# Patient Record
Sex: MALE | Race: WHITE | NOT HISPANIC OR LATINO | Employment: OTHER | URBAN - NONMETROPOLITAN AREA
[De-identification: names, ages, dates, MRNs, and addresses within clinical notes are randomized per-mention and may not be internally consistent; named-entity substitution may affect disease eponyms.]

---

## 2021-04-21 ENCOUNTER — APPOINTMENT (EMERGENCY)
Dept: RADIOLOGY | Facility: HOSPITAL | Age: 81
DRG: 872 | End: 2021-04-21
Payer: MEDICARE

## 2021-04-21 ENCOUNTER — HOSPITAL ENCOUNTER (INPATIENT)
Facility: HOSPITAL | Age: 81
LOS: 7 days | Discharge: HOME | DRG: 872 | End: 2021-04-28
Attending: EMERGENCY MEDICINE | Admitting: FAMILY MEDICINE
Payer: MEDICARE

## 2021-04-21 ENCOUNTER — APPOINTMENT (EMERGENCY)
Dept: CT IMAGING | Facility: HOSPITAL | Age: 81
DRG: 872 | End: 2021-04-21
Payer: MEDICARE

## 2021-04-21 DIAGNOSIS — B96.5 BACTEREMIA DUE TO PSEUDOMONAS: ICD-10-CM

## 2021-04-21 DIAGNOSIS — I10 ESSENTIAL HYPERTENSION: ICD-10-CM

## 2021-04-21 DIAGNOSIS — R50.9 FEVER: Primary | ICD-10-CM

## 2021-04-21 DIAGNOSIS — R65.10 SIRS (SYSTEMIC INFLAMMATORY RESPONSE SYNDROME) (HCC): ICD-10-CM

## 2021-04-21 DIAGNOSIS — N39.0 UTI (URINARY TRACT INFECTION): ICD-10-CM

## 2021-04-21 DIAGNOSIS — D72.829 LEUKOCYTOSIS: ICD-10-CM

## 2021-04-21 DIAGNOSIS — E87.2 LACTIC ACIDOSIS: ICD-10-CM

## 2021-04-21 DIAGNOSIS — R94.31 ABNORMAL EKG: ICD-10-CM

## 2021-04-21 DIAGNOSIS — R78.81 BACTEREMIA DUE TO PSEUDOMONAS: ICD-10-CM

## 2021-04-21 PROBLEM — A41.9 SEPSIS (HCC): Status: ACTIVE | Noted: 2021-04-21

## 2021-04-21 PROBLEM — I34.0 MITRAL REGURGITATION: Status: ACTIVE | Noted: 2021-04-21

## 2021-04-21 PROBLEM — E78.5 HYPERLIPIDEMIA: Status: ACTIVE | Noted: 2021-04-21

## 2021-04-21 PROBLEM — I25.810 CORONARY ARTERY DISEASE INVOLVING CORONARY BYPASS GRAFT: Status: ACTIVE | Noted: 2021-04-21

## 2021-04-21 LAB
ALBUMIN SERPL BCP-MCNC: 4 G/DL (ref 3.5–5)
ALP SERPL-CCNC: 136 U/L (ref 46–116)
ALT SERPL W P-5'-P-CCNC: 26 U/L (ref 12–78)
ANION GAP SERPL CALCULATED.3IONS-SCNC: 12 MMOL/L (ref 4–13)
APTT PPP: 28 SECONDS (ref 23–37)
AST SERPL W P-5'-P-CCNC: 14 U/L (ref 5–45)
BACTERIA UR QL AUTO: ABNORMAL /HPF
BASOPHILS # BLD AUTO: 0.04 THOUSANDS/ΜL (ref 0–0.1)
BASOPHILS NFR BLD AUTO: 0 % (ref 0–1)
BILIRUB SERPL-MCNC: 1.65 MG/DL (ref 0.2–1)
BILIRUB UR QL STRIP: NEGATIVE
BUN SERPL-MCNC: 31 MG/DL (ref 5–25)
CALCIUM SERPL-MCNC: 8.2 MG/DL (ref 8.3–10.1)
CHLORIDE SERPL-SCNC: 102 MMOL/L (ref 100–108)
CLARITY UR: ABNORMAL
CO2 SERPL-SCNC: 25 MMOL/L (ref 21–32)
COLOR UR: YELLOW
CREAT SERPL-MCNC: 1.54 MG/DL (ref 0.6–1.3)
EOSINOPHIL # BLD AUTO: 0.11 THOUSAND/ΜL (ref 0–0.61)
EOSINOPHIL NFR BLD AUTO: 1 % (ref 0–6)
ERYTHROCYTE [DISTWIDTH] IN BLOOD BY AUTOMATED COUNT: 15.2 % (ref 11.6–15.1)
FLUAV RNA RESP QL NAA+PROBE: NEGATIVE
FLUBV RNA RESP QL NAA+PROBE: NEGATIVE
GFR SERPL CREATININE-BSD FRML MDRD: 42 ML/MIN/1.73SQ M
GLUCOSE SERPL-MCNC: 146 MG/DL (ref 65–140)
GLUCOSE UR STRIP-MCNC: NEGATIVE MG/DL
HCT VFR BLD AUTO: 35.5 % (ref 36.5–49.3)
HGB BLD-MCNC: 12.3 G/DL (ref 12–17)
HGB UR QL STRIP.AUTO: ABNORMAL
IMM GRANULOCYTES # BLD AUTO: 0.05 THOUSAND/UL (ref 0–0.2)
IMM GRANULOCYTES NFR BLD AUTO: 0 % (ref 0–2)
INR PPP: 1.09 (ref 0.84–1.19)
KETONES UR STRIP-MCNC: NEGATIVE MG/DL
LACTATE SERPL-SCNC: 1.4 MMOL/L (ref 0.5–2)
LACTATE SERPL-SCNC: 2.5 MMOL/L (ref 0.5–2)
LEUKOCYTE ESTERASE UR QL STRIP: ABNORMAL
LIPASE SERPL-CCNC: 159 U/L (ref 73–393)
LYMPHOCYTES # BLD AUTO: 0.96 THOUSANDS/ΜL (ref 0.6–4.47)
LYMPHOCYTES NFR BLD AUTO: 8 % (ref 14–44)
MCH RBC QN AUTO: 33 PG (ref 26.8–34.3)
MCHC RBC AUTO-ENTMCNC: 34.6 G/DL (ref 31.4–37.4)
MCV RBC AUTO: 95 FL (ref 82–98)
MONOCYTES # BLD AUTO: 0.32 THOUSAND/ΜL (ref 0.17–1.22)
MONOCYTES NFR BLD AUTO: 3 % (ref 4–12)
NEUTROPHILS # BLD AUTO: 10.61 THOUSANDS/ΜL (ref 1.85–7.62)
NEUTS SEG NFR BLD AUTO: 88 % (ref 43–75)
NITRITE UR QL STRIP: NEGATIVE
NON-SQ EPI CELLS URNS QL MICRO: ABNORMAL /HPF
NRBC BLD AUTO-RTO: 0 /100 WBCS
NT-PROBNP SERPL-MCNC: 921 PG/ML
PH UR STRIP.AUTO: 6 [PH]
PLATELET # BLD AUTO: 152 THOUSANDS/UL (ref 149–390)
PMV BLD AUTO: 10.2 FL (ref 8.9–12.7)
POTASSIUM SERPL-SCNC: 3.5 MMOL/L (ref 3.5–5.3)
PROT SERPL-MCNC: 7.1 G/DL (ref 6.4–8.2)
PROT UR STRIP-MCNC: ABNORMAL MG/DL
PROTHROMBIN TIME: 13.9 SECONDS (ref 11.6–14.5)
RBC # BLD AUTO: 3.73 MILLION/UL (ref 3.88–5.62)
RBC #/AREA URNS AUTO: ABNORMAL /HPF
RSV RNA RESP QL NAA+PROBE: NEGATIVE
SARS-COV-2 RNA RESP QL NAA+PROBE: NEGATIVE
SODIUM SERPL-SCNC: 139 MMOL/L (ref 136–145)
SP GR UR STRIP.AUTO: 1.02 (ref 1–1.03)
TROPONIN I SERPL-MCNC: 0.02 NG/ML
UROBILINOGEN UR QL STRIP.AUTO: 0.2 E.U./DL
WBC # BLD AUTO: 12.09 THOUSAND/UL (ref 4.31–10.16)
WBC #/AREA URNS AUTO: ABNORMAL /HPF

## 2021-04-21 PROCEDURE — 85610 PROTHROMBIN TIME: CPT | Performed by: EMERGENCY MEDICINE

## 2021-04-21 PROCEDURE — 84484 ASSAY OF TROPONIN QUANT: CPT | Performed by: EMERGENCY MEDICINE

## 2021-04-21 PROCEDURE — 85730 THROMBOPLASTIN TIME PARTIAL: CPT | Performed by: EMERGENCY MEDICINE

## 2021-04-21 PROCEDURE — 99285 EMERGENCY DEPT VISIT HI MDM: CPT

## 2021-04-21 PROCEDURE — 96361 HYDRATE IV INFUSION ADD-ON: CPT

## 2021-04-21 PROCEDURE — 99285 EMERGENCY DEPT VISIT HI MDM: CPT | Performed by: EMERGENCY MEDICINE

## 2021-04-21 PROCEDURE — 87077 CULTURE AEROBIC IDENTIFY: CPT | Performed by: EMERGENCY MEDICINE

## 2021-04-21 PROCEDURE — 87040 BLOOD CULTURE FOR BACTERIA: CPT | Performed by: EMERGENCY MEDICINE

## 2021-04-21 PROCEDURE — 85025 COMPLETE CBC W/AUTO DIFF WBC: CPT | Performed by: EMERGENCY MEDICINE

## 2021-04-21 PROCEDURE — 87186 SC STD MICRODIL/AGAR DIL: CPT | Performed by: EMERGENCY MEDICINE

## 2021-04-21 PROCEDURE — 36415 COLL VENOUS BLD VENIPUNCTURE: CPT | Performed by: EMERGENCY MEDICINE

## 2021-04-21 PROCEDURE — 80053 COMPREHEN METABOLIC PANEL: CPT | Performed by: EMERGENCY MEDICINE

## 2021-04-21 PROCEDURE — 85049 AUTOMATED PLATELET COUNT: CPT | Performed by: STUDENT IN AN ORGANIZED HEALTH CARE EDUCATION/TRAINING PROGRAM

## 2021-04-21 PROCEDURE — 96374 THER/PROPH/DIAG INJ IV PUSH: CPT

## 2021-04-21 PROCEDURE — 83605 ASSAY OF LACTIC ACID: CPT | Performed by: EMERGENCY MEDICINE

## 2021-04-21 PROCEDURE — 1124F ACP DISCUSS-NO DSCNMKR DOCD: CPT | Performed by: EMERGENCY MEDICINE

## 2021-04-21 PROCEDURE — 83690 ASSAY OF LIPASE: CPT | Performed by: EMERGENCY MEDICINE

## 2021-04-21 PROCEDURE — 70450 CT HEAD/BRAIN W/O DYE: CPT

## 2021-04-21 PROCEDURE — 87086 URINE CULTURE/COLONY COUNT: CPT | Performed by: EMERGENCY MEDICINE

## 2021-04-21 PROCEDURE — 93005 ELECTROCARDIOGRAM TRACING: CPT

## 2021-04-21 PROCEDURE — 83880 ASSAY OF NATRIURETIC PEPTIDE: CPT | Performed by: EMERGENCY MEDICINE

## 2021-04-21 PROCEDURE — 71045 X-RAY EXAM CHEST 1 VIEW: CPT

## 2021-04-21 PROCEDURE — 0241U HB NFCT DS VIR RESP RNA 4 TRGT: CPT | Performed by: NURSE PRACTITIONER

## 2021-04-21 PROCEDURE — 81001 URINALYSIS AUTO W/SCOPE: CPT | Performed by: EMERGENCY MEDICINE

## 2021-04-21 RX ORDER — METOPROLOL TARTRATE 100 MG/1
100 TABLET ORAL EVERY 12 HOURS SCHEDULED
COMMUNITY
End: 2022-03-05 | Stop reason: HOSPADM

## 2021-04-21 RX ORDER — ACETAMINOPHEN 325 MG/1
975 TABLET ORAL ONCE
Status: COMPLETED | OUTPATIENT
Start: 2021-04-21 | End: 2021-04-21

## 2021-04-21 RX ORDER — CEFTRIAXONE 1 G/50ML
1000 INJECTION, SOLUTION INTRAVENOUS ONCE
Status: COMPLETED | OUTPATIENT
Start: 2021-04-21 | End: 2021-04-21

## 2021-04-21 RX ORDER — CLOPIDOGREL BISULFATE 75 MG/1
75 TABLET ORAL DAILY
COMMUNITY

## 2021-04-21 RX ORDER — ASPIRIN 81 MG/1
81 TABLET, CHEWABLE ORAL DAILY
COMMUNITY
End: 2022-02-28 | Stop reason: CLARIF

## 2021-04-21 RX ORDER — ATORVASTATIN CALCIUM 40 MG/1
40 TABLET, FILM COATED ORAL DAILY
COMMUNITY

## 2021-04-21 RX ORDER — SODIUM CHLORIDE 9 MG/ML
150 INJECTION, SOLUTION INTRAVENOUS CONTINUOUS
Status: DISCONTINUED | OUTPATIENT
Start: 2021-04-21 | End: 2021-04-22

## 2021-04-21 RX ADMIN — SODIUM CHLORIDE 150 ML/HR: 0.9 INJECTION, SOLUTION INTRAVENOUS at 23:13

## 2021-04-21 RX ADMIN — ACETAMINOPHEN 975 MG: 325 TABLET ORAL at 21:34

## 2021-04-21 RX ADMIN — SODIUM CHLORIDE 1000 ML: 0.9 INJECTION, SOLUTION INTRAVENOUS at 21:34

## 2021-04-21 RX ADMIN — CEFTRIAXONE 1000 MG: 1 INJECTION, SOLUTION INTRAVENOUS at 22:30

## 2021-04-22 ENCOUNTER — APPOINTMENT (INPATIENT)
Dept: ULTRASOUND IMAGING | Facility: HOSPITAL | Age: 81
DRG: 872 | End: 2021-04-22
Payer: MEDICARE

## 2021-04-22 ENCOUNTER — APPOINTMENT (INPATIENT)
Dept: RADIOLOGY | Facility: HOSPITAL | Age: 81
DRG: 872 | End: 2021-04-22
Payer: MEDICARE

## 2021-04-22 PROBLEM — I65.29 CAROTID ARTERY STENOSIS: Status: ACTIVE | Noted: 2021-04-22

## 2021-04-22 LAB
ANION GAP SERPL CALCULATED.3IONS-SCNC: 10 MMOL/L (ref 4–13)
ATRIAL RATE: 97 BPM
ATRIAL RATE: 99 BPM
BASOPHILS # BLD AUTO: 0.05 THOUSANDS/ΜL (ref 0–0.1)
BASOPHILS NFR BLD AUTO: 0 % (ref 0–1)
BUN SERPL-MCNC: 30 MG/DL (ref 5–25)
CALCIUM SERPL-MCNC: 7.4 MG/DL (ref 8.3–10.1)
CHLORIDE SERPL-SCNC: 104 MMOL/L (ref 100–108)
CO2 SERPL-SCNC: 24 MMOL/L (ref 21–32)
CREAT SERPL-MCNC: 1.59 MG/DL (ref 0.6–1.3)
EOSINOPHIL # BLD AUTO: 0.02 THOUSAND/ΜL (ref 0–0.61)
EOSINOPHIL NFR BLD AUTO: 0 % (ref 0–6)
ERYTHROCYTE [DISTWIDTH] IN BLOOD BY AUTOMATED COUNT: 15.6 % (ref 11.6–15.1)
GFR SERPL CREATININE-BSD FRML MDRD: 40 ML/MIN/1.73SQ M
GLUCOSE SERPL-MCNC: 138 MG/DL (ref 65–140)
HCT VFR BLD AUTO: 33.1 % (ref 36.5–49.3)
HGB BLD-MCNC: 11.4 G/DL (ref 12–17)
IMM GRANULOCYTES # BLD AUTO: 0.1 THOUSAND/UL (ref 0–0.2)
IMM GRANULOCYTES NFR BLD AUTO: 0 % (ref 0–2)
LYMPHOCYTES # BLD AUTO: 0.7 THOUSANDS/ΜL (ref 0.6–4.47)
LYMPHOCYTES NFR BLD AUTO: 3 % (ref 14–44)
MCH RBC QN AUTO: 32.9 PG (ref 26.8–34.3)
MCHC RBC AUTO-ENTMCNC: 34.4 G/DL (ref 31.4–37.4)
MCV RBC AUTO: 95 FL (ref 82–98)
MONOCYTES # BLD AUTO: 1.53 THOUSAND/ΜL (ref 0.17–1.22)
MONOCYTES NFR BLD AUTO: 7 % (ref 4–12)
NEUTROPHILS # BLD AUTO: 19.96 THOUSANDS/ΜL (ref 1.85–7.62)
NEUTS SEG NFR BLD AUTO: 90 % (ref 43–75)
NRBC BLD AUTO-RTO: 0 /100 WBCS
P AXIS: 55 DEGREES
P AXIS: 64 DEGREES
PLATELET # BLD AUTO: 152 THOUSANDS/UL (ref 149–390)
PLATELET # BLD AUTO: 156 THOUSANDS/UL (ref 149–390)
PMV BLD AUTO: 10.2 FL (ref 8.9–12.7)
PMV BLD AUTO: 10.6 FL (ref 8.9–12.7)
POTASSIUM SERPL-SCNC: 4.1 MMOL/L (ref 3.5–5.3)
PR INTERVAL: 216 MS
PR INTERVAL: 220 MS
PROCALCITONIN SERPL-MCNC: 6.31 NG/ML
QRS AXIS: 49 DEGREES
QRS AXIS: 54 DEGREES
QRSD INTERVAL: 86 MS
QRSD INTERVAL: 92 MS
QT INTERVAL: 348 MS
QT INTERVAL: 350 MS
QTC INTERVAL: 441 MS
QTC INTERVAL: 449 MS
RBC # BLD AUTO: 3.47 MILLION/UL (ref 3.88–5.62)
SODIUM SERPL-SCNC: 138 MMOL/L (ref 136–145)
T WAVE AXIS: 190 DEGREES
T WAVE AXIS: 216 DEGREES
VENTRICULAR RATE: 97 BPM
VENTRICULAR RATE: 99 BPM
WBC # BLD AUTO: 22.36 THOUSAND/UL (ref 4.31–10.16)

## 2021-04-22 PROCEDURE — 80048 BASIC METABOLIC PNL TOTAL CA: CPT | Performed by: STUDENT IN AN ORGANIZED HEALTH CARE EDUCATION/TRAINING PROGRAM

## 2021-04-22 PROCEDURE — 84145 PROCALCITONIN (PCT): CPT | Performed by: STUDENT IN AN ORGANIZED HEALTH CARE EDUCATION/TRAINING PROGRAM

## 2021-04-22 PROCEDURE — 85025 COMPLETE CBC W/AUTO DIFF WBC: CPT | Performed by: STUDENT IN AN ORGANIZED HEALTH CARE EDUCATION/TRAINING PROGRAM

## 2021-04-22 PROCEDURE — 99222 1ST HOSP IP/OBS MODERATE 55: CPT | Performed by: INTERNAL MEDICINE

## 2021-04-22 PROCEDURE — 76770 US EXAM ABDO BACK WALL COMP: CPT

## 2021-04-22 PROCEDURE — 71046 X-RAY EXAM CHEST 2 VIEWS: CPT

## 2021-04-22 PROCEDURE — 94640 AIRWAY INHALATION TREATMENT: CPT

## 2021-04-22 PROCEDURE — 94760 N-INVAS EAR/PLS OXIMETRY 1: CPT

## 2021-04-22 RX ORDER — CEFTRIAXONE 2 G/50ML
2000 INJECTION, SOLUTION INTRAVENOUS EVERY 24 HOURS
Status: DISCONTINUED | OUTPATIENT
Start: 2021-04-22 | End: 2021-04-22

## 2021-04-22 RX ORDER — ALBUTEROL SULFATE 2.5 MG/3ML
SOLUTION RESPIRATORY (INHALATION)
Status: COMPLETED
Start: 2021-04-22 | End: 2021-04-22

## 2021-04-22 RX ORDER — HEPARIN SODIUM 5000 [USP'U]/ML
5000 INJECTION, SOLUTION INTRAVENOUS; SUBCUTANEOUS EVERY 8 HOURS SCHEDULED
Status: DISCONTINUED | OUTPATIENT
Start: 2021-04-22 | End: 2021-04-28 | Stop reason: HOSPADM

## 2021-04-22 RX ORDER — CLOPIDOGREL BISULFATE 75 MG/1
75 TABLET ORAL DAILY
Status: DISCONTINUED | OUTPATIENT
Start: 2021-04-22 | End: 2021-04-28 | Stop reason: HOSPADM

## 2021-04-22 RX ORDER — METOPROLOL TARTRATE 50 MG/1
100 TABLET, FILM COATED ORAL EVERY 12 HOURS SCHEDULED
Status: DISCONTINUED | OUTPATIENT
Start: 2021-04-22 | End: 2021-04-28 | Stop reason: HOSPADM

## 2021-04-22 RX ORDER — ATORVASTATIN CALCIUM 40 MG/1
40 TABLET, FILM COATED ORAL DAILY
Status: DISCONTINUED | OUTPATIENT
Start: 2021-04-22 | End: 2021-04-28 | Stop reason: HOSPADM

## 2021-04-22 RX ORDER — ASPIRIN 81 MG/1
81 TABLET, CHEWABLE ORAL DAILY
Status: DISCONTINUED | OUTPATIENT
Start: 2021-04-22 | End: 2021-04-28 | Stop reason: HOSPADM

## 2021-04-22 RX ORDER — ACETAMINOPHEN 325 MG/1
650 TABLET ORAL EVERY 6 HOURS PRN
Status: DISCONTINUED | OUTPATIENT
Start: 2021-04-22 | End: 2021-04-28 | Stop reason: HOSPADM

## 2021-04-22 RX ORDER — CEFEPIME HYDROCHLORIDE 1 G/50ML
1000 INJECTION, SOLUTION INTRAVENOUS EVERY 12 HOURS
Status: DISCONTINUED | OUTPATIENT
Start: 2021-04-22 | End: 2021-04-28

## 2021-04-22 RX ORDER — ALBUTEROL SULFATE 2.5 MG/3ML
2.5 SOLUTION RESPIRATORY (INHALATION) EVERY 4 HOURS PRN
Status: DISCONTINUED | OUTPATIENT
Start: 2021-04-22 | End: 2021-04-28 | Stop reason: HOSPADM

## 2021-04-22 RX ORDER — AMLODIPINE BESYLATE 5 MG/1
5 TABLET ORAL
Status: DISCONTINUED | OUTPATIENT
Start: 2021-04-22 | End: 2021-04-25

## 2021-04-22 RX ADMIN — ALBUTEROL SULFATE 2.5 MG: 2.5 SOLUTION RESPIRATORY (INHALATION) at 10:15

## 2021-04-22 RX ADMIN — HEPARIN SODIUM 5000 UNITS: 5000 INJECTION INTRAVENOUS; SUBCUTANEOUS at 17:43

## 2021-04-22 RX ADMIN — ATORVASTATIN CALCIUM 40 MG: 40 TABLET, FILM COATED ORAL at 09:28

## 2021-04-22 RX ADMIN — ASPIRIN 81 MG CHEWABLE TABLET 81 MG: 81 TABLET CHEWABLE at 09:28

## 2021-04-22 RX ADMIN — METOPROLOL TARTRATE 100 MG: 50 TABLET, FILM COATED ORAL at 21:52

## 2021-04-22 RX ADMIN — ACETAMINOPHEN 650 MG: 325 TABLET ORAL at 18:57

## 2021-04-22 RX ADMIN — HEPARIN SODIUM 5000 UNITS: 5000 INJECTION INTRAVENOUS; SUBCUTANEOUS at 09:29

## 2021-04-22 RX ADMIN — HEPARIN SODIUM 5000 UNITS: 5000 INJECTION INTRAVENOUS; SUBCUTANEOUS at 01:11

## 2021-04-22 RX ADMIN — SODIUM CHLORIDE 150 ML/HR: 0.9 INJECTION, SOLUTION INTRAVENOUS at 06:04

## 2021-04-22 RX ADMIN — METOPROLOL TARTRATE 100 MG: 50 TABLET, FILM COATED ORAL at 01:11

## 2021-04-22 RX ADMIN — METOPROLOL TARTRATE 100 MG: 50 TABLET, FILM COATED ORAL at 09:32

## 2021-04-22 RX ADMIN — CLOPIDOGREL BISULFATE 75 MG: 75 TABLET ORAL at 09:28

## 2021-04-22 RX ADMIN — AMLODIPINE BESYLATE 5 MG: 5 TABLET ORAL at 09:28

## 2021-04-22 RX ADMIN — CEFEPIME HYDROCHLORIDE 1000 MG: 1 INJECTION, SOLUTION INTRAVENOUS at 12:24

## 2021-04-22 NOTE — RESPIRATORY THERAPY NOTE
RT Protocol Note  [facility] 80 y o  male MRN: [id number]  Unit/Bed#: -01 Encounter: [id number]    Assessment    Principal Problem:    Sepsis ([address] )  Active Problems:    UTI (urinary tract infection)    Coronary artery disease involving coronary bypass graft    Mitral regurgitation    Essential hypertension    Hyperlipidemia    Carotid artery stenosis      Home Pulmonary Medications:  None       Past Medical History:   Diagnosis Date    Hypertension      Social History     Socioeconomic History    Marital status: /Civil Union     Spouse name: None    Number of children: None    Years of education: None    Highest education level: None   Occupational History    None   Social Needs    Financial resource strain: None    Food insecurity     Worry: None     Inability: None    Transportation needs     Medical: None     Non-medical: None   Tobacco Use    Smoking status: Never Smoker    Smokeless tobacco: Never Used   Substance and Sexual Activity    Alcohol use: Not Currently    Drug use: Not Currently    Sexual activity: None   Lifestyle    Physical activity     Days per week: None     Minutes per session: None    Stress: None   Relationships    Social connections     Talks on phone: None     Gets together: None     Attends [employer] service: None     Active member of club or organization: None     Attends meetings of clubs or organizations: None     Relationship status: None    Intimate partner violence     Fear of current or ex partner: None     Emotionally abused: None     Physically abused: None     Forced sexual activity: None   Other Topics Concern    None   Social History Narrative    None       Subjective    Subjective Data: Pt states he is SOB with exertion normally but not usually without exertion       Objective    Physical Exam:   Assessment Type: Assess only  General Appearance: Alert, Awake  Respiratory Pattern: Dyspnea with exertion, Dyspnea at rest  Chest Assessment: Chest expansion symmetrical  Bilateral Breath Sounds: Diminished, Expiratory wheezes  Cough: Dry, Non-productive  O2 Device: RA    Vitals:  Blood pressure 150/71, pulse 75, temperature 97 8 °F (36 6 °C), temperature source Oral, resp  rate 22, height 5' 8" (1 727 m), weight 76 3 kg (168 lb 3 4 oz), SpO2 94 %  Imaging and other studies: I have personally reviewed pertinent reports  O2 Device: RA     Plan    Respiratory Plan: Mild Distress pathway        Resp Comments: (P) Pt was never a smoker, no pulmonary hx, no home respiratory medications  Pt now having exp  wheezing with increased WOB  CXR ordered to r/o PNA vs fluid  Albuterol PRN UDN tx ordered and given at this time  Pt instructed on IS and understands well  Will continue to monitor

## 2021-04-22 NOTE — ED PROVIDER NOTES
History  Chief Complaint   Patient presents with    Chills     Patient started with chills and feels off balance about 2 hours ago  Patient denies cough, sob, NVD at this time  Patient is an 54-year-old male presents the emergency department complaining of chills and shakiness started abruptly this afternoon about 1 hour prior to arrival no associated slurred speech headache blurred vision no focal numbness or weakness no confusion  Patient noted to have a low-grade fever 100 4 and describes feeling generally fatigued and having chills and shakiness  Patient is not off balance or falling no ataxia  No chest pain no shortness of breath  History provided by:  Patient and spouse  Fever - 76 years or older  Max temp prior to arrival:  100 4  Severity:  Moderate  Onset quality:  Sudden  Duration:  1 hour  Timing:  Constant  Progression:  Worsening  Chronicity:  New  Associated symptoms: chills    Associated symptoms: no chest pain, no congestion, no cough, no diarrhea, no dysuria, no ear pain, no headaches, no myalgias, no nausea, no rash, no rhinorrhea, no sore throat and no vomiting        Prior to Admission Medications   Prescriptions Last Dose Informant Patient Reported? Taking?    AMLODIPINE BESYLATE PO 4/21/2021 at Unknown time  Yes Yes   Sig: Take 5 mg by mouth daily with lunch   aspirin 81 mg chewable tablet 4/21/2021 at Unknown time  Yes Yes   Sig: Chew 81 mg daily   atorvastatin (LIPITOR) 40 mg tablet 4/21/2021 at Unknown time  Yes Yes   Sig: Take 40 mg by mouth daily   clopidogrel (PLAVIX) 75 mg tablet 4/21/2021 at Unknown time  Yes Yes   Sig: Take 75 mg by mouth daily   metoprolol tartrate (LOPRESSOR) 100 mg tablet 4/21/2021 at Unknown time  Yes Yes   Sig: Take 100 mg by mouth every 12 (twelve) hours      Facility-Administered Medications: None       Past Medical History:   Diagnosis Date    Hypertension        Past Surgical History:   Procedure Laterality Date    CARDIAC SURGERY History reviewed  No pertinent family history  I have reviewed and agree with the history as documented  E-Cigarette/Vaping    E-Cigarette Use Never User      E-Cigarette/Vaping Substances     Social History     Tobacco Use    Smoking status: Never Smoker    Smokeless tobacco: Never Used   Substance Use Topics    Alcohol use: Not Currently    Drug use: Not Currently       Review of Systems   Constitutional: Positive for chills, fatigue and fever  Negative for activity change and appetite change  HENT: Negative for congestion, ear pain, rhinorrhea and sore throat  Eyes: Negative for discharge, redness and visual disturbance  Respiratory: Negative for cough, chest tightness, shortness of breath and wheezing  Cardiovascular: Negative for chest pain and palpitations  Gastrointestinal: Negative for abdominal pain, constipation, diarrhea, nausea and vomiting  Endocrine: Negative for polydipsia and polyuria  Genitourinary: Negative for difficulty urinating, dysuria, frequency, hematuria and urgency  Musculoskeletal: Negative for arthralgias and myalgias  Skin: Negative for color change, pallor and rash  Neurological: Positive for light-headedness  Negative for dizziness, weakness, numbness and headaches  Hematological: Negative for adenopathy  Does not bruise/bleed easily  All other systems reviewed and are negative  Physical Exam  Physical Exam  Vitals signs and nursing note reviewed  Constitutional:       Appearance: He is well-developed  HENT:      Head: Normocephalic and atraumatic  Right Ear: External ear normal       Left Ear: External ear normal       Nose: Nose normal    Eyes:      Conjunctiva/sclera: Conjunctivae normal       Pupils: Pupils are equal, round, and reactive to light  Neck:      Musculoskeletal: Normal range of motion and neck supple  Cardiovascular:      Rate and Rhythm: Normal rate and regular rhythm  Heart sounds: Normal heart sounds  Pulmonary:      Effort: Pulmonary effort is normal  No respiratory distress  Breath sounds: Normal breath sounds  No wheezing or rales  Chest:      Chest wall: No tenderness  Abdominal:      General: Bowel sounds are normal  There is no distension  Palpations: Abdomen is soft  Tenderness: There is no abdominal tenderness  There is no guarding  Musculoskeletal: Normal range of motion  Skin:     General: Skin is warm and dry  Neurological:      Mental Status: He is alert and oriented to person, place, and time  Cranial Nerves: No cranial nerve deficit  Sensory: No sensory deficit           Vital Signs  ED Triage Vitals   Temperature Pulse Respirations Blood Pressure SpO2   04/21/21 2125 04/21/21 2125 04/21/21 2125 04/21/21 2125 04/21/21 2125   100 4 °F (38 °C) 102 20 (!) 205/88 96 %      Temp Source Heart Rate Source Patient Position - Orthostatic VS BP Location FiO2 (%)   04/21/21 2125 04/21/21 2125 04/21/21 2125 04/21/21 2125 --   Temporal Monitor Lying Left arm       Pain Score       04/21/21 2130       No Pain           Vitals:    04/21/21 2130 04/21/21 2200 04/21/21 2215 04/21/21 2230   BP: (!) 185/86 167/70 155/67 (!) 171/72   Pulse: 98 99 97 97   Patient Position - Orthostatic VS: Lying Lying Lying Lying         Visual Acuity  Visual Acuity      Most Recent Value   L Pupil Size (mm)  4   R Pupil Size (mm)  4          ED Medications  Medications   cefTRIAXone (ROCEPHIN) IVPB (premix in dextrose) 1,000 mg 50 mL (1,000 mg Intravenous New Bag 4/21/21 2230)   sodium chloride 0 9 % infusion (has no administration in time range)   sodium chloride 0 9 % bolus 1,000 mL (1,000 mL Intravenous New Bag 4/21/21 2134)   acetaminophen (TYLENOL) tablet 975 mg (975 mg Oral Given 4/21/21 2134)       Diagnostic Studies  Results Reviewed     Procedure Component Value Units Date/Time    COVID19, Influenza A/B, RSV PCR, [facility] [[id number]]     Lab Status: No result Specimen: Nares from Nose     Urine Microscopic [[id number]]  (Abnormal) Collected: 04/21/21 2207    Lab Status: Final result Specimen: Urine, Clean Catch Updated: 04/21/21 2231     RBC, UA 10-20 /hpf      WBC, UA 20-30 /hpf      Epithelial Cells None Seen /hpf      Bacteria, UA Innumerable /hpf     Urine culture [[id number]] Collected: 04/21/21 2207    Lab Status: In process Specimen: Urine, Clean Catch Updated: 04/21/21 2226    UA w Reflex to Microscopic w Reflex to Culture [[id number]]  (Abnormal) Collected: 04/21/21 2207    Lab Status: Final result Specimen: Urine, Clean Catch Updated: 04/21/21 2214     Color, UA Yellow     Clarity, UA Slightly Cloudy     Specific [place], UA 1 020     pH, UA 6 0     Leukocytes, UA Small     Nitrite, UA Negative     Protein, UA 30 (1+) mg/dl      Glucose, UA Negative mg/dl      Ketones, UA Negative mg/dl      Urobilinogen, UA 0 2 E U /dl      Bilirubin, UA Negative     Blood, UA Small    NT-BNP PRO [[id number]]  (Abnormal) Collected: 04/21/21 2131    Lab Status: Final result Specimen: Blood from Arm, Right Updated: 04/21/21 2214     NT-proBNP 921 pg/mL     Lactic acid [[id number]]  (Abnormal) Collected: 04/21/21 2131    Lab Status: Final result Specimen: Blood from Arm, Right Updated: 04/21/21 2158     LACTIC ACID 2 5 mmol/L     Narrative:      Result may be elevated if tourniquet was used during collection      Lactic acid 2 Hours [[id number]]     Lab Status: No result Specimen: Blood     Troponin I [[id number]]  (Normal) Collected: 04/21/21 2131    Lab Status: Final result Specimen: Blood from Arm, Right Updated: 04/21/21 2156     Troponin I 0 02 ng/mL     Comprehensive metabolic panel [[id number]]  (Abnormal) Collected: 04/21/21 2131    Lab Status: Final result Specimen: Blood from Arm, Right Updated: 04/21/21 2154     Sodium 139 mmol/L      Potassium 3 5 mmol/L      Chloride 102 mmol/L      CO2 25 mmol/L      ANION GAP 12 mmol/L      BUN 31 mg/dL      Creatinine 1 54 mg/dL      Glucose 146 mg/dL      Calcium 8 2 mg/dL AST 14 U/L      ALT 26 U/L      Alkaline Phosphatase 136 U/L      Total Protein 7 1 g/dL      Albumin 4 0 g/dL      Total Bilirubin 1 65 mg/dL      eGFR 42 ml/min/1 73sq m     Narrative:      [place] guidelines for Chronic Kidney Disease (CKD):     Stage 1 with normal or high GFR (GFR > 90 mL/min/1 73 square meters)    Stage 2 Mild CKD (GFR = 60-89 mL/min/1 73 square meters)    Stage 3A Moderate CKD (GFR = 45-59 mL/min/1 73 square meters)    Stage 3B Moderate CKD (GFR = 30-44 mL/min/1 73 square meters)    Stage 4 Severe CKD (GFR = 15-29 mL/min/1 73 square meters)    Stage 5 End Stage CKD (GFR <15 mL/min/1 73 square meters)  Note: GFR calculation is accurate only with a steady state creatinine    Lipase [[id number]]  (Normal) Collected: 04/21/21 2131    Lab Status: Final result Specimen: Blood from Arm, Right Updated: 04/21/21 2154     Lipase 159 u/L     Protime-INR [[id number]]  (Normal) Collected: 04/21/21 2131    Lab Status: Final result Specimen: Blood from Arm, Right Updated: 04/21/21 2150     Protime 13 9 seconds      INR 1 09    APTT [[id number]]  (Normal) Collected: 04/21/21 2131    Lab Status: Final result Specimen: Blood from Arm, Right Updated: 04/21/21 2150     PTT 28 seconds     Blood culture #1 [[id number]] Collected: 04/21/21 2140    Lab Status:  In process Specimen: Blood from Arm, Left Updated: 04/21/21 2147    CBC and differential [[id number]]  (Abnormal) Collected: 04/21/21 2131    Lab Status: Final result Specimen: Blood from Arm, Right Updated: 04/21/21 2137     WBC 12 09 Thousand/uL      RBC 3 73 Million/uL      Hemoglobin 12 3 g/dL      Hematocrit 35 5 %      MCV 95 fL      MCH 33 0 pg      MCHC 34 6 g/dL      RDW 15 2 %      MPV 10 2 fL      Platelets 352 Thousands/uL      nRBC 0 /100 WBCs      Neutrophils Relative 88 %      Immat GRANS % 0 %      Lymphocytes Relative 8 %      Monocytes Relative 3 %      Eosinophils Relative 1 %      Basophils Relative 0 % Neutrophils Absolute 10 61 Thousands/µL      Immature Grans Absolute 0 05 Thousand/uL      Lymphocytes Absolute 0 96 Thousands/µL      Monocytes Absolute 0 32 Thousand/µL      Eosinophils Absolute 0 11 Thousand/µL      Basophils Absolute 0 04 Thousands/µL     Blood culture #2 [[id number]] Collected: 04/21/21 2131    Lab Status: In process Specimen: Blood from Arm, Right Updated: 04/21/21 2134                 XR chest 1 view portable   ED Interpretation by Yamini Benítez DO (04/21 2220)   Mild pulmonary vascular congestion elevated right hemidiaphragm no focal infiltrate  CT head without contrast   Final Result by Mary Sanders DO (04/21 2218)      No acute intracranial abnormality is seen  Other findings as above  Workstation performed: [id number]                    Procedures  ECG 12 Lead Documentation Only    Date/Time: 4/21/2021 9:32 PM  Performed by: Yamini Benítez DO  Authorized by: Yamini Benítez DO     ECG reviewed by me, the ED Provider: yes    Patient location:  ED  Previous ECG:     Comparison to cardiac monitor: Yes    Interpretation:     Interpretation: normal    Quality:     Tracing quality:  Limited by artifact  Rate:     ECG rate:  99    ECG rate assessment: normal    Rhythm:     Rhythm: sinus rhythm and A-V block      Rhythm comment:  First degree AV block  QRS:     QRS axis:  Normal    QRS intervals:  Normal  ST segments:     ST segments:  Non-specific  T waves:     T waves: non-specific               ED Course  ED Course as of Apr 21 2238 Wed Apr 21, 2021 2146 Spoke with Dr Braulio Baez Cardiology on-call reviewed EKG specifically given concern for ischemic changes  He agrees no ST elevation criteria present and recommends low threshold for transfer if troponins elevate if not elevated recommends admit for telemetry monitoring serial troponins and evaluation for fever and chills at this point          2202 Slight lactic acidosis and acute kidney injury noted as well as mild leukocytosis and low-grade fever present there is no severe sepsis or septic shock present will continue with IV fluid hydration treated with Tylenol blood cultures obtained no focal infiltrate or pneumonia seen on chest x-ray awaiting urinalysis will likely admit for SIRS vs sepsis and ischemic changes on EKG for telemetry  2218 Urine questionable for possible urinary tract infection no other clear source of infection with leukocytosis fever and slight lactic acidosis will cover with IV antibiotics for now and referred to the hospitalist for further evaluation monitoring and treatment  2236 Spoke with hospitalist nurse-practitioner on-call Alyssia Lockhart reviewed case and findings in the emergency department management thus far she accepts for admission on behalf Dr Linda Garcia                                                  [facility]  Number of Diagnoses or Management Options  Abnormal EKG: new and requires workup  Fever: new and requires workup  Lactic acidosis: new and requires workup  Leukocytosis: new and requires workup  SIRS (systemic inflammatory response syndrome) ([address] ): new and requires workup  UTI (urinary tract infection): new and requires workup     Amount and/or Complexity of Data Reviewed  Clinical lab tests: ordered and reviewed  Tests in the radiology section of CPT®: ordered and reviewed  Tests in the medicine section of CPT®: ordered and reviewed  Decide to obtain previous medical records or to obtain history from someone other than the patient: yes  Review and summarize past medical records: yes  Independent visualization of images, tracings, or specimens: yes    Risk of Complications, Morbidity, and/or Mortality  Presenting problems: moderate  Diagnostic procedures: moderate  Management options: moderate    Patient Progress  Patient progress: stable      Disposition  Final diagnoses:   Fever   Leukocytosis   UTI (urinary tract infection)   Lactic acidosis   SIRS (systemic inflammatory response syndrome) (HCC)   Abnormal EKG     Time reflects when diagnosis was documented in both MDM as applicable and the Disposition within this note     Time User Action Codes Description Comment    4/21/2021 10:17 PM Mónica Cosby Add [R50 9] Fever     4/21/2021 10:17 PM Mónica Cosby Add [M84 603] Leukocytosis     4/21/2021 10:17 PM Remaley, Stormy Car Add [N39 0] UTI (urinary tract infection)     4/21/2021 10:18 PM Remaleafua Eugenio Add [E87 2] Lactic acidosis     4/21/2021 10:18 PM Remaley, Stormy Car Add [N17 0] Acute kidney injury (JOEL) with acute tubular necrosis (ATN) ([email or web address] )     4/21/2021 10:18 PM Remaley, Stormy Car Remove [N17 0] Acute kidney injury (JOEL) with acute tubular necrosis (ATN) ([email or web address] )     4/21/2021 10:18 PM Remaley, Stormy Car Add [R65 10] SIRS (systemic inflammatory response syndrome) ([email or web address] )     4/21/2021 10:19 PM Mónica Cosby Add [R94 31] Abnormal EKG       ED Disposition     ED Disposition Condition Date/Time Comment    Admit Stable Wed Apr 21, 2021 10:37 PM Case was discussed with Natanael Edmonds and the patient's admission status was agreed to be Admission Status: inpatient status to the service of Dr Kirstie Snellen  Follow-up Information    None         Patient's Medications   Discharge Prescriptions    No medications on file     No discharge procedures on file      PDMP Review     None          ED Provider  Electronically Signed by           Reji Espinoza DO  04/21/21 9115

## 2021-04-22 NOTE — H&P
[address]  H&P- Robbie Cool 1940, 80 y o  male MRN: [id number]  Unit/Bed#: -01 Encounter: [id number]  Primary Care Provider: No primary care provider on file  Date and time admitted to hospital: 4/21/2021  9:18 PM    * Sepsis [facility])  Assessment & Plan  · Meets sepsis criteria with temp of 100 4, HR >90, RR >20 and a WBC count of 12 09  · Suspected source of UTI with innumerable bacteria on urine mircoscopic  · Pt received rocephin and 2L NS total    · Initial lactic 2 5, will repeat  · Procal, BMP, CBC AM draw  · Follow urine and blood cultures to end point  UTI (urinary tract infection)  Assessment & Plan  · UA and microscopic showing UTI, likely cause of sepsis  · Pt without urinary frequency, dysuria, or flank pain  · Will continue rocephin  · Will follow urine culture to end point  Coronary artery disease involving coronary bypass graft  Assessment & Plan  · History of CAD s/p CABG 1995  · Denies chest pain at this time  · Will continue ASA 81mg, plavix 75mg, amlodipine 5mg, metoprolol tartrate 100mg BID  Mitral regurgitation  Assessment & Plan  · Echo 3/5/21: normal left ventricular systolic function, EF 65%, mitral valve repair with mild residual regurg, mild tricuspid regurg, mild pulmonary hypertension, mild aortic root dilation  · Continue medication as above  Carotid artery stenosis  Assessment & Plan  · Carotid duplex 02/25/21: 1-19% stenosis right ICA, 20-39% stenosis left ICA, heterogenous plaque in left distal common carotid without significant stenosis  · Continue medications as above  · Continue Lipitor 40mg daily  Essential hypertension  Assessment & Plan  · /72 on admission  · Continue medications as above  Hyperlipidemia  Assessment & Plan  · Continue Lipitor 40mg daily        VTE Prophylaxis: Heparin  / sequential compression device   Code Status: Full code  POLST: There is no POLST form on file for this patient (pre-hospital)  Discussion with family: Wife at bedside on admission    Anticipated Length of Stay:  Patient will be admitted on an Inpatient basis with an anticipated length of stay of  Greater than 2 midnights  Justification for Hospital Stay: Sepsis and UTI treatment    Total Time for Visit, including Counseling / Coordination of Care: 45 minutes  Greater than 50% of this total time spent on direct patient counseling and coordination of care  Chief Complaint:   Chills and shakiness    History of Present Illness:    Wade Arevalo is a 80 y o  male who presents with chills and shakiness  On arrival to the ED pt had a low grade fever at 100 4, a mildly elevated WBC at 12 09, mild tachycardia and tachypnea  Initial lactic acid of 2 5  CT head negative and CXR with some congestion  UA showing small leuks, protein and blood with microscopic showing innumerable bacteria  Blood and urine cultures pending  At this time the patient is denying any urinary symptoms, denies chest pain, SOB, headache, dizziness, or weakness  Review of Systems:    Review of Systems   Constitutional: Positive for chills and fatigue  Negative for fever  Eyes: Negative for visual disturbance  Respiratory: Negative for chest tightness and shortness of breath  Cardiovascular: Negative for chest pain, palpitations and leg swelling  Gastrointestinal: Negative for abdominal pain  Genitourinary: Negative for flank pain, frequency and urgency  Musculoskeletal: Negative for back pain  Neurological: Negative for dizziness, weakness and headaches  Past Medical and Surgical History:     Past Medical History:   Diagnosis Date    Hypertension        Past Surgical History:   Procedure Laterality Date    CARDIAC SURGERY         Meds/Allergies:    Prior to Admission medications    Medication Sig Start Date End Date Taking?  Authorizing Provider   AMLODIPINE BESYLATE PO Take 5 mg by mouth daily with lunch   Yes Historical Provider, MD   aspirin 81 mg chewable tablet Chew 81 mg daily   Yes Historical Provider, MD   atorvastatin (LIPITOR) 40 mg tablet Take 40 mg by mouth daily   Yes Historical Provider, MD   clopidogrel (PLAVIX) 75 mg tablet Take 75 mg by mouth daily   Yes Historical Provider, MD   metoprolol tartrate (LOPRESSOR) 100 mg tablet Take 100 mg by mouth every 12 (twelve) hours   Yes Historical Provider, MD     I have reviewed home medications with patient personally  Allergies: No Known Allergies    Social History:     Marital Status: /Civil Union     Substance Use History:   Social History     Substance and Sexual Activity   Alcohol Use Not Currently     Social History     Tobacco Use   Smoking Status Never Smoker   Smokeless Tobacco Never Used     Social History     Substance and Sexual Activity   Drug Use Not Currently       Family History:    History reviewed  No pertinent family history  Physical Exam:     Vitals:   Blood Pressure: 161/77 (04/22/21 0017)  Pulse: 97 (04/22/21 0017)  Temperature: 100 4 °F (38 °C) (04/21/21 2125)  Temp Source: Temporal (04/21/21 2125)  Respirations: (!) 35 (04/22/21 0017)  Height: 5' 8" (172 7 cm) (04/22/21 0000)  Weight - Scale: 76 3 kg (168 lb 3 4 oz) (04/22/21 0000)  SpO2: 95 % (04/22/21 0017)    Physical Exam  Constitutional:       General: He is not in acute distress  Appearance: He is overweight  He is not ill-appearing, toxic-appearing or diaphoretic  Neck:      Musculoskeletal: Neck supple  Cardiovascular:      Rate and Rhythm: Normal rate and regular rhythm  Heart sounds: Normal heart sounds  No murmur  No friction rub  No gallop  Pulmonary:      Breath sounds: Normal breath sounds  No wheezing, rhonchi or rales  Abdominal:      General: Bowel sounds are normal  There is no distension  Palpations: Abdomen is soft  Tenderness: There is no abdominal tenderness  Musculoskeletal:      Right lower leg: No edema  Left lower leg: No edema  Skin:     General: Skin is warm and dry  Neurological:      Mental Status: He is oriented to person, place, and time  Mental status is at baseline  Additional Data:     Lab Results: I have personally reviewed pertinent reports  Results from last 7 days   Lab Units 04/21/21  2131   WBC Thousand/uL 12 09*   HEMOGLOBIN g/dL 12 3   HEMATOCRIT % 35 5*   PLATELETS Thousands/uL 152   NEUTROS PCT % 88*   LYMPHS PCT % 8*   MONOS PCT % 3*   EOS PCT % 1     Results from last 7 days   Lab Units 04/21/21  2131   SODIUM mmol/L 139   POTASSIUM mmol/L 3 5   CHLORIDE mmol/L 102   CO2 mmol/L 25   BUN mg/dL 31*   CREATININE mg/dL 1 54*   ANION GAP mmol/L 12   CALCIUM mg/dL 8 2*   ALBUMIN g/dL 4 0   TOTAL BILIRUBIN mg/dL 1 65*   ALK PHOS U/L 136*   ALT U/L 26   AST U/L 14   GLUCOSE RANDOM mg/dL 146*     Results from last 7 days   Lab Units 04/21/21  2131   INR  1 09             Results from last 7 days   Lab Units 04/21/21  2313 04/21/21  2131   LACTIC ACID mmol/L 1 4 2 5*       Imaging: I have personally reviewed pertinent reports  XR chest 1 view portable   ED Interpretation by Alexander Dunlap DO (04/21 2220)   Mild pulmonary vascular congestion elevated right hemidiaphragm no focal infiltrate  CT head without contrast   Final Result by Elzbieta Barreto DO (04/21 2218)      No acute intracranial abnormality is seen  Other findings as above  Workstation performed: [id number]             EKG, Pathology, and Other Studies Reviewed on Admission:   · EKG: sinus rhythm with 1st degree AV block    Allscripts / Epic Records Reviewed: Yes     ** Please Note: This note has been constructed using a voice recognition system   **

## 2021-04-22 NOTE — PLAN OF CARE
Problem: PAIN - ADULT  Goal: Verbalizes/displays adequate comfort level or baseline comfort level  Description: Interventions:  - Encourage patient to monitor pain and request assistance  - Assess pain using appropriate pain scale  - Administer analgesics based on type and severity of pain and evaluate response  - Implement non-pharmacological measures as appropriate and evaluate response  - Consider cultural and social influences on pain and pain management  - Notify physician/advanced practitioner if interventions unsuccessful or patient reports new pain  Outcome: Progressing     Problem: INFECTION - ADULT  Goal: Absence or prevention of progression during hospitalization  Description: INTERVENTIONS:  - Assess and monitor for signs and symptoms of infection  - Monitor lab/diagnostic results  - Monitor all insertion sites, i e  indwelling lines, tubes, and drains  - Monitor endotracheal if appropriate and nasal secretions for changes in amount and color  - [place] appropriate cooling/warming therapies per order  - Administer medications as ordered  - Instruct and encourage patient and family to use good hand hygiene technique  - Identify and instruct in appropriate isolation precautions for identified infection/condition  Outcome: Progressing     Problem: SAFETY ADULT  Goal: Patient will remain free of falls  Description: INTERVENTIONS:  - Assess patient frequently for physical needs  -  Identify cognitive and physical deficits and behaviors that affect risk of falls    -  [place] fall precautions as indicated by assessment   - Educate patient/family on patient safety including physical limitations  - Instruct patient to call for assistance with activity based on assessment  - Modify environment to reduce risk of injury  - Consider OT/PT consult to assist with strengthening/mobility  Outcome: Progressing  Goal: Maintain or return to baseline ADL function  Description: INTERVENTIONS:  -  Assess patient's ability to carry out ADLs; assess patient's baseline for ADL function and identify physical deficits which impact ability to perform ADLs (bathing, care of mouth/teeth, toileting, grooming, dressing, etc )  - Assess/evaluate cause of self-care deficits   - Assess range of motion  - Assess patient's mobility; develop plan if impaired  - Assess patient's need for assistive devices and provide as appropriate  - Encourage maximum independence but intervene and supervise when necessary  - Involve family in performance of ADLs  - Assess for home care needs following discharge   - Consider OT consult to assist with ADL evaluation and planning for discharge  - Provide patient education as appropriate  Outcome: Progressing  Goal: Maintain or return mobility status to optimal level  Description: INTERVENTIONS:  - Assess patient's baseline mobility status (ambulation, transfers, stairs, etc )    - Identify cognitive and physical deficits and behaviors that affect mobility  - Identify mobility aids required to assist with transfers and/or ambulation (gait belt, sit-to-stand, lift, walker, cane, etc )  - [place] fall precautions as indicated by assessment  - Record patient progress and toleration of activity level on Mobility SBAR; progress patient to next Phase/Stage  - Instruct patient to call for assistance with activity based on assessment  - Consider rehabilitation consult to assist with strengthening/weightbearing, etc   Outcome: Progressing     Problem: DISCHARGE PLANNING  Goal: Discharge to home or other facility with appropriate resources  Description: INTERVENTIONS:  - Identify barriers to discharge w/patient and caregiver  - Arrange for needed discharge resources and transportation as appropriate  - Identify discharge learning needs (meds, wound care, etc )  - Arrange for interpretive services to assist at discharge as needed  - Refer to Case Management Department for coordinating discharge planning if the patient needs post-hospital services based on physician/advanced practitioner order or complex needs related to functional status, cognitive ability, or social support system  Outcome: Not Progressing

## 2021-04-22 NOTE — ASSESSMENT & PLAN NOTE
· History of CAD s/p CABG 1995  · Denies chest pain at this time  · Will continue ASA 81mg, plavix 75mg, amlodipine 5mg, metoprolol tartrate 100mg BID

## 2021-04-22 NOTE — ASSESSMENT & PLAN NOTE
· Meets sepsis criteria with temp of 100 4, HR >90, RR >20 and a WBC count of 12 09  · Suspected source of UTI with innumerable bacteria on urine mircoscopic  · Pt received rocephin and 2L NS total    · Initial lactic 2 5, will repeat  · Procal, BMP, CBC AM draw  · Follow urine and blood cultures to end point

## 2021-04-22 NOTE — ASSESSMENT & PLAN NOTE
· Carotid duplex 02/25/21: 1-19% stenosis right ICA, 20-39% stenosis left ICA, heterogenous plaque in left distal common carotid without significant stenosis  · Continue medications as above  · Continue Lipitor 40mg daily

## 2021-04-22 NOTE — ASSESSMENT & PLAN NOTE
· UA and microscopic showing UTI, likely cause of sepsis  · Pt without urinary frequency, dysuria, or flank pain  · Will continue rocephin  · Will follow urine culture to end point

## 2021-04-22 NOTE — ASSESSMENT & PLAN NOTE
· Echo 3/5/21: normal left ventricular systolic function, EF 53%, mitral valve repair with mild residual regurg, mild tricuspid regurg, mild pulmonary hypertension, mild aortic root dilation  · Continue medication as above

## 2021-04-23 ENCOUNTER — APPOINTMENT (INPATIENT)
Dept: CT IMAGING | Facility: HOSPITAL | Age: 81
DRG: 872 | End: 2021-04-23
Payer: MEDICARE

## 2021-04-23 PROBLEM — N18.30 STAGE 3 CHRONIC KIDNEY DISEASE (HCC): Status: ACTIVE | Noted: 2021-04-23

## 2021-04-23 PROBLEM — R91.8 MULTIPLE LUNG NODULES ON CT: Status: ACTIVE | Noted: 2021-04-23

## 2021-04-23 LAB
ALBUMIN SERPL BCP-MCNC: 3.3 G/DL (ref 3.5–5)
ALP SERPL-CCNC: 94 U/L (ref 46–116)
ALT SERPL W P-5'-P-CCNC: 30 U/L (ref 12–78)
ANION GAP SERPL CALCULATED.3IONS-SCNC: 8 MMOL/L (ref 4–13)
AST SERPL W P-5'-P-CCNC: 68 U/L (ref 5–45)
BASOPHILS # BLD AUTO: 0.02 THOUSANDS/ΜL (ref 0–0.1)
BASOPHILS NFR BLD AUTO: 0 % (ref 0–1)
BILIRUB SERPL-MCNC: 2.08 MG/DL (ref 0.2–1)
BUN SERPL-MCNC: 29 MG/DL (ref 5–25)
CALCIUM ALBUM COR SERPL-MCNC: 8.5 MG/DL (ref 8.3–10.1)
CALCIUM SERPL-MCNC: 7.9 MG/DL (ref 8.3–10.1)
CHLORIDE SERPL-SCNC: 104 MMOL/L (ref 100–108)
CO2 SERPL-SCNC: 26 MMOL/L (ref 21–32)
CREAT SERPL-MCNC: 1.43 MG/DL (ref 0.6–1.3)
EOSINOPHIL # BLD AUTO: 0.06 THOUSAND/ΜL (ref 0–0.61)
EOSINOPHIL NFR BLD AUTO: 1 % (ref 0–6)
ERYTHROCYTE [DISTWIDTH] IN BLOOD BY AUTOMATED COUNT: 16 % (ref 11.6–15.1)
GFR SERPL CREATININE-BSD FRML MDRD: 46 ML/MIN/1.73SQ M
GLUCOSE SERPL-MCNC: 120 MG/DL (ref 65–140)
HCT VFR BLD AUTO: 33.2 % (ref 36.5–49.3)
HGB BLD-MCNC: 11.5 G/DL (ref 12–17)
IMM GRANULOCYTES # BLD AUTO: 0.04 THOUSAND/UL (ref 0–0.2)
IMM GRANULOCYTES NFR BLD AUTO: 0 % (ref 0–2)
INR PPP: 1.11 (ref 0.84–1.19)
LYMPHOCYTES # BLD AUTO: 0.85 THOUSANDS/ΜL (ref 0.6–4.47)
LYMPHOCYTES NFR BLD AUTO: 8 % (ref 14–44)
MAGNESIUM SERPL-MCNC: 1.7 MG/DL (ref 1.6–2.6)
MCH RBC QN AUTO: 32.9 PG (ref 26.8–34.3)
MCHC RBC AUTO-ENTMCNC: 34.6 G/DL (ref 31.4–37.4)
MCV RBC AUTO: 95 FL (ref 82–98)
MONOCYTES # BLD AUTO: 1.13 THOUSAND/ΜL (ref 0.17–1.22)
MONOCYTES NFR BLD AUTO: 10 % (ref 4–12)
NEUTROPHILS # BLD AUTO: 9.19 THOUSANDS/ΜL (ref 1.85–7.62)
NEUTS SEG NFR BLD AUTO: 81 % (ref 43–75)
NRBC BLD AUTO-RTO: 0 /100 WBCS
PHOSPHATE SERPL-MCNC: 2.8 MG/DL (ref 2.3–4.1)
PLATELET # BLD AUTO: 138 THOUSANDS/UL (ref 149–390)
PMV BLD AUTO: 10.4 FL (ref 8.9–12.7)
POTASSIUM SERPL-SCNC: 4 MMOL/L (ref 3.5–5.3)
PROCALCITONIN SERPL-MCNC: 4.91 NG/ML
PROT SERPL-MCNC: 6.3 G/DL (ref 6.4–8.2)
PROTHROMBIN TIME: 14.1 SECONDS (ref 11.6–14.5)
RBC # BLD AUTO: 3.5 MILLION/UL (ref 3.88–5.62)
SODIUM SERPL-SCNC: 138 MMOL/L (ref 136–145)
WBC # BLD AUTO: 11.29 THOUSAND/UL (ref 4.31–10.16)

## 2021-04-23 PROCEDURE — 94760 N-INVAS EAR/PLS OXIMETRY 1: CPT

## 2021-04-23 PROCEDURE — 74176 CT ABD & PELVIS W/O CONTRAST: CPT

## 2021-04-23 PROCEDURE — G1004 CDSM NDSC: HCPCS

## 2021-04-23 PROCEDURE — 71250 CT THORAX DX C-: CPT

## 2021-04-23 PROCEDURE — 80053 COMPREHEN METABOLIC PANEL: CPT | Performed by: INTERNAL MEDICINE

## 2021-04-23 PROCEDURE — 85610 PROTHROMBIN TIME: CPT | Performed by: INTERNAL MEDICINE

## 2021-04-23 PROCEDURE — 84100 ASSAY OF PHOSPHORUS: CPT | Performed by: INTERNAL MEDICINE

## 2021-04-23 PROCEDURE — 83735 ASSAY OF MAGNESIUM: CPT | Performed by: INTERNAL MEDICINE

## 2021-04-23 PROCEDURE — 85025 COMPLETE CBC W/AUTO DIFF WBC: CPT | Performed by: INTERNAL MEDICINE

## 2021-04-23 PROCEDURE — 94640 AIRWAY INHALATION TREATMENT: CPT

## 2021-04-23 PROCEDURE — 84145 PROCALCITONIN (PCT): CPT | Performed by: STUDENT IN AN ORGANIZED HEALTH CARE EDUCATION/TRAINING PROGRAM

## 2021-04-23 PROCEDURE — 99233 SBSQ HOSP IP/OBS HIGH 50: CPT | Performed by: INTERNAL MEDICINE

## 2021-04-23 RX ORDER — MAGNESIUM SULFATE HEPTAHYDRATE 40 MG/ML
2 INJECTION, SOLUTION INTRAVENOUS ONCE
Status: COMPLETED | OUTPATIENT
Start: 2021-04-23 | End: 2021-04-23

## 2021-04-23 RX ORDER — FUROSEMIDE 10 MG/ML
20 INJECTION INTRAMUSCULAR; INTRAVENOUS ONCE
Status: COMPLETED | OUTPATIENT
Start: 2021-04-23 | End: 2021-04-23

## 2021-04-23 RX ADMIN — HEPARIN SODIUM 5000 UNITS: 5000 INJECTION INTRAVENOUS; SUBCUTANEOUS at 17:41

## 2021-04-23 RX ADMIN — CEFEPIME HYDROCHLORIDE 1000 MG: 1 INJECTION, SOLUTION INTRAVENOUS at 12:17

## 2021-04-23 RX ADMIN — CEFEPIME HYDROCHLORIDE 1000 MG: 1 INJECTION, SOLUTION INTRAVENOUS at 00:37

## 2021-04-23 RX ADMIN — AMLODIPINE BESYLATE 5 MG: 5 TABLET ORAL at 09:06

## 2021-04-23 RX ADMIN — ALBUTEROL SULFATE 2.5 MG: 2.5 SOLUTION RESPIRATORY (INHALATION) at 06:06

## 2021-04-23 RX ADMIN — MAGNESIUM SULFATE HEPTAHYDRATE 2 G: 40 INJECTION, SOLUTION INTRAVENOUS at 10:05

## 2021-04-23 RX ADMIN — CEFEPIME HYDROCHLORIDE 1000 MG: 1 INJECTION, SOLUTION INTRAVENOUS at 23:10

## 2021-04-23 RX ADMIN — ATORVASTATIN CALCIUM 40 MG: 40 TABLET, FILM COATED ORAL at 09:06

## 2021-04-23 RX ADMIN — METOPROLOL TARTRATE 100 MG: 50 TABLET, FILM COATED ORAL at 09:06

## 2021-04-23 RX ADMIN — METOPROLOL TARTRATE 100 MG: 50 TABLET, FILM COATED ORAL at 20:28

## 2021-04-23 RX ADMIN — FUROSEMIDE 20 MG: 10 INJECTION, SOLUTION INTRAMUSCULAR; INTRAVENOUS at 10:05

## 2021-04-23 RX ADMIN — ASPIRIN 81 MG CHEWABLE TABLET 81 MG: 81 TABLET CHEWABLE at 09:06

## 2021-04-23 RX ADMIN — HEPARIN SODIUM 5000 UNITS: 5000 INJECTION INTRAVENOUS; SUBCUTANEOUS at 09:06

## 2021-04-23 RX ADMIN — CLOPIDOGREL BISULFATE 75 MG: 75 TABLET ORAL at 09:06

## 2021-04-23 RX ADMIN — HEPARIN SODIUM 5000 UNITS: 5000 INJECTION INTRAVENOUS; SUBCUTANEOUS at 00:37

## 2021-04-23 NOTE — ASSESSMENT & PLAN NOTE
· Met sepsis criteria with temp of 100 4, HR >90, RR >20 and a WBC count of 12 09  · Suspected source of UTI vs PNA  · Pt received Rocephin and 2L NS total in ER     · Transitioned to Cefepime Day #2  · Initial lactic 2 5 with repeat 1 4  · Initial Procal 6 31 with repeat pending  · Cont to trend labs- WBC's decreasing  · Urine and blood cultures pending

## 2021-04-23 NOTE — ASSESSMENT & PLAN NOTE
· Echo 3/5/21: normal left ventricular systolic function, EF 86%, mitral valve repair with mild residual regurg, mild tricuspid regurg, mild pulmonary hypertension, mild aortic root dilation  · Continue medication as above    · CXR on admission shows vascular congestion- will give Lasix 20 mg IV today and monitor closely

## 2021-04-23 NOTE — ASSESSMENT & PLAN NOTE
· Met sepsis criteria with temp of 100 4, HR >90, RR >20 and a WBC count of 12 09  · Suspected source likely PNA   · 2/2 blood cultures positive Pseudomonas  · Non-contrast CT c/a/p- gallstones without inflammation  Diverticulosis without acute diverticulitis  Some thickening and fat stranding left ureter, concern for left ureteritis  Multiple small lung nodules noted- will need follow up imaging  · Pt received Rocephin and 2L NS total in ER  · Transitioned to Cefepime Day #3  · Initial lactic 2 5 with repeat 1 4  · Initial Procal 6 31 with repeat 4 9  · Cont to trend labs- WBC's decreasing  · Repeat blood cultures ordered  Consult placed to Infectious Disease

## 2021-04-23 NOTE — ASSESSMENT & PLAN NOTE
· Non-contrast CT scan on 4/23 of chest/abd/pelvis   Multiple small pulmonary nodules measuring up to 0 5 cm  Based on current Fleischner Society 2017 Guidelines on  incidental pulmonary nodule, no routine follow-up is needed if the patient is considered low risk for lung cancer  If the  patient is considered high risk for lung cancer, 12 month follow-up non-contrast chest CT is recommended    · Pt reported as non-smoker  · Will need outpt follow up imaging if high risk for lung CA

## 2021-04-23 NOTE — ASSESSMENT & PLAN NOTE
Lab Results   Component Value Date    EGFR 46 04/23/2021    EGFR 40 04/22/2021    EGFR 42 04/21/2021    CREATININE 1 43 (H) 04/23/2021    CREATININE 1 59 (H) 04/22/2021    CREATININE 1 54 (H) 04/21/2021     Possible CKD 3, although no documentation seen in prior records    Cr from 3/2020 was 1 32  Continue to monitor closely  Avoid hypotension and nephrotoxic agents  Will need outpatient follow up

## 2021-04-23 NOTE — ASSESSMENT & PLAN NOTE
· Echo 3/5/21: normal left ventricular systolic function, EF 94%, mitral valve repair with mild residual regurg, mild tricuspid regurg, mild pulmonary hypertension, mild aortic root dilation  · Continue medication as above  · CXR on admission shows vascular congestion- will give Lasix 20 mg IV today and monitor closely    · Currently euvolemic

## 2021-04-23 NOTE — RESPIRATORY THERAPY NOTE
Paged ot patient's room by nurse, reporting patient felt short of breath  Upon therapists assessment and follow up questions patient denied feeling "any different than I usually do "  Patient admits to occasional shortness of breath that he has learned to live with it, but denies ever having a conversation with his PCP  A PRN albuterol was administered  Patient  Patient reported no relief  Nurse reports patient used the words "dyspnea with exertion" verbatim  Patient does not present with staccato speech or display any other symptoms except for requesting a breathing treatment   Latoya Vicente

## 2021-04-23 NOTE — ASSESSMENT & PLAN NOTE
· History of CAD s/p CABG 1995  · Denies chest pain at this time  · Continue ASA 81mg, plavix 75mg, amlodipine 5mg, metoprolol tartrate 100mg BID

## 2021-04-23 NOTE — RESPIRATORY THERAPY NOTE
RT Protocol Note  Jose Miguel Cherry 80 y o  male MRN: [id number]  Unit/Bed#: -01 Encounter: [id number]    Assessment    Principal Problem:    Sepsis ([address] )  Active Problems:    UTI (urinary tract infection)    Coronary artery disease involving coronary bypass graft    Mitral regurgitation    Essential hypertension    Hyperlipidemia    Carotid artery stenosis      Home Pulmonary Medications:  None    Past Medical History:   Diagnosis Date    Hypertension      Social History     Socioeconomic History    Marital status: /Civil Union     Spouse name: None    Number of children: None    Years of education: None    Highest education level: None   Occupational History    None   Social Needs    Financial resource strain: None    Food insecurity     Worry: None     Inability: None    Transportation needs     Medical: None     Non-medical: None   Tobacco Use    Smoking status: Never Smoker    Smokeless tobacco: Never Used   Substance and Sexual Activity    Alcohol use: Not Currently    Drug use: Not Currently    Sexual activity: None   Lifestyle    Physical activity     Days per week: None     Minutes per session: None    Stress: None   Relationships    Social connections     Talks on phone: None     Gets together: None     Attends Mu-ism service: None     Active member of club or organization: None     Attends meetings of clubs or organizations: None     Relationship status: None    Intimate partner violence     Fear of current or ex partner: None     Emotionally abused: None     Physically abused: None     Forced sexual activity: None   Other Topics Concern    None   Social History Narrative    None       Subjective    Subjective Data: Patient is non smoker without history of CPAP,/home O2 or respiratory medication    he has consitantly denied shortness of breath      Objective    Physical Exam:   Assessment Type: Assess only  General Appearance: Sleeping  Respiratory Pattern: Dyspnea with exertion(per nurse not witnessed)  Chest Assessment: Chest expansion symmetrical  Bilateral Breath Sounds: Clear, Crackles(per nurse)  Cough: Non-productive  O2 Device: Room Air    Vitals:  Blood pressure 141/66, pulse 68, temperature 99 6 °F (37 6 °C), temperature source Temporal, resp  rate 15, height 5' 8" (1 727 m), weight 76 3 kg (168 lb 3 4 oz), SpO2 96 %  Imaging and other studies: Bibasilar patchy airspace consolidation could reflect volume loss and/or pneumonia   Vascular congestion    O2 Device: Room Air     Plan    Respiratory Plan: No distress/Pulmonary history        Resp Comments: sleeping soundly

## 2021-04-23 NOTE — ASSESSMENT & PLAN NOTE
· UA and microscopic possibly showing UTI, although asymptomatic  · Will continue Cefepime  · Will follow urine culture to end point

## 2021-04-23 NOTE — PROGRESS NOTES
[address]  Progress Note Perri Eden 1940, 80 y o  male MRN: [id number]  Unit/Bed#: -01 Encounter: [id number]  Primary Care Provider: No primary care provider on file  Date and time admitted to hospital: 4/21/2021  9:18 PM    Stage 3 chronic kidney disease [facility])  Assessment & Plan  Lab Results   Component Value Date    EGFR 46 04/23/2021    EGFR 40 04/22/2021    EGFR 42 04/21/2021    CREATININE 1 43 (H) 04/23/2021    CREATININE 1 59 (H) 04/22/2021    CREATININE 1 54 (H) 04/21/2021     Possible CKD 3, although no documentation seen in prior records  Cr from 3/2020 was 1 32  Continue to monitor closely  Avoid hypotension and nephrotoxic agents  Will need outpatient follow up    Carotid artery stenosis  Assessment & Plan  · Carotid duplex 02/25/21: 1-19% stenosis right ICA, 20-39% stenosis left ICA, heterogenous plaque in left distal common carotid without significant stenosis  · Continue medications as above  · Continue Lipitor 40mg daily  Hyperlipidemia  Assessment & Plan  · Continue Lipitor 40mg daily  Essential hypertension  Assessment & Plan  · /72 on admission  · Continue medications as above  Mitral regurgitation  Assessment & Plan  · Echo 3/5/21: normal left ventricular systolic function, EF 44%, mitral valve repair with mild residual regurg, mild tricuspid regurg, mild pulmonary hypertension, mild aortic root dilation  · Continue medication as above  · CXR on admission shows vascular congestion- will give Lasix 20 mg IV today and monitor closely    Coronary artery disease involving coronary bypass graft  Assessment & Plan  · History of CAD s/p CABG 1995  · Denies chest pain at this time  · Continue ASA 81mg, plavix 75mg, amlodipine 5mg, metoprolol tartrate 100mg BID      Possible UTI (urinary tract infection)  Assessment & Plan  · UA and microscopic possibly showing UTI, although asymptomatic  · Will continue Cefepime  · Will follow urine culture to end point  * Sepsis likely due to PNA  Assessment & Plan  · Met sepsis criteria with temp of 100 4, HR >90, RR >20 and a WBC count of 12 09  · Suspected source likely PNA, but UTI on differential  · Pt received Rocephin and 2L NS total in ER  · Transitioned to Cefepime Day #2  · Initial lactic 2 5 with repeat 1 4  · Initial Procal 6 31 with repeat pending  · Cont to trend labs- WBC's decreasing  · Urine and blood cultures pending      VTE Pharmacologic Prophylaxis:   Pharmacologic: Heparin  Mechanical VTE Prophylaxis in Place: Yes    Patient Centered Rounds: I have performed bedside rounds with nursing staff today  Discussions with Specialists or Other Care Team Provider:     Education and Discussions with Family / Patient: Discussed likelihood of PNA    Time Spent for Care: 20 minutes  More than 50% of total time spent on counseling and coordination of care as described above  Current Length of Stay: 2 day(s)    Current Patient Status: Inpatient   Certification Statement: The patient will continue to require additional inpatient hospital stay due to pending cultures and IV diuresis    Discharge Plan: likely 1-2 days    Code Status: Level 1 - Full Code      Subjective:   Patient feels better today- denies fever, chills, chest pain, SOB at rest, abd pain, or any other concerns  Still with DODGE, which he states he has had for months  Never associated with CP  Objective:     Vitals:   Temp (24hrs), Av 3 °F (37 4 °C), Min:98 2 °F (36 8 °C), Max:100 6 °F (38 1 °C)    Temp:  [98 2 °F (36 8 °C)-100 6 °F (38 1 °C)] 98 2 °F (36 8 °C)  HR:  [68-83] 83  Resp:  [15-36] 32  BP: (141-160)/(66-75) 147/72  SpO2:  [93 %-98 %] 98 %  Body mass index is 25 58 kg/m²  Input and Output Summary (last 24 hours):        Intake/Output Summary (Last 24 hours) at 2021 0932  Last data filed at 2021 0912  Gross per 24 hour   Intake 770 ml   Output 1925 ml   Net -1155 ml       Physical Exam:     Physical Exam  Vitals signs and nursing note reviewed  Constitutional:       General: He is not in acute distress  Appearance: Normal appearance  HENT:      Head: Normocephalic  Mouth/Throat:      Mouth: Mucous membranes are moist       Pharynx: Oropharynx is clear  Eyes:      Conjunctiva/sclera: Conjunctivae normal       Pupils: Pupils are equal, round, and reactive to light  Cardiovascular:      Rate and Rhythm: Normal rate and regular rhythm  Heart sounds: Normal heart sounds  Pulmonary:      Breath sounds: Normal breath sounds  Abdominal:      General: Bowel sounds are normal  There is no distension  Palpations: Abdomen is soft  Tenderness: There is no abdominal tenderness  There is no guarding  Musculoskeletal:      Comments: Mild edema bilateral ankles   Skin:     General: Skin is warm  Capillary Refill: Capillary refill takes less than 2 seconds  Neurological:      General: No focal deficit present  Mental Status: He is alert and oriented to person, place, and time  Additional Data:     Labs:    Results from last 7 days   Lab Units 04/23/21  0555   WBC Thousand/uL 11 29*   HEMOGLOBIN g/dL 11 5*   HEMATOCRIT % 33 2*   PLATELETS Thousands/uL 138*   NEUTROS PCT % 81*   LYMPHS PCT % 8*   MONOS PCT % 10   EOS PCT % 1     Results from last 7 days   Lab Units 04/23/21  0555   SODIUM mmol/L 138   POTASSIUM mmol/L 4 0   CHLORIDE mmol/L 104   CO2 mmol/L 26   BUN mg/dL 29*   CREATININE mg/dL 1 43*   ANION GAP mmol/L 8   CALCIUM mg/dL 7 9*   ALBUMIN g/dL 3 3*   TOTAL BILIRUBIN mg/dL 2 08*   ALK PHOS U/L 94   ALT U/L 30   AST U/L 68*   GLUCOSE RANDOM mg/dL 120     Results from last 7 days   Lab Units 04/23/21  0555   INR  1 11             Results from last 7 days   Lab Units 04/22/21  0442 04/21/21  2313 04/21/21  2131   LACTIC ACID mmol/L  --  1 4 2 5*   PROCALCITONIN ng/ml 6 31*  --   --            * I Have Reviewed All Lab Data Listed Above    * Additional Pertinent Lab Tests Reviewed: Bob 66 Admission Reviewed    Imaging:    Imaging Reports Reviewed Today Include: CXR  Imaging Personally Reviewed by Myself Includes:      Recent Cultures (last 7 days):     Results from last 7 days   Lab Units 04/21/21 2140 04/21/21 2131   BLOOD CULTURE  Received in Microbiology Lab  Culture in Progress  Received in Microbiology Lab  Culture in Progress  Last 24 Hours Medication List:   Current Facility-Administered Medications   Medication Dose Route Frequency Provider Last Rate    acetaminophen  650 mg Oral Q6H PRN Florencio Mackay PA-C      albuterol  2 5 mg Nebulization Q4H PRN Charmel Gens, DO      amLODIPine  5 mg Oral Daily With [address] [place] OLEGARIO Arciniega      aspirin  81 mg Oral Daily [place] Arciniega PA-C      atorvastatin  40 mg Oral Daily Florencio Mackay PA-C      cefepime  1,000 mg Intravenous Tracy Choi, DO 1,000 mg (04/23/21 0037)    clopidogrel  75 mg Oral Daily Florencio Mackay PA-C      furosemide  20 mg Intravenous Once Eron Mercer PA-C      heparin (porcine)  5,000 Units Subcutaneous Q8H [facility] Swathi Ayezbigniew Arciniega PA-C      metoprolol tartrate  100 mg Oral Q12H [facility] Florencio Mackay PA-C          Today, Patient Was Seen By: Eron Mercer PA-C    ** Please Note: Dictation voice to text software may have been used in the creation of this document   **

## 2021-04-24 PROBLEM — N30.00 ACUTE CYSTITIS WITHOUT HEMATURIA: Status: ACTIVE | Noted: 2021-04-21

## 2021-04-24 PROBLEM — B96.5 BACTEREMIA DUE TO PSEUDOMONAS: Status: ACTIVE | Noted: 2021-04-21

## 2021-04-24 PROBLEM — R78.81 BACTEREMIA DUE TO PSEUDOMONAS: Status: ACTIVE | Noted: 2021-04-21

## 2021-04-24 LAB
ANION GAP SERPL CALCULATED.3IONS-SCNC: 7 MMOL/L (ref 4–13)
BACTERIA UR CULT: ABNORMAL
BUN SERPL-MCNC: 34 MG/DL (ref 5–25)
CALCIUM SERPL-MCNC: 7.9 MG/DL (ref 8.3–10.1)
CHLORIDE SERPL-SCNC: 106 MMOL/L (ref 100–108)
CO2 SERPL-SCNC: 25 MMOL/L (ref 21–32)
CREAT SERPL-MCNC: 1.46 MG/DL (ref 0.6–1.3)
ERYTHROCYTE [DISTWIDTH] IN BLOOD BY AUTOMATED COUNT: 15.9 % (ref 11.6–15.1)
GFR SERPL CREATININE-BSD FRML MDRD: 44 ML/MIN/1.73SQ M
GLUCOSE SERPL-MCNC: 102 MG/DL (ref 65–140)
HCT VFR BLD AUTO: 31.4 % (ref 36.5–49.3)
HGB BLD-MCNC: 10.9 G/DL (ref 12–17)
MAGNESIUM SERPL-MCNC: 2.3 MG/DL (ref 1.6–2.6)
MCH RBC QN AUTO: 33 PG (ref 26.8–34.3)
MCHC RBC AUTO-ENTMCNC: 34.7 G/DL (ref 31.4–37.4)
MCV RBC AUTO: 95 FL (ref 82–98)
PHOSPHATE SERPL-MCNC: 3.4 MG/DL (ref 2.3–4.1)
PLATELET # BLD AUTO: 142 THOUSANDS/UL (ref 149–390)
PMV BLD AUTO: 10.4 FL (ref 8.9–12.7)
POTASSIUM SERPL-SCNC: 3.7 MMOL/L (ref 3.5–5.3)
RBC # BLD AUTO: 3.3 MILLION/UL (ref 3.88–5.62)
SODIUM SERPL-SCNC: 138 MMOL/L (ref 136–145)
WBC # BLD AUTO: 7.5 THOUSAND/UL (ref 4.31–10.16)

## 2021-04-24 PROCEDURE — 87040 BLOOD CULTURE FOR BACTERIA: CPT | Performed by: PHYSICIAN ASSISTANT

## 2021-04-24 PROCEDURE — 99233 SBSQ HOSP IP/OBS HIGH 50: CPT | Performed by: FAMILY MEDICINE

## 2021-04-24 PROCEDURE — 80048 BASIC METABOLIC PNL TOTAL CA: CPT | Performed by: PHYSICIAN ASSISTANT

## 2021-04-24 PROCEDURE — 84100 ASSAY OF PHOSPHORUS: CPT | Performed by: PHYSICIAN ASSISTANT

## 2021-04-24 PROCEDURE — 83735 ASSAY OF MAGNESIUM: CPT | Performed by: PHYSICIAN ASSISTANT

## 2021-04-24 PROCEDURE — 85027 COMPLETE CBC AUTOMATED: CPT | Performed by: PHYSICIAN ASSISTANT

## 2021-04-24 RX ADMIN — HEPARIN SODIUM 5000 UNITS: 5000 INJECTION INTRAVENOUS; SUBCUTANEOUS at 08:33

## 2021-04-24 RX ADMIN — METOPROLOL TARTRATE 100 MG: 50 TABLET, FILM COATED ORAL at 08:33

## 2021-04-24 RX ADMIN — ATORVASTATIN CALCIUM 40 MG: 40 TABLET, FILM COATED ORAL at 08:32

## 2021-04-24 RX ADMIN — ASPIRIN 81 MG CHEWABLE TABLET 81 MG: 81 TABLET CHEWABLE at 08:32

## 2021-04-24 RX ADMIN — METOPROLOL TARTRATE 100 MG: 50 TABLET, FILM COATED ORAL at 20:04

## 2021-04-24 RX ADMIN — HEPARIN SODIUM 5000 UNITS: 5000 INJECTION INTRAVENOUS; SUBCUTANEOUS at 17:15

## 2021-04-24 RX ADMIN — CEFEPIME HYDROCHLORIDE 1000 MG: 1 INJECTION, SOLUTION INTRAVENOUS at 23:56

## 2021-04-24 RX ADMIN — CLOPIDOGREL BISULFATE 75 MG: 75 TABLET ORAL at 08:33

## 2021-04-24 RX ADMIN — CEFEPIME HYDROCHLORIDE 1000 MG: 1 INJECTION, SOLUTION INTRAVENOUS at 12:34

## 2021-04-24 RX ADMIN — HEPARIN SODIUM 5000 UNITS: 5000 INJECTION INTRAVENOUS; SUBCUTANEOUS at 00:26

## 2021-04-24 RX ADMIN — AMLODIPINE BESYLATE 5 MG: 5 TABLET ORAL at 08:32

## 2021-04-24 NOTE — PROGRESS NOTES
Chart reviewed:  Being treated for  Pseudomonas bacteremia  Maxipime IV  ID is consulted  CM met with patient at the bedside,baseline information  was obtained  CM discussed the role of CM in helping the patient develop a discharge plan and assist the patient in carry out their plan  04/24/21 1116   Patient Information   Mental Status Alert   Primary Caregiver Self   Support System Immediate family   Activities of Daily Living Prior to Admission   Functional Status Independent   Assistive Device No device needed   [address] [place] [address] of Transport to Appts: Drive Self       Patient has identified:   Tyshawn Red his wife as the primary   She is able to assist upon discharge  Per patient ,Tyshawn Red his wife is the POA: No advance directive: At this time patient is not interested in receiving information on advance directive  Requested patient to bring in a copy of their POA paperwork to be scanned into the chart  PCP:    Juan Miguel Cook        Pt has a prescription plan and uses [facility]  Medications are affordable  Patient is a : Pt does not seek [facility] at the [facility]  Pt denies SA/MH history  House set up: 4 steps to enter the 2 floor home with bed/bath on the 2nd floor  Functional level PTA: I/pta  DME use: none  Prior use of Home Care or STR: hx of [facility] after his CABG unsure of the name of the agency  Transportation: pt drives and family available upon [facility]: none    CM reviewed d/c planning process including the following: identifying help at home, patient preference for d/c planning needs, availability of treatment team to discuss questions or concerns patient and/or family may have regarding understanding medications and recognizing signs and symptoms once discharged  CM also encouraged patient to follow up with all recommended appointments after discharge   Patient advised of importance for patient and family to participate in managing patients medical well being  Current dc plan is home, no needs identified  CM will follow medical management

## 2021-04-24 NOTE — PROGRESS NOTES
[address]  Progress Note Yosi Horta 1940, 80 y o  male MRN: [id number]  Unit/Bed#: -01 Encounter: [id number]  Primary Care Provider: No primary care provider on file  Date and time admitted to hospital: 4/21/2021  9:18 PM    * Bacteremia due to Pseudomonas  Assessment & Plan  · Met sepsis criteria with temp of 100 4, HR >90, RR >20 and a WBC count of 12 09  · Suspected source likely PNA   · 2/2 blood cultures positive Pseudomonas  · Non-contrast CT c/a/p- gallstones without inflammation  Diverticulosis without acute diverticulitis  Some thickening and fat stranding left ureter, concern for left ureteritis  Multiple small lung nodules noted- will need follow up imaging  · Pt received Rocephin and 2L NS total in ER  · Transitioned to Cefepime Day #3  · Initial lactic 2 5 with repeat 1 4  · Initial Procal 6 31 with repeat 4 9  · Cont to trend labs- WBC's decreasing  · Repeat blood cultures ordered  Consult placed to Infectious Disease  Acute cystitis without hematuria  Assessment & Plan  · Urine cx >100K Klebsiella  · Will continue Cefepime    Multiple lung nodules on CT  Assessment & Plan  · Non-contrast CT scan on 4/23 of chest/abd/pelvis   Multiple small pulmonary nodules measuring up to 0 5 cm  Based on current Fleischner Society 2017 Guidelines on  incidental pulmonary nodule, no routine follow-up is needed if the patient is considered low risk for lung cancer  If the  patient is considered high risk for lung cancer, 12 month follow-up non-contrast chest CT is recommended    · Pt reported as non-smoker  · Will need outpt follow up imaging if high risk for lung CA    Stage 3 chronic kidney disease [facility])  Assessment & Plan  Lab Results   Component Value Date    EGFR 46 04/23/2021    EGFR 40 04/22/2021    EGFR 42 04/21/2021    CREATININE 1 43 (H) 04/23/2021    CREATININE 1 59 (H) 04/22/2021    CREATININE 1 54 (H) 04/21/2021     Possible CKD 3, although no documentation seen in prior records  Cr from 3/2020 was 1 32  Continue to monitor closely  Avoid hypotension and nephrotoxic agents  Will need outpatient follow up    Carotid artery stenosis  Assessment & Plan  · Carotid duplex 02/25/21: 1-19% stenosis right ICA, 20-39% stenosis left ICA, heterogenous plaque in left distal common carotid without significant stenosis  · Continue medications as above  · Continue Lipitor 40mg daily  Hyperlipidemia  Assessment & Plan  · Continue Lipitor 40mg daily  Essential hypertension  Assessment & Plan  · /72 on admission  · Continue medications as above  Mitral regurgitation  Assessment & Plan  · Echo 3/5/21: normal left ventricular systolic function, EF 85%, mitral valve repair with mild residual regurg, mild tricuspid regurg, mild pulmonary hypertension, mild aortic root dilation  · Continue medication as above  · CXR on admission shows vascular congestion- will give Lasix 20 mg IV today and monitor closely  · Currently euvolemic    Coronary artery disease involving coronary bypass graft  Assessment & Plan  · History of CAD s/p CABG 1995  · Denies chest pain at this time  · Continue ASA 81mg, plavix 75mg, amlodipine 5mg, metoprolol tartrate 100mg BID  VTE Pharmacologic Prophylaxis:   Pharmacologic: Heparin  Mechanical VTE Prophylaxis in Place: Yes    Patient Centered Rounds: I have performed bedside rounds with nursing staff today  Discussions with Specialists or Other Care Team Provider:  Will discuss with Infectious Disease    Education and Discussions with Family / Patient:  Discussed with patient at bedside    Time Spent for Care: 45 minutes  More than 50% of total time spent on counseling and coordination of care as described above      Current Length of Stay: 3 day(s)    Current Patient Status: Inpatient   Certification Statement: The patient will continue to require additional inpatient hospital stay due to Pseudomonas bacteremia    Discharge Plan:  Pending progress    Code Status: Level 1 - Full Code      Subjective:   Patient denies any chest pain or shortness of breath or abdominal pain at this time  States he is feeling better  Denies any fevers or chills but did have low-grade fever 100 1 yesterday    Objective:     Vitals:   Temp (24hrs), Av 6 °F (37 °C), Min:97 7 °F (36 5 °C), Max:100 1 °F (37 8 °C)    Temp:  [97 7 °F (36 5 °C)-100 1 °F (37 8 °C)] 98 2 °F (36 8 °C)  HR:  [69-88] 73  Resp:  [20-33] 20  BP: (144-177)/(67-86) 165/77  SpO2:  [92 %-96 %] 93 %  Body mass index is 25 58 kg/m²  Input and Output Summary (last 24 hours): Intake/Output Summary (Last 24 hours) at 2021 1107  Last data filed at 2021 0900  Gross per 24 hour   Intake 830 ml   Output 1320 ml   Net -490 ml       Physical Exam:     Physical Exam  Vitals signs and nursing note reviewed  Constitutional:       Appearance: Normal appearance  HENT:      Head: Normocephalic and atraumatic  Right Ear: External ear normal       Left Ear: External ear normal       Nose: Nose normal       Mouth/Throat:      Pharynx: Oropharynx is clear  Eyes:      Pupils: Pupils are equal, round, and reactive to light  Neck:      Musculoskeletal: Normal range of motion and neck supple  Cardiovascular:      Rate and Rhythm: Normal rate and regular rhythm  Heart sounds: Normal heart sounds  Pulmonary:      Effort: Pulmonary effort is normal       Comments: Moderate air entry bilaterally with mild diminished breath sounds bilateral bases  Abdominal:      General: Bowel sounds are normal       Palpations: Abdomen is soft  Tenderness: There is no abdominal tenderness  Musculoskeletal: Normal range of motion  Skin:     General: Skin is warm and dry  Capillary Refill: Capillary refill takes less than 2 seconds  Neurological:      General: No focal deficit present        Mental Status: He is alert and oriented to person, place, and time    Psychiatric:         Mood and Affect: Mood normal            Additional Data:     Labs:    Results from last 7 days   Lab Units 04/24/21  0654 04/23/21  0555   WBC Thousand/uL 7 50 11 29*   HEMOGLOBIN g/dL 10 9* 11 5*   HEMATOCRIT % 31 4* 33 2*   PLATELETS Thousands/uL 142* 138*   NEUTROS PCT %  --  81*   LYMPHS PCT %  --  8*   MONOS PCT %  --  10   EOS PCT %  --  1     Results from last 7 days   Lab Units 04/24/21  0654 04/23/21  0555   SODIUM mmol/L 138 138   POTASSIUM mmol/L 3 7 4 0   CHLORIDE mmol/L 106 104   CO2 mmol/L 25 26   BUN mg/dL 34* 29*   CREATININE mg/dL 1 46* 1 43*   ANION GAP mmol/L 7 8   CALCIUM mg/dL 7 9* 7 9*   ALBUMIN g/dL  --  3 3*   TOTAL BILIRUBIN mg/dL  --  2 08*   ALK PHOS U/L  --  94   ALT U/L  --  30   AST U/L  --  68*   GLUCOSE RANDOM mg/dL 102 120     Results from last 7 days   Lab Units 04/23/21  0555   INR  1 11             Results from last 7 days   Lab Units 04/23/21  0555 04/22/21  0442 04/21/21  2313 04/21/21  2131   LACTIC ACID mmol/L  --   --  1 4 2 5*   PROCALCITONIN ng/ml 4 91* 6 31*  --   --            * I Have Reviewed All Lab Data Listed Above  * Additional Pertinent Lab Tests Reviewed:  [facility] 66 Admission Reviewed    Imaging:    Imaging Reports Reviewed Today Include:  CT chest abdomen pelvis  Imaging Personally Reviewed by Myself Includes:  CT chest abdomen pelvis    Recent Cultures (last 7 days):     Results from last 7 days   Lab Units 04/21/21  2207 04/21/21  2140 04/21/21  2131   BLOOD CULTURE   --  Pseudomonas aeruginosa* Pseudomonas aeruginosa*  Gamma Hemolytic Streptococcus NOT Enterococcus   GRAM STAIN RESULT   --  Gram negative rods* Gram negative rods*  Gram positive cocci in chains*   URINE CULTURE  >100,000 cfu/ml Klebsiella pneumoniae*  --   --        Last 24 Hours Medication List:   Current Facility-Administered Medications   Medication Dose Route Frequency Provider Last Rate    acetaminophen  650 mg Oral Q6H PRN Swathi A Kali Craven PA-C      albuterol  2 5 mg Nebulization Q4H PRN Ursula Lock DO      amLODIPine  5 mg Oral Daily With [address] Jennifer Pall, PA-C      aspirin  81 mg Oral Daily [facility], PA-C      atorvastatin  40 mg Oral Daily [facility], PA-C      cefepime  1,000 mg Intravenous Q12H Ursula Lock DO Stopped (04/24/21 0020)    clopidogrel  75 mg Oral Daily [facility] Pall, PA-C      heparin (porcine)  5,000 Units Subcutaneous [facility] Michael, PA-ESTELITA      metoprolol tartrate  100 mg Oral Q12H [address] [facility], OLEGARIO          Today, Patient Was Seen By: Nemo Ritchie MD    ** Please Note: Dictation voice to text software may have been used in the creation of this document   **

## 2021-04-24 NOTE — PLAN OF CARE
Problem: PAIN - ADULT  Goal: Verbalizes/displays adequate comfort level or baseline comfort level  Description: Interventions:  - Encourage patient to monitor pain and request assistance  - Assess pain using appropriate pain scale  - Administer analgesics based on type and severity of pain and evaluate response  - Implement non-pharmacological measures as appropriate and evaluate response  - Notify physician/advanced practitioner if interventions unsuccessful or patient reports new pain  Outcome: Progressing     Problem: INFECTION - ADULT  Goal: Absence or prevention of progression during hospitalization  Description: INTERVENTIONS:  - Assess and monitor for signs and symptoms of infection  - Monitor lab/diagnostic results  - Monitor all insertion sites, i e  indwelling lines, tubes, and drains  - Monitor endotracheal if appropriate and nasal secretions for changes in amount and color  - [place] appropriate cooling/warming therapies per order  - Administer medications as ordered  - Instruct and encourage patient and family to use good hand hygiene technique  Outcome: Progressing     Problem: SAFETY ADULT  Goal: Patient will remain free of falls  Description: INTERVENTIONS:  - Assess patient frequently for physical needs  -  Identify cognitive and physical deficits and behaviors that affect risk of falls    -  [place] fall precautions as indicated by assessment   - Educate patient/family on patient safety including physical limitations  - Instruct patient to call for assistance with activity based on assessment  - Modify environment to reduce risk of injury  - Consider OT/PT consult to assist with strengthening/mobility  Outcome: Progressing  Goal: Maintain or return to baseline ADL function  Description: INTERVENTIONS:  -  Assess patient's ability to carry out ADLs; assess patient's baseline for ADL function and identify physical deficits which impact ability to perform ADLs (bathing, care of mouth/teeth, toileting, grooming, dressing, etc )  - Assess/evaluate cause of self-care deficits   - Assess range of motion  - Assess patient's mobility; develop plan if impaired  - Assess patient's need for assistive devices and provide as appropriate  - Encourage maximum independence but intervene and supervise when necessary  - Involve family in performance of ADLs  - Assess for home care needs following discharge   - Consider OT consult to assist with ADL evaluation and planning for discharge  - Provide patient education as appropriate  Outcome: Progressing  Goal: Maintain or return mobility status to optimal level  Description: INTERVENTIONS:  - Assess patient's baseline mobility status (ambulation, transfers, stairs, etc )    - Identify cognitive and physical deficits and behaviors that affect mobility  - Identify mobility aids required to assist with transfers and/or ambulation (gait belt, sit-to-stand, lift, walker, cane, etc )  - [place] fall precautions as indicated by assessment  - Record patient progress and toleration of activity level on Mobility SBAR; progress patient to next Phase/Stage  - Instruct patient to call for assistance with activity based on assessment  - Consider rehabilitation consult to assist with strengthening/weightbearing, etc   Outcome: Progressing     Problem: DISCHARGE PLANNING  Goal: Discharge to home or other facility with appropriate resources  Description: INTERVENTIONS:  - Identify barriers to discharge w/patient and caregiver  - Arrange for needed discharge resources and transportation as appropriate  - Identify discharge learning needs (meds, wound care, etc )  - Arrange for interpretive services to assist at discharge as needed  - Refer to Case Management Department for coordinating discharge planning if the patient needs post-hospital services based on physician/advanced practitioner order or complex needs related to functional status, cognitive ability, or social support system  Outcome: Progressing     Problem: Knowledge Deficit  Goal: Patient/family/caregiver demonstrates understanding of disease process, treatment plan, medications, and discharge instructions  Description: Complete learning assessment and assess knowledge base  Interventions:  - Provide teaching at level of understanding  - Provide teaching via preferred learning methods  Outcome: Progressing     Problem: CARDIOVASCULAR - ADULT  Goal: Maintains optimal cardiac output and hemodynamic stability  Description: INTERVENTIONS:  - Monitor I/O, vital signs and rhythm  - Monitor for S/S and trends of decreased cardiac output  - Administer and titrate ordered vasoactive medications to optimize hemodynamic stability  - Assess quality of pulses, skin color and temperature  - Assess for signs of decreased coronary artery perfusion  - Instruct patient to report change in severity of symptoms  Outcome: Progressing  Goal: Absence of cardiac dysrhythmias or at baseline rhythm  Description: INTERVENTIONS:  - Continuous cardiac monitoring, vital signs, obtain 12 lead EKG if ordered  - Administer antiarrhythmic and heart rate control medications as ordered  - Monitor electrolytes and administer replacement therapy as ordered  Outcome: Progressing     Problem: GENITOURINARY - ADULT  Goal: Maintains or returns to baseline urinary function  Description: INTERVENTIONS:  - Assess urinary function  - Encourage oral fluids to ensure adequate hydration if ordered  - Administer IV fluids as ordered to ensure adequate hydration  - Administer ordered medications as needed  - Offer frequent toileting  - Follow urinary retention protocol if ordered  Outcome: Progressing     Problem: Potential for Falls  Goal: Patient will remain free of falls  Description: INTERVENTIONS:  - Assess patient frequently for physical needs  -  Identify cognitive and physical deficits and behaviors that affect risk of falls    -  [place] fall precautions as indicated by assessment   - Educate patient/family on patient safety including physical limitations  - Instruct patient to call for assistance with activity based on assessment  - Modify environment to reduce risk of injury  - Consider OT/PT consult to assist with strengthening/mobility  Outcome: Progressing

## 2021-04-25 LAB
ANION GAP SERPL CALCULATED.3IONS-SCNC: 8 MMOL/L (ref 4–13)
BACTERIA BLD CULT: ABNORMAL
BUN SERPL-MCNC: 39 MG/DL (ref 5–25)
CALCIUM SERPL-MCNC: 7.6 MG/DL (ref 8.3–10.1)
CHLORIDE SERPL-SCNC: 106 MMOL/L (ref 100–108)
CO2 SERPL-SCNC: 25 MMOL/L (ref 21–32)
CREAT SERPL-MCNC: 1.49 MG/DL (ref 0.6–1.3)
ERYTHROCYTE [DISTWIDTH] IN BLOOD BY AUTOMATED COUNT: 15.9 % (ref 11.6–15.1)
GFR SERPL CREATININE-BSD FRML MDRD: 43 ML/MIN/1.73SQ M
GLUCOSE SERPL-MCNC: 95 MG/DL (ref 65–140)
GRAM STN SPEC: ABNORMAL
HCT VFR BLD AUTO: 29.7 % (ref 36.5–49.3)
HGB BLD-MCNC: 10.2 G/DL (ref 12–17)
MCH RBC QN AUTO: 32.9 PG (ref 26.8–34.3)
MCHC RBC AUTO-ENTMCNC: 34.3 G/DL (ref 31.4–37.4)
MCV RBC AUTO: 96 FL (ref 82–98)
PLATELET # BLD AUTO: 141 THOUSANDS/UL (ref 149–390)
PMV BLD AUTO: 10.2 FL (ref 8.9–12.7)
POTASSIUM SERPL-SCNC: 3.9 MMOL/L (ref 3.5–5.3)
RBC # BLD AUTO: 3.1 MILLION/UL (ref 3.88–5.62)
SODIUM SERPL-SCNC: 139 MMOL/L (ref 136–145)
WBC # BLD AUTO: 6.13 THOUSAND/UL (ref 4.31–10.16)

## 2021-04-25 PROCEDURE — 85027 COMPLETE CBC AUTOMATED: CPT | Performed by: FAMILY MEDICINE

## 2021-04-25 PROCEDURE — 99232 SBSQ HOSP IP/OBS MODERATE 35: CPT | Performed by: PHYSICIAN ASSISTANT

## 2021-04-25 PROCEDURE — 80048 BASIC METABOLIC PNL TOTAL CA: CPT | Performed by: FAMILY MEDICINE

## 2021-04-25 RX ORDER — AMLODIPINE BESYLATE 10 MG/1
10 TABLET ORAL
Status: DISCONTINUED | OUTPATIENT
Start: 2021-04-25 | End: 2021-04-28 | Stop reason: HOSPADM

## 2021-04-25 RX ADMIN — CEFEPIME HYDROCHLORIDE 1000 MG: 1 INJECTION, SOLUTION INTRAVENOUS at 12:33

## 2021-04-25 RX ADMIN — METOPROLOL TARTRATE 100 MG: 50 TABLET, FILM COATED ORAL at 20:37

## 2021-04-25 RX ADMIN — HEPARIN SODIUM 5000 UNITS: 5000 INJECTION INTRAVENOUS; SUBCUTANEOUS at 17:00

## 2021-04-25 RX ADMIN — AMLODIPINE BESYLATE 10 MG: 10 TABLET ORAL at 12:33

## 2021-04-25 RX ADMIN — METOPROLOL TARTRATE 100 MG: 50 TABLET, FILM COATED ORAL at 08:35

## 2021-04-25 RX ADMIN — HEPARIN SODIUM 5000 UNITS: 5000 INJECTION INTRAVENOUS; SUBCUTANEOUS at 00:48

## 2021-04-25 RX ADMIN — ATORVASTATIN CALCIUM 40 MG: 40 TABLET, FILM COATED ORAL at 08:35

## 2021-04-25 RX ADMIN — CEFEPIME HYDROCHLORIDE 1000 MG: 1 INJECTION, SOLUTION INTRAVENOUS at 22:52

## 2021-04-25 RX ADMIN — HEPARIN SODIUM 5000 UNITS: 5000 INJECTION INTRAVENOUS; SUBCUTANEOUS at 08:35

## 2021-04-25 RX ADMIN — ASPIRIN 81 MG CHEWABLE TABLET 81 MG: 81 TABLET CHEWABLE at 08:34

## 2021-04-25 RX ADMIN — CLOPIDOGREL BISULFATE 75 MG: 75 TABLET ORAL at 08:35

## 2021-04-25 NOTE — ASSESSMENT & PLAN NOTE
· History of CAD s/p CABG 1995  · Denies chest pain at this time  · Continue ASA 81mg, plavix 75mg, amlodipine 10mg, metoprolol tartrate 100mg BID

## 2021-04-25 NOTE — ASSESSMENT & PLAN NOTE
Lab Results   Component Value Date    EGFR 43 04/25/2021    EGFR 44 04/24/2021    EGFR 46 04/23/2021    CREATININE 1 49 (H) 04/25/2021    CREATININE 1 46 (H) 04/24/2021    CREATININE 1 43 (H) 04/23/2021     Possible CKD 3, although no documentation seen in prior records    Cr from 3/2020 was 1 32  Continue to monitor closely  Avoid hypotension and nephrotoxic agents  Will need outpatient follow up

## 2021-04-25 NOTE — ASSESSMENT & PLAN NOTE
· Non-contrast CT scan on 4/23 of chest/abd/pelvis   Multiple small pulmonary nodules measuring up to 0 5 cm  Based on current Fleischner Society 2017 Guidelines on  incidental pulmonary nodule, no routine follow-up is needed if the patient is considered low risk for lung cancer  If the  patient is considered high risk for lung cancer, 12 month follow-up non-contrast chest CT is recommended    · Pt reported as non-smoker  · Will need outpt follow up imaging if high risk for lung CA in 12 months

## 2021-04-25 NOTE — PLAN OF CARE
Problem: PAIN - ADULT  Goal: Verbalizes/displays adequate comfort level or baseline comfort level  Description: Interventions:  - Encourage patient to monitor pain and request assistance  - Assess pain using appropriate pain scale  - Administer analgesics based on type and severity of pain and evaluate response  - Implement non-pharmacological measures as appropriate and evaluate response  - Consider cultural and social influences on pain and pain management  - Notify physician/advanced practitioner if interventions unsuccessful or patient reports new pain  Outcome: Progressing     Problem: INFECTION - ADULT  Goal: Absence or prevention of progression during hospitalization  Description: INTERVENTIONS:  - Assess and monitor for signs and symptoms of infection  - Monitor lab/diagnostic results  - Monitor all insertion sites, i e  indwelling lines, tubes, and drains  - Monitor endotracheal if appropriate and nasal secretions for changes in amount and color  - [place] appropriate cooling/warming therapies per order  - Administer medications as ordered  - Instruct and encourage patient and family to use good hand hygiene technique  - Identify and instruct in appropriate isolation precautions for identified infection/condition  Outcome: Progressing     Problem: SAFETY ADULT  Goal: Patient will remain free of falls  Description: INTERVENTIONS:  - Assess patient frequently for physical needs  -  Identify cognitive and physical deficits and behaviors that affect risk of falls    -  [place] fall precautions as indicated by assessment   - Educate patient/family on patient safety including physical limitations  - Instruct patient to call for assistance with activity based on assessment  - Modify environment to reduce risk of injury  - Consider OT/PT consult to assist with strengthening/mobility  Outcome: Progressing  Goal: Maintain or return to baseline ADL function  Description: INTERVENTIONS:  -  Assess patient's ability to carry out ADLs; assess patient's baseline for ADL function and identify physical deficits which impact ability to perform ADLs (bathing, care of mouth/teeth, toileting, grooming, dressing, etc )  - Assess/evaluate cause of self-care deficits   - Assess range of motion  - Assess patient's mobility; develop plan if impaired  - Assess patient's need for assistive devices and provide as appropriate  - Encourage maximum independence but intervene and supervise when necessary  - Involve family in performance of ADLs  - Assess for home care needs following discharge   - Consider OT consult to assist with ADL evaluation and planning for discharge  - Provide patient education as appropriate  Outcome: Progressing  Goal: Maintain or return mobility status to optimal level  Description: INTERVENTIONS:  - Assess patient's baseline mobility status (ambulation, transfers, stairs, etc )    - Identify cognitive and physical deficits and behaviors that affect mobility  - Identify mobility aids required to assist with transfers and/or ambulation (gait belt, sit-to-stand, lift, walker, cane, etc )  - [place] fall precautions as indicated by assessment  - Record patient progress and toleration of activity level on Mobility SBAR; progress patient to next Phase/Stage  - Instruct patient to call for assistance with activity based on assessment  - Consider rehabilitation consult to assist with strengthening/weightbearing, etc   Outcome: Progressing     Problem: DISCHARGE PLANNING  Goal: Discharge to home or other facility with appropriate resources  Description: INTERVENTIONS:  - Identify barriers to discharge w/patient and caregiver  - Arrange for needed discharge resources and transportation as appropriate  - Identify discharge learning needs (meds, wound care, etc )  - Arrange for interpretive services to assist at discharge as needed  - Refer to Case Management Department for coordinating discharge planning if the patient needs post-hospital services based on physician/advanced practitioner order or complex needs related to functional status, cognitive ability, or social support system  Outcome: Progressing     Problem: Knowledge Deficit  Goal: Patient/family/caregiver demonstrates understanding of disease process, treatment plan, medications, and discharge instructions  Description: Complete learning assessment and assess knowledge base    Interventions:  - Provide teaching at level of understanding  - Provide teaching via preferred learning methods  Outcome: Progressing     Problem: CARDIOVASCULAR - ADULT  Goal: Maintains optimal cardiac output and hemodynamic stability  Description: INTERVENTIONS:  - Monitor I/O, vital signs and rhythm  - Monitor for S/S and trends of decreased cardiac output  - Administer and titrate ordered vasoactive medications to optimize hemodynamic stability  - Assess quality of pulses, skin color and temperature  - Assess for signs of decreased coronary artery perfusion  - Instruct patient to report change in severity of symptoms  Outcome: Progressing  Goal: Absence of cardiac dysrhythmias or at baseline rhythm  Description: INTERVENTIONS:  - Continuous cardiac monitoring, vital signs, obtain 12 lead EKG if ordered  - Administer antiarrhythmic and heart rate control medications as ordered  - Monitor electrolytes and administer replacement therapy as ordered  Outcome: Progressing     Problem: GENITOURINARY - ADULT  Goal: Maintains or returns to baseline urinary function  Description: INTERVENTIONS:  - Assess urinary function  - Encourage oral fluids to ensure adequate hydration if ordered  - Administer IV fluids as ordered to ensure adequate hydration  - Administer ordered medications as needed  - Offer frequent toileting  - Follow urinary retention protocol if ordered  Outcome: Progressing     Problem: Potential for Falls  Goal: Patient will remain free of falls  Description: INTERVENTIONS:  - Assess patient frequently for physical needs  -  Identify cognitive and physical deficits and behaviors that affect risk of falls    -  [place] fall precautions as indicated by assessment   - Educate patient/family on patient safety including physical limitations  - Instruct patient to call for assistance with activity based on assessment  - Modify environment to reduce risk of injury  - Consider OT/PT consult to assist with strengthening/mobility  Outcome: Progressing

## 2021-04-25 NOTE — ASSESSMENT & PLAN NOTE
· Met sepsis criteria with temp of 100 4, HR >90, RR >20 and a WBC count of 12 09  · Suspected source likely PNA   · 2/2 blood cultures positive Pseudomonas  · Non-contrast CT c/a/p- gallstones without inflammation  Diverticulosis without acute diverticulitis  Some thickening and fat stranding left ureter, concern for left ureteritis  Multiple small lung nodules noted- will need follow up imaging in 12 months  · Pt received Rocephin and 2L NS total in ER  · Transitioned to Cefepime Day #4  · Initial lactic 2 5 with repeat 1 4  · Initial Procal 6 31 with repeat 4 9  · Cont to trend labs- WBC's decreasing  · Repeat blood cultures pending  Consult placed to Infectious Disease    · Echo pending for tomorrow

## 2021-04-25 NOTE — ASSESSMENT & PLAN NOTE
· Echo 3/5/21: normal left ventricular systolic function, EF 54%, mitral valve repair with mild residual regurg, mild tricuspid regurg, mild pulmonary hypertension, mild aortic root dilation  · Continue medication as above    · CXR on admission shows vascular congestion- given Lasix 20 mg IV 4/23 with good response  · Currently euvolemic

## 2021-04-25 NOTE — PROGRESS NOTES
[address]  Progress Note Hakan Jang 1940, 80 y o  male MRN: [id number]  Unit/Bed#: -01 Encounter: [id number]  Primary Care Provider: Mirza Ortiz DO   Date and time admitted to hospital: 4/21/2021  9:18 PM    Multiple lung nodules on CT  Assessment & Plan  · Non-contrast CT scan on 4/23 of chest/abd/pelvis   Multiple small pulmonary nodules measuring up to 0 5 cm  Based on current Fleischner Society 2017 Guidelines on  incidental pulmonary nodule, no routine follow-up is needed if the patient is considered low risk for lung cancer  If the  patient is considered high risk for lung cancer, 12 month follow-up non-contrast chest CT is recommended  · Pt reported as non-smoker  · Will need outpt follow up imaging if high risk for lung CA in 12 months    Stage 3 chronic kidney disease [facility])  Assessment & Plan  Lab Results   Component Value Date    EGFR 43 04/25/2021    EGFR 44 04/24/2021    EGFR 46 04/23/2021    CREATININE 1 49 (H) 04/25/2021    CREATININE 1 46 (H) 04/24/2021    CREATININE 1 43 (H) 04/23/2021     Possible CKD 3, although no documentation seen in prior records  Cr from 3/2020 was 1 32  Continue to monitor closely  Avoid hypotension and nephrotoxic agents  Will need outpatient follow up    Carotid artery stenosis  Assessment & Plan  · Carotid duplex 02/25/21: 1-19% stenosis right ICA, 20-39% stenosis left ICA, heterogenous plaque in left distal common carotid without significant stenosis  · Continue medications as above  · Continue Lipitor 40mg daily  Hyperlipidemia  Assessment & Plan  · Continue Lipitor 40mg daily  Essential hypertension  Assessment & Plan  · /72 on admission    · Increase norvasc to 10 mg daily and monitor    Mitral regurgitation  Assessment & Plan  · Echo 3/5/21: normal left ventricular systolic function, EF 65%, mitral valve repair with mild residual regurg, mild tricuspid regurg, mild pulmonary hypertension, mild aortic root dilation  · Continue medication as above  · CXR on admission shows vascular congestion- given Lasix 20 mg IV 4/23 with good response  · Currently euvolemic    Coronary artery disease involving coronary bypass graft  Assessment & Plan  · History of CAD s/p CABG 1995  · Denies chest pain at this time  · Continue ASA 81mg, plavix 75mg, amlodipine 10mg, metoprolol tartrate 100mg BID  Acute cystitis without hematuria  Assessment & Plan  · Urine cx >100K Klebsiella  · Will continue Cefepime    * Bacteremia due to Pseudomonas  Assessment & Plan  · Met sepsis criteria with temp of 100 4, HR >90, RR >20 and a WBC count of 12 09  · Suspected source likely PNA   · 2/2 blood cultures positive Pseudomonas  · Non-contrast CT c/a/p- gallstones without inflammation  Diverticulosis without acute diverticulitis  Some thickening and fat stranding left ureter, concern for left ureteritis  Multiple small lung nodules noted- will need follow up imaging in 12 months  · Pt received Rocephin and 2L NS total in ER  · Transitioned to Cefepime Day #4  · Initial lactic 2 5 with repeat 1 4  · Initial Procal 6 31 with repeat 4 9  · Cont to trend labs- WBC's decreasing  · Repeat blood cultures pending  Consult placed to Infectious Disease  · Echo pending for tomorrow      VTE Pharmacologic Prophylaxis:   Pharmacologic: Heparin  Mechanical VTE Prophylaxis in Place: Yes    Patient Centered Rounds: I have performed bedside rounds with nursing staff today  Discussions with Specialists or Other Care Team Provider: ID    Education and Discussions with Family / Patient: Bacteremia and abx    Time Spent for Care: 20 minutes  More than 50% of total time spent on counseling and coordination of care as described above      Current Length of Stay: 4 day(s)    Current Patient Status: Inpatient   Certification Statement: The patient will continue to require additional inpatient hospital stay due to IV abx    Discharge Plan: 1-2 days    Code Status: Level 1 - Full Code      Subjective:   Patient feeling much better  No longer notices DODGE  Denies SOB, CP, abd pain, nausea  Objective:     Vitals:   Temp (24hrs), Av 6 °F (37 °C), Min:98 2 °F (36 8 °C), Max:99 °F (37 2 °C)    Temp:  [98 2 °F (36 8 °C)-99 °F (37 2 °C)] 98 2 °F (36 8 °C)  HR:  [69-78] 77  Resp:  [20] 20  BP: (142-172)/(67-76) 153/70  SpO2:  [96 %-98 %] 97 %  Body mass index is 25 58 kg/m²  Input and Output Summary (last 24 hours): Intake/Output Summary (Last 24 hours) at 2021 1228  Last data filed at 2021 0901  Gross per 24 hour   Intake 1025 ml   Output 650 ml   Net 375 ml       Physical Exam:     Physical Exam  Vitals signs and nursing note reviewed  Constitutional:       General: He is not in acute distress  HENT:      Head: Normocephalic  Mouth/Throat:      Mouth: Mucous membranes are moist    Eyes:      Conjunctiva/sclera: Conjunctivae normal       Pupils: Pupils are equal, round, and reactive to light  Neck:      Musculoskeletal: Neck supple  Cardiovascular:      Rate and Rhythm: Normal rate and regular rhythm  Heart sounds: Murmur present  Pulmonary:      Effort: No respiratory distress  Breath sounds: Normal breath sounds  No wheezing  Abdominal:      General: Bowel sounds are normal  There is no distension  Palpations: Abdomen is soft  Tenderness: There is no abdominal tenderness  There is no guarding  Musculoskeletal:      Right lower leg: No edema  Left lower leg: No edema  Neurological:      General: No focal deficit present  Mental Status: He is alert and oriented to person, place, and time             Additional Data:     Labs:    Results from last 7 days   Lab Units 21  0600  21  0555   WBC Thousand/uL 6 13   < > 11 29*   HEMOGLOBIN g/dL 10 2*   < > 11 5*   HEMATOCRIT % 29 7*   < > 33 2*   PLATELETS Thousands/uL 141*   < > 138*   NEUTROS PCT %  --   --  81*   LYMPHS PCT %  -- --  8*   MONOS PCT %  --   --  10   EOS PCT %  --   --  1    < > = values in this interval not displayed  Results from last 7 days   Lab Units 04/25/21  0600  04/23/21  0555   SODIUM mmol/L 139   < > 138   POTASSIUM mmol/L 3 9   < > 4 0   CHLORIDE mmol/L 106   < > 104   CO2 mmol/L 25   < > 26   BUN mg/dL 39*   < > 29*   CREATININE mg/dL 1 49*   < > 1 43*   ANION GAP mmol/L 8   < > 8   CALCIUM mg/dL 7 6*   < > 7 9*   ALBUMIN g/dL  --   --  3 3*   TOTAL BILIRUBIN mg/dL  --   --  2 08*   ALK PHOS U/L  --   --  94   ALT U/L  --   --  30   AST U/L  --   --  68*   GLUCOSE RANDOM mg/dL 95   < > 120    < > = values in this interval not displayed  Results from last 7 days   Lab Units 04/23/21  0555   INR  1 11             Results from last 7 days   Lab Units 04/23/21  0555 04/22/21  0442 04/21/21  2313 04/21/21  2131   LACTIC ACID mmol/L  --   --  1 4 2 5*   PROCALCITONIN ng/ml 4 91* 6 31*  --   --            * I Have Reviewed All Lab Data Listed Above  * Additional Pertinent Lab Tests Reviewed: All Labs Within Last 24 Hours Reviewed    Imaging:    Imaging Reports Reviewed Today Include:   Imaging Personally Reviewed by Myself Includes:      Recent Cultures (last 7 days):     Results from last 7 days   Lab Units 04/24/21  0743 04/24/21  0742 04/21/21  2207 04/21/21  2140 04/21/21  2131   BLOOD CULTURE  Received in Microbiology Lab  Culture in Progress  Received in Microbiology Lab  Culture in Progress    --  Pseudomonas aeruginosa* Pseudomonas aeruginosa*  Gamma Hemolytic Streptococcus NOT Enterococcus   GRAM STAIN RESULT   --   --   --  Gram negative rods* Gram negative rods*  Gram positive cocci in chains*   URINE CULTURE   --   --  >100,000 cfu/ml Klebsiella pneumoniae*  --   --        Last 24 Hours Medication List:   Current Facility-Administered Medications   Medication Dose Route Frequency Provider Last Rate    acetaminophen  650 mg Oral Q6H PRN Brenda Pugh PA-C      albuterol  2 5 mg Nebulization Q4H THEE Auguste DO      amLODIPine  10 mg Oral Daily With R Delvin [address] Vicente Cuevas PA-C      aspirin  81 mg Oral Daily Monse Chacon PA-C      atorvastatin  40 mg Oral Daily Monse Chacon PA-C      cefepime  1,000 mg Intravenous Tucker Ventura DO Stopped (04/25/21 0059)    clopidogrel  75 mg Oral Daily Swathi Burks PA-C      heparin (porcine)  5,000 Units Subcutaneous [facility] Swathi Burks PA-C      metoprolol tartrate  100 mg Oral Q12H [address] Monse Chacon PA-C          Today, Patient Was Seen By: Tamie Roman PA-C    ** Please Note: Dictation voice to text software may have been used in the creation of this document   **

## 2021-04-26 ENCOUNTER — APPOINTMENT (INPATIENT)
Dept: NON INVASIVE DIAGNOSTICS | Facility: HOSPITAL | Age: 81
DRG: 872 | End: 2021-04-26
Payer: MEDICARE

## 2021-04-26 PROBLEM — A41.9 SEPSIS (HCC): Status: ACTIVE | Noted: 2021-04-26

## 2021-04-26 LAB
ANION GAP SERPL CALCULATED.3IONS-SCNC: 11 MMOL/L (ref 4–13)
BUN SERPL-MCNC: 38 MG/DL (ref 5–25)
CA-I BLD-SCNC: 1.1 MMOL/L (ref 1.12–1.32)
CALCIUM SERPL-MCNC: 8.2 MG/DL (ref 8.3–10.1)
CHLORIDE SERPL-SCNC: 106 MMOL/L (ref 100–108)
CO2 SERPL-SCNC: 22 MMOL/L (ref 21–32)
CREAT SERPL-MCNC: 1.41 MG/DL (ref 0.6–1.3)
ERYTHROCYTE [DISTWIDTH] IN BLOOD BY AUTOMATED COUNT: 15.5 % (ref 11.6–15.1)
GFR SERPL CREATININE-BSD FRML MDRD: 46 ML/MIN/1.73SQ M
GLUCOSE SERPL-MCNC: 102 MG/DL (ref 65–140)
HCT VFR BLD AUTO: 32.4 % (ref 36.5–49.3)
HGB BLD-MCNC: 11.2 G/DL (ref 12–17)
MAGNESIUM SERPL-MCNC: 2.1 MG/DL (ref 1.6–2.6)
MCH RBC QN AUTO: 32.7 PG (ref 26.8–34.3)
MCHC RBC AUTO-ENTMCNC: 34.6 G/DL (ref 31.4–37.4)
MCV RBC AUTO: 95 FL (ref 82–98)
PLATELET # BLD AUTO: 149 THOUSANDS/UL (ref 149–390)
PMV BLD AUTO: 10.6 FL (ref 8.9–12.7)
POTASSIUM SERPL-SCNC: 3.9 MMOL/L (ref 3.5–5.3)
RBC # BLD AUTO: 3.43 MILLION/UL (ref 3.88–5.62)
SODIUM SERPL-SCNC: 139 MMOL/L (ref 136–145)
WBC # BLD AUTO: 6.16 THOUSAND/UL (ref 4.31–10.16)

## 2021-04-26 PROCEDURE — 83735 ASSAY OF MAGNESIUM: CPT | Performed by: PHYSICIAN ASSISTANT

## 2021-04-26 PROCEDURE — 99232 SBSQ HOSP IP/OBS MODERATE 35: CPT | Performed by: INTERNAL MEDICINE

## 2021-04-26 PROCEDURE — NC001 PR NO CHARGE: Performed by: INTERNAL MEDICINE

## 2021-04-26 PROCEDURE — 82330 ASSAY OF CALCIUM: CPT | Performed by: PHYSICIAN ASSISTANT

## 2021-04-26 PROCEDURE — 93306 TTE W/DOPPLER COMPLETE: CPT

## 2021-04-26 PROCEDURE — 94760 N-INVAS EAR/PLS OXIMETRY 1: CPT

## 2021-04-26 PROCEDURE — 80048 BASIC METABOLIC PNL TOTAL CA: CPT | Performed by: PHYSICIAN ASSISTANT

## 2021-04-26 PROCEDURE — 85027 COMPLETE CBC AUTOMATED: CPT | Performed by: PHYSICIAN ASSISTANT

## 2021-04-26 PROCEDURE — 93306 TTE W/DOPPLER COMPLETE: CPT | Performed by: INTERNAL MEDICINE

## 2021-04-26 RX ADMIN — HEPARIN SODIUM 5000 UNITS: 5000 INJECTION INTRAVENOUS; SUBCUTANEOUS at 16:42

## 2021-04-26 RX ADMIN — ASPIRIN 81 MG CHEWABLE TABLET 81 MG: 81 TABLET CHEWABLE at 08:04

## 2021-04-26 RX ADMIN — METOPROLOL TARTRATE 100 MG: 50 TABLET, FILM COATED ORAL at 08:04

## 2021-04-26 RX ADMIN — CEFEPIME HYDROCHLORIDE 1000 MG: 1 INJECTION, SOLUTION INTRAVENOUS at 23:50

## 2021-04-26 RX ADMIN — HEPARIN SODIUM 5000 UNITS: 5000 INJECTION INTRAVENOUS; SUBCUTANEOUS at 00:40

## 2021-04-26 RX ADMIN — CLOPIDOGREL BISULFATE 75 MG: 75 TABLET ORAL at 08:05

## 2021-04-26 RX ADMIN — HEPARIN SODIUM 5000 UNITS: 5000 INJECTION INTRAVENOUS; SUBCUTANEOUS at 23:56

## 2021-04-26 RX ADMIN — HEPARIN SODIUM 5000 UNITS: 5000 INJECTION INTRAVENOUS; SUBCUTANEOUS at 08:05

## 2021-04-26 RX ADMIN — METOPROLOL TARTRATE 100 MG: 50 TABLET, FILM COATED ORAL at 20:44

## 2021-04-26 RX ADMIN — CEFEPIME HYDROCHLORIDE 1000 MG: 1 INJECTION, SOLUTION INTRAVENOUS at 11:30

## 2021-04-26 RX ADMIN — ATORVASTATIN CALCIUM 40 MG: 40 TABLET, FILM COATED ORAL at 08:05

## 2021-04-26 RX ADMIN — AMLODIPINE BESYLATE 10 MG: 10 TABLET ORAL at 08:05

## 2021-04-26 NOTE — PROGRESS NOTES
[address] Osullivan  Progress Note [place] December 1940, 80 y o  male MRN: [id number]  Unit/Bed#: -01 Encounter: [id number]  Primary Care Provider: Dustin Scott DO   Date and time admitted to hospital: 4/21/2021  9:18 PM    Sepsis [facility])  Assessment & Plan  · POA - fever, tachycardia, tachypnea, leukocytosis, elevated lactic  · Source - Klebsiella UTI, pseudomonas bacteremia  · See plans below    * Bacteremia due to Pseudomonas  Assessment & Plan  2/2 blood cultures positive Pseudomonas  Non-contrast CT c/a/p- gallstones without inflammation  Diverticulosis without acute diverticulitis  Some thickening and fat stranding left ureter, concern for left ureteritis  Multiple small lung nodules noted- will need follow up imaging in 12 months  · 2/2 blood cultures - Pseudomonas  · Unclear source - ID following  · Repeat BC - NGTD  · Non-contrast CT c/a/p- gallstones without inflammation  Diverticulosis without acute diverticulitis  Some thickening and fat stranding left ureter, concern for left ureteritis  · ECHO - pending  · Pt received Rocephin and 2L NS total in ER  · Transitioned to Cefepime Day #5  · ID following - appreciated ABX guidance  · Trend fever curve and WBC      Acute cystitis without hematuria  Assessment & Plan  · Urine cx >100K Klebsiella  · Will continue Cefepime Day #5  · ID following - appreciate rec's    Multiple lung nodules on CT  Assessment & Plan  · Non-contrast CT scan on 4/23 of chest/abd/pelvis   Multiple small pulmonary nodules measuring up to 0 5 cm  Based on current Fleischner Society 2017 Guidelines on  incidental pulmonary nodule, no routine follow-up is needed if the patient is considered low risk for lung cancer  If the  patient is considered high risk for lung cancer, 12 month follow-up non-contrast chest CT is recommended    · Pt reported as non-smoker  · Will need outpt follow up imaging if high risk for lung CA in 12 months    Stage 3 chronic kidney disease [facility])  Assessment & Plan  Lab Results   Component Value Date    EGFR 46 04/26/2021    EGFR 43 04/25/2021    EGFR 44 04/24/2021    CREATININE 1 41 (H) 04/26/2021    CREATININE 1 49 (H) 04/25/2021    CREATININE 1 46 (H) 04/24/2021     Possible CKD 3, although no documentation seen in prior records  Cr from 3/2020 was 1 32  Continue to monitor closely  Avoid hypotension and nephrotoxic agents  Will need outpatient follow up    Carotid artery stenosis  Assessment & Plan  · Carotid duplex 02/25/21: 1-19% stenosis right ICA, 20-39% stenosis left ICA, heterogenous plaque in left distal common carotid without significant stenosis  · Continue medications as above  · Continue Lipitor 40mg daily  Hyperlipidemia  Assessment & Plan  · Continue Lipitor 40mg daily  Essential hypertension  Assessment & Plan  · /72 on admission  · Increase norvasc to 10 mg daily and monitor     Mitral regurgitation  Assessment & Plan  · Echo 3/5/21: normal left ventricular systolic function, EF 21%, mitral valve repair with mild residual regurg, mild tricuspid regurg, mild pulmonary hypertension, mild aortic root dilation  · Continue medication as above  · CXR on admission shows vascular congestion- given Lasix 20 mg IV 4/23 with good response  · Currently euvolemic    Coronary artery disease involving coronary bypass graft  Assessment & Plan  · History of CAD s/p CABG 1995  · Denies chest pain at this time  · Continue ASA 81mg, plavix 75mg, amlodipine 10mg, metoprolol tartrate 100mg BID  VTE Pharmacologic Prophylaxis:   Pharmacologic: Heparin  Mechanical VTE Prophylaxis in Place: Yes    Patient Centered Rounds: I have performed bedside rounds with nursing staff today  Discussions with Specialists or Other Care Team Provider: Dr Danni GALINDO attending    Education and Discussions with Family / Patient: source of bacteremia    Time Spent for Care: 20 minutes    More than 50% of total time spent on counseling and coordination of care as described above  Current Length of Stay: 5 day(s)    Current Patient Status: Inpatient   Certification Statement: The patient will continue to require additional inpatient hospital stay due to IV ABX    Discharge Plan: home with long term ABX IV vs po    Code Status: Level 1 - Full Code      Subjective:   "I'm feeling great"  Pt denies all complaints at this time  Objective:     Vitals:   Temp (24hrs), Av °F (36 7 °C), Min:98 °F (36 7 °C), Max:98 °F (36 7 °C)    Temp:  [98 °F (36 7 °C)] 98 °F (36 7 °C)  HR:  [68-83] 83  Resp:  [18-20] 18  BP: (162-173)/(74-76) 173/74  SpO2:  [97 %-100 %] 100 %  Body mass index is 25 58 kg/m²  Input and Output Summary (last 24 hours): Intake/Output Summary (Last 24 hours) at 2021 1227  Last data filed at 2021 0001  Gross per 24 hour   Intake 140 ml   Output --   Net 140 ml       Physical Exam:     Physical Exam  Constitutional:       General: He is awake  He is not in acute distress  Appearance: He is well-developed and normal weight  Cardiovascular:      Rate and Rhythm: Normal rate and regular rhythm  Pulses: Normal pulses  Heart sounds: Murmur present  No friction rub  No gallop  Pulmonary:      Effort: Pulmonary effort is normal  No respiratory distress  Breath sounds: No stridor  No wheezing or rales  Abdominal:      General: Abdomen is flat  There is no distension  Palpations: Abdomen is soft  Tenderness: There is no abdominal tenderness  Musculoskeletal:      Right lower leg: No edema  Left lower leg: No edema  Skin:     General: Skin is warm and dry  Capillary Refill: Capillary refill takes less than 2 seconds  Neurological:      General: No focal deficit present  Mental Status: He is alert and oriented to person, place, and time             Additional Data:     Labs:    Results from last 7 days   Lab Units 21  0559  21  0555   WBC Thousand/uL 6 16   < > 11 29*   HEMOGLOBIN g/dL 11 2*   < > 11 5*   HEMATOCRIT % 32 4*   < > 33 2*   PLATELETS Thousands/uL 149   < > 138*   NEUTROS PCT %  --   --  81*   LYMPHS PCT %  --   --  8*   MONOS PCT %  --   --  10   EOS PCT %  --   --  1    < > = values in this interval not displayed  Results from last 7 days   Lab Units 04/26/21  0559  04/23/21  0555   SODIUM mmol/L 139   < > 138   POTASSIUM mmol/L 3 9   < > 4 0   CHLORIDE mmol/L 106   < > 104   CO2 mmol/L 22   < > 26   BUN mg/dL 38*   < > 29*   CREATININE mg/dL 1 41*   < > 1 43*   ANION GAP mmol/L 11   < > 8   CALCIUM mg/dL 8 2*   < > 7 9*   ALBUMIN g/dL  --   --  3 3*   TOTAL BILIRUBIN mg/dL  --   --  2 08*   ALK PHOS U/L  --   --  94   ALT U/L  --   --  30   AST U/L  --   --  68*   GLUCOSE RANDOM mg/dL 102   < > 120    < > = values in this interval not displayed  Results from last 7 days   Lab Units 04/23/21  0555   INR  1 11             Results from last 7 days   Lab Units 04/23/21  0555 04/22/21  0442 04/21/21  2313 04/21/21  2131   LACTIC ACID mmol/L  --   --  1 4 2 5*   PROCALCITONIN ng/ml 4 91* 6 31*  --   --            * I Have Reviewed All Lab Data Listed Above  * Additional Pertinent Lab Tests Reviewed: All Labs Within Last 24 Hours Reviewed    Imaging:    Imaging Reports Reviewed Today Include: CT c/a/p  Imaging Personally Reviewed by Myself Includes:  CT c/a/p    Recent Cultures (last 7 days):     Results from last 7 days   Lab Units 04/24/21  0743 04/24/21  0742 04/21/21  2207 04/21/21 2140 04/21/21 2131   BLOOD CULTURE  No Growth at 24 hrs  No Growth at 24 hrs    --  Pseudomonas aeruginosa* Pseudomonas aeruginosa*  Gamma Hemolytic Streptococcus NOT Enterococcus   GRAM STAIN RESULT   --   --   --  Gram negative rods* Gram negative rods*  Gram positive cocci in chains*   URINE CULTURE   --   --  >100,000 cfu/ml Klebsiella pneumoniae*  --   --        Last 24 Hours Medication List:   Current Facility-Administered Medications Medication Dose Route Frequency Provider Last Rate    acetaminophen  650 mg Oral Q6H PRN Orcesar Kunz PA-C      albuterol  2 5 mg Nebulization Q4H PRN Jaya Jaramillo DO      amLODIPine  10 mg Oral Daily With R Delvin Lavrador [address] Tiny OLEGARIO Saul      aspirin  81 mg Oral Daily Orcesar Kunz PA-C      atorvastatin  40 mg Oral Daily Orcesar Kunz PA-C      cefepime  1,000 mg Intravenous Neal Lek, DO 1,000 mg (04/26/21 1130)    clopidogrel  75 mg Oral Daily Swathi Suthelrand PA-C      heparin (porcine)  5,000 Units Subcutaneous Q8H [address] Swathi Sutherland PA-C      metoprolol tartrate  100 mg Oral Q12H [address] Vamshi Kunz PA-C          Today, Patient Was Seen By: SANDRINE Howell    ** Please Note: Dictation voice to text software may have been used in the creation of this document   **

## 2021-04-26 NOTE — ASSESSMENT & PLAN NOTE
Lab Results   Component Value Date    EGFR 46 04/26/2021    EGFR 43 04/25/2021    EGFR 44 04/24/2021    CREATININE 1 41 (H) 04/26/2021    CREATININE 1 49 (H) 04/25/2021    CREATININE 1 46 (H) 04/24/2021     Possible CKD 3, although no documentation seen in prior records    Cr from 3/2020 was 1 32  Continue to monitor closely  Avoid hypotension and nephrotoxic agents  Will need outpatient follow up

## 2021-04-26 NOTE — ASSESSMENT & PLAN NOTE
2/2 blood cultures positive Pseudomonas  Non-contrast CT c/a/p- gallstones without inflammation  Diverticulosis without acute diverticulitis  Some thickening and fat stranding left ureter, concern for left ureteritis  Multiple small lung nodules noted- will need follow up imaging in 12 months  · 2/2 blood cultures - Pseudomonas  · Unclear source - ID following  · Repeat BC - NGTD  · Non-contrast CT c/a/p- gallstones without inflammation  Diverticulosis without acute diverticulitis  Some thickening and fat stranding left ureter, concern for left ureteritis  · ECHO - pending  · Pt received Rocephin and 2L NS total in ER     · Transitioned to Cefepime Day #5  · ID following - appreciated ABX guidance  · Trend fever curve and WBC

## 2021-04-26 NOTE — PLAN OF CARE
Problem: PAIN - ADULT  Goal: Verbalizes/displays adequate comfort level or baseline comfort level  Description: Interventions:  - Encourage patient to monitor pain and request assistance  - Assess pain using appropriate pain scale  - Administer analgesics based on type and severity of pain and evaluate response  - Implement non-pharmacological measures as appropriate and evaluate response  - Consider cultural and social influences on pain and pain management  - Notify physician/advanced practitioner if interventions unsuccessful or patient reports new pain  Outcome: Progressing     Problem: INFECTION - ADULT  Goal: Absence or prevention of progression during hospitalization  Description: INTERVENTIONS:  - Assess and monitor for signs and symptoms of infection  - Monitor lab/diagnostic results  - Monitor all insertion sites, i e  indwelling lines, tubes, and drains  - Monitor endotracheal if appropriate and nasal secretions for changes in amount and color  - [place] appropriate cooling/warming therapies per order  - Administer medications as ordered  - Instruct and encourage patient and family to use good hand hygiene technique  - Identify and instruct in appropriate isolation precautions for identified infection/condition  Outcome: Progressing     Problem: SAFETY ADULT  Goal: Patient will remain free of falls  Description: INTERVENTIONS:  - Assess patient frequently for physical needs  -  Identify cognitive and physical deficits and behaviors that affect risk of falls    -  [place] fall precautions as indicated by assessment   - Educate patient/family on patient safety including physical limitations  - Instruct patient to call for assistance with activity based on assessment  - Modify environment to reduce risk of injury  - Consider OT/PT consult to assist with strengthening/mobility  Outcome: Progressing  Goal: Maintain or return to baseline ADL function  Description: INTERVENTIONS:  -  Assess patient's ability to carry out ADLs; assess patient's baseline for ADL function and identify physical deficits which impact ability to perform ADLs (bathing, care of mouth/teeth, toileting, grooming, dressing, etc )  - Assess/evaluate cause of self-care deficits   - Assess range of motion  - Assess patient's mobility; develop plan if impaired  - Assess patient's need for assistive devices and provide as appropriate  - Encourage maximum independence but intervene and supervise when necessary  - Involve family in performance of ADLs  - Assess for home care needs following discharge   - Consider OT consult to assist with ADL evaluation and planning for discharge  - Provide patient education as appropriate  Outcome: Progressing  Goal: Maintain or return mobility status to optimal level  Description: INTERVENTIONS:  - Assess patient's baseline mobility status (ambulation, transfers, stairs, etc )    - Identify cognitive and physical deficits and behaviors that affect mobility  - Identify mobility aids required to assist with transfers and/or ambulation (gait belt, sit-to-stand, lift, walker, cane, etc )  - [place] fall precautions as indicated by assessment  - Record patient progress and toleration of activity level on Mobility SBAR; progress patient to next Phase/Stage  - Instruct patient to call for assistance with activity based on assessment  - Consider rehabilitation consult to assist with strengthening/weightbearing, etc   Outcome: Progressing     Problem: DISCHARGE PLANNING  Goal: Discharge to home or other facility with appropriate resources  Description: INTERVENTIONS:  - Identify barriers to discharge w/patient and caregiver  - Arrange for needed discharge resources and transportation as appropriate  - Identify discharge learning needs (meds, wound care, etc )  - Arrange for interpretive services to assist at discharge as needed  - Refer to Case Management Department for coordinating discharge planning if the patient needs post-hospital services based on physician/advanced practitioner order or complex needs related to functional status, cognitive ability, or social support system  Outcome: Progressing     Problem: Knowledge Deficit  Goal: Patient/family/caregiver demonstrates understanding of disease process, treatment plan, medications, and discharge instructions  Description: Complete learning assessment and assess knowledge base    Interventions:  - Provide teaching at level of understanding  - Provide teaching via preferred learning methods  Outcome: Progressing     Problem: CARDIOVASCULAR - ADULT  Goal: Maintains optimal cardiac output and hemodynamic stability  Description: INTERVENTIONS:  - Monitor I/O, vital signs and rhythm  - Monitor for S/S and trends of decreased cardiac output  - Administer and titrate ordered vasoactive medications to optimize hemodynamic stability  - Assess quality of pulses, skin color and temperature  - Assess for signs of decreased coronary artery perfusion  - Instruct patient to report change in severity of symptoms  Outcome: Progressing  Goal: Absence of cardiac dysrhythmias or at baseline rhythm  Description: INTERVENTIONS:  - Continuous cardiac monitoring, vital signs, obtain 12 lead EKG if ordered  - Administer antiarrhythmic and heart rate control medications as ordered  - Monitor electrolytes and administer replacement therapy as ordered  Outcome: Progressing     Problem: GENITOURINARY - ADULT  Goal: Maintains or returns to baseline urinary function  Description: INTERVENTIONS:  - Assess urinary function  - Encourage oral fluids to ensure adequate hydration if ordered  - Administer IV fluids as ordered to ensure adequate hydration  - Administer ordered medications as needed  - Offer frequent toileting  - Follow urinary retention protocol if ordered  Outcome: Progressing     Problem: Potential for Falls  Goal: Patient will remain free of falls  Description: INTERVENTIONS:  - Assess patient frequently for physical needs  -  Identify cognitive and physical deficits and behaviors that affect risk of falls    -  [place] fall precautions as indicated by assessment   - Educate patient/family on patient safety including physical limitations  - Instruct patient to call for assistance with activity based on assessment  - Modify environment to reduce risk of injury  - Consider OT/PT consult to assist with strengthening/mobility  Outcome: Progressing

## 2021-04-26 NOTE — CONSULTS
REQUIRED DOCUMENTATION:   1  This service was provided via Telemedicine  2  Provider located at home  3  TeleMed provider: Elvira Teixeira DO   4  Identify all parties in room with patient during tele consult: ADEEL Barrera  After connecting through [employer], patient was identified by name and date of birth and assistant checked wristband  Patient was then informed that this was a Telemedicine visit and that the exam was being conducted confidentially over secure lines  No one else was in the room  Patient acknowledged consent and understanding of privacy and security of the Telemedicine visit, and gave us permission to have the assistant stay in the room in order to assist with the history and to conduct the exam   I informed the patient that I have reviewed their record in Epic and presented the opportunity for them to ask any questions regarding the visit today  The patient agreed to participate  TeleConsultation - Infectious Disease   Hosmer Rein 80 y o  male MRN: [id number]  Unit/Bed#: -01 Encounter: [id number]    IMPRESSION/RECOMMENDATIONS:   Sepsis, POA: With manifestations of fever, tachycardia, tachypnea, leukocytosis, elevated lactic acid level (lactate 2 5->1  4)  Source of sepsis appears to be UTI with bacteremia  However urine and blood cultures do not match  Patient is clinically improving with cefepime IV  Fevers have resolved; WBC and lactate have normalized  Procalcitonin trending down   o Monitor clinical response, temperature curve   o Continue cefepime iv   Pseudomonas aeruginosa bacteremia: Possible respiratory source considered based on initial CXR; although CT chest does not demonstrate consolidation  Urinary source possible, although less likely, as Klebsiella was isolated from the urine   Noted repeat blood cx from 4/24 are ngtd   o Follow 4/24 blood cx: ngtd  o 2D echo ordered today which is reasonable as source of bacteremia is unclear and pt has history of mitral valve repair  o Continue cefepime 1g iv q12 hours (started on 8/16)   Complicated UTI due to Klebsiella pneumoniae  CT Abd/pelvis demonstrates findings of left ureteritis  o Continue cefepime iv   CKD Stage 3 with GFR 46  o Renal dose medications, avoid nephrotoxins   CAD s/p CABG, hx of mitral valve repair  o Follow 2D echo results    Extensive review of the medical records in Epic including review of the notes, radiographs, and laboratory results  My recommendations were discussed with the patient in detail who verbalized understanding  My recommendations were discussed with hospitalist, Dr Janny Giordano, from the primary service  Thank you for allowing me to participate in the care of this patient  The ID service will follow  HISTORY OF PRESENT ILLNESS:  Reason for Consult: Pseudomonas bacteremia  HPI: Tato Oviedo is a 80y o  year old man who was admitted on 4/22With chills and shakiness  Upon arrival to the ED he was noted to have temperature 100 4° F, WBC 12 0, tachycardia and tachypnea  Initial lactate was 2 5  He was admitted given concerns of UTI/sepsis  He was initially treated with ceftriaxone IV  His blood cultures returned positive for Pseudomonas aeruginosa and his antibiotic regimen was transitioned to cefepime IV  ID service was consulted for antibiotic management  Pt is tolerating current antibiotics  No fevers reported today  He is feeling better and denies chills or sweats  He does not report burning upon urination or bloody urine  Denies bladder, flank or kidney pain  REVIEW OF SYSTEMS:  Constitutional: +fever   Negative for chills  HENT: Negative for congestion, runny nose, sore throat  Eyes: Negative for visual disturbance  Respiratory: Negative for cough, shortness of breath  Cardiovascular: Negative for chest pain, palpitations   Gastrointestinal: Negative for abdominal pain, diarrhea, nausea, vomiting  Genitourinary: Negative for dysuria, hematuria, urine frequency, urgency  Musculoskeletal: +knee arthritis  Neurological: + numbness of his feet  Negative for speech difficulty  Skin: Negative for rash, itching  Psychiatric/Behavioral: Negative for depression, anxiety    PAST MEDICAL HISTORY:  Past Medical History:   Diagnosis Date    Hypertension      Past Surgical History:   Procedure Laterality Date    CARDIAC SURGERY     CAD s/p CABG    FAMILY HISTORY: Negative for recurrent infection    SOCIAL HISTORY:  Social History   Social History     Substance and Sexual Activity   Alcohol Use Not Currently     Social History     Substance and Sexual Activity   Drug Use Not Currently     Social History     Tobacco Use   Smoking Status Never Smoker   Smokeless Tobacco Never Used   Lives at home with his wife  ALLERGIES:  No Known Allergies    MEDICATIONS:  All current active medications have been reviewed    Scheduled Meds:  Current Facility-Administered Medications   Medication Dose Route Frequency Provider Last Rate    acetaminophen  650 mg Oral Q6H PRN Octaviano Araya PA-C      albuterol  2 5 mg Nebulization Q4H PRN Urusla Lock DO      amLODIPine  10 mg Oral Daily With R [address] Abby Lowe PA-C      aspirin  81 mg Oral Daily Octaviano Araya PA-C      atorvastatin  40 mg Oral Daily Octaviano Araya PA-C      cefepime  1,000 mg Intravenous Q12H Ursula Lock DO Stopped (21 0001)    clopidogrel  75 mg Oral Daily Octaviano Araya PA-C      heparin (porcine)  5,000 Units Subcutaneous [facility] Octaviano Araya PA-C      metoprolol tartrate  100 mg Oral Q12H [address] Swathi Donahue PA-C       Continuous Infusions:   PRN Meds:   acetaminophen    albuterol    PHYSICAL EXAM:  Temp:  [98 °F (36 7 °C)] 98 °F (36 7 °C)  HR:  [68-83] 83  Resp:  [18-20] 18  BP: (162-173)/(74-76) 173/74  SpO2:  [97 %-100 %] 100 %  Temp (24hrs), Av °F (36 7 °C), Min:98 °F (36 7 °C), Max:98 °F (36 7 °C)  Current: Temperature: 98 °F (36 7 °C)    Intake/Output Summary (Last 24 hours) at 4/26/2021 9873  Last data filed at 4/26/2021 0001  Gross per 24 hour   Intake 140 ml   Output --   Net 140 ml     Limited exam due to telehealth visit which was mostly done by the patient's nurse  General Appearance:  Nontoxic, in no acute distress   Head:  Normocephalic, without obvious abnormality, atraumatic   Eyes:  EOMI   Nose: Nares normal, mucosa normal   Throat: Oropharynx moist   Neck: Supple per RN   Lungs:   Respirations unlabored, CTA b/l per RN   Heart:  S1, S2, regular rate/rhythm, no murmur per RN   Abdomen:   Soft, nontender per RN   : No Schaffer catheter present per RN   Extremities: No distal leg edema bilateral per RN   Skin: No rash per RN   Neurologic: Alert and oriented times 3, conversant, fluent speech      LABS, IMAGING, & OTHER STUDIES:  Lab Results:  I have personally reviewed pertinent labs  Results from last 7 days   Lab Units 04/26/21  0559 04/25/21  0600 04/24/21  0654   WBC Thousand/uL 6 16 6 13 7 50   HEMOGLOBIN g/dL 11 2* 10 2* 10 9*   PLATELETS Thousands/uL 149 141* 142*     Results from last 7 days   Lab Units 04/26/21  0559  04/23/21  0555  04/21/21  2131   POTASSIUM mmol/L 3 9   < > 4 0   < > 3 5   CHLORIDE mmol/L 106   < > 104   < > 102   CO2 mmol/L 22   < > 26   < > 25   BUN mg/dL 38*   < > 29*   < > 31*   CREATININE mg/dL 1 41*   < > 1 43*   < > 1 54*   EGFR ml/min/1 73sq m 46   < > 46   < > 42   CALCIUM mg/dL 8 2*   < > 7 9*   < > 8 2*   AST U/L  --   --  68*  --  14   ALT U/L  --   --  30  --  26   ALK PHOS U/L  --   --  94  --  136*    < > = values in this interval not displayed  Results from last 7 days   Lab Units 04/24/21  0743 04/24/21  0742 04/21/21  2207 04/21/21  2140 04/21/21  2131   BLOOD CULTURE  No Growth at 24 hrs  No Growth at 24 hrs    --  Pseudomonas aeruginosa* Pseudomonas aeruginosa*  Gamma Hemolytic Streptococcus NOT Enterococcus   GRAM STAIN RESULT   --   --   --  Gram negative rods* Gram negative rods*  Gram positive cocci in chains* URINE CULTURE   --   --  >100,000 cfu/ml Klebsiella pneumoniae*  --   --      Results from last 7 days   Lab Units 04/23/21  0555 04/22/21  0442   PROCALCITONIN ng/ml 4 91* 6 31*     Imaging Studies:   4/22 CXR PA/Lat:  Bibasilar patchy airspace consolidation  4/22 renal US: mild renal atrophy  4/23 CT abd/pelv: Mild urothelial thickening and periureteral fat stranding of the left ureter, suggestive of ureteritis  I have personally reviewed pertinent imaging study reports  4/21 ECG: QTc 441    VIRTUAL VISIT DISCLAIMER  The patient has consented to an online visit or consultation  The patient understands that the online visit is based solely on information provided by them, and that, in the absence of a face-to-face physical evaluation by the physician, the diagnosis they receive are both limited and provisional in terms of accuracy and completeness  This is not intended to replace a full medical face-to-face evaluation by the physician

## 2021-04-26 NOTE — PLAN OF CARE
Problem: PAIN - ADULT  Goal: Verbalizes/displays adequate comfort level or baseline comfort level  Description: Interventions:  - Encourage patient to monitor pain and request assistance  - Assess pain using appropriate pain scale  - Administer analgesics based on type and severity of pain and evaluate response  - Implement non-pharmacological measures as appropriate and evaluate response  - Consider cultural and social influences on pain and pain management  - Notify physician/advanced practitioner if interventions unsuccessful or patient reports new pain  Outcome: Progressing     Problem: INFECTION - ADULT  Goal: Absence or prevention of progression during hospitalization  Description: INTERVENTIONS:  - Assess and monitor for signs and symptoms of infection  - Monitor lab/diagnostic results  - Monitor all insertion sites, i e  indwelling lines, tubes, and drains  - Monitor endotracheal if appropriate and nasal secretions for changes in amount and color  - [place] appropriate cooling/warming therapies per order  - Administer medications as ordered  - Instruct and encourage patient and family to use good hand hygiene technique  - Identify and instruct in appropriate isolation precautions for identified infection/condition  Outcome: Progressing     Problem: SAFETY ADULT  Goal: Patient will remain free of falls  Description: INTERVENTIONS:  - Assess patient frequently for physical needs  -  Identify cognitive and physical deficits and behaviors that affect risk of falls    -  [place] fall precautions as indicated by assessment   - Educate patient/family on patient safety including physical limitations  - Instruct patient to call for assistance with activity based on assessment  - Modify environment to reduce risk of injury  - Consider OT/PT consult to assist with strengthening/mobility  Outcome: Progressing  Goal: Maintain or return to baseline ADL function  Description: INTERVENTIONS:  -  Assess patient's ability to carry out ADLs; assess patient's baseline for ADL function and identify physical deficits which impact ability to perform ADLs (bathing, care of mouth/teeth, toileting, grooming, dressing, etc )  - Assess/evaluate cause of self-care deficits   - Assess range of motion  - Assess patient's mobility; develop plan if impaired  - Assess patient's need for assistive devices and provide as appropriate  - Encourage maximum independence but intervene and supervise when necessary  - Involve family in performance of ADLs  - Assess for home care needs following discharge   - Consider OT consult to assist with ADL evaluation and planning for discharge  - Provide patient education as appropriate  Outcome: Progressing  Goal: Maintain or return mobility status to optimal level  Description: INTERVENTIONS:  - Assess patient's baseline mobility status (ambulation, transfers, stairs, etc )    - Identify cognitive and physical deficits and behaviors that affect mobility  - Identify mobility aids required to assist with transfers and/or ambulation (gait belt, sit-to-stand, lift, walker, cane, etc )  - [place] fall precautions as indicated by assessment  - Record patient progress and toleration of activity level on Mobility SBAR; progress patient to next Phase/Stage  - Instruct patient to call for assistance with activity based on assessment  - Consider rehabilitation consult to assist with strengthening/weightbearing, etc   Outcome: Progressing     Problem: DISCHARGE PLANNING  Goal: Discharge to home or other facility with appropriate resources  Description: INTERVENTIONS:  - Identify barriers to discharge w/patient and caregiver  - Arrange for needed discharge resources and transportation as appropriate  - Identify discharge learning needs (meds, wound care, etc )  - Arrange for interpretive services to assist at discharge as needed  - Refer to Case Management Department for coordinating discharge planning if the patient needs post-hospital services based on physician/advanced practitioner order or complex needs related to functional status, cognitive ability, or social support system  Outcome: Progressing     Problem: Knowledge Deficit  Goal: Patient/family/caregiver demonstrates understanding of disease process, treatment plan, medications, and discharge instructions  Description: Complete learning assessment and assess knowledge base    Interventions:  - Provide teaching at level of understanding  - Provide teaching via preferred learning methods  Outcome: Progressing     Problem: CARDIOVASCULAR - ADULT  Goal: Maintains optimal cardiac output and hemodynamic stability  Description: INTERVENTIONS:  - Monitor I/O, vital signs and rhythm  - Monitor for S/S and trends of decreased cardiac output  - Administer and titrate ordered vasoactive medications to optimize hemodynamic stability  - Assess quality of pulses, skin color and temperature  - Assess for signs of decreased coronary artery perfusion  - Instruct patient to report change in severity of symptoms  Outcome: Progressing  Goal: Absence of cardiac dysrhythmias or at baseline rhythm  Description: INTERVENTIONS:  - Continuous cardiac monitoring, vital signs, obtain 12 lead EKG if ordered  - Administer antiarrhythmic and heart rate control medications as ordered  - Monitor electrolytes and administer replacement therapy as ordered  Outcome: Progressing     Problem: GENITOURINARY - ADULT  Goal: Maintains or returns to baseline urinary function  Description: INTERVENTIONS:  - Assess urinary function  - Encourage oral fluids to ensure adequate hydration if ordered  - Administer IV fluids as ordered to ensure adequate hydration  - Administer ordered medications as needed  - Offer frequent toileting  - Follow urinary retention protocol if ordered  Outcome: Progressing     Problem: Potential for Falls  Goal: Patient will remain free of falls  Description: INTERVENTIONS:  - Assess patient frequently for physical needs  -  Identify cognitive and physical deficits and behaviors that affect risk of falls    -  [place] fall precautions as indicated by assessment   - Educate patient/family on patient safety including physical limitations  - Instruct patient to call for assistance with activity based on assessment  - Modify environment to reduce risk of injury  - Consider OT/PT consult to assist with strengthening/mobility  Outcome: Progressing

## 2021-04-26 NOTE — CASE MANAGEMENT
Chart reviewed aware of medical management  Clinical information supporting hospitalization:   + Pseudomonas bacteremia  IV antibiotics  ID consulted-- will need echo  Barriers to discharge:  - medical management  Discharge plan: home  -CM will assist with IV antibiotics if needed  CM will continue to follow plan of care

## 2021-04-26 NOTE — ASSESSMENT & PLAN NOTE
· POA - fever, tachycardia, tachypnea, leukocytosis, elevated lactic  · Source - Klebsiella UTI, pseudomonas bacteremia  · See plans below

## 2021-04-26 NOTE — ASSESSMENT & PLAN NOTE
· Echo 3/5/21: normal left ventricular systolic function, EF 15%, mitral valve repair with mild residual regurg, mild tricuspid regurg, mild pulmonary hypertension, mild aortic root dilation  · Continue medication as above    · CXR on admission shows vascular congestion- given Lasix 20 mg IV 4/23 with good response  · Currently euvolemic

## 2021-04-26 NOTE — ASSESSMENT & PLAN NOTE
· History of CAD s/p CABG 1995  · Denies chest pain at this time  · Continue ASA 81mg, plavix 75mg, amlodipine 10mg, metoprolol tartrate 100mg BID  major surgery lasting over 3 hrs

## 2021-04-27 LAB
ANION GAP SERPL CALCULATED.3IONS-SCNC: 9 MMOL/L (ref 4–13)
BASOPHILS # BLD AUTO: 0.06 THOUSANDS/ΜL (ref 0–0.1)
BASOPHILS NFR BLD AUTO: 1 % (ref 0–1)
BUN SERPL-MCNC: 37 MG/DL (ref 5–25)
CALCIUM SERPL-MCNC: 8.5 MG/DL (ref 8.3–10.1)
CHLORIDE SERPL-SCNC: 107 MMOL/L (ref 100–108)
CO2 SERPL-SCNC: 23 MMOL/L (ref 21–32)
CREAT SERPL-MCNC: 1.45 MG/DL (ref 0.6–1.3)
EOSINOPHIL # BLD AUTO: 0.34 THOUSAND/ΜL (ref 0–0.61)
EOSINOPHIL NFR BLD AUTO: 4 % (ref 0–6)
ERYTHROCYTE [DISTWIDTH] IN BLOOD BY AUTOMATED COUNT: 15.5 % (ref 11.6–15.1)
GFR SERPL CREATININE-BSD FRML MDRD: 45 ML/MIN/1.73SQ M
GLUCOSE SERPL-MCNC: 107 MG/DL (ref 65–140)
HCT VFR BLD AUTO: 32.5 % (ref 36.5–49.3)
HGB BLD-MCNC: 11 G/DL (ref 12–17)
IMM GRANULOCYTES # BLD AUTO: 0.05 THOUSAND/UL (ref 0–0.2)
IMM GRANULOCYTES NFR BLD AUTO: 1 % (ref 0–2)
LYMPHOCYTES # BLD AUTO: 2.06 THOUSANDS/ΜL (ref 0.6–4.47)
LYMPHOCYTES NFR BLD AUTO: 25 % (ref 14–44)
MCH RBC QN AUTO: 32.2 PG (ref 26.8–34.3)
MCHC RBC AUTO-ENTMCNC: 33.8 G/DL (ref 31.4–37.4)
MCV RBC AUTO: 95 FL (ref 82–98)
MONOCYTES # BLD AUTO: 0.66 THOUSAND/ΜL (ref 0.17–1.22)
MONOCYTES NFR BLD AUTO: 8 % (ref 4–12)
NEUTROPHILS # BLD AUTO: 5.01 THOUSANDS/ΜL (ref 1.85–7.62)
NEUTS SEG NFR BLD AUTO: 61 % (ref 43–75)
NRBC BLD AUTO-RTO: 0 /100 WBCS
PLATELET # BLD AUTO: 181 THOUSANDS/UL (ref 149–390)
PMV BLD AUTO: 10.5 FL (ref 8.9–12.7)
POTASSIUM SERPL-SCNC: 4.1 MMOL/L (ref 3.5–5.3)
RBC # BLD AUTO: 3.42 MILLION/UL (ref 3.88–5.62)
SODIUM SERPL-SCNC: 139 MMOL/L (ref 136–145)
WBC # BLD AUTO: 8.18 THOUSAND/UL (ref 4.31–10.16)

## 2021-04-27 PROCEDURE — 99232 SBSQ HOSP IP/OBS MODERATE 35: CPT | Performed by: PHYSICIAN ASSISTANT

## 2021-04-27 PROCEDURE — 80048 BASIC METABOLIC PNL TOTAL CA: CPT | Performed by: INTERNAL MEDICINE

## 2021-04-27 PROCEDURE — 85025 COMPLETE CBC W/AUTO DIFF WBC: CPT | Performed by: INTERNAL MEDICINE

## 2021-04-27 RX ADMIN — CEFEPIME HYDROCHLORIDE 1000 MG: 1 INJECTION, SOLUTION INTRAVENOUS at 23:36

## 2021-04-27 RX ADMIN — HEPARIN SODIUM 5000 UNITS: 5000 INJECTION INTRAVENOUS; SUBCUTANEOUS at 17:32

## 2021-04-27 RX ADMIN — HEPARIN SODIUM 5000 UNITS: 5000 INJECTION INTRAVENOUS; SUBCUTANEOUS at 08:53

## 2021-04-27 RX ADMIN — ASPIRIN 81 MG CHEWABLE TABLET 81 MG: 81 TABLET CHEWABLE at 08:52

## 2021-04-27 RX ADMIN — CEFEPIME HYDROCHLORIDE 1000 MG: 1 INJECTION, SOLUTION INTRAVENOUS at 11:37

## 2021-04-27 RX ADMIN — METOPROLOL TARTRATE 100 MG: 50 TABLET, FILM COATED ORAL at 20:22

## 2021-04-27 RX ADMIN — METOPROLOL TARTRATE 100 MG: 50 TABLET, FILM COATED ORAL at 08:52

## 2021-04-27 RX ADMIN — AMLODIPINE BESYLATE 10 MG: 10 TABLET ORAL at 08:53

## 2021-04-27 RX ADMIN — ATORVASTATIN CALCIUM 40 MG: 40 TABLET, FILM COATED ORAL at 08:53

## 2021-04-27 RX ADMIN — CLOPIDOGREL BISULFATE 75 MG: 75 TABLET ORAL at 08:52

## 2021-04-27 NOTE — ASSESSMENT & PLAN NOTE
· POA - fever, tachycardia, tachypnea, leukocytosis, elevated lactic  · Source - Klebsiella UTI, pseudomonas bacteremia - unclear source  · See plan under Bacteremia

## 2021-04-27 NOTE — ASSESSMENT & PLAN NOTE
· Urine cx >100K Klebsiella  · Will continue Cefepime Day #6  · ID following in consult - will follow up antibiotic plan

## 2021-04-27 NOTE — QUICK NOTE
Patient had 1st Moderna vaccine approximately 3 weeks ago  Patient is due for his 2nd vaccine tomorrow at [facility]  Family is questioning next steps, as he was bacteremic and still on antibiotics, is he able to receive his 2nd dose and if not when can he proceed with such?

## 2021-04-27 NOTE — PROGRESS NOTES
[address]  Progress Note Alireza Speaks 1940, 80 y o  male MRN: [id number]  Unit/Bed#: -01 Encounter: [id number]  Primary Care Provider: Grey Briceno DO   Date and time admitted to hospital: 4/21/2021  9:18 PM    * Bacteremia due to Pseudomonas  Assessment & Plan  2/2 blood cultures positive Pseudomonas  Non-contrast CT c/a/p- gallstones without inflammation  Diverticulosis without acute diverticulitis  Some thickening and fat stranding left ureter, concern for left ureteritis  Multiple small lung nodules noted- will need follow up imaging in 12 months  · 2/2 blood cultures - Pseudomonas  · Unclear source - ID following  · Repeat [facility] 4/24 - NGTD 48 hours  · Non-contrast CT c/a/p- gallstones without inflammation  Diverticulosis without acute diverticulitis  Some thickening and fat stranding left ureter, concern for left ureteritis  · ECHO 4/26 EF 60%  - PA Pressure 35mm/Hg indicative of mild Pulmonary hypertension  · Pt received Rocephin and 2L NS total in ER  · Transitioned to Cefepime Day #6 (started 4/22)  · ID following in consult - will follow up further recommendations  · Continue to monitor temps / WBC count      Sepsis (HCC)  Assessment & Plan  · POA - fever, tachycardia, tachypnea, leukocytosis, elevated lactic  · Source - Klebsiella UTI, pseudomonas bacteremia - unclear source  · See plan under Bacteremia    Acute cystitis without hematuria  Assessment & Plan  · Urine cx >100K Klebsiella  · Will continue Cefepime Day #6  · ID following in consult - will follow up antibiotic plan    Carotid artery stenosis  Assessment & Plan  · Carotid duplex 02/25/21: 1-19% stenosis right ICA, 20-39% stenosis left ICA, heterogenous plaque in left distal common carotid without significant stenosis  · Continue ASA / Plavix  · Continue Lipitor 40mg daily  Hyperlipidemia  Assessment & Plan  · Continue Lipitor 40mg daily       Essential hypertension  Assessment & Plan  · BP 170/72 on admission  · Continue Norvasc which was increased to 10mg yesterday    Coronary artery disease involving coronary bypass graft  Assessment & Plan  · History of CAD s/p CABG   · No current complaints  · Continue ASA 81mg, plavix 75mg, amlodipine 10mg, metoprolol tartrate 100mg BID  VTE Pharmacologic Prophylaxis:   Pharmacologic: Heparin  Mechanical VTE Prophylaxis in Place: Yes    Patient Centered Rounds: I have performed bedside rounds with nursing staff today  Discussions with Specialists or Other Care Team Provider: Will follow up with ID regarding antibiotic plan    Education and Discussions with Family / Patient: Discussed with patient    Time Spent for Care: 20 minutes  More than 50% of total time spent on counseling and coordination of care as described above  Current Length of Stay: 6 day(s)    Current Patient Status: Inpatient   Certification Statement: The patient will continue to require additional inpatient hospital stay due to Bacteremia     Discharge Plan: hopeful d/c over next 24-48 hours    Code Status: Level 1 - Full Code      Subjective:   Patient seen and evaluated this am   He reports he is feeling well and would love to "get out of here"  No 24 hour events or significant concerns  Objective:     Vitals:   Temp (24hrs), Av °F (36 7 °C), Min:97 6 °F (36 4 °C), Max:98 4 °F (36 9 °C)    Temp:  [97 6 °F (36 4 °C)-98 4 °F (36 9 °C)] 98 4 °F (36 9 °C)  HR:  [68-82] 82  Resp:  [16-18] 18  BP: (143-170)/(67-78) 170/78  SpO2:  [98 %-100 %] 99 %  Body mass index is 25 58 kg/m²  Input and Output Summary (last 24 hours):        Intake/Output Summary (Last 24 hours) at 2021 1109  Last data filed at 2021 0901  Gross per 24 hour   Intake 320 ml   Output --   Net 320 ml       Physical Exam:     General:  79 y/o M awake, alert, and oriented in chair next to bed, without any complaints and in no distress  HEENT: Normocephalic, atraumatic, PERR, Sclera anicteric, conjunctiva pink, oropharynx patent, tolerating secretions  Neck: supple, trachea midline  Heart: RRR with noted murmur, no rub  Lungs: clear and equal all fields bilaterally, no wheezing, rales, or rhonchi  Abd: soft, non-tender, no guarding, rebound, or peritoneal signs, active BS noted  : no zavala  Back: no CVA tenderness  Skin: warm and dry  Neuro: GCS 15, conscious, alert, and oriented, non-focal exam    Additional Data:     Labs:    Results from last 7 days   Lab Units 04/27/21  0451   WBC Thousand/uL 8 18   HEMOGLOBIN g/dL 11 0*   HEMATOCRIT % 32 5*   PLATELETS Thousands/uL 181   NEUTROS PCT % 61   LYMPHS PCT % 25   MONOS PCT % 8   EOS PCT % 4     Results from last 7 days   Lab Units 04/27/21  0451  04/23/21  0555   SODIUM mmol/L 139   < > 138   POTASSIUM mmol/L 4 1   < > 4 0   CHLORIDE mmol/L 107   < > 104   CO2 mmol/L 23   < > 26   BUN mg/dL 37*   < > 29*   CREATININE mg/dL 1 45*   < > 1 43*   ANION GAP mmol/L 9   < > 8   CALCIUM mg/dL 8 5   < > 7 9*   ALBUMIN g/dL  --   --  3 3*   TOTAL BILIRUBIN mg/dL  --   --  2 08*   ALK PHOS U/L  --   --  94   ALT U/L  --   --  30   AST U/L  --   --  68*   GLUCOSE RANDOM mg/dL 107   < > 120    < > = values in this interval not displayed  Results from last 7 days   Lab Units 04/23/21  0555   INR  1 11             Results from last 7 days   Lab Units 04/23/21  0555 04/22/21  0442 04/21/21  2313 04/21/21  2131   LACTIC ACID mmol/L  --   --  1 4 2 5*   PROCALCITONIN ng/ml 4 91* 6 31*  --   --        * I Have Reviewed All Lab Data Listed Above  * Additional Pertinent Lab Tests Reviewed:  Bob [address] Admission Reviewed    Imaging:    Imaging Reports Reviewed Today Include: Echo, CT Chest/Abd/Pelvis, Renal U/S  Imaging Personally Reviewed by Myself Includes:  Echo, CT C/A/P, U/S    Recent Cultures (last 7 days):     Results from last 7 days   Lab Units 04/24/21  0743 04/24/21  0742 04/21/21  2207 04/21/21  2140 04/21/21 2131   BLOOD CULTURE  No Growth at 48 hrs  No Growth at 48 hrs  --  Pseudomonas aeruginosa* Pseudomonas aeruginosa*  Gamma Hemolytic Streptococcus NOT Enterococcus   GRAM STAIN RESULT   --   --   --  Gram negative rods* Gram negative rods*  Gram positive cocci in chains*   URINE CULTURE   --   --  >100,000 cfu/ml Klebsiella pneumoniae*  --   --        Last 24 Hours Medication List:   Current Facility-Administered Medications   Medication Dose Route Frequency Provider Last Rate    acetaminophen  650 mg Oral Q6H PRN Rafita Larson PA-C      albuterol  2 5 mg Nebulization Q4H PRN Neomia LaudDO      amLODIPine  10 mg Oral Daily With Lunch Jayde Santoro PA-C      aspirin  81 mg Oral Daily Swathi Rodarte PA-C      atorvastatin  40 mg Oral Daily Swathi Rodarte PA-C      cefepime  1,000 mg Intravenous Q12H Antwan Laguerre DO 1,000 mg (04/26/21 2350)    clopidogrel  75 mg Oral Daily Swathi Rodarte PA-C      heparin (porcine)  5,000 Units Subcutaneous Q8H [address] Swathi Rodarte PA-C      metoprolol tartrate  100 mg Oral Q12H [address] Rafita Larson PA-C          Today, Patient Was Seen By: Jasvir Villarreal PA-C    ** Please Note: Dictation voice to text software may have been used in the creation of this document   **

## 2021-04-27 NOTE — ASSESSMENT & PLAN NOTE
2/2 blood cultures positive Pseudomonas  Non-contrast CT c/a/p- gallstones without inflammation  Diverticulosis without acute diverticulitis  Some thickening and fat stranding left ureter, concern for left ureteritis  Multiple small lung nodules noted- will need follow up imaging in 12 months  · 2/2 blood cultures - Pseudomonas  · Unclear source - ID following  · Repeat [facility] 4/24 - NGTD 48 hours  · Non-contrast CT c/a/p- gallstones without inflammation  Diverticulosis without acute diverticulitis  Some thickening and fat stranding left ureter, concern for left ureteritis  · ECHO 4/26 EF 60%  - PA Pressure 35mm/Hg indicative of mild Pulmonary hypertension  · Pt received Rocephin and 2L NS total in ER     · Transitioned to Cefepime Day #6 (started 4/22)  · ID following in consult - will follow up further recommendations  · Continue to monitor temps / WBC count

## 2021-04-27 NOTE — PLAN OF CARE
Problem: INFECTION - ADULT  Goal: Absence or prevention of progression during hospitalization  Description: INTERVENTIONS:  - Assess and monitor for signs and symptoms of infection  - Monitor lab/diagnostic results  - Monitor all insertion sites, i e  indwelling lines, tubes, and drains  - Monitor endotracheal if appropriate and nasal secretions for changes in amount and color  - [place] appropriate cooling/warming therapies per order  - Administer medications as ordered  - Instruct and encourage patient and family to use good hand hygiene technique  - Identify and instruct in appropriate isolation precautions for identified infection/condition  Outcome: Progressing     Problem: SAFETY ADULT  Goal: Maintain or return to baseline ADL function  Description: INTERVENTIONS:  -  Assess patient's ability to carry out ADLs; assess patient's baseline for ADL function and identify physical deficits which impact ability to perform ADLs (bathing, care of mouth/teeth, toileting, grooming, dressing, etc )  - Assess/evaluate cause of self-care deficits   - Assess range of motion  - Assess patient's mobility; develop plan if impaired  - Assess patient's need for assistive devices and provide as appropriate  - Encourage maximum independence but intervene and supervise when necessary  - Involve family in performance of ADLs  - Assess for home care needs following discharge   - Consider OT consult to assist with ADL evaluation and planning for discharge  - Provide patient education as appropriate  Outcome: Progressing  Goal: Maintain or return mobility status to optimal level  Description: INTERVENTIONS:  - Assess patient's baseline mobility status (ambulation, transfers, stairs, etc )    - Identify cognitive and physical deficits and behaviors that affect mobility  - Identify mobility aids required to assist with transfers and/or ambulation (gait belt, sit-to-stand, lift, walker, cane, etc )  - [place] fall precautions as indicated by assessment  - Record patient progress and toleration of activity level on Mobility SBAR; progress patient to next Phase/Stage  - Instruct patient to call for assistance with activity based on assessment  - Consider rehabilitation consult to assist with strengthening/weightbearing, etc   Outcome: Progressing     Problem: Knowledge Deficit  Goal: Patient/family/caregiver demonstrates understanding of disease process, treatment plan, medications, and discharge instructions  Description: Complete learning assessment and assess knowledge base    Interventions:  - Provide teaching at level of understanding  - Provide teaching via preferred learning methods  Outcome: Progressing     Problem: CARDIOVASCULAR - ADULT  Goal: Maintains optimal cardiac output and hemodynamic stability  Description: INTERVENTIONS:  - Monitor I/O, vital signs and rhythm  - Monitor for S/S and trends of decreased cardiac output  - Administer and titrate ordered vasoactive medications to optimize hemodynamic stability  - Assess quality of pulses, skin color and temperature  - Assess for signs of decreased coronary artery perfusion  - Instruct patient to report change in severity of symptoms  Outcome: Progressing     Problem: GENITOURINARY - ADULT  Goal: Maintains or returns to baseline urinary function  Description: INTERVENTIONS:  - Assess urinary function  - Encourage oral fluids to ensure adequate hydration if ordered  - Administer IV fluids as ordered to ensure adequate hydration  - Administer ordered medications as needed  - Offer frequent toileting  - Follow urinary retention protocol if ordered  Outcome: Progressing Spontaneous, unlabored and symmetrical

## 2021-04-27 NOTE — ASSESSMENT & PLAN NOTE
· History of CAD s/p CABG 1995  · No current complaints  · Continue ASA 81mg, plavix 75mg, amlodipine 10mg, metoprolol tartrate 100mg BID

## 2021-04-27 NOTE — ASSESSMENT & PLAN NOTE
· Carotid duplex 02/25/21: 1-19% stenosis right ICA, 20-39% stenosis left ICA, heterogenous plaque in left distal common carotid without significant stenosis  · Continue ASA / Plavix  · Continue Lipitor 40mg daily

## 2021-04-28 VITALS
HEIGHT: 68 IN | BODY MASS INDEX: 23.76 KG/M2 | RESPIRATION RATE: 20 BRPM | WEIGHT: 156.75 LBS | TEMPERATURE: 97.9 F | OXYGEN SATURATION: 98 % | DIASTOLIC BLOOD PRESSURE: 70 MMHG | HEART RATE: 86 BPM | SYSTOLIC BLOOD PRESSURE: 155 MMHG

## 2021-04-28 PROCEDURE — 99239 HOSP IP/OBS DSCHRG MGMT >30: CPT | Performed by: PHYSICIAN ASSISTANT

## 2021-04-28 PROCEDURE — NC001 PR NO CHARGE: Performed by: INTERNAL MEDICINE

## 2021-04-28 RX ORDER — LEVOFLOXACIN 750 MG/1
750 TABLET ORAL EVERY OTHER DAY
Qty: 2 TABLET | Refills: 0 | Status: SHIPPED | OUTPATIENT
Start: 2021-04-30 | End: 2021-05-04

## 2021-04-28 RX ORDER — LEVOFLOXACIN 5 MG/ML
750 INJECTION, SOLUTION INTRAVENOUS
Status: DISCONTINUED | OUTPATIENT
Start: 2021-04-28 | End: 2021-04-28

## 2021-04-28 RX ORDER — LEVOFLOXACIN 750 MG/1
750 TABLET ORAL EVERY OTHER DAY
Status: DISCONTINUED | OUTPATIENT
Start: 2021-04-28 | End: 2021-04-28 | Stop reason: HOSPADM

## 2021-04-28 RX ORDER — AMLODIPINE BESYLATE 10 MG/1
10 TABLET ORAL
Qty: 30 TABLET | Refills: 0 | Status: SHIPPED | OUTPATIENT
Start: 2021-04-29 | End: 2022-03-05 | Stop reason: HOSPADM

## 2021-04-28 RX ADMIN — CLOPIDOGREL BISULFATE 75 MG: 75 TABLET ORAL at 08:43

## 2021-04-28 RX ADMIN — HEPARIN SODIUM 5000 UNITS: 5000 INJECTION INTRAVENOUS; SUBCUTANEOUS at 08:43

## 2021-04-28 RX ADMIN — ASPIRIN 81 MG CHEWABLE TABLET 81 MG: 81 TABLET CHEWABLE at 08:43

## 2021-04-28 RX ADMIN — LEVOFLOXACIN 750 MG: 750 TABLET, FILM COATED ORAL at 12:02

## 2021-04-28 RX ADMIN — AMLODIPINE BESYLATE 10 MG: 10 TABLET ORAL at 08:43

## 2021-04-28 RX ADMIN — METOPROLOL TARTRATE 100 MG: 50 TABLET, FILM COATED ORAL at 08:43

## 2021-04-28 RX ADMIN — HEPARIN SODIUM 5000 UNITS: 5000 INJECTION INTRAVENOUS; SUBCUTANEOUS at 00:31

## 2021-04-28 RX ADMIN — ATORVASTATIN CALCIUM 40 MG: 40 TABLET, FILM COATED ORAL at 08:43

## 2021-04-28 NOTE — ASSESSMENT & PLAN NOTE
· Echo 3/5/21: normal left ventricular systolic function, EF 57%, mitral valve repair with mild residual regurg, mild tricuspid regurg, mild pulmonary hypertension, mild aortic root dilation  · Continue medication as above    · CXR on admission shows vascular congestion- given Lasix 20 mg IV 4/23 with good response  · Currently euvolemic

## 2021-04-28 NOTE — DISCHARGE SUMMARY
[address]  Discharge- Nathaly Dominguez 1940, 80 y o  male MRN: [id number]  Unit/Bed#: -01 Encounter: [id number]  Primary Care Provider: Corbin Rangel DO   Date and time admitted to hospital: 4/21/2021  9:18 PM    * Bacteremia due to Pseudomonas  Assessment & Plan  2/2 blood cultures positive Pseudomonas  Non-contrast CT c/a/p- gallstones without inflammation  Diverticulosis without acute diverticulitis  Some thickening and fat stranding left ureter, concern for left ureteritis  Multiple small lung nodules noted- will need follow up imaging in 12 months  Spoke with wife today and she will make appointment with PCP  · 2/2 blood cultures - Pseudomonas  · Unclear source - ID following  · Repeat [facility] 4/24 - NGTD 48 hours   · ECHO 4/26 EF 60%  - PA Pressure 35mm/Hg indicative of mild Pulmonary hypertension  No vegetation  · Pt received Rocephin and 2L NS total in ER  · Transitioned to Cefepime Day #6 (started 4/22)  · ID following in consult - D/C Cefepime today and change to PO Levaquin 750 mg PO q 48 hours until 5/2  · Plan for D/C home today      Sepsis ([address] )  Assessment & Plan  · POA - fever, tachycardia, tachypnea, leukocytosis, elevated lactic  · Source - Klebsiella UTI, pseudomonas bacteremia - unclear source  · See plan under Bacteremia    Acute cystitis without hematuria  Assessment & Plan  · Urine cx >100K Klebsiella  · Received Cefepime X  6 days, transition to PO Levaquin per ID  · ID following in consult - will follow up antibiotic plan    Multiple lung nodules on CT  Assessment & Plan  · Non-contrast CT scan on 4/23 of chest/abd/pelvis   Multiple small pulmonary nodules measuring up to 0 5 cm  Based on current Fleischner Society 2017 Guidelines on  incidental pulmonary nodule, no routine follow-up is needed if the patient is considered low risk for lung cancer    If the  patient is considered high risk for lung cancer, 12 month follow-up non-contrast chest CT is recommended  · Pt reported as non-smoker  · Will need outpt follow up imaging if high risk for lung CA in 12 months    Stage 3 chronic kidney disease [facility])  Assessment & Plan  Lab Results   Component Value Date    EGFR 45 04/27/2021    EGFR 46 04/26/2021    EGFR 43 04/25/2021    CREATININE 1 45 (H) 04/27/2021    CREATININE 1 41 (H) 04/26/2021    CREATININE 1 49 (H) 04/25/2021     Possible CKD 3, although no documentation seen in prior records  Cr from 3/2020 was 1 32  Continue to monitor closely  Avoid hypotension and nephrotoxic agents  Will need outpatient follow up    Carotid artery stenosis  Assessment & Plan  · Carotid duplex 02/25/21: 1-19% stenosis right ICA, 20-39% stenosis left ICA, heterogenous plaque in left distal common carotid without significant stenosis  · Continue ASA / Plavix  · Continue Lipitor 40mg daily  Hyperlipidemia  Assessment & Plan  · Continue Lipitor 40mg daily  Essential hypertension  Assessment & Plan  · /72 on admission  · Continue Norvasc which was increased to 10mg during stay  · Follow up outpatient with PCP    Mitral regurgitation  Assessment & Plan  · Echo 3/5/21: normal left ventricular systolic function, EF 39%, mitral valve repair with mild residual regurg, mild tricuspid regurg, mild pulmonary hypertension, mild aortic root dilation  · Continue medication as above  · CXR on admission shows vascular congestion- given Lasix 20 mg IV 4/23 with good response  · Currently euvolemic    Coronary artery disease involving coronary bypass graft  Assessment & Plan  · History of CAD s/p CABG 1995  · No current complaints  · Continue ASA 81mg, plavix 75mg, amlodipine 10mg, metoprolol tartrate 100mg BID         Discharging Physician / Practitioner: Kate Edmonds PA-C  PCP: Sosa Daniel DO  Admission Date:   Admission Orders (From admission, onward)     Ordered        04/21/21 2232  Inpatient Admission  Once                   Discharge Date: 04/28/21    Resolved Problems  Date Reviewed: 4/28/2021    None          Consultations During Hospital Stay:  · Infectious Disease    Significant Findings / Test Results:   · As above    Incidental Findings:   · Multiple pulmonary nodules on CT chest     Outpatient Tests Requested:  · CT chest in 12 months    Complications:  None    Reason for Admission: Sepsis    Hospital Course:     Bari Clancy is a 80 y o  male with PMHx mitral regurg, CAD, HTN HTN who originally presented to the hospital on 4/21/2021 with chills and shakiness  On arrival to the ED, patient had a low grade fever at 100 4, mildly elevated WBC at 12 09, mild tachycardia and tachypnea  Initial lactic acid of 2 5  CT head negative and CXR with some congestion  CT of chest was completed showing multiple pulmonary nodules measuring up to 0 5 cm, which will require outpatient follow-up in 12 months  CT of the abdomen and pelvis showed mild urethral thickening and periureteral fat stranding of the left ureter suggestive of ureteritis  UA showing small leuks, protein and blood with microscopic showing innumerable bacteria  Blood and urine cultures were sent  At that time, the patient is denying any urinary symptoms, denies chest pain, SOB, headache, dizziness, or weakness  He was empirically started on Rocephin and admitted to the hospitalist service  He also had a mild increase in his creatinine to 1 54, which decreased over the course of his stay to 1 45  He did have a renal ultrasound which showed mild right renal atrophy, otherwise normal study  Initial blood and urine cultures were growing Gram-negative bacteria and he was transitioned to IV cefepime  Ultimately 2/2 blood cultures grew Pseudomonas and urine culture grew Klebsiella  Infectious Disease was consulted  Due to an unknown etiology for Pseudomonas bacteremia, and echo was obtained on April 26th due to history of mitral valve repair  There was no indication of valvular vegetation    Infectious Disease continued IV cefepime for 6 days and transition to oral Levaquin 750 mg every 48 hours until May 2nd  Repeat blood cultures were drawn April 24th and negative x2  The patient was markedly improved after IV antibiotics and was able to be discharged home on April 28  He will continue antibiotics as above and follow up with his primary care provider within 1 week  He was also made aware of a repeat CT scan of his chest for surveillance of pulmonary nodules within 1 year  He also is due for his 2nd Moderna vaccine and per Infectious Disease the patient is able to get his 2nd dose of the vaccine as soon as he is discharged  Please see above list of diagnoses and related plan for additional information  Condition at Discharge: stable     Discharge Day Visit / Exam:     Subjective:  Patient feels well and is ready for discharge  He denies chest pain, shortness of breath, cough, diaphoresis, fever, dysuria, hematuria, abdominal pain, nausea, or vomiting  Vitals: Blood Pressure: 155/70 (04/28/21 0800)  Pulse: 86 (04/28/21 0800)  Temperature: 97 9 °F (36 6 °C) (04/28/21 0800)  Temp Source: Temporal (04/28/21 0800)  Respirations: 20 (04/28/21 0800)  Height: 5' 8" (172 7 cm) (04/28/21 0857)  Weight - Scale: 71 1 kg (156 lb 12 oz) (04/28/21 0800)  SpO2: 98 % (04/28/21 0800)  Exam:   Physical Exam  Vitals signs and nursing note reviewed  Constitutional:       General: He is not in acute distress  Appearance: Normal appearance  He is not ill-appearing  HENT:      Head: Normocephalic and atraumatic  Mouth/Throat:      Mouth: Mucous membranes are moist    Eyes:      Conjunctiva/sclera: Conjunctivae normal    Neck:      Musculoskeletal: Neck supple  Cardiovascular:      Rate and Rhythm: Normal rate and regular rhythm  Heart sounds: Murmur present  Pulmonary:      Effort: Pulmonary effort is normal  No respiratory distress  Breath sounds: Normal breath sounds     Abdominal: General: Bowel sounds are normal  There is no distension  Palpations: Abdomen is soft  Tenderness: There is no abdominal tenderness  There is no guarding  Musculoskeletal:      Right lower leg: No edema  Left lower leg: No edema  Skin:     General: Skin is warm  Neurological:      General: No focal deficit present  Mental Status: He is alert and oriented to person, place, and time  Psychiatric:         Mood and Affect: Mood normal          Behavior: Behavior normal          Thought Content: Thought content normal          Discussion with Family: Wife, Kinga Macdonald, updated  Discharge instructions/Information to patient and family:   See after visit summary for information provided to patient and family  Provisions for Follow-Up Care:  See after visit summary for information related to follow-up care and any pertinent home health orders  Disposition:     Home    For Discharges to [facility] SNF:   · Not Applicable to this Patient - Not Applicable to this Patient    Planned Readmission: No     Discharge Statement:  I spent 35 minutes discharging the patient  This time was spent on the day of discharge  I had direct contact with the patient on the day of discharge  Greater than 50% of the total time was spent examining patient, answering all patient questions, arranging and discussing plan of care with patient as well as directly providing post-discharge instructions  Additional time then spent on discharge activities  Discharge Medications:  See after visit summary for reconciled discharge medications provided to patient and family        ** Please Note: This note has been constructed using a voice recognition system **

## 2021-04-28 NOTE — TELEMEDICINE
Progress Note - Infectious Disease   Len Manley 80 y o  male MRN: [id number]  Unit/Bed#: -01 Encounter: [id number]    REQUIRED DOCUMENTATION:   1  This service was provided via Telemedicine  2  Provider located at home  3  TeleMed provider: Doug Montero DO   4  Identify all parties in room with patient during tele consult: ADEEL Morris  5  After connecting through [employer], patient was identified by name and date of birth and assistant checked wristband  Patient was then informed that this was a Telemedicine visit and that the exam was being conducted confidentially over secure lines  My office door was closed  Patient acknowledged consent and understanding of privacy and security of the Telemedicine visit, and gave us permission to have the assistant stay in the room in order to assist with the history and to conduct the exam   I informed the patient that I have reviewed their record in Epic and presented the opportunity for them to ask any questions regarding the visit today  The patient agreed to participate  Impression/Recommendations:   · Sepsis, POA: With manifestations of fever, tachycardia, tachypnea, leukocytosis, elevated lactic acid level (lactate 2 5->1  4)  Source of sepsis appears to be UTI with bacteremia  However, urine and blood cultures do not match  Patient is clinically improving with cefepime IV  Fevers have resolved; WBC and lactate have normalized  Procalcitonin trending down  ? Improving with cefepime iv  · Pseudomonas aeruginosa bacteremia: Possible respiratory source considered based on initial CXR; although CT chest does not demonstrate consolidation  Urinary source considered, although less likely, as Klebsiella was isolated from the urine  Noted repeat blood cx from 4/24 are ngtd  4/26 2D echo is negative for valve vegetations  ? Improving with cefepime 1g iv q12 hours (started on 4/22)  ?  Upon discharge, transition to levofloxacin 750 mg p o  q 48h until May 2nd  · Complicated UTI due to Klebsiella pneumoniae  CT Abd/pelvis demonstrates findings of left ureteritis  ? Improving with cefepime iv  Transition to levofloxacin po as above  · CKD Stage 3 with GFR 46  ? Renal dose medications, avoid nephrotoxins  · CAD s/p CABG, hx of mitral valve repair    My recommendations were discussed with the patient in detail who verbalized understanding  My recommendations were discussed with ARNOLD Lagunas, from the critical care team       History   Subjective: Pt says he is feeling well and eager to be discharged  He denies fevers, chills, nausea, vomiting, diarrhea, rash or itching  Pt says his breathing is fine       Antibiotics: cefepime iv since   Scheduled Meds:  Current Facility-Administered Medications   Medication Dose Route Frequency Provider Last Rate    acetaminophen  650 mg Oral Q6H PRN Citlaly Fierro PA-C      albuterol  2 5 mg Nebulization Q4H PRN Lamar Vizcarra DO      amLODIPine  10 mg Oral Daily With Lunch Jayde Santoro PA-C      aspirin  81 mg Oral Daily Citlaly Fierro PA-C      atorvastatin  40 mg Oral Daily Citlaly Fierro PA-C      cefepime  1,000 mg Intravenous Q12H Lamar Vizcarra,  Stopped (21 0015)    clopidogrel  75 mg Oral Daily Citlaly Fierro PA-C      heparin (porcine)  5,000 Units Subcutaneous [facility] Citlaly Fierro PA-C      metoprolol tartrate  100 mg Oral Q12H [address] Swathi Donahue PA-C       Continuous Infusions:   PRN Meds:   acetaminophen    albuterol  Physical Exam   Temp:  [97 9 °F (36 6 °C)-99 °F (37 2 °C)] 97 9 °F (36 6 °C)  HR:  [66-86] 86  Resp:  [18-22] 20  BP: (141-159)/() 155/70  SpO2:  [96 %-99 %] 98 %  Temp (24hrs), Av 5 °F (36 9 °C), Min:97 9 °F (36 6 °C), Max:99 °F (37 2 °C)  Current: Temperature: 97 9 °F (36 6 °C)    Intake/Output Summary (Last 24 hours) at 2021 1056  Last data filed at 2021 0901  Gross per 24 hour   Intake 1048 33 ml   Output --   Net 1048 33 ml Limited exam due to tele health visit  Partial exam done that was corroborated with patient's RN  General Appearance:  Appearing well, nontoxic, and in no distress   Head:  Normocephalic, atraumatic   Eyes:  EOMI   Nose: Nares normal, mucosa normal   Throat: Oropharynx moist    Lungs:   Respirations nonlabored, CTA b/l but diminished at bases per RN   Heart:  Regular rate and rhythm, S1, S2 per RN   Abdomen:   Soft, non-tender per RN    No Schaffer catheter present per RN   Extremities: No distal leg edema b/l per RN   Skin: No rash per RN   Neurologic: Alert and oriented x3, conversant, fluent speech     Invasive Devices:   Peripheral IV 04/24/21 Left Antecubital (Active)   Site Assessment WDL 04/28/21 0900   Dressing Type Transparent 04/28/21 0900   Line Status Flushed;Saline locked 04/28/21 0900   Dressing Status Clean;Dry; Intact 04/28/21 0900   Reason Not Rotated Not due 04/28/21 0900     Labs, Imaging, & Other Studies   Lab Results: I have personally reviewed pertinent labs  Results from last 7 days   Lab Units 04/27/21  0451 04/26/21  0559 04/25/21  0600   WBC Thousand/uL 8 18 6 16 6 13   HEMOGLOBIN g/dL 11 0* 11 2* 10 2*   PLATELETS Thousands/uL 181 149 141*     Results from last 7 days   Lab Units 04/27/21  0451  04/23/21  0555  04/21/21  2131   POTASSIUM mmol/L 4 1   < > 4 0   < > 3 5   CHLORIDE mmol/L 107   < > 104   < > 102   CO2 mmol/L 23   < > 26   < > 25   BUN mg/dL 37*   < > 29*   < > 31*   CREATININE mg/dL 1 45*   < > 1 43*   < > 1 54*   EGFR ml/min/1 73sq m 45   < > 46   < > 42   CALCIUM mg/dL 8 5   < > 7 9*   < > 8 2*   AST U/L  --   --  68*  --  14   ALT U/L  --   --  30  --  26   ALK PHOS U/L  --   --  94  --  136*    < > = values in this interval not displayed  Results from last 7 days   Lab Units 04/24/21  0743 04/24/21  0742 04/21/21  2207 04/21/21 2140 04/21/21 2131   BLOOD CULTURE  No Growth at 72 hrs  No Growth at 72 hrs    --  Pseudomonas aeruginosa* Pseudomonas aeruginosa* Gamma Hemolytic Streptococcus NOT Enterococcus   GRAM STAIN RESULT   --   --   --  Gram negative rods* Gram negative rods*  Gram positive cocci in chains*   URINE CULTURE   --   --  >100,000 cfu/ml Klebsiella pneumoniae*  --   --      Imaging Studies:   I have personally reviewed pertinent imaging study reports  Counseling/Coordination of care: Total 30 minutes encounter including direct communication with the patient via telehealth, chart review and coordination of care  Labs, medical tests and imaging studies were independently reviewed by me as noted above  VIRTUAL VISIT DISCLAIMER  The patient has consented to an online visit or consultation  The patient understands that the online visit is based solely on information provided by them, and that, in the absence of a face-to-face physical evaluation by the physician, the diagnosis they receive are both limited and provisional in terms of accuracy and completeness  This is not intended to replace a full medical face-to-face evaluation by the physician  The patient understands and accepts these terms

## 2021-04-28 NOTE — ASSESSMENT & PLAN NOTE
2/2 blood cultures positive Pseudomonas  Non-contrast CT c/a/p- gallstones without inflammation  Diverticulosis without acute diverticulitis  Some thickening and fat stranding left ureter, concern for left ureteritis  Multiple small lung nodules noted- will need follow up imaging in 12 months  Spoke with wife today and she will make appointment with PCP  · 2/2 blood cultures - Pseudomonas  · Unclear source - ID following  · Repeat [facility] 4/24 - NGTD 48 hours   · ECHO 4/26 EF 60%  - PA Pressure 35mm/Hg indicative of mild Pulmonary hypertension  No vegetation  · Pt received Rocephin and 2L NS total in ER     · Transitioned to Cefepime Day #6 (started 4/22)  · ID following in consult - D/C Cefepime today and change to PO Levaquin 750 mg PO q 48 hours until 5/2  · Plan for D/C home today

## 2021-04-28 NOTE — INCIDENTAL FINDINGS
The following findings require follow up:  Radiographic finding   Finding: Multiple small lung nodules   Follow up required: CT chest   Follow up should be done within 1 year    Please notify the following clinician to assist with the follow up:   PCP

## 2021-04-28 NOTE — ASSESSMENT & PLAN NOTE
· Urine cx >100K Klebsiella  · Received Cefepime X  6 days, transition to PO Levaquin per ID  · ID following in consult - will follow up antibiotic plan

## 2021-04-28 NOTE — NURSING NOTE
Patient discharged to home  Went over all discharge instructions with patient and spouse at bedside  All belongings sent home with patient  Patient escorted out of building with this RN walking

## 2021-04-28 NOTE — PROGRESS NOTES
Pt with noted wt loss since admission, was 171lb now 156lb  Noting use of lasix once on 4/23  Pt reports good intake, eating 100% of meals x 11/11 per nursing flowsheets though says he eating smaller portions due to not moving around as much  Maintain cardiac diet, will order ensure enlive daily to help prevent further wt loss

## 2021-04-28 NOTE — PLAN OF CARE
Problem: PAIN - ADULT  Goal: Verbalizes/displays adequate comfort level or baseline comfort level  Description: Interventions:  - Encourage patient to monitor pain and request assistance  - Assess pain using appropriate pain scale  - Administer analgesics based on type and severity of pain and evaluate response  - Implement non-pharmacological measures as appropriate and evaluate response  - Consider cultural and social influences on pain and pain management  - Notify physician/advanced practitioner if interventions unsuccessful or patient reports new pain  Outcome: Progressing     Problem: INFECTION - ADULT  Goal: Absence or prevention of progression during hospitalization  Description: INTERVENTIONS:  - Assess and monitor for signs and symptoms of infection  - Monitor lab/diagnostic results  - Monitor all insertion sites, i e  indwelling lines, tubes, and drains  - Monitor endotracheal if appropriate and nasal secretions for changes in amount and color  - [place] appropriate cooling/warming therapies per order  - Administer medications as ordered  - Instruct and encourage patient and family to use good hand hygiene technique  - Identify and instruct in appropriate isolation precautions for identified infection/condition  Outcome: Progressing     Problem: SAFETY ADULT  Goal: Patient will remain free of falls  Description: INTERVENTIONS:  - Assess patient frequently for physical needs  -  Identify cognitive and physical deficits and behaviors that affect risk of falls    -  [place] fall precautions as indicated by assessment   - Educate patient/family on patient safety including physical limitations  - Instruct patient to call for assistance with activity based on assessment  - Modify environment to reduce risk of injury  - Consider OT/PT consult to assist with strengthening/mobility  Outcome: Progressing  Goal: Maintain or return to baseline ADL function  Description: INTERVENTIONS:  -  Assess patient's ability to carry out ADLs; assess patient's baseline for ADL function and identify physical deficits which impact ability to perform ADLs (bathing, care of mouth/teeth, toileting, grooming, dressing, etc )  - Assess/evaluate cause of self-care deficits   - Assess range of motion  - Assess patient's mobility; develop plan if impaired  - Assess patient's need for assistive devices and provide as appropriate  - Encourage maximum independence but intervene and supervise when necessary  - Involve family in performance of ADLs  - Assess for home care needs following discharge   - Consider OT consult to assist with ADL evaluation and planning for discharge  - Provide patient education as appropriate  Outcome: Progressing  Goal: Maintain or return mobility status to optimal level  Description: INTERVENTIONS:  - Assess patient's baseline mobility status (ambulation, transfers, stairs, etc )    - Identify cognitive and physical deficits and behaviors that affect mobility  - Identify mobility aids required to assist with transfers and/or ambulation (gait belt, sit-to-stand, lift, walker, cane, etc )  - [place] fall precautions as indicated by assessment  - Record patient progress and toleration of activity level on Mobility SBAR; progress patient to next Phase/Stage  - Instruct patient to call for assistance with activity based on assessment  - Consider rehabilitation consult to assist with strengthening/weightbearing, etc   Outcome: Progressing     Problem: DISCHARGE PLANNING  Goal: Discharge to home or other facility with appropriate resources  Description: INTERVENTIONS:  - Identify barriers to discharge w/patient and caregiver  - Arrange for needed discharge resources and transportation as appropriate  - Identify discharge learning needs (meds, wound care, etc )  - Arrange for interpretive services to assist at discharge as needed  - Refer to Case Management Department for coordinating discharge planning if the patient needs post-hospital services based on physician/advanced practitioner order or complex needs related to functional status, cognitive ability, or social support system  Outcome: Progressing     Problem: Knowledge Deficit  Goal: Patient/family/caregiver demonstrates understanding of disease process, treatment plan, medications, and discharge instructions  Description: Complete learning assessment and assess knowledge base  Interventions:  - Provide teaching at level of understanding  - Provide teaching via preferred learning methods  Outcome: Progressing     Problem: CARDIOVASCULAR - ADULT  Goal: Maintains optimal cardiac output and hemodynamic stability  Description: INTERVENTIONS:  - Monitor I/O, vital signs and rhythm  - Monitor for S/S and trends of decreased cardiac output  - Administer and titrate ordered vasoactive medications to optimize hemodynamic stability  - Assess quality of pulses, skin color and temperature  - Assess for signs of decreased coronary artery perfusion  - Instruct patient to report change in severity of symptoms  Outcome: Progressing     Problem: GENITOURINARY - ADULT  Goal: Maintains or returns to baseline urinary function  Description: INTERVENTIONS:  - Assess urinary function  - Encourage oral fluids to ensure adequate hydration if ordered  - Administer IV fluids as ordered to ensure adequate hydration  - Administer ordered medications as needed  - Offer frequent toileting  - Follow urinary retention protocol if ordered  Outcome: Progressing     Problem: Potential for Falls  Goal: Patient will remain free of falls  Description: INTERVENTIONS:  - Assess patient frequently for physical needs  -  Identify cognitive and physical deficits and behaviors that affect risk of falls    -  [place] fall precautions as indicated by assessment   - Educate patient/family on patient safety including physical limitations  - Instruct patient to call for assistance with activity based on assessment  - Modify environment to reduce risk of injury  - Consider OT/PT consult to assist with strengthening/mobility  Outcome: Progressing

## 2021-04-28 NOTE — PLAN OF CARE
Problem: PAIN - ADULT  Goal: Verbalizes/displays adequate comfort level or baseline comfort level  Description: Interventions:  - Encourage patient to monitor pain and request assistance  - Assess pain using appropriate pain scale  - Administer analgesics based on type and severity of pain and evaluate response  - Implement non-pharmacological measures as appropriate and evaluate response  - Consider cultural and social influences on pain and pain management  - Notify physician/advanced practitioner if interventions unsuccessful or patient reports new pain  Outcome: Progressing     Problem: INFECTION - ADULT  Goal: Absence or prevention of progression during hospitalization  Description: INTERVENTIONS:  - Assess and monitor for signs and symptoms of infection  - Monitor lab/diagnostic results  - Monitor all insertion sites, i e  indwelling lines, tubes, and drains  - Monitor endotracheal if appropriate and nasal secretions for changes in amount and color  - [place] appropriate cooling/warming therapies per order  - Administer medications as ordered  - Instruct and encourage patient and family to use good hand hygiene technique  - Identify and instruct in appropriate isolation precautions for identified infection/condition  Outcome: Progressing     Problem: SAFETY ADULT  Goal: Patient will remain free of falls  Description: INTERVENTIONS:  - Assess patient frequently for physical needs  -  Identify cognitive and physical deficits and behaviors that affect risk of falls    -  [place] fall precautions as indicated by assessment   - Educate patient/family on patient safety including physical limitations  - Instruct patient to call for assistance with activity based on assessment  - Modify environment to reduce risk of injury  - Consider OT/PT consult to assist with strengthening/mobility  Outcome: Progressing  Goal: Maintain or return to baseline ADL function  Description: INTERVENTIONS:  -  Assess patient's ability to carry out ADLs; assess patient's baseline for ADL function and identify physical deficits which impact ability to perform ADLs (bathing, care of mouth/teeth, toileting, grooming, dressing, etc )  - Assess/evaluate cause of self-care deficits   - Assess range of motion  - Assess patient's mobility; develop plan if impaired  - Assess patient's need for assistive devices and provide as appropriate  - Encourage maximum independence but intervene and supervise when necessary  - Involve family in performance of ADLs  - Assess for home care needs following discharge   - Consider OT consult to assist with ADL evaluation and planning for discharge  - Provide patient education as appropriate  Outcome: Progressing  Goal: Maintain or return mobility status to optimal level  Description: INTERVENTIONS:  - Assess patient's baseline mobility status (ambulation, transfers, stairs, etc )    - Identify cognitive and physical deficits and behaviors that affect mobility  - Identify mobility aids required to assist with transfers and/or ambulation (gait belt, sit-to-stand, lift, walker, cane, etc )  - [place] fall precautions as indicated by assessment  - Record patient progress and toleration of activity level on Mobility SBAR; progress patient to next Phase/Stage  - Instruct patient to call for assistance with activity based on assessment  - Consider rehabilitation consult to assist with strengthening/weightbearing, etc   Outcome: Progressing     Problem: DISCHARGE PLANNING  Goal: Discharge to home or other facility with appropriate resources  Description: INTERVENTIONS:  - Identify barriers to discharge w/patient and caregiver  - Arrange for needed discharge resources and transportation as appropriate  - Identify discharge learning needs (meds, wound care, etc )  - Arrange for interpretive services to assist at discharge as needed  - Refer to Case Management Department for coordinating discharge planning if the patient needs post-hospital services based on physician/advanced practitioner order or complex needs related to functional status, cognitive ability, or social support system  Outcome: Progressing     Problem: Knowledge Deficit  Goal: Patient/family/caregiver demonstrates understanding of disease process, treatment plan, medications, and discharge instructions  Description: Complete learning assessment and assess knowledge base  Interventions:  - Provide teaching at level of understanding  - Provide teaching via preferred learning methods  Outcome: Progressing     Problem: CARDIOVASCULAR - ADULT  Goal: Maintains optimal cardiac output and hemodynamic stability  Description: INTERVENTIONS:  - Monitor I/O, vital signs and rhythm  - Monitor for S/S and trends of decreased cardiac output  - Administer and titrate ordered vasoactive medications to optimize hemodynamic stability  - Assess quality of pulses, skin color and temperature  - Assess for signs of decreased coronary artery perfusion  - Instruct patient to report change in severity of symptoms  Outcome: Progressing     Problem: GENITOURINARY - ADULT  Goal: Maintains or returns to baseline urinary function  Description: INTERVENTIONS:  - Assess urinary function  - Encourage oral fluids to ensure adequate hydration if ordered  - Administer IV fluids as ordered to ensure adequate hydration  - Administer ordered medications as needed  - Offer frequent toileting  - Follow urinary retention protocol if ordered  Outcome: Progressing     Problem: Potential for Falls  Goal: Patient will remain free of falls  Description: INTERVENTIONS:  - Assess patient frequently for physical needs  -  Identify cognitive and physical deficits and behaviors that affect risk of falls    -  [place] fall precautions as indicated by assessment   - Educate patient/family on patient safety including physical limitations  - Instruct patient to call for assistance with activity based on assessment  - Modify environment to reduce risk of injury  - Consider OT/PT consult to assist with strengthening/mobility  Outcome: Progressing     Problem: Nutrition/Hydration-ADULT  Goal: Nutrient/Hydration intake appropriate for improving, restoring or maintaining nutritional needs  Description: Monitor and assess patient's nutrition/hydration status for malnutrition  Collaborate with interdisciplinary team and initiate plan and interventions as ordered  Monitor patient's weight and dietary intake as ordered or per policy  Utilize nutrition screening tool and intervene as necessary  Determine patient's food preferences and provide high-protein, high-caloric foods as appropriate       INTERVENTIONS:  - Monitor oral intake, urinary output, labs, and treatment plans  - Assess nutrition and hydration status and recommend course of action  - Evaluate amount of meals eaten  - Assist patient with eating if necessary   - Allow adequate time for meals  - Recommend/ encourage appropriate diets, oral nutritional supplements, and vitamin/mineral supplements  - Order, calculate, and assess calorie counts as needed  - Recommend, monitor, and adjust tube feedings and TPN/PPN based on assessed needs  - Assess need for intravenous fluids  - Provide specific nutrition/hydration education as appropriate  - Include patient/family/caregiver in decisions related to nutrition  Outcome: Progressing

## 2021-04-28 NOTE — ASSESSMENT & PLAN NOTE
· /72 on admission    · Continue Norvasc which was increased to 10mg during stay  · Follow up outpatient with PCP

## 2021-04-28 NOTE — ASSESSMENT & PLAN NOTE
Lab Results   Component Value Date    EGFR 45 04/27/2021    EGFR 46 04/26/2021    EGFR 43 04/25/2021    CREATININE 1 45 (H) 04/27/2021    CREATININE 1 41 (H) 04/26/2021    CREATININE 1 49 (H) 04/25/2021     Possible CKD 3, although no documentation seen in prior records    Cr from 3/2020 was 1 32  Continue to monitor closely  Avoid hypotension and nephrotoxic agents  Will need outpatient follow up

## 2021-04-29 LAB
BACTERIA BLD CULT: NORMAL
BACTERIA BLD CULT: NORMAL

## 2021-10-06 ENCOUNTER — HOSPITAL ENCOUNTER (EMERGENCY)
Facility: HOSPITAL | Age: 81
Discharge: ANOTHER HOSPITAL | End: 2021-10-06
Attending: EMERGENCY MEDICINE | Admitting: EMERGENCY MEDICINE
Payer: MEDICARE

## 2021-10-06 ENCOUNTER — APPOINTMENT (EMERGENCY)
Dept: RADIOLOGY | Facility: HOSPITAL | Age: 81
End: 2021-10-06
Payer: MEDICARE

## 2021-10-06 VITALS
HEART RATE: 65 BPM | WEIGHT: 167.99 LBS | TEMPERATURE: 97.8 F | HEIGHT: 68 IN | DIASTOLIC BLOOD PRESSURE: 76 MMHG | SYSTOLIC BLOOD PRESSURE: 153 MMHG | OXYGEN SATURATION: 97 % | RESPIRATION RATE: 18 BRPM | BODY MASS INDEX: 25.46 KG/M2

## 2021-10-06 DIAGNOSIS — R06.00 DYSPNEA: Primary | ICD-10-CM

## 2021-10-06 DIAGNOSIS — I21.4 NSTEMI (NON-ST ELEVATED MYOCARDIAL INFARCTION) (HCC): ICD-10-CM

## 2021-10-06 DIAGNOSIS — R07.9 CHEST PAIN: ICD-10-CM

## 2021-10-06 LAB
ALBUMIN SERPL BCP-MCNC: 4.1 G/DL (ref 3.5–5)
ALP SERPL-CCNC: 126 U/L (ref 46–116)
ALT SERPL W P-5'-P-CCNC: 30 U/L (ref 12–78)
ANION GAP SERPL CALCULATED.3IONS-SCNC: 8 MMOL/L (ref 4–13)
APTT PPP: 31 SECONDS (ref 23–37)
APTT PPP: 31 SECONDS (ref 23–37)
AST SERPL W P-5'-P-CCNC: 19 U/L (ref 5–45)
ATRIAL RATE: 69 BPM
ATRIAL RATE: 70 BPM
BASOPHILS # BLD AUTO: 0.07 THOUSANDS/ΜL (ref 0–0.1)
BASOPHILS NFR BLD AUTO: 1 % (ref 0–1)
BILIRUB SERPL-MCNC: 2.19 MG/DL (ref 0.2–1)
BUN SERPL-MCNC: 34 MG/DL (ref 5–25)
CALCIUM SERPL-MCNC: 8.5 MG/DL (ref 8.3–10.1)
CHLORIDE SERPL-SCNC: 106 MMOL/L (ref 100–108)
CO2 SERPL-SCNC: 27 MMOL/L (ref 21–32)
CREAT SERPL-MCNC: 1.69 MG/DL (ref 0.6–1.3)
EOSINOPHIL # BLD AUTO: 0.18 THOUSAND/ΜL (ref 0–0.61)
EOSINOPHIL NFR BLD AUTO: 1 % (ref 0–6)
ERYTHROCYTE [DISTWIDTH] IN BLOOD BY AUTOMATED COUNT: 15.7 % (ref 11.6–15.1)
GFR SERPL CREATININE-BSD FRML MDRD: 37 ML/MIN/1.73SQ M
GLUCOSE SERPL-MCNC: 204 MG/DL (ref 65–140)
HCT VFR BLD AUTO: 37.8 % (ref 36.5–49.3)
HGB BLD-MCNC: 12.9 G/DL (ref 12–17)
IMM GRANULOCYTES # BLD AUTO: 0.07 THOUSAND/UL (ref 0–0.2)
IMM GRANULOCYTES NFR BLD AUTO: 1 % (ref 0–2)
INR PPP: 1.02 (ref 0.84–1.19)
LYMPHOCYTES # BLD AUTO: 1.8 THOUSANDS/ΜL (ref 0.6–4.47)
LYMPHOCYTES NFR BLD AUTO: 13 % (ref 14–44)
MCH RBC QN AUTO: 33.2 PG (ref 26.8–34.3)
MCHC RBC AUTO-ENTMCNC: 34.1 G/DL (ref 31.4–37.4)
MCV RBC AUTO: 97 FL (ref 82–98)
MONOCYTES # BLD AUTO: 1.4 THOUSAND/ΜL (ref 0.17–1.22)
MONOCYTES NFR BLD AUTO: 10 % (ref 4–12)
NEUTROPHILS # BLD AUTO: 9.92 THOUSANDS/ΜL (ref 1.85–7.62)
NEUTS SEG NFR BLD AUTO: 74 % (ref 43–75)
NRBC BLD AUTO-RTO: 0 /100 WBCS
NT-PROBNP SERPL-MCNC: 6436 PG/ML
P AXIS: 53 DEGREES
P AXIS: 61 DEGREES
PLATELET # BLD AUTO: 261 THOUSANDS/UL (ref 149–390)
PMV BLD AUTO: 10.6 FL (ref 8.9–12.7)
POTASSIUM SERPL-SCNC: 3.9 MMOL/L (ref 3.5–5.3)
PR INTERVAL: 192 MS
PR INTERVAL: 222 MS
PROT SERPL-MCNC: 7.1 G/DL (ref 6.4–8.2)
PROTHROMBIN TIME: 13.3 SECONDS (ref 11.6–14.5)
QRS AXIS: 33 DEGREES
QRS AXIS: 65 DEGREES
QRSD INTERVAL: 104 MS
QRSD INTERVAL: 92 MS
QT INTERVAL: 422 MS
QT INTERVAL: 424 MS
QTC INTERVAL: 454 MS
QTC INTERVAL: 455 MS
RBC # BLD AUTO: 3.88 MILLION/UL (ref 3.88–5.62)
SARS-COV-2 RNA RESP QL NAA+PROBE: NEGATIVE
SODIUM SERPL-SCNC: 141 MMOL/L (ref 136–145)
T WAVE AXIS: 181 DEGREES
T WAVE AXIS: 230 DEGREES
TROPONIN I SERPL-MCNC: 0.79 NG/ML
VENTRICULAR RATE: 69 BPM
VENTRICULAR RATE: 70 BPM
WBC # BLD AUTO: 13.44 THOUSAND/UL (ref 4.31–10.16)

## 2021-10-06 PROCEDURE — 99285 EMERGENCY DEPT VISIT HI MDM: CPT | Performed by: EMERGENCY MEDICINE

## 2021-10-06 PROCEDURE — 96375 TX/PRO/DX INJ NEW DRUG ADDON: CPT

## 2021-10-06 PROCEDURE — 93005 ELECTROCARDIOGRAM TRACING: CPT

## 2021-10-06 PROCEDURE — 80053 COMPREHEN METABOLIC PANEL: CPT | Performed by: EMERGENCY MEDICINE

## 2021-10-06 PROCEDURE — U0005 INFEC AGEN DETEC AMPLI PROBE: HCPCS | Performed by: EMERGENCY MEDICINE

## 2021-10-06 PROCEDURE — 36415 COLL VENOUS BLD VENIPUNCTURE: CPT | Performed by: EMERGENCY MEDICINE

## 2021-10-06 PROCEDURE — 96366 THER/PROPH/DIAG IV INF ADDON: CPT

## 2021-10-06 PROCEDURE — 71045 X-RAY EXAM CHEST 1 VIEW: CPT

## 2021-10-06 PROCEDURE — 83880 ASSAY OF NATRIURETIC PEPTIDE: CPT | Performed by: EMERGENCY MEDICINE

## 2021-10-06 PROCEDURE — 85025 COMPLETE CBC W/AUTO DIFF WBC: CPT | Performed by: EMERGENCY MEDICINE

## 2021-10-06 PROCEDURE — 85730 THROMBOPLASTIN TIME PARTIAL: CPT | Performed by: EMERGENCY MEDICINE

## 2021-10-06 PROCEDURE — U0003 INFECTIOUS AGENT DETECTION BY NUCLEIC ACID (DNA OR RNA); SEVERE ACUTE RESPIRATORY SYNDROME CORONAVIRUS 2 (SARS-COV-2) (CORONAVIRUS DISEASE [COVID-19]), AMPLIFIED PROBE TECHNIQUE, MAKING USE OF HIGH THROUGHPUT TECHNOLOGIES AS DESCRIBED BY CMS-2020-01-R: HCPCS | Performed by: EMERGENCY MEDICINE

## 2021-10-06 PROCEDURE — 96365 THER/PROPH/DIAG IV INF INIT: CPT

## 2021-10-06 PROCEDURE — 99285 EMERGENCY DEPT VISIT HI MDM: CPT

## 2021-10-06 PROCEDURE — 84484 ASSAY OF TROPONIN QUANT: CPT | Performed by: EMERGENCY MEDICINE

## 2021-10-06 PROCEDURE — 85610 PROTHROMBIN TIME: CPT | Performed by: EMERGENCY MEDICINE

## 2021-10-06 RX ORDER — HEPARIN SODIUM 1000 [USP'U]/ML
4000 INJECTION, SOLUTION INTRAVENOUS; SUBCUTANEOUS ONCE
Status: COMPLETED | OUTPATIENT
Start: 2021-10-06 | End: 2021-10-06

## 2021-10-06 RX ORDER — ASPIRIN 81 MG/1
324 TABLET, CHEWABLE ORAL ONCE
Status: COMPLETED | OUTPATIENT
Start: 2021-10-06 | End: 2021-10-06

## 2021-10-06 RX ORDER — HEPARIN SODIUM 10000 [USP'U]/100ML
3-20 INJECTION, SOLUTION INTRAVENOUS
Status: DISCONTINUED | OUTPATIENT
Start: 2021-10-06 | End: 2021-10-06 | Stop reason: HOSPADM

## 2021-10-06 RX ORDER — NITROGLYCERIN 0.4 MG/1
0.4 TABLET SUBLINGUAL ONCE
Status: COMPLETED | OUTPATIENT
Start: 2021-10-06 | End: 2021-10-06

## 2021-10-06 RX ADMIN — HEPARIN SODIUM 12 UNITS/KG/HR: 10000 INJECTION, SOLUTION INTRAVENOUS at 08:44

## 2021-10-06 RX ADMIN — NITROGLYCERIN 0.4 MG: 0.4 TABLET SUBLINGUAL at 06:41

## 2021-10-06 RX ADMIN — ASPIRIN 324 MG: 81 TABLET, CHEWABLE ORAL at 06:42

## 2021-10-06 RX ADMIN — HEPARIN SODIUM 4000 UNITS: 1000 INJECTION INTRAVENOUS; SUBCUTANEOUS at 08:43

## 2021-11-26 ENCOUNTER — APPOINTMENT (EMERGENCY)
Dept: RADIOLOGY | Facility: HOSPITAL | Age: 81
End: 2021-11-26
Payer: MEDICARE

## 2021-11-26 ENCOUNTER — APPOINTMENT (EMERGENCY)
Dept: CT IMAGING | Facility: HOSPITAL | Age: 81
End: 2021-11-26
Payer: MEDICARE

## 2021-11-26 ENCOUNTER — HOSPITAL ENCOUNTER (EMERGENCY)
Facility: HOSPITAL | Age: 81
Discharge: HOME | End: 2021-11-26
Attending: EMERGENCY MEDICINE | Admitting: EMERGENCY MEDICINE
Payer: MEDICARE

## 2021-11-26 VITALS
TEMPERATURE: 97.9 F | HEART RATE: 59 BPM | DIASTOLIC BLOOD PRESSURE: 63 MMHG | SYSTOLIC BLOOD PRESSURE: 137 MMHG | OXYGEN SATURATION: 97 % | RESPIRATION RATE: 20 BRPM

## 2021-11-26 DIAGNOSIS — S89.90XA KNEE INJURY: ICD-10-CM

## 2021-11-26 DIAGNOSIS — S09.90XA HEAD INJURY: Primary | ICD-10-CM

## 2021-11-26 DIAGNOSIS — D64.9 ANEMIA: ICD-10-CM

## 2021-11-26 LAB
ANION GAP SERPL CALCULATED.3IONS-SCNC: 7 MMOL/L (ref 4–13)
BASOPHILS # BLD AUTO: 0.04 THOUSANDS/ΜL (ref 0–0.1)
BASOPHILS NFR BLD AUTO: 1 % (ref 0–1)
BUN SERPL-MCNC: 28 MG/DL (ref 5–25)
CALCIUM SERPL-MCNC: 8.6 MG/DL (ref 8.3–10.1)
CHLORIDE SERPL-SCNC: 106 MMOL/L (ref 100–108)
CO2 SERPL-SCNC: 26 MMOL/L (ref 21–32)
CREAT SERPL-MCNC: 1.76 MG/DL (ref 0.6–1.3)
EOSINOPHIL # BLD AUTO: 0.04 THOUSAND/ΜL (ref 0–0.61)
EOSINOPHIL NFR BLD AUTO: 1 % (ref 0–6)
ERYTHROCYTE [DISTWIDTH] IN BLOOD BY AUTOMATED COUNT: 17 % (ref 11.6–15.1)
GFR SERPL CREATININE-BSD FRML MDRD: 35 ML/MIN/1.73SQ M
GLUCOSE SERPL-MCNC: 107 MG/DL (ref 65–140)
HCT VFR BLD AUTO: 29.2 % (ref 36.5–49.3)
HGB BLD-MCNC: 9.9 G/DL (ref 12–17)
IMM GRANULOCYTES # BLD AUTO: 0.04 THOUSAND/UL (ref 0–0.2)
IMM GRANULOCYTES NFR BLD AUTO: 1 % (ref 0–2)
LYMPHOCYTES # BLD AUTO: 0.93 THOUSANDS/ΜL (ref 0.6–4.47)
LYMPHOCYTES NFR BLD AUTO: 12 % (ref 14–44)
MCH RBC QN AUTO: 33.3 PG (ref 26.8–34.3)
MCHC RBC AUTO-ENTMCNC: 33.9 G/DL (ref 31.4–37.4)
MCV RBC AUTO: 98 FL (ref 82–98)
MONOCYTES # BLD AUTO: 0.94 THOUSAND/ΜL (ref 0.17–1.22)
MONOCYTES NFR BLD AUTO: 12 % (ref 4–12)
NEUTROPHILS # BLD AUTO: 5.94 THOUSANDS/ΜL (ref 1.85–7.62)
NEUTS SEG NFR BLD AUTO: 73 % (ref 43–75)
NRBC BLD AUTO-RTO: 0 /100 WBCS
PLATELET # BLD AUTO: 163 THOUSANDS/UL (ref 149–390)
PMV BLD AUTO: 10.5 FL (ref 8.9–12.7)
POTASSIUM SERPL-SCNC: 4 MMOL/L (ref 3.5–5.3)
RBC # BLD AUTO: 2.97 MILLION/UL (ref 3.88–5.62)
SODIUM SERPL-SCNC: 139 MMOL/L (ref 136–145)
WBC # BLD AUTO: 7.93 THOUSAND/UL (ref 4.31–10.16)

## 2021-11-26 PROCEDURE — 99284 EMERGENCY DEPT VISIT MOD MDM: CPT | Performed by: EMERGENCY MEDICINE

## 2021-11-26 PROCEDURE — 36415 COLL VENOUS BLD VENIPUNCTURE: CPT | Performed by: EMERGENCY MEDICINE

## 2021-11-26 PROCEDURE — 85025 COMPLETE CBC W/AUTO DIFF WBC: CPT | Performed by: EMERGENCY MEDICINE

## 2021-11-26 PROCEDURE — 73590 X-RAY EXAM OF LOWER LEG: CPT

## 2021-11-26 PROCEDURE — 90715 TDAP VACCINE 7 YRS/> IM: CPT | Performed by: EMERGENCY MEDICINE

## 2021-11-26 PROCEDURE — 80048 BASIC METABOLIC PNL TOTAL CA: CPT | Performed by: EMERGENCY MEDICINE

## 2021-11-26 PROCEDURE — 73700 CT LOWER EXTREMITY W/O DYE: CPT

## 2021-11-26 PROCEDURE — 99284 EMERGENCY DEPT VISIT MOD MDM: CPT

## 2021-11-26 PROCEDURE — 90471 IMMUNIZATION ADMIN: CPT

## 2021-11-26 PROCEDURE — 70450 CT HEAD/BRAIN W/O DYE: CPT

## 2021-11-26 PROCEDURE — 73552 X-RAY EXAM OF FEMUR 2/>: CPT

## 2021-11-26 PROCEDURE — 73564 X-RAY EXAM KNEE 4 OR MORE: CPT

## 2021-11-26 RX ADMIN — TETANUS TOXOID, REDUCED DIPHTHERIA TOXOID AND ACELLULAR PERTUSSIS VACCINE, ADSORBED 0.5 ML: 5; 2.5; 8; 8; 2.5 SUSPENSION INTRAMUSCULAR at 11:56

## 2021-12-19 ENCOUNTER — HOSPITAL ENCOUNTER (EMERGENCY)
Facility: HOSPITAL | Age: 81
Discharge: HOME | End: 2021-12-19
Attending: EMERGENCY MEDICINE | Admitting: EMERGENCY MEDICINE
Payer: MEDICARE

## 2021-12-19 ENCOUNTER — APPOINTMENT (EMERGENCY)
Dept: RADIOLOGY | Facility: HOSPITAL | Age: 81
End: 2021-12-19
Payer: MEDICARE

## 2021-12-19 ENCOUNTER — APPOINTMENT (EMERGENCY)
Dept: CT IMAGING | Facility: HOSPITAL | Age: 81
End: 2021-12-19
Payer: MEDICARE

## 2021-12-19 VITALS
TEMPERATURE: 97.8 F | RESPIRATION RATE: 20 BRPM | HEART RATE: 54 BPM | SYSTOLIC BLOOD PRESSURE: 140 MMHG | WEIGHT: 150 LBS | OXYGEN SATURATION: 97 % | BODY MASS INDEX: 22.81 KG/M2 | DIASTOLIC BLOOD PRESSURE: 78 MMHG

## 2021-12-19 DIAGNOSIS — S60.222A CONTUSION OF LEFT HAND, INITIAL ENCOUNTER: ICD-10-CM

## 2021-12-19 DIAGNOSIS — S09.90XA INJURY OF HEAD, INITIAL ENCOUNTER: Primary | ICD-10-CM

## 2021-12-19 DIAGNOSIS — S00.83XA TRAUMATIC HEMATOMA OF FOREHEAD, INITIAL ENCOUNTER: ICD-10-CM

## 2021-12-19 PROCEDURE — 99284 EMERGENCY DEPT VISIT MOD MDM: CPT

## 2021-12-19 PROCEDURE — 70450 CT HEAD/BRAIN W/O DYE: CPT

## 2021-12-19 PROCEDURE — 99284 EMERGENCY DEPT VISIT MOD MDM: CPT | Performed by: EMERGENCY MEDICINE

## 2021-12-19 PROCEDURE — 73130 X-RAY EXAM OF HAND: CPT

## 2021-12-19 PROCEDURE — 70486 CT MAXILLOFACIAL W/O DYE: CPT

## 2021-12-19 PROCEDURE — 72125 CT NECK SPINE W/O DYE: CPT

## 2021-12-19 RX ORDER — ACETAMINOPHEN 325 MG/1
650 TABLET ORAL ONCE
Status: COMPLETED | OUTPATIENT
Start: 2021-12-19 | End: 2021-12-19

## 2021-12-19 RX ADMIN — ACETAMINOPHEN 650 MG: 325 TABLET ORAL at 12:05

## 2022-01-01 ENCOUNTER — APPOINTMENT (OUTPATIENT)
Dept: RADIOLOGY | Facility: HOSPITAL | Age: 82
End: 2022-01-01

## 2022-01-01 ENCOUNTER — APPOINTMENT (INPATIENT)
Dept: RADIOLOGY | Facility: HOSPITAL | Age: 82
End: 2022-01-01
Attending: GENERAL PRACTICE

## 2022-01-01 ENCOUNTER — APPOINTMENT (OUTPATIENT)
Dept: RADIOLOGY | Facility: HOSPITAL | Age: 82
End: 2022-01-01
Attending: GENERAL PRACTICE

## 2022-01-01 ENCOUNTER — APPOINTMENT (INPATIENT)
Dept: NON INVASIVE DIAGNOSTICS | Facility: HOSPITAL | Age: 82
End: 2022-01-01

## 2022-01-01 ENCOUNTER — APPOINTMENT (INPATIENT)
Dept: RADIOLOGY | Facility: HOSPITAL | Age: 82
End: 2022-01-01

## 2022-01-01 ENCOUNTER — APPOINTMENT (INPATIENT)
Dept: RADIOLOGY | Facility: HOSPITAL | Age: 82
End: 2022-01-01
Attending: INTERNAL MEDICINE

## 2022-01-01 ENCOUNTER — APPOINTMENT (OUTPATIENT)
Dept: RADIOLOGY | Facility: HOSPITAL | Age: 82
End: 2022-01-01
Attending: INTERNAL MEDICINE

## 2022-01-01 ENCOUNTER — HOSPITAL ENCOUNTER (INPATIENT)
Facility: HOSPITAL | Age: 82
LOS: 24 days | End: 2022-12-17
Attending: INTERNAL MEDICINE | Admitting: INTERNAL MEDICINE

## 2022-01-01 VITALS
HEART RATE: 116 BPM | RESPIRATION RATE: 24 BRPM | HEIGHT: 68 IN | TEMPERATURE: 98.2 F | BODY MASS INDEX: 21.05 KG/M2 | SYSTOLIC BLOOD PRESSURE: 91 MMHG | DIASTOLIC BLOOD PRESSURE: 49 MMHG | OXYGEN SATURATION: 85 % | WEIGHT: 138.89 LBS

## 2022-01-01 DIAGNOSIS — Z71.89 GOALS OF CARE, COUNSELING/DISCUSSION: ICD-10-CM

## 2022-01-01 DIAGNOSIS — T17.908A ASPIRATION INTO RESPIRATORY TRACT: ICD-10-CM

## 2022-01-01 DIAGNOSIS — A41.9 SEPTIC SHOCK (HCC): ICD-10-CM

## 2022-01-01 DIAGNOSIS — D64.9 ANEMIA: ICD-10-CM

## 2022-01-01 DIAGNOSIS — I73.9 PAD (PERIPHERAL ARTERY DISEASE) (HCC): ICD-10-CM

## 2022-01-01 DIAGNOSIS — L97.409 HEEL ULCER (HCC): ICD-10-CM

## 2022-01-01 DIAGNOSIS — J69.0 ASPIRATION PNEUMONIA (HCC): ICD-10-CM

## 2022-01-01 DIAGNOSIS — I48.0 PAROXYSMAL ATRIAL FIBRILLATION (HCC): ICD-10-CM

## 2022-01-01 DIAGNOSIS — N17.9 AKI (ACUTE KIDNEY INJURY) (HCC): ICD-10-CM

## 2022-01-01 DIAGNOSIS — S99.921A INJURY OF HEEL, RIGHT, INITIAL ENCOUNTER: ICD-10-CM

## 2022-01-01 DIAGNOSIS — S37.30XA TRAUMA OF URETHRA, INITIAL ENCOUNTER: Primary | ICD-10-CM

## 2022-01-01 DIAGNOSIS — J21.0 RSV (ACUTE BRONCHIOLITIS DUE TO RESPIRATORY SYNCYTIAL VIRUS): ICD-10-CM

## 2022-01-01 DIAGNOSIS — J69.0 ASPIRATION PNEUMONIA, UNSPECIFIED ASPIRATION PNEUMONIA TYPE, UNSPECIFIED LATERALITY, UNSPECIFIED PART OF LUNG (HCC): ICD-10-CM

## 2022-01-01 DIAGNOSIS — I48.91 ATRIAL FIBRILLATION WITH RVR (HCC): ICD-10-CM

## 2022-01-01 DIAGNOSIS — R65.21 SEPTIC SHOCK (HCC): ICD-10-CM

## 2022-01-01 LAB
ABO GROUP BLD BPU: NORMAL
ABO GROUP BLD: NORMAL
ABO GROUP BLD: NORMAL
ALBUMIN SERPL BCP-MCNC: 1.7 G/DL (ref 3.5–5)
ALBUMIN SERPL BCP-MCNC: 1.9 G/DL (ref 3.5–5)
ALBUMIN SERPL BCP-MCNC: 1.9 G/DL (ref 3.5–5)
ALBUMIN SERPL BCP-MCNC: 2 G/DL (ref 3.5–5)
ALBUMIN SERPL BCP-MCNC: 2.1 G/DL (ref 3.5–5)
ALBUMIN SERPL BCP-MCNC: 2.2 G/DL (ref 3.5–5)
ALP SERPL-CCNC: 110 U/L (ref 46–116)
ALP SERPL-CCNC: 112 U/L (ref 46–116)
ALP SERPL-CCNC: 120 U/L (ref 46–116)
ALP SERPL-CCNC: 157 U/L (ref 46–116)
ALP SERPL-CCNC: 289 U/L (ref 46–116)
ALP SERPL-CCNC: 290 U/L (ref 46–116)
ALP SERPL-CCNC: 346 U/L (ref 46–116)
ALP SERPL-CCNC: 92 U/L (ref 46–116)
ALT SERPL W P-5'-P-CCNC: 12 U/L (ref 12–78)
ALT SERPL W P-5'-P-CCNC: 13 U/L (ref 12–78)
ALT SERPL W P-5'-P-CCNC: 157 U/L (ref 12–78)
ALT SERPL W P-5'-P-CCNC: 18 U/L (ref 12–78)
ALT SERPL W P-5'-P-CCNC: 22 U/L (ref 12–78)
ALT SERPL W P-5'-P-CCNC: 27 U/L (ref 12–78)
ALT SERPL W P-5'-P-CCNC: 38 U/L (ref 12–78)
ALT SERPL W P-5'-P-CCNC: 73 U/L (ref 12–78)
AMORPH URATE CRY URNS QL MICRO: ABNORMAL
ANION GAP SERPL CALCULATED.3IONS-SCNC: 3 MMOL/L (ref 4–13)
ANION GAP SERPL CALCULATED.3IONS-SCNC: 3 MMOL/L (ref 4–13)
ANION GAP SERPL CALCULATED.3IONS-SCNC: 4 MMOL/L (ref 4–13)
ANION GAP SERPL CALCULATED.3IONS-SCNC: 5 MMOL/L (ref 4–13)
ANION GAP SERPL CALCULATED.3IONS-SCNC: 6 MMOL/L (ref 4–13)
ANION GAP SERPL CALCULATED.3IONS-SCNC: 7 MMOL/L (ref 4–13)
ANION GAP SERPL CALCULATED.3IONS-SCNC: 8 MMOL/L (ref 4–13)
ANION GAP SERPL CALCULATED.3IONS-SCNC: 9 MMOL/L (ref 4–13)
ANISOCYTOSIS BLD QL SMEAR: PRESENT
ANISOCYTOSIS BLD QL SMEAR: PRESENT
APTT PPP: 106 SECONDS (ref 23–37)
APTT PPP: 117 SECONDS (ref 23–37)
APTT PPP: 117 SECONDS (ref 23–37)
APTT PPP: 30 SECONDS (ref 23–37)
APTT PPP: 42 SECONDS (ref 23–37)
APTT PPP: 58 SECONDS (ref 23–37)
APTT PPP: 70 SECONDS (ref 23–37)
APTT PPP: 72 SECONDS (ref 23–37)
APTT PPP: 74 SECONDS (ref 23–37)
APTT PPP: 74 SECONDS (ref 23–37)
APTT PPP: 82 SECONDS (ref 23–37)
ARTERIAL PATENCY WRIST A: YES
AST SERPL W P-5'-P-CCNC: 18 U/L (ref 5–45)
AST SERPL W P-5'-P-CCNC: 20 U/L (ref 5–45)
AST SERPL W P-5'-P-CCNC: 22 U/L (ref 5–45)
AST SERPL W P-5'-P-CCNC: 31 U/L (ref 5–45)
AST SERPL W P-5'-P-CCNC: 40 U/L (ref 5–45)
AST SERPL W P-5'-P-CCNC: 41 U/L (ref 5–45)
AST SERPL W P-5'-P-CCNC: 47 U/L (ref 5–45)
AST SERPL W P-5'-P-CCNC: 98 U/L (ref 5–45)
BACTERIA BLD CULT: NORMAL
BACTERIA BLD CULT: NORMAL
BACTERIA UR QL AUTO: ABNORMAL /HPF
BASE EXCESS BLDA CALC-SCNC: 2.7 MMOL/L
BASOPHILS # BLD AUTO: 0.01 THOUSANDS/ÂΜL (ref 0–0.1)
BASOPHILS # BLD AUTO: 0.02 THOUSANDS/ÂΜL (ref 0–0.1)
BASOPHILS # BLD AUTO: 0.02 THOUSANDS/ÂΜL (ref 0–0.1)
BASOPHILS # BLD MANUAL: 0 THOUSAND/UL (ref 0–0.1)
BASOPHILS # BLD MANUAL: 0 THOUSAND/UL (ref 0–0.1)
BASOPHILS # BLD MANUAL: 0.06 THOUSAND/UL (ref 0–0.1)
BASOPHILS NFR BLD AUTO: 0 % (ref 0–1)
BASOPHILS NFR MAR MANUAL: 0 % (ref 0–1)
BASOPHILS NFR MAR MANUAL: 0 % (ref 0–1)
BASOPHILS NFR MAR MANUAL: 1 % (ref 0–1)
BILIRUB SERPL-MCNC: 0.93 MG/DL (ref 0.2–1)
BILIRUB SERPL-MCNC: 0.99 MG/DL (ref 0.2–1)
BILIRUB SERPL-MCNC: 1.04 MG/DL (ref 0.2–1)
BILIRUB SERPL-MCNC: 1.16 MG/DL (ref 0.2–1)
BILIRUB SERPL-MCNC: 1.34 MG/DL (ref 0.2–1)
BILIRUB SERPL-MCNC: 1.44 MG/DL (ref 0.2–1)
BILIRUB SERPL-MCNC: 1.53 MG/DL (ref 0.2–1)
BILIRUB SERPL-MCNC: 2.38 MG/DL (ref 0.2–1)
BILIRUB UR QL STRIP: NEGATIVE
BLD GP AB SCN SERPL QL: NEGATIVE
BLD GP AB SCN SERPL QL: NEGATIVE
BPU ID: NORMAL
BUN SERPL-MCNC: 38 MG/DL (ref 5–25)
BUN SERPL-MCNC: 40 MG/DL (ref 5–25)
BUN SERPL-MCNC: 41 MG/DL (ref 5–25)
BUN SERPL-MCNC: 41 MG/DL (ref 5–25)
BUN SERPL-MCNC: 42 MG/DL (ref 5–25)
BUN SERPL-MCNC: 43 MG/DL (ref 5–25)
BUN SERPL-MCNC: 46 MG/DL (ref 5–25)
BUN SERPL-MCNC: 47 MG/DL (ref 5–25)
BUN SERPL-MCNC: 53 MG/DL (ref 5–25)
BUN SERPL-MCNC: 55 MG/DL (ref 5–25)
BUN SERPL-MCNC: 55 MG/DL (ref 5–25)
BUN SERPL-MCNC: 56 MG/DL (ref 5–25)
BUN SERPL-MCNC: 58 MG/DL (ref 5–25)
BUN SERPL-MCNC: 60 MG/DL (ref 5–25)
BUN SERPL-MCNC: 68 MG/DL (ref 5–25)
BUN SERPL-MCNC: 76 MG/DL (ref 5–25)
BUN SERPL-MCNC: 78 MG/DL (ref 5–25)
BUN SERPL-MCNC: 83 MG/DL (ref 5–25)
BUN SERPL-MCNC: 85 MG/DL (ref 5–25)
BUN SERPL-MCNC: 87 MG/DL (ref 5–25)
BUN SERPL-MCNC: 89 MG/DL (ref 5–25)
BURR CELLS BLD QL SMEAR: PRESENT
CALCIUM ALBUM COR SERPL-MCNC: 10 MG/DL (ref 8.3–10.1)
CALCIUM ALBUM COR SERPL-MCNC: 10.2 MG/DL (ref 8.3–10.1)
CALCIUM ALBUM COR SERPL-MCNC: 9.3 MG/DL (ref 8.3–10.1)
CALCIUM ALBUM COR SERPL-MCNC: 9.3 MG/DL (ref 8.3–10.1)
CALCIUM ALBUM COR SERPL-MCNC: 9.6 MG/DL (ref 8.3–10.1)
CALCIUM ALBUM COR SERPL-MCNC: 9.7 MG/DL (ref 8.3–10.1)
CALCIUM ALBUM COR SERPL-MCNC: 9.7 MG/DL (ref 8.3–10.1)
CALCIUM ALBUM COR SERPL-MCNC: 9.8 MG/DL (ref 8.3–10.1)
CALCIUM ALBUM COR SERPL-MCNC: 9.8 MG/DL (ref 8.3–10.1)
CALCIUM ALBUM COR SERPL-MCNC: 9.9 MG/DL (ref 8.3–10.1)
CALCIUM SERPL-MCNC: 7.5 MG/DL (ref 8.3–10.1)
CALCIUM SERPL-MCNC: 7.6 MG/DL (ref 8.3–10.1)
CALCIUM SERPL-MCNC: 7.8 MG/DL (ref 8.3–10.1)
CALCIUM SERPL-MCNC: 7.9 MG/DL (ref 8.3–10.1)
CALCIUM SERPL-MCNC: 7.9 MG/DL (ref 8.3–10.1)
CALCIUM SERPL-MCNC: 8 MG/DL (ref 8.3–10.1)
CALCIUM SERPL-MCNC: 8.1 MG/DL (ref 8.3–10.1)
CALCIUM SERPL-MCNC: 8.2 MG/DL (ref 8.3–10.1)
CALCIUM SERPL-MCNC: 8.3 MG/DL (ref 8.3–10.1)
CALCIUM SERPL-MCNC: 8.4 MG/DL (ref 8.3–10.1)
CALCIUM SERPL-MCNC: 8.6 MG/DL (ref 8.3–10.1)
CHLORIDE SERPL-SCNC: 104 MMOL/L (ref 96–108)
CHLORIDE SERPL-SCNC: 104 MMOL/L (ref 96–108)
CHLORIDE SERPL-SCNC: 105 MMOL/L (ref 96–108)
CHLORIDE SERPL-SCNC: 105 MMOL/L (ref 96–108)
CHLORIDE SERPL-SCNC: 108 MMOL/L (ref 96–108)
CHLORIDE SERPL-SCNC: 108 MMOL/L (ref 96–108)
CHLORIDE SERPL-SCNC: 109 MMOL/L (ref 96–108)
CHLORIDE SERPL-SCNC: 109 MMOL/L (ref 96–108)
CHLORIDE SERPL-SCNC: 110 MMOL/L (ref 96–108)
CHLORIDE SERPL-SCNC: 111 MMOL/L (ref 96–108)
CHLORIDE SERPL-SCNC: 111 MMOL/L (ref 96–108)
CHLORIDE SERPL-SCNC: 112 MMOL/L (ref 96–108)
CHLORIDE SERPL-SCNC: 112 MMOL/L (ref 96–108)
CHLORIDE SERPL-SCNC: 113 MMOL/L (ref 96–108)
CHLORIDE SERPL-SCNC: 114 MMOL/L (ref 96–108)
CHLORIDE SERPL-SCNC: 114 MMOL/L (ref 96–108)
CHLORIDE SERPL-SCNC: 115 MMOL/L (ref 96–108)
CHLORIDE SERPL-SCNC: 116 MMOL/L (ref 96–108)
CHLORIDE SERPL-SCNC: 116 MMOL/L (ref 96–108)
CHLORIDE SERPL-SCNC: 117 MMOL/L (ref 96–108)
CHLORIDE SERPL-SCNC: 118 MMOL/L (ref 96–108)
CHOLEST SERPL-MCNC: 81 MG/DL
CLARITY UR: ABNORMAL
CO2 SERPL-SCNC: 19 MMOL/L (ref 21–32)
CO2 SERPL-SCNC: 21 MMOL/L (ref 21–32)
CO2 SERPL-SCNC: 22 MMOL/L (ref 21–32)
CO2 SERPL-SCNC: 24 MMOL/L (ref 21–32)
CO2 SERPL-SCNC: 26 MMOL/L (ref 21–32)
CO2 SERPL-SCNC: 26 MMOL/L (ref 21–32)
CO2 SERPL-SCNC: 27 MMOL/L (ref 21–32)
CO2 SERPL-SCNC: 28 MMOL/L (ref 21–32)
CO2 SERPL-SCNC: 29 MMOL/L (ref 21–32)
CO2 SERPL-SCNC: 30 MMOL/L (ref 21–32)
CO2 SERPL-SCNC: 31 MMOL/L (ref 21–32)
CO2 SERPL-SCNC: 33 MMOL/L (ref 21–32)
COLOR UR: YELLOW
CREAT SERPL-MCNC: 1.07 MG/DL (ref 0.6–1.3)
CREAT SERPL-MCNC: 1.09 MG/DL (ref 0.6–1.3)
CREAT SERPL-MCNC: 1.16 MG/DL (ref 0.6–1.3)
CREAT SERPL-MCNC: 1.19 MG/DL (ref 0.6–1.3)
CREAT SERPL-MCNC: 1.31 MG/DL (ref 0.6–1.3)
CREAT SERPL-MCNC: 1.52 MG/DL (ref 0.6–1.3)
CREAT SERPL-MCNC: 1.76 MG/DL (ref 0.6–1.3)
CREAT SERPL-MCNC: 1.77 MG/DL (ref 0.6–1.3)
CREAT SERPL-MCNC: 1.85 MG/DL (ref 0.6–1.3)
CREAT SERPL-MCNC: 1.85 MG/DL (ref 0.6–1.3)
CREAT SERPL-MCNC: 1.9 MG/DL (ref 0.6–1.3)
CREAT SERPL-MCNC: 1.91 MG/DL (ref 0.6–1.3)
CREAT SERPL-MCNC: 1.98 MG/DL (ref 0.6–1.3)
CREAT SERPL-MCNC: 2.12 MG/DL (ref 0.6–1.3)
CREAT SERPL-MCNC: 2.25 MG/DL (ref 0.6–1.3)
CREAT SERPL-MCNC: 2.41 MG/DL (ref 0.6–1.3)
CREAT SERPL-MCNC: 2.42 MG/DL (ref 0.6–1.3)
CREAT SERPL-MCNC: 2.43 MG/DL (ref 0.6–1.3)
CREAT SERPL-MCNC: 2.54 MG/DL (ref 0.6–1.3)
CREAT SERPL-MCNC: 2.61 MG/DL (ref 0.6–1.3)
CREAT SERPL-MCNC: 2.7 MG/DL (ref 0.6–1.3)
CREAT SERPL-MCNC: 2.81 MG/DL (ref 0.6–1.3)
CREAT SERPL-MCNC: 2.85 MG/DL (ref 0.6–1.3)
CROSSMATCH: NORMAL
EOSINOPHIL # BLD AUTO: 0 THOUSAND/ÂΜL (ref 0–0.61)
EOSINOPHIL # BLD AUTO: 0.01 THOUSAND/ÂΜL (ref 0–0.61)
EOSINOPHIL # BLD AUTO: 0.16 THOUSAND/ÂΜL (ref 0–0.61)
EOSINOPHIL # BLD MANUAL: 0 THOUSAND/UL (ref 0–0.4)
EOSINOPHIL # BLD MANUAL: 0 THOUSAND/UL (ref 0–0.4)
EOSINOPHIL # BLD MANUAL: 0.13 THOUSAND/UL (ref 0–0.4)
EOSINOPHIL NFR BLD AUTO: 0 % (ref 0–6)
EOSINOPHIL NFR BLD AUTO: 0 % (ref 0–6)
EOSINOPHIL NFR BLD AUTO: 2 % (ref 0–6)
EOSINOPHIL NFR BLD MANUAL: 0 % (ref 0–6)
EOSINOPHIL NFR BLD MANUAL: 0 % (ref 0–6)
EOSINOPHIL NFR BLD MANUAL: 1 % (ref 0–6)
ERYTHROCYTE [DISTWIDTH] IN BLOOD BY AUTOMATED COUNT: 15.5 % (ref 11.6–15.1)
ERYTHROCYTE [DISTWIDTH] IN BLOOD BY AUTOMATED COUNT: 15.9 % (ref 11.6–15.1)
ERYTHROCYTE [DISTWIDTH] IN BLOOD BY AUTOMATED COUNT: 16.2 % (ref 11.6–15.1)
ERYTHROCYTE [DISTWIDTH] IN BLOOD BY AUTOMATED COUNT: 16.4 % (ref 11.6–15.1)
ERYTHROCYTE [DISTWIDTH] IN BLOOD BY AUTOMATED COUNT: 16.5 % (ref 11.6–15.1)
ERYTHROCYTE [DISTWIDTH] IN BLOOD BY AUTOMATED COUNT: 16.6 % (ref 11.6–15.1)
ERYTHROCYTE [DISTWIDTH] IN BLOOD BY AUTOMATED COUNT: 17.1 % (ref 11.6–15.1)
ERYTHROCYTE [DISTWIDTH] IN BLOOD BY AUTOMATED COUNT: 17.1 % (ref 11.6–15.1)
ERYTHROCYTE [DISTWIDTH] IN BLOOD BY AUTOMATED COUNT: 17.2 % (ref 11.6–15.1)
ERYTHROCYTE [DISTWIDTH] IN BLOOD BY AUTOMATED COUNT: 17.4 % (ref 11.6–15.1)
ERYTHROCYTE [DISTWIDTH] IN BLOOD BY AUTOMATED COUNT: 17.5 % (ref 11.6–15.1)
ERYTHROCYTE [DISTWIDTH] IN BLOOD BY AUTOMATED COUNT: 17.7 % (ref 11.6–15.1)
ERYTHROCYTE [DISTWIDTH] IN BLOOD BY AUTOMATED COUNT: 17.9 % (ref 11.6–15.1)
ERYTHROCYTE [DISTWIDTH] IN BLOOD BY AUTOMATED COUNT: 17.9 % (ref 11.6–15.1)
ERYTHROCYTE [DISTWIDTH] IN BLOOD BY AUTOMATED COUNT: 18 % (ref 11.6–15.1)
FLUAV RNA RESP QL NAA+PROBE: NEGATIVE
FLUBV RNA RESP QL NAA+PROBE: NEGATIVE
GFR SERPL CREATININE-BSD FRML MDRD: 19 ML/MIN/1.73SQ M
GFR SERPL CREATININE-BSD FRML MDRD: 20 ML/MIN/1.73SQ M
GFR SERPL CREATININE-BSD FRML MDRD: 20 ML/MIN/1.73SQ M
GFR SERPL CREATININE-BSD FRML MDRD: 21 ML/MIN/1.73SQ M
GFR SERPL CREATININE-BSD FRML MDRD: 22 ML/MIN/1.73SQ M
GFR SERPL CREATININE-BSD FRML MDRD: 23 ML/MIN/1.73SQ M
GFR SERPL CREATININE-BSD FRML MDRD: 23 ML/MIN/1.73SQ M
GFR SERPL CREATININE-BSD FRML MDRD: 24 ML/MIN/1.73SQ M
GFR SERPL CREATININE-BSD FRML MDRD: 26 ML/MIN/1.73SQ M
GFR SERPL CREATININE-BSD FRML MDRD: 28 ML/MIN/1.73SQ M
GFR SERPL CREATININE-BSD FRML MDRD: 30 ML/MIN/1.73SQ M
GFR SERPL CREATININE-BSD FRML MDRD: 31 ML/MIN/1.73SQ M
GFR SERPL CREATININE-BSD FRML MDRD: 32 ML/MIN/1.73SQ M
GFR SERPL CREATININE-BSD FRML MDRD: 33 ML/MIN/1.73SQ M
GFR SERPL CREATININE-BSD FRML MDRD: 33 ML/MIN/1.73SQ M
GFR SERPL CREATININE-BSD FRML MDRD: 34 ML/MIN/1.73SQ M
GFR SERPL CREATININE-BSD FRML MDRD: 35 ML/MIN/1.73SQ M
GFR SERPL CREATININE-BSD FRML MDRD: 42 ML/MIN/1.73SQ M
GFR SERPL CREATININE-BSD FRML MDRD: 50 ML/MIN/1.73SQ M
GFR SERPL CREATININE-BSD FRML MDRD: 56 ML/MIN/1.73SQ M
GFR SERPL CREATININE-BSD FRML MDRD: 58 ML/MIN/1.73SQ M
GFR SERPL CREATININE-BSD FRML MDRD: 62 ML/MIN/1.73SQ M
GFR SERPL CREATININE-BSD FRML MDRD: 64 ML/MIN/1.73SQ M
GIANT PLATELETS BLD QL SMEAR: PRESENT
GLUCOSE FLD-MCNC: 147 MG/DL
GLUCOSE P FAST SERPL-MCNC: 98 MG/DL (ref 65–99)
GLUCOSE SERPL-MCNC: 100 MG/DL (ref 65–140)
GLUCOSE SERPL-MCNC: 101 MG/DL (ref 65–140)
GLUCOSE SERPL-MCNC: 101 MG/DL (ref 65–140)
GLUCOSE SERPL-MCNC: 102 MG/DL (ref 65–140)
GLUCOSE SERPL-MCNC: 102 MG/DL (ref 65–140)
GLUCOSE SERPL-MCNC: 103 MG/DL (ref 65–140)
GLUCOSE SERPL-MCNC: 106 MG/DL (ref 65–140)
GLUCOSE SERPL-MCNC: 108 MG/DL (ref 65–140)
GLUCOSE SERPL-MCNC: 110 MG/DL (ref 65–140)
GLUCOSE SERPL-MCNC: 112 MG/DL (ref 65–140)
GLUCOSE SERPL-MCNC: 113 MG/DL (ref 65–140)
GLUCOSE SERPL-MCNC: 114 MG/DL (ref 65–140)
GLUCOSE SERPL-MCNC: 117 MG/DL (ref 65–140)
GLUCOSE SERPL-MCNC: 120 MG/DL (ref 65–140)
GLUCOSE SERPL-MCNC: 120 MG/DL (ref 65–140)
GLUCOSE SERPL-MCNC: 122 MG/DL (ref 65–140)
GLUCOSE SERPL-MCNC: 122 MG/DL (ref 65–140)
GLUCOSE SERPL-MCNC: 125 MG/DL (ref 65–140)
GLUCOSE SERPL-MCNC: 126 MG/DL (ref 65–140)
GLUCOSE SERPL-MCNC: 128 MG/DL (ref 65–140)
GLUCOSE SERPL-MCNC: 129 MG/DL (ref 65–140)
GLUCOSE SERPL-MCNC: 131 MG/DL (ref 65–140)
GLUCOSE SERPL-MCNC: 135 MG/DL (ref 65–140)
GLUCOSE SERPL-MCNC: 137 MG/DL (ref 65–140)
GLUCOSE SERPL-MCNC: 137 MG/DL (ref 65–140)
GLUCOSE SERPL-MCNC: 138 MG/DL (ref 65–140)
GLUCOSE SERPL-MCNC: 147 MG/DL (ref 65–140)
GLUCOSE SERPL-MCNC: 155 MG/DL (ref 65–140)
GLUCOSE SERPL-MCNC: 158 MG/DL (ref 65–140)
GLUCOSE SERPL-MCNC: 159 MG/DL (ref 65–140)
GLUCOSE SERPL-MCNC: 159 MG/DL (ref 65–140)
GLUCOSE SERPL-MCNC: 160 MG/DL (ref 65–140)
GLUCOSE SERPL-MCNC: 165 MG/DL (ref 65–140)
GLUCOSE SERPL-MCNC: 167 MG/DL (ref 65–140)
GLUCOSE SERPL-MCNC: 167 MG/DL (ref 65–140)
GLUCOSE SERPL-MCNC: 171 MG/DL (ref 65–140)
GLUCOSE SERPL-MCNC: 177 MG/DL (ref 65–140)
GLUCOSE SERPL-MCNC: 191 MG/DL (ref 65–140)
GLUCOSE SERPL-MCNC: 194 MG/DL (ref 65–140)
GLUCOSE SERPL-MCNC: 197 MG/DL (ref 65–140)
GLUCOSE SERPL-MCNC: 209 MG/DL (ref 65–140)
GLUCOSE SERPL-MCNC: 211 MG/DL (ref 65–140)
GLUCOSE SERPL-MCNC: 218 MG/DL (ref 65–140)
GLUCOSE SERPL-MCNC: 229 MG/DL (ref 65–140)
GLUCOSE SERPL-MCNC: 232 MG/DL (ref 65–140)
GLUCOSE SERPL-MCNC: 237 MG/DL (ref 65–140)
GLUCOSE SERPL-MCNC: 256 MG/DL (ref 65–140)
GLUCOSE SERPL-MCNC: 265 MG/DL (ref 65–140)
GLUCOSE SERPL-MCNC: 311 MG/DL (ref 65–140)
GLUCOSE SERPL-MCNC: 325 MG/DL (ref 65–140)
GLUCOSE SERPL-MCNC: 74 MG/DL (ref 65–140)
GLUCOSE SERPL-MCNC: 75 MG/DL (ref 65–140)
GLUCOSE SERPL-MCNC: 79 MG/DL (ref 65–140)
GLUCOSE SERPL-MCNC: 83 MG/DL (ref 65–140)
GLUCOSE SERPL-MCNC: 86 MG/DL (ref 65–140)
GLUCOSE SERPL-MCNC: 86 MG/DL (ref 65–140)
GLUCOSE SERPL-MCNC: 87 MG/DL (ref 65–140)
GLUCOSE SERPL-MCNC: 90 MG/DL (ref 65–140)
GLUCOSE SERPL-MCNC: 91 MG/DL (ref 65–140)
GLUCOSE SERPL-MCNC: 92 MG/DL (ref 65–140)
GLUCOSE SERPL-MCNC: 94 MG/DL (ref 65–140)
GLUCOSE SERPL-MCNC: 95 MG/DL (ref 65–140)
GLUCOSE SERPL-MCNC: 97 MG/DL (ref 65–140)
GLUCOSE SERPL-MCNC: 98 MG/DL (ref 65–140)
GLUCOSE SERPL-MCNC: 99 MG/DL (ref 65–140)
GLUCOSE UR STRIP-MCNC: NEGATIVE MG/DL
HCO3 BLDA-SCNC: 25.9 MMOL/L (ref 22–28)
HCT VFR BLD AUTO: 22 % (ref 36.5–49.3)
HCT VFR BLD AUTO: 22.6 % (ref 36.5–49.3)
HCT VFR BLD AUTO: 22.8 % (ref 36.5–49.3)
HCT VFR BLD AUTO: 23.7 % (ref 36.5–49.3)
HCT VFR BLD AUTO: 24 % (ref 36.5–49.3)
HCT VFR BLD AUTO: 24.8 % (ref 36.5–49.3)
HCT VFR BLD AUTO: 25 % (ref 36.5–49.3)
HCT VFR BLD AUTO: 26.1 % (ref 36.5–49.3)
HCT VFR BLD AUTO: 26.3 % (ref 36.5–49.3)
HCT VFR BLD AUTO: 26.7 % (ref 36.5–49.3)
HCT VFR BLD AUTO: 28.4 % (ref 36.5–49.3)
HCT VFR BLD AUTO: 28.9 % (ref 36.5–49.3)
HCT VFR BLD AUTO: 29.4 % (ref 36.5–49.3)
HCT VFR BLD AUTO: 30 % (ref 36.5–49.3)
HCT VFR BLD AUTO: 30.5 % (ref 36.5–49.3)
HCT VFR BLD AUTO: 31.3 % (ref 36.5–49.3)
HCT VFR BLD AUTO: 31.7 % (ref 36.5–49.3)
HCT VFR BLD AUTO: 33.4 % (ref 36.5–49.3)
HCT VFR BLD AUTO: 34.8 % (ref 36.5–49.3)
HDLC SERPL-MCNC: 25 MG/DL
HGB BLD-MCNC: 10.1 G/DL (ref 12–17)
HGB BLD-MCNC: 10.6 G/DL (ref 12–17)
HGB BLD-MCNC: 6.7 G/DL (ref 12–17)
HGB BLD-MCNC: 6.8 G/DL (ref 12–17)
HGB BLD-MCNC: 6.8 G/DL (ref 12–17)
HGB BLD-MCNC: 7 G/DL (ref 12–17)
HGB BLD-MCNC: 7.4 G/DL (ref 12–17)
HGB BLD-MCNC: 7.6 G/DL (ref 12–17)
HGB BLD-MCNC: 7.9 G/DL (ref 12–17)
HGB BLD-MCNC: 8 G/DL (ref 12–17)
HGB BLD-MCNC: 8.8 G/DL (ref 12–17)
HGB BLD-MCNC: 8.8 G/DL (ref 12–17)
HGB BLD-MCNC: 8.9 G/DL (ref 12–17)
HGB BLD-MCNC: 9 G/DL (ref 12–17)
HGB BLD-MCNC: 9.2 G/DL (ref 12–17)
HGB BLD-MCNC: 9.7 G/DL (ref 12–17)
HGB BLD-MCNC: 9.8 G/DL (ref 12–17)
HGB UR QL STRIP.AUTO: ABNORMAL
HYALINE CASTS #/AREA URNS LPF: ABNORMAL /LPF
IMM GRANULOCYTES # BLD AUTO: 0.11 THOUSAND/UL (ref 0–0.2)
IMM GRANULOCYTES # BLD AUTO: 0.13 THOUSAND/UL (ref 0–0.2)
IMM GRANULOCYTES # BLD AUTO: 0.14 THOUSAND/UL (ref 0–0.2)
IMM GRANULOCYTES NFR BLD AUTO: 1 % (ref 0–2)
INR PPP: 1.38 (ref 0.84–1.19)
INR PPP: 1.69 (ref 0.84–1.19)
IPAP: 12
KETONES UR STRIP-MCNC: ABNORMAL MG/DL
L PNEUMO1 AG UR QL IA.RAPID: NEGATIVE
LACTATE SERPL-SCNC: 1.5 MMOL/L (ref 0.5–2)
LACTATE SERPL-SCNC: 1.6 MMOL/L (ref 0.5–2)
LACTATE SERPL-SCNC: 2.2 MMOL/L (ref 0.5–2)
LDH FLD L TO P-CCNC: 117 U/L
LDLC SERPL CALC-MCNC: 33 MG/DL (ref 0–100)
LEUKOCYTE ESTERASE UR QL STRIP: NEGATIVE
LYMPHOCYTES # BLD AUTO: 0 % (ref 14–44)
LYMPHOCYTES # BLD AUTO: 0 % (ref 14–44)
LYMPHOCYTES # BLD AUTO: 0 THOUSAND/UL (ref 0.6–4.47)
LYMPHOCYTES # BLD AUTO: 0 THOUSAND/UL (ref 0.6–4.47)
LYMPHOCYTES # BLD AUTO: 0.12 THOUSAND/UL (ref 0.6–4.47)
LYMPHOCYTES # BLD AUTO: 0.29 THOUSANDS/ÂΜL (ref 0.6–4.47)
LYMPHOCYTES # BLD AUTO: 0.31 THOUSANDS/ÂΜL (ref 0.6–4.47)
LYMPHOCYTES # BLD AUTO: 0.58 THOUSANDS/ÂΜL (ref 0.6–4.47)
LYMPHOCYTES # BLD AUTO: 2 % (ref 14–44)
LYMPHOCYTES NFR BLD AUTO: 2 % (ref 14–44)
LYMPHOCYTES NFR BLD AUTO: 2 % (ref 14–44)
LYMPHOCYTES NFR BLD AUTO: 6 % (ref 14–44)
MAGNESIUM SERPL-MCNC: 1.9 MG/DL (ref 1.6–2.6)
MAGNESIUM SERPL-MCNC: 2 MG/DL (ref 1.6–2.6)
MAGNESIUM SERPL-MCNC: 2 MG/DL (ref 1.6–2.6)
MAGNESIUM SERPL-MCNC: 2.5 MG/DL (ref 1.6–2.6)
MAGNESIUM SERPL-MCNC: 2.5 MG/DL (ref 1.6–2.6)
MAGNESIUM SERPL-MCNC: 2.6 MG/DL (ref 1.6–2.6)
MAGNESIUM SERPL-MCNC: 2.7 MG/DL (ref 1.6–2.6)
MCH RBC QN AUTO: 28.6 PG (ref 26.8–34.3)
MCH RBC QN AUTO: 28.8 PG (ref 26.8–34.3)
MCH RBC QN AUTO: 28.9 PG (ref 26.8–34.3)
MCH RBC QN AUTO: 29.1 PG (ref 26.8–34.3)
MCH RBC QN AUTO: 29.1 PG (ref 26.8–34.3)
MCH RBC QN AUTO: 29.2 PG (ref 26.8–34.3)
MCH RBC QN AUTO: 29.2 PG (ref 26.8–34.3)
MCH RBC QN AUTO: 29.3 PG (ref 26.8–34.3)
MCH RBC QN AUTO: 29.4 PG (ref 26.8–34.3)
MCH RBC QN AUTO: 29.6 PG (ref 26.8–34.3)
MCH RBC QN AUTO: 29.6 PG (ref 26.8–34.3)
MCH RBC QN AUTO: 29.8 PG (ref 26.8–34.3)
MCH RBC QN AUTO: 30.2 PG (ref 26.8–34.3)
MCHC RBC AUTO-ENTMCNC: 28.9 G/DL (ref 31.4–37.4)
MCHC RBC AUTO-ENTMCNC: 29.3 G/DL (ref 31.4–37.4)
MCHC RBC AUTO-ENTMCNC: 29.6 G/DL (ref 31.4–37.4)
MCHC RBC AUTO-ENTMCNC: 30.2 G/DL (ref 31.4–37.4)
MCHC RBC AUTO-ENTMCNC: 30.2 G/DL (ref 31.4–37.4)
MCHC RBC AUTO-ENTMCNC: 30.3 G/DL (ref 31.4–37.4)
MCHC RBC AUTO-ENTMCNC: 30.3 G/DL (ref 31.4–37.4)
MCHC RBC AUTO-ENTMCNC: 30.4 G/DL (ref 31.4–37.4)
MCHC RBC AUTO-ENTMCNC: 30.5 G/DL (ref 31.4–37.4)
MCHC RBC AUTO-ENTMCNC: 30.6 G/DL (ref 31.4–37.4)
MCHC RBC AUTO-ENTMCNC: 30.8 G/DL (ref 31.4–37.4)
MCHC RBC AUTO-ENTMCNC: 30.9 G/DL (ref 31.4–37.4)
MCHC RBC AUTO-ENTMCNC: 31 G/DL (ref 31.4–37.4)
MCHC RBC AUTO-ENTMCNC: 31 G/DL (ref 31.4–37.4)
MCHC RBC AUTO-ENTMCNC: 31.1 G/DL (ref 31.4–37.4)
MCV RBC AUTO: 94 FL (ref 82–98)
MCV RBC AUTO: 95 FL (ref 82–98)
MCV RBC AUTO: 95 FL (ref 82–98)
MCV RBC AUTO: 96 FL (ref 82–98)
MCV RBC AUTO: 97 FL (ref 82–98)
MCV RBC AUTO: 98 FL (ref 82–98)
MCV RBC AUTO: 98 FL (ref 82–98)
MCV RBC AUTO: 99 FL (ref 82–98)
METAMYELOCYTES NFR BLD MANUAL: 1 % (ref 0–1)
MONOCYTES # BLD AUTO: 0.06 THOUSAND/UL (ref 0–1.22)
MONOCYTES # BLD AUTO: 0.13 THOUSAND/UL (ref 0–1.22)
MONOCYTES # BLD AUTO: 0.33 THOUSAND/UL (ref 0–1.22)
MONOCYTES # BLD AUTO: 0.48 THOUSAND/ÂΜL (ref 0.17–1.22)
MONOCYTES # BLD AUTO: 0.68 THOUSAND/ÂΜL (ref 0.17–1.22)
MONOCYTES # BLD AUTO: 0.77 THOUSAND/ÂΜL (ref 0.17–1.22)
MONOCYTES NFR BLD AUTO: 4 % (ref 4–12)
MONOCYTES NFR BLD AUTO: 5 % (ref 4–12)
MONOCYTES NFR BLD AUTO: 6 % (ref 4–12)
MONOCYTES NFR BLD: 1 % (ref 4–12)
MONOCYTES NFR BLD: 1 % (ref 4–12)
MONOCYTES NFR BLD: 3 % (ref 4–12)
MRSA NOSE QL CULT: NORMAL
MUCOUS THREADS UR QL AUTO: ABNORMAL
NEUTROPHILS # BLD AUTO: 12.49 THOUSANDS/ÂΜL (ref 1.85–7.62)
NEUTROPHILS # BLD AUTO: 14.32 THOUSANDS/ÂΜL (ref 1.85–7.62)
NEUTROPHILS # BLD AUTO: 7.72 THOUSANDS/ÂΜL (ref 1.85–7.62)
NEUTROPHILS # BLD MANUAL: 10.51 THOUSAND/UL (ref 1.85–7.62)
NEUTROPHILS # BLD MANUAL: 12.21 THOUSAND/UL (ref 1.85–7.62)
NEUTROPHILS # BLD MANUAL: 5.54 THOUSAND/UL (ref 1.85–7.62)
NEUTS BAND NFR BLD MANUAL: 2 % (ref 0–8)
NEUTS BAND NFR BLD MANUAL: 3 % (ref 0–8)
NEUTS SEG NFR BLD AUTO: 86 % (ref 43–75)
NEUTS SEG NFR BLD AUTO: 91 % (ref 43–75)
NEUTS SEG NFR BLD AUTO: 91 % (ref 43–75)
NEUTS SEG NFR BLD AUTO: 93 % (ref 43–75)
NEUTS SEG NFR BLD AUTO: 93 % (ref 43–75)
NEUTS SEG NFR BLD AUTO: 97 % (ref 43–75)
NITRITE UR QL STRIP: NEGATIVE
NON VENT- BIPAP: ABNORMAL
NON-SQ EPI CELLS URNS QL MICRO: ABNORMAL /HPF
NRBC BLD AUTO-RTO: 0 /100 WBCS
O2 CT BLDA-SCNC: 13 ML/DL (ref 16–23)
OXYHGB MFR BLDA: 96.5 % (ref 94–97)
PCO2 BLDA: 34.2 MM HG (ref 36–44)
PEEP MAX SETTING VENT: 6 CM[H2O]
PH BLDA: 7.5 [PH] (ref 7.35–7.45)
PH BODY FLUID: 7.5
PH UR STRIP.AUTO: 5 [PH]
PHOSPHATE SERPL-MCNC: 2.8 MG/DL (ref 2.3–4.1)
PHOSPHATE SERPL-MCNC: 3.1 MG/DL (ref 2.3–4.1)
PHOSPHATE SERPL-MCNC: 3.4 MG/DL (ref 2.3–4.1)
PHOSPHATE SERPL-MCNC: 3.6 MG/DL (ref 2.3–4.1)
PHOSPHATE SERPL-MCNC: 4.5 MG/DL (ref 2.3–4.1)
PHOSPHATE SERPL-MCNC: 4.7 MG/DL (ref 2.3–4.1)
PLATELET # BLD AUTO: 113 THOUSANDS/UL (ref 149–390)
PLATELET # BLD AUTO: 119 THOUSANDS/UL (ref 149–390)
PLATELET # BLD AUTO: 122 THOUSANDS/UL (ref 149–390)
PLATELET # BLD AUTO: 123 THOUSANDS/UL (ref 149–390)
PLATELET # BLD AUTO: 132 THOUSANDS/UL (ref 149–390)
PLATELET # BLD AUTO: 140 THOUSANDS/UL (ref 149–390)
PLATELET # BLD AUTO: 142 THOUSANDS/UL (ref 149–390)
PLATELET # BLD AUTO: 144 THOUSANDS/UL (ref 149–390)
PLATELET # BLD AUTO: 156 THOUSANDS/UL (ref 149–390)
PLATELET # BLD AUTO: 158 THOUSANDS/UL (ref 149–390)
PLATELET # BLD AUTO: 160 THOUSANDS/UL (ref 149–390)
PLATELET # BLD AUTO: 167 THOUSANDS/UL (ref 149–390)
PLATELET # BLD AUTO: 212 THOUSANDS/UL (ref 149–390)
PLATELET # BLD AUTO: 87 THOUSANDS/UL (ref 149–390)
PLATELET # BLD AUTO: 92 THOUSANDS/UL (ref 149–390)
PLATELET BLD QL SMEAR: ABNORMAL
PLATELET BLD QL SMEAR: ABNORMAL
PLATELET BLD QL SMEAR: ADEQUATE
PMV BLD AUTO: 10.8 FL (ref 8.9–12.7)
PMV BLD AUTO: 10.9 FL (ref 8.9–12.7)
PMV BLD AUTO: 11 FL (ref 8.9–12.7)
PMV BLD AUTO: 11.1 FL (ref 8.9–12.7)
PMV BLD AUTO: 11.2 FL (ref 8.9–12.7)
PMV BLD AUTO: 11.3 FL (ref 8.9–12.7)
PMV BLD AUTO: 11.3 FL (ref 8.9–12.7)
PMV BLD AUTO: 11.4 FL (ref 8.9–12.7)
PMV BLD AUTO: 11.5 FL (ref 8.9–12.7)
PMV BLD AUTO: 11.5 FL (ref 8.9–12.7)
PMV BLD AUTO: 11.7 FL (ref 8.9–12.7)
PO2 BLDA: 110.2 MM HG (ref 75–129)
POIKILOCYTOSIS BLD QL SMEAR: PRESENT
POLYCHROMASIA BLD QL SMEAR: PRESENT
POTASSIUM SERPL-SCNC: 3.4 MMOL/L (ref 3.5–5.3)
POTASSIUM SERPL-SCNC: 3.5 MMOL/L (ref 3.5–5.3)
POTASSIUM SERPL-SCNC: 3.6 MMOL/L (ref 3.5–5.3)
POTASSIUM SERPL-SCNC: 3.7 MMOL/L (ref 3.5–5.3)
POTASSIUM SERPL-SCNC: 3.8 MMOL/L (ref 3.5–5.3)
POTASSIUM SERPL-SCNC: 3.8 MMOL/L (ref 3.5–5.3)
POTASSIUM SERPL-SCNC: 3.9 MMOL/L (ref 3.5–5.3)
POTASSIUM SERPL-SCNC: 4 MMOL/L (ref 3.5–5.3)
POTASSIUM SERPL-SCNC: 4 MMOL/L (ref 3.5–5.3)
POTASSIUM SERPL-SCNC: 4.1 MMOL/L (ref 3.5–5.3)
POTASSIUM SERPL-SCNC: 4.1 MMOL/L (ref 3.5–5.3)
POTASSIUM SERPL-SCNC: 4.2 MMOL/L (ref 3.5–5.3)
POTASSIUM SERPL-SCNC: 4.5 MMOL/L (ref 3.5–5.3)
POTASSIUM SERPL-SCNC: 4.6 MMOL/L (ref 3.5–5.3)
PROCALCITONIN SERPL-MCNC: 0.25 NG/ML
PROCALCITONIN SERPL-MCNC: 0.88 NG/ML
PROCALCITONIN SERPL-MCNC: 1.09 NG/ML
PROCALCITONIN SERPL-MCNC: 2 NG/ML
PROCALCITONIN SERPL-MCNC: 47.28 NG/ML
PROT FLD-MCNC: <2 G/DL
PROT SERPL-MCNC: 4.6 G/DL (ref 6.4–8.4)
PROT SERPL-MCNC: 5 G/DL (ref 6.4–8.4)
PROT SERPL-MCNC: 5 G/DL (ref 6.4–8.4)
PROT SERPL-MCNC: 5.5 G/DL (ref 6.4–8.4)
PROT SERPL-MCNC: 5.6 G/DL (ref 6.4–8.4)
PROT SERPL-MCNC: 6 G/DL (ref 6.4–8.4)
PROT SERPL-MCNC: 6.2 G/DL (ref 6.4–8.4)
PROT SERPL-MCNC: 6.9 G/DL (ref 6.4–8.4)
PROT UR STRIP-MCNC: ABNORMAL MG/DL
PROTHROMBIN TIME: 17.2 SECONDS (ref 11.6–14.5)
PROTHROMBIN TIME: 20.1 SECONDS (ref 11.6–14.5)
RBC # BLD AUTO: 2.34 MILLION/UL (ref 3.88–5.62)
RBC # BLD AUTO: 2.36 MILLION/UL (ref 3.88–5.62)
RBC # BLD AUTO: 2.45 MILLION/UL (ref 3.88–5.62)
RBC # BLD AUTO: 2.57 MILLION/UL (ref 3.88–5.62)
RBC # BLD AUTO: 2.61 MILLION/UL (ref 3.88–5.62)
RBC # BLD AUTO: 2.66 MILLION/UL (ref 3.88–5.62)
RBC # BLD AUTO: 2.99 MILLION/UL (ref 3.88–5.62)
RBC # BLD AUTO: 3.04 MILLION/UL (ref 3.88–5.62)
RBC # BLD AUTO: 3.06 MILLION/UL (ref 3.88–5.62)
RBC # BLD AUTO: 3.08 MILLION/UL (ref 3.88–5.62)
RBC # BLD AUTO: 3.22 MILLION/UL (ref 3.88–5.62)
RBC # BLD AUTO: 3.25 MILLION/UL (ref 3.88–5.62)
RBC # BLD AUTO: 3.31 MILLION/UL (ref 3.88–5.62)
RBC # BLD AUTO: 3.49 MILLION/UL (ref 3.88–5.62)
RBC # BLD AUTO: 3.64 MILLION/UL (ref 3.88–5.62)
RBC #/AREA URNS AUTO: ABNORMAL /HPF
RBC MORPH BLD: NORMAL
RBC MORPH BLD: PRESENT
RBC MORPH BLD: PRESENT
RH BLD: NEGATIVE
RH BLD: NEGATIVE
RSV RNA RESP QL NAA+PROBE: POSITIVE
S PNEUM AG UR QL: NEGATIVE
SARS-COV-2 RNA RESP QL NAA+PROBE: NEGATIVE
SITE: NORMAL
SODIUM SERPL-SCNC: 139 MMOL/L (ref 135–147)
SODIUM SERPL-SCNC: 141 MMOL/L (ref 135–147)
SODIUM SERPL-SCNC: 142 MMOL/L (ref 135–147)
SODIUM SERPL-SCNC: 143 MMOL/L (ref 135–147)
SODIUM SERPL-SCNC: 144 MMOL/L (ref 135–147)
SODIUM SERPL-SCNC: 145 MMOL/L (ref 135–147)
SODIUM SERPL-SCNC: 145 MMOL/L (ref 135–147)
SODIUM SERPL-SCNC: 147 MMOL/L (ref 135–147)
SODIUM SERPL-SCNC: 148 MMOL/L (ref 135–147)
SODIUM SERPL-SCNC: 149 MMOL/L (ref 135–147)
SODIUM SERPL-SCNC: 150 MMOL/L (ref 135–147)
SP GR UR STRIP.AUTO: 1.02 (ref 1–1.03)
SPECIMEN EXPIRATION DATE: NORMAL
SPECIMEN EXPIRATION DATE: NORMAL
SPECIMEN SOURCE: ABNORMAL
TRIGL SERPL-MCNC: 113 MG/DL
UNIT DISPENSE STATUS: NORMAL
UNIT PRODUCT CODE: NORMAL
UNIT PRODUCT VOLUME: 200 ML
UNIT PRODUCT VOLUME: 250 ML
UNIT PRODUCT VOLUME: 250 ML
UNIT RH: NORMAL
UROBILINOGEN UR STRIP-ACNC: <2 MG/DL
VANCOMYCIN SERPL-MCNC: 14.7 UG/ML (ref 10–20)
VANCOMYCIN SERPL-MCNC: 22.2 UG/ML (ref 10–20)
VARIANT LYMPHS # BLD AUTO: 1 %
VARIANT LYMPHS # BLD AUTO: 3 %
VENT BIPAP FIO2: 70 %
WBC # BLD AUTO: 10.84 THOUSAND/UL (ref 4.31–10.16)
WBC # BLD AUTO: 11.35 THOUSAND/UL (ref 4.31–10.16)
WBC # BLD AUTO: 12.99 THOUSAND/UL (ref 4.31–10.16)
WBC # BLD AUTO: 13.72 THOUSAND/UL (ref 4.31–10.16)
WBC # BLD AUTO: 15.43 THOUSAND/UL (ref 4.31–10.16)
WBC # BLD AUTO: 24.89 THOUSAND/UL (ref 4.31–10.16)
WBC # BLD AUTO: 25.39 THOUSAND/UL (ref 4.31–10.16)
WBC # BLD AUTO: 5.25 THOUSAND/UL (ref 4.31–10.16)
WBC # BLD AUTO: 5.83 THOUSAND/UL (ref 4.31–10.16)
WBC # BLD AUTO: 6.27 THOUSAND/UL (ref 4.31–10.16)
WBC # BLD AUTO: 7.7 THOUSAND/UL (ref 4.31–10.16)
WBC # BLD AUTO: 7.99 THOUSAND/UL (ref 4.31–10.16)
WBC # BLD AUTO: 8.04 THOUSAND/UL (ref 4.31–10.16)
WBC # BLD AUTO: 9.09 THOUSAND/UL (ref 4.31–10.16)
WBC # BLD AUTO: 9.68 THOUSAND/UL (ref 4.31–10.16)
WBC # FLD MANUAL: 275 /UL
WBC #/AREA URNS AUTO: ABNORMAL /HPF

## 2022-01-01 PROCEDURE — 02PYX3Z REMOVAL OF INFUSION DEVICE FROM GREAT VESSEL, EXTERNAL APPROACH: ICD-10-PCS | Performed by: INTERNAL MEDICINE

## 2022-01-01 PROCEDURE — 02HV33Z INSERTION OF INFUSION DEVICE INTO SUPERIOR VENA CAVA, PERCUTANEOUS APPROACH: ICD-10-PCS | Performed by: RADIOLOGY

## 2022-01-01 PROCEDURE — 0T9B30Z DRAINAGE OF BLADDER WITH DRAINAGE DEVICE, PERCUTANEOUS APPROACH: ICD-10-PCS | Performed by: UROLOGY

## 2022-01-01 PROCEDURE — 0TJD8ZZ INSPECTION OF URETHRA, VIA NATURAL OR ARTIFICIAL OPENING ENDOSCOPIC: ICD-10-PCS | Performed by: UROLOGY

## 2022-01-01 PROCEDURE — 0W9B3ZZ DRAINAGE OF LEFT PLEURAL CAVITY, PERCUTANEOUS APPROACH: ICD-10-PCS | Performed by: RADIOLOGY

## 2022-01-01 PROCEDURE — 30233N1 TRANSFUSION OF NONAUTOLOGOUS RED BLOOD CELLS INTO PERIPHERAL VEIN, PERCUTANEOUS APPROACH: ICD-10-PCS | Performed by: STUDENT IN AN ORGANIZED HEALTH CARE EDUCATION/TRAINING PROGRAM

## 2022-01-01 PROCEDURE — 30233N1 TRANSFUSION OF NONAUTOLOGOUS RED BLOOD CELLS INTO PERIPHERAL VEIN, PERCUTANEOUS APPROACH: ICD-10-PCS | Performed by: INTERNAL MEDICINE

## 2022-01-01 PROCEDURE — 02HV33Z INSERTION OF INFUSION DEVICE INTO SUPERIOR VENA CAVA, PERCUTANEOUS APPROACH: ICD-10-PCS | Performed by: INTERNAL MEDICINE

## 2022-01-01 RX ORDER — POTASSIUM CHLORIDE 750 MG/1
10 TABLET, EXTENDED RELEASE ORAL DAILY
Refills: 0 | Status: CANCELLED
Start: 2022-01-01

## 2022-01-01 RX ORDER — HYDROMORPHONE HCL IN WATER/PF 6 MG/30 ML
0.2 PATIENT CONTROLLED ANALGESIA SYRINGE INTRAVENOUS EVERY 4 HOURS PRN
Status: DISCONTINUED | OUTPATIENT
Start: 2022-01-01 | End: 2022-01-01

## 2022-01-01 RX ORDER — GUAIFENESIN 600 MG/1
600 TABLET, EXTENDED RELEASE ORAL EVERY 12 HOURS SCHEDULED
Status: DISCONTINUED | OUTPATIENT
Start: 2022-01-01 | End: 2022-01-01

## 2022-01-01 RX ORDER — HYDROMORPHONE HCL/PF 1 MG/ML
0.3 SYRINGE (ML) INJECTION
Status: DISCONTINUED | OUTPATIENT
Start: 2022-01-01 | End: 2022-01-01 | Stop reason: HOSPADM

## 2022-01-01 RX ORDER — VANCOMYCIN HYDROCHLORIDE 1 G/200ML
15 INJECTION, SOLUTION INTRAVENOUS DAILY PRN
Status: DISCONTINUED | OUTPATIENT
Start: 2022-01-01 | End: 2022-01-01

## 2022-01-01 RX ORDER — METOPROLOL TARTRATE 50 MG/1
100 TABLET, FILM COATED ORAL EVERY 12 HOURS SCHEDULED
Status: DISCONTINUED | OUTPATIENT
Start: 2022-01-01 | End: 2022-01-01

## 2022-01-01 RX ORDER — FUROSEMIDE 10 MG/ML
40 INJECTION INTRAMUSCULAR; INTRAVENOUS ONCE
Status: COMPLETED | OUTPATIENT
Start: 2022-01-01 | End: 2022-01-01

## 2022-01-01 RX ORDER — SODIUM CHLORIDE, SODIUM GLUCONATE, SODIUM ACETATE, POTASSIUM CHLORIDE, MAGNESIUM CHLORIDE, SODIUM PHOSPHATE, DIBASIC, AND POTASSIUM PHOSPHATE .53; .5; .37; .037; .03; .012; .00082 G/100ML; G/100ML; G/100ML; G/100ML; G/100ML; G/100ML; G/100ML
500 INJECTION, SOLUTION INTRAVENOUS ONCE
Status: COMPLETED | OUTPATIENT
Start: 2022-01-01 | End: 2022-01-01

## 2022-01-01 RX ORDER — METOPROLOL TARTRATE 5 MG/5ML
2.5 INJECTION INTRAVENOUS EVERY 8 HOURS
Status: DISCONTINUED | OUTPATIENT
Start: 2022-01-01 | End: 2022-01-01

## 2022-01-01 RX ORDER — SODIUM CHLORIDE, SODIUM GLUCONATE, SODIUM ACETATE, POTASSIUM CHLORIDE, MAGNESIUM CHLORIDE, SODIUM PHOSPHATE, DIBASIC, AND POTASSIUM PHOSPHATE .53; .5; .37; .037; .03; .012; .00082 G/100ML; G/100ML; G/100ML; G/100ML; G/100ML; G/100ML; G/100ML
75 INJECTION, SOLUTION INTRAVENOUS CONTINUOUS
Status: DISCONTINUED | OUTPATIENT
Start: 2022-01-01 | End: 2022-01-01

## 2022-01-01 RX ORDER — MAGNESIUM HYDROXIDE 1200 MG/15ML
LIQUID ORAL AS NEEDED
Status: DISCONTINUED | OUTPATIENT
Start: 2022-01-01 | End: 2022-01-01 | Stop reason: HOSPADM

## 2022-01-01 RX ORDER — LIDOCAINE HYDROCHLORIDE 10 MG/ML
INJECTION, SOLUTION EPIDURAL; INFILTRATION; INTRACAUDAL; PERINEURAL AS NEEDED
Status: COMPLETED | OUTPATIENT
Start: 2022-01-01 | End: 2022-01-01

## 2022-01-01 RX ORDER — ALBUTEROL SULFATE 90 UG/1
2 AEROSOL, METERED RESPIRATORY (INHALATION) EVERY 6 HOURS PRN
Status: DISCONTINUED | OUTPATIENT
Start: 2022-01-01 | End: 2022-01-01

## 2022-01-01 RX ORDER — METRONIDAZOLE 500 MG/100ML
500 INJECTION, SOLUTION INTRAVENOUS EVERY 8 HOURS
Status: DISCONTINUED | OUTPATIENT
Start: 2022-01-01 | End: 2022-01-01

## 2022-01-01 RX ORDER — OXYCODONE HYDROCHLORIDE 5 MG/1
2.5 TABLET ORAL EVERY 6 HOURS PRN
Status: DISCONTINUED | OUTPATIENT
Start: 2022-01-01 | End: 2022-01-01

## 2022-01-01 RX ORDER — OXYCODONE HYDROCHLORIDE 5 MG/1
5 TABLET ORAL 2 TIMES DAILY PRN
Qty: 5 TABLET | Refills: 0 | Status: CANCELLED | OUTPATIENT
Start: 2022-01-01 | End: 2022-01-01

## 2022-01-01 RX ORDER — FUROSEMIDE 10 MG/ML
40 INJECTION INTRAMUSCULAR; INTRAVENOUS
Status: DISCONTINUED | OUTPATIENT
Start: 2022-01-01 | End: 2022-01-01

## 2022-01-01 RX ORDER — HEPARIN SODIUM 5000 [USP'U]/ML
5000 INJECTION, SOLUTION INTRAVENOUS; SUBCUTANEOUS EVERY 8 HOURS SCHEDULED
Status: DISCONTINUED | OUTPATIENT
Start: 2022-01-01 | End: 2022-01-01

## 2022-01-01 RX ORDER — CEFAZOLIN SODIUM 2 G/50ML
2000 SOLUTION INTRAVENOUS EVERY 8 HOURS
Status: DISCONTINUED | OUTPATIENT
Start: 2022-01-01 | End: 2022-01-01

## 2022-01-01 RX ORDER — AMIODARONE HYDROCHLORIDE 200 MG/1
200 TABLET ORAL
Status: DISCONTINUED | OUTPATIENT
Start: 2022-01-01 | End: 2022-01-01

## 2022-01-01 RX ORDER — METHYLPREDNISOLONE SODIUM SUCCINATE 40 MG/ML
20 INJECTION, POWDER, LYOPHILIZED, FOR SOLUTION INTRAMUSCULAR; INTRAVENOUS DAILY
Status: DISCONTINUED | OUTPATIENT
Start: 2022-01-01 | End: 2022-01-01

## 2022-01-01 RX ORDER — LANOLIN ALCOHOL/MO/W.PET/CERES
6 CREAM (GRAM) TOPICAL
Status: DISCONTINUED | OUTPATIENT
Start: 2022-01-01 | End: 2022-01-01

## 2022-01-01 RX ORDER — GUAIFENESIN 100 MG/5ML
400 SOLUTION ORAL 2 TIMES DAILY
Status: DISCONTINUED | OUTPATIENT
Start: 2022-01-01 | End: 2022-01-01

## 2022-01-01 RX ORDER — FERROUS SULFATE 325(65) MG
325 TABLET ORAL
Status: DISCONTINUED | OUTPATIENT
Start: 2022-01-01 | End: 2022-01-01

## 2022-01-01 RX ORDER — METHYLPREDNISOLONE SODIUM SUCCINATE 40 MG/ML
40 INJECTION, POWDER, LYOPHILIZED, FOR SOLUTION INTRAMUSCULAR; INTRAVENOUS ONCE
Status: COMPLETED | OUTPATIENT
Start: 2022-01-01 | End: 2022-01-01

## 2022-01-01 RX ORDER — ALBUTEROL SULFATE 90 UG/1
2 AEROSOL, METERED RESPIRATORY (INHALATION) EVERY 4 HOURS PRN
Status: DISCONTINUED | OUTPATIENT
Start: 2022-01-01 | End: 2022-01-01 | Stop reason: HOSPADM

## 2022-01-01 RX ORDER — OXYCODONE HYDROCHLORIDE 5 MG/1
2.5 TABLET ORAL EVERY 4 HOURS PRN
Status: DISCONTINUED | OUTPATIENT
Start: 2022-01-01 | End: 2022-01-01

## 2022-01-01 RX ORDER — LIDOCAINE HYDROCHLORIDE 20 MG/ML
15 SOLUTION OROPHARYNGEAL 4 TIMES DAILY PRN
Status: DISCONTINUED | OUTPATIENT
Start: 2022-01-01 | End: 2022-01-01

## 2022-01-01 RX ORDER — HYDROMORPHONE HYDROCHLORIDE 2 MG/1
2 TABLET ORAL ONCE
Status: COMPLETED | OUTPATIENT
Start: 2022-01-01 | End: 2022-01-01

## 2022-01-01 RX ORDER — HEPARIN SODIUM 10000 [USP'U]/100ML
3-20 INJECTION, SOLUTION INTRAVENOUS
Status: DISCONTINUED | OUTPATIENT
Start: 2022-01-01 | End: 2022-01-01

## 2022-01-01 RX ORDER — LANOLIN ALCOHOL/MO/W.PET/CERES
6 CREAM (GRAM) TOPICAL
Refills: 0 | Status: CANCELLED
Start: 2022-01-01

## 2022-01-01 RX ORDER — XYLITOL/YERBA SANTA
5 AEROSOL, SPRAY WITH PUMP (ML) MUCOUS MEMBRANE 4 TIMES DAILY PRN
Status: DISCONTINUED | OUTPATIENT
Start: 2022-01-01 | End: 2022-01-01 | Stop reason: HOSPADM

## 2022-01-01 RX ORDER — ALBUMIN (HUMAN) 12.5 G/50ML
25 SOLUTION INTRAVENOUS ONCE
Status: DISCONTINUED | OUTPATIENT
Start: 2022-01-01 | End: 2022-01-01

## 2022-01-01 RX ORDER — HEPARIN SODIUM 1000 [USP'U]/ML
1950 INJECTION, SOLUTION INTRAVENOUS; SUBCUTANEOUS
Status: DISCONTINUED | OUTPATIENT
Start: 2022-01-01 | End: 2022-01-01

## 2022-01-01 RX ORDER — DEXTROSE MONOHYDRATE 50 MG/ML
75 INJECTION, SOLUTION INTRAVENOUS CONTINUOUS
Status: DISCONTINUED | OUTPATIENT
Start: 2022-01-01 | End: 2022-01-01

## 2022-01-01 RX ORDER — POTASSIUM CHLORIDE 14.9 MG/ML
20 INJECTION INTRAVENOUS ONCE
Status: COMPLETED | OUTPATIENT
Start: 2022-01-01 | End: 2022-01-01

## 2022-01-01 RX ORDER — ALBUTEROL SULFATE 2.5 MG/3ML
2.5 SOLUTION RESPIRATORY (INHALATION) EVERY 6 HOURS PRN
Status: DISCONTINUED | OUTPATIENT
Start: 2022-01-01 | End: 2022-01-01

## 2022-01-01 RX ORDER — SPIRONOLACTONE 25 MG/1
25 TABLET ORAL DAILY
Status: DISCONTINUED | OUTPATIENT
Start: 2022-01-01 | End: 2022-01-01

## 2022-01-01 RX ORDER — METOPROLOL TARTRATE 5 MG/5ML
2.5 INJECTION INTRAVENOUS EVERY 4 HOURS
Status: DISCONTINUED | OUTPATIENT
Start: 2022-01-01 | End: 2022-01-01

## 2022-01-01 RX ORDER — CEFAZOLIN SODIUM 1 G/50ML
1000 SOLUTION INTRAVENOUS EVERY 12 HOURS
Status: DISCONTINUED | OUTPATIENT
Start: 2022-01-01 | End: 2022-01-01

## 2022-01-01 RX ORDER — METHYLPREDNISOLONE SODIUM SUCCINATE 40 MG/ML
40 INJECTION, POWDER, LYOPHILIZED, FOR SOLUTION INTRAMUSCULAR; INTRAVENOUS EVERY 8 HOURS SCHEDULED
Status: DISCONTINUED | OUTPATIENT
Start: 2022-01-01 | End: 2022-01-01

## 2022-01-01 RX ORDER — ALBUTEROL SULFATE 2.5 MG/3ML
SOLUTION RESPIRATORY (INHALATION)
Status: COMPLETED
Start: 2022-01-01 | End: 2022-01-01

## 2022-01-01 RX ORDER — OXYCODONE HYDROCHLORIDE 5 MG/1
5 TABLET ORAL EVERY 6 HOURS PRN
Status: DISCONTINUED | OUTPATIENT
Start: 2022-01-01 | End: 2022-01-01

## 2022-01-01 RX ORDER — DEXTROSE AND SODIUM CHLORIDE 5; .2 G/100ML; G/100ML
100 INJECTION, SOLUTION INTRAVENOUS CONTINUOUS
Status: DISPENSED | OUTPATIENT
Start: 2022-01-01 | End: 2022-01-01

## 2022-01-01 RX ORDER — ACETAMINOPHEN 650 MG/1
650 SUPPOSITORY RECTAL EVERY 6 HOURS PRN
Status: DISCONTINUED | OUTPATIENT
Start: 2022-01-01 | End: 2022-01-01 | Stop reason: HOSPADM

## 2022-01-01 RX ORDER — MIRTAZAPINE 15 MG/1
7.5 TABLET, FILM COATED ORAL
Status: DISCONTINUED | OUTPATIENT
Start: 2022-01-01 | End: 2022-01-01

## 2022-01-01 RX ORDER — VANCOMYCIN HYDROCHLORIDE 1 G/200ML
15 INJECTION, SOLUTION INTRAVENOUS EVERY 12 HOURS
Status: DISCONTINUED | OUTPATIENT
Start: 2022-01-01 | End: 2022-01-01

## 2022-01-01 RX ORDER — LORAZEPAM 2 MG/ML
0.5 INJECTION INTRAMUSCULAR EVERY 6 HOURS PRN
Status: DISCONTINUED | OUTPATIENT
Start: 2022-01-01 | End: 2022-01-01

## 2022-01-01 RX ORDER — INSULIN LISPRO 100 [IU]/ML
1-5 INJECTION, SOLUTION INTRAVENOUS; SUBCUTANEOUS EVERY 6 HOURS SCHEDULED
Status: DISCONTINUED | OUTPATIENT
Start: 2022-01-01 | End: 2022-01-01

## 2022-01-01 RX ORDER — METHYLPREDNISOLONE SODIUM SUCCINATE 40 MG/ML
40 INJECTION, POWDER, LYOPHILIZED, FOR SOLUTION INTRAMUSCULAR; INTRAVENOUS DAILY
Status: DISCONTINUED | OUTPATIENT
Start: 2022-01-01 | End: 2022-01-01

## 2022-01-01 RX ORDER — ACETAMINOPHEN 325 MG/1
650 TABLET ORAL EVERY 6 HOURS PRN
Status: DISCONTINUED | OUTPATIENT
Start: 2022-01-01 | End: 2022-01-01

## 2022-01-01 RX ORDER — LEVALBUTEROL 1.25 MG/.5ML
1.25 SOLUTION, CONCENTRATE RESPIRATORY (INHALATION)
Status: DISCONTINUED | OUTPATIENT
Start: 2022-01-01 | End: 2022-01-01

## 2022-01-01 RX ORDER — BENZONATATE 100 MG/1
200 CAPSULE ORAL 3 TIMES DAILY
Status: DISCONTINUED | OUTPATIENT
Start: 2022-01-01 | End: 2022-01-01

## 2022-01-01 RX ORDER — DEXTROSE MONOHYDRATE 50 MG/ML
35 INJECTION, SOLUTION INTRAVENOUS CONTINUOUS
Status: DISCONTINUED | OUTPATIENT
Start: 2022-01-01 | End: 2022-01-01

## 2022-01-01 RX ORDER — ALBUTEROL SULFATE 2.5 MG/3ML
2.5 SOLUTION RESPIRATORY (INHALATION) ONCE
Status: COMPLETED | OUTPATIENT
Start: 2022-01-01 | End: 2022-01-01

## 2022-01-01 RX ORDER — ALBUTEROL SULFATE 90 UG/1
2 AEROSOL, METERED RESPIRATORY (INHALATION) EVERY 4 HOURS PRN
Status: DISCONTINUED | OUTPATIENT
Start: 2022-01-01 | End: 2022-01-01

## 2022-01-01 RX ORDER — LORAZEPAM 2 MG/ML
0.5 INJECTION INTRAMUSCULAR
Status: DISCONTINUED | OUTPATIENT
Start: 2022-01-01 | End: 2022-01-01 | Stop reason: HOSPADM

## 2022-01-01 RX ORDER — FUROSEMIDE 20 MG/1
20 TABLET ORAL 2 TIMES DAILY
Status: DISCONTINUED | OUTPATIENT
Start: 2022-01-01 | End: 2022-01-01

## 2022-01-01 RX ORDER — INSULIN LISPRO 100 [IU]/ML
1-5 INJECTION, SOLUTION INTRAVENOUS; SUBCUTANEOUS
Status: DISCONTINUED | OUTPATIENT
Start: 2022-01-01 | End: 2022-01-01

## 2022-01-01 RX ORDER — ATORVASTATIN CALCIUM 40 MG/1
40 TABLET, FILM COATED ORAL
Status: DISCONTINUED | OUTPATIENT
Start: 2022-01-01 | End: 2022-01-01

## 2022-01-01 RX ORDER — ALBUMIN (HUMAN) 12.5 G/50ML
25 SOLUTION INTRAVENOUS ONCE
Status: COMPLETED | OUTPATIENT
Start: 2022-01-01 | End: 2022-01-01

## 2022-01-01 RX ORDER — METHYLPREDNISOLONE SODIUM SUCCINATE 40 MG/ML
30 INJECTION, POWDER, LYOPHILIZED, FOR SOLUTION INTRAMUSCULAR; INTRAVENOUS DAILY
Status: DISCONTINUED | OUTPATIENT
Start: 2022-01-01 | End: 2022-01-01

## 2022-01-01 RX ORDER — DEXTROSE MONOHYDRATE 50 MG/ML
60 INJECTION, SOLUTION INTRAVENOUS CONTINUOUS
Status: DISCONTINUED | OUTPATIENT
Start: 2022-01-01 | End: 2022-01-01

## 2022-01-01 RX ORDER — HYDROMORPHONE HCL/PF 1 MG/ML
0.5 SYRINGE (ML) INJECTION EVERY 6 HOURS PRN
Status: DISCONTINUED | OUTPATIENT
Start: 2022-01-01 | End: 2022-01-01

## 2022-01-01 RX ORDER — DEXTROSE AND SODIUM CHLORIDE 5; .45 G/100ML; G/100ML
50 INJECTION, SOLUTION INTRAVENOUS CONTINUOUS
Status: DISCONTINUED | OUTPATIENT
Start: 2022-01-01 | End: 2022-01-01

## 2022-01-01 RX ORDER — POTASSIUM CHLORIDE 20 MEQ/1
20 TABLET, EXTENDED RELEASE ORAL
Status: COMPLETED | OUTPATIENT
Start: 2022-01-01 | End: 2022-01-01

## 2022-01-01 RX ORDER — OXYCODONE HYDROCHLORIDE 5 MG/1
5 TABLET ORAL EVERY 4 HOURS PRN
Status: DISCONTINUED | OUTPATIENT
Start: 2022-01-01 | End: 2022-01-01

## 2022-01-01 RX ORDER — POTASSIUM CHLORIDE 20 MEQ/1
20 TABLET, EXTENDED RELEASE ORAL DAILY
Status: DISCONTINUED | OUTPATIENT
Start: 2022-01-01 | End: 2022-01-01

## 2022-01-01 RX ORDER — CEPHALEXIN 500 MG/1
500 CAPSULE ORAL EVERY 8 HOURS SCHEDULED
Status: COMPLETED | OUTPATIENT
Start: 2022-01-01 | End: 2022-01-01

## 2022-01-01 RX ORDER — PANTOPRAZOLE SODIUM 40 MG/10ML
40 INJECTION, POWDER, LYOPHILIZED, FOR SOLUTION INTRAVENOUS
Status: DISCONTINUED | OUTPATIENT
Start: 2022-01-01 | End: 2022-01-01 | Stop reason: HOSPADM

## 2022-01-01 RX ORDER — HEPARIN SODIUM 1000 [USP'U]/ML
3900 INJECTION, SOLUTION INTRAVENOUS; SUBCUTANEOUS
Status: DISCONTINUED | OUTPATIENT
Start: 2022-01-01 | End: 2022-01-01

## 2022-01-01 RX ORDER — POTASSIUM CHLORIDE 750 MG/1
10 TABLET, EXTENDED RELEASE ORAL DAILY
Status: DISCONTINUED | OUTPATIENT
Start: 2022-01-01 | End: 2022-01-01

## 2022-01-01 RX ORDER — CLOPIDOGREL BISULFATE 75 MG/1
75 TABLET ORAL DAILY
Status: DISCONTINUED | OUTPATIENT
Start: 2022-01-01 | End: 2022-01-01

## 2022-01-01 RX ORDER — MAGNESIUM SULFATE 1 G/100ML
1 INJECTION INTRAVENOUS ONCE
Status: COMPLETED | OUTPATIENT
Start: 2022-01-01 | End: 2022-01-01

## 2022-01-01 RX ORDER — DEXTROSE AND SODIUM CHLORIDE 5; .2 G/100ML; G/100ML
100 INJECTION, SOLUTION INTRAVENOUS CONTINUOUS
Status: DISCONTINUED | OUTPATIENT
Start: 2022-01-01 | End: 2022-01-01

## 2022-01-01 RX ORDER — TORSEMIDE 20 MG/1
40 TABLET ORAL DAILY
Status: DISCONTINUED | OUTPATIENT
Start: 2022-01-01 | End: 2022-01-01

## 2022-01-01 RX ORDER — ALBUMIN, HUMAN INJ 5% 5 %
12.5 SOLUTION INTRAVENOUS ONCE
Status: COMPLETED | OUTPATIENT
Start: 2022-01-01 | End: 2022-01-01

## 2022-01-01 RX ORDER — METHYLPREDNISOLONE SODIUM SUCCINATE 40 MG/ML
10 INJECTION, POWDER, LYOPHILIZED, FOR SOLUTION INTRAMUSCULAR; INTRAVENOUS DAILY
Status: DISCONTINUED | OUTPATIENT
Start: 2022-01-01 | End: 2022-01-01

## 2022-01-01 RX ORDER — NITROGLYCERIN 0.4 MG/1
0.4 TABLET SUBLINGUAL
Status: DISCONTINUED | OUTPATIENT
Start: 2022-01-01 | End: 2022-01-01

## 2022-01-01 RX ORDER — TAMSULOSIN HYDROCHLORIDE 0.4 MG/1
0.4 CAPSULE ORAL
Status: DISCONTINUED | OUTPATIENT
Start: 2022-01-01 | End: 2022-01-01

## 2022-01-01 RX ADMIN — METHYLPREDNISOLONE SODIUM SUCCINATE 40 MG: 40 INJECTION, POWDER, FOR SOLUTION INTRAMUSCULAR; INTRAVENOUS at 15:20

## 2022-01-01 RX ADMIN — HEPARIN SODIUM 1950 UNITS: 1000 INJECTION INTRAVENOUS; SUBCUTANEOUS at 02:11

## 2022-01-01 RX ADMIN — OXYCODONE HYDROCHLORIDE 5 MG: 5 TABLET ORAL at 12:37

## 2022-01-01 RX ADMIN — METOROPROLOL TARTRATE 2.5 MG: 5 INJECTION, SOLUTION INTRAVENOUS at 04:35

## 2022-01-01 RX ADMIN — METHYLPREDNISOLONE SODIUM SUCCINATE 30 MG: 40 INJECTION, POWDER, FOR SOLUTION INTRAMUSCULAR; INTRAVENOUS at 08:43

## 2022-01-01 RX ADMIN — TAMSULOSIN HYDROCHLORIDE 0.4 MG: 0.4 CAPSULE ORAL at 16:20

## 2022-01-01 RX ADMIN — TAMSULOSIN HYDROCHLORIDE 0.4 MG: 0.4 CAPSULE ORAL at 16:06

## 2022-01-01 RX ADMIN — IPRATROPIUM BROMIDE 0.5 MG: 0.5 SOLUTION RESPIRATORY (INHALATION) at 07:20

## 2022-01-01 RX ADMIN — ATORVASTATIN CALCIUM 40 MG: 40 TABLET, FILM COATED ORAL at 16:08

## 2022-01-01 RX ADMIN — LEVALBUTEROL HYDROCHLORIDE 1.25 MG: 1.25 SOLUTION, CONCENTRATE RESPIRATORY (INHALATION) at 19:29

## 2022-01-01 RX ADMIN — CEPHALEXIN 500 MG: 500 CAPSULE ORAL at 21:04

## 2022-01-01 RX ADMIN — HYDROMORPHONE HYDROCHLORIDE 0.2 MG: 0.2 INJECTION, SOLUTION INTRAMUSCULAR; INTRAVENOUS; SUBCUTANEOUS at 23:29

## 2022-01-01 RX ADMIN — MELATONIN TAB 3 MG 6 MG: 3 TAB at 21:53

## 2022-01-01 RX ADMIN — OXYCODONE HYDROCHLORIDE 5 MG: 5 TABLET ORAL at 05:59

## 2022-01-01 RX ADMIN — IPRATROPIUM BROMIDE 0.5 MG: 0.5 SOLUTION RESPIRATORY (INHALATION) at 07:48

## 2022-01-01 RX ADMIN — METOPROLOL TARTRATE 25 MG: 25 TABLET, FILM COATED ORAL at 08:23

## 2022-01-01 RX ADMIN — IPRATROPIUM BROMIDE 0.5 MG: 0.5 SOLUTION RESPIRATORY (INHALATION) at 19:29

## 2022-01-01 RX ADMIN — AMIODARONE HYDROCHLORIDE 200 MG: 200 TABLET ORAL at 09:15

## 2022-01-01 RX ADMIN — AMIODARONE HYDROCHLORIDE 200 MG: 200 TABLET ORAL at 07:53

## 2022-01-01 RX ADMIN — METHYLPREDNISOLONE SODIUM SUCCINATE 40 MG: 40 INJECTION, POWDER, FOR SOLUTION INTRAMUSCULAR; INTRAVENOUS at 17:04

## 2022-01-01 RX ADMIN — GUAIFENESIN 400 MG: 200 SOLUTION ORAL at 21:53

## 2022-01-01 RX ADMIN — SODIUM CHLORIDE, SODIUM GLUCONATE, SODIUM ACETATE, POTASSIUM CHLORIDE, MAGNESIUM CHLORIDE, SODIUM PHOSPHATE, DIBASIC, AND POTASSIUM PHOSPHATE 75 ML/HR: .53; .5; .37; .037; .03; .012; .00082 INJECTION, SOLUTION INTRAVENOUS at 20:55

## 2022-01-01 RX ADMIN — MELATONIN TAB 3 MG 6 MG: 3 TAB at 21:31

## 2022-01-01 RX ADMIN — OXYCODONE HYDROCHLORIDE 2.5 MG: 5 TABLET ORAL at 08:26

## 2022-01-01 RX ADMIN — GUAIFENESIN 600 MG: 600 TABLET, EXTENDED RELEASE ORAL at 09:13

## 2022-01-01 RX ADMIN — AMIODARONE HYDROCHLORIDE 200 MG: 200 TABLET ORAL at 08:31

## 2022-01-01 RX ADMIN — CEPHALEXIN 500 MG: 500 CAPSULE ORAL at 21:09

## 2022-01-01 RX ADMIN — MELATONIN TAB 3 MG 6 MG: 3 TAB at 22:09

## 2022-01-01 RX ADMIN — LEVALBUTEROL HYDROCHLORIDE 1.25 MG: 1.25 SOLUTION, CONCENTRATE RESPIRATORY (INHALATION) at 14:12

## 2022-01-01 RX ADMIN — ALBUTEROL SULFATE 2 PUFF: 90 AEROSOL, METERED RESPIRATORY (INHALATION) at 10:00

## 2022-01-01 RX ADMIN — CEPHALEXIN 500 MG: 500 CAPSULE ORAL at 06:12

## 2022-01-01 RX ADMIN — MELATONIN TAB 3 MG 6 MG: 3 TAB at 21:24

## 2022-01-01 RX ADMIN — FUROSEMIDE 40 MG: 10 INJECTION, SOLUTION INTRAMUSCULAR; INTRAVENOUS at 17:21

## 2022-01-01 RX ADMIN — CLOPIDOGREL BISULFATE 75 MG: 75 TABLET, FILM COATED ORAL at 09:51

## 2022-01-01 RX ADMIN — METHYLPREDNISOLONE SODIUM SUCCINATE 30 MG: 40 INJECTION, POWDER, FOR SOLUTION INTRAMUSCULAR; INTRAVENOUS at 08:34

## 2022-01-01 RX ADMIN — BENZONATATE 200 MG: 100 CAPSULE ORAL at 16:16

## 2022-01-01 RX ADMIN — CEPHALEXIN 500 MG: 500 CAPSULE ORAL at 05:42

## 2022-01-01 RX ADMIN — CLOPIDOGREL BISULFATE 75 MG: 75 TABLET, FILM COATED ORAL at 13:05

## 2022-01-01 RX ADMIN — METOROPROLOL TARTRATE 2.5 MG: 5 INJECTION, SOLUTION INTRAVENOUS at 10:19

## 2022-01-01 RX ADMIN — METHYLPREDNISOLONE SODIUM SUCCINATE 30 MG: 40 INJECTION, POWDER, FOR SOLUTION INTRAMUSCULAR; INTRAVENOUS at 08:23

## 2022-01-01 RX ADMIN — METOPROLOL TARTRATE 25 MG: 25 TABLET, FILM COATED ORAL at 21:08

## 2022-01-01 RX ADMIN — CEFAZOLIN SODIUM 1000 MG: 1 SOLUTION INTRAVENOUS at 07:53

## 2022-01-01 RX ADMIN — FERROUS SULFATE TAB 325 MG (65 MG ELEMENTAL FE) 325 MG: 325 (65 FE) TAB at 08:42

## 2022-01-01 RX ADMIN — ACETAMINOPHEN 650 MG: 325 TABLET ORAL at 06:12

## 2022-01-01 RX ADMIN — LORAZEPAM 0.5 MG: 2 INJECTION INTRAMUSCULAR; INTRAVENOUS at 21:43

## 2022-01-01 RX ADMIN — METOPROLOL TARTRATE 25 MG: 25 TABLET, FILM COATED ORAL at 08:32

## 2022-01-01 RX ADMIN — ACETAMINOPHEN 650 MG: 650 SUPPOSITORY RECTAL at 23:36

## 2022-01-01 RX ADMIN — FUROSEMIDE 40 MG: 10 INJECTION, SOLUTION INTRAMUSCULAR; INTRAVENOUS at 16:20

## 2022-01-01 RX ADMIN — METOPROLOL TARTRATE 25 MG: 25 TABLET, FILM COATED ORAL at 21:19

## 2022-01-01 RX ADMIN — METOPROLOL TARTRATE 25 MG: 25 TABLET, FILM COATED ORAL at 09:50

## 2022-01-01 RX ADMIN — AMIODARONE HYDROCHLORIDE 200 MG: 200 TABLET ORAL at 08:05

## 2022-01-01 RX ADMIN — ATORVASTATIN CALCIUM 40 MG: 40 TABLET, FILM COATED ORAL at 17:17

## 2022-01-01 RX ADMIN — ALBUTEROL SULFATE 2.5 MG: 2.5 SOLUTION RESPIRATORY (INHALATION) at 09:49

## 2022-01-01 RX ADMIN — GUAIFENESIN 400 MG: 200 SOLUTION ORAL at 09:51

## 2022-01-01 RX ADMIN — APIXABAN 2.5 MG: 2.5 TABLET, FILM COATED ORAL at 18:19

## 2022-01-01 RX ADMIN — OXYCODONE HYDROCHLORIDE 5 MG: 5 TABLET ORAL at 13:24

## 2022-01-01 RX ADMIN — METOROPROLOL TARTRATE 2.5 MG: 5 INJECTION, SOLUTION INTRAVENOUS at 10:16

## 2022-01-01 RX ADMIN — METOPROLOL TARTRATE 25 MG: 25 TABLET, FILM COATED ORAL at 21:43

## 2022-01-01 RX ADMIN — APIXABAN 2.5 MG: 2.5 TABLET, FILM COATED ORAL at 17:10

## 2022-01-01 RX ADMIN — GUAIFENESIN 600 MG: 600 TABLET, EXTENDED RELEASE ORAL at 08:53

## 2022-01-01 RX ADMIN — HYDROMORPHONE HYDROCHLORIDE 0.3 MG: 1 INJECTION, SOLUTION INTRAMUSCULAR; INTRAVENOUS; SUBCUTANEOUS at 02:40

## 2022-01-01 RX ADMIN — INSULIN LISPRO 2 UNITS: 100 INJECTION, SOLUTION INTRAVENOUS; SUBCUTANEOUS at 18:13

## 2022-01-01 RX ADMIN — ATORVASTATIN CALCIUM 40 MG: 40 TABLET, FILM COATED ORAL at 17:01

## 2022-01-01 RX ADMIN — METOROPROLOL TARTRATE 2.5 MG: 5 INJECTION, SOLUTION INTRAVENOUS at 17:17

## 2022-01-01 RX ADMIN — CEFEPIME 1000 MG: 1 INJECTION, POWDER, FOR SOLUTION INTRAMUSCULAR; INTRAVENOUS at 02:13

## 2022-01-01 RX ADMIN — FUROSEMIDE 40 MG: 10 INJECTION, SOLUTION INTRAMUSCULAR; INTRAVENOUS at 09:12

## 2022-01-01 RX ADMIN — INSULIN LISPRO 1 UNITS: 100 INJECTION, SOLUTION INTRAVENOUS; SUBCUTANEOUS at 17:35

## 2022-01-01 RX ADMIN — METOPROLOL TARTRATE 25 MG: 25 TABLET, FILM COATED ORAL at 20:30

## 2022-01-01 RX ADMIN — APIXABAN 2.5 MG: 2.5 TABLET, FILM COATED ORAL at 13:05

## 2022-01-01 RX ADMIN — METOPROLOL TARTRATE 25 MG: 25 TABLET, FILM COATED ORAL at 08:42

## 2022-01-01 RX ADMIN — IPRATROPIUM BROMIDE 0.5 MG: 0.5 SOLUTION RESPIRATORY (INHALATION) at 20:07

## 2022-01-01 RX ADMIN — AMIODARONE HYDROCHLORIDE 200 MG: 200 TABLET ORAL at 08:38

## 2022-01-01 RX ADMIN — SODIUM CHLORIDE 500 ML: 0.9 INJECTION, SOLUTION INTRAVENOUS at 23:29

## 2022-01-01 RX ADMIN — LORAZEPAM 0.5 MG: 2 INJECTION INTRAMUSCULAR; INTRAVENOUS at 20:40

## 2022-01-01 RX ADMIN — FUROSEMIDE 40 MG: 10 INJECTION, SOLUTION INTRAMUSCULAR; INTRAVENOUS at 16:16

## 2022-01-01 RX ADMIN — ATORVASTATIN CALCIUM 40 MG: 40 TABLET, FILM COATED ORAL at 16:20

## 2022-01-01 RX ADMIN — AMIODARONE HYDROCHLORIDE 200 MG: 200 TABLET ORAL at 08:19

## 2022-01-01 RX ADMIN — BENZONATATE 200 MG: 100 CAPSULE ORAL at 09:55

## 2022-01-01 RX ADMIN — APIXABAN 2.5 MG: 2.5 TABLET, FILM COATED ORAL at 10:29

## 2022-01-01 RX ADMIN — FUROSEMIDE 20 MG: 20 TABLET ORAL at 17:51

## 2022-01-01 RX ADMIN — PANTOPRAZOLE SODIUM 40 MG: 40 INJECTION, POWDER, FOR SOLUTION INTRAVENOUS at 08:34

## 2022-01-01 RX ADMIN — MELATONIN TAB 3 MG 6 MG: 3 TAB at 21:44

## 2022-01-01 RX ADMIN — DEXTROSE AND SODIUM CHLORIDE 50 ML/HR: 5; .45 INJECTION, SOLUTION INTRAVENOUS at 18:02

## 2022-01-01 RX ADMIN — GUAIFENESIN 400 MG: 200 SOLUTION ORAL at 08:05

## 2022-01-01 RX ADMIN — GUAIFENESIN 600 MG: 600 TABLET, EXTENDED RELEASE ORAL at 21:08

## 2022-01-01 RX ADMIN — METHYLPREDNISOLONE SODIUM SUCCINATE 20 MG: 40 INJECTION, POWDER, FOR SOLUTION INTRAMUSCULAR; INTRAVENOUS at 10:16

## 2022-01-01 RX ADMIN — METOROPROLOL TARTRATE 2.5 MG: 5 INJECTION, SOLUTION INTRAVENOUS at 18:28

## 2022-01-01 RX ADMIN — METOROPROLOL TARTRATE 2.5 MG: 5 INJECTION, SOLUTION INTRAVENOUS at 19:53

## 2022-01-01 RX ADMIN — CLOPIDOGREL BISULFATE 75 MG: 75 TABLET, FILM COATED ORAL at 08:58

## 2022-01-01 RX ADMIN — POTASSIUM CHLORIDE 20 MEQ: 1500 TABLET, EXTENDED RELEASE ORAL at 17:01

## 2022-01-01 RX ADMIN — BENZONATATE 200 MG: 100 CAPSULE ORAL at 17:01

## 2022-01-01 RX ADMIN — DEXTROSE 1000 ML: 5 SOLUTION INTRAVENOUS at 12:35

## 2022-01-01 RX ADMIN — ALBUMIN (HUMAN) 25 G: 0.25 INJECTION, SOLUTION INTRAVENOUS at 12:50

## 2022-01-01 RX ADMIN — ATORVASTATIN CALCIUM 40 MG: 40 TABLET, FILM COATED ORAL at 17:21

## 2022-01-01 RX ADMIN — METHYLPREDNISOLONE SODIUM SUCCINATE 20 MG: 40 INJECTION, POWDER, FOR SOLUTION INTRAMUSCULAR; INTRAVENOUS at 08:08

## 2022-01-01 RX ADMIN — HEPARIN SODIUM 10 UNITS/KG/HR: 10000 INJECTION, SOLUTION INTRAVENOUS at 22:01

## 2022-01-01 RX ADMIN — CEFEPIME 1000 MG: 1 INJECTION, POWDER, FOR SOLUTION INTRAMUSCULAR; INTRAVENOUS at 01:48

## 2022-01-01 RX ADMIN — CLOPIDOGREL BISULFATE 75 MG: 75 TABLET, FILM COATED ORAL at 08:42

## 2022-01-01 RX ADMIN — METOPROLOL TARTRATE 25 MG: 25 TABLET, FILM COATED ORAL at 08:26

## 2022-01-01 RX ADMIN — DEXTROSE 75 ML/HR: 5 SOLUTION INTRAVENOUS at 17:36

## 2022-01-01 RX ADMIN — AMIODARONE HYDROCHLORIDE 200 MG: 200 TABLET ORAL at 10:29

## 2022-01-01 RX ADMIN — OXYCODONE HYDROCHLORIDE 5 MG: 5 TABLET ORAL at 12:50

## 2022-01-01 RX ADMIN — CEPHALEXIN 500 MG: 500 CAPSULE ORAL at 13:53

## 2022-01-01 RX ADMIN — OXYCODONE HYDROCHLORIDE 5 MG: 5 TABLET ORAL at 21:23

## 2022-01-01 RX ADMIN — BENZONATATE 200 MG: 100 CAPSULE ORAL at 09:13

## 2022-01-01 RX ADMIN — METOPROLOL TARTRATE 25 MG: 25 TABLET, FILM COATED ORAL at 09:15

## 2022-01-01 RX ADMIN — APIXABAN 2.5 MG: 2.5 TABLET, FILM COATED ORAL at 17:01

## 2022-01-01 RX ADMIN — METRONIDAZOLE 500 MG: 500 INJECTION, SOLUTION INTRAVENOUS at 15:19

## 2022-01-01 RX ADMIN — BENZONATATE 200 MG: 100 CAPSULE ORAL at 08:53

## 2022-01-01 RX ADMIN — OXYCODONE HYDROCHLORIDE 5 MG: 5 TABLET ORAL at 06:16

## 2022-01-01 RX ADMIN — METOROPROLOL TARTRATE 2.5 MG: 5 INJECTION, SOLUTION INTRAVENOUS at 17:03

## 2022-01-01 RX ADMIN — POTASSIUM CHLORIDE 10 MEQ: 750 TABLET, EXTENDED RELEASE ORAL at 11:39

## 2022-01-01 RX ADMIN — METOROPROLOL TARTRATE 2.5 MG: 5 INJECTION, SOLUTION INTRAVENOUS at 21:55

## 2022-01-01 RX ADMIN — FERROUS SULFATE TAB 325 MG (65 MG ELEMENTAL FE) 325 MG: 325 (65 FE) TAB at 08:05

## 2022-01-01 RX ADMIN — METOROPROLOL TARTRATE 2.5 MG: 5 INJECTION, SOLUTION INTRAVENOUS at 06:30

## 2022-01-01 RX ADMIN — MIRTAZAPINE 7.5 MG: 15 TABLET, FILM COATED ORAL at 20:30

## 2022-01-01 RX ADMIN — BENZONATATE 200 MG: 100 CAPSULE ORAL at 17:21

## 2022-01-01 RX ADMIN — SODIUM CHLORIDE, SODIUM GLUCONATE, SODIUM ACETATE, POTASSIUM CHLORIDE, MAGNESIUM CHLORIDE, SODIUM PHOSPHATE, DIBASIC, AND POTASSIUM PHOSPHATE 75 ML/HR: .53; .5; .37; .037; .03; .012; .00082 INJECTION, SOLUTION INTRAVENOUS at 13:07

## 2022-01-01 RX ADMIN — FERROUS SULFATE TAB 325 MG (65 MG ELEMENTAL FE) 325 MG: 325 (65 FE) TAB at 09:15

## 2022-01-01 RX ADMIN — HYDROMORPHONE HYDROCHLORIDE 0.2 MG: 0.2 INJECTION, SOLUTION INTRAMUSCULAR; INTRAVENOUS; SUBCUTANEOUS at 00:39

## 2022-01-01 RX ADMIN — OXYCODONE HYDROCHLORIDE 5 MG: 5 TABLET ORAL at 21:04

## 2022-01-01 RX ADMIN — CEPHALEXIN 500 MG: 500 CAPSULE ORAL at 06:33

## 2022-01-01 RX ADMIN — SPIRONOLACTONE 25 MG: 25 TABLET ORAL at 08:53

## 2022-01-01 RX ADMIN — BENZONATATE 200 MG: 100 CAPSULE ORAL at 16:20

## 2022-01-01 RX ADMIN — BENZONATATE 200 MG: 100 CAPSULE ORAL at 09:15

## 2022-01-01 RX ADMIN — SPIRONOLACTONE 25 MG: 25 TABLET ORAL at 09:12

## 2022-01-01 RX ADMIN — FUROSEMIDE 40 MG: 10 INJECTION, SOLUTION INTRAMUSCULAR; INTRAVENOUS at 15:20

## 2022-01-01 RX ADMIN — AMIODARONE HYDROCHLORIDE 200 MG: 200 TABLET ORAL at 09:06

## 2022-01-01 RX ADMIN — CLOPIDOGREL BISULFATE 75 MG: 75 TABLET, FILM COATED ORAL at 08:07

## 2022-01-01 RX ADMIN — CLOPIDOGREL BISULFATE 75 MG: 75 TABLET, FILM COATED ORAL at 08:05

## 2022-01-01 RX ADMIN — LEVALBUTEROL HYDROCHLORIDE 1.25 MG: 1.25 SOLUTION, CONCENTRATE RESPIRATORY (INHALATION) at 16:45

## 2022-01-01 RX ADMIN — CEFTRIAXONE SODIUM 2000 MG: 10 INJECTION, POWDER, FOR SOLUTION INTRAVENOUS at 10:55

## 2022-01-01 RX ADMIN — METOROPROLOL TARTRATE 2.5 MG: 5 INJECTION, SOLUTION INTRAVENOUS at 08:05

## 2022-01-01 RX ADMIN — IPRATROPIUM BROMIDE 0.5 MG: 0.5 SOLUTION RESPIRATORY (INHALATION) at 14:12

## 2022-01-01 RX ADMIN — IPRATROPIUM BROMIDE 0.5 MG: 0.5 SOLUTION RESPIRATORY (INHALATION) at 14:45

## 2022-01-01 RX ADMIN — GUAIFENESIN 400 MG: 200 SOLUTION ORAL at 20:30

## 2022-01-01 RX ADMIN — CEPHALEXIN 500 MG: 500 CAPSULE ORAL at 21:57

## 2022-01-01 RX ADMIN — OXYCODONE HYDROCHLORIDE 5 MG: 5 TABLET ORAL at 23:52

## 2022-01-01 RX ADMIN — DEXTROSE AND SODIUM CHLORIDE 40 ML/HR: 5; .45 INJECTION, SOLUTION INTRAVENOUS at 18:29

## 2022-01-01 RX ADMIN — AMIODARONE HYDROCHLORIDE 200 MG: 200 TABLET ORAL at 09:55

## 2022-01-01 RX ADMIN — DEXTROSE 35 ML/HR: 5 SOLUTION INTRAVENOUS at 09:30

## 2022-01-01 RX ADMIN — SPIRONOLACTONE 25 MG: 25 TABLET ORAL at 08:26

## 2022-01-01 RX ADMIN — NOREPINEPHRINE BITARTRATE 8 MCG/MIN: 1 SOLUTION INTRAVENOUS at 07:29

## 2022-01-01 RX ADMIN — HEPARIN SODIUM 5000 UNITS: 5000 INJECTION INTRAVENOUS; SUBCUTANEOUS at 14:52

## 2022-01-01 RX ADMIN — TORSEMIDE 40 MG: 20 TABLET ORAL at 08:31

## 2022-01-01 RX ADMIN — METOROPROLOL TARTRATE 2.5 MG: 5 INJECTION, SOLUTION INTRAVENOUS at 17:50

## 2022-01-01 RX ADMIN — APIXABAN 2.5 MG: 2.5 TABLET, FILM COATED ORAL at 18:01

## 2022-01-01 RX ADMIN — HEPARIN SODIUM 12 UNITS/KG/HR: 10000 INJECTION, SOLUTION INTRAVENOUS at 18:29

## 2022-01-01 RX ADMIN — GUAIFENESIN 400 MG: 200 SOLUTION ORAL at 21:43

## 2022-01-01 RX ADMIN — BENZONATATE 200 MG: 100 CAPSULE ORAL at 21:07

## 2022-01-01 RX ADMIN — METOROPROLOL TARTRATE 2.5 MG: 5 INJECTION, SOLUTION INTRAVENOUS at 03:30

## 2022-01-01 RX ADMIN — CEPHALEXIN 500 MG: 500 CAPSULE ORAL at 21:05

## 2022-01-01 RX ADMIN — SODIUM CHLORIDE 200 MG: 900 INJECTION, SOLUTION INTRAVENOUS at 09:06

## 2022-01-01 RX ADMIN — APIXABAN 2.5 MG: 2.5 TABLET, FILM COATED ORAL at 18:29

## 2022-01-01 RX ADMIN — LEVALBUTEROL HYDROCHLORIDE 1.25 MG: 1.25 SOLUTION, CONCENTRATE RESPIRATORY (INHALATION) at 19:54

## 2022-01-01 RX ADMIN — HEPARIN SODIUM 5000 UNITS: 5000 INJECTION INTRAVENOUS; SUBCUTANEOUS at 21:15

## 2022-01-01 RX ADMIN — CEPHALEXIN 500 MG: 500 CAPSULE ORAL at 05:27

## 2022-01-01 RX ADMIN — HEPARIN SODIUM 3900 UNITS: 1000 INJECTION INTRAVENOUS; SUBCUTANEOUS at 03:31

## 2022-01-01 RX ADMIN — ACETAMINOPHEN 650 MG: 650 SUPPOSITORY RECTAL at 05:15

## 2022-01-01 RX ADMIN — METOROPROLOL TARTRATE 2.5 MG: 5 INJECTION, SOLUTION INTRAVENOUS at 11:17

## 2022-01-01 RX ADMIN — CLOPIDOGREL BISULFATE 75 MG: 75 TABLET, FILM COATED ORAL at 09:13

## 2022-01-01 RX ADMIN — CEFEPIME 1000 MG: 1 INJECTION, POWDER, FOR SOLUTION INTRAMUSCULAR; INTRAVENOUS at 00:54

## 2022-01-01 RX ADMIN — TAMSULOSIN HYDROCHLORIDE 0.4 MG: 0.4 CAPSULE ORAL at 17:10

## 2022-01-01 RX ADMIN — METOPROLOL TARTRATE 25 MG: 25 TABLET, FILM COATED ORAL at 21:45

## 2022-01-01 RX ADMIN — METOPROLOL TARTRATE 25 MG: 25 TABLET, FILM COATED ORAL at 21:55

## 2022-01-01 RX ADMIN — METOROPROLOL TARTRATE 2.5 MG: 5 INJECTION, SOLUTION INTRAVENOUS at 02:13

## 2022-01-01 RX ADMIN — GUAIFENESIN 400 MG: 200 SOLUTION ORAL at 08:44

## 2022-01-01 RX ADMIN — HEPARIN SODIUM 5000 UNITS: 5000 INJECTION INTRAVENOUS; SUBCUTANEOUS at 09:06

## 2022-01-01 RX ADMIN — CLOPIDOGREL BISULFATE 75 MG: 75 TABLET, FILM COATED ORAL at 08:23

## 2022-01-01 RX ADMIN — DEXTROSE 60 ML/HR: 5 SOLUTION INTRAVENOUS at 10:28

## 2022-01-01 RX ADMIN — ATORVASTATIN CALCIUM 40 MG: 40 TABLET, FILM COATED ORAL at 18:13

## 2022-01-01 RX ADMIN — POTASSIUM CHLORIDE 20 MEQ: 14.9 INJECTION, SOLUTION INTRAVENOUS at 09:26

## 2022-01-01 RX ADMIN — METHYLPREDNISOLONE SODIUM SUCCINATE 40 MG: 40 INJECTION, POWDER, FOR SOLUTION INTRAMUSCULAR; INTRAVENOUS at 11:30

## 2022-01-01 RX ADMIN — APIXABAN 2.5 MG: 2.5 TABLET, FILM COATED ORAL at 08:06

## 2022-01-01 RX ADMIN — METOPROLOL TARTRATE 25 MG: 25 TABLET, FILM COATED ORAL at 09:55

## 2022-01-01 RX ADMIN — HEPARIN SODIUM 5000 UNITS: 5000 INJECTION INTRAVENOUS; SUBCUTANEOUS at 05:39

## 2022-01-01 RX ADMIN — HEPARIN SODIUM 5000 UNITS: 5000 INJECTION INTRAVENOUS; SUBCUTANEOUS at 21:19

## 2022-01-01 RX ADMIN — TAMSULOSIN HYDROCHLORIDE 0.4 MG: 0.4 CAPSULE ORAL at 16:00

## 2022-01-01 RX ADMIN — APIXABAN 2.5 MG: 2.5 TABLET, FILM COATED ORAL at 17:34

## 2022-01-01 RX ADMIN — METOROPROLOL TARTRATE 2.5 MG: 5 INJECTION, SOLUTION INTRAVENOUS at 02:08

## 2022-01-01 RX ADMIN — APIXABAN 2.5 MG: 2.5 TABLET, FILM COATED ORAL at 08:58

## 2022-01-01 RX ADMIN — CLOPIDOGREL BISULFATE 75 MG: 75 TABLET, FILM COATED ORAL at 08:53

## 2022-01-01 RX ADMIN — AMIODARONE HYDROCHLORIDE 200 MG: 200 TABLET ORAL at 06:41

## 2022-01-01 RX ADMIN — APIXABAN 2.5 MG: 2.5 TABLET, FILM COATED ORAL at 08:24

## 2022-01-01 RX ADMIN — APIXABAN 2.5 MG: 2.5 TABLET, FILM COATED ORAL at 09:55

## 2022-01-01 RX ADMIN — CEFAZOLIN SODIUM 1000 MG: 1 SOLUTION INTRAVENOUS at 18:45

## 2022-01-01 RX ADMIN — PANTOPRAZOLE SODIUM 40 MG: 40 INJECTION, POWDER, FOR SOLUTION INTRAVENOUS at 08:08

## 2022-01-01 RX ADMIN — FERROUS SULFATE TAB 325 MG (65 MG ELEMENTAL FE) 325 MG: 325 (65 FE) TAB at 10:29

## 2022-01-01 RX ADMIN — METOROPROLOL TARTRATE 2.5 MG: 5 INJECTION, SOLUTION INTRAVENOUS at 13:49

## 2022-01-01 RX ADMIN — METHYLPREDNISOLONE SODIUM SUCCINATE 20 MG: 40 INJECTION, POWDER, FOR SOLUTION INTRAMUSCULAR; INTRAVENOUS at 12:21

## 2022-01-01 RX ADMIN — FERROUS SULFATE TAB 325 MG (65 MG ELEMENTAL FE) 325 MG: 325 (65 FE) TAB at 08:32

## 2022-01-01 RX ADMIN — METOPROLOL TARTRATE 25 MG: 25 TABLET, FILM COATED ORAL at 20:57

## 2022-01-01 RX ADMIN — TAMSULOSIN HYDROCHLORIDE 0.4 MG: 0.4 CAPSULE ORAL at 15:56

## 2022-01-01 RX ADMIN — DEXTROSE AND SODIUM CHLORIDE 100 ML/HR: 5; .2 INJECTION, SOLUTION INTRAVENOUS at 13:41

## 2022-01-01 RX ADMIN — CLOPIDOGREL BISULFATE 75 MG: 75 TABLET, FILM COATED ORAL at 08:32

## 2022-01-01 RX ADMIN — VANCOMYCIN HYDROCHLORIDE 750 MG: 750 INJECTION, SOLUTION INTRAVENOUS at 11:27

## 2022-01-01 RX ADMIN — APIXABAN 2.5 MG: 2.5 TABLET, FILM COATED ORAL at 08:38

## 2022-01-01 RX ADMIN — METOROPROLOL TARTRATE 2.5 MG: 5 INJECTION, SOLUTION INTRAVENOUS at 09:27

## 2022-01-01 RX ADMIN — AMIODARONE HYDROCHLORIDE 200 MG: 200 TABLET ORAL at 09:51

## 2022-01-01 RX ADMIN — TAMSULOSIN HYDROCHLORIDE 0.4 MG: 0.4 CAPSULE ORAL at 17:01

## 2022-01-01 RX ADMIN — MELATONIN TAB 3 MG 6 MG: 3 TAB at 21:57

## 2022-01-01 RX ADMIN — CEPHALEXIN 500 MG: 500 CAPSULE ORAL at 05:59

## 2022-01-01 RX ADMIN — TORSEMIDE 40 MG: 20 TABLET ORAL at 08:07

## 2022-01-01 RX ADMIN — FERROUS SULFATE TAB 325 MG (65 MG ELEMENTAL FE) 325 MG: 325 (65 FE) TAB at 08:19

## 2022-01-01 RX ADMIN — APIXABAN 2.5 MG: 2.5 TABLET, FILM COATED ORAL at 17:51

## 2022-01-01 RX ADMIN — ATORVASTATIN CALCIUM 40 MG: 40 TABLET, FILM COATED ORAL at 16:16

## 2022-01-01 RX ADMIN — ATORVASTATIN CALCIUM 40 MG: 40 TABLET, FILM COATED ORAL at 17:34

## 2022-01-01 RX ADMIN — LEVALBUTEROL HYDROCHLORIDE 1.25 MG: 1.25 SOLUTION, CONCENTRATE RESPIRATORY (INHALATION) at 20:07

## 2022-01-01 RX ADMIN — ATORVASTATIN CALCIUM 40 MG: 40 TABLET, FILM COATED ORAL at 17:10

## 2022-01-01 RX ADMIN — APIXABAN 2.5 MG: 2.5 TABLET, FILM COATED ORAL at 08:53

## 2022-01-01 RX ADMIN — APIXABAN 2.5 MG: 2.5 TABLET, FILM COATED ORAL at 08:05

## 2022-01-01 RX ADMIN — HEPARIN SODIUM 14 UNITS/KG/HR: 10000 INJECTION, SOLUTION INTRAVENOUS at 05:16

## 2022-01-01 RX ADMIN — METOROPROLOL TARTRATE 2.5 MG: 5 INJECTION, SOLUTION INTRAVENOUS at 15:11

## 2022-01-01 RX ADMIN — CEFEPIME 1000 MG: 1 INJECTION, POWDER, FOR SOLUTION INTRAMUSCULAR; INTRAVENOUS at 01:51

## 2022-01-01 RX ADMIN — HYDROMORPHONE HYDROCHLORIDE 0.3 MG: 1 INJECTION, SOLUTION INTRAMUSCULAR; INTRAVENOUS; SUBCUTANEOUS at 06:02

## 2022-01-01 RX ADMIN — METOPROLOL TARTRATE 25 MG: 25 TABLET, FILM COATED ORAL at 08:07

## 2022-01-01 RX ADMIN — METOPROLOL TARTRATE 25 MG: 25 TABLET, FILM COATED ORAL at 21:06

## 2022-01-01 RX ADMIN — FERROUS SULFATE TAB 325 MG (65 MG ELEMENTAL FE) 325 MG: 325 (65 FE) TAB at 08:23

## 2022-01-01 RX ADMIN — TAMSULOSIN HYDROCHLORIDE 0.4 MG: 0.4 CAPSULE ORAL at 17:40

## 2022-01-01 RX ADMIN — METHYLPREDNISOLONE SODIUM SUCCINATE 10 MG: 40 INJECTION, POWDER, FOR SOLUTION INTRAMUSCULAR; INTRAVENOUS at 09:50

## 2022-01-01 RX ADMIN — APIXABAN 2.5 MG: 2.5 TABLET, FILM COATED ORAL at 18:44

## 2022-01-01 RX ADMIN — FERROUS SULFATE TAB 325 MG (65 MG ELEMENTAL FE) 325 MG: 325 (65 FE) TAB at 09:51

## 2022-01-01 RX ADMIN — POTASSIUM CHLORIDE 20 MEQ: 1500 TABLET, EXTENDED RELEASE ORAL at 11:37

## 2022-01-01 RX ADMIN — SPIRONOLACTONE 25 MG: 25 TABLET ORAL at 08:06

## 2022-01-01 RX ADMIN — VANCOMYCIN HYDROCHLORIDE 1750 MG: 10 INJECTION, POWDER, LYOPHILIZED, FOR SOLUTION INTRAVENOUS at 00:55

## 2022-01-01 RX ADMIN — CEPHALEXIN 500 MG: 500 CAPSULE ORAL at 21:06

## 2022-01-01 RX ADMIN — OXYCODONE HYDROCHLORIDE 5 MG: 5 TABLET ORAL at 17:35

## 2022-01-01 RX ADMIN — CEFEPIME 1000 MG: 1 INJECTION, POWDER, FOR SOLUTION INTRAMUSCULAR; INTRAVENOUS at 00:39

## 2022-01-01 RX ADMIN — METOPROLOL TARTRATE 25 MG: 25 TABLET, FILM COATED ORAL at 20:47

## 2022-01-01 RX ADMIN — TAMSULOSIN HYDROCHLORIDE 0.4 MG: 0.4 CAPSULE ORAL at 17:21

## 2022-01-01 RX ADMIN — METOPROLOL TARTRATE 25 MG: 25 TABLET, FILM COATED ORAL at 21:31

## 2022-01-01 RX ADMIN — FERROUS SULFATE TAB 325 MG (65 MG ELEMENTAL FE) 325 MG: 325 (65 FE) TAB at 08:07

## 2022-01-01 RX ADMIN — PANTOPRAZOLE SODIUM 40 MG: 40 INJECTION, POWDER, FOR SOLUTION INTRAVENOUS at 11:17

## 2022-01-01 RX ADMIN — APIXABAN 2.5 MG: 2.5 TABLET, FILM COATED ORAL at 08:19

## 2022-01-01 RX ADMIN — TORSEMIDE 40 MG: 20 TABLET ORAL at 08:38

## 2022-01-01 RX ADMIN — METOROPROLOL TARTRATE 2.5 MG: 5 INJECTION, SOLUTION INTRAVENOUS at 18:29

## 2022-01-01 RX ADMIN — APIXABAN 2.5 MG: 2.5 TABLET, FILM COATED ORAL at 17:36

## 2022-01-01 RX ADMIN — SODIUM CHLORIDE, SODIUM GLUCONATE, SODIUM ACETATE, POTASSIUM CHLORIDE, MAGNESIUM CHLORIDE, SODIUM PHOSPHATE, DIBASIC, AND POTASSIUM PHOSPHATE 500 ML: .53; .5; .37; .037; .03; .012; .00082 INJECTION, SOLUTION INTRAVENOUS at 12:30

## 2022-01-01 RX ADMIN — LEVALBUTEROL HYDROCHLORIDE 1.25 MG: 1.25 SOLUTION, CONCENTRATE RESPIRATORY (INHALATION) at 07:20

## 2022-01-01 RX ADMIN — BENZONATATE 200 MG: 100 CAPSULE ORAL at 21:05

## 2022-01-01 RX ADMIN — CEPHALEXIN 500 MG: 500 CAPSULE ORAL at 13:00

## 2022-01-01 RX ADMIN — FUROSEMIDE 40 MG: 10 INJECTION, SOLUTION INTRAMUSCULAR; INTRAVENOUS at 09:55

## 2022-01-01 RX ADMIN — OXYCODONE HYDROCHLORIDE 5 MG: 5 TABLET ORAL at 00:17

## 2022-01-01 RX ADMIN — APIXABAN 2.5 MG: 2.5 TABLET, FILM COATED ORAL at 09:16

## 2022-01-01 RX ADMIN — METOPROLOL TARTRATE 25 MG: 25 TABLET, FILM COATED ORAL at 08:53

## 2022-01-01 RX ADMIN — CEPHALEXIN 500 MG: 500 CAPSULE ORAL at 16:16

## 2022-01-01 RX ADMIN — CEFEPIME 2000 MG: 2 INJECTION, POWDER, FOR SOLUTION INTRAVENOUS at 03:58

## 2022-01-01 RX ADMIN — CLOPIDOGREL BISULFATE 75 MG: 75 TABLET, FILM COATED ORAL at 09:55

## 2022-01-01 RX ADMIN — CEPHALEXIN 500 MG: 500 CAPSULE ORAL at 16:22

## 2022-01-01 RX ADMIN — MELATONIN TAB 3 MG 6 MG: 3 TAB at 21:06

## 2022-01-01 RX ADMIN — METOROPROLOL TARTRATE 2.5 MG: 5 INJECTION, SOLUTION INTRAVENOUS at 06:38

## 2022-01-01 RX ADMIN — APIXABAN 2.5 MG: 2.5 TABLET, FILM COATED ORAL at 11:10

## 2022-01-01 RX ADMIN — TAMSULOSIN HYDROCHLORIDE 0.4 MG: 0.4 CAPSULE ORAL at 17:17

## 2022-01-01 RX ADMIN — TAMSULOSIN HYDROCHLORIDE 0.4 MG: 0.4 CAPSULE ORAL at 16:16

## 2022-01-01 RX ADMIN — METOROPROLOL TARTRATE 2.5 MG: 5 INJECTION, SOLUTION INTRAVENOUS at 23:10

## 2022-01-01 RX ADMIN — PANTOPRAZOLE SODIUM 40 MG: 40 INJECTION, POWDER, FOR SOLUTION INTRAVENOUS at 12:21

## 2022-01-01 RX ADMIN — ALBUTEROL SULFATE 2 PUFF: 90 AEROSOL, METERED RESPIRATORY (INHALATION) at 04:53

## 2022-01-01 RX ADMIN — APIXABAN 2.5 MG: 2.5 TABLET, FILM COATED ORAL at 09:51

## 2022-01-01 RX ADMIN — HYDROMORPHONE HYDROCHLORIDE 0.2 MG: 0.2 INJECTION, SOLUTION INTRAMUSCULAR; INTRAVENOUS; SUBCUTANEOUS at 01:15

## 2022-01-01 RX ADMIN — APIXABAN 2.5 MG: 2.5 TABLET, FILM COATED ORAL at 17:40

## 2022-01-01 RX ADMIN — AMIODARONE HYDROCHLORIDE 200 MG: 200 TABLET ORAL at 08:42

## 2022-01-01 RX ADMIN — METOROPROLOL TARTRATE 2.5 MG: 5 INJECTION, SOLUTION INTRAVENOUS at 11:42

## 2022-01-01 RX ADMIN — METHYLPREDNISOLONE SODIUM SUCCINATE 10 MG: 40 INJECTION, POWDER, FOR SOLUTION INTRAMUSCULAR; INTRAVENOUS at 09:08

## 2022-01-01 RX ADMIN — METOPROLOL TARTRATE 25 MG: 25 TABLET, FILM COATED ORAL at 21:03

## 2022-01-01 RX ADMIN — LIDOCAINE HYDROCHLORIDE 10 ML: 10 INJECTION, SOLUTION EPIDURAL; INFILTRATION; INTRACAUDAL; PERINEURAL at 14:20

## 2022-01-01 RX ADMIN — METOROPROLOL TARTRATE 2.5 MG: 5 INJECTION, SOLUTION INTRAVENOUS at 06:10

## 2022-01-01 RX ADMIN — PANTOPRAZOLE SODIUM 40 MG: 40 INJECTION, POWDER, FOR SOLUTION INTRAVENOUS at 10:16

## 2022-01-01 RX ADMIN — CLOPIDOGREL BISULFATE 75 MG: 75 TABLET, FILM COATED ORAL at 10:29

## 2022-01-01 RX ADMIN — GUAIFENESIN 600 MG: 600 TABLET, EXTENDED RELEASE ORAL at 21:06

## 2022-01-01 RX ADMIN — METOROPROLOL TARTRATE 2.5 MG: 5 INJECTION, SOLUTION INTRAVENOUS at 21:22

## 2022-01-01 RX ADMIN — METHYLPREDNISOLONE SODIUM SUCCINATE 20 MG: 40 INJECTION, POWDER, FOR SOLUTION INTRAMUSCULAR; INTRAVENOUS at 11:17

## 2022-01-01 RX ADMIN — FUROSEMIDE 40 MG: 10 INJECTION, SOLUTION INTRAMUSCULAR; INTRAVENOUS at 08:53

## 2022-01-01 RX ADMIN — METOPROLOL TARTRATE 25 MG: 25 TABLET, FILM COATED ORAL at 09:12

## 2022-01-01 RX ADMIN — FERROUS SULFATE TAB 325 MG (65 MG ELEMENTAL FE) 325 MG: 325 (65 FE) TAB at 08:38

## 2022-01-01 RX ADMIN — ALBUTEROL SULFATE 2.5 MG: 2.5 SOLUTION RESPIRATORY (INHALATION) at 21:20

## 2022-01-01 RX ADMIN — METOPROLOL TARTRATE 25 MG: 25 TABLET, FILM COATED ORAL at 08:58

## 2022-01-01 RX ADMIN — GUAIFENESIN 400 MG: 200 SOLUTION ORAL at 08:23

## 2022-01-01 RX ADMIN — CLOPIDOGREL BISULFATE 75 MG: 75 TABLET, FILM COATED ORAL at 09:16

## 2022-01-01 RX ADMIN — ATORVASTATIN CALCIUM 40 MG: 40 TABLET, FILM COATED ORAL at 15:56

## 2022-01-01 RX ADMIN — POTASSIUM CHLORIDE 10 MEQ: 750 TABLET, EXTENDED RELEASE ORAL at 08:07

## 2022-01-01 RX ADMIN — CEFAZOLIN SODIUM 1000 MG: 1 SOLUTION INTRAVENOUS at 06:02

## 2022-01-01 RX ADMIN — OXYCODONE HYDROCHLORIDE 5 MG: 5 TABLET ORAL at 05:42

## 2022-01-01 RX ADMIN — ALBUMIN (HUMAN) 12.5 G: 12.5 INJECTION, SOLUTION INTRAVENOUS at 21:17

## 2022-01-01 RX ADMIN — ALBUTEROL SULFATE 2 PUFF: 90 AEROSOL, METERED RESPIRATORY (INHALATION) at 06:08

## 2022-01-01 RX ADMIN — MELATONIN TAB 3 MG 6 MG: 3 TAB at 21:42

## 2022-01-01 RX ADMIN — SPIRONOLACTONE 25 MG: 25 TABLET ORAL at 08:38

## 2022-01-01 RX ADMIN — POTASSIUM CHLORIDE 10 MEQ: 750 TABLET, EXTENDED RELEASE ORAL at 08:38

## 2022-01-01 RX ADMIN — MELATONIN TAB 3 MG 6 MG: 3 TAB at 21:19

## 2022-01-01 RX ADMIN — LEVALBUTEROL HYDROCHLORIDE 1.25 MG: 1.25 SOLUTION, CONCENTRATE RESPIRATORY (INHALATION) at 07:48

## 2022-01-01 RX ADMIN — FUROSEMIDE 20 MG: 20 TABLET ORAL at 17:50

## 2022-01-01 RX ADMIN — CLOPIDOGREL BISULFATE 75 MG: 75 TABLET, FILM COATED ORAL at 08:19

## 2022-01-01 RX ADMIN — AMIODARONE HYDROCHLORIDE 200 MG: 200 TABLET ORAL at 08:06

## 2022-01-01 RX ADMIN — MELATONIN TAB 3 MG 6 MG: 3 TAB at 21:16

## 2022-01-01 RX ADMIN — ALBUTEROL SULFATE 2 PUFF: 90 AEROSOL, METERED RESPIRATORY (INHALATION) at 01:06

## 2022-01-01 RX ADMIN — AMIODARONE HYDROCHLORIDE 200 MG: 200 TABLET ORAL at 08:58

## 2022-01-01 RX ADMIN — FERROUS SULFATE TAB 325 MG (65 MG ELEMENTAL FE) 325 MG: 325 (65 FE) TAB at 09:55

## 2022-01-01 RX ADMIN — GUAIFENESIN 600 MG: 600 TABLET, EXTENDED RELEASE ORAL at 21:05

## 2022-01-01 RX ADMIN — IPRATROPIUM BROMIDE 0.5 MG: 0.5 SOLUTION RESPIRATORY (INHALATION) at 19:54

## 2022-01-01 RX ADMIN — AMIODARONE HYDROCHLORIDE 200 MG: 200 TABLET ORAL at 06:34

## 2022-01-01 RX ADMIN — METOPROLOL TARTRATE 25 MG: 25 TABLET, FILM COATED ORAL at 10:29

## 2022-01-01 RX ADMIN — SPIRONOLACTONE 25 MG: 25 TABLET ORAL at 09:55

## 2022-01-01 RX ADMIN — INSULIN LISPRO 1 UNITS: 100 INJECTION, SOLUTION INTRAVENOUS; SUBCUTANEOUS at 18:24

## 2022-01-01 RX ADMIN — HEPARIN SODIUM 5000 UNITS: 5000 INJECTION INTRAVENOUS; SUBCUTANEOUS at 05:04

## 2022-01-01 RX ADMIN — METOROPROLOL TARTRATE 2.5 MG: 5 INJECTION, SOLUTION INTRAVENOUS at 11:37

## 2022-01-01 RX ADMIN — APIXABAN 2.5 MG: 2.5 TABLET, FILM COATED ORAL at 17:17

## 2022-01-01 RX ADMIN — POTASSIUM CHLORIDE 20 MEQ: 1500 TABLET, EXTENDED RELEASE ORAL at 09:15

## 2022-01-01 RX ADMIN — APIXABAN 2.5 MG: 2.5 TABLET, FILM COATED ORAL at 08:31

## 2022-01-01 RX ADMIN — OXYCODONE HYDROCHLORIDE 5 MG: 5 TABLET ORAL at 10:55

## 2022-01-01 RX ADMIN — METOPROLOL TARTRATE 25 MG: 25 TABLET, FILM COATED ORAL at 21:57

## 2022-01-01 RX ADMIN — METOPROLOL TARTRATE 25 MG: 25 TABLET, FILM COATED ORAL at 08:38

## 2022-01-01 RX ADMIN — METOROPROLOL TARTRATE 2.5 MG: 5 INJECTION, SOLUTION INTRAVENOUS at 14:28

## 2022-01-01 RX ADMIN — APIXABAN 2.5 MG: 2.5 TABLET, FILM COATED ORAL at 18:13

## 2022-01-01 RX ADMIN — FUROSEMIDE 40 MG: 10 INJECTION, SOLUTION INTRAMUSCULAR; INTRAVENOUS at 09:15

## 2022-01-01 RX ADMIN — FUROSEMIDE 40 MG: 10 INJECTION, SOLUTION INTRAMUSCULAR; INTRAVENOUS at 16:41

## 2022-01-01 RX ADMIN — IPRATROPIUM BROMIDE 0.5 MG: 0.5 SOLUTION RESPIRATORY (INHALATION) at 16:46

## 2022-01-01 RX ADMIN — OXYCODONE HYDROCHLORIDE 5 MG: 5 TABLET ORAL at 12:51

## 2022-01-01 RX ADMIN — HYDROMORPHONE HYDROCHLORIDE 2 MG: 2 TABLET ORAL at 03:09

## 2022-01-01 RX ADMIN — SODIUM CHLORIDE 200 MG: 900 INJECTION, SOLUTION INTRAVENOUS at 09:13

## 2022-01-01 RX ADMIN — CEFEPIME 1000 MG: 1 INJECTION, POWDER, FOR SOLUTION INTRAMUSCULAR; INTRAVENOUS at 00:52

## 2022-01-01 RX ADMIN — FUROSEMIDE 40 MG: 10 INJECTION, SOLUTION INTRAMUSCULAR; INTRAVENOUS at 17:01

## 2022-01-01 RX ADMIN — SPIRONOLACTONE 25 MG: 25 TABLET ORAL at 08:32

## 2022-01-01 RX ADMIN — MELATONIN TAB 3 MG 6 MG: 3 TAB at 21:43

## 2022-01-01 RX ADMIN — ATORVASTATIN CALCIUM 40 MG: 40 TABLET, FILM COATED ORAL at 17:40

## 2022-01-01 RX ADMIN — METOPROLOL TARTRATE 25 MG: 25 TABLET, FILM COATED ORAL at 21:05

## 2022-01-01 RX ADMIN — FUROSEMIDE 40 MG: 10 INJECTION, SOLUTION INTRAMUSCULAR; INTRAVENOUS at 08:26

## 2022-01-01 RX ADMIN — FUROSEMIDE 20 MG: 20 TABLET ORAL at 08:58

## 2022-01-01 RX ADMIN — METHYLPREDNISOLONE SODIUM SUCCINATE 40 MG: 40 INJECTION, POWDER, FOR SOLUTION INTRAMUSCULAR; INTRAVENOUS at 17:02

## 2022-01-01 RX ADMIN — MAGNESIUM SULFATE HEPTAHYDRATE 1 G: 1 INJECTION, SOLUTION INTRAVENOUS at 10:25

## 2022-01-01 RX ADMIN — CLOPIDOGREL BISULFATE 75 MG: 75 TABLET, FILM COATED ORAL at 08:38

## 2022-01-01 RX ADMIN — METOROPROLOL TARTRATE 2.5 MG: 5 INJECTION, SOLUTION INTRAVENOUS at 06:26

## 2022-01-01 RX ADMIN — AMIODARONE HYDROCHLORIDE 200 MG: 200 TABLET ORAL at 08:23

## 2022-01-01 RX ADMIN — APIXABAN 2.5 MG: 2.5 TABLET, FILM COATED ORAL at 17:50

## 2022-01-01 RX ADMIN — SODIUM CHLORIDE 200 MG: 900 INJECTION, SOLUTION INTRAVENOUS at 10:36

## 2022-01-01 RX ADMIN — PANTOPRAZOLE SODIUM 40 MG: 40 INJECTION, POWDER, FOR SOLUTION INTRAVENOUS at 08:54

## 2022-01-01 RX ADMIN — DEXTROSE AND SODIUM CHLORIDE 100 ML/HR: 5; .2 INJECTION, SOLUTION INTRAVENOUS at 15:11

## 2022-01-01 RX ADMIN — APIXABAN 2.5 MG: 2.5 TABLET, FILM COATED ORAL at 17:21

## 2022-01-01 RX ADMIN — METOROPROLOL TARTRATE 2.5 MG: 5 INJECTION, SOLUTION INTRAVENOUS at 01:57

## 2022-01-01 RX ADMIN — METOPROLOL TARTRATE 25 MG: 25 TABLET, FILM COATED ORAL at 14:52

## 2022-01-01 RX ADMIN — ATORVASTATIN CALCIUM 40 MG: 40 TABLET, FILM COATED ORAL at 18:19

## 2022-01-01 RX ADMIN — GUAIFENESIN 600 MG: 600 TABLET, EXTENDED RELEASE ORAL at 09:16

## 2022-01-01 RX ADMIN — PANTOPRAZOLE SODIUM 40 MG: 40 INJECTION, POWDER, FOR SOLUTION INTRAVENOUS at 09:08

## 2022-01-01 RX ADMIN — BENZONATATE 200 MG: 100 CAPSULE ORAL at 21:06

## 2022-01-01 RX ADMIN — GUAIFENESIN 600 MG: 600 TABLET, EXTENDED RELEASE ORAL at 21:57

## 2022-01-01 RX ADMIN — CEPHALEXIN 500 MG: 500 CAPSULE ORAL at 13:03

## 2022-01-01 RX ADMIN — BENZONATATE 200 MG: 100 CAPSULE ORAL at 21:57

## 2022-01-01 RX ADMIN — GUAIFENESIN 400 MG: 200 SOLUTION ORAL at 20:54

## 2022-01-01 RX ADMIN — SPIRONOLACTONE 25 MG: 25 TABLET ORAL at 09:15

## 2022-01-01 RX ADMIN — PANTOPRAZOLE SODIUM 40 MG: 40 INJECTION, POWDER, FOR SOLUTION INTRAVENOUS at 09:50

## 2022-01-01 RX ADMIN — METOPROLOL TARTRATE 25 MG: 25 TABLET, FILM COATED ORAL at 21:24

## 2022-01-01 RX ADMIN — APIXABAN 2.5 MG: 2.5 TABLET, FILM COATED ORAL at 09:13

## 2022-01-01 RX ADMIN — DEXTROSE 60 ML/HR: 5 SOLUTION INTRAVENOUS at 17:41

## 2022-01-01 RX ADMIN — MELATONIN TAB 3 MG 6 MG: 3 TAB at 20:30

## 2022-01-01 RX ADMIN — CEFAZOLIN SODIUM 1000 MG: 1 SOLUTION INTRAVENOUS at 17:26

## 2022-01-01 RX ADMIN — METOROPROLOL TARTRATE 2.5 MG: 5 INJECTION, SOLUTION INTRAVENOUS at 22:23

## 2022-01-01 RX ADMIN — TAMSULOSIN HYDROCHLORIDE 0.4 MG: 0.4 CAPSULE ORAL at 18:44

## 2022-01-01 RX ADMIN — LEVALBUTEROL HYDROCHLORIDE 1.25 MG: 1.25 SOLUTION, CONCENTRATE RESPIRATORY (INHALATION) at 14:45

## 2022-01-01 RX ADMIN — MIRTAZAPINE 7.5 MG: 15 TABLET, FILM COATED ORAL at 21:43

## 2022-01-01 RX ADMIN — CEFEPIME 2000 MG: 2 INJECTION, POWDER, FOR SOLUTION INTRAVENOUS at 00:53

## 2022-02-28 ENCOUNTER — HOSPITAL ENCOUNTER (INPATIENT)
Facility: HOSPITAL | Age: 82
LOS: 5 days | Discharge: HOME HEALTH | DRG: 291 | End: 2022-03-05
Attending: EMERGENCY MEDICINE | Admitting: FAMILY MEDICINE
Payer: MEDICARE

## 2022-02-28 ENCOUNTER — APPOINTMENT (EMERGENCY)
Dept: RADIOLOGY | Facility: HOSPITAL | Age: 82
DRG: 291 | End: 2022-02-28
Payer: MEDICARE

## 2022-02-28 DIAGNOSIS — I48.20 CHRONIC A-FIB (HCC): ICD-10-CM

## 2022-02-28 DIAGNOSIS — R09.02 HYPOXIA: Primary | ICD-10-CM

## 2022-02-28 DIAGNOSIS — J90 PLEURAL EFFUSION: ICD-10-CM

## 2022-02-28 DIAGNOSIS — I50.33 ACUTE ON CHRONIC DIASTOLIC CHF (CONGESTIVE HEART FAILURE) (HCC): ICD-10-CM

## 2022-02-28 DIAGNOSIS — I50.9 CHF (CONGESTIVE HEART FAILURE) (HCC): ICD-10-CM

## 2022-02-28 DIAGNOSIS — J96.01 ACUTE RESPIRATORY FAILURE WITH HYPOXIA (HCC): ICD-10-CM

## 2022-02-28 DIAGNOSIS — R33.9 URINARY RETENTION: ICD-10-CM

## 2022-02-28 DIAGNOSIS — S32.000A COMPRESSION FRACTURE OF LUMBAR VERTEBRA, UNSPECIFIED LUMBAR VERTEBRAL LEVEL, INITIAL ENCOUNTER (HCC): ICD-10-CM

## 2022-02-28 DIAGNOSIS — J95.811 POSTPROCEDURAL PNEUMOTHORAX: ICD-10-CM

## 2022-02-28 PROBLEM — E78.2 HYPERLIPIDEMIA, MIXED: Status: ACTIVE | Noted: 2021-04-21

## 2022-02-28 PROBLEM — I16.0 HYPERTENSIVE URGENCY: Status: ACTIVE | Noted: 2021-04-21

## 2022-02-28 PROBLEM — N18.31 STAGE 3A CHRONIC KIDNEY DISEASE (HCC): Status: ACTIVE | Noted: 2022-02-28

## 2022-02-28 PROBLEM — N17.9 AKI (ACUTE KIDNEY INJURY) (HCC): Status: ACTIVE | Noted: 2022-02-28

## 2022-02-28 LAB
2HR DELTA HS TROPONIN: 10 NG/L
4HR DELTA HS TROPONIN: 30 NG/L
ALBUMIN SERPL BCP-MCNC: 3.8 G/DL (ref 3.5–5)
ALP SERPL-CCNC: 143 U/L (ref 46–116)
ALT SERPL W P-5'-P-CCNC: 32 U/L (ref 12–78)
ANION GAP SERPL CALCULATED.3IONS-SCNC: 9 MMOL/L (ref 4–13)
APTT PPP: 39 SECONDS (ref 23–37)
AST SERPL W P-5'-P-CCNC: 20 U/L (ref 5–45)
BASOPHILS # BLD AUTO: 0.05 THOUSANDS/ΜL (ref 0–0.1)
BASOPHILS NFR BLD AUTO: 0 % (ref 0–1)
BILIRUB SERPL-MCNC: 1.67 MG/DL (ref 0.2–1)
BUN SERPL-MCNC: 44 MG/DL (ref 5–25)
CALCIUM SERPL-MCNC: 8.5 MG/DL (ref 8.3–10.1)
CARDIAC TROPONIN I PNL SERPL HS: 227 NG/L
CARDIAC TROPONIN I PNL SERPL HS: 237 NG/L
CARDIAC TROPONIN I PNL SERPL HS: 257 NG/L
CHLORIDE SERPL-SCNC: 106 MMOL/L (ref 100–108)
CK SERPL-CCNC: 99 U/L (ref 39–308)
CO2 SERPL-SCNC: 25 MMOL/L (ref 21–32)
CREAT SERPL-MCNC: 1.85 MG/DL (ref 0.6–1.3)
EOSINOPHIL # BLD AUTO: 0.07 THOUSAND/ΜL (ref 0–0.61)
EOSINOPHIL NFR BLD AUTO: 1 % (ref 0–6)
ERYTHROCYTE [DISTWIDTH] IN BLOOD BY AUTOMATED COUNT: 16.7 % (ref 11.6–15.1)
FLUAV RNA RESP QL NAA+PROBE: NEGATIVE
FLUBV RNA RESP QL NAA+PROBE: NEGATIVE
GFR SERPL CREATININE-BSD FRML MDRD: 33 ML/MIN/1.73SQ M
GLUCOSE SERPL-MCNC: 116 MG/DL (ref 65–140)
HCT VFR BLD AUTO: 36.5 % (ref 36.5–49.3)
HGB BLD-MCNC: 12.6 G/DL (ref 12–17)
IMM GRANULOCYTES # BLD AUTO: 0.05 THOUSAND/UL (ref 0–0.2)
IMM GRANULOCYTES NFR BLD AUTO: 0 % (ref 0–2)
INR PPP: 1.29 (ref 0.84–1.19)
LYMPHOCYTES # BLD AUTO: 2.44 THOUSANDS/ΜL (ref 0.6–4.47)
LYMPHOCYTES NFR BLD AUTO: 18 % (ref 14–44)
MAGNESIUM SERPL-MCNC: 2.3 MG/DL (ref 1.6–2.6)
MCH RBC QN AUTO: 33.4 PG (ref 26.8–34.3)
MCHC RBC AUTO-ENTMCNC: 34.5 G/DL (ref 31.4–37.4)
MCV RBC AUTO: 97 FL (ref 82–98)
MONOCYTES # BLD AUTO: 1.41 THOUSAND/ΜL (ref 0.17–1.22)
MONOCYTES NFR BLD AUTO: 11 % (ref 4–12)
NEUTROPHILS # BLD AUTO: 9.22 THOUSANDS/ΜL (ref 1.85–7.62)
NEUTS SEG NFR BLD AUTO: 70 % (ref 43–75)
NRBC BLD AUTO-RTO: 0 /100 WBCS
NT-PROBNP SERPL-MCNC: ABNORMAL PG/ML
PLATELET # BLD AUTO: 181 THOUSANDS/UL (ref 149–390)
PMV BLD AUTO: 11.4 FL (ref 8.9–12.7)
POTASSIUM SERPL-SCNC: 4.1 MMOL/L (ref 3.5–5.3)
PROT SERPL-MCNC: 7.6 G/DL (ref 6.4–8.2)
PROTHROMBIN TIME: 16 SECONDS (ref 11.6–14.5)
RBC # BLD AUTO: 3.77 MILLION/UL (ref 3.88–5.62)
RSV RNA RESP QL NAA+PROBE: NEGATIVE
SARS-COV-2 RNA RESP QL NAA+PROBE: NEGATIVE
SODIUM SERPL-SCNC: 140 MMOL/L (ref 136–145)
WBC # BLD AUTO: 13.24 THOUSAND/UL (ref 4.31–10.16)

## 2022-02-28 PROCEDURE — 99285 EMERGENCY DEPT VISIT HI MDM: CPT

## 2022-02-28 PROCEDURE — 0241U HB NFCT DS VIR RESP RNA 4 TRGT: CPT | Performed by: PHYSICIAN ASSISTANT

## 2022-02-28 PROCEDURE — 96374 THER/PROPH/DIAG INJ IV PUSH: CPT

## 2022-02-28 PROCEDURE — 36415 COLL VENOUS BLD VENIPUNCTURE: CPT

## 2022-02-28 PROCEDURE — 85610 PROTHROMBIN TIME: CPT | Performed by: PHYSICIAN ASSISTANT

## 2022-02-28 PROCEDURE — 85730 THROMBOPLASTIN TIME PARTIAL: CPT | Performed by: PHYSICIAN ASSISTANT

## 2022-02-28 PROCEDURE — 85025 COMPLETE CBC W/AUTO DIFF WBC: CPT | Performed by: EMERGENCY MEDICINE

## 2022-02-28 PROCEDURE — 99285 EMERGENCY DEPT VISIT HI MDM: CPT | Performed by: PHYSICIAN ASSISTANT

## 2022-02-28 PROCEDURE — 80053 COMPREHEN METABOLIC PANEL: CPT | Performed by: EMERGENCY MEDICINE

## 2022-02-28 PROCEDURE — 71045 X-RAY EXAM CHEST 1 VIEW: CPT

## 2022-02-28 PROCEDURE — 1124F ACP DISCUSS-NO DSCNMKR DOCD: CPT | Performed by: PHYSICIAN ASSISTANT

## 2022-02-28 PROCEDURE — 82550 ASSAY OF CK (CPK): CPT | Performed by: PHYSICIAN ASSISTANT

## 2022-02-28 PROCEDURE — 93005 ELECTROCARDIOGRAM TRACING: CPT

## 2022-02-28 PROCEDURE — 83735 ASSAY OF MAGNESIUM: CPT | Performed by: PHYSICIAN ASSISTANT

## 2022-02-28 PROCEDURE — 84484 ASSAY OF TROPONIN QUANT: CPT | Performed by: EMERGENCY MEDICINE

## 2022-02-28 PROCEDURE — 99223 1ST HOSP IP/OBS HIGH 75: CPT | Performed by: FAMILY MEDICINE

## 2022-02-28 PROCEDURE — 83880 ASSAY OF NATRIURETIC PEPTIDE: CPT | Performed by: PHYSICIAN ASSISTANT

## 2022-02-28 RX ORDER — ATORVASTATIN CALCIUM 40 MG/1
40 TABLET, FILM COATED ORAL DAILY
Status: DISCONTINUED | OUTPATIENT
Start: 2022-03-01 | End: 2022-03-05 | Stop reason: HOSPADM

## 2022-02-28 RX ORDER — ACETAMINOPHEN 325 MG/1
650 TABLET ORAL EVERY 6 HOURS PRN
Status: DISCONTINUED | OUTPATIENT
Start: 2022-02-28 | End: 2022-03-05 | Stop reason: HOSPADM

## 2022-02-28 RX ORDER — FUROSEMIDE 10 MG/ML
40 INJECTION INTRAMUSCULAR; INTRAVENOUS
Status: DISCONTINUED | OUTPATIENT
Start: 2022-02-28 | End: 2022-03-02

## 2022-02-28 RX ORDER — AMIODARONE HYDROCHLORIDE 200 MG/1
200 TABLET ORAL DAILY
Status: DISCONTINUED | OUTPATIENT
Start: 2022-03-01 | End: 2022-03-05 | Stop reason: HOSPADM

## 2022-02-28 RX ORDER — AMIODARONE HYDROCHLORIDE 200 MG/1
200 TABLET ORAL DAILY
COMMUNITY

## 2022-02-28 RX ORDER — FUROSEMIDE 10 MG/ML
40 INJECTION INTRAMUSCULAR; INTRAVENOUS ONCE
Status: COMPLETED | OUTPATIENT
Start: 2022-02-28 | End: 2022-02-28

## 2022-02-28 RX ORDER — CLOPIDOGREL BISULFATE 75 MG/1
75 TABLET ORAL DAILY
Status: DISCONTINUED | OUTPATIENT
Start: 2022-03-01 | End: 2022-02-28

## 2022-02-28 RX ORDER — METOPROLOL TARTRATE 50 MG/1
100 TABLET, FILM COATED ORAL EVERY 12 HOURS SCHEDULED
Status: DISCONTINUED | OUTPATIENT
Start: 2022-02-28 | End: 2022-03-01

## 2022-02-28 RX ADMIN — FUROSEMIDE 40 MG: 10 INJECTION, SOLUTION INTRAVENOUS at 17:27

## 2022-02-28 RX ADMIN — METOPROLOL TARTRATE 100 MG: 50 TABLET, FILM COATED ORAL at 21:00

## 2022-02-28 RX ADMIN — FUROSEMIDE 40 MG: 10 INJECTION, SOLUTION INTRAVENOUS at 22:18

## 2022-02-28 NOTE — ASSESSMENT & PLAN NOTE
Patient has acute respiratory failure with hypoxia secondary to acute on chronic diastolic CHF exacerbation with left-sided pleural effusion  Currently patient is requiring 2 L of oxygen    Patient does not use any oxygen at home

## 2022-02-28 NOTE — ASSESSMENT & PLAN NOTE
Lab Results   Component Value Date    EGFR 33 02/28/2022    EGFR 35 11/26/2021    EGFR 37 10/06/2021    CREATININE 1 85 (H) 02/28/2022    CREATININE 1 76 (H) 11/26/2021    CREATININE 1 69 (H) 10/06/2021   Baseline creatinine is 1 4-1 7  Currently patient present with creatinine of 1 85  Also fluid overloaded  Placed on IV Lasix and monitor closely during diuresis    Avoid hypotension or nephrotoxic agents

## 2022-02-28 NOTE — H&P
[address] Am[address] Osullivan  H&P- [place] 1940, 80 y o  male MRN: [id number]  Unit/Bed#: DAYNA Encounter: [id number]  Primary Care Provider: Robyn Rocha DO   Date and time admitted to hospital: 2/28/2022  3:58 PM    * Acute respiratory failure with hypoxia [facility])  Assessment & Plan  Patient has acute respiratory failure with hypoxia secondary to acute on chronic diastolic CHF exacerbation with left-sided pleural effusion  Currently patient is requiring 2 L of oxygen  Patient does not use any oxygen at home    Acute on chronic diastolic CHF (congestive heart failure) ([facility])  Assessment & Plan  Wt Readings from Last 3 Encounters:   02/28/22 71 1 kg (156 lb 12 oz)   12/19/21 68 kg (150 lb)   10/06/21 76 2 kg (167 lb 15 9 oz)     Patient has acute on chronic diastolic CHF exacerbation  Known history of coronary artery disease status post CABG 8 years ago and now recently had 2 stents 10/2021  Last 2D echo from 04/20/2021 showed EF of 60%  Patient had a complicated hospital course at the end of last year in [facility] and records are not completely available a but it seems like patient had an acute MI had 2 stents placed in the left main and ramus  Patient also had AFib with RVR underwent cardioversion  Also had episode of GI bleed and found have a stomach ulcer  Was admitted for almost 3 weeks  Recently he was seen outpatient by his cardiologist [facility] who stopped his Lasix  Patient appears to be gaining weight again and presented with worsening shortness of breath and found to have worsening left-sided pleural effusion  Will place on Lasix 40 mg IV b i d   Consult placed to Critical Care to evaluate for left-sided thoracentesis  Temporarily hold Plavix and Eliquis  Please note that patient is not on aspirin anymore as per his primary cardiologist   Please resume Eliquis and Plavix right away after procedure is completed due to recent PCI          Hypertensive urgency  Assessment & Plan  Blood pressure elevated on presentation secondary to acute CHF exacerbation  Received IV Lasix following which blood pressure is starting to improve  Continue home regimen of metoprolol 100 mg twice daily  Patient also takes Norvasc 5 mg daily  Will hold temporarily for now and continue diurese    Chronic a-fib [facility])  Assessment & Plan  Patient has history of chronic AFib is on amiodarone 200 mg daily continue same for now  Also on Lopressor 100 mg twice daily  Continue Eliquis 2 5 mg twice daily once thoracentesis has been completed    Stage 3a chronic kidney disease [facility])  Assessment & Plan  Lab Results   Component Value Date    EGFR 33 02/28/2022    EGFR 35 11/26/2021    EGFR 37 10/06/2021    CREATININE 1 85 (H) 02/28/2022    CREATININE 1 76 (H) 11/26/2021    CREATININE 1 69 (H) 10/06/2021   Baseline creatinine is 1 4-1 7  Currently patient present with creatinine of 1 85  Also fluid overloaded  Placed on IV Lasix and monitor closely during diuresis  Avoid hypotension or nephrotoxic agents    Hyperlipidemia, mixed  Assessment & Plan  Continue statin therapy      VTE Prophylaxis: Apixaban (Eliquis)  / sequential compression device   Code Status:  Full code  POLST: There is no POLST form on file for this patient (pre-hospital)  Discussion with family:  Discussed with wife at bedside    Anticipated Length of Stay:  Patient will be admitted on an Inpatient basis with an anticipated length of stay of  more than 2 midnights  Justification for Hospital Stay:  Acute respiratory failure with hypoxia    Total Time for Visit, including Counseling / Coordination of Care: 60 minutes  Greater than 50% of this total time spent on direct patient counseling and coordination of care  Chief Complaint:   Worsening shortness of breath    History of Present Illness:    Bari Clancy is a 80 y o  male who presents with progressively worsening shortness of breath over the last few days  Patient has been sleeping up in the recliner chair despite that he has difficulty catching his breath  Patient called PCP who recommended going to the ED for further evaluation  He has had some cough with clear phlegm with no chest pain reported  Also complaining of leg swelling  Patient Lasix and aspirin was recently discontinued by his cardiologist at [facility]  Patient's pulse ox was 88-89% on room air and placed on 2 L and now is more comfortable with decreasing work of breathing    Review of Systems:    Review of Systems   Constitutional: Positive for fatigue  Negative for appetite change, chills and fever  HENT: Negative for hearing loss, sore throat and trouble swallowing  Eyes: Negative for photophobia, discharge and visual disturbance  Respiratory: Positive for cough and shortness of breath  Negative for chest tightness  Cardiovascular: Positive for leg swelling  Negative for chest pain and palpitations  Gastrointestinal: Negative for abdominal pain, blood in stool and vomiting  Endocrine: Negative for polydipsia and polyuria  Genitourinary: Negative for difficulty urinating, dysuria, flank pain and hematuria  Musculoskeletal: Negative for back pain and gait problem  Skin: Negative for rash  Allergic/Immunologic: Negative for environmental allergies and food allergies  Neurological: Positive for weakness  Negative for dizziness, seizures, syncope and headaches  Hematological: Does not bruise/bleed easily  Psychiatric/Behavioral: Negative for behavioral problems  All other systems reviewed and are negative        Past Medical and Surgical History:     Past Medical History:   Diagnosis Date    CHF [facility]congestive heart failure) ([facility] [address] )     Coronary artery disease     Hyperlipidemia     Hypertension        Past Surgical History:   Procedure Laterality Date    CARDIAC SURGERY         Meds/Allergies:    Prior to Admission medications    Medication Sig Start Date End Date Taking? Authorizing Provider   amiodarone 200 mg tablet Take 200 mg by mouth daily   Yes Historical Provider, MD   amLODIPine (NORVASC) 10 mg tablet Take 1 tablet (10 mg total) by mouth daily with lunch  Patient taking differently: Take 5 mg by mouth daily   4/29/21  Yes Favian Petit PA-C   apixaban (Eliquis) 2 5 mg Take 2 5 mg by mouth 2 (two) times a day   10/25/21  Yes Historical Provider, MD   atorvastatin (LIPITOR) 40 mg tablet Take 40 mg by mouth daily   Yes Historical Provider, MD   clopidogrel (PLAVIX) 75 mg tablet Take 75 mg by mouth daily   Yes Historical Provider, MD   metoprolol tartrate (LOPRESSOR) 100 mg tablet Take 100 mg by mouth every 12 (twelve) hours   Yes Historical Provider, MD   aspirin 81 mg chewable tablet Chew 81 mg daily  2/28/22 Yes Historical Provider, MD     I have reviewed home medications with patient personally  Allergies: No Known Allergies    Social History:     Marital Status: /Civil Union   Social History     Substance and Sexual Activity   Alcohol Use Not Currently     Social History     Tobacco Use   Smoking Status Never Smoker   Smokeless Tobacco Never Used     Social History     Substance and Sexual Activity   Drug Use Not Currently       Family History:    Family History   Problem Relation Age of Onset    Hypertension Father        Physical Exam:     Vitals:   Blood Pressure: 151/74 (02/28/22 1730)  Pulse: 60 (02/28/22 1730)  Temperature: 98 °F (36 7 °C) (02/28/22 1559)  Temp Source: Oral (02/28/22 1559)  Respirations: (!) 23 (02/28/22 1730)  Height: 5' 8" (172 7 cm) (02/28/22 1559)  Weight - Scale: 71 1 kg (156 lb 12 oz) (02/28/22 1559)  SpO2: 95 % (02/28/22 1730)    Physical Exam  Vitals and nursing note reviewed  Constitutional:       Appearance: Normal appearance  He is ill-appearing  HENT:      Head: Normocephalic and atraumatic        Right Ear: External ear normal       Left Ear: External ear normal       Nose: Nose normal       Mouth/Throat: Pharynx: Oropharynx is clear  Eyes:      Pupils: Pupils are equal, round, and reactive to light  Cardiovascular:      Rate and Rhythm: Normal rate  Rhythm irregular  Heart sounds: Normal heart sounds  Pulmonary:      Effort: Respiratory distress present  Breath sounds: Rales present  Comments: Moderate air entry on the right side however diminished breath sounds on left side and absent breath sounds mid to lower chest on the left side  Increased work of breathing noted  Abdominal:      General: Bowel sounds are normal       Palpations: Abdomen is soft  Tenderness: There is no abdominal tenderness  Musculoskeletal:         General: Normal range of motion  Cervical back: Normal range of motion and neck supple  Right lower leg: Edema present  Left lower leg: Edema present  Comments: 1+ nonpitting edema bilateral lower extremity   Skin:     General: Skin is warm and dry  Capillary Refill: Capillary refill takes less than 2 seconds  Neurological:      General: No focal deficit present  Mental Status: He is alert and oriented to person, place, and time  Psychiatric:         Mood and Affect: Mood normal            Additional Data:     Lab Results: I have personally reviewed pertinent reports        Results from last 7 days   Lab Units 02/28/22  1608   WBC Thousand/uL 13 24*   HEMOGLOBIN g/dL 12 6   HEMATOCRIT % 36 5   PLATELETS Thousands/uL 181   NEUTROS PCT % 70   LYMPHS PCT % 18   MONOS PCT % 11   EOS PCT % 1     Results from last 7 days   Lab Units 02/28/22  1608   SODIUM mmol/L 140   POTASSIUM mmol/L 4 1   CHLORIDE mmol/L 106   CO2 mmol/L 25   BUN mg/dL 44*   CREATININE mg/dL 1 85*   ANION GAP mmol/L 9   CALCIUM mg/dL 8 5   ALBUMIN g/dL 3 8   TOTAL BILIRUBIN mg/dL 1 67*   ALK PHOS U/L 143*   ALT U/L 32   AST U/L 20   GLUCOSE RANDOM mg/dL 116     Results from last 7 days   Lab Units 02/28/22  1616   INR  1 29*                   Imaging: I have personally reviewed pertinent reports  XR chest 1 view portable    (Results Pending)       EKG, Pathology, and Other Studies Reviewed on Admission:   · EKG:  AFib    Allscripts / Epic Records Reviewed: Yes     ** Please Note: This note has been constructed using a voice recognition system   **

## 2022-02-28 NOTE — ASSESSMENT & PLAN NOTE
Blood pressure elevated on presentation secondary to acute CHF exacerbation  Received IV Lasix following which blood pressure is starting to improve  Continue home regimen of metoprolol 100 mg twice daily  Patient also takes Norvasc 5 mg daily    Will hold temporarily for now and continue diurese

## 2022-02-28 NOTE — ASSESSMENT & PLAN NOTE
Wt Readings from Last 3 Encounters:   02/28/22 71 1 kg (156 lb 12 oz)   12/19/21 68 kg (150 lb)   10/06/21 76 2 kg (167 lb 15 9 oz)     Patient has acute on chronic diastolic CHF exacerbation  Known history of coronary artery disease status post CABG 8 years ago and now recently had 2 stents 10/2021  Last 2D echo from 04/20/2021 showed EF of 60%  Patient had a complicated hospital course at the end of last year in [facility] and records are not completely available a but it seems like patient had an acute MI had 2 stents placed in the left main and ramus  Patient also had AFib with RVR underwent cardioversion  Also had episode of GI bleed and found have a stomach ulcer  Was admitted for almost 3 weeks  Recently he was seen outpatient by his cardiologist [facility] who stopped his Lasix  Patient appears to be gaining weight again and presented with worsening shortness of breath and found to have worsening left-sided pleural effusion  Will place on Lasix 40 mg IV b i d   Consult placed to Critical Care to evaluate for left-sided thoracentesis  Temporarily hold Plavix and Eliquis  Please note that patient is not on aspirin anymore as per his primary cardiologist   Please resume Eliquis and Plavix right away after procedure is completed due to recent PCI

## 2022-02-28 NOTE — ED PROVIDER NOTES
History  Chief Complaint   Patient presents with    Shortness of Breath     Shortness of breath for a few weeks, worse with exertion  80year old male presents to the ED with spouse for evaluation of shortness of breath  Reports this have been going on for several weeks however worsened since this weekend  We did until Monday to call PCP who recommended ED evaluation  Patient has had productive cough with clear phlegm  He reports shortness of breath is worse with exertion  Wife states she has noticed patient's feet have been more swollen than normal   Reports he was recently stopped from Eliquis  Patient with past medical history of cardiac catheterization and stent placement on Eliquis and Plavix  Patient denies any chest pain, palpitations  He denies any nausea vomiting, diarrhea, abdominal pain, diaphoresis  Patient is a non smoker  History provided by:  Patient  Shortness of Breath  Severity:  Moderate  Onset quality:  Gradual  Timing:  Constant  Progression:  Worsening  Chronicity:  New  Relieved by:  Nothing  Worsened by:  Exertion  Ineffective treatments:  None tried  Associated symptoms: cough    Associated symptoms: no abdominal pain, no chest pain, no claudication, no diaphoresis, no ear pain, no fever, no headaches, no hemoptysis, no neck pain, no PND, no rash, no sore throat, no sputum production, no syncope, no swollen glands, no vomiting and no wheezing        Prior to Admission Medications   Prescriptions Last Dose Informant Patient Reported? Taking?    amLODIPine (NORVASC) 10 mg tablet 2/28/2022 at Unknown time  No Yes   Sig: Take 1 tablet (10 mg total) by mouth daily with lunch   Patient taking differently: Take 5 mg by mouth daily     amiodarone 200 mg tablet 2/28/2022 at Unknown time  Yes Yes   Sig: Take 200 mg by mouth daily   apixaban (Eliquis) 2 5 mg 2/28/2022 at Unknown time  Yes Yes   Sig: Take 2 5 mg by mouth 2 (two) times a day     atorvastatin (LIPITOR) 40 mg tablet 2/27/2022 at Unknown time  Yes Yes   Sig: Take 40 mg by mouth daily   clopidogrel (PLAVIX) 75 mg tablet 2/28/2022 at Unknown time  Yes Yes   Sig: Take 75 mg by mouth daily   metoprolol tartrate (LOPRESSOR) 100 mg tablet 2/28/2022 at Unknown time  Yes Yes   Sig: Take 100 mg by mouth every 12 (twelve) hours      Facility-Administered Medications: None       Past Medical History:   Diagnosis Date    [facility]est[facility]) ([facility] [address] [facility]     Coronary artery disease     Hyperlipidemia     Hypertension        Past Surgical History:   Procedure Laterality Date    CARDIAC SURGERY         Family History   Problem Relation Age of Onset    Hypertension Father      I have reviewed and agree with the history as documented  E-Cigarette/Vaping    E-Cigarette Use Never User      E-Cigarette/Vaping Substances    Nicotine No     THC No     CBD No     Flavoring No     Other No     Unknown No      Social History     Tobacco Use    Smoking status: Never Smoker    Smokeless tobacco: Never Used   Vaping Use    Vaping Use: Never used   Substance Use Topics    Alcohol use: Not Currently    Drug use: Not Currently       Review of Systems   Constitutional: Negative  Negative for appetite change, chills, diaphoresis, fatigue and fever  HENT: Negative  Negative for ear pain and sore throat  Respiratory: Positive for cough and shortness of breath  Negative for hemoptysis, sputum production, choking, chest tightness, wheezing and stridor  Cardiovascular: Negative  Negative for chest pain, claudication, syncope and PND  Gastrointestinal: Negative  Negative for abdominal pain and vomiting  Musculoskeletal: Negative  Negative for neck pain  Skin: Negative  Negative for rash  Neurological: Negative  Negative for headaches  All other systems reviewed and are negative  Physical Exam  Physical Exam  Vitals and nursing note reviewed  Constitutional:       General: He is not in acute distress  Appearance: He is well-developed  He is ill-appearing  He is not toxic-appearing or diaphoretic  HENT:      Head: Normocephalic  Mouth/Throat:      Mouth: Mucous membranes are moist       Pharynx: Oropharynx is clear  No pharyngeal swelling or oropharyngeal exudate  Eyes:      Pupils: Pupils are equal, round, and reactive to light  Cardiovascular:      Rate and Rhythm: Normal rate and regular rhythm  Pulmonary:      Effort: Tachypnea present  Breath sounds: Examination of the left-middle field reveals decreased breath sounds  Examination of the left-lower field reveals decreased breath sounds  Decreased breath sounds and rales present  Chest:      Chest wall: No mass or tenderness  Abdominal:      Palpations: Abdomen is soft  There is no mass  Tenderness: There is no abdominal tenderness  Musculoskeletal:         General: Normal range of motion  Cervical back: Normal range of motion  Right lower leg: Edema (foot) present  Left lower leg: Edema (foot) present  Skin:     General: Skin is warm and dry  Capillary Refill: Capillary refill takes less than 2 seconds  Findings: No ecchymosis, erythema or rash  Neurological:      General: No focal deficit present  Mental Status: He is alert and oriented to person, place, and time     Psychiatric:         Mood and Affect: Mood normal          Behavior: Behavior normal          Vital Signs  ED Triage Vitals   Temperature Pulse Respirations Blood Pressure SpO2   02/28/22 1559 02/28/22 1559 02/28/22 1559 02/28/22 1559 02/28/22 1612   98 °F (36 7 °C) 65 22 (!) 196/86 (!) 89 %      Temp Source Heart Rate Source Patient Position - Orthostatic VS BP Location FiO2 (%)   02/28/22 1559 02/28/22 1559 02/28/22 1559 02/28/22 1559 --   Oral Monitor Sitting Right arm       Pain Score       02/28/22 1839       No Pain           Vitals:    02/28/22 1559 02/28/22 1615 02/28/22 1730 02/28/22 1830   BP: (!) 196/86  151/74 166/85 Pulse: 65 62 60    Patient Position - Orthostatic VS: Sitting            Visual Acuity      ED Medications  Medications   amiodarone tablet 200 mg (has no administration in time range)   atorvastatin (LIPITOR) tablet 40 mg (has no administration in time range)   metoprolol tartrate (LOPRESSOR) tablet 100 mg (has no administration in time range)   acetaminophen (TYLENOL) tablet 650 mg (has no administration in time range)   furosemide (LASIX) injection 40 mg (has no administration in time range)   furosemide (LASIX) injection 40 mg (40 mg Intravenous Given 2/28/22 1727)       Diagnostic Studies  Results Reviewed     Procedure Component Value Units Date/Time    HS Troponin I 4hr [[id number]]     Lab Status: No result Specimen: Blood     Magnesium [[id number]]  (Normal) Collected: 02/28/22 1608    Lab Status: Final result Specimen: Blood from Arm, Left Updated: 02/28/22 1710     Magnesium 2 3 mg/dL     CK Total with Reflex CKMB [[id number]]  (Normal) Collected: 02/28/22 1608    Lab Status: Final result Specimen: Blood from Arm, Left Updated: 02/28/22 1710     Total CK 99 U/L     NT-BNP PRO [[id number]]  (Abnormal) Collected: 02/28/22 1608    Lab Status: Final result Specimen: Blood from Arm, Left Updated: 02/28/22 1710     NT-proBNP 10,648 pg/mL     COVID/FLU/RSV - 2 hour TAT [[id number]]  (Normal) Collected: 02/28/22 1616    Lab Status: Final result Specimen: Nares from Nose Updated: 02/28/22 1704     SARS-CoV-2 Negative     INFLUENZA A PCR Negative     INFLUENZA B PCR Negative     RSV PCR Negative    Narrative:      FOR PEDIATRIC PATIENTS - copy/paste COVID Guidelines URL to browser: [email or web address]    SARS-CoV-2 assay is a Nucleic Acid Amplification assay intended for the  qualitative detection of nucleic acid from SARS-CoV-2 in nasopharyngeal  swabs  Results are for the presumptive identification of SARS-CoV-2 RNA      Positive results are indicative of infection with SARS-CoV-2, the virus  causing COVID-19, but do not rule out bacterial infection or co-infection  with other viruses  Laboratories within the [place] and its  territories are required to report all positive results to the appropriate  public health authorities  Negative results do not preclude SARS-CoV-2  infection and should not be used as the sole basis for treatment or other  patient management decisions  Negative results must be combined with  clinical observations, patient history, and epidemiological information  This test has not been FDA cleared or approved  This test has been authorized by FDA under an Emergency Use Authorization  (EUA)  This test is only authorized for the duration of time the  declaration that circumstances exist justifying the authorization of the  emergency use of an in vitro diagnostic tests for detection of SARS-CoV-2  virus and/or diagnosis of COVID-19 infection under section 564(b)(1) of  the Act, 21 U  S C  418ZJX-1(I)(7), unless the authorization is terminated  or revoked sooner  The test has been validated but independent review by FDA  and CLIA is pending  Test performed using [employer] Gene[employer]pert: This RT-PCR assay targets N2,  a region unique to SARS-CoV-2  A conserved region in the E-gene was chosen  for pan-Sarbecovirus detection which includes SARS-CoV-2      HS Troponin I 2hr [[id number]]     Lab Status: No result Specimen: Blood     HS Troponin 0hr (reflex protocol) [[id number]]  (Abnormal) Collected: 02/28/22 1608    Lab Status: Final result Specimen: Blood from Arm, Left Updated: 02/28/22 1645     hs TnI 0hr 227 ng/L     Comprehensive metabolic panel [[id number]]  (Abnormal) Collected: 02/28/22 1608    Lab Status: Final result Specimen: Blood from Arm, Left Updated: 02/28/22 1637     Sodium 140 mmol/L      Potassium 4 1 mmol/L      Chloride 106 mmol/L      CO2 25 mmol/L      ANION GAP 9 mmol/L      BUN 44 mg/dL      Creatinine 1 85 mg/dL      Glucose 116 mg/dL      Calcium 8 5 mg/dL      AST 20 U/L      ALT 32 U/L      Alkaline Phosphatase 143 U/L      Total Protein 7 6 g/dL      Albumin 3 8 g/dL      Total Bilirubin 1 67 mg/dL      eGFR 33 ml/min/1 73sq m     Narrative:      [place] guidelines for Chronic Kidney Disease (CKD):     Stage 1 with normal or high GFR (GFR > 90 mL/min/1 73 square meters)    Stage 2 Mild CKD (GFR = 60-89 mL/min/1 73 square meters)    Stage 3A Moderate CKD (GFR = 45-59 mL/min/1 73 square meters)    Stage 3B Moderate CKD (GFR = 30-44 mL/min/1 73 square meters)    Stage 4 Severe CKD (GFR = 15-29 mL/min/1 73 square meters)    Stage 5 End Stage CKD (GFR <15 mL/min/1 73 square meters)  Note: GFR calculation is accurate only with a steady state creatinine    Protime-INR [[id number]]  (Abnormal) Collected: 02/28/22 1616    Lab Status: Final result Specimen: Blood from Arm, Left Updated: 02/28/22 1634     Protime 16 0 seconds      INR 1 29    APTT [[id number]]  (Abnormal) Collected: 02/28/22 1616    Lab Status: Final result Specimen: Blood from Arm, Left Updated: 02/28/22 1634     PTT 39 seconds     CBC and differential [[id number]]  (Abnormal) Collected: 02/28/22 1608    Lab Status: Final result Specimen: Blood from Arm, Left Updated: 02/28/22 1622     WBC 13 24 Thousand/uL      RBC 3 77 Million/uL      Hemoglobin 12 6 g/dL      Hematocrit 36 5 %      MCV 97 fL      MCH 33 4 pg      MCHC 34 5 g/dL      RDW 16 7 %      MPV 11 4 fL      Platelets 686 Thousands/uL      nRBC 0 /100 WBCs      Neutrophils Relative 70 %      Immat GRANS % 0 %      Lymphocytes Relative 18 %      Monocytes Relative 11 %      Eosinophils Relative 1 %      Basophils Relative 0 %      Neutrophils Absolute 9 22 Thousands/µL      Immature Grans Absolute 0 05 Thousand/uL      Lymphocytes Absolute 2 44 Thousands/µL      Monocytes Absolute 1 41 Thousand/µL      Eosinophils Absolute 0 07 Thousand/µL      Basophils Absolute 0 05 Thousands/µL XR chest 1 view portable    (Results Pending)              Procedures  Procedures   Please see attendings EKG interpretation      ED Course  ED Course as of 02/28/22 1905   Mon Feb 28, 2022   1623 WBC(!): 13 24   1624 SpO2(!): 89 %  Placed on 2L NC   1638 Creatinine(!): 1 85  Similar to baseline however trending upwards   1647 hs TnI 0hr(!): 227   1649 Chest xray noted for left sided effusion    1707 SARS-COV-2: Negative   1711 NT-proBNP(!): 10,648   1727 Patient accepted to medicine service               MDM  Number of Diagnoses or Management Options  CHF (congestive heart failure) ([facility] [address] ): new and requires workup  Hypoxia: new and requires workup  Pleural effusion: new and requires workup  Diagnosis management comments: 80year old male presents to the emergency department for evaluation of shortness of breath worsening over the past several weeks  Vitals and medical record reviewed  Hypoxic on arrival placed on 2 L nasal cannula  On exam patient is tachypneic  Decreased breath sounds in left base with noted rales  Some pitting edema in his feet bilaterally  Abdomen is soft nontender  Patient found have CHF flare with elevated BNP  EKG was nonischemic  Troponin elevated suspected due to CHF will continue to trend  Creat elevated trending upwards from baseline  ED interpretation of chest x-ray concerning for left-sided pleural effusion  Patient was ordered IV Lasix  Discussed with Medicine who accepted the patient for admission         Amount and/or Complexity of Data Reviewed  Clinical lab tests: ordered and reviewed  Tests in the radiology section of CPT®: ordered and reviewed  Review and summarize past medical records: yes  Discuss the patient with other providers: yes  Independent visualization of images, tracings, or specimens: yes        Disposition  Final diagnoses:   Hypoxia   CHF (congestive heart failure) ([facility] [address] )   Pleural effusion     Time reflects when diagnosis was documented in both MDM as applicable and the Disposition within this note     Time User Action Codes Description Comment    2/28/2022  5:28 PM Emmanuel Denny Add [R09 02] Hypoxia     2/28/2022  5:28 PM Emmanuel Denny Add [I50 9] [facility]     2/28/2022  5:28 PM Emmanuel Denny Add [J90] Pleural effusion     2/28/2022  6:08 PM Lobo Pool Add [I50 33] Acute on chronic diastolic CHF (congestive heart failure) ([facility]     2/28/2022  6:12 PM Bakari Pulse Add [J96 01] Acute respiratory failure with hypoxia [facility])       ED Disposition     ED Disposition Condition Date/Time Comment    Admit Stable Mon Feb 28, 2022  5:28 PM Case was discussed with Dr Vasu Norman and the patient's admission status was agreed to be Admission Status: inpatient status to the service of Dr Vasu Norman   Follow-up Information    None         Current Discharge Medication List      CONTINUE these medications which have NOT CHANGED    Details   amiodarone 200 mg tablet Take 200 mg by mouth daily      amLODIPine (NORVASC) 10 mg tablet Take 1 tablet (10 mg total) by mouth daily with lunch  Qty: 30 tablet, Refills: 0    Associated Diagnoses: Essential hypertension      apixaban (Eliquis) 2 5 mg Take 2 5 mg by mouth 2 (two) times a day        atorvastatin (LIPITOR) 40 mg tablet Take 40 mg by mouth daily      clopidogrel (PLAVIX) 75 mg tablet Take 75 mg by mouth daily      metoprolol tartrate (LOPRESSOR) 100 mg tablet Take 100 mg by mouth every 12 (twelve) hours             No discharge procedures on file      PDMP Review     None          ED Provider  Electronically Signed by           Louise Orlando PA-C  02/28/22 0103

## 2022-02-28 NOTE — ASSESSMENT & PLAN NOTE
Patient has history of chronic AFib is on amiodarone 200 mg daily continue same for now  Also on Lopressor 100 mg twice daily    Continue Eliquis 2 5 mg twice daily once thoracentesis has been completed

## 2022-03-01 ENCOUNTER — APPOINTMENT (INPATIENT)
Dept: RADIOLOGY | Facility: HOSPITAL | Age: 82
DRG: 291 | End: 2022-03-01
Payer: MEDICARE

## 2022-03-01 ENCOUNTER — APPOINTMENT (INPATIENT)
Dept: NON INVASIVE DIAGNOSTICS | Facility: HOSPITAL | Age: 82
DRG: 291 | End: 2022-03-01
Payer: MEDICARE

## 2022-03-01 PROBLEM — J95.811 POSTPROCEDURAL PNEUMOTHORAX: Status: ACTIVE | Noted: 2022-03-01

## 2022-03-01 LAB
ANION GAP SERPL CALCULATED.3IONS-SCNC: 10 MMOL/L (ref 4–13)
AORTIC ROOT: 4 CM
APICAL FOUR CHAMBER EJECTION FRACTION: 40 %
APPEARANCE FLD: ABNORMAL
ATRIAL RATE: 64 BPM
ATRIAL RATE: 64 BPM
BUN SERPL-MCNC: 42 MG/DL (ref 5–25)
CALCIUM SERPL-MCNC: 8.7 MG/DL (ref 8.3–10.1)
CHLORIDE SERPL-SCNC: 103 MMOL/L (ref 100–108)
CO2 SERPL-SCNC: 28 MMOL/L (ref 21–32)
COLOR FLD: ABNORMAL
CREAT SERPL-MCNC: 1.94 MG/DL (ref 0.6–1.3)
DOP CALC MV VTI: 35.16 CM
ERYTHROCYTE [DISTWIDTH] IN BLOOD BY AUTOMATED COUNT: 16.3 % (ref 11.6–15.1)
FRACTIONAL SHORTENING: 16 % (ref 28–44)
GFR SERPL CREATININE-BSD FRML MDRD: 31 ML/MIN/1.73SQ M
GLUCOSE FLD-MCNC: 124 MG/DL
GLUCOSE SERPL-MCNC: 111 MG/DL (ref 65–140)
HCT VFR BLD AUTO: 35.9 % (ref 36.5–49.3)
HGB BLD-MCNC: 12.4 G/DL (ref 12–17)
INTERVENTRICULAR SEPTUM IN DIASTOLE (PARASTERNAL SHORT AXIS VIEW): 1.4 CM
INTERVENTRICULAR SEPTUM: 1.4 CM (ref 0.51–0.95)
LAAS-AP4: 15.2 CM2
LDH FLD L TO P-CCNC: 212 U/L
LEFT ATRIUM SIZE: 4.4 CM
LEFT INTERNAL DIMENSION IN SYSTOLE: 3.2 CM (ref 2.47–3.74)
LEFT VENTRICLE DIASTOLIC VOLUME (MOD BIPLANE): 86 ML (ref 80.71–181.77)
LEFT VENTRICULAR INTERNAL DIMENSION IN DIASTOLE: 3.8 CM (ref 4.03–6)
LEFT VENTRICULAR POSTERIOR WALL IN END DIASTOLE: 1 CM (ref 0.5–0.94)
LEFT VENTRICULAR STROKE VOLUME: 22 ML
LVSV (TEICH): 22 ML
LYMPHOCYTES NFR BLD AUTO: 91 %
MCH RBC QN AUTO: 32.6 PG (ref 26.8–34.3)
MCHC RBC AUTO-ENTMCNC: 34.5 G/DL (ref 31.4–37.4)
MCV RBC AUTO: 95 FL (ref 82–98)
MONO+MESO NFR FLD MANUAL: 1 %
MONONUC CELLS NFR FLD MANUAL: 5 %
MV MEAN GRADIENT: 2 MMHG
MV PEAK GRADIENT: 4 MMHG
MV STENOSIS PRESSURE HALF TIME: 81 MS
MV VALVE AREA P 1/2 METHOD: 2.72 CM2
NEUTS SEG NFR BLD AUTO: 3 %
P AXIS: 67 DEGREES
P AXIS: 85 DEGREES
PH BODY FLUID: 7.9
PLATELET # BLD AUTO: 149 THOUSANDS/UL (ref 149–390)
PMV BLD AUTO: 11.2 FL (ref 8.9–12.7)
POTASSIUM SERPL-SCNC: 3.6 MMOL/L (ref 3.5–5.3)
PR INTERVAL: 240 MS
PR INTERVAL: 244 MS
PROT FLD-MCNC: 4 G/DL
QRS AXIS: 25 DEGREES
QRS AXIS: 26 DEGREES
QRSD INTERVAL: 96 MS
QRSD INTERVAL: 98 MS
QT INTERVAL: 448 MS
QT INTERVAL: 464 MS
QTC INTERVAL: 462 MS
QTC INTERVAL: 478 MS
RBC # BLD AUTO: 3.8 MILLION/UL (ref 3.88–5.62)
RIGHT ATRIAL 2D VOLUME: 11 ML
RIGHT ATRIUM AREA SYSTOLE A4C: 7.5 CM2
RIGHT VENTRICLE ID DIMENSION: 2.9 CM
SITE: ABNORMAL
SL CV LV DIAS VOL ENDO Z SCORE: -1.79
SL CV LV EF: 45
SL CV PED ECHO LEFT VENTRICLE DIASTOLIC VOLUME (MOD BIPLANE) 2D: 62 ML
SL CV PED ECHO LEFT VENTRICLE SYSTOLIC VOLUME (MOD BIPLANE) 2D: 40 ML
SODIUM SERPL-SCNC: 141 MMOL/L (ref 136–145)
T WAVE AXIS: 151 DEGREES
T WAVE AXIS: 162 DEGREES
TOTAL CELLS COUNTED SPEC: 100
TR MAX PG: 33 MMHG
TR PEAK VELOCITY: 2.9 M/S
TRICUSPID VALVE PEAK REGURGITATION VELOCITY: 2.87 M/S
TSH SERPL DL<=0.05 MIU/L-ACNC: 1.33 UIU/ML (ref 0.36–3.74)
VENTRICULAR RATE: 64 BPM
VENTRICULAR RATE: 64 BPM
WBC # BLD AUTO: 12.53 THOUSAND/UL (ref 4.31–10.16)
WBC # FLD MANUAL: 732 /UL
Z-SCORE OF INTERVENTRICULAR SEPTUM IN END DIASTOLE: 5.91
Z-SCORE OF LEFT VENTRICULAR DIMENSION IN END DIASTOLE: -2.58
Z-SCORE OF LEFT VENTRICULAR DIMENSION IN END SYSTOLE: 0.4
Z-SCORE OF LEFT VENTRICULAR POSTERIOR WALL IN END DIASTOLE: 2.49

## 2022-03-01 PROCEDURE — 88305 TISSUE EXAM BY PATHOLOGIST: CPT | Performed by: PATHOLOGY

## 2022-03-01 PROCEDURE — 83615 LACTATE (LD) (LDH) ENZYME: CPT | Performed by: FAMILY MEDICINE

## 2022-03-01 PROCEDURE — 82945 GLUCOSE OTHER FLUID: CPT | Performed by: FAMILY MEDICINE

## 2022-03-01 PROCEDURE — 80048 BASIC METABOLIC PNL TOTAL CA: CPT | Performed by: FAMILY MEDICINE

## 2022-03-01 PROCEDURE — 99223 1ST HOSP IP/OBS HIGH 75: CPT | Performed by: INTERNAL MEDICINE

## 2022-03-01 PROCEDURE — 84443 ASSAY THYROID STIM HORMONE: CPT | Performed by: FAMILY MEDICINE

## 2022-03-01 PROCEDURE — 32555 ASPIRATE PLEURA W/ IMAGING: CPT | Performed by: INTERNAL MEDICINE

## 2022-03-01 PROCEDURE — 93005 ELECTROCARDIOGRAM TRACING: CPT

## 2022-03-01 PROCEDURE — 71045 X-RAY EXAM CHEST 1 VIEW: CPT

## 2022-03-01 PROCEDURE — NC001 PR NO CHARGE: Performed by: INTERNAL MEDICINE

## 2022-03-01 PROCEDURE — 87205 SMEAR GRAM STAIN: CPT | Performed by: FAMILY MEDICINE

## 2022-03-01 PROCEDURE — 87070 CULTURE OTHR SPECIMN AEROBIC: CPT | Performed by: FAMILY MEDICINE

## 2022-03-01 PROCEDURE — 93010 ELECTROCARDIOGRAM REPORT: CPT | Performed by: INTERNAL MEDICINE

## 2022-03-01 PROCEDURE — 93306 TTE W/DOPPLER COMPLETE: CPT | Performed by: INTERNAL MEDICINE

## 2022-03-01 PROCEDURE — 84157 ASSAY OF PROTEIN OTHER: CPT | Performed by: FAMILY MEDICINE

## 2022-03-01 PROCEDURE — 88112 CYTOPATH CELL ENHANCE TECH: CPT | Performed by: PATHOLOGY

## 2022-03-01 PROCEDURE — 83986 ASSAY PH BODY FLUID NOS: CPT | Performed by: FAMILY MEDICINE

## 2022-03-01 PROCEDURE — 99233 SBSQ HOSP IP/OBS HIGH 50: CPT | Performed by: FAMILY MEDICINE

## 2022-03-01 PROCEDURE — 0W9B3ZZ DRAINAGE OF LEFT PLEURAL CAVITY, PERCUTANEOUS APPROACH: ICD-10-PCS | Performed by: INTERNAL MEDICINE

## 2022-03-01 PROCEDURE — 89051 BODY FLUID CELL COUNT: CPT | Performed by: FAMILY MEDICINE

## 2022-03-01 PROCEDURE — 93306 TTE W/DOPPLER COMPLETE: CPT

## 2022-03-01 PROCEDURE — 85027 COMPLETE CBC AUTOMATED: CPT | Performed by: FAMILY MEDICINE

## 2022-03-01 RX ORDER — METOPROLOL SUCCINATE 100 MG/1
200 TABLET, EXTENDED RELEASE ORAL DAILY
Status: DISCONTINUED | OUTPATIENT
Start: 2022-03-02 | End: 2022-03-05 | Stop reason: HOSPADM

## 2022-03-01 RX ORDER — CLOPIDOGREL BISULFATE 75 MG/1
75 TABLET ORAL DAILY
Status: DISCONTINUED | OUTPATIENT
Start: 2022-03-01 | End: 2022-03-05 | Stop reason: HOSPADM

## 2022-03-01 RX ORDER — OXYCODONE HYDROCHLORIDE AND ACETAMINOPHEN 5; 325 MG/1; MG/1
1 TABLET ORAL EVERY 4 HOURS PRN
Status: DISCONTINUED | OUTPATIENT
Start: 2022-03-01 | End: 2022-03-05 | Stop reason: HOSPADM

## 2022-03-01 RX ORDER — TAMSULOSIN HYDROCHLORIDE 0.4 MG/1
0.4 CAPSULE ORAL
Status: DISCONTINUED | OUTPATIENT
Start: 2022-03-02 | End: 2022-03-05 | Stop reason: HOSPADM

## 2022-03-01 RX ORDER — SPIRONOLACTONE 25 MG/1
25 TABLET ORAL DAILY
Status: DISCONTINUED | OUTPATIENT
Start: 2022-03-01 | End: 2022-03-05 | Stop reason: HOSPADM

## 2022-03-01 RX ADMIN — ATORVASTATIN CALCIUM 40 MG: 40 TABLET, FILM COATED ORAL at 09:14

## 2022-03-01 RX ADMIN — FUROSEMIDE 40 MG: 10 INJECTION, SOLUTION INTRAVENOUS at 09:14

## 2022-03-01 RX ADMIN — SPIRONOLACTONE 25 MG: 25 TABLET ORAL at 12:56

## 2022-03-01 RX ADMIN — FUROSEMIDE 40 MG: 10 INJECTION, SOLUTION INTRAVENOUS at 16:07

## 2022-03-01 RX ADMIN — CLOPIDOGREL BISULFATE 75 MG: 75 TABLET ORAL at 09:15

## 2022-03-01 RX ADMIN — APIXABAN 2.5 MG: 2.5 TABLET, FILM COATED ORAL at 09:16

## 2022-03-01 RX ADMIN — APIXABAN 2.5 MG: 2.5 TABLET, FILM COATED ORAL at 16:07

## 2022-03-01 RX ADMIN — ACETAMINOPHEN 650 MG: 325 TABLET ORAL at 09:29

## 2022-03-01 RX ADMIN — OXYCODONE HYDROCHLORIDE AND ACETAMINOPHEN 1 TABLET: 5; 325 TABLET ORAL at 16:07

## 2022-03-01 RX ADMIN — METOPROLOL TARTRATE 100 MG: 50 TABLET, FILM COATED ORAL at 09:14

## 2022-03-01 RX ADMIN — AMIODARONE HYDROCHLORIDE 200 MG: 200 TABLET ORAL at 09:15

## 2022-03-01 NOTE — ASSESSMENT & PLAN NOTE
Blood pressure elevated on presentation secondary to acute CHF exacerbation  Received IV Lasix following which blood pressure is starting to improve  Continue home regimen of metoprolol 100 mg twice daily    Blood pressure is much improved  Cardiology improved appreciated

## 2022-03-01 NOTE — PLAN OF CARE
Problem: Nutrition/Hydration-ADULT  Goal: Nutrient/Hydration intake appropriate for improving, restoring or maintaining nutritional needs  Description: Monitor and assess patient's nutrition/hydration status for malnutrition  Collaborate with interdisciplinary team and initiate plan and interventions as ordered  Monitor patient's weight and dietary intake as ordered or per policy  Utilize nutrition screening tool and intervene as necessary  Determine patient's food preferences and provide high-protein, high-caloric foods as appropriate       INTERVENTIONS:  - Monitor oral intake, urinary output, labs, and treatment plans  - Assess nutrition and hydration status and recommend course of action  - Evaluate amount of meals eaten  - Assist patient with eating if necessary   - Allow adequate time for meals  - Recommend/ encourage appropriate diets, oral nutritional supplements, and vitamin/mineral supplements  - Order, calculate, and assess calorie counts as needed  - Recommend, monitor, and adjust tube feedings and TPN/PPN based on assessed needs  - Assess need for intravenous fluids  - Provide specific nutrition/hydration education as appropriate  - Include patient/family/caregiver in decisions related to nutrition  Outcome: Progressing     Problem: MOBILITY - ADULT  Goal: Maintain or return to baseline ADL function  Description: INTERVENTIONS:  -  Assess patient's ability to carry out ADLs; assess patient's baseline for ADL function and identify physical deficits which impact ability to perform ADLs (bathing, care of mouth/teeth, toileting, grooming, dressing, etc )  - Assess/evaluate cause of self-care deficits   - Assess range of motion  - Assess patient's mobility; develop plan if impaired  - Assess patient's need for assistive devices and provide as appropriate  - Encourage maximum independence but intervene and supervise when necessary  - Involve family in performance of ADLs  - Assess for home care needs following discharge   - Consider OT consult to assist with ADL evaluation and planning for discharge  - Provide patient education as appropriate  Outcome: Progressing  Goal: Maintains/Returns to pre admission functional level  Description: INTERVENTIONS:  - Perform BMAT or MOVE assessment daily    - Set and communicate daily mobility goal to care team and patient/family/caregiver  - Collaborate with rehabilitation services on mobility goals if consulted  - Perform Range of Motion 2 times a day  - Reposition patient every 2 hours    - Dangle patient 2 times a day  - Stand patient 2 times a day  - Ambulate patient 2 times a day  - Out of bed to chair 2 times a day   - Out of bed for meals 2 times a day  - Out of bed for toileting  - Record patient progress and toleration of activity level   Outcome: Progressing     Problem: Potential for Falls  Goal: Patient will remain free of falls  Description: INTERVENTIONS:  - Educate patient/family on patient safety including physical limitations  - Instruct patient to call for assistance with activity   - Consult OT/PT to assist with strengthening/mobility   - Keep Call bell within reach  - Keep bed low and locked with side rails adjusted as appropriate  - Keep care items and personal belongings within reach  - Initiate and maintain comfort rounds  - Make Fall Risk Sign visible to staff  - Offer Toileting every 2 Hours, in advance of need  - Initiate/Maintain 2 alarm  - Obtain necessary fall risk management equipment: 2  - Apply yellow socks and bracelet for high fall risk patients  - Consider moving patient to room near nurses station  Outcome: Progressing     Problem: PAIN - ADULT  Goal: Verbalizes/displays adequate comfort level or baseline comfort level  Description: Interventions:  - Encourage patient to monitor pain and request assistance  - Assess pain using appropriate pain scale  - Administer analgesics based on type and severity of pain and evaluate response  - Implement non-pharmacological measures as appropriate and evaluate response  - Consider cultural and social influences on pain and pain management  - Notify physician/advanced practitioner if interventions unsuccessful or patient reports new pain  Outcome: Progressing     Problem: INFECTION - ADULT  Goal: Absence or prevention of progression during hospitalization  Description: INTERVENTIONS:  - Assess and monitor for signs and symptoms of infection  - Monitor lab/diagnostic results  - Monitor all insertion sites, i e  indwelling lines, tubes, and drains  - Monitor endotracheal if appropriate and nasal secretions for changes in amount and color  - [place] appropriate cooling/warming therapies per order  - Administer medications as ordered  - Instruct and encourage patient and family to use good hand hygiene technique  - Identify and instruct in appropriate isolation precautions for identified infection/condition  Outcome: Progressing  Goal: Absence of fever/infection during neutropenic period  Description: INTERVENTIONS:  - Monitor WBC    Outcome: Progressing     Problem: SAFETY ADULT  Goal: Maintain or return to baseline ADL function  Description: INTERVENTIONS:  -  Assess patient's ability to carry out ADLs; assess patient's baseline for ADL function and identify physical deficits which impact ability to perform ADLs (bathing, care of mouth/teeth, toileting, grooming, dressing, etc )  - Assess/evaluate cause of self-care deficits   - Assess range of motion  - Assess patient's mobility; develop plan if impaired  - Assess patient's need for assistive devices and provide as appropriate  - Encourage maximum independence but intervene and supervise when necessary  - Involve family in performance of ADLs  - Assess for home care needs following discharge   - Consider OT consult to assist with ADL evaluation and planning for discharge  - Provide patient education as appropriate  Outcome: Progressing  Goal: Maintains/Returns to pre admission functional level  Description: INTERVENTIONS:  - Perform BMAT or MOVE assessment daily    - Set and communicate daily mobility goal to care team and patient/family/caregiver  - Collaborate with rehabilitation services on mobility goals if consulted  - Perform Range of Motion 2 times a day  - Reposition patient every 2 hours    - Dangle patient 2 times a day  - Stand patient 2 times a day  - Ambulate patient 2 times a day  - Out of bed to chair 2 times a day   - Out of bed for meals 2 times a day  - Out of bed for toileting  - Record patient progress and toleration of activity level   Outcome: Progressing  Goal: Patient will remain free of falls  Description: INTERVENTIONS:  - Educate patient/family on patient safety including physical limitations  - Instruct patient to call for assistance with activity   - Consult OT/PT to assist with strengthening/mobility   - Keep Call bell within reach  - Keep bed low and locked with side rails adjusted as appropriate  - Keep care items and personal belongings within reach  - Initiate and maintain comfort rounds  - Make Fall Risk Sign visible to staff  - Offer Toileting every 2 Hours, in advance of need  - Initiate/Maintain 2alarm  - Obtain necessary fall risk management equipment: 2  - Apply yellow socks and bracelet for high fall risk patients  - Consider moving patient to room near nurses station  Outcome: Progressing     Problem: DISCHARGE PLANNING  Goal: Discharge to home or other facility with appropriate resources  Description: INTERVENTIONS:  - Identify barriers to discharge w/patient and caregiver  - Arrange for needed discharge resources and transportation as appropriate  - Identify discharge learning needs (meds, wound care, etc )  - Arrange for interpretive services to assist at discharge as needed  - Refer to Case Management Department for coordinating discharge planning if the patient needs post-hospital services based on physician/advanced practitioner order or complex needs related to functional status, cognitive ability, or social support system  Outcome: Progressing     Problem: Knowledge Deficit  Goal: Patient/family/caregiver demonstrates understanding of disease process, treatment plan, medications, and discharge instructions  Description: Complete learning assessment and assess knowledge base    Interventions:  - Provide teaching at level of understanding  - Provide teaching via preferred learning methods  Outcome: Progressing

## 2022-03-01 NOTE — PLAN OF CARE
Problem: Nutrition/Hydration-ADULT  Goal: Nutrient/Hydration intake appropriate for improving, restoring or maintaining nutritional needs  Description: Monitor and assess patient's nutrition/hydration status for malnutrition  Collaborate with interdisciplinary team and initiate plan and interventions as ordered  Monitor patient's weight and dietary intake as ordered or per policy  Utilize nutrition screening tool and intervene as necessary  Determine patient's food preferences and provide high-protein, high-caloric foods as appropriate       INTERVENTIONS:  - Monitor oral intake, urinary output, labs, and treatment plans  - Assess nutrition and hydration status and recommend course of action  - Evaluate amount of meals eaten  - Assist patient with eating if necessary   - Allow adequate time for meals  - Recommend/ encourage appropriate diets, oral nutritional supplements, and vitamin/mineral supplements  - Order, calculate, and assess calorie counts as needed  - Recommend, monitor, and adjust tube feedings and TPN/PPN based on assessed needs  - Assess need for intravenous fluids  - Provide specific nutrition/hydration education as appropriate  - Include patient/family/caregiver in decisions related to nutrition  Outcome: Progressing     Problem: PAIN - ADULT  Goal: Verbalizes/displays adequate comfort level or baseline comfort level  Description: Interventions:  - Encourage patient to monitor pain and request assistance  - Assess pain using appropriate pain scale  - Administer analgesics based on type and severity of pain and evaluate response  - Implement non-pharmacological measures as appropriate and evaluate response  - Consider cultural and social influences on pain and pain management  - Notify physician/advanced practitioner if interventions unsuccessful or patient reports new pain  Outcome: Progressing     Problem: Knowledge Deficit  Goal: Patient/family/caregiver demonstrates understanding of disease process, treatment plan, medications, and discharge instructions  Description: Complete learning assessment and assess knowledge base    Interventions:  - Provide teaching at level of understanding  - Provide teaching via preferred learning methods  Outcome: Progressing   toleration of activity level   Outcome: Progressing

## 2022-03-01 NOTE — ASSESSMENT & PLAN NOTE
Patient with known CAD  CABG x 2 in 2014  DAPHNEY to left main and ramus in 10/2021  Continue daily Plavix 75 mg  Not on aspirin to avoid triple therapy in setting of Eliquis use  Continue beta blocker and statin  HS troponin elevated but flat, peak 257  Suspect demand  Denies any chest pain and no acute changes on ECG  Echo 3/1/2022 with EF 45% and hypokinesis in mid anteroseptal, apical septal and apical inferior walls  Optimized goal directed medical therapy with transition to metoprolol succinate and addition of spironolactone  Would recommend follow up imaging through primary cardiologist for monitoring

## 2022-03-01 NOTE — PROGRESS NOTES
[address]  Progress Note Sabrina Ariza 1940, 80 y o  male MRN: [id number]  Unit/Bed#: -01 Encounter: [id number]  Primary Care Provider: Ramona Diaz DO   Date and time admitted to hospital: 2/28/2022  3:58 PM    Postprocedural pneumothorax  Assessment & Plan  Mild left-sided pneumothorax after procedure  Patient remained hemodynamically stable  Continue to monitor-serial chest x-ray q 4 hours  Critical care team is aware of the patient  Family updated    * Acute respiratory failure with hypoxia [facility])  Assessment & Plan  Patient has acute respiratory failure with hypoxia secondary to acute on chronic diastolic CHF exacerbation with left-sided pleural effusion  Currently patient is requiring 2 L of oxygen  Patient does not use any oxygen at home  Status post thoracentesis with removal of 2 2 L serosanguineous fluid-complicated with mild pneumothorax, continue to monitor    Chronic a-fib [facility])  Assessment & Plan  Patient has history of chronic AFib is on amiodarone 200 mg daily continue same for now  Also on Lopressor 100 mg twice daily  Continue Eliquis 2 5 mg twice daily     Acute on chronic diastolic CHF (congestive heart failure) ([facility])  Assessment & Plan  Wt Readings from Last 3 Encounters:   03/01/22 65 8 kg (145 lb)   12/19/21 68 kg (150 lb)   10/06/21 76 2 kg (167 lb 15 9 oz)     Patient has acute on chronic diastolic CHF exacerbation  Known history of coronary artery disease status post CABG 8 years ago and now recently had 2 stents 10/2021  Last 2D echo from 04/20/2021 showed EF of 60%  Patient had a complicated hospital course at the end of last year in [facility] and records are not completely available a but it seems like patient had an acute MI had 2 stents placed in the left main and ramus  Patient also had AFib with RVR underwent cardioversion  Also had episode of GI bleed and found have a stomach ulcer    Was admitted for almost 3 weeks  Recently he was seen outpatient by his cardiologist krystina Cardiology who stopped his Lasix  Patient appears to be gaining weight again and presented with worsening shortness of breath and found to have worsening left-sided pleural effusion  Will place on Lasix 40 mg IV b i d   Consult placed to Critical Care to evaluate for left-sided thoracentesis  Temporarily hold Plavix and Eliquis  Please note that patient is not on aspirin anymore as per his primary cardiologist   Continue Eliquis and Plavix  Status post thoracentesis with removal of 2 2 L serosanguineous fluid        Hypertensive urgency  Assessment & Plan  Blood pressure elevated on presentation secondary to acute CHF exacerbation  Received IV Lasix following which blood pressure is starting to improve  Continue home regimen of metoprolol 100 mg twice daily  Blood pressure is much improved  Cardiology improved appreciated          VTE Pharmacologic Prophylaxis:   High Risk (Score >/= 5) - Pharmacological DVT Prophylaxis Ordered: apixaban (Eliquis)  Sequential Compression Devices Ordered  Patient Centered Rounds: I performed bedside rounds with nursing staff today  Discussions with Specialists or Other Care Team Provider:  Critical Care, Cardiology    Education and Discussions with Family / Patient: Updated  (wife and daughter) at bedside  Time Spent for Care: 15 minutes  More than 50% of total time spent on counseling and coordination of care as described above  Current Length of Stay: 1 day(s)  Current Patient Status: Inpatient   Certification Statement: The patient will continue to require additional inpatient hospital stay due to To monitor above condition  Discharge Plan: Anticipate discharge in 48-72 hrs to To be determined    Code Status: Level 1 - Full Code    Subjective:   Seen and evaluated during the round  Patient resting comfortably    Denies any chest pain, nausea, vomiting but reports mild short of breath  Objective:     Vitals:   Temp (24hrs), Av 4 °F (36 3 °C), Min:96 5 °F (35 8 °C), Max:97 9 °F (36 6 °C)    Temp:  [96 5 °F (35 8 °C)-97 9 °F (36 6 °C)] 97 9 °F (36 6 °C)  HR:  [60-81] 60  Resp:  [17-23] 17  BP: (136-166)/(61-85) 136/61  SpO2:  [88 %-98 %] 98 %  Body mass index is 22 05 kg/m²  Input and Output Summary (last 24 hours): Intake/Output Summary (Last 24 hours) at 3/1/2022 1652  Last data filed at 3/1/2022 1243  Gross per 24 hour   Intake 500 ml   Output 2850 ml   Net -2350 ml       Physical Exam:   Physical Exam  Vitals and nursing note reviewed  HENT:      Nose: No congestion  Mouth/Throat:      Mouth: Mucous membranes are moist    Eyes:      General: No scleral icterus  Conjunctiva/sclera: Conjunctivae normal       Pupils: Pupils are equal, round, and reactive to light  Cardiovascular:      Rate and Rhythm: Normal rate  Heart sounds: No gallop  Pulmonary:      Effort: Pulmonary effort is normal       Breath sounds: No stridor  Rales present  No wheezing  Abdominal:      General: Abdomen is flat  Bowel sounds are normal  There is no distension  Palpations: There is no mass  Hernia: No hernia is present  Musculoskeletal:         General: Normal range of motion  Cervical back: Normal range of motion  Lymphadenopathy:      Cervical: No cervical adenopathy  Skin:     General: Skin is warm  Neurological:      General: No focal deficit present  Mental Status: He is alert and oriented to person, place, and time  Cranial Nerves: No cranial nerve deficit  Motor: No weakness           Additional Data:     Labs:  Results from last 7 days   Lab Units 22  0447 22  1608 22  1608   WBC Thousand/uL 12 53*   < > 13 24*   HEMOGLOBIN g/dL 12 4   < > 12 6   HEMATOCRIT % 35 9*   < > 36 5   PLATELETS Thousands/uL 149   < > 181   NEUTROS PCT %  --   --  70   LYMPHS PCT %  --   --  18   MONOS PCT %  --   --  11   EOS PCT %  -- --  1    < > = values in this interval not displayed  Results from last 7 days   Lab Units 03/01/22  0447 02/28/22  1608 02/28/22  1608   SODIUM mmol/L 141   < > 140   POTASSIUM mmol/L 3 6   < > 4 1   CHLORIDE mmol/L 103   < > 106   CO2 mmol/L 28   < > 25   BUN mg/dL 42*   < > 44*   CREATININE mg/dL 1 94*   < > 1 85*   ANION GAP mmol/L 10   < > 9   CALCIUM mg/dL 8 7   < > 8 5   ALBUMIN g/dL  --   --  3 8   TOTAL BILIRUBIN mg/dL  --   --  1 67*   ALK PHOS U/L  --   --  143*   ALT U/L  --   --  32   AST U/L  --   --  20   GLUCOSE RANDOM mg/dL 111   < > 116    < > = values in this interval not displayed  Results from last 7 days   Lab Units 02/28/22  1616   INR  1 29*                   Lines/Drains:  Invasive Devices  Report    Peripheral Intravenous Line            Peripheral IV 02/28/22 Left Antecubital 1 day                      Imaging: Reviewed radiology reports from this admission including: chest xray    Recent Cultures (last 7 days):         Last 24 Hours Medication List:   Current Facility-Administered Medications   Medication Dose Route Frequency Provider Last Rate    acetaminophen  650 mg Oral Q6H PRN Margaret Wolf MD      amiodarone  200 mg Oral Daily Margaret Wolf MD      apixaban  2 5 mg Oral BID Chemo Cardenas MD      atorvastatin  40 mg Oral Daily Margaret Wolf MD      clopidogrel  75 mg Oral Daily Chemo Cardenas MD      furosemide  40 mg Intravenous BID (diuretic) MD Ortiz Linn Peacockangélica Jeffreys ON 3/2/2022] metoprolol succinate  200 mg Oral Daily Ventura Hollins MD      oxyCODONE-acetaminophen  1 tablet Oral Q4H PRN Chemo Cardenas MD      spironolactone  25 mg Oral Daily Ventura Hollins MD          Today, Patient Was Seen By: Chemo Cardenas MD    **Please Note: This note may have been constructed using a voice recognition system  **

## 2022-03-01 NOTE — ASSESSMENT & PLAN NOTE
Wt Readings from Last 3 Encounters:   03/01/22 65 8 kg (145 lb)   12/19/21 68 kg (150 lb)   10/06/21 76 2 kg (167 lb 15 9 oz)     Patient has acute on chronic diastolic CHF exacerbation  Known history of coronary artery disease status post CABG 8 years ago and now recently had 2 stents 10/2021  Last 2D echo from 04/20/2021 showed EF of 60%  Patient had a complicated hospital course at the end of last year in [facility] and records are not completely available a but it seems like patient had an acute MI had 2 stents placed in the left main and ramus  Patient also had AFib with RVR underwent cardioversion  Also had episode of GI bleed and found have a stomach ulcer  Was admitted for almost 3 weeks  Recently he was seen outpatient by his cardiologist krystina Cardiology who stopped his Lasix  Patient appears to be gaining weight again and presented with worsening shortness of breath and found to have worsening left-sided pleural effusion  Will place on Lasix 40 mg IV b i d   Consult placed to Critical Care to evaluate for left-sided thoracentesis  Temporarily hold Plavix and Eliquis    Please note that patient is not on aspirin anymore as per his primary cardiologist   Continue Eliquis and Plavix  Status post thoracentesis with removal of 2 2 L serosanguineous fluid

## 2022-03-01 NOTE — CONSULTS
Consult Cardiology    [facility] 1940, 80 y o  male MRN: [id number]    Unit/Bed#: -01 Encounter: [id number]    Attending Provider: Mary Short MD   Primary Care Provider: Ramiro Rodgers DO   Date admitted to hospital: 2/28/2022       Inpatient consult to Cardiology  Consult performed by: SANDRINE Cagle  Consult ordered by: Yoselin Rogers MD          Acute on chronic diastolic CHF (congestive heart failure) [facility])  Assessment & Plan  Wt Readings from Last 3 Encounters:   03/01/22 65 8 kg (145 lb 2 oz)   12/19/21 68 kg (150 lb)   10/06/21 76 2 kg (167 lb 15 9 oz)     Patient presented with progressively worsening shortness of breath x 2 weeks  His furosemide was discontinued by his primary cardiologist in December 2021  On presentation NT proBNP elevated at 10,648 with large left pleural effusion on chest xray  He is responding well to IV furosemide 40 mg BID, which we will continue  Awaiting thoracentesis this am   Echo 4/2021 with EF 60% and mild-mod MR  Echo today pending  Continue strict I's/O's, standing daily weights, fluid restriction  Optimize electrolytes for K+>4, Mag > 2  Reviewed 2 g sodium diet with patient  Mitral regurgitation  Assessment & Plan  S/P mitral ring in 2014  Echo 4/2021 with mild-moderate regurgitation  Awaiting updated echo this admission  Coronary artery disease involving coronary bypass graft  Assessment & Plan  Patient with known CAD  CABG x 2 in 2014  DAPHNEY to left main and ramus in 10/2021  Resume Plavix 75 mg daily as soon as thoracentesis complete  Not on aspirin to avoid triple therapy in setting of Eliquis use  Continue beta blocker and statin  HS troponin elevated but flat, peak 257  Suspect demand in setting of CHF  Denies any chest pain and no acute changes on ECG  Chronic a-fib (HCC)  Assessment & Plan  S/P cardioversion    Remains on amiodarone 200 mg daily and Eliquis 2 5 mg BID (qualifies for reduced dose due to age and renal status)  Stage 3a chronic kidney disease [facility])  Assessment & Plan  Lab Results   Component Value Date    EGFR 31 03/01/2022    EGFR 33 02/28/2022    EGFR 35 11/26/2021    CREATININE 1 94 (H) 03/01/2022    CREATININE 1 85 (H) 02/28/2022    CREATININE 1 76 (H) 11/26/2021     Will monitor renal function with diuresis  Avoiding nephrotoxic agents  Hyperlipidemia, mixed  Assessment & Plan  Continue statin therapy for goal LDL < 70 given known CAD    Hypertensive urgency  Assessment & Plan  Blood pressure elevated at admission, however is much improved today  Continue metoprolol tartrate 100 mg BID  Amlodipine on hold to allow for aggressive diuresis but would resume prior to discharge  * Acute respiratory failure with hypoxia (HCC)  Assessment & Plan  Secondary to volume overload with large left pleural effusion  Presently awaiting thoracentesis  Continues on O2 via [place]      Physician Requesting Consult: Stu Jackson MD    Reason for Consult / Principal Problem: Acute on chronic heart failure      HPI: Lexis Medina is a 80y o  year old male who has a history of CAD s/p CABG x 2 in 2014 and DAPHNEY to LM and ramus in 76/3485, chronic diastolic heart failure, paroxysmal a fib, mitral regurgitation s/p mitral ring 2014, GI bleed who follows with cardiologist Dr Krystal Lamb at [facility]  Patient presented to [address] [place] [address] 2/28/2022 with complaint of progressively worsening shortness of breath for the past 2 weeks  He states his wife noticed that his legs were more swollen than usual  He denied chest pain, lightheadedness/dizziness, palpitations or heart racing  He last saw his primary cardiologist in December at which time his furosemide was discontinued  He states because the cardiologist felt it was not needed  He had been on triple therapy for recent stent placement and aspirin was discontinued  He continues on Plavix and Eliquis  He denies recent GI bleeding or tarry stools    Upon presentation NT proBNP was 10,648  Chest xray showed a large left pleural effusion, HS troponins were elevated but flat with peak of 257  ECG did not reveal acute ST changes  He did require supplemental O2  He was started on IV furosemide with good diuresis  At the time of my consultation he is resting comfortably in bed  He continues to note shortness of breath  Critical care is preparing to complete a thoracentesis at bedside  He denies chest discomfort  He does not feel his legs are swollen but he states his wife feels they are  He denies lightheadedness  He does feel that he is urinating well since last evening  No new complaints today  Review of Systems   Constitutional: Negative for chills and fever  HENT: Negative for ear pain and sore throat  Eyes: Negative for pain and visual disturbance  Respiratory: Positive for shortness of breath  Negative for cough  Cardiovascular: Positive for leg swelling  Negative for chest pain and palpitations  Gastrointestinal: Negative for abdominal pain and vomiting  Genitourinary: Negative for dysuria and hematuria  Musculoskeletal: Negative for arthralgias and back pain  Skin: Negative for color change and rash  Neurological: Negative for seizures and syncope  All other systems reviewed and are negative         Historical Information     Past Medical History:   Diagnosis Date    CHF (congestive heart failure) ([address] )     Coronary artery disease     Hyperlipidemia     Hypertension     Paroxysmal atrial fibrillation ([address] )      Past Surgical History:   Procedure Laterality Date    CARDIAC SURGERY      cabg x 2 2014 [facility]    CORONARY ANGIOPLASTY WITH STENT PLACEMENT  10/2021    DAPHNEY to left main and ramus    MITRAL VALVE REPAIR  2014    mitral ring     Social History     Substance and Sexual Activity   Alcohol Use Not Currently     Social History     Substance and Sexual Activity   Drug Use Never     Social History     Tobacco Use   Smoking Status Never Smoker   Smokeless Tobacco Never Used       Family History:   Family History   Problem Relation Age of Onset    Hypertension Father        Meds/Allergies     current meds:   Current Facility-Administered Medications   Medication Dose Route Frequency    acetaminophen (TYLENOL) tablet 650 mg  650 mg Oral Q6H PRN    amiodarone tablet 200 mg  200 mg Oral Daily    apixaban (ELIQUIS) tablet 2 5 mg  2 5 mg Oral BID    atorvastatin (LIPITOR) tablet 40 mg  40 mg Oral Daily    clopidogrel (PLAVIX) tablet 75 mg  75 mg Oral Daily    furosemide (LASIX) injection 40 mg  40 mg Intravenous BID (diuretic)    metoprolol tartrate (LOPRESSOR) tablet 100 mg  100 mg Oral Q12H [address]          No Known Allergies    Objective     Vitals: Blood pressure 136/69, pulse 64, temperature 97 7 °F (36 5 °C), resp  rate 17, height 5' 8" (1 727 m), weight 65 8 kg (145 lb 2 oz), SpO2 97 %  , Body mass index is 22 07 kg/m²  Orthostatic Blood Pressures      Most Recent Value   Blood Pressure 136/69 filed at 03/01/2022 0709   Patient Position - Orthostatic VS Sitting filed at 02/28/2022 4369          Systolic (74MVP), BHK:466 , Min:136 , RVF:234     Diastolic (14NPO), MDP:16, Min:69, Max:86        Intake/Output Summary (Last 24 hours) at 3/1/2022 1003  Last data filed at 3/1/2022 0610  Gross per 24 hour   Intake 500 ml   Output 2050 ml   Net -1550 ml       Weight (last 2 days)     Date/Time Weight    03/01/22 0600 65 8 (145 13)    02/28/22 1559 71 1 (156 75)          Invasive Devices  Report    Peripheral Intravenous Line            Peripheral IV 02/28/22 Left Antecubital <1 day                Physical Exam  Vitals and nursing note reviewed  Constitutional:       Appearance: He is well-developed  HENT:      Head: Normocephalic and atraumatic  Eyes:      Conjunctiva/sclera: Conjunctivae normal    Neck:      Vascular: JVD present  Cardiovascular:      Rate and Rhythm: Normal rate and regular rhythm  Heart sounds: Murmur heard  Systolic murmur is present  Comments: Mild bilateral leg edema  Pulmonary:      Effort: Pulmonary effort is normal  No respiratory distress  Breath sounds: Examination of the right-middle field reveals rales  Examination of the left-middle field reveals decreased breath sounds  Examination of the right-lower field reveals rales  Examination of the left-lower field reveals decreased breath sounds  Decreased breath sounds and rales present  Abdominal:      Palpations: Abdomen is soft  Tenderness: There is no abdominal tenderness  Musculoskeletal:      Cervical back: Neck supple  Skin:     General: Skin is warm and dry  Neurological:      Mental Status: He is alert  Psychiatric:         Mood and Affect: Mood and affect normal          Speech: Speech normal          Behavior: Behavior normal  Behavior is cooperative           Cognition and Memory: Cognition and memory normal               Laboratory Results:    Results from last 7 days   Lab Units 02/28/22  1608   CK TOTAL U/L 99       CBC with diff:   Results from last 7 days   Lab Units 03/01/22  0447 02/28/22  1608   WBC Thousand/uL 12 53* 13 24*   HEMOGLOBIN g/dL 12 4 12 6   HEMATOCRIT % 35 9* 36 5   MCV fL 95 97   PLATELETS Thousands/uL 149 181   MCH pg 32 6 33 4   MCHC g/dL 34 5 34 5   RDW % 16 3* 16 7*   MPV fL 11 2 11 4   NRBC AUTO /100 WBCs  --  0         CMP:  Results from last 7 days   Lab Units 03/01/22  0447 02/28/22  1608   POTASSIUM mmol/L 3 6 4 1   CHLORIDE mmol/L 103 106   CO2 mmol/L 28 25   BUN mg/dL 42* 44*   CREATININE mg/dL 1 94* 1 85*   CALCIUM mg/dL 8 7 8 5   AST U/L  --  20   ALT U/L  --  32   ALK PHOS U/L  --  143*   EGFR ml/min/1 73sq m 31 33       BMP:  Results from last 7 days   Lab Units 03/01/22  0447 02/28/22  1608   POTASSIUM mmol/L 3 6 4 1   CHLORIDE mmol/L 103 106   CO2 mmol/L 28 25   BUN mg/dL 42* 44*   CREATININE mg/dL 1 94* 1 85*   CALCIUM mg/dL 8 7 8 5       BNP:  10,648    HS Troponin 0 hr: 227,  2 hr: 237, 4hr: 257    Magnesium:   Results from last 7 days   Lab Units 22  1608   MAGNESIUM mg/dL 2 3       Coags:   Results from last 7 days   Lab Units 22  1616   PTT seconds 39*   INR  1 29*       TSH:   1 3    Cardiac testing:     Results for orders placed during the hospital encounter of 21    Echo complete with contrast if indicated    Narrative  [facility] [address]    Transthoracic Echocardiogram  2D, M-mode, Doppler, and Color Doppler    Study date:  2021    Patient: Sandrita Pérez  MR number: [id number]  Account number: [de-identified]  : 1940  Age: 80 years  Gender: Male  Status: Inpatient  [facility]  Height: 68 in  Weight: 168 lb  BP: 173/ 74 mmHg    Indications: Bacteremia    Diagnoses: R78 81 - Bacteremia    Sonographer:  SILAS Carmona  Referring Physician:  Jennifer Haywood MD  Group:  Emilee Harry  Interpreting Physician:  Cele Pham DO    SUMMARY    LEFT VENTRICLE:  Abnormal septal motion consistent with prior cardiac surgery  Systolic function was normal  Ejection fraction was estimated to be 60 %  There was mild concentric hypertrophy with a sigmoid shaped basal septum  RIGHT VENTRICLE:  The size was normal   Systolic function was normal visually  Tapse 1 4 cm  LEFT ATRIUM:  The atrium was mildly dilated  MITRAL VALVE:  Prior repair procedures: surgical repair with a #28 annuloplasty ring 3/7/2014  Transmitral velocity was increased  Mean gradient 5 mmHg (4 mmHg on prior study 2015)  There was mild to moderate regurgitation  TRICUSPID VALVE:  There was mild regurgitation  Estimated peak PA pressure was 35 mmHg  The findings suggest mild pulmonary hypertension  PULMONIC VALVE:  There was trace regurgitation  AORTA:  There was dilatation of the ascending aorta  3 8 cm      COMPARISONS:  Compared to prior study 2015, the degree of mitral regurgitation has increased from minimal on prior study to mild-moderate on current study  Minimal increase in the mitral valve gradient from 4 mmHg to 5 mmHg  HISTORY: PRIOR HISTORY: MV repair, CAD  MR, Carotid Artery stenosis  CKD3    PROCEDURE: The study was performed in the [facility]  This was a routine study  The transthoracic approach was used  The study included complete 2D imaging, M-mode, complete spectral Doppler, and color Doppler  The heart  rate was 71 bpm, at the start of the study  Images were obtained from the parasternal, apical, subcostal, and suprasternal notch acoustic windows  Echocardiographic views were limited due to lung interference  Image quality was adequate  LEFT VENTRICLE: Abnormal septal motion consistent with prior cardiac surgery  Size was normal  Systolic function was normal  Ejection fraction was estimated to be 60 %  There was mild concentric hypertrophy with a sigmoid shaped basal  septum  DOPPLER: Left ventricular diastolic function is indeterminate due to mitral valve repair  RIGHT VENTRICLE: The size was normal  Systolic function was normal visually  Tapse 1 4 cm  LEFT ATRIUM: The atrium was mildly dilated  RIGHT ATRIUM: Size was normal     MITRAL VALVE: There was lower normal leaflet separation  Prior repair procedures: surgical repair with a #28 annuloplasty ring 3/7/2014  DOPPLER: Transmitral velocity was increased  Mean gradient 5 mmHg (4 mmHg on prior study 11/24/2015)  There was mild to moderate regurgitation  AORTIC VALVE: The valve was trileaflet  Leaflets exhibited mildly increased thickness, mild calcification, and normal cuspal separation  There was no echocardiographic evidence of vegetation  DOPPLER: Transaortic velocity was within the  normal range  There was no evidence for stenosis  There was no significant regurgitation  TRICUSPID VALVE: The valve structure was normal  There was normal leaflet separation   DOPPLER: The transtricuspid velocity was within the normal range  There was no evidence for stenosis  There was mild regurgitation  Estimated peak PA  pressure was 35 mmHg  The findings suggest mild pulmonary hypertension  PULMONIC VALVE: Not well visualized  DOPPLER: The transpulmonic velocity was within the normal range  There was trace regurgitation  PERICARDIUM: There was no pericardial effusion  AORTA: The root exhibited normal size  There was dilatation of the ascending aorta  3 8 cm  SYSTEMIC VEINS: IVC: The inferior vena cava was normal in size  SYSTEM MEASUREMENT TABLES    2D  %FS: 29 1 %  AV Diam: 3 47 cm  EDV(Teich): 101 77 ml  EF(Teich): 55 87 %  ESV(Teich): 44 91 ml  IVSd: 1 24 cm  LA Diam: 5 01 cm  LAAs A4C: 13 54 cm2  LAESV A-L A4C: 34 3 ml  LAESV MOD A4C: 32 68 ml  LALs A4C: 4 53 cm  LVEDV MOD A4C: 99 57 ml  LVEF MOD A4C: 57 25 %  LVESV MOD A4C: 42 56 ml  LVIDd: 4 69 cm  LVIDs: 3 32 cm  LVLd A4C: 8 82 cm  LVLs A4C: 8 cm  LVPWd: 1 19 cm  RAAs A4C: 9 72 cm2  RAESV A-L: 16 93 ml  RAESV MOD: 16 99 ml  RALs: 4 73 cm  RVIDd: 2 83 cm  RWT: 0 51  SV MOD A4C: 57 01 ml  SV(Teich): 56 86 ml    CF  MR Als  Mina: 0 46 m/s  MR Flow: 22 57 ml/s  MR Rad: 0 28 cm    CW  AV Vmax: 1 43 m/s  AV maxP 15 mmHg  HR: 70 62 BPM  MR VTI: 197 42 cm  MR Vmax: 6 1 m/s  MR Vmean: 4 32 m/s  MR maxP 16 mmHg  MR meanP 92 mmHg  MV PHT: 95 72 ms  MV VTI: 51 69 cm  MV Vmax: 1 57 m/s  MV Vmean: 1 02 m/s  MV maxP 81 mmHg  MV meanP 68 mmHg  MVA By PHT: 2 3 cm2  TR Vmax: 2 47 m/s  TR maxP 55 mmHg    MM  TAPSE: 1 39 cm    PW  E' Av 06 m/s  E' Lat: 0 07 m/s  E' Sept: 0 05 m/s  E/E' Av 82  E/E' Lat: 18 01  E/E' Sept: 24 65  MR ERO: 0 04 cm2  MR RV: 7 31 ml  MV A Mina: 1 02 m/s  MV Dec [place]: 7 63 m/s2  MV DecT: 172 23 ms  MV E Mina: 1 31 m/s  MV E/A Ratio: 1 28    [facility] Accredited Echocardiography Laboratory    Prepared and electronically signed by    DO Jori Gonsalves 26-Apr-2021 17:26:11      Imaging: I have personally reviewed pertinent films in PACS    XR chest portable    Result Date: 3/1/2022  Narrative: CHEST INDICATION:   pleural effusion s/p thoracentesis  COMPARISON:  Compared to 2/28/2022 EXAM PERFORMED/VIEWS:  XR CHEST PORTABLE FINDINGS:  There are median sternotomy wires indicating prior cardiac surgery  Tortuous thoracic aorta  Cardiomediastinal silhouette appears unremarkable  Right hemidiaphragm is elevated  Poor inspiration  Minimally blunted right costophrenic angle  Left lung base opacity of effusion is resolved  Nonexpanded left lung with trapped appearance and small hemothorax Osseous structures appear within normal limits for patient age  Impression: Trapped left lower lobe with nonreexpansion related small pneumothorax postthoracentesis  The study was marked in [facility] for immediate notification  Workstation performed: [id number]     XR chest 1 view portable    Result Date: 3/1/2022  Narrative: CHEST INDICATION:   short of breath  COMPARISON:  10/06/2021 EXAM PERFORMED/VIEWS:  XR CHEST PORTABLE 1 image FINDINGS: Cardiomediastinal silhouette appears unremarkable  A median sternotomy has been performed  Prosthetic cardiac valve  Patient appears to be in mild CHF  Left greater than right pleural effusions  The left-sided pleural effusion is larger compared to the prior study  No pneumothorax  Osseous structures appear within normal limits for patient age  Surgical anchors in the left humeral head  Impression: CHF  Left greater than right pleural effusions  Workstation performed: [id number]       Counseling / Coordination of Care  Total floor / unit time spent today 45 minutes  Greater than 50% of total time was spent with the patient and / or family counseling and / or coordination of care    A description of the counseling / coordination of care: Discussed case with Dr Joaquin Larry         Code Status: Level 1 - Full Code

## 2022-03-01 NOTE — PROCEDURES
Thoracentesis (Bedside)    Date/Time: 3/1/2022 8:43 AM  Performed by: Merna Gilliam MD  Authorized by: Merna Gilliam MD     Patient location:  Bedside  Consent:     Consent obtained:  Written    Consent given by:  Patient    Risks discussed:  Bleeding, infection, pain, pneumothorax, nerve damage and incomplete drainage    Alternatives discussed:  No treatment and observation  Universal protocol:     Procedure explained and questions answered to patient or proxy's satisfaction: yes      Relevant documents present and verified: yes      Site/side marked: yes      Immediately prior to procedure a time out was called: yes      Patient identity confirmed:  Verbally with patient  Indications:     Procedure Purpose: therapeutic      Indications: pleural effusion    Anesthesia (see MAR for exact dosages):      Anesthesia method:  None  Procedure details:     Preparation: Patient was prepped and draped in usual sterile fashion      Standard thoracentesis cath kit used: Yes      Patient position:  Sitting    Laterality:  Left    Location:  Midscapular line    Intercostal space:  8th    Puncture method:  Over-the-needle catheter    Ultrasound guidance: yes      Sterile ultrasound techniques: Sterile gel and sterile probe covers were used      Number of attempts:  1    Drainage characteristics:  Serosanguinous    Fluid removed amount:  2 2 L  Post-procedure details:     Chest x-ray performed: yes      Chest x-ray findings:  Pneumothorax    Complication (if applicable):  Small pneumo at left base with trapped lung

## 2022-03-01 NOTE — ASSESSMENT & PLAN NOTE
Wt Readings from Last 3 Encounters:   03/01/22 65 8 kg (145 lb 2 oz)   12/19/21 68 kg (150 lb)   10/06/21 76 2 kg (167 lb 15 9 oz)     Patient presented with progressively worsening shortness of breath x 2 weeks  His furosemide was discontinued by his primary cardiologist in December 2021  On presentation NT proBNP elevated at 10,648 with large left pleural effusion on chest xray  Thoracentesis 3/1 resulted in >2L of serosanguinous fluid removal  Left stable pneumothorax present on chest xray with serial monitoring  Echo 4/2021 with EF 60% and mild-mod MR  Echo 3/1/22 with EF 45% and mild MR  Optimizing goal directed medical therapy with transition to metoprolol succinate 200 mg daily and addition of spironolactone  Creatinine elevated to 2 44 3/2/22 and diuretics placed on hold  Creatinine today 2 36 with no evidence of volume overload  Would continue to hold diuretics at this time  CT chest 3/2 reveals RUL pneumonia, now on antibiotics  Continue strict I's/O's, standing daily weights, fluid restriction  Optimize electrolytes for K+>4, Mag > 2  Reviewed 2 g sodium diet with patient  Will sign off at this time, but may re-consult as needed  Will need monitoring of fluid status off diuretics

## 2022-03-01 NOTE — PROGRESS NOTES
Pt reports decreased appetite though says his wife has been encouraging him to eat basically regular portions  Reports eating large bowl of cereal with muffin for breakfast, sandwich with low sodium lunchmeat/cheese for lunch and homecooked meal for dinner  Avoids salt shaker entirely, uses salt substitute instead  Noting recent weight loss, suspect mostly fluid related  Pt reports he has not noticed clothes fitting looser or losing muscle/fat weight  Chart review: 4/28/21 156lb, 10/6/21 167lb, 3/1/22 145lb  Will maintain diet as ordered, fluid restriction per MD  Will trial ensure pudding BID to optimize oral intake

## 2022-03-01 NOTE — ASSESSMENT & PLAN NOTE
Mild left-sided pneumothorax after procedure  Patient remained hemodynamically stable  Continue to monitor-serial chest x-ray q 4 hours  Critical care team is aware of the patient  Family updated

## 2022-03-01 NOTE — ASSESSMENT & PLAN NOTE
Blood pressure elevated at admission, however is much improved today  Continue metoprolol succinate 200 mg daily  Amlodipine on hold to allow for aggressive diuresis but would resume prior to discharge

## 2022-03-01 NOTE — ASSESSMENT & PLAN NOTE
Lab Results   Component Value Date    EGFR 31 03/01/2022    EGFR 33 02/28/2022    EGFR 35 11/26/2021    CREATININE 1 94 (H) 03/01/2022    CREATININE 1 85 (H) 02/28/2022    CREATININE 1 76 (H) 11/26/2021     Will monitor renal function with diuresis  Avoiding nephrotoxic agents

## 2022-03-02 ENCOUNTER — APPOINTMENT (INPATIENT)
Dept: CT IMAGING | Facility: HOSPITAL | Age: 82
DRG: 291 | End: 2022-03-02
Payer: MEDICARE

## 2022-03-02 PROBLEM — S32.000A LUMBAR COMPRESSION FRACTURE (HCC): Status: ACTIVE | Noted: 2022-03-02

## 2022-03-02 LAB
ALBUMIN SERPL BCP-MCNC: 2.9 G/DL (ref 3.5–5)
ALP SERPL-CCNC: 114 U/L (ref 46–116)
ALT SERPL W P-5'-P-CCNC: 21 U/L (ref 12–78)
ANION GAP SERPL CALCULATED.3IONS-SCNC: 10 MMOL/L (ref 4–13)
AST SERPL W P-5'-P-CCNC: 21 U/L (ref 5–45)
BASOPHILS # BLD AUTO: 0.02 THOUSANDS/ΜL (ref 0–0.1)
BASOPHILS NFR BLD AUTO: 0 % (ref 0–1)
BILIRUB SERPL-MCNC: 1.77 MG/DL (ref 0.2–1)
BUN SERPL-MCNC: 56 MG/DL (ref 5–25)
CALCIUM ALBUM COR SERPL-MCNC: 8.8 MG/DL (ref 8.3–10.1)
CALCIUM SERPL-MCNC: 7.9 MG/DL (ref 8.3–10.1)
CHLORIDE SERPL-SCNC: 103 MMOL/L (ref 100–108)
CO2 SERPL-SCNC: 27 MMOL/L (ref 21–32)
CREAT SERPL-MCNC: 2.44 MG/DL (ref 0.6–1.3)
EOSINOPHIL # BLD AUTO: 0.01 THOUSAND/ΜL (ref 0–0.61)
EOSINOPHIL NFR BLD AUTO: 0 % (ref 0–6)
ERYTHROCYTE [DISTWIDTH] IN BLOOD BY AUTOMATED COUNT: 17.2 % (ref 11.6–15.1)
GFR SERPL CREATININE-BSD FRML MDRD: 23 ML/MIN/1.73SQ M
GLUCOSE SERPL-MCNC: 120 MG/DL (ref 65–140)
GLUCOSE SERPL-MCNC: 99 MG/DL (ref 65–140)
HCT VFR BLD AUTO: 31.8 % (ref 36.5–49.3)
HGB BLD-MCNC: 11.6 G/DL (ref 12–17)
IMM GRANULOCYTES # BLD AUTO: 0.16 THOUSAND/UL (ref 0–0.2)
IMM GRANULOCYTES NFR BLD AUTO: 1 % (ref 0–2)
LACTATE SERPL-SCNC: 1.7 MMOL/L (ref 0.5–2)
LDH SERPL-CCNC: 248 U/L (ref 81–234)
LYMPHOCYTES # BLD AUTO: 0.22 THOUSANDS/ΜL (ref 0.6–4.47)
LYMPHOCYTES NFR BLD AUTO: 1 % (ref 14–44)
MCH RBC QN AUTO: 34.8 PG (ref 26.8–34.3)
MCHC RBC AUTO-ENTMCNC: 36.5 G/DL (ref 31.4–37.4)
MCV RBC AUTO: 96 FL (ref 82–98)
MONOCYTES # BLD AUTO: 0.98 THOUSAND/ΜL (ref 0.17–1.22)
MONOCYTES NFR BLD AUTO: 7 % (ref 4–12)
NEUTROPHILS # BLD AUTO: 13.79 THOUSANDS/ΜL (ref 1.85–7.62)
NEUTS SEG NFR BLD AUTO: 91 % (ref 43–75)
NRBC BLD AUTO-RTO: 0 /100 WBCS
PLATELET # BLD AUTO: 119 THOUSANDS/UL (ref 149–390)
PMV BLD AUTO: 11.2 FL (ref 8.9–12.7)
POTASSIUM SERPL-SCNC: 3.2 MMOL/L (ref 3.5–5.3)
PROCALCITONIN SERPL-MCNC: 1.93 NG/ML
PROT SERPL-MCNC: 6.1 G/DL (ref 6.4–8.2)
RBC # BLD AUTO: 3.33 MILLION/UL (ref 3.88–5.62)
SODIUM SERPL-SCNC: 140 MMOL/L (ref 136–145)
WBC # BLD AUTO: 15.18 THOUSAND/UL (ref 4.31–10.16)

## 2022-03-02 PROCEDURE — 80053 COMPREHEN METABOLIC PANEL: CPT | Performed by: FAMILY MEDICINE

## 2022-03-02 PROCEDURE — 85025 COMPLETE CBC W/AUTO DIFF WBC: CPT | Performed by: FAMILY MEDICINE

## 2022-03-02 PROCEDURE — 87449 NOS EACH ORGANISM AG IA: CPT | Performed by: FAMILY MEDICINE

## 2022-03-02 PROCEDURE — G1004 CDSM NDSC: HCPCS

## 2022-03-02 PROCEDURE — 87040 BLOOD CULTURE FOR BACTERIA: CPT | Performed by: FAMILY MEDICINE

## 2022-03-02 PROCEDURE — 83605 ASSAY OF LACTIC ACID: CPT | Performed by: FAMILY MEDICINE

## 2022-03-02 PROCEDURE — 99233 SBSQ HOSP IP/OBS HIGH 50: CPT | Performed by: FAMILY MEDICINE

## 2022-03-02 PROCEDURE — 83615 LACTATE (LD) (LDH) ENZYME: CPT | Performed by: FAMILY MEDICINE

## 2022-03-02 PROCEDURE — 99232 SBSQ HOSP IP/OBS MODERATE 35: CPT | Performed by: INTERNAL MEDICINE

## 2022-03-02 PROCEDURE — 71250 CT THORAX DX C-: CPT

## 2022-03-02 PROCEDURE — 84145 PROCALCITONIN (PCT): CPT | Performed by: FAMILY MEDICINE

## 2022-03-02 PROCEDURE — 82948 REAGENT STRIP/BLOOD GLUCOSE: CPT

## 2022-03-02 RX ORDER — CEFTRIAXONE 1 G/50ML
1000 INJECTION, SOLUTION INTRAVENOUS EVERY 24 HOURS
Status: DISCONTINUED | OUTPATIENT
Start: 2022-03-02 | End: 2022-03-05 | Stop reason: HOSPADM

## 2022-03-02 RX ORDER — POTASSIUM CHLORIDE 20 MEQ/1
40 TABLET, EXTENDED RELEASE ORAL ONCE
Status: COMPLETED | OUTPATIENT
Start: 2022-03-02 | End: 2022-03-02

## 2022-03-02 RX ADMIN — OXYCODONE HYDROCHLORIDE AND ACETAMINOPHEN 1 TABLET: 5; 325 TABLET ORAL at 16:49

## 2022-03-02 RX ADMIN — APIXABAN 2.5 MG: 2.5 TABLET, FILM COATED ORAL at 08:48

## 2022-03-02 RX ADMIN — CEFTRIAXONE 1000 MG: 1 INJECTION, SOLUTION INTRAVENOUS at 16:52

## 2022-03-02 RX ADMIN — ATORVASTATIN CALCIUM 40 MG: 40 TABLET, FILM COATED ORAL at 08:48

## 2022-03-02 RX ADMIN — AMIODARONE HYDROCHLORIDE 200 MG: 200 TABLET ORAL at 08:47

## 2022-03-02 RX ADMIN — TAMSULOSIN HYDROCHLORIDE 0.4 MG: 0.4 CAPSULE ORAL at 16:50

## 2022-03-02 RX ADMIN — OXYCODONE HYDROCHLORIDE AND ACETAMINOPHEN 1 TABLET: 5; 325 TABLET ORAL at 11:38

## 2022-03-02 RX ADMIN — SPIRONOLACTONE 25 MG: 25 TABLET ORAL at 08:48

## 2022-03-02 RX ADMIN — APIXABAN 2.5 MG: 2.5 TABLET, FILM COATED ORAL at 17:18

## 2022-03-02 RX ADMIN — POTASSIUM CHLORIDE 40 MEQ: 1500 TABLET, EXTENDED RELEASE ORAL at 08:47

## 2022-03-02 RX ADMIN — CLOPIDOGREL BISULFATE 75 MG: 75 TABLET ORAL at 08:48

## 2022-03-02 NOTE — ASSESSMENT & PLAN NOTE
Patient with known CADCABG x 2 in 2014  DAPHNEY to left main and ramus in 10/2021  Continue daily Plavix 75 mg  Not on aspirin to avoid triple therapy in setting of Eliquis use  Continue beta blocker and statin  Echo 3/1/2022 with EF 45% and hypokinesis in mid anteroseptal, apical septal and apical inferior walls  Optimized goal directed medical therapy with transition to metoprolol succinate and addition of spironolactone    Cardiology consult appreciated, recommendation as above

## 2022-03-02 NOTE — PROGRESS NOTES
Progress Note:Cardiology  Juana Eden 1940, 80 y o  male MRN: [id number]    Unit/Bed#: -01 Encounter: [id number]  Attending Physician: Nathanael Zhang MD   Primary Care Provider: Radha Pike DO   Date admitted to hospital: 2/28/2022  Length of stay: 2         Acute on chronic diastolic CHF (congestive heart failure) [facility])  Assessment & Plan  Wt Readings from Last 3 Encounters:   03/01/22 65 8 kg (145 lb 2 oz)   12/19/21 68 kg (150 lb)   10/06/21 76 2 kg (167 lb 15 9 oz)     Patient presented with progressively worsening shortness of breath x 2 weeks  His furosemide was discontinued by his primary cardiologist in December 2021  On presentation NT proBNP elevated at 10,648 with large left pleural effusion on chest xray  Thoracentesis 3/1 resulted in >2L of serosanguinous fluid removal  Left stable pneumothorax present on chest xray with serial monitoring  Creatinine elevated to 2 44 today  Will hold diuretics  Patient continues with left lower lung opacity on chest xray, continues to be somewhat short of breath today  I discussed additional chest imaging with medicine team   Echo 4/2021 with EF 60% and mild-mod MR  Echo 3/1/22 with EF 45% and mild MR  Optimizing goal directed medical therapy with transition to metoprolol succinate 200 mg daily and addition of spironolactone  Continue strict I's/O's, standing daily weights, fluid restriction  Optimize electrolytes for K+>4, Mag > 2  Reviewed 2 g sodium diet with patient  Will monitor fluid status with hold of diuretics  Mitral regurgitation  Assessment & Plan  S/P mitral ring in 2014  Echo 4/2021 with mild-moderate regurgitation  Echo 3/1/2022 with mild MR  Coronary artery disease involving coronary bypass graft  Assessment & Plan  Patient with known CAD  CABG x 2 in 2014  DAPHNEY to left main and ramus in 10/2021  Continue daily Plavix 75 mg  Not on aspirin to avoid triple therapy in setting of Eliquis use    Continue beta blocker and statin  HS troponin elevated but flat, peak 257  Suspect demand  Denies any chest pain and no acute changes on ECG  Echo 3/1/2022 with EF 45% and hypokinesis in mid anteroseptal, apical septal and apical inferior walls  Optimized goal directed medical therapy with transition to metoprolol succinate and addition of spironolactone  Would recommend follow up imaging through primary cardiologist for monitoring  Chronic a-fib (HCC)  Assessment & Plan  S/P cardioversion  Remains on amiodarone 200 mg daily and Eliquis 2 5 mg BID (qualifies for reduced dose due to age and renal status)  Stage 3a chronic kidney disease [facility])  Assessment & Plan  Lab Results   Component Value Date    EGFR 31 03/01/2022    EGFR 33 02/28/2022    EGFR 35 11/26/2021    CREATININE 1 94 (H) 03/01/2022    CREATININE 1 85 (H) 02/28/2022    CREATININE 1 76 (H) 11/26/2021     Will monitor renal function with diuresis  Avoiding nephrotoxic agents  Hyperlipidemia, mixed  Assessment & Plan  Continue statin therapy for goal LDL < 70 given known CAD    Hypertensive urgency  Assessment & Plan  Blood pressure elevated at admission, however is much improved today  Continue metoprolol succinate 200 mg daily  Amlodipine on hold to allow for aggressive diuresis but would resume prior to discharge  * Acute respiratory failure with hypoxia [facility])  Assessment & Plan  Resolved today  Currently saturating well on room air  Subjective:   Patient seen and examined  No significant events overnight  Patient is sitting up comfortably breathing on room air  He continues to note some shortness of breath, which he feels is better than yesterday  He denies any new complaints today  No dizziness, chest pain, palpitations  Review of Systems   Constitutional: Negative  HENT: Negative  Cardiovascular: Positive for dyspnea on exertion   Negative for chest pain, irregular heartbeat, leg swelling, near-syncope, orthopnea and palpitations  Respiratory: Positive for shortness of breath (somewhat improved from yesterday)  Negative for cough and snoring  Endocrine: Negative  Skin: Negative  Musculoskeletal: Negative  Gastrointestinal: Negative  Genitourinary: Negative  Neurological: Negative  Psychiatric/Behavioral: Negative  Objective:     Vitals: Blood pressure 107/59, pulse 81, temperature 98 4 °F (36 9 °C), resp  rate 18, height 5' 8" (1 727 m), weight 65 9 kg (145 lb 4 5 oz), SpO2 99 %  , Body mass index is 22 09 kg/m² ,     Orthostatic Blood Pressures      Most Recent Value   Blood Pressure 107/59 filed at 03/02/2022 7922   Patient Position - Orthostatic VS Sitting filed at 02/28/2022 1559          Physical Exam  Vitals and nursing note reviewed  Constitutional:       Appearance: He is well-developed  HENT:      Head: Normocephalic and atraumatic  Eyes:      Conjunctiva/sclera: Conjunctivae normal    Neck:      Vascular: No JVD  Cardiovascular:      Rate and Rhythm: Normal rate and regular rhythm  Heart sounds: Murmur heard  Systolic murmur is present  Pulmonary:      Effort: Pulmonary effort is normal  No respiratory distress  Breath sounds: Examination of the left-lower field reveals decreased breath sounds  Decreased breath sounds present  Comments: Breathing comfortably on room air today  Abdominal:      Palpations: Abdomen is soft  Tenderness: There is no abdominal tenderness  Musculoskeletal:      Cervical back: Neck supple  Right lower leg: No edema  Left lower leg: No edema  Skin:     General: Skin is warm and dry  Neurological:      Mental Status: He is alert  Psychiatric:         Mood and Affect: Mood and affect normal          Speech: Speech normal          Behavior: Behavior normal  Behavior is cooperative           Cognition and Memory: Cognition and memory normal             Intake/Output Summary (Last 24 hours) at 3/2/2022 1132  Last data filed at 3/2/2022 0509  Gross per 24 hour   Intake --   Output 1475 ml   Net -1475 ml       Weight (last 2 days)     Date/Time Weight    03/02/22 0600 65 9 (145 28)    03/01/22 1020 65 8 (145)    03/01/22 0600 65 8 (145 13)    02/28/22 1559 71 1 (156 75)             Medications:      Current Facility-Administered Medications:     acetaminophen (TYLENOL) tablet 650 mg, 650 mg, Oral, Q6H PRN, Joanne Hui MD, 650 mg at 03/01/22 3113    amiodarone tablet 200 mg, 200 mg, Oral, Daily, Joanne Hui MD, 200 mg at 03/02/22 0847    apixaban (ELIQUIS) tablet 2 5 mg, 2 5 mg, Oral, BID, Ingrid Gonzalez MD, 2 5 mg at 03/02/22 0848    atorvastatin (LIPITOR) tablet 40 mg, 40 mg, Oral, Daily, Joanne Hui MD, 40 mg at 03/02/22 0848    clopidogrel (PLAVIX) tablet 75 mg, 75 mg, Oral, Daily, Keshia Zepeda MD, 75 mg at 03/02/22 0848    metoprolol succinate (TOPROL-XL) 24 hr tablet 200 mg, 200 mg, Oral, Daily, Lula Campos MD    oxyCODONE-acetaminophen (PERCOCET) 5-325 mg per tablet 1 tablet, 1 tablet, Oral, Q4H PRN, Ingrid Gonzalez MD, 1 tablet at 03/01/22 1607    spironolactone (ALDACTONE) tablet 25 mg, 25 mg, Oral, Daily, Lula Campos MD, 25 mg at 03/02/22 0848    tamsulosin (FLOMAX) capsule 0 4 mg, 0 4 mg, Oral, Daily With Auguste SANDRINE Lee     Labs & Results:    Results from last 7 days   Lab Units 02/28/22  1608   CK TOTAL U/L 99     Results from last 7 days   Lab Units 03/02/22  0506 03/01/22  0447 02/28/22  1608   WBC Thousand/uL 15 18* 12 53* 13 24*   HEMOGLOBIN g/dL 11 6* 12 4 12 6   HEMATOCRIT % 31 8* 35 9* 36 5   PLATELETS Thousands/uL 119* 149 181         Results from last 7 days   Lab Units 03/02/22  0506 03/01/22  0447 02/28/22  1608   POTASSIUM mmol/L 3 2* 3 6 4 1   CHLORIDE mmol/L 103 103 106   CO2 mmol/L 27 28 25   BUN mg/dL 56* 42* 44*   CREATININE mg/dL 2 44* 1 94* 1 85*   CALCIUM mg/dL 7 9* 8 7 8 5   ALK PHOS U/L 114  --  143*   ALT U/L 21  --  32   AST U/L 21  --  20     Results from last 7 days   Lab Units 02/28/22  1616   INR  1 29*   PTT seconds 39*     Results from last 7 days   Lab Units 02/28/22  1608   MAGNESIUM mg/dL 2 3     Results from last 7 days   Lab Units 02/28/22  1608   NT-PRO BNP pg/mL 10,648*        Counseling / Coordination of Care  Total floor / unit time spent today 25 minutes  Greater than 50% of total time was spent with the patient and / or family counseling and / or coordination of care    A description of the counseling / coordination of care: Discussed case with Dr Koko Gutierrez

## 2022-03-02 NOTE — ASSESSMENT & PLAN NOTE
Wt Readings from Last 3 Encounters:   03/02/22 65 9 kg (145 lb 4 5 oz)   12/19/21 68 kg (150 lb)   10/06/21 76 2 kg (167 lb 15 9 oz)     Patient has acute on chronic diastolic CHF exacerbation  Known history of coronary artery disease status post CABG 8 years ago and now recently had 2 stents 10/2021  Last 2D echo from 04/20/2021 showed EF of 60%  Patient had a complicated hospital course at the end of last year in [facility] and records are not completely available a but it seems like patient had an acute MI had 2 stents placed in the left main and ramus  Patient also had AFib with RVR underwent cardioversion  Also had episode of GI bleed and found have a stomach ulcer  Was admitted for almost 3 weeks  Recently he was seen outpatient by his cardiologist [facility] who stopped his Lasix  Patient appears to be gaining weight again and presented with worsening shortness of breath and found to have worsening left-sided pleural effusion  Will place on Lasix 40 mg IV b i d   Consult placed to Critical Care to evaluate for left-sided thoracentesis  Temporarily hold Plavix and Eliquis  Please note that patient is not on aspirin anymore as per his primary cardiologist   Continue Eliquis and Plavix  Status post thoracentesis with removal of 2 2 L serosanguineous fluid-exudative in nature as per light criteria  Patient placed on IV antibiotic  no

## 2022-03-02 NOTE — PLAN OF CARE
Problem: Nutrition/Hydration-ADULT  Goal: Nutrient/Hydration intake appropriate for improving, restoring or maintaining nutritional needs  Description: Monitor and assess patient's nutrition/hydration status for malnutrition  Collaborate with interdisciplinary team and initiate plan and interventions as ordered  Monitor patient's weight and dietary intake as ordered or per policy  Utilize nutrition screening tool and intervene as necessary  Determine patient's food preferences and provide high-protein, high-caloric foods as appropriate       INTERVENTIONS:  - Monitor oral intake, urinary output, labs, and treatment plans  - Assess nutrition and hydration status and recommend course of action  - Evaluate amount of meals eaten  - Assist patient with eating if necessary   - Allow adequate time for meals  - Recommend/ encourage appropriate diets, oral nutritional supplements, and vitamin/mineral supplements  - Order, calculate, and assess calorie counts as needed  - Recommend, monitor, and adjust tube feedings and TPN/PPN based on assessed needs  - Assess need for intravenous fluids  - Provide specific nutrition/hydration education as appropriate  - Include patient/family/caregiver in decisions related to nutrition  Outcome: Progressing     Problem: MOBILITY - ADULT  Goal: Maintain or return to baseline ADL function  Description: INTERVENTIONS:  -  Assess patient's ability to carry out ADLs; assess patient's baseline for ADL function and identify physical deficits which impact ability to perform ADLs (bathing, care of mouth/teeth, toileting, grooming, dressing, etc )  - Assess/evaluate cause of self-care deficits   - Assess range of motion  - Assess patient's mobility; develop plan if impaired  - Assess patient's need for assistive devices and provide as appropriate  - Encourage maximum independence but intervene and supervise when necessary  - Involve family in performance of ADLs  - Assess for home care needs following discharge   - Consider OT consult to assist with ADL evaluation and planning for discharge  - Provide patient education as appropriate  Outcome: Progressing  Goal: Maintains/Returns to pre admission functional level  Description: INTERVENTIONS:  - Perform BMAT or MOVE assessment daily    - Set and communicate daily mobility goal to care team and patient/family/caregiver  - Collaborate with rehabilitation services on mobility goals if consulted  - Perform Range of Motion 4 times a day  - Reposition patient every 3 hours    - Dangle patient 3 times a day  - Stand patient 3 times a day  - Ambulate patient 3 times a day  - Out of bed to chair 3 times a day   - Out of bed for meals 3 times a day  - Out of bed for toileting  - Record patient progress and toleration of activity level   Outcome: Progressing     Problem: Potential for Falls  Goal: Patient will remain free of falls  Description: INTERVENTIONS:  - Educate patient/family on patient safety including physical limitations  - Instruct patient to call for assistance with activity   - Consult OT/PT to assist with strengthening/mobility   - Keep Call bell within reach  - Keep bed low and locked with side rails adjusted as appropriate  - Keep care items and personal belongings within reach  - Initiate and maintain comfort rounds  - Make Fall Risk Sign visible to staff  - Offer Toileting every 2 Hours, in advance of need  - Apply yellow socks and bracelet for high fall risk patients  - Consider moving patient to room near nurses station  Outcome: Progressing     Problem: PAIN - ADULT  Goal: Verbalizes/displays adequate comfort level or baseline comfort level  Description: Interventions:  - Encourage patient to monitor pain and request assistance  - Assess pain using appropriate pain scale  - Administer analgesics based on type and severity of pain and evaluate response  - Implement non-pharmacological measures as appropriate and evaluate response  - Consider cultural and social influences on pain and pain management  - Notify physician/advanced practitioner if interventions unsuccessful or patient reports new pain  Outcome: Progressing     Problem: INFECTION - ADULT  Goal: Absence or prevention of progression during hospitalization  Description: INTERVENTIONS:  - Assess and monitor for signs and symptoms of infection  - Monitor lab/diagnostic results  - Monitor all insertion sites, i e  indwelling lines, tubes, and drains  - Monitor endotracheal if appropriate and nasal secretions for changes in amount and color  - Luke appropriate cooling/warming therapies per order  - Administer medications as ordered  - Instruct and encourage patient and family to use good hand hygiene technique  - Identify and instruct in appropriate isolation precautions for identified infection/condition  Outcome: Progressing  Goal: Absence of fever/infection during neutropenic period  Description: INTERVENTIONS:  - Monitor WBC    Outcome: Progressing     Problem: SAFETY ADULT  Goal: Maintain or return to baseline ADL function  Description: INTERVENTIONS:  -  Assess patient's ability to carry out ADLs; assess patient's baseline for ADL function and identify physical deficits which impact ability to perform ADLs (bathing, care of mouth/teeth, toileting, grooming, dressing, etc )  - Assess/evaluate cause of self-care deficits   - Assess range of motion  - Assess patient's mobility; develop plan if impaired  - Assess patient's need for assistive devices and provide as appropriate  - Encourage maximum independence but intervene and supervise when necessary  - Involve family in performance of ADLs  - Assess for home care needs following discharge   - Consider OT consult to assist with ADL evaluation and planning for discharge  - Provide patient education as appropriate  Outcome: Progressing  Goal: Maintains/Returns to pre admission functional level  Description: INTERVENTIONS:  - Perform BMAT or MOVE assessment daily    - Set and communicate daily mobility goal to care team and patient/family/caregiver  - Collaborate with rehabilitation services on mobility goals if consulted  - Perform Range of Motion 4 times a day  - Reposition patient every 3 hours    - Dangle patient 3 times a day  - Stand patient 3 times a day  - Ambulate patient 3 times a day  - Out of bed to chair 3 times a day   - Out of bed for meals 3 times a day  - Out of bed for toileting  - Record patient progress and toleration of activity level   Outcome: Progressing  Goal: Patient will remain free of falls  Description: INTERVENTIONS:  - Educate patient/family on patient safety including physical limitations  - Instruct patient to call for assistance with activity   - Consult OT/PT to assist with strengthening/mobility   - Keep Call bell within reach  - Keep bed low and locked with side rails adjusted as appropriate  - Keep care items and personal belongings within reach  - Initiate and maintain comfort rounds  - Make Fall Risk Sign visible to staff  - Offer Toileting every 2 Hours, in advance of need  - Apply yellow socks and bracelet for high fall risk patients  - Consider moving patient to room near nurses station  Outcome: Progressing     Problem: DISCHARGE PLANNING  Goal: Discharge to home or other facility with appropriate resources  Description: INTERVENTIONS:  - Identify barriers to discharge w/patient and caregiver  - Arrange for needed discharge resources and transportation as appropriate  - Identify discharge learning needs (meds, wound care, etc )  - Arrange for interpretive services to assist at discharge as needed  - Refer to Case Management Department for coordinating discharge planning if the patient needs post-hospital services based on physician/advanced practitioner order or complex needs related to functional status, cognitive ability, or social support system  Outcome: Progressing     Problem: Knowledge Deficit  Goal: Patient/family/caregiver demonstrates understanding of disease process, treatment plan, medications, and discharge instructions  Description: Complete learning assessment and assess knowledge base    Interventions:  - Provide teaching at level of understanding  - Provide teaching via preferred learning methods  Outcome: Progressing     Problem: Prexisting or High Potential for Compromised Skin Integrity  Goal: Skin integrity is maintained or improved  Description: INTERVENTIONS:  - Identify patients at risk for skin breakdown  - Assess and monitor skin integrity  - Assess and monitor nutrition and hydration status  - Monitor labs   - Assess for incontinence   - Turn and reposition patient  - Assist with mobility/ambulation  - Relieve pressure over bony prominences  - Avoid friction and shearing  - Provide appropriate hygiene as needed including keeping skin clean and dry  - Evaluate need for skin moisturizer/barrier cream  - Collaborate with interdisciplinary team   - Patient/family teaching  - Consider wound care consult   Outcome: Progressing

## 2022-03-02 NOTE — PROGRESS NOTES
[address]  Progress Note Garrick Backer 1940, 80 y o  male MRN: [id number]  Unit/Bed#: -[place] Encounter: [id number]  Primary Care Provider: Vel Ruff DO   Date and time admitted to hospital: 2/28/2022  3:58 PM    Postprocedural pneumothorax  Assessment & Plan  Mild left-sided pneumothorax after procedure  Patient remained hemodynamically stable  Continue to monitor-serial chest x-ray q 4 hours  Critical care team is aware of the patient  Family updated  Remained stable on repeat x-ray  * Acute respiratory failure with hypoxia [facility])  Assessment & Plan  Patient has acute respiratory failure with hypoxia secondary to acute on chronic diastolic CHF exacerbation with left-sided pleural effusion  Currently patient is requiring 2 L of oxygen  Patient does not use any oxygen at home  Status post thoracentesis with removal of 2 2 L serosanguineous fluid-complicated with mild pneumothorax, continue to monitor  As per light criteria, exudate deep in nature  Patient placed on IV antibiotic-body fluid shows no growth  Follow CT scan of chest    Lumbar compression deformity  Assessment & Plan  Mild superior endplate deformity of L1 and inferior endplate deformity of L2  As per family, patient has history of multiple fall  No neuro deficit  Continue conservative management    Chronic a-fib [facility])  Assessment & Plan  Patient has history of chronic AFib is on amiodarone 200 mg daily continue same for now  Also on Lopressor 100 mg twice daily  Continue Eliquis 2 5 mg twice daily     Stage 3a chronic kidney disease [facility])  Assessment & Plan  Lab Results   Component Value Date    EGFR 23 03/02/2022    EGFR 31 03/01/2022    EGFR 33 02/28/2022    CREATININE 2 44 (H) 03/02/2022    CREATININE 1 94 (H) 03/01/2022    CREATININE 1 85 (H) 02/28/2022   Baseline creatinine is 1 4-1 7  Currently patient present with creatinine of 1 85  Also fluid overloaded    Placed on IV Lasix and monitor closely during diuresis  Avoid hypotension or nephrotoxic agents    Acute on chronic diastolic CHF (congestive heart failure) (HCC)  Assessment & Plan  Wt Readings from Last 3 Encounters:   03/02/22 65 9 kg (145 lb 4 5 oz)   12/19/21 68 kg (150 lb)   10/06/21 76 2 kg (167 lb 15 9 oz)     Patient has acute on chronic diastolic CHF exacerbation  Known history of coronary artery disease status post CABG 8 years ago and now recently had 2 stents 10/2021  Last 2D echo from 04/20/2021 showed EF of 60%  Patient had a complicated hospital course at the end of last year in [facility] and records are not completely available a but it seems like patient had an acute MI had 2 stents placed in the left main and ramus  Patient also had AFib with RVR underwent cardioversion  Also had episode of GI bleed and found have a stomach ulcer  Was admitted for almost 3 weeks  Recently he was seen outpatient by his cardiologist [facility] who stopped his Lasix  Patient appears to be gaining weight again and presented with worsening shortness of breath and found to have worsening left-sided pleural effusion  Will place on Lasix 40 mg IV b i d   Consult placed to Critical Care to evaluate for left-sided thoracentesis  Temporarily hold Plavix and Eliquis  Please note that patient is not on aspirin anymore as per his primary cardiologist   Continue Eliquis and Plavix  Status post thoracentesis with removal of 2 2 L serosanguineous fluid-exudative in nature as per light criteria  Patient placed on IV antibiotic  Hyperlipidemia, mixed  Assessment & Plan  Continue statin therapy    Hypertensive urgency  Assessment & Plan  Blood pressure elevated on presentation secondary to acute CHF exacerbation  Received IV Lasix following which blood pressure is starting to improve  Continue home regimen of metoprolol 100 mg twice daily    Blood pressure is much improved  Cardiology improved appreciated    Coronary artery disease involving coronary bypass graft  Assessment & Plan  Patient with known CADCABG x 2 in   DAPHNEY to left main and ramus in 10/2021  Continue daily Plavix 75 mg  Not on aspirin to avoid triple therapy in setting of Eliquis use  Continue beta blocker and statin  Echo 3/1/2022 with EF 45% and hypokinesis in mid anteroseptal, apical septal and apical inferior walls  Optimized goal directed medical therapy with transition to metoprolol succinate and addition of spironolactone  Cardiology consult appreciated, recommendation as above        VTE Pharmacologic Prophylaxis:   Moderate Risk (Score 3-4) - Pharmacological DVT Prophylaxis Ordered: apixaban (Eliquis)  Patient Centered Rounds: I performed bedside rounds with nursing staff today  Discussions with Specialists or Other Care Team Provider:  Cardiology    Education and Discussions with Family / Patient: Updated  (wife and daughter) at bedside  Time Spent for Care: 20 minutes  More than 50% of total time spent on counseling and coordination of care as described above  Current Length of Stay: 2 day(s)  Current Patient Status: Inpatient   Certification Statement: The patient will continue to require additional inpatient hospital stay due to To monitor above condition  Discharge Plan: Anticipate discharge in >72 hrs to rehab facility  Code Status: Level 1 - Full Code    Subjective:   Seen and evaluated during the round  Patient resting comfortably  Denies any significant complaint other than mild pain on the procedure site  Objective:     Vitals:   Temp (24hrs), Av 3 °F (36 8 °C), Min:97 9 °F (36 6 °C), Max:98 5 °F (36 9 °C)    Temp:  [97 9 °F (36 6 °C)-98 5 °F (36 9 °C)] 98 2 °F (36 8 °C)  HR:  [76-86] 81  Resp:  [18] 18  BP: (107-131)/(57-91) 128/57  SpO2:  [93 %-99 %] 99 %  Body mass index is 22 09 kg/m²  Input and Output Summary (last 24 hours):      Intake/Output Summary (Last 24 hours) at 3/2/2022 1702  Last data filed at 3/2/2022 1320  Gross per 24 hour   Intake 420 ml   Output 675 ml   Net -255 ml       Physical Exam:   Physical Exam  Vitals and nursing note reviewed  Constitutional:       Appearance: He is not diaphoretic  HENT:      Head: Normocephalic  Nose: No congestion  Mouth/Throat:      Mouth: Mucous membranes are moist       Pharynx: No oropharyngeal exudate  Eyes:      General: No scleral icterus  Conjunctiva/sclera: Conjunctivae normal       Pupils: Pupils are equal, round, and reactive to light  Cardiovascular:      Rate and Rhythm: Normal rate  Heart sounds: No friction rub  No gallop  Pulmonary:      Breath sounds: Rales (Left lower lung base ) present  Comments: Demand sound in left lower lung base  Abdominal:      General: Abdomen is flat  Bowel sounds are normal  There is no distension  Palpations: There is no mass  Musculoskeletal:         General: Normal range of motion  Cervical back: Normal range of motion  Right lower leg: No edema  Lymphadenopathy:      Cervical: No cervical adenopathy  Skin:     General: Skin is warm  Neurological:      General: No focal deficit present  Mental Status: He is alert and oriented to person, place, and time  Cranial Nerves: No cranial nerve deficit  Sensory: No sensory deficit  Motor: No weakness        Coordination: Coordination normal          Additional Data:     Labs:  Results from last 7 days   Lab Units 03/02/22  0506   WBC Thousand/uL 15 18*   HEMOGLOBIN g/dL 11 6*   HEMATOCRIT % 31 8*   PLATELETS Thousands/uL 119*   NEUTROS PCT % 91*   LYMPHS PCT % 1*   MONOS PCT % 7   EOS PCT % 0     Results from last 7 days   Lab Units 03/02/22  0506   SODIUM mmol/L 140   POTASSIUM mmol/L 3 2*   CHLORIDE mmol/L 103   CO2 mmol/L 27   BUN mg/dL 56*   CREATININE mg/dL 2 44*   ANION GAP mmol/L 10   CALCIUM mg/dL 7 9*   ALBUMIN g/dL 2 9*   TOTAL BILIRUBIN mg/dL 1 77*   ALK PHOS U/L 114   ALT U/L 21   AST U/L 21   GLUCOSE RANDOM mg/dL 99     Results from last 7 days   Lab Units 02/28/22  1616   INR  1 29*     Results from last 7 days   Lab Units 03/02/22  0049   POC GLUCOSE mg/dl 120         Results from last 7 days   Lab Units 03/02/22  1632 03/02/22  0506   LACTIC ACID mmol/L 1 7  --    PROCALCITONIN ng/ml  --  1 93*       Lines/Drains:  Invasive Devices  Report    Peripheral Intravenous Line            Peripheral IV 02/28/22 Left Antecubital 2 days          Drain            Urethral Catheter Straight-tip 12 Fr  <1 day              Urinary Catheter:  Goal for removal: Remove after 48 hrs of I/O monitoring               Imaging: Reviewed radiology reports from this admission including: chest CT scan    Recent Cultures (last 7 days):   Results from last 7 days   Lab Units 03/01/22  1556   GRAM STAIN RESULT  No Polys or Bacteria seen   BODY FLUID CULTURE, STERILE  No growth       Last 24 Hours Medication List:   Current Facility-Administered Medications   Medication Dose Route Frequency Provider Last Rate    acetaminophen  650 mg Oral Q6H PRN Neftali Valdes MD      amiodarone  200 mg Oral Daily Neftali Valdes MD      apixaban  2 5 mg Oral BID Chay Vance MD      atorvastatin  40 mg Oral Daily Neftali Valdes MD      cefTRIAXone  1,000 mg Intravenous Q24H Chay Vance MD      clopidogrel  75 mg Oral Daily Chay Vance MD      metoprolol succinate  200 mg Oral Daily Tasha Crews MD      oxyCODONE-acetaminophen  1 tablet Oral Q4H PRN Chay Vance MD      spironolactone  25 mg Oral Daily Tasha Crews MD      tamsulosin  0 4 mg Oral Daily With [address] [place] [address]          Today, Patient Was Seen By: Chay Vance MD    **Please Note: This note may have been constructed using a voice recognition system  **

## 2022-03-02 NOTE — NURSING NOTE
Patient requiring multiple intermittent catheterizations following urinary retention protocol  Order obtained for indwelling catheter per protocol  Nasreen Cottrell RN attempted 25 Korean coude catheter insertion  Resistance met along with patient discomfort and no passage of urine  I (Gibson Harper) attempted 12 [place] straight tip catheter  Small amount of resistance met with some patient discomfort  Catheter able to be advanced  Initially catheter patent for small blood clots x2 and pink tinged urine returns  Urine grew to a clear, yellow as  urine continued to drain  Patient reports some mild burning sensation, denies further sensation of bladder fullness  Indwelling catheter secured and patient made comfortable  Initial volume return amount after catheterization 300mL's with urine present in drainage bag tubing  Will continue to monitor

## 2022-03-02 NOTE — ASSESSMENT & PLAN NOTE
Patient has history of chronic AFib is on amiodarone 200 mg daily continue same for now  Also on Lopressor 100 mg twice daily    Continue Eliquis 2 5 mg twice daily

## 2022-03-02 NOTE — ASSESSMENT & PLAN NOTE
Patient has acute respiratory failure with hypoxia secondary to acute on chronic diastolic CHF exacerbation with left-sided pleural effusion  Currently patient is requiring 2 L of oxygen  Patient does not use any oxygen at home  Status post thoracentesis with removal of 2 2 L serosanguineous fluid-complicated with mild pneumothorax, continue to monitor  As per light criteria, exudate deep in nature  Patient placed on IV antibiotic-body fluid shows no growth    Follow CT scan of chest

## 2022-03-02 NOTE — PLAN OF CARE
Problem: Nutrition/Hydration-ADULT  Goal: Nutrient/Hydration intake appropriate for improving, restoring or maintaining nutritional needs  Description: Monitor and assess patient's nutrition/hydration status for malnutrition  Collaborate with interdisciplinary team and initiate plan and interventions as ordered  Monitor patient's weight and dietary intake as ordered or per policy  Utilize nutrition screening tool and intervene as necessary  Determine patient's food preferences and provide high-protein, high-caloric foods as appropriate       INTERVENTIONS:  - Monitor oral intake, urinary output, labs, and treatment plans  - Assess nutrition and hydration status and recommend course of action  - Evaluate amount of meals eaten  - Assist patient with eating if necessary   - Allow adequate time for meals  - Recommend/ encourage appropriate diets, oral nutritional supplements, and vitamin/mineral supplements  - Order, calculate, and assess calorie counts as needed  - Recommend, monitor, and adjust tube feedings and TPN/PPN based on assessed needs  - Assess need for intravenous fluids  - Provide specific nutrition/hydration education as appropriate  - Include patient/family/caregiver in decisions related to nutrition  Outcome: Progressing     Problem: INFECTION - ADULT  Goal: Absence or prevention of progression during hospitalization  Description: INTERVENTIONS:  - Assess and monitor for signs and symptoms of infection  - Monitor lab/diagnostic results  - Monitor all insertion sites, i e  indwelling lines, tubes, and drains  - Monitor endotracheal if appropriate and nasal secretions for changes in amount and color  - [place] appropriate cooling/warming therapies per order  - Administer medications as ordered  - Instruct and encourage patient and family to use good hand hygiene technique  - Identify and instruct in appropriate isolation precautions for identified infection/condition  Outcome: Progressing     Problem: PAIN - ADULT  Goal: Verbalizes/displays adequate comfort level or baseline comfort level  Description: Interventions:  - Encourage patient to monitor pain and request assistance  - Assess pain using appropriate pain scale  - Administer analgesics based on type and severity of pain and evaluate response  - Implement non-pharmacological measures as appropriate and evaluate response  - Consider cultural and social influences on pain and pain management  - Notify physician/advanced practitioner if interventions unsuccessful or patient reports new pain  Outcome: Progressing     Problem: DISCHARGE PLANNING  Goal: Discharge to home or other facility with appropriate resources  Description: INTERVENTIONS:  - Identify barriers to discharge w/patient and caregiver  - Arrange for needed discharge resources and transportation as appropriate  - Identify discharge learning needs (meds, wound care, etc )  - Arrange for interpretive services to assist at discharge as needed  - Refer to Case Management Department for coordinating discharge planning if the patient needs post-hospital services based on physician/advanced practitioner order or complex needs related to functional status, cognitive ability, or social support system  Outcome: Progressing     Problem: Knowledge Deficit  Goal: Patient/family/caregiver demonstrates understanding of disease process, treatment plan, medications, and discharge instructions  Description: Complete learning assessment and assess knowledge base    Interventions:  - Provide teaching at level of understanding  - Provide teaching via preferred learning methods  Outcome: Progressing

## 2022-03-02 NOTE — ASSESSMENT & PLAN NOTE
Mild left-sided pneumothorax after procedure  Patient remained hemodynamically stable  Continue to monitor-serial chest x-ray q 4 hours  Critical care team is aware of the patient  Family updated  Remained stable on repeat x-ray

## 2022-03-02 NOTE — ASSESSMENT & PLAN NOTE
Lab Results   Component Value Date    EGFR 23 03/02/2022    EGFR 31 03/01/2022    EGFR 33 02/28/2022    CREATININE 2 44 (H) 03/02/2022    CREATININE 1 94 (H) 03/01/2022    CREATININE 1 85 (H) 02/28/2022   Baseline creatinine is 1 4-1 7  Currently patient present with creatinine of 1 85  Also fluid overloaded  Placed on IV Lasix and monitor closely during diuresis    Avoid hypotension or nephrotoxic agents

## 2022-03-02 NOTE — ASSESSMENT & PLAN NOTE
Mild superior endplate deformity of L1 and inferior endplate deformity of L2  As per family, patient has history of multiple fall  No neuro deficit  Continue conservative management

## 2022-03-03 LAB
ALBUMIN SERPL BCP-MCNC: 2.8 G/DL (ref 3.5–5)
ALP SERPL-CCNC: 105 U/L (ref 46–116)
ALT SERPL W P-5'-P-CCNC: 21 U/L (ref 12–78)
ANION GAP SERPL CALCULATED.3IONS-SCNC: 8 MMOL/L (ref 4–13)
AST SERPL W P-5'-P-CCNC: 15 U/L (ref 5–45)
BASOPHILS # BLD AUTO: 0.02 THOUSANDS/ΜL (ref 0–0.1)
BASOPHILS NFR BLD AUTO: 0 % (ref 0–1)
BILIRUB SERPL-MCNC: 1.13 MG/DL (ref 0.2–1)
BUN SERPL-MCNC: 63 MG/DL (ref 5–25)
CALCIUM ALBUM COR SERPL-MCNC: 9 MG/DL (ref 8.3–10.1)
CALCIUM SERPL-MCNC: 8 MG/DL (ref 8.3–10.1)
CHLORIDE SERPL-SCNC: 103 MMOL/L (ref 100–108)
CO2 SERPL-SCNC: 29 MMOL/L (ref 21–32)
CREAT SERPL-MCNC: 2.36 MG/DL (ref 0.6–1.3)
EOSINOPHIL # BLD AUTO: 0.17 THOUSAND/ΜL (ref 0–0.61)
EOSINOPHIL NFR BLD AUTO: 2 % (ref 0–6)
ERYTHROCYTE [DISTWIDTH] IN BLOOD BY AUTOMATED COUNT: 16.5 % (ref 11.6–15.1)
GFR SERPL CREATININE-BSD FRML MDRD: 24 ML/MIN/1.73SQ M
GLUCOSE SERPL-MCNC: 100 MG/DL (ref 65–140)
HCT VFR BLD AUTO: 32 % (ref 36.5–49.3)
HGB BLD-MCNC: 10.8 G/DL (ref 12–17)
IMM GRANULOCYTES # BLD AUTO: 0.03 THOUSAND/UL (ref 0–0.2)
IMM GRANULOCYTES NFR BLD AUTO: 0 % (ref 0–2)
L PNEUMO1 AG UR QL IA.RAPID: NEGATIVE
LYMPHOCYTES # BLD AUTO: 0.97 THOUSANDS/ΜL (ref 0.6–4.47)
LYMPHOCYTES NFR BLD AUTO: 10 % (ref 14–44)
MCH RBC QN AUTO: 32.6 PG (ref 26.8–34.3)
MCHC RBC AUTO-ENTMCNC: 33.8 G/DL (ref 31.4–37.4)
MCV RBC AUTO: 97 FL (ref 82–98)
MONOCYTES # BLD AUTO: 1.07 THOUSAND/ΜL (ref 0.17–1.22)
MONOCYTES NFR BLD AUTO: 11 % (ref 4–12)
NEUTROPHILS # BLD AUTO: 7.11 THOUSANDS/ΜL (ref 1.85–7.62)
NEUTS SEG NFR BLD AUTO: 77 % (ref 43–75)
NRBC BLD AUTO-RTO: 0 /100 WBCS
PLATELET # BLD AUTO: 121 THOUSANDS/UL (ref 149–390)
PMV BLD AUTO: 11.1 FL (ref 8.9–12.7)
POTASSIUM SERPL-SCNC: 3.7 MMOL/L (ref 3.5–5.3)
PROCALCITONIN SERPL-MCNC: 3.06 NG/ML
PROT SERPL-MCNC: 6.3 G/DL (ref 6.4–8.2)
RBC # BLD AUTO: 3.31 MILLION/UL (ref 3.88–5.62)
S PNEUM AG UR QL: NEGATIVE
SODIUM SERPL-SCNC: 140 MMOL/L (ref 136–145)
WBC # BLD AUTO: 9.37 THOUSAND/UL (ref 4.31–10.16)

## 2022-03-03 PROCEDURE — 99232 SBSQ HOSP IP/OBS MODERATE 35: CPT | Performed by: INTERNAL MEDICINE

## 2022-03-03 PROCEDURE — 99232 SBSQ HOSP IP/OBS MODERATE 35: CPT | Performed by: FAMILY MEDICINE

## 2022-03-03 PROCEDURE — 85025 COMPLETE CBC W/AUTO DIFF WBC: CPT | Performed by: FAMILY MEDICINE

## 2022-03-03 PROCEDURE — 80053 COMPREHEN METABOLIC PANEL: CPT | Performed by: FAMILY MEDICINE

## 2022-03-03 PROCEDURE — 84145 PROCALCITONIN (PCT): CPT | Performed by: FAMILY MEDICINE

## 2022-03-03 RX ADMIN — ATORVASTATIN CALCIUM 40 MG: 40 TABLET, FILM COATED ORAL at 09:02

## 2022-03-03 RX ADMIN — APIXABAN 2.5 MG: 2.5 TABLET, FILM COATED ORAL at 09:02

## 2022-03-03 RX ADMIN — TAMSULOSIN HYDROCHLORIDE 0.4 MG: 0.4 CAPSULE ORAL at 16:07

## 2022-03-03 RX ADMIN — APIXABAN 2.5 MG: 2.5 TABLET, FILM COATED ORAL at 18:11

## 2022-03-03 RX ADMIN — CLOPIDOGREL BISULFATE 75 MG: 75 TABLET ORAL at 09:01

## 2022-03-03 RX ADMIN — ACETAMINOPHEN 650 MG: 325 TABLET ORAL at 16:07

## 2022-03-03 RX ADMIN — CEFTRIAXONE 1000 MG: 1 INJECTION, SOLUTION INTRAVENOUS at 16:07

## 2022-03-03 RX ADMIN — METOPROLOL SUCCINATE 200 MG: 100 TABLET, EXTENDED RELEASE ORAL at 09:01

## 2022-03-03 RX ADMIN — SPIRONOLACTONE 25 MG: 25 TABLET ORAL at 09:01

## 2022-03-03 RX ADMIN — AMIODARONE HYDROCHLORIDE 200 MG: 200 TABLET ORAL at 09:01

## 2022-03-03 NOTE — PROGRESS NOTES
[address]  Progress Note Sophie Hernandez 1940, 80 y o  male MRN: [id number]  Unit/Bed#: -01 Encounter: [id number]  Primary Care Provider: Anastasiya Vail DO   Date and time admitted to hospital: 2/28/2022  3:58 PM    Postprocedural pneumothorax  Assessment & Plan  Mild left-sided pneumothorax after procedure  Patient remained hemodynamically stable  Continue to monitor-serial chest x-ray q 4 hours  Critical care team is aware of the patient  Family updated  Remained stable on repeat x-ray  * Acute respiratory failure with hypoxia [facility])  Assessment & Plan  Patient has acute respiratory failure with hypoxia secondary to acute on chronic diastolic CHF exacerbation with left-sided pleural effusion  Currently patient is requiring 2 L of oxygen  Patient does not use any oxygen at home  Status post thoracentesis with removal of 2 2 L serosanguineous fluid-complicated with mild pneumothorax, continue to monitor  As per light criteria, exudate deep in nature  Patient placed on IV antibiotic-body fluid shows no growth  CT chest reviewed    Lumbar compression deformity  Assessment & Plan  Mild superior endplate deformity of L1 and inferior endplate deformity of L2  As per family, patient has history of multiple fall  No neuro deficit  Continue conservative management    Chronic a-fib [facility])  Assessment & Plan  Patient has history of chronic AFib is on amiodarone 200 mg daily continue same for now  Also on Lopressor 100 mg twice daily  Continue Eliquis 2 5 mg twice daily     Stage 3a chronic kidney disease [facility])  Assessment & Plan  Lab Results   Component Value Date    EGFR 24 03/03/2022    EGFR 23 03/02/2022    EGFR 31 03/01/2022    CREATININE 2 36 (H) 03/03/2022    CREATININE 2 44 (H) 03/02/2022    CREATININE 1 94 (H) 03/01/2022   Baseline creatinine is 1 4-1 7  Currently patient present with creatinine of 1 85  Also fluid overloaded    Placed on IV Lasix and monitor closely during diuresis  Avoid hypotension or nephrotoxic agents    Acute on chronic diastolic CHF (congestive heart failure) (HCC)  Assessment & Plan  Wt Readings from Last 3 Encounters:   03/03/22 65 3 kg (143 lb 15 4 oz)   12/19/21 68 kg (150 lb)   10/06/21 76 2 kg (167 lb 15 9 oz)     Patient has acute on chronic diastolic CHF exacerbation  Known history of coronary artery disease status post CABG 8 years ago and now recently had 2 stents 10/2021  Last 2D echo from 04/20/2021 showed EF of 60%  Patient had a complicated hospital course at the end of last year in [facility] and records are not completely available a but it seems like patient had an acute MI had 2 stents placed in the left main and ramus  Patient also had AFib with RVR underwent cardioversion  Also had episode of GI bleed and found have a stomach ulcer  Was admitted for almost 3 weeks  Recently he was seen outpatient by his cardiologist [facility] who stopped his Lasix  Patient appears to be gaining weight again and presented with worsening shortness of breath and found to have worsening left-sided pleural effusion  Will place on Lasix 40 mg IV b i d   Consult placed to Critical Care to evaluate for left-sided thoracentesis  Temporarily hold Plavix and Eliquis  Please note that patient is not on aspirin anymore as per his primary cardiologist   Continue Eliquis and Plavix  Status post thoracentesis with removal of 2 2 L serosanguineous fluid-exudative in nature as per light criteria  Patient placed on IV antibiotic  Hyperlipidemia, mixed  Assessment & Plan  Continue statin therapy    Hypertensive urgency  Assessment & Plan  Blood pressure elevated on presentation secondary to acute CHF exacerbation  Received IV Lasix following which blood pressure is starting to improve  Continue home regimen of metoprolol 100 mg twice daily    Blood pressure is much improved  Cardiology improved appreciated    Coronary artery disease involving coronary bypass graft  Assessment & Plan  Patient with known CADCABG x 2 in   DAPHNEY to left main and ramus in 10/2021  Continue daily Plavix 75 mg  Not on aspirin to avoid triple therapy in setting of Eliquis use  Continue beta blocker and statin  Echo 3/1/2022 with EF 45% and hypokinesis in mid anteroseptal, apical septal and apical inferior walls  Optimized goal directed medical therapy with transition to metoprolol succinate and addition of spironolactone  Cardiology consult appreciated, recommendation as above          VTE Pharmacologic Prophylaxis:   High Risk (Score >/= 5) - Pharmacological DVT Prophylaxis Ordered: apixaban (Eliquis)  Sequential Compression Devices Ordered  Patient Centered Rounds: I performed bedside rounds with nursing staff today  Discussions with Specialists or Other Care Team Provider:  Cardiology    Education and Discussions with Family / Patient: Updated  (wife) at bedside  Time Spent for Care: 15 minutes  More than 50% of total time spent on counseling and coordination of care as described above  Current Length of Stay: 3 day(s)  Current Patient Status: Inpatient   Certification Statement: The patient will continue to require additional inpatient hospital stay due to To monitor above conditions  Discharge Plan: Anticipate discharge in 48-72 hrs to home with home services  Code Status: Level 1 - Full Code    Subjective:   Seen and evaluated during the round  Patient denies any significant complaint  Objective:     Vitals:   Temp (24hrs), Av 1 °F (36 7 °C), Min:98 °F (36 7 °C), Max:98 2 °F (36 8 °C)    Temp:  [98 °F (36 7 °C)-98 2 °F (36 8 °C)] 98 °F (36 7 °C)  HR:  [70-74] 70  Resp:  [16-19] 17  BP: (128-131)/(58-59) 128/59  SpO2:  [95 %-97 %] 97 %  Body mass index is 21 89 kg/m²  Input and Output Summary (last 24 hours):      Intake/Output Summary (Last 24 hours) at 3/3/2022 1708  Last data filed at 3/3/2022 1419  Gross per 24 hour   Intake 600 ml   Output 1275 ml   Net -675 ml       Physical Exam:   Physical Exam  Vitals and nursing note reviewed  Constitutional:       Appearance: He is not diaphoretic  HENT:      Head: Normocephalic  Nose: No congestion  Mouth/Throat:      Mouth: Mucous membranes are moist       Pharynx: No oropharyngeal exudate  Eyes:      General: No scleral icterus  Conjunctiva/sclera: Conjunctivae normal       Pupils: Pupils are equal, round, and reactive to light  Cardiovascular:      Rate and Rhythm: Normal rate  Heart sounds: No gallop  Pulmonary:      Effort: Pulmonary effort is normal  No respiratory distress  Breath sounds: No stridor  Rales present  No rhonchi  Abdominal:      General: Abdomen is flat  Bowel sounds are normal    Musculoskeletal:         General: Normal range of motion  Cervical back: Normal range of motion  Right lower leg: No edema  Left lower leg: No edema  Lymphadenopathy:      Cervical: No cervical adenopathy  Neurological:      General: No focal deficit present  Mental Status: He is alert and oriented to person, place, and time     Psychiatric:         Mood and Affect: Mood normal          Additional Data:     Labs:  Results from last 7 days   Lab Units 03/03/22  0541   WBC Thousand/uL 9 37   HEMOGLOBIN g/dL 10 8*   HEMATOCRIT % 32 0*   PLATELETS Thousands/uL 121*   NEUTROS PCT % 77*   LYMPHS PCT % 10*   MONOS PCT % 11   EOS PCT % 2     Results from last 7 days   Lab Units 03/03/22  0541   SODIUM mmol/L 140   POTASSIUM mmol/L 3 7   CHLORIDE mmol/L 103   CO2 mmol/L 29   BUN mg/dL 63*   CREATININE mg/dL 2 36*   ANION GAP mmol/L 8   CALCIUM mg/dL 8 0*   ALBUMIN g/dL 2 8*   TOTAL BILIRUBIN mg/dL 1 13*   ALK PHOS U/L 105   ALT U/L 21   AST U/L 15   GLUCOSE RANDOM mg/dL 100     Results from last 7 days   Lab Units 02/28/22  1616   INR  1 29*     Results from last 7 days   Lab Units 03/02/22  0049   POC GLUCOSE mg/dl 120 Results from last 7 days   Lab Units 03/03/22  0457 03/02/22  1632 03/02/22  0506   LACTIC ACID mmol/L  --  1 7  --    PROCALCITONIN ng/ml 3 06*  --  1 93*       Lines/Drains:  Invasive Devices  Report    Peripheral Intravenous Line            Peripheral IV 03/03/22 Left;Ventral (anterior) Forearm <1 day                      Imaging: No pertinent imaging reviewed  Recent Cultures (last 7 days):   Results from last 7 days   Lab Units 03/02/22  1633 03/02/22  1601 03/01/22  1556   BLOOD CULTURE  Received in Microbiology Lab  Culture in Progress  Received in Microbiology Lab  Culture in Progress  --   --    GRAM STAIN RESULT   --   --  No Polys or Bacteria seen   BODY FLUID CULTURE, STERILE   --   --  No growth   LEGIONELLA URINARY ANTIGEN   --  Negative  --        Last 24 Hours Medication List:   Current Facility-Administered Medications   Medication Dose Route Frequency Provider Last Rate    acetaminophen  650 mg Oral Q6H PRN Nieves Gotti MD      amiodarone  200 mg Oral Daily Nieves Gotti MD      apixaban  2 5 mg Oral BID Tara Tellez MD      atorvastatin  40 mg Oral Daily Nieves Gotti MD      cefTRIAXone  1,000 mg Intravenous Q24H Tara Tellez MD 1,000 mg (03/03/22 1607)    clopidogrel  75 mg Oral Daily Tara Tellez MD      metoprolol succinate  200 mg Oral Daily Nerissa Cummings MD      oxyCODONE-acetaminophen  1 tablet Oral Q4H PRN Tara Tellez MD      spironolactone  25 mg Oral Daily Nerissa Cummings MD      tamsulosin  0 4 mg Oral Daily With [address] [place] [address]          Today, Patient Was Seen By: Tara Tellez MD    **Please Note: This note may have been constructed using a voice recognition system  **

## 2022-03-03 NOTE — PROGRESS NOTES
Progress Note:Cardiology  Henri Labs 1940, 80 y o  male MRN: [id number]    Unit/Bed#: -01 Encounter: [id number]  Attending Physician: Pat Manley MD   Primary Care Provider: Marion Daniel DO   Date admitted to hospital: 2/28/2022  Length of stay: 3         Acute on chronic diastolic CHF (congestive heart failure) [facility])  Assessment & Plan  Wt Readings from Last 3 Encounters:   03/01/22 65 8 kg (145 lb 2 oz)   12/19/21 68 kg (150 lb)   10/06/21 76 2 kg (167 lb 15 9 oz)     Patient presented with progressively worsening shortness of breath x 2 weeks  His furosemide was discontinued by his primary cardiologist in December 2021  On presentation NT proBNP elevated at 10,648 with large left pleural effusion on chest xray  Thoracentesis 3/1 resulted in >2L of serosanguinous fluid removal  Left stable pneumothorax present on chest xray with serial monitoring  Echo 4/2021 with EF 60% and mild-mod MR  Echo 3/1/22 with EF 45% and mild MR  Optimizing goal directed medical therapy with transition to metoprolol succinate 200 mg daily and addition of spironolactone  Creatinine elevated to 2 44 3/2/22 and diuretics placed on hold  Creatinine today 2 36 with no evidence of volume overload  Would continue to hold diuretics at this time  CT chest 3/2 reveals RUL pneumonia, now on antibiotics  Continue strict I's/O's, standing daily weights, fluid restriction  Optimize electrolytes for K+>4, Mag > 2  Reviewed 2 g sodium diet with patient  Will sign off at this time, but may re-consult as needed  Will need monitoring of fluid status off diuretics  Mitral regurgitation  Assessment & Plan  S/P mitral ring in 2014  Echo 4/2021 with mild-moderate regurgitation  Echo 3/1/2022 with mild MR  Coronary artery disease involving coronary bypass graft  Assessment & Plan  Patient with known CAD  CABG x 2 in 2014  DAPHNEY to left main and ramus in 10/2021    Continue daily Plavix 75 mg  Not on aspirin to avoid triple therapy in setting of Eliquis use  Continue beta blocker and statin  HS troponin elevated but flat, peak 257  Suspect demand  Denies any chest pain and no acute changes on ECG  Echo 3/1/2022 with EF 45% and hypokinesis in mid anteroseptal, apical septal and apical inferior walls  Optimized goal directed medical therapy with transition to metoprolol succinate and addition of spironolactone  Would recommend follow up imaging through primary cardiologist for monitoring  Chronic a-fib (HCC)  Assessment & Plan  S/P cardioversion  Remains on amiodarone 200 mg daily and Eliquis 2 5 mg BID (qualifies for reduced dose due to age and renal status)  Stage 3a chronic kidney disease [facility])  Assessment & Plan  Lab Results   Component Value Date    EGFR 31 03/01/2022    EGFR 33 02/28/2022    EGFR 35 11/26/2021    CREATININE 1 94 (H) 03/01/2022    CREATININE 1 85 (H) 02/28/2022    CREATININE 1 76 (H) 11/26/2021     Will monitor renal function with diuresis  Avoiding nephrotoxic agents  Hyperlipidemia, mixed  Assessment & Plan  Continue statin therapy for goal LDL < 70 given known CAD    Hypertensive urgency  Assessment & Plan  Blood pressure elevated at admission, however is much improved today  Continue metoprolol succinate 200 mg daily  Amlodipine on hold to allow for aggressive diuresis but would resume prior to discharge  * Acute respiratory failure with hypoxia [facility])  Assessment & Plan  Resolved today  Currently saturating well on room air  Subjective:   Patient seen and examined  No significant events overnight  He is sitting up in bed eating breakfast  Continues with some shortness of breath which he feels is mildly improved from admission  He denies chest pain, cough, lightheadedness, or palpitations  He does not feel that he is retaining fluid at this time  Review of Systems   Constitutional: Negative  HENT: Negative  Cardiovascular: Positive for dyspnea on exertion   Negative for chest pain, irregular heartbeat, leg swelling, near-syncope, orthopnea and palpitations  Respiratory: Positive for shortness of breath  Negative for cough and snoring  Endocrine: Negative  Skin: Negative  Musculoskeletal: Negative  Gastrointestinal: Negative  Genitourinary: Negative  Neurological: Negative  Psychiatric/Behavioral: Negative  Objective:     Vitals: Blood pressure 129/59, pulse 74, temperature 98 1 °F (36 7 °C), resp  rate 19, height 5' 8" (1 727 m), weight 65 3 kg (143 lb 15 4 oz), SpO2 95 %  , Body mass index is 21 89 kg/m² ,     Orthostatic Blood Pressures      Most Recent Value   Blood Pressure 129/59 filed at 03/03/2022 0757   Patient Position - Orthostatic VS Sitting filed at 02/28/2022 1559          Physical Exam  Vitals and nursing note reviewed  Constitutional:       Appearance: He is well-developed  HENT:      Head: Normocephalic and atraumatic  Eyes:      Conjunctiva/sclera: Conjunctivae normal    Neck:      Vascular: No JVD  Cardiovascular:      Rate and Rhythm: Normal rate and regular rhythm  Heart sounds: Murmur heard  Systolic murmur is present  Pulmonary:      Effort: Pulmonary effort is normal  No respiratory distress  Breath sounds: Examination of the right-upper field reveals rhonchi  Examination of the left-lower field reveals decreased breath sounds  Decreased breath sounds and rhonchi present  Comments: Breathing comfortably on room air  Abdominal:      Palpations: Abdomen is soft  Tenderness: There is no abdominal tenderness  Musculoskeletal:      Cervical back: Neck supple  Right lower leg: No edema  Left lower leg: No edema  Skin:     General: Skin is warm and dry  Neurological:      Mental Status: He is alert  Psychiatric:         Mood and Affect: Mood and affect normal          Speech: Speech normal          Behavior: Behavior normal  Behavior is cooperative           Cognition and Memory: Cognition and memory normal             Intake/Output Summary (Last 24 hours) at 3/3/2022 1016  Last data filed at 3/3/2022 0926  Gross per 24 hour   Intake 840 ml   Output 900 ml   Net -60 ml       Weight (last 2 days)     Date/Time Weight    03/03/22 0500 65 3 (143 96)    03/02/22 1700 64 5 (142 2)    03/02/22 0600 65 9 (145 28)    03/01/22 1020 65 8 (145)    03/01/22 0600 65 8 (145 13)             Medications:      Current Facility-Administered Medications:     acetaminophen (TYLENOL) tablet 650 mg, 650 mg, Oral, Q6H PRN, Vonda Haynes MD, 650 mg at 03/01/22 8458    amiodarone tablet 200 mg, 200 mg, Oral, Daily, Vonda Haynes MD, 200 mg at 03/03/22 0901    apixaban (ELIQUIS) tablet 2 5 mg, 2 5 mg, Oral, BID, Jose Zepeda MD, 2 5 mg at 03/03/22 0902    atorvastatin (LIPITOR) tablet 40 mg, 40 mg, Oral, Daily, Vonda Haynes MD, 40 mg at 03/03/22 0902    cefTRIAXone (ROCEPHIN) IVPB (premix in dextrose) 1,000 mg 50 mL, 1,000 mg, Intravenous, Q24H, Agatha Zepeda MD, Last Rate: 100 mL/hr at 03/02/22 1652, 1,000 mg at 03/02/22 1652    clopidogrel (PLAVIX) tablet 75 mg, 75 mg, Oral, Daily, Jose Zepeda MD, 75 mg at 03/03/22 0901    metoprolol succinate (TOPROL-XL) 24 hr tablet 200 mg, 200 mg, Oral, Daily, Jessie Dhillon MD, 200 mg at 03/03/22 0901    oxyCODONE-acetaminophen (PERCOCET) 5-325 mg per tablet 1 tablet, 1 tablet, Oral, Q4H PRN, Amalia Mijares MD, 1 tablet at 03/02/22 1649    spironolactone (ALDACTONE) tablet 25 mg, 25 mg, Oral, Daily, Jessie Dhillon MD, 25 mg at 03/03/22 0901    tamsulosin (FLOMAX) capsule 0 4 mg, 0 4 mg, Oral, Daily With Dinner, SANDRINE Vickers, 0 4 mg at 03/02/22 1650     Labs & Results:    Results from last 7 days   Lab Units 02/28/22  1608   CK TOTAL U/L 99     Results from last 7 days   Lab Units 03/03/22  0541 03/02/22  0506 03/01/22  0447   WBC Thousand/uL 9 37 15 18* 12 53*   HEMOGLOBIN g/dL 10 8* 11 6* 12 4   HEMATOCRIT % 32 0* 31 8* 35 9*   PLATELETS Thousands/uL 121* 119* 149         Results from last 7 days   Lab Units 03/03/22  0541 03/02/22  0506 03/01/22  0447 02/28/22  1608 02/28/22  1608   POTASSIUM mmol/L 3 7 3 2* 3 6   < > 4 1   CHLORIDE mmol/L 103 103 103   < > 106   CO2 mmol/L 29 27 28   < > 25   BUN mg/dL 63* 56* 42*   < > 44*   CREATININE mg/dL 2 36* 2 44* 1 94*   < > 1 85*   CALCIUM mg/dL 8 0* 7 9* 8 7   < > 8 5   ALK PHOS U/L 105 114  --   --  143*   ALT U/L 21 21  --   --  32   AST U/L 15 21  --   --  20    < > = values in this interval not displayed  Results from last 7 days   Lab Units 02/28/22  1616   INR  1 29*   PTT seconds 39*     Results from last 7 days   Lab Units 02/28/22  1608   MAGNESIUM mg/dL 2 3     Results from last 7 days   Lab Units 02/28/22  1608   NT-PRO BNP pg/mL 10,648*      Counseling / Coordination of Care  Total floor / unit time spent today 25 minutes  Greater than 50% of total time was spent with the patient and / or family counseling and / or coordination of care    A description of the counseling / coordination of care: Discussed case with Dr Josiah Mcgregor

## 2022-03-03 NOTE — PLAN OF CARE
Problem: Nutrition/Hydration-ADULT  Goal: Nutrient/Hydration intake appropriate for improving, restoring or maintaining nutritional needs  Description: Monitor and assess patient's nutrition/hydration status for malnutrition  Collaborate with interdisciplinary team and initiate plan and interventions as ordered  Monitor patient's weight and dietary intake as ordered or per policy  Utilize nutrition screening tool and intervene as necessary  Determine patient's food preferences and provide high-protein, high-caloric foods as appropriate       INTERVENTIONS:  - Monitor oral intake, urinary output, labs, and treatment plans  - Assess nutrition and hydration status and recommend course of action  - Evaluate amount of meals eaten  - Assist patient with eating if necessary   - Allow adequate time for meals  - Recommend/ encourage appropriate diets, oral nutritional supplements, and vitamin/mineral supplements  - Order, calculate, and assess calorie counts as needed  - Recommend, monitor, and adjust tube feedings and TPN/PPN based on assessed needs  - Assess need for intravenous fluids  - Provide specific nutrition/hydration education as appropriate  - Include patient/family/caregiver in decisions related to nutrition  Outcome: Progressing     Problem: PAIN - ADULT  Goal: Verbalizes/displays adequate comfort level or baseline comfort level  Description: Interventions:  - Encourage patient to monitor pain and request assistance  - Assess pain using appropriate pain scale  - Administer analgesics based on type and severity of pain and evaluate response  - Implement non-pharmacological measures as appropriate and evaluate response  - Consider cultural and social influences on pain and pain management  - Notify physician/advanced practitioner if interventions unsuccessful or patient reports new pain  Outcome: Progressing     Problem: INFECTION - ADULT  Goal: Absence or prevention of progression during hospitalization  Description: INTERVENTIONS:  - Assess and monitor for signs and symptoms of infection  - Monitor lab/diagnostic results  - Monitor all insertion sites, i e  indwelling lines, tubes, and drains  - Monitor endotracheal if appropriate and nasal secretions for changes in amount and color  - [employer] appropriate cooling/warming therapies per order  - Administer medications as ordered  - Instruct and encourage patient and family to use good hand hygiene technique  - Identify and instruct in appropriate isolation precautions for identified infection/condition  Outcome: Progressing     Problem: Knowledge Deficit  Goal: Patient/family/caregiver demonstrates understanding of disease process, treatment plan, medications, and discharge instructions  Description: Complete learning assessment and assess knowledge base    Interventions:  - Provide teaching at level of understanding  - Provide teaching via preferred learning methods  Outcome: Progressing     Problem: DISCHARGE PLANNING  Goal: Discharge to home or other facility with appropriate resources  Description: INTERVENTIONS:  - Identify barriers to discharge w/patient and caregiver  - Arrange for needed discharge resources and transportation as appropriate  - Identify discharge learning needs (meds, wound care, etc )  - Arrange for interpretive services to assist at discharge as needed  - Refer to Case Management Department for coordinating discharge planning if the patient needs post-hospital services based on physician/advanced practitioner order or complex needs related to functional status, cognitive ability, or social support system  Outcome: Progressing

## 2022-03-03 NOTE — PROGRESS NOTES
Pt refused to get OOB for standing weight, said "I will get up before breakfast and they can get receive it"

## 2022-03-03 NOTE — ASSESSMENT & PLAN NOTE
Patient has acute respiratory failure with hypoxia secondary to acute on chronic diastolic CHF exacerbation with left-sided pleural effusion  Currently patient is requiring 2 L of oxygen  Patient does not use any oxygen at home  Status post thoracentesis with removal of 2 2 L serosanguineous fluid-complicated with mild pneumothorax, continue to monitor  As per light criteria, exudate deep in nature  Patient placed on IV antibiotic-body fluid shows no growth    CT chest reviewed

## 2022-03-03 NOTE — PLAN OF CARE
Problem: Nutrition/Hydration-ADULT  Goal: Nutrient/Hydration intake appropriate for improving, restoring or maintaining nutritional needs  Description: Monitor and assess patient's nutrition/hydration status for malnutrition  Collaborate with interdisciplinary team and initiate plan and interventions as ordered  Monitor patient's weight and dietary intake as ordered or per policy  Utilize nutrition screening tool and intervene as necessary  Determine patient's food preferences and provide high-protein, high-caloric foods as appropriate       INTERVENTIONS:  - Monitor oral intake, urinary output, labs, and treatment plans  - Assess nutrition and hydration status and recommend course of action  - Evaluate amount of meals eaten  - Assist patient with eating if necessary   - Allow adequate time for meals  - Recommend/ encourage appropriate diets, oral nutritional supplements, and vitamin/mineral supplements  - Order, calculate, and assess calorie counts as needed  - Recommend, monitor, and adjust tube feedings and TPN/PPN based on assessed needs  - Assess need for intravenous fluids  - Provide specific nutrition/hydration education as appropriate  - Include patient/family/caregiver in decisions related to nutrition  Outcome: Progressing     Problem: MOBILITY - ADULT  Goal: Maintain or return to baseline ADL function  Description: INTERVENTIONS:  -  Assess patient's ability to carry out ADLs; assess patient's baseline for ADL function and identify physical deficits which impact ability to perform ADLs (bathing, care of mouth/teeth, toileting, grooming, dressing, etc )  - Assess/evaluate cause of self-care deficits   - Assess range of motion  - Assess patient's mobility; develop plan if impaired  - Assess patient's need for assistive devices and provide as appropriate  - Encourage maximum independence but intervene and supervise when necessary  - Involve family in performance of ADLs  - Assess for home care needs following discharge   - Consider OT consult to assist with ADL evaluation and planning for discharge  - Provide patient education as appropriate  Outcome: Progressing  Goal: Maintains/Returns to pre admission functional level  Description: INTERVENTIONS:  - Perform BMAT or MOVE assessment daily    - Set and communicate daily mobility goal to care team and patient/family/caregiver  - Collaborate with rehabilitation services on mobility goals if consulted  - Perform Range of Motion 4 times a day  - Reposition patient every 2 hours    - Dangle patient 3 times a day  - Stand patient 3 times a day  - Ambulate patient 3 times a day  - Out of bed to chair 3 times a day   - Out of bed for meals 3 times a day  - Out of bed for toileting  - Record patient progress and toleration of activity level   Outcome: Progressing     Problem: Potential for Falls  Goal: Patient will remain free of falls  Description: INTERVENTIONS:  - Educate patient/family on patient safety including physical limitations  - Instruct patient to call for assistance with activity   - Consult OT/PT to assist with strengthening/mobility   - Keep Call bell within reach  - Keep bed low and locked with side rails adjusted as appropriate  - Keep care items and personal belongings within reach  - Initiate and maintain comfort rounds  - Make Fall Risk Sign visible to staff  - Offer Toileting every 2 Hours, in advance of need  - Initiate/Maintain bed and chair alarm  - Obtain necessary fall risk management equipment: walker  - Apply yellow socks and bracelet for high fall risk patients  - Consider moving patient to room near nurses station  Outcome: Progressing     Problem: PAIN - ADULT  Goal: Verbalizes/displays adequate comfort level or baseline comfort level  Description: Interventions:  - Encourage patient to monitor pain and request assistance  - Assess pain using appropriate pain scale  - Administer analgesics based on type and severity of pain and evaluate response  - Implement non-pharmacological measures as appropriate and evaluate response  - Consider cultural and social influences on pain and pain management  - Notify physician/advanced practitioner if interventions unsuccessful or patient reports new pain  Outcome: Progressing     Problem: INFECTION - ADULT  Goal: Absence or prevention of progression during hospitalization  Description: INTERVENTIONS:  - Assess and monitor for signs and symptoms of infection  - Monitor lab/diagnostic results  - Monitor all insertion sites, i e  indwelling lines, tubes, and drains  - Monitor endotracheal if appropriate and nasal secretions for changes in amount and color  - [place] appropriate cooling/warming therapies per order  - Administer medications as ordered  - Instruct and encourage patient and family to use good hand hygiene technique  - Identify and instruct in appropriate isolation precautions for identified infection/condition  Outcome: Progressing  Goal: Absence of fever/infection during neutropenic period  Description: INTERVENTIONS:  - Monitor WBC    Outcome: Progressing     Problem: SAFETY ADULT  Goal: Maintain or return to baseline ADL function  Description: INTERVENTIONS:  -  Assess patient's ability to carry out ADLs; assess patient's baseline for ADL function and identify physical deficits which impact ability to perform ADLs (bathing, care of mouth/teeth, toileting, grooming, dressing, etc )  - Assess/evaluate cause of self-care deficits   - Assess range of motion  - Assess patient's mobility; develop plan if impaired  - Assess patient's need for assistive devices and provide as appropriate  - Encourage maximum independence but intervene and supervise when necessary  - Involve family in performance of ADLs  - Assess for home care needs following discharge   - Consider OT consult to assist with ADL evaluation and planning for discharge  - Provide patient education as appropriate  Outcome: Progressing  Goal: Maintains/Returns to pre admission functional level  Description: INTERVENTIONS:  - Perform BMAT or MOVE assessment daily    - Set and communicate daily mobility goal to care team and patient/family/caregiver  - Collaborate with rehabilitation services on mobility goals if consulted  - Perform Range of Motion 4 times a day  - Reposition patient every 2 hours    - Dangle patient 3 times a day  - Stand patient 3 times a day  - Ambulate patient 3 times a day  - Out of bed to chair 3 times a day   - Out of bed for meals 3 times a day  - Out of bed for toileting  - Record patient progress and toleration of activity level   Outcome: Progressing  Goal: Patient will remain free of falls  Description: INTERVENTIONS:  - Educate patient/family on patient safety including physical limitations  - Instruct patient to call for assistance with activity   - Consult OT/PT to assist with strengthening/mobility   - Keep Call bell within reach  - Keep bed low and locked with side rails adjusted as appropriate  - Keep care items and personal belongings within reach  - Initiate and maintain comfort rounds  - Make Fall Risk Sign visible to staff  - Offer Toileting every 2 Hours, in advance of need  - Initiate/Maintain bed and chair alarm  - Obtain necessary fall risk management equipment: walker  - Apply yellow socks and bracelet for high fall risk patients  - Consider moving patient to room near nurses station  Outcome: Progressing     Problem: DISCHARGE PLANNING  Goal: Discharge to home or other facility with appropriate resources  Description: INTERVENTIONS:  - Identify barriers to discharge w/patient and caregiver  - Arrange for needed discharge resources and transportation as appropriate  - Identify discharge learning needs (meds, wound care, etc )  - Arrange for interpretive services to assist at discharge as needed  - Refer to Case Management Department for coordinating discharge planning if the patient needs post-hospital services based on physician/advanced practitioner order or complex needs related to functional status, cognitive ability, or social support system  Outcome: Progressing     Problem: Knowledge Deficit  Goal: Patient/family/caregiver demonstrates understanding of disease process, treatment plan, medications, and discharge instructions  Description: Complete learning assessment and assess knowledge base    Interventions:  - Provide teaching at level of understanding  - Provide teaching via preferred learning methods  Outcome: Progressing     Problem: Prexisting or High Potential for Compromised Skin Integrity  Goal: Skin integrity is maintained or improved  Description: INTERVENTIONS:  - Identify patients at risk for skin breakdown  - Assess and monitor skin integrity  - Assess and monitor nutrition and hydration status  - Monitor labs   - Assess for incontinence   - Turn and reposition patient  - Assist with mobility/ambulation  - Relieve pressure over bony prominences  - Avoid friction and shearing  - Provide appropriate hygiene as needed including keeping skin clean and dry  - Evaluate need for skin moisturizer/barrier cream  - Collaborate with interdisciplinary team   - Patient/family teaching  - Consider wound care consult   Outcome: Progressing

## 2022-03-03 NOTE — PLAN OF CARE
Problem: GENITOURINARY - ADULT  Goal: Urinary catheter remains patent  Description: INTERVENTIONS:  - Assess patency of urinary catheter  - If patient has a chronic zavala, consider changing catheter if non-functioning  - Follow guidelines for intermittent irrigation of non-functioning urinary catheter  Outcome: Progressing

## 2022-03-03 NOTE — PROGRESS NOTES
Indwelling catheter removed at 1420  Voiding trial initiated  Pt educated and given urinal at bedside

## 2022-03-03 NOTE — ASSESSMENT & PLAN NOTE
Wt Readings from Last 3 Encounters:   03/03/22 65 3 kg (143 lb 15 4 oz)   12/19/21 68 kg (150 lb)   10/06/21 76 2 kg (167 lb 15 9 oz)     Patient has acute on chronic diastolic CHF exacerbation  Known history of coronary artery disease status post CABG 8 years ago and now recently had 2 stents 10/2021  Last 2D echo from 04/20/2021 showed EF of 60%  Patient had a complicated hospital course at the end of last year in [facility] and records are not completely available a but it seems like patient had an acute MI had 2 stents placed in the left main and ramus  Patient also had AFib with RVR underwent cardioversion  Also had episode of GI bleed and found have a stomach ulcer  Was admitted for almost 3 weeks  Recently he was seen outpatient by his cardiologist [facility] who stopped his Lasix  Patient appears to be gaining weight again and presented with worsening shortness of breath and found to have worsening left-sided pleural effusion  Will place on Lasix 40 mg IV b i d   Consult placed to Critical Care to evaluate for left-sided thoracentesis  Temporarily hold Plavix and Eliquis  Please note that patient is not on aspirin anymore as per his primary cardiologist   Continue Eliquis and Plavix  Status post thoracentesis with removal of 2 2 L serosanguineous fluid-exudative in nature as per light criteria  Patient placed on IV antibiotic

## 2022-03-03 NOTE — ASSESSMENT & PLAN NOTE
Lab Results   Component Value Date    EGFR 24 03/03/2022    EGFR 23 03/02/2022    EGFR 31 03/01/2022    CREATININE 2 36 (H) 03/03/2022    CREATININE 2 44 (H) 03/02/2022    CREATININE 1 94 (H) 03/01/2022   Baseline creatinine is 1 4-1 7  Currently patient present with creatinine of 1 85  Also fluid overloaded  Placed on IV Lasix and monitor closely during diuresis    Avoid hypotension or nephrotoxic agents

## 2022-03-04 ENCOUNTER — APPOINTMENT (INPATIENT)
Dept: RADIOLOGY | Facility: HOSPITAL | Age: 82
DRG: 291 | End: 2022-03-04
Payer: MEDICARE

## 2022-03-04 LAB
ALBUMIN SERPL BCP-MCNC: 2.4 G/DL (ref 3.5–5)
ALP SERPL-CCNC: 98 U/L (ref 46–116)
ALT SERPL W P-5'-P-CCNC: 25 U/L (ref 12–78)
ANION GAP SERPL CALCULATED.3IONS-SCNC: 9 MMOL/L (ref 4–13)
AST SERPL W P-5'-P-CCNC: 22 U/L (ref 5–45)
ATRIAL RATE: 65 BPM
BACTERIA SPEC BFLD CULT: NO GROWTH
BASOPHILS # BLD AUTO: 0.02 THOUSANDS/ΜL (ref 0–0.1)
BASOPHILS NFR BLD AUTO: 0 % (ref 0–1)
BILIRUB SERPL-MCNC: 0.76 MG/DL (ref 0.2–1)
BUN SERPL-MCNC: 52 MG/DL (ref 5–25)
CALCIUM ALBUM COR SERPL-MCNC: 9.1 MG/DL (ref 8.3–10.1)
CALCIUM SERPL-MCNC: 7.8 MG/DL (ref 8.3–10.1)
CHLORIDE SERPL-SCNC: 103 MMOL/L (ref 100–108)
CO2 SERPL-SCNC: 25 MMOL/L (ref 21–32)
CREAT SERPL-MCNC: 1.81 MG/DL (ref 0.6–1.3)
EOSINOPHIL # BLD AUTO: 0.28 THOUSAND/ΜL (ref 0–0.61)
EOSINOPHIL NFR BLD AUTO: 3 % (ref 0–6)
ERYTHROCYTE [DISTWIDTH] IN BLOOD BY AUTOMATED COUNT: 16 % (ref 11.6–15.1)
GFR SERPL CREATININE-BSD FRML MDRD: 34 ML/MIN/1.73SQ M
GLUCOSE SERPL-MCNC: 95 MG/DL (ref 65–140)
GRAM STN SPEC: NORMAL
HCT VFR BLD AUTO: 28.9 % (ref 36.5–49.3)
HGB BLD-MCNC: 10.2 G/DL (ref 12–17)
IMM GRANULOCYTES # BLD AUTO: 0.04 THOUSAND/UL (ref 0–0.2)
IMM GRANULOCYTES NFR BLD AUTO: 1 % (ref 0–2)
LYMPHOCYTES # BLD AUTO: 0.85 THOUSANDS/ΜL (ref 0.6–4.47)
LYMPHOCYTES NFR BLD AUTO: 10 % (ref 14–44)
MCH RBC QN AUTO: 33.2 PG (ref 26.8–34.3)
MCHC RBC AUTO-ENTMCNC: 35.3 G/DL (ref 31.4–37.4)
MCV RBC AUTO: 94 FL (ref 82–98)
MONOCYTES # BLD AUTO: 0.89 THOUSAND/ΜL (ref 0.17–1.22)
MONOCYTES NFR BLD AUTO: 11 % (ref 4–12)
NEUTROPHILS # BLD AUTO: 6.21 THOUSANDS/ΜL (ref 1.85–7.62)
NEUTS SEG NFR BLD AUTO: 75 % (ref 43–75)
NRBC BLD AUTO-RTO: 0 /100 WBCS
P AXIS: 55 DEGREES
PLATELET # BLD AUTO: 117 THOUSANDS/UL (ref 149–390)
PMV BLD AUTO: 11 FL (ref 8.9–12.7)
POTASSIUM SERPL-SCNC: 4.1 MMOL/L (ref 3.5–5.3)
PR INTERVAL: 210 MS
PROT SERPL-MCNC: 5.7 G/DL (ref 6.4–8.2)
QRS AXIS: 48 DEGREES
QRSD INTERVAL: 104 MS
QT INTERVAL: 464 MS
QTC INTERVAL: 482 MS
RBC # BLD AUTO: 3.07 MILLION/UL (ref 3.88–5.62)
SODIUM SERPL-SCNC: 137 MMOL/L (ref 136–145)
T WAVE AXIS: 106 DEGREES
VENTRICULAR RATE: 65 BPM
WBC # BLD AUTO: 8.29 THOUSAND/UL (ref 4.31–10.16)

## 2022-03-04 PROCEDURE — 99223 1ST HOSP IP/OBS HIGH 75: CPT | Performed by: INTERNAL MEDICINE

## 2022-03-04 PROCEDURE — 97163 PT EVAL HIGH COMPLEX 45 MIN: CPT

## 2022-03-04 PROCEDURE — 97166 OT EVAL MOD COMPLEX 45 MIN: CPT

## 2022-03-04 PROCEDURE — 99233 SBSQ HOSP IP/OBS HIGH 50: CPT | Performed by: FAMILY MEDICINE

## 2022-03-04 PROCEDURE — 80053 COMPREHEN METABOLIC PANEL: CPT | Performed by: FAMILY MEDICINE

## 2022-03-04 PROCEDURE — 93010 ELECTROCARDIOGRAM REPORT: CPT | Performed by: INTERNAL MEDICINE

## 2022-03-04 PROCEDURE — 71046 X-RAY EXAM CHEST 2 VIEWS: CPT

## 2022-03-04 PROCEDURE — 85025 COMPLETE CBC W/AUTO DIFF WBC: CPT | Performed by: FAMILY MEDICINE

## 2022-03-04 RX ORDER — FUROSEMIDE 40 MG/1
40 TABLET ORAL DAILY
Status: DISCONTINUED | OUTPATIENT
Start: 2022-03-05 | End: 2022-03-05 | Stop reason: HOSPADM

## 2022-03-04 RX ADMIN — APIXABAN 2.5 MG: 2.5 TABLET, FILM COATED ORAL at 07:46

## 2022-03-04 RX ADMIN — TAMSULOSIN HYDROCHLORIDE 0.4 MG: 0.4 CAPSULE ORAL at 15:59

## 2022-03-04 RX ADMIN — OXYCODONE HYDROCHLORIDE AND ACETAMINOPHEN 1 TABLET: 5; 325 TABLET ORAL at 17:24

## 2022-03-04 RX ADMIN — ATORVASTATIN CALCIUM 40 MG: 40 TABLET, FILM COATED ORAL at 07:45

## 2022-03-04 RX ADMIN — OXYCODONE HYDROCHLORIDE AND ACETAMINOPHEN 1 TABLET: 5; 325 TABLET ORAL at 13:01

## 2022-03-04 RX ADMIN — OXYCODONE HYDROCHLORIDE AND ACETAMINOPHEN 1 TABLET: 5; 325 TABLET ORAL at 07:44

## 2022-03-04 RX ADMIN — OXYCODONE HYDROCHLORIDE AND ACETAMINOPHEN 1 TABLET: 5; 325 TABLET ORAL at 22:16

## 2022-03-04 RX ADMIN — SPIRONOLACTONE 25 MG: 25 TABLET ORAL at 07:46

## 2022-03-04 RX ADMIN — METOPROLOL SUCCINATE 200 MG: 100 TABLET, EXTENDED RELEASE ORAL at 07:45

## 2022-03-04 RX ADMIN — CLOPIDOGREL BISULFATE 75 MG: 75 TABLET ORAL at 07:46

## 2022-03-04 RX ADMIN — AMIODARONE HYDROCHLORIDE 200 MG: 200 TABLET ORAL at 07:46

## 2022-03-04 RX ADMIN — CEFTRIAXONE 1000 MG: 1 INJECTION, SOLUTION INTRAVENOUS at 14:44

## 2022-03-04 RX ADMIN — APIXABAN 2.5 MG: 2.5 TABLET, FILM COATED ORAL at 17:24

## 2022-03-04 NOTE — CASE MANAGEMENT
Case Management Discharge Planning Note    Patient name Tato Oviedo  Location [address] [id number] MRN [id number]  : 1940 Date 3/4/2022       Current Admission Date: 2022  Current Admission Diagnosis:Acute respiratory failure with hypoxia [facility])   Patient Active Problem List    Diagnosis Date Noted    Lumbar compression deformity 2022    Postprocedural pneumothorax 2022    Acute respiratory failure with hypoxia ([facility] ) 2022    Acute on chronic diastolic CHF (congestive heart failure) ([facility] ) 2022    Stage 3a chronic kidney disease ([facility] ) 2022    [facility] 20[facility]    Sepsis [facility] ) 2021    Stage 3 chronic kidney disease [facility] 2021    Multiple lung nodules on CT 2021    Carotid artery stenosis 2021    Bacteremia due to Pseudomonas 2021    Acute cystitis without hematuria 2021    Coronary artery disease involving coronary bypass graft 2021    Mitral regurgitation 2021    Hypertensive urgency 2021    Hyperlipidemia, mixed 2021      LOS (days): 4  Geometric Mean LOS (GMLOS) (days): 3 80  Days to GMLOS:-0 2     OBJECTIVE:  Risk of Unplanned Readmission Score: 20         Current admission status: Inpatient   Preferred Pharmacy:   [address] [place] [id number]  Phone: [phone] Fax: [facility], [address] [place] [address],[address]  [facility] [address]  Phone: [phone] Fax: [phone]    Primary Care Provider: Ramona Diaz DO    Primary Insurance: MEDICARE  Secondary Insurance: BLUE CROSS    DISCHARGE DETAILS:    Recommendation is for [facility]    Discharge planning discussed with[de-identified] patient, wife and daughter  Freedom of Choice: Yes  Comments - [place] of Choice: HHC is recommended- No choice or preference/ can not recall the name of the past agencies- 4 referrals placed out to see who is available for [facility] - patient is being dc tomorrow  CM contacted family/caregiver?: Yes  Were Treatment Team discharge recommendations reviewed with patient/caregiver?: Yes  Did patient/caregiver verbalize understanding of patient care needs?: Yes            Requested [address]         Is the patient interested in [facility] at discharge?: Yes  [address] requested[de-identified] [address] Name[de-identified]  (Pending acceptance)  Vaibhav2 Art Garcia Provider[de-identified] PCP  Home Health Services Needed[de-identified] Strengthening/Theraputic Exercises to Improve Function (Cardiopulmonary assessment/ med management)  Homebound Criteria Met[de-identified] Uses an Assist Device (i e  cane, walker, etc)  Supporting Clincal Findings[de-identified] Fatigues Easliy in Short Distances                                     IMM Given (Date):: 03/04/22  IMM Given to[de-identified] Patient (verbalized understanding and copy provided)        Referral is pending at this time time  To [facility] agency

## 2022-03-04 NOTE — CONSULTS
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    He has evidence of pleural effusions on prior imaging as far back as April 2021  His residual pneumothorax after thoracentesis signifies trapped lung and the chronicity of the effusion  Unusual for this effusion to be so predominant on the left and for it to be exudative in setting of heart failure, however, he is on chronic diuretics which can lead to pseudoexudate  Cytology negative  Cultures negative  He has been treated for possible CAP of LLL  If pleural fluid reaccumulates as outpatient despite optimization of cardiac medications he should have a repeat thoracentesis with cytology sent again  I gave him my card- he can see me as outpatient if necessary if large effusion recurs  Discussed with Dr Frantz Ibarra with [facility]      History of Present Illness   HPI:  Leatha Milton is a 80 y o  male with PMHx of CAD, chronic systolic heart failure, atrial fibrillation on A/C, hypertension, and hyperlipidemia presented 2/28 with shortness of breath for several days and cough and orthopnea  He had b/l pleural effusions, much larger on left  He was initially treated with lasix and aldactone  He has been on ceftriaxone for 3 days  He had elevated procalcitonin  He denied sputum production or fever  He also had a thoracentesis - fluid was exudative, lymphocyte predominant  He feels much better and is on room air  No dyspnea or cough      He is unsure if he has had pleural effusion in past   He is a never smoker  No relevant occupational exposures  No history of prior pulmonary disease including asthma or COPD  No family history of lung disease    Review of systems:  12 point review of systems was completed and was otherwise negative except as listed in HPI        Historical Information   Past Medical History:   Diagnosis Date    CHF (congestive heart failure) ([address] )     Coronary artery disease     Hyperlipidemia     Hypertension     Paroxysmal atrial fibrillation (HCC)      Past Surgical History:   Procedure Laterality Date    CARDIAC SURGERY      cabg x 2 2014 [facility]    CORONARY ANGIOPLASTY WITH STENT PLACEMENT  10/2021    DAPHNEY to left main and ramus    MITRAL VALVE REPAIR  2014    mitral ring     Family History   Problem Relation Age of Onset    Hypertension Father          Social History     Tobacco Use   Smoking Status Never Smoker   Smokeless Tobacco Never Used         Meds/Allergies   Current Facility-Administered Medications   Medication Dose Route Frequency    acetaminophen (TYLENOL) tablet 650 mg  650 mg Oral Q6H PRN    amiodarone tablet 200 mg  200 mg Oral Daily    apixaban (ELIQUIS) tablet 2 5 mg  2 5 mg Oral BID    atorvastatin (LIPITOR) tablet 40 mg  40 mg Oral Daily    cefTRIAXone (ROCEPHIN) IVPB (premix in dextrose) 1,000 mg 50 mL  1,000 mg Intravenous Q24H    clopidogrel (PLAVIX) tablet 75 mg  75 mg Oral Daily    metoprolol succinate (TOPROL-XL) 24 hr tablet 200 mg  200 mg Oral Daily    oxyCODONE-acetaminophen (PERCOCET) 5-325 mg per tablet 1 tablet  1 tablet Oral Q4H PRN    spironolactone (ALDACTONE) tablet 25 mg  25 mg Oral Daily    tamsulosin (FLOMAX) capsule 0 4 mg  0 4 mg Oral Daily With Dinner     Medications Prior to Admission   Medication    amiodarone 200 mg tablet    amLODIPine (NORVASC) 10 mg tablet    apixaban (Eliquis) 2 5 mg    atorvastatin (LIPITOR) 40 mg tablet    clopidogrel (PLAVIX) 75 mg tablet    metoprolol tartrate (LOPRESSOR) 100 mg tablet     No Known Allergies    Vitals: Blood pressure 133/60, pulse 65, temperature 98 1 °F (36 7 °C), resp  rate 17, height 5' 8" (1 727 m), weight 63 9 kg (140 lb 12 8 oz), SpO2 98 %  , Room air, Body mass index is 21 41 kg/m²  Intake/Output Summary (Last 24 hours) at 3/4/2022 1741  Last data filed at 3/4/2022 1201  Gross per 24 hour   Intake 180 ml   Output 1000 ml   Net -820 ml       Physical Exam  Vitals and nursing note reviewed  Constitutional:       Appearance: He is well-developed  HENT:      Head: Normocephalic and atraumatic  Mouth/Throat:      Mouth: Mucous membranes are moist       Pharynx: No pharyngeal swelling  Eyes:      Conjunctiva/sclera: Conjunctivae normal    Neck:      Vascular: No JVD  Cardiovascular:      Rate and Rhythm: Normal rate and regular rhythm  Heart sounds: No murmur heard  Pulmonary:      Effort: Pulmonary effort is normal  No respiratory distress  Breath sounds: Normal breath sounds  Abdominal:      Palpations: Abdomen is soft  Tenderness: There is no abdominal tenderness  Musculoskeletal:      Cervical back: Neck supple  Right lower leg: No edema  Left lower leg: No edema  Skin:     General: Skin is warm and dry  Neurological:      General: No focal deficit present  Mental Status: He is alert and oriented to person, place, and time  Psychiatric:         Mood and Affect: Mood normal          Behavior: Behavior normal          Labs: I have personally reviewed pertinent lab results    Laboratory and Diagnostics  Results from last 7 days   Lab Units 03/04/22  0602 03/03/22  0541 03/02/22  0506 03/01/22  0447 02/28/22  1608   WBC Thousand/uL 8 29 9 37 15 18* 12 53* 13 24*   HEMOGLOBIN g/dL 10 2* 10 8* 11 6* 12 4 12 6   HEMATOCRIT % 28 9* 32 0* 31 8* 35 9* 36 5   PLATELETS Thousands/uL 117* 121* 119* 149 181   NEUTROS PCT % 75 77* 91*  --  70   MONOS PCT % 11 11 7  --  11     Results from last 7 days   Lab Units 03/04/22  0602 03/03/22  0541 03/02/22  0506 03/01/22  0447 02/28/22  1608   SODIUM mmol/L 137 140 140 141 140   POTASSIUM mmol/L 4 1 3 7 3 2* 3 6 4 1   CHLORIDE mmol/L 103 103 103 103 106   CO2 mmol/L 25 29 27 28 25   ANION GAP mmol/L 9 8 10 10 9   BUN mg/dL 52* 63* 56* 42* 44*   CREATININE mg/dL 1 81* 2 36* 2 44* 1 94* 1 85*   CALCIUM mg/dL 7 8* 8 0* 7 9* 8 7 8 5   GLUCOSE RANDOM mg/dL 95 100 99 111 116   ALT U/L 25 21 21  --  32   AST U/L 22 15 21  --  20   ALK PHOS U/L 98 105 114  --  143*   ALBUMIN g/dL 2 4* 2 8* 2 9*  --  3 8   TOTAL BILIRUBIN mg/dL 0 76 1 13* 1 77*  --  1 67*     Results from last 7 days   Lab Units 02/28/22  1608   MAGNESIUM mg/dL 2 3      Results from last 7 days   Lab Units 02/28/22  1616   INR  1 29*   PTT seconds 39*          Results from last 7 days   Lab Units 03/02/22  1632   LACTIC ACID mmol/L 1 7         Results from last 7 days   Lab Units 03/02/22  0506   LD U/L 248*             Results from last 7 days   Lab Units 03/03/22  0457 03/02/22  0506   PROCALCITONIN ng/ml 3 06* 1 93*       Microbiology:  Results from last 7 days   Lab Units 03/02/22  1633 03/02/22  1601 03/01/22  1556   BLOOD CULTURE  No Growth at 24 hrs  No Growth at 24 hrs   --   --    GRAM STAIN RESULT   --   --  No Polys or Bacteria seen   BODY FLUID CULTURE, STERILE   --   --  No growth   STREP PNEUMONIAE ANTIGEN, URINE   --  Negative  --    LEGIONELLA URINARY ANTIGEN   --  Negative  --        ABG: No results found for: PHART, PIR7ZGB, PO2ART, QAE0NNE, U8CVQECF, BEART, SOURCE    Pleural fluid cytology- negative  Component      Latest Ref Rng & Units 3/1/2022   Case Report       Non-gynecologic Cytology                          Case: [id number]         Final Diagnosis       This result contains rich text formatting which cannot be displayed here  Gross Description       This result contains rich text formatting which cannot be displayed here  Clinical Information       CT          Additional Information       This result contains rich text formatting which cannot be displayed here  Site       Pleural, Left   Color, Fluid      Clear, Colorless,Yellow Luz   Clarity, Fluid      Clear Cloudy (A)   WBC, Fluid      /ul 732   GLUCOSE FLUID      mg/dL 124   LACTATE DEHYDROGENASE FLUID      U/L 212   PH BODY FLUID       7 9   Protein, Fluid      g/dL 4 0       Imaging and other studies: I have personally reviewed pertinent reports  and I have personally reviewed pertinent films in PACS  XR chest portable    Result Date: 3/2/2022  Impression: No significant interval change compared to most immediate prior study  Redemonstrated left pneumothorax  Workstation performed: [id number]     XR chest portable    Result Date: 3/1/2022  Impression: Slightly increased size of the left pneumothorax  Workstation performed: [id number]     XR chest portable    Result Date: 3/1/2022  Impression: Trapped left lower lobe with nonreexpansion related small pneumothorax postthoracentesis  The study was marked in [facility] for immediate notification  Workstation performed: [id number]     XR chest 1 view portable    Result Date: 3/1/2022  Impression: CHF  Left greater than right pleural effusions  Workstation performed: [id number]     XR chest pa & lateral    Result Date: 3/4/2022  Impression: Persistent left basilar pleural effusion and atelectasis  Small residual left basilar pneumothorax  Workstation performed: [id number]     CT chest wo contrast    Result Date: 3/2/2022  Impression: Small left pleural effusion, similar to April 2021, recurrent versus chronic  Small left pneumothorax  Mild patchy ground glass opacity in the right upper lobe and inferior lingula and mild patchy consolidation in the lingula which may be infectious or inflammatory  Mild reticulation in the right lower lobe due to fibrosis  New mild endplate compression deformities in the spine    Workstation performed: [id number]         Pulmonary function testing: N/A    EKG, Pathology, and Other Studies: I have personally reviewed pertinent reports  TTE 3/1/22   Left Ventricle: Left ventricular cavity size is normal  Wall thickness is normal  The left ventricular ejection fraction is 45%  Systolic function is normal  Diastolic function is normal     The following segments are hypokinetic: mid anteroseptal, apical septal and apical inferior    All other segments are normal     Aortic Valve: The aortic valve is trileaflet  The leaflets are mildly thickened  The leaflets are mildly calcified  The leaflets exhibit normal mobility  There is mild regurgitation  There is no evidence of stenosis    Mitral Valve: There is mild thickening  There is severe annular calcification  The valve has been repaired with an annular ring  There is mild regurgitation  There is no evidence of stenosis    Tricuspid Valve: Tricuspid valve structure is normal  There is mild regurgitation        Code Status: Level 1 - Full Code        Pramod Staples MD  Attending Physician  Pulmonary and Critical Care Medicine

## 2022-03-04 NOTE — PLAN OF CARE
Problem: PHYSICAL THERAPY ADULT  Goal: Performs mobility at highest level of function for planned discharge setting  See evaluation for individualized goals  Description: Treatment/Interventions: Functional transfer training,LE strengthening/ROM,Elevations,Therapeutic exercise,Endurance training,Patient/family training,Equipment eval/education,Bed mobility,Gait training,Compensatory technique education,Spoke to nursing,Spoke to case management,OT  Equipment Recommended:  (walker-pt has)       See flowsheet documentation for full assessment, interventions and recommendations  Note: Prognosis: Good  Problem List: Decreased strength,Decreased endurance,Impaired balance,Decreased mobility,Decreased range of motion,Decreased safety awareness,Decreased coordination  Assessment: Pt is a 80 y o  male seen for PT evaluation s/p admission to [address] [place] [address] on 2/28/2022 with Acute respiratory failure with hypoxia ([facility] [address] )  Order placed for PT services  Upon evaluation: Pt is presenting with impaired functional mobility due to decreased strength, decreased ROM, decreased endurance, impaired balance, impaired coordination, gait deviations, decreased safety awareness and fall risk requiring supervision assistance for transfers and supervision assistance for ambulation with RW and SBA with [place] cane   Pt's clinical presentation is currently unpredictable given the functional mobility deficits above, especially weakness, decreased ROM, decreased endurance, impaired coordination, gait deviations, decreased activity tolerance, decreased functional mobility tolerance and decreased safety awareness, coupled with fall risks as indicated by AM-PAC 6-Clicks: 19/95 and score of 7/12 on 4 item DGI  Indicating increased fall risk as well as impaired balance, polypharmacy, impaired coordination and decreased safety awareness and combined with medical complications of abnormal renal lab values, abnormal H&H and abnormal BNP with acute CHF, troponin, hypertension on admission, post procedural pneumothorax s/p thoracentesis 3/1/22, Mild superior endplate deformity of L1 and inferior endplate deformity of L2  (pt with h/o multiple falls)  Pt's PMHx and comorbidities that may affect physical performance and progress include: CHF, CKD, A fib, HTN, CAD and orthopedic surgery  Personal factors affecting pt at time of IE include: step(s) to enter environment, multi-level environment, inability to perform IADLs, inability to perform ADLs, inability to navigate level surfaces without external assistance, inability to navigate community distances and recent fall(s)/fall history  Pt will benefit from continued skilled PT services to address deficits as defined above and to maximize level of functional mobility to facilitate return toward PLOF and improved QOL  From PT/mobility standpoint, recommendation at time of d/c would be Home PT, home with family support and with walker pending progress in order to reduce fall risk and maximize pt's functional independence and consistency with mobility in order to facilitate return to PLOF  Recommend ther ex next 1-2 sessions and RW versus cane  Barriers to Discharge: None  Barriers to Discharge Comments: assistance prn from spouse     PT Discharge Recommendation: Home with home health rehabilitation          See flowsheet documentation for full assessment

## 2022-03-04 NOTE — PLAN OF CARE
Problem: Nutrition/Hydration-ADULT  Goal: Nutrient/Hydration intake appropriate for improving, restoring or maintaining nutritional needs  Description: Monitor and assess patient's nutrition/hydration status for malnutrition  Collaborate with interdisciplinary team and initiate plan and interventions as ordered  Monitor patient's weight and dietary intake as ordered or per policy  Utilize nutrition screening tool and intervene as necessary  Determine patient's food preferences and provide high-protein, high-caloric foods as appropriate       INTERVENTIONS:  - Monitor oral intake, urinary output, labs, and treatment plans  - Assess nutrition and hydration status and recommend course of action  - Evaluate amount of meals eaten  - Assist patient with eating if necessary   - Allow adequate time for meals  - Recommend/ encourage appropriate diets, oral nutritional supplements, and vitamin/mineral supplements  - Order, calculate, and assess calorie counts as needed  - Recommend, monitor, and adjust tube feedings and TPN/PPN based on assessed needs  - Assess need for intravenous fluids  - Provide specific nutrition/hydration education as appropriate  - Include patient/family/caregiver in decisions related to nutrition  Outcome: Progressing     Problem: MOBILITY - ADULT  Goal: Maintain or return to baseline ADL function  Description: INTERVENTIONS:  -  Assess patient's ability to carry out ADLs; assess patient's baseline for ADL function and identify physical deficits which impact ability to perform ADLs (bathing, care of mouth/teeth, toileting, grooming, dressing, etc )  - Assess/evaluate cause of self-care deficits   - Assess range of motion  - Assess patient's mobility; develop plan if impaired  - Assess patient's need for assistive devices and provide as appropriate  - Encourage maximum independence but intervene and supervise when necessary  - Involve family in performance of ADLs  - Assess for home care needs following discharge   - Consider OT consult to assist with ADL evaluation and planning for discharge  - Provide patient education as appropriate  Outcome: Progressing     Problem: PAIN - ADULT  Goal: Verbalizes/displays adequate comfort level or baseline comfort level  Description: Interventions:  - Encourage patient to monitor pain and request assistance  - Assess pain using appropriate pain scale  - Administer analgesics based on type and severity of pain and evaluate response  - Implement non-pharmacological measures as appropriate and evaluate response  - Consider cultural and social influences on pain and pain management  - Notify physician/advanced practitioner if interventions unsuccessful or patient reports new pain  Outcome: Progressing     Problem: INFECTION - ADULT  Goal: Absence or prevention of progression during hospitalization  Description: INTERVENTIONS:  - Assess and monitor for signs and symptoms of infection  - Monitor lab/diagnostic results  - Monitor all insertion sites, i e  indwelling lines, tubes, and drains  - Monitor endotracheal if appropriate and nasal secretions for changes in amount and color  - [place] appropriate cooling/warming therapies per order  - Administer medications as ordered  - Instruct and encourage patient and family to use good hand hygiene technique  - Identify and instruct in appropriate isolation precautions for identified infection/condition  Outcome: Progressing     Problem: Knowledge Deficit  Goal: Patient/family/caregiver demonstrates understanding of disease process, treatment plan, medications, and discharge instructions  Description: Complete learning assessment and assess knowledge base    Interventions:  - Provide teaching at level of understanding  - Provide teaching via preferred learning methods  Outcome: Progressing     Problem: DISCHARGE PLANNING  Goal: Discharge to home or other facility with appropriate resources  Description: INTERVENTIONS:  - Identify barriers to discharge w/patient and caregiver  - Arrange for needed discharge resources and transportation as appropriate  - Identify discharge learning needs (meds, wound care, etc )  - Arrange for interpretive services to assist at discharge as needed  - Refer to Case Management Department for coordinating discharge planning if the patient needs post-hospital services based on physician/advanced practitioner order or complex needs related to functional status, cognitive ability, or social support system  Outcome: Progressing

## 2022-03-04 NOTE — OCCUPATIONAL THERAPY NOTE
Occupational Therapy Evaluation     Patient Name: Cora SUMNER Date: 3/4/2022  Problem List  Principal Problem:    Acute respiratory failure with hypoxia [facility])  Active Problems:    Coronary artery disease involving coronary bypass graft    Mitral regurgitation    Hypertensive urgency    Hyperlipidemia, mixed    Acute on chronic diastolic CHF (congestive heart failure) ([facility] )    Stage 3a chronic kidney disease (HCC)    Chronic a-fib (HCC)    Postprocedural pneumothorax    Lumbar compression deformity    Past Medical History  Past Medical History:   Diagnosis Date    CHF (congestive heart failure) ([facility] [address] [facility] )     Coronary artery disease     Hyperlipidemia     Hypertension     [facility]      Past Surgical History  Past Surgical History:   Procedure Laterality Date    CARDIAC SURGERY      cabg x 2 2014 [facility]    CORONARY ANGIOPLASTY WITH STENT PLACEMENT  10/2021    DAPHNEY to left main and ramus    MITRAL VALVE REPAIR  2014    mitral ring           03/04/22 1028   Note Type   Note type Evaluation   Restrictions/Precautions   Other Precautions Chair Alarm; Bed Alarm; Fall Risk   Pain Assessment   Pain Assessment Tool 0-10   Pain Score No Pain   Home Living   Type of Home House  (3 JACOBO R HR)   Home Layout Two level;1/2 bath on main level;Bed/bath upstairs  (3 steps to platform L HR then stair glide to 2nd floor)   Bathroom Shower/Tub Tub/shower unit   [employer] Standard  (standard on 2nd floor  raised on 1st floor)   [address] [place] chair;Grab bars in [facility];Cane  (RW on 1st floor   SPC on 2nd floor)   Additional Comments Pt reports living in a 2 story home with 3 JACOBO> Pt ambulates with RW or SPC a baseline   Prior Function   Level of [place] Independent with ADLs and functional mobility   Lives With Spouse   Receives Help From Family   ADL Assistance Independent   IADLs Needs assistance  (+ driving, spouse does 99%)   Falls in the last 6 months 0   Comments Pt reports completing ADLs adn functional ambulation @ Mod I  Pt has assistance for IADLs and community mobility  ADL   Where Assessed Chair   Grooming Assistance 6  Modified Independent   Grooming Deficit Setup   UB Dressing Assistance 6  Modified independent   UB Dressing Deficit Setup   LB Dressing Assistance 5  Supervision/Setup   LB Dressing Deficit Requires assistive device for steadying;Verbal cueing;Supervision/safety; Increased time to complete; Don/doff R sock; Don/doff L sock; Thread RLE into pants; Thread LLE into pants;Pull up over hips   Additional Comments Pt completing ADL tasks while seated OOB in recliner chair  UB Dressing and grooming tasks @ MOd I after set-up while seated  LB Dressing @ S for safety dring CM around waist while standing  Pt donning B socks/pants around feet @ S with no difficulty noted   Bed Mobility   Additional Comments Pt seated OOB at start and end of session with call bell in reach, chair alarm intact adn all needs met at this time   Transfers   Sit to Stand 6  Modified independent   Stand to Sit 6  Modified independent   Stand pivot 5  Supervision   Additional items Increased time required;Verbal cues   Additional Comments Pt completing functional transfers with use of RW for UB support  Mod I for STS and S for SPT and short distance amblation  occasional vc'ing for safety and RW managemetn   Balance   Static Sitting Normal   Dynamic Sitting Good   Static Standing Fair +   Dynamic Standing Fair   Activity Tolerance   Activity Tolerance Patient limited by fatigue   Medical Staff Made Aware Spoke with PT, Kiko Espinoza   Nurse Made Aware Spoke with RN   RUE Assessment   RUE Assessment WFL   LUE Assessment   LUE Assessment WFL   Hand Function   Gross Motor Coordination Functional   Fine Motor Coordination Functional   Sensation   Light Touch No apparent deficits   Cognition   Overall Cognitive Status WFL   Arousal/Participation Alert; Responsive; Cooperative Attention Attends with cues to redirect   Orientation Level Oriented X4   Memory Within functional limits   Following Commands Follows one step commands without difficulty   Assessment   Limitation Decreased ADL status; Decreased UE strength;Decreased Safe judgement during ADL;Decreased endurance;Decreased self-care trans;Decreased high-level ADLs   Prognosis Good   Assessment Pt is a 80 y o  male, admitted to [address] [place] [address] 2/28/2022 d/t experiencing increased SOB  Dx: acute respiratory failure with hypoxia  Pt with PMHx impacting their performance during ADL tasks, including: CHF, CAD, hyperlipidemia, HTN  Prior to admission to the hospital Pt was performing ADLs without physical assistance  IADLs without physical assistance  Functional transfers/ambulation without physical assistance  Cognitive status was PTA was intact  OT order placed to assess Pt's ADLs, cognitive status, and performance during functional tasks in order to maximize safety and independence while making most appropriate d/c recommendations  Pt's clinical presentation is currently evolving given new onset deficits that effect Pt's occupational performance and ability to safely return to [facility] including decrease activity tolerance, decrease standing balance, decrease safety awareness , decrease UB MS, decrease generalized strength, decrease activity engagement, decrease performance during functional transfers and high fall risk combined with medical complications of hypertension , abnormal renal lab values, abnormal H&H, abnormal CBC, abnormal potassium values, low SpO2 values, new onset O2 use, elevated BNP and need for input for mobility technique/safety  Pt previously on 2L of O2 however on RA during therapy session  Pt maintaining >88% with activity and no SOB noted   Personal factors affecting Pt at time of initial evaluation include: multi-level environment, inability to perform current job functions, inability to perform IADLs, inability to navigate community distances and limited insight into impairments  Pt will benefit from continued skilled OT services to address deficits as defined above and to maximize level independence/participation during ADLs and functional tasks to facilitate return toward PLOF and improved quality of life  From an occupational therapy standpoint, recommendation at time of d/c would be no further OT needs  Plan   Treatment Interventions ADL retraining;Functional transfer training;UE strengthening/ROM; Endurance training;Patient/family training;Equipment evaluation/education; Neuromuscular reeducation; Compensatory technique education;Continued evaluation; Energy conservation; Activityengagement   Goal Expiration Date 03/18/22   OT Frequency 1-2x/wk   Recommendation   OT Discharge Recommendation No rehabilitation needs   AM-PAC Daily Activity Inpatient   Lower Body Dressing 3   Bathing 3   Toileting 3   Upper Body Dressing 4   Grooming 3   Eating 4   Daily Activity Raw Score 20   Daily Activity Standardized Score (Calc for Raw Score >=11) 42 03     The patient's raw score on the AM-PAC Daily Activity inpatient short form is 20, standardized score is 42 03, greater than 39 4  Patients at this level are likely to benefit from DC to home  Please refer to the recommendation of the Occupational Therapist for safe DC planning  Pt goals to be met by 3/18/2022    1  Pt will demonstrate ability to complete LB dressing @ Mod I in order to increase safety and independence during meaningful tasks  2  Pt will demonstrate ability to dary/doff socks/shoes while sitting EOB @ Mod I in order to increase safety and independence during meaningful tasks  3  Pt will demonstrate ability to complete toileting tasks including CM and pericare @ Mod I in order to increase safety and independence during meaningful tasks    4  Pt will demonstrate ability to complete EOB, chair, toilet/commode transfers @ Mod I in order to increase performance and participation during functional tasks  5  Pt will demonstrate ability to stand for 10 minutes while maintaining G balance with use of least restrictive device for UB support PRN  6  Pt will demonstrate ability to tolerate 30-35 minute OT session with no vc'ing for deep breathing or use of energy conservation techniques in order to increase activity tolerance during functional tasks  7  Pt will demonstrate Good carryover of use of energy conservation/compensatory strategies during ADLs and functional tasks in order to increase safety and reduce risk for falls  8  Pt will demonstrate Good attention and participation in continued evaluation of functional ambulation house hold distances in order to assist with safe d/c planning  9  Pt will attend to continued cognitive assessments 100% of the time in order to provide most appropriate d/c recommendations  10  Pt will follow 100% simple 2-step commands and be A&O x4 consistently with environmental cues to increase participation in functional activities  11  Pt will identify 3 areas of interest/hobbies and 1 intervention on how to incorporate into daily life in order to increase interaction with environment and peers as well as increase participation in meaningful tasks  12  Pt will demonstrate 100% carryover of BUE HEP in order to increase BUE MS and increase performance during functional tasks upon d/c home      Marc Duran OTR/L

## 2022-03-04 NOTE — PLAN OF CARE
Problem: Nutrition/Hydration-ADULT  Goal: Nutrient/Hydration intake appropriate for improving, restoring or maintaining nutritional needs  Description: Monitor and assess patient's nutrition/hydration status for malnutrition  Collaborate with interdisciplinary team and initiate plan and interventions as ordered  Monitor patient's weight and dietary intake as ordered or per policy  Utilize nutrition screening tool and intervene as necessary  Determine patient's food preferences and provide high-protein, high-caloric foods as appropriate       INTERVENTIONS:  - Monitor oral intake, urinary output, labs, and treatment plans  - Assess nutrition and hydration status and recommend course of action  - Evaluate amount of meals eaten  - Assist patient with eating if necessary   - Allow adequate time for meals  - Recommend/ encourage appropriate diets, oral nutritional supplements, and vitamin/mineral supplements  - Order, calculate, and assess calorie counts as needed  - Recommend, monitor, and adjust tube feedings and TPN/PPN based on assessed needs  - Assess need for intravenous fluids  - Provide specific nutrition/hydration education as appropriate  - Include patient/family/caregiver in decisions related to nutrition  Outcome: Progressing     Problem: MOBILITY - ADULT  Goal: Maintain or return to baseline ADL function  Description: INTERVENTIONS:  -  Assess patient's ability to carry out ADLs; assess patient's baseline for ADL function and identify physical deficits which impact ability to perform ADLs (bathing, care of mouth/teeth, toileting, grooming, dressing, etc )  - Assess/evaluate cause of self-care deficits   - Assess range of motion  - Assess patient's mobility; develop plan if impaired  - Assess patient's need for assistive devices and provide as appropriate  - Encourage maximum independence but intervene and supervise when necessary  - Involve family in performance of ADLs  - Assess for home care needs following discharge   - Consider OT consult to assist with ADL evaluation and planning for discharge  - Provide patient education as appropriate  Outcome: Progressing  Goal: Maintains/Returns to pre admission functional level  Description: INTERVENTIONS:  - Perform BMAT or MOVE assessment daily    - Set and communicate daily mobility goal to care team and patient/family/caregiver  - Collaborate with rehabilitation services on mobility goals if consulted  - Perform Range of Motion   times a day  - Reposition patient every   hours  - Dangle patient   times a day  - Stand patient   times a day  - Ambulate patient   times a day  - Out of bed to chair   times a day   - Out of bed for meals   times a day  - Out of bed for toileting  - Record patient progress and toleration of activity level   Outcome: Progressing     Problem: Potential for Falls  Goal: Patient will remain free of falls  Description: INTERVENTIONS:  - Educate patient/family on patient safety including physical limitations  - Instruct patient to call for assistance with activity   - Consult OT/PT to assist with strengthening/mobility   - Keep Call bell within reach  - Keep bed low and locked with side rails adjusted as appropriate  - Keep care items and personal belongings within reach  - Initiate and maintain comfort rounds  - Make Fall Risk Sign visible to staff  - Offer Toileting every   Hours, in advance of need  - Initiate/Maintain    alarm  - Obtain necessary fall risk management equipment:      - Apply yellow socks and bracelet for high fall risk patients  - Consider moving patient to room near nurses station  Outcome: Progressing     Problem: PAIN - ADULT  Goal: Verbalizes/displays adequate comfort level or baseline comfort level  Description: Interventions:  - Encourage patient to monitor pain and request assistance  - Assess pain using appropriate pain scale  - Administer analgesics based on type and severity of pain and evaluate response  - Implement non-pharmacological measures as appropriate and evaluate response  - Consider cultural and social influences on pain and pain management  - Notify physician/advanced practitioner if interventions unsuccessful or patient reports new pain  Outcome: Progressing     Problem: INFECTION - ADULT  Goal: Absence or prevention of progression during hospitalization  Description: INTERVENTIONS:  - Assess and monitor for signs and symptoms of infection  - Monitor lab/diagnostic results  - Monitor all insertion sites, i e  indwelling lines, tubes, and drains  - Monitor endotracheal if appropriate and nasal secretions for changes in amount and color  - Aromas appropriate cooling/warming therapies per order  - Administer medications as ordered  - Instruct and encourage patient and family to use good hand hygiene technique  - Identify and instruct in appropriate isolation precautions for identified infection/condition  Outcome: Progressing  Goal: Absence of fever/infection during neutropenic period  Description: INTERVENTIONS:  - Monitor WBC    Outcome: Progressing     Problem: SAFETY ADULT  Goal: Maintain or return to baseline ADL function  Description: INTERVENTIONS:  -  Assess patient's ability to carry out ADLs; assess patient's baseline for ADL function and identify physical deficits which impact ability to perform ADLs (bathing, care of mouth/teeth, toileting, grooming, dressing, etc )  - Assess/evaluate cause of self-care deficits   - Assess range of motion  - Assess patient's mobility; develop plan if impaired  - Assess patient's need for assistive devices and provide as appropriate  - Encourage maximum independence but intervene and supervise when necessary  - Involve family in performance of ADLs  - Assess for home care needs following discharge   - Consider OT consult to assist with ADL evaluation and planning for discharge  - Provide patient education as appropriate  Outcome: Progressing  Goal: Maintains/Returns to pre admission functional level  Description: INTERVENTIONS:  - Perform BMAT or MOVE assessment daily    - Set and communicate daily mobility goal to care team and patient/family/caregiver  - Collaborate with rehabilitation services on mobility goals if consulted  - Perform Range of Motion   times a day  - Reposition patient every   hours  - Dangle patient   times a day  - Stand patient   times a day  - Ambulate patient   times a day  - Out of bed to chair   times a day   - Out of bed for meals     times a day  - Out of bed for toileting  - Record patient progress and toleration of activity level   Outcome: Progressing  Goal: Patient will remain free of falls  Description: INTERVENTIONS:  - Educate patient/family on patient safety including physical limitations  - Instruct patient to call for assistance with activity   - Consult OT/PT to assist with strengthening/mobility   - Keep Call bell within reach  - Keep bed low and locked with side rails adjusted as appropriate  - Keep care items and personal belongings within reach  - Initiate and maintain comfort rounds  - Make Fall Risk Sign visible to staff  - Offer Toileting every   Hours, in advance of need  - Initiate/Maintain   alarm  - Obtain necessary fall risk management equipment:      - Apply yellow socks and bracelet for high fall risk patients  - Consider moving patient to room near nurses station  Outcome: Progressing     Problem: DISCHARGE PLANNING  Goal: Discharge to home or other facility with appropriate resources  Description: INTERVENTIONS:  - Identify barriers to discharge w/patient and caregiver  - Arrange for needed discharge resources and transportation as appropriate  - Identify discharge learning needs (meds, wound care, etc )  - Arrange for interpretive services to assist at discharge as needed  - Refer to Case Management Department for coordinating discharge planning if the patient needs post-hospital services based on physician/advanced practitioner order or complex needs related to functional status, cognitive ability, or social support system  Outcome: Progressing     Problem: Knowledge Deficit  Goal: Patient/family/caregiver demonstrates understanding of disease process, treatment plan, medications, and discharge instructions  Description: Complete learning assessment and assess knowledge base    Interventions:  - Provide teaching at level of understanding  - Provide teaching via preferred learning methods  Outcome: Progressing     Problem: Prexisting or High Potential for Compromised Skin Integrity  Goal: Skin integrity is maintained or improved  Description: INTERVENTIONS:  - Identify patients at risk for skin breakdown  - Assess and monitor skin integrity  - Assess and monitor nutrition and hydration status  - Monitor labs   - Assess for incontinence   - Turn and reposition patient  - Assist with mobility/ambulation  - Relieve pressure over bony prominences  - Avoid friction and shearing  - Provide appropriate hygiene as needed including keeping skin clean and dry  - Evaluate need for skin moisturizer/barrier cream  - Collaborate with interdisciplinary team   - Patient/family teaching  - Consider wound care consult   Outcome: Progressing     Problem: GENITOURINARY - ADULT  Goal: Urinary catheter remains patent  Description: INTERVENTIONS:  - Assess patency of urinary catheter  - If patient has a chronic zavala, consider changing catheter if non-functioning  - Follow guidelines for intermittent irrigation of non-functioning urinary catheter  Outcome: Progressing

## 2022-03-04 NOTE — PROGRESS NOTES
Pt still due to void, stated he does not feel the need to void and is not experiencing discomfort  Bladder scanned for 86ml x3  Offered pt more water  Will continue to monitor

## 2022-03-04 NOTE — PLAN OF CARE
Problem: Nutrition/Hydration-ADULT  Goal: Nutrient/Hydration intake appropriate for improving, restoring or maintaining nutritional needs  Description: Monitor and assess patient's nutrition/hydration status for malnutrition  Collaborate with interdisciplinary team and initiate plan and interventions as ordered  Monitor patient's weight and dietary intake as ordered or per policy  Utilize nutrition screening tool and intervene as necessary  Determine patient's food preferences and provide high-protein, high-caloric foods as appropriate       INTERVENTIONS:  - Monitor oral intake, urinary output, labs, and treatment plans  - Assess nutrition and hydration status and recommend course of action  - Evaluate amount of meals eaten  - Assist patient with eating if necessary   - Allow adequate time for meals  - Recommend/ encourage appropriate diets, oral nutritional supplements, and vitamin/mineral supplements  - Order, calculate, and assess calorie counts as needed  - Recommend, monitor, and adjust tube feedings and TPN/PPN based on assessed needs  - Assess need for intravenous fluids  - Provide specific nutrition/hydration education as appropriate  - Include patient/family/caregiver in decisions related to nutrition  3/4/2022 1022 by Yudith Ingram RN  Outcome: Progressing  3/4/2022 1022 by Yudith Ingram RN  Outcome: Progressing     Problem: MOBILITY - ADULT  Goal: Maintain or return to baseline ADL function  Description: INTERVENTIONS:  -  Assess patient's ability to carry out ADLs; assess patient's baseline for ADL function and identify physical deficits which impact ability to perform ADLs (bathing, care of mouth/teeth, toileting, grooming, dressing, etc )  - Assess/evaluate cause of self-care deficits   - Assess range of motion  - Assess patient's mobility; develop plan if impaired  - Assess patient's need for assistive devices and provide as appropriate  - Encourage maximum independence but intervene and supervise when necessary  - Involve family in performance of ADLs  - Assess for home care needs following discharge   - Consider OT consult to assist with ADL evaluation and planning for discharge  - Provide patient education as appropriate  3/4/2022 1022 by Estela Bettencourt RN  Outcome: Progressing  3/4/2022 1022 by Estela Bettencourt RN  Outcome: Progressing  Goal: Maintains/Returns to pre admission functional level  Description: INTERVENTIONS:  - Perform BMAT or MOVE assessment daily    - Set and communicate daily mobility goal to care team and patient/family/caregiver  - Collaborate with rehabilitation services on mobility goals if consulted  - Perform Range of Motion 4 times a day  - Reposition patient every 2 hours    - Dangle patient 3 times a day  - Stand patient 3 times a day  - Ambulate patient 3 times a day  - Out of bed to chair 3 times a day   - Out of bed for meals 3 times a day  - Out of bed for toileting  - Record patient progress and toleration of activity level   3/4/2022 1022 by Estela Bettencourt RN  Outcome: Progressing  3/4/2022 1022 by Estela Bettencourt RN  Outcome: Progressing     Problem: Potential for Falls  Goal: Patient will remain free of falls  Description: INTERVENTIONS:  - Educate patient/family on patient safety including physical limitations  - Instruct patient to call for assistance with activity   - Consult OT/PT to assist with strengthening/mobility   - Keep Call bell within reach  - Keep bed low and locked with side rails adjusted as appropriate  - Keep care items and personal belongings within reach  - Initiate and maintain comfort rounds  - Make Fall Risk Sign visible to staff  - Offer Toileting every 3 Hours, in advance of need  - Initiate/Maintain 2alarm  - Obtain necessary fall risk management equipment: 2  - Apply yellow socks and bracelet for high fall risk patients  - Consider moving patient to room near nurses station  3/4/2022 1022 by Estela Bettencourt RN  Outcome: Progressing  3/4/2022 1022 by Sabino Ball RN  Outcome: Progressing     Problem: PAIN - ADULT  Goal: Verbalizes/displays adequate comfort level or baseline comfort level  Description: Interventions:  - Encourage patient to monitor pain and request assistance  - Assess pain using appropriate pain scale  - Administer analgesics based on type and severity of pain and evaluate response  - Implement non-pharmacological measures as appropriate and evaluate response  - Consider cultural and social influences on pain and pain management  - Notify physician/advanced practitioner if interventions unsuccessful or patient reports new pain  3/4/2022 1022 by Sabino Ball RN  Outcome: Progressing  3/4/2022 1022 by Sabino Ball RN  Outcome: Progressing     Problem: INFECTION - ADULT  Goal: Absence or prevention of progression during hospitalization  Description: INTERVENTIONS:  - Assess and monitor for signs and symptoms of infection  - Monitor lab/diagnostic results  - Monitor all insertion sites, i e  indwelling lines, tubes, and drains  - Monitor endotracheal if appropriate and nasal secretions for changes in amount and color  - [place] appropriate cooling/warming therapies per order  - Administer medications as ordered  - Instruct and encourage patient and family to use good hand hygiene technique  - Identify and instruct in appropriate isolation precautions for identified infection/condition  3/4/2022 1022 by Sabino Ball RN  Outcome: Progressing  3/4/2022 1022 by Sabino Ball RN  Outcome: Progressing  Goal: Absence of fever/infection during neutropenic period  Description: INTERVENTIONS:  - Monitor WBC    3/4/2022 1022 by Sabino Ball RN  Outcome: Progressing  3/4/2022 1022 by Sabino Ball RN  Outcome: Progressing     Problem: SAFETY ADULT  Goal: Maintain or return to baseline ADL function  Description: INTERVENTIONS:  -  Assess patient's ability to carry out ADLs; assess patient's baseline for ADL function and identify physical deficits which impact ability to perform ADLs (bathing, care of mouth/teeth, toileting, grooming, dressing, etc )  - Assess/evaluate cause of self-care deficits   - Assess range of motion  - Assess patient's mobility; develop plan if impaired  - Assess patient's need for assistive devices and provide as appropriate  - Encourage maximum independence but intervene and supervise when necessary  - Involve family in performance of ADLs  - Assess for home care needs following discharge   - Consider OT consult to assist with ADL evaluation and planning for discharge  - Provide patient education as appropriate  3/4/2022 1022 by Johnny Nayak RN  Outcome: Progressing  3/4/2022 1022 by Johnny Nayak RN  Outcome: Progressing  Goal: Maintains/Returns to pre admission functional level  Description: INTERVENTIONS:  - Perform BMAT or MOVE assessment daily    - Set and communicate daily mobility goal to care team and patient/family/caregiver  - Collaborate with rehabilitation services on mobility goals if consulted  - Perform Range of Motion 4 times a day  - Reposition patient every 2 hours    - Dangle patient 3 times a day  - Stand patient 3 times a day  - Ambulate patient 3 times a day  - Out of bed to chair 3 times a day   - Out of bed for meals 3 times a day  - Out of bed for toileting  - Record patient progress and toleration of activity level   3/4/2022 1022 by Johnny Nayak RN  Outcome: Progressing  3/4/2022 1022 by Johnny Nayak RN  Outcome: Progressing  Goal: Patient will remain free of falls  Description: INTERVENTIONS:  - Educate patient/family on patient safety including physical limitations  - Instruct patient to call for assistance with activity   - Consult OT/PT to assist with strengthening/mobility   - Keep Call bell within reach  - Keep bed low and locked with side rails adjusted as appropriate  - Keep care items and personal belongings within reach  - Initiate and maintain comfort rounds  - Make Fall Risk Sign visible to staff  - Offer Toileting every 3 Hours, in advance of need  - Initiate/Maintain 2alarm  - Consider moving patient to room near nurses station  3/4/2022 1022 by Taniya Melara RN  Outcome: Progressing  3/4/2022 1022 by Taniya Melara RN  Outcome: Progressing     Problem: DISCHARGE PLANNING  Goal: Discharge to home or other facility with appropriate resources  Description: INTERVENTIONS:  - Identify barriers to discharge w/patient and caregiver  - Arrange for needed discharge resources and transportation as appropriate  - Identify discharge learning needs (meds, wound care, etc )  - Arrange for interpretive services to assist at discharge as needed  - Refer to Case Management Department for coordinating discharge planning if the patient needs post-hospital services based on physician/advanced practitioner order or complex needs related to functional status, cognitive ability, or social support system  3/4/2022 1022 by Taniya Melara RN  Outcome: Progressing  3/4/2022 1022 by Taniya Melara RN  Outcome: Progressing     Problem: Knowledge Deficit  Goal: Patient/family/caregiver demonstrates understanding of disease process, treatment plan, medications, and discharge instructions  Description: Complete learning assessment and assess knowledge base    Interventions:  - Provide teaching at level of understanding  - Provide teaching via preferred learning methods  3/4/2022 1022 by Taniya Melara RN  Outcome: Progressing  3/4/2022 1022 by Taniya Melara RN  Outcome: Progressing     Problem: Prexisting or High Potential for Compromised Skin Integrity  Goal: Skin integrity is maintained or improved  Description: INTERVENTIONS:  - Identify patients at risk for skin breakdown  - Assess and monitor skin integrity  - Assess and monitor nutrition and hydration status  - Monitor labs   - Assess for incontinence   - Turn and reposition patient  - Assist with mobility/ambulation  - Relieve pressure over bony prominences  - Avoid friction and shearing  - Provide appropriate hygiene as needed including keeping skin clean and dry  - Evaluate need for skin moisturizer/barrier cream  - Collaborate with interdisciplinary team   - Patient/family teaching  - Consider wound care consult   3/4/2022 1022 by Marcie Yost RN  Outcome: Progressing  3/4/2022 1022 by Marcie Yost RN  Outcome: Progressing     Problem: GENITOURINARY - ADULT  Goal: Urinary catheter remains patent  Description: INTERVENTIONS:  - Assess patency of urinary catheter  - If patient has a chronic zavala, consider changing catheter if non-functioning  - Follow guidelines for intermittent irrigation of non-functioning urinary catheter  3/4/2022 1022 by Marcie Yost RN  Outcome: Progressing  3/4/2022 1022 by Marcie Yost RN  Outcome: Progressing

## 2022-03-04 NOTE — PHYSICAL THERAPY NOTE
PHYSICAL THERAPY EVALUATION  NAME:  Henri King  DATE: 03/04/22    AGE:   80 y o  Mrn:   [id number]  ADMIT DX:  CHF (congestive heart failure) (HCC) [I50 9]  SOB (shortness of breath) [R06 02]  Pleural effusion [J90]  Hypoxia [R09 02]  Acute respiratory failure with hypoxia (HCC) [J96 01]  Acute on chronic diastolic CHF (congestive heart failure) (HCC) [I50 33]    Past Medical History:   Diagnosis Date    [facility])     Coronary artery disease     Hyperlipidemia     Hypertension     Paroxysmal atrial fibrillation ([facility] [address] )      Length Of Stay: 4  Performed at least 2 patient identifiers during session: Name and Birthday  PHYSICAL THERAPY EVALUATION :      03/04/22 1027   PT Last Visit   PT Visit Date 03/04/22   Note Type   Note type Evaluation   Pain Assessment   Pain Assessment Tool 0-10   Pain Score No Pain   Restrictions/Precautions   Other Precautions Chair Alarm; Bed Alarm; Fall Risk   Home Living   Type of Home House  (3 JACOBO R HR)   Home Layout Two level;Bed/bath upstairs;1/2 bath on main level  (3 Steps L HR to chair lift)   Bathroom Shower/Tub Tub/shower unit   Bathroom Toilet Standard  (upstairs, raised downstairs)   Bathroom Equipment Shower chair;Grab bars in shower  (wasn't using shower chair)   Home Equipment Cane;Walker  (mostly uses RW on 1st floor, cane on 2nd floor)   Additional Comments Reports living in a 2 SH with 3 JACOBO R HR and then 3 steps L HR to stair glide and uses stair glide to 2nd floor  Reports using RW downstairs and cane upstairs  Prior Function   Level of [place] Independent with ADLs and functional mobility   Lives With Spouse   Receives Help From Family   ADL Assistance Independent   IADLs Needs assistance  (+ driving, spouse does 99%)   Falls in the last 6 months 0   Comments Reports being independent with mobility, ADLs and has assistance from spouse for IADLs  Pt drives  General   Additional Pertinent History Reports h/o ankle sx R ankle  Pt s/p L thoracentesis 3/1/2022 due to hypoxia, CHF with pleural effusion  chest xray 3/4/22 showing Persistent left basilar pleural effusion and atelectasis  Small residual left basilar pneumothorax  Cognition   Orientation Level Oriented X4   Following Commands Follows one step commands without difficulty   Subjective   Subjective "I have trouble with this foot since my ankle surgery"   RLE Assessment   RLE Assessment WFL  (4-/5 except ankle DF 1/5 wiht trace contraction; PROM WNL)   LLE Assessment   LLE Assessment WFL  (4-/5 )   Coordination   Rapid Alternating Movements   (impaired at ankle)   Light Touch   RLE Light Touch Grossly intact   LLE Light Touch Grossly intact   Bed Mobility   Additional Comments Pt sitting OOB in recliner chair upon arrival to room  Transfers   Sit to Stand 6  Modified independent  (w RW, with hurry cane Supervision w inc time)   Stand to Sit 6  Modified independent  (with RW  Supervision with hurry cane)   Stand pivot 5  Supervision  (w RW  with hurry cane SBA)   Additional items Increased time required;Verbal cues   Additional Comments use of RW  sit<>stand without cues with safe completion modified independent  spt with RW with supervision with min cues for turning completely and backing up toward chair prior to sitting  trialed hurry cane as pt uses cane upstairs  sit<>stand with SBA with inc time and inc lateral lean to right  spt with cane with SBA for balance  Ambulation/Elevation   Gait pattern Narrow TED;Scissoring;Decreased foot clearance; Excessively slow; Short stride  (R foot drop)   Gait Assistance   (S with RW and SBA with hurry cane)   Additional items Verbal cues   Assistive Device Rolling walker  (hurry cane)   Distance 60'x1 and 75'x1 with RW with Supervision with NBOS, scissoring at times, dec R foot clearance and min cues for inc foot clearance and TED  then ambulated with hurry cane 36'x1 with SBA with NBOS, inc lateral sway bilaterally     Stair Management Assistance   (steadying to SBA)   Additional items Assist x 1;Verbal cues; Increased time required   Stair Management Technique One rail R;Step to pattern; Foreward   Number of Stairs 5   Ambulation/Elevation Additional Comments completed 5 steps ascending wiht L LE and descending with R LE with increased time with R HR  cues for safe completion, positioning on step   Balance   Static Sitting Normal   Dynamic Sitting Good   Static Standing Fair +   Dynamic Standing Fair   Ambulatory Fair   Activity Tolerance   Activity Tolerance Patient limited by fatigue   Medical Staff Made Aware Yaniv BALTAZAR   Nurse Made Aware Yesenia DENIS/Colleen   Assessment   Prognosis Good   Problem List Decreased strength;Decreased endurance; Impaired balance;Decreased mobility; Decreased range of motion;Decreased safety awareness;Decreased coordination   Barriers to Discharge None   Barriers to Discharge Comments assistance prn from spouse   Goals   Patient Goals "Go home"   [facility] Expiration Date 03/18/22   PT Treatment Day 0   Plan   Treatment/Interventions Functional transfer training;LE strengthening/ROM; Elevations; Therapeutic exercise; Endurance training;Patient/family training;Equipment eval/education; Bed mobility;Gait training; Compensatory technique education;Spoke to nursing;Spoke to case management;OT   PT Frequency 2-3x/wk   Recommendation   PT Discharge Recommendation Home with home health rehabilitation   Equipment Recommended   (walker-pt has)   AM-PAC Basic Mobility Inpatient   Turning in Bed Without Bedrails 4   Lying on Back to Sitting on Edge of Flat Bed 4   Moving Bed to Chair 3   Standing Up From Chair 3   Walk in Room 3   Climb 3-5 Stairs 3   Basic Mobility Inpatient Raw Score 20   Basic Mobility Standardized Score 43 99   Highest Level Of Mobility   JH-HLM Goal 6: Walk 10 steps or more   JH-HLM Highest Level of Mobility 7: Walk 25 feet or more   JH-HLM Goal Achieved Yes   Dynamic Gait Index   Gait level surface  2   Change in gait speed 2   Gait with horizontal head turns  1   Gait with vertical head turns  2   End of Consult   Patient Position at End of Consult Bedside chair;Bed/Chair alarm activated; All needs within reach  (reclined in recliner)     (Please find full objective findings from PT assessment regarding body systems outlined above)  Assessment: Pt is a 80 y o  male seen for PT evaluation s/p admission to [address] [place] [address] on 2/28/2022 with Acute respiratory failure with hypoxia ([address] )  Order placed for PT services  Upon evaluation: Pt is presenting with impaired functional mobility due to decreased strength, decreased ROM, decreased endurance, impaired balance, impaired coordination, gait deviations, decreased safety awareness and fall risk requiring supervision assistance for transfers and supervision assistance for ambulation with RW and SBA with hurry cane  Pt's clinical presentation is currently unpredictable given the functional mobility deficits above, especially weakness, decreased ROM, decreased endurance, impaired coordination, gait deviations, decreased activity tolerance, decreased functional mobility tolerance and decreased safety awareness, coupled with fall risks as indicated by AM-PAC 6-Clicks: 10/04 and score of 7/12 on 4 item DGI  Indicating increased fall risk as well as impaired balance, polypharmacy, impaired coordination and decreased safety awareness and combined with medical complications of abnormal renal lab values, abnormal H&H and abnormal BNP with acute CHF, troponin, hypertension on admission, post procedural pneumothorax s/p thoracentesis 3/1/22, Mild superior endplate deformity of L1 and inferior endplate deformity of L2  (pt with h/o multiple falls)  Pt's PMHx and comorbidities that may affect physical performance and progress include: CHF, CKD, A fib, HTN, CAD and orthopedic surgery   Personal factors affecting pt at time of IE include: step(s) to enter environment, multi-level environment, inability to perform IADLs, inability to perform ADLs, inability to navigate level surfaces without external assistance, inability to navigate community distances and recent fall(s)/fall history  Pt will benefit from continued skilled PT services to address deficits as defined above and to maximize level of functional mobility to facilitate return toward PLOF and improved QOL  From PT/mobility standpoint, recommendation at time of d/c would be Home PT, home with family support and with walker pending progress in order to reduce fall risk and maximize pt's functional independence and consistency with mobility in order to facilitate return to PLOF  Recommend ther ex next 1-2 sessions and RW versus cane  The patient's AM-PAC Basic Mobility Inpatient Short Form Raw Score is 20  A Raw score of greater than 16 suggests the patient may benefit from discharge to home  Please also refer to the recommendation of the Physical Therapist for safe discharge planning  Goals: Pt will: Perform bed mobility tasks with modified I to reposition in bed and prepare for transfers  Pt will perform transfers with modified I to decrease burden of care, decrease risk for falls and improve activity tolerance and prepare for ambulation  Pt will ambulate with LRAD for >/= 200' with  modified I  to decrease burden of care, decrease risk for falls, improve activity tolerance and improve gait quality and to access home environment  Pt will complete >/= 8 steps with with unilateral handrail with modified I to decrease burden of care, return to home with JACOBO, return to multilevel home, decrease risk for falls and improve activity tolerance  Pt will demonstrate decreased fall risk as indicated by score of >/= 10/12 on 4 item DGI  Pt will increase B LE strength >/= 1/2 MMT grade to facilitate functional mobility        [place], PT,DPT

## 2022-03-04 NOTE — PLAN OF CARE
Problem: OCCUPATIONAL THERAPY ADULT  Goal: Performs self-care activities at highest level of function for planned discharge setting  See evaluation for individualized goals  Description: Treatment Interventions: ADL retraining,Functional transfer training,UE strengthening/ROM,Endurance training,Patient/family training,Equipment evaluation/education,Neuromuscular reeducation,Compensatory technique education,Continued evaluation,Energy conservation,Activityengagement          See flowsheet documentation for full assessment, interventions and recommendations  Note: Limitation: Decreased ADL status,Decreased UE strength,Decreased Safe judgement during ADL,Decreased endurance,Decreased self-care trans,Decreased high-level ADLs  Prognosis: Good  Assessment: Pt is a 80 y o  male, admitted to [address] [place] [address] 2/28/2022 d/t experiencing increased SOB  Dx: acute respiratory failure with hypoxia  Pt with PMHx impacting their performance during ADL tasks, including: CHF, CAD, hyperlipidemia, HTN  Prior to admission to the hospital Pt was performing ADLs without physical assistance  IADLs without physical assistance  Functional transfers/ambulation without physical assistance  Cognitive status was PTA was intact  OT order placed to assess Pt's ADLs, cognitive status, and performance during functional tasks in order to maximize safety and independence while making most appropriate d/c recommendations   Pt's clinical presentation is currently evolving given new onset deficits that effect Pt's occupational performance and ability to safely return to [facility] including decrease activity tolerance, decrease standing balance, decrease safety awareness , decrease UB MS, decrease generalized strength, decrease activity engagement, decrease performance during functional transfers and high fall risk combined with medical complications of hypertension , abnormal renal lab values, abnormal H&H, abnormal CBC, abnormal potassium values, low SpO2 values, new onset O2 use, elevated BNP and need for input for mobility technique/safety  Pt previously on 2L of O2 however on RA during therapy session  Pt maintaining >88% with activity and no SOB noted  Personal factors affecting Pt at time of initial evaluation include: multi-level environment, inability to perform current job functions, inability to perform IADLs, inability to navigate community distances and limited insight into impairments  Pt will benefit from continued skilled OT services to address deficits as defined above and to maximize level independence/participation during ADLs and functional tasks to facilitate return toward PLOF and improved quality of life  From an occupational therapy standpoint, recommendation at time of d/c would be no further OT needs       OT Discharge Recommendation: No rehabilitation needs

## 2022-03-05 VITALS
HEART RATE: 69 BPM | DIASTOLIC BLOOD PRESSURE: 55 MMHG | SYSTOLIC BLOOD PRESSURE: 127 MMHG | RESPIRATION RATE: 18 BRPM | OXYGEN SATURATION: 94 % | WEIGHT: 138.67 LBS | BODY MASS INDEX: 21.02 KG/M2 | HEIGHT: 68 IN | TEMPERATURE: 97.9 F

## 2022-03-05 PROCEDURE — 99239 HOSP IP/OBS DSCHRG MGMT >30: CPT | Performed by: FAMILY MEDICINE

## 2022-03-05 PROCEDURE — 94761 N-INVAS EAR/PLS OXIMETRY MLT: CPT

## 2022-03-05 RX ORDER — METOPROLOL SUCCINATE 200 MG/1
200 TABLET, EXTENDED RELEASE ORAL DAILY
Qty: 90 TABLET | Refills: 0 | Status: SHIPPED | OUTPATIENT
Start: 2022-03-06 | End: 2022-06-04

## 2022-03-05 RX ORDER — ALBUTEROL SULFATE 90 UG/1
2 AEROSOL, METERED RESPIRATORY (INHALATION) EVERY 6 HOURS PRN
Qty: 8.5 G | Refills: 0 | Status: SHIPPED | OUTPATIENT
Start: 2022-03-05

## 2022-03-05 RX ORDER — SPIRONOLACTONE 25 MG/1
25 TABLET ORAL DAILY
Qty: 90 TABLET | Refills: 0 | Status: SHIPPED | OUTPATIENT
Start: 2022-03-06 | End: 2022-06-04

## 2022-03-05 RX ORDER — TAMSULOSIN HYDROCHLORIDE 0.4 MG/1
0.4 CAPSULE ORAL
Qty: 90 CAPSULE | Refills: 0 | Status: SHIPPED | OUTPATIENT
Start: 2022-03-05 | End: 2022-06-03

## 2022-03-05 RX ORDER — DOXYCYCLINE 100 MG/1
100 TABLET ORAL 2 TIMES DAILY
Qty: 14 TABLET | Refills: 0 | Status: SHIPPED | OUTPATIENT
Start: 2022-03-05 | End: 2022-03-12

## 2022-03-05 RX ORDER — ACETAMINOPHEN 325 MG/1
650 TABLET ORAL EVERY 6 HOURS PRN
Qty: 30 TABLET | Refills: 0 | Status: SHIPPED | OUTPATIENT
Start: 2022-03-05

## 2022-03-05 RX ORDER — FUROSEMIDE 40 MG/1
40 TABLET ORAL DAILY
Qty: 90 TABLET | Refills: 0 | Status: SHIPPED | OUTPATIENT
Start: 2022-03-06 | End: 2022-06-04

## 2022-03-05 RX ADMIN — METOPROLOL SUCCINATE 200 MG: 100 TABLET, EXTENDED RELEASE ORAL at 09:41

## 2022-03-05 RX ADMIN — FUROSEMIDE 40 MG: 40 TABLET ORAL at 09:41

## 2022-03-05 RX ADMIN — OXYCODONE HYDROCHLORIDE AND ACETAMINOPHEN 1 TABLET: 5; 325 TABLET ORAL at 10:58

## 2022-03-05 RX ADMIN — CLOPIDOGREL BISULFATE 75 MG: 75 TABLET ORAL at 09:41

## 2022-03-05 RX ADMIN — AMIODARONE HYDROCHLORIDE 200 MG: 200 TABLET ORAL at 09:41

## 2022-03-05 RX ADMIN — ATORVASTATIN CALCIUM 40 MG: 40 TABLET, FILM COATED ORAL at 09:41

## 2022-03-05 RX ADMIN — SPIRONOLACTONE 25 MG: 25 TABLET ORAL at 09:41

## 2022-03-05 RX ADMIN — APIXABAN 2.5 MG: 2.5 TABLET, FILM COATED ORAL at 09:41

## 2022-03-05 NOTE — ASSESSMENT & PLAN NOTE
Patient has acute respiratory failure with hypoxia secondary to acute on chronic diastolic CHF exacerbation with left-sided pleural effusion  Currently patient is requiring 2 L of oxygen  Patient does not use any oxygen at home  Status post thoracentesis with removal of 2 2 L serosanguineous fluid-complicated with mild pneumothorax, continue to monitor  As per light criteria, exudate deep in nature  Patient placed on IV antibiotic-body fluid shows no growth    CT chest reviewed  Continue to improve

## 2022-03-05 NOTE — ASSESSMENT & PLAN NOTE
Wt Readings from Last 3 Encounters:   03/04/22 63 9 kg (140 lb 12 8 oz)   12/19/21 68 kg (150 lb)   10/06/21 76 2 kg (167 lb 15 9 oz)     Patient has acute on chronic diastolic CHF exacerbation  Known history of coronary artery disease status post CABG 8 years ago and now recently had 2 stents 10/2021  Last 2D echo from 04/20/2021 showed EF of 60%  Patient had a complicated hospital course at the end of last year in [facility] and records are not completely available a but it seems like patient had an acute MI had 2 stents placed in the left main and ramus  Patient also had AFib with RVR underwent cardioversion  Also had episode of GI bleed and found have a stomach ulcer  Was admitted for almost 3 weeks  Recently he was seen outpatient by his cardiologist [facility] who stopped his Lasix  Patient appears to be gaining weight again and presented with worsening shortness of breath and found to have worsening left-sided pleural effusion  Resume p o  Lasix 40 mg   Plavix and Eliquis  Please note that patient is not on aspirin anymore as per his primary cardiologist   Status post thoracentesis with removal of 2 2 L serosanguineous fluid-exudative in nature as per light criteria  Patient placed on IV antibiotic

## 2022-03-05 NOTE — RESPIRATORY THERAPY NOTE
Home Oxygen Qualifying Test     Patient name: Susie Eagle        : 1940   Date of Test:  2022  Diagnosis:    Home Oxygen Test:    **Medicare Guidelines require item(s) 1-5 on all ambulatory patients or 1 and 2 on non-ambulatory patients  1  Baseline SPO2 on Room Air at rest 98 %   a  If <= 88% on Room Air add O2 via NC to obtain SpO2 >=88%  If LPM needed, document LPM  needed to reach =>88%    2  SPO2 during exertion on Room Air 94  %  a  During exertion monitor SPO2  If SPO2 increases >=89%, do not add supplemental oxygen  3  SPO2 on Oxygen at Rest NA % at NA LPM    4  SPO2 during exertion on Oxygen NA % at NA LPM    5  Test performed during exertion activity  []  Supplemental Home Oxygen is indicated  [x]  Client does not qualify for home oxygen      Respiratory Additional Notes- Pt did not require O2 at rest or for exertion  Ilsa Figueredo, RT

## 2022-03-05 NOTE — ASSESSMENT & PLAN NOTE
Patient has acute respiratory failure with hypoxia secondary to acute on chronic diastolic CHF exacerbation with left-sided pleural effusion  Patient does not use any oxygen at home  Patient is off of oxygen  Status post thoracentesis with removal of 2 2 L serosanguineous fluid-complicated with mild pneumothorax, continue to monitor  As per light criteria, exudate deep in nature  Patient placed on IV antibiotic-body fluid shows no growth    CT chest reviewed  Pulmonary consult appreciated

## 2022-03-05 NOTE — DISCHARGE SUMMARY
[address]  Discharge- Ar Galicia 1940, 80 y o  male MRN: [id number]  Unit/Bed#: -01 Encounter: [id number]  Primary Care Provider: Delilah Yanez DO   Date and time admitted to hospital: 2/28/2022  3:58 PM    Postprocedural pneumothorax  Assessment & Plan  Mild left-sided pneumothorax after procedure  Patient remained hemodynamically stable  Continue to monitor-serial chest x-ray q 4 hours  Critical care team is aware of the patient  Family updated  Remained stable on repeat x-ray  * Acute respiratory failure with hypoxia [facility])  Assessment & Plan  Patient has acute respiratory failure with hypoxia secondary to acute on chronic diastolic CHF exacerbation with left-sided pleural effusion  Patient does not use any oxygen at home  Patient is off of oxygen  Status post thoracentesis with removal of 2 2 L serosanguineous fluid-complicated with mild pneumothorax, continue to monitor  As per light criteria, exudate deep in nature  Patient placed on IV antibiotic-body fluid shows no growth  CT chest reviewed  Pulmonary consult appreciated    Lumbar compression deformity  Assessment & Plan  Mild superior endplate deformity of L1 and inferior endplate deformity of L2  As per family, patient has history of multiple fall  No neuro deficit  Continue conservative management    Chronic a-fib [facility])  Assessment & Plan  Patient has history of chronic AFib is on amiodarone 200 mg daily continue same for now  Also on Lopressor 100 mg twice daily  Continue Eliquis 2 5 mg twice daily     Stage 3a chronic kidney disease [facility])  Assessment & Plan  Lab Results   Component Value Date    EGFR 34 03/04/2022    EGFR 24 03/03/2022    EGFR 23 03/02/2022    CREATININE 1 81 (H) 03/04/2022    CREATININE 2 36 (H) 03/03/2022    CREATININE 2 44 (H) 03/02/2022   Baseline creatinine is 1 4-1 7  Currently patient present with creatinine of 1 85  Also fluid overloaded  Continue p o   Lasix  Patient renal function remained almost close to baseline    Acute on chronic diastolic CHF (congestive heart failure) (HCC)  Assessment & Plan  Wt Readings from Last 3 Encounters:   03/05/22 62 9 kg (138 lb 10 7 oz)   12/19/21 68 kg (150 lb)   10/06/21 76 2 kg (167 lb 15 9 oz)     Patient has acute on chronic diastolic CHF exacerbation  Known history of coronary artery disease status post CABG 8 years ago and now recently had 2 stents 10/2021  Last 2D echo from 04/20/2021 showed EF of 60%  Patient had a complicated hospital course at the end of last year in [facility] and records are not completely available a but it seems like patient had an acute MI had 2 stents placed in the left main and ramus  Patient also had AFib with RVR underwent cardioversion  Also had episode of GI bleed and found have a stomach ulcer  Was admitted for almost 3 weeks  Recently he was seen outpatient by his cardiologist [facility] who stopped his Lasix  Patient appears to be gaining weight again and presented with worsening shortness of breath and found to have worsening left-sided pleural effusion  Resume p o  Lasix 40 mg   Plavix and Eliquis  Please note that patient is not on aspirin anymore as per his primary cardiologist   Status post thoracentesis with removal of 2 2 L serosanguineous fluid-exudative in nature as per light criteria  Hyperlipidemia, mixed  Assessment & Plan  Continue statin therapy    Hypertensive urgency  Assessment & Plan  Blood pressure elevated on presentation secondary to acute CHF exacerbation  Received IV Lasix following which blood pressure is starting to improve  Continue home regimen of metoprolol 100 mg twice daily  Blood pressure is much improved  Cardiology improved appreciated    Coronary artery disease involving coronary bypass graft  Assessment & Plan  Patient with known CADCABG x 2 in 2014  DAPHNEY to left main and ramus in 10/2021    Continue daily Plavix 75 mg  Not on aspirin to avoid triple therapy in setting of Eliquis use  Continue beta blocker and statin  Echo 3/1/2022 with EF 45% and hypokinesis in mid anteroseptal, apical septal and apical inferior walls  Optimized goal directed medical therapy with transition to metoprolol succinate and addition of spironolactone  Cardiology consult appreciated, recommendation as above        Medical Problems             Resolved Problems  Date Reviewed: 3/3/2022    None              Discharging Physician / Practitioner: Karen Ward MD  PCP: Corbin Rangel,   Admission Date:   Admission Orders (From admission, onward)     Ordered        02/28/22 1728  Inpatient Admission  Once                      Discharge Date: 03/05/22    Consultations During Hospital Stay:  · Cardiology  · Pulmonary  · Critical care  · PT/OT    Procedures Performed:   Thoracentesis (Bedside)    Result Date: 3/1/2022  Narrative: Chiqui Lewis MD     3/1/2022 11:04 AM Thoracentesis (Bedside) Date/Time: 3/1/2022 8:43 AM Performed by: Chiqui Lewis MD Authorized by: Chiqui Lewis MD Patient location:  Bedside Consent:   Consent obtained:  Written   Consent given by:  Patient   Risks discussed:  Bleeding, infection, pain, pneumothorax, nerve damage and incomplete drainage   Alternatives discussed:  No treatment and observation Universal protocol:   Procedure explained and questions answered to patient or proxy's satisfaction: yes    Relevant documents present and verified: yes    Site/side marked: yes    Immediately prior to procedure a time out was called: yes    Patient identity confirmed:  Verbally with patient Indications:   Procedure Purpose: therapeutic    Indications: pleural effusion  Anesthesia (see MAR for exact dosages):    Anesthesia method:  None Procedure details:   Preparation: Patient was prepped and draped in usual sterile fashion    Standard thoracentesis cath kit used: Yes    Patient position:  Sitting   Laterality:  Left   Location:  Midscapular line Intercostal space:  8th   Puncture method:  Over-the-needle catheter   Ultrasound guidance: yes    Sterile ultrasound techniques: Sterile gel and sterile probe covers were used    Number of attempts:  1   Drainage characteristics:  Serosanguinous   Fluid removed amount:  2 2 L Post-procedure details:   Chest x-ray performed: yes    Chest x-ray findings:  Pneumothorax   Complication (if applicable):  Small pneumo at left base with trapped lung    XR chest portable    Result Date: 3/2/2022  Narrative: CHEST INDICATION:   Pneumothorax  COMPARISON:  Prior chest x-rays performed earlier the same day  EXAM PERFORMED/VIEWS:  XR CHEST PORTABLE FINDINGS: Cardiomediastinal silhouette appears unremarkable  CABG  Stable opacity left lower lung field and non reexpanded lung  Reidentified left pneumothorax similar in appearance to prior earlier studies  Osseous structures appear within normal limits for patient age  Median sternotomy wires  Stable surgical clips  Impression: No significant interval change compared to most immediate prior study  Redemonstrated left pneumothorax  Workstation performed: [id number]     XR chest portable    Result Date: 3/1/2022  Narrative: CHEST INDICATION:   pneumothorax s/p thoracentesis  COMPARISON:  3/1/2022 at 0855 EXAM PERFORMED/VIEWS:  XR CHEST PORTABLE FINDINGS:  Median sternotomy wires are aligned  Cardiomediastinal silhouette appears unremarkable  Slightly increased size of the left pneumothorax  Groundglass opacities again in the left lower lung zone with non-re-expanded lung again noted  No right pneumothorax or pleural effusion  Osseous structures appear unchanged  Impression: Slightly increased size of the left pneumothorax  Workstation performed: [id number]     XR chest portable    Result Date: 3/1/2022  Narrative: CHEST INDICATION:   pleural effusion s/p thoracentesis   COMPARISON:  Compared to 2/28/2022 EXAM PERFORMED/VIEWS:  XR CHEST PORTABLE FINDINGS:  There are median sternotomy wires indicating prior cardiac surgery  Tortuous thoracic aorta  Cardiomediastinal silhouette appears unremarkable  Right hemidiaphragm is elevated  Poor inspiration  Minimally blunted right costophrenic angle  Left lung base opacity of effusion is resolved  Nonexpanded left lung with trapped appearance and small hemothorax Osseous structures appear within normal limits for patient age  Impression: Trapped left lower lobe with nonreexpansion related small pneumothorax postthoracentesis  The study was marked in [facility] for immediate notification  Workstation performed: [id number]     XR chest 1 view portable    Result Date: 3/1/2022  Narrative: CHEST INDICATION:   short of breath  COMPARISON:  10/06/2021 EXAM PERFORMED/VIEWS:  XR CHEST PORTABLE 1 image FINDINGS: Cardiomediastinal silhouette appears unremarkable  A median sternotomy has been performed  Prosthetic cardiac valve  Patient appears to be in mild CHF  Left greater than right pleural effusions  The left-sided pleural effusion is larger compared to the prior study  No pneumothorax  Osseous structures appear within normal limits for patient age  Surgical anchors in the left humeral head  Impression: CHF  Left greater than right pleural effusions  Workstation performed: [id number]     XR chest pa & lateral    Result Date: 3/4/2022  Narrative: CHEST INDICATION:   pneumothorax  COMPARISON:  CT chest 3/2/2022  AP chest 3/1/2022  EXAM PERFORMED/VIEWS:  XR CHEST PA & LATERAL FINDINGS: Unchanged cardiomegaly  Status post CABG  Persistent left basilar pleural effusion and atelectasis  Small residual left basilar pneumothorax  Osseous structures appear within normal limits for patient age  Impression: Persistent left basilar pleural effusion and atelectasis  Small residual left basilar pneumothorax   Workstation performed: [id number]     CT chest wo contrast    Result Date: 3/2/2022  Narrative: CT CHEST WITHOUT IV CONTRAST INDICATION: "Pneumothorax Pleural effusion, malignancy suspected pleural effusion, Pneumothorax " Per my review of the medical record, the patient developed a left pneumothorax after thoracentesis yesterday  COMPARISON:  Chest radiograph from 3/1/2022 and chest CT from 4/23/2021  TECHNIQUE: Chest CT without intravenous contrast   Axial, sagittal, coronal 2D reformats and coronal MIPS from source data  Radiation dose length product (DLP):  245 mGy-cm   Radiation dose exposure minimized using iterative reconstruction and automated exposure control  FINDINGS: LUNGS:  Mild patchy inflammatory groundglass opacity in the right upper lobe and inferior lingula with mild consolidation in the lingula  Moderate atelectasis in the left lower lobe  Juxtapleural reticulation in the right lower lobe  AIRWAYS: No significant filling defects  PLEURA:  Small loculated left pleural effusion, similar to April 2021, with small left pneumothorax  HEART/GREAT VESSELS:  Heart at upper limits normal in size  MVR, CABG  Severe coronary artery calcification  MEDIASTINUM AND JENNIFER:  Unremarkable  CHEST WALL AND LOWER NECK: Unremarkable  UPPER ABDOMEN:  Cholelithiasis  OSSEOUS STRUCTURES: Mild degenerative disease in the spine  Healed left rib fractures  Mild superior endplate deformity of L1 and inferior endplate deformity of L2, new since April 2021  Impression: Small left pleural effusion, similar to April 2021, recurrent versus chronic  Small left pneumothorax  Mild patchy ground glass opacity in the right upper lobe and inferior lingula and mild patchy consolidation in the lingula which may be infectious or inflammatory  Mild reticulation in the right lower lobe due to fibrosis  New mild endplate compression deformities in the spine    Workstation performed: [id number]     Echo complete w/ contrast if indicated    Result Date: 3/1/2022  · Narrative:   Left Ventricle: Left ventricular cavity size is normal  Wall thickness is normal  The left ventricular ejection fraction is 45%  Systolic function is normal  Diastolic function is normal    The following segments are hypokinetic: mid anteroseptal, apical septal and apical inferior    All other segments are normal    Aortic Valve: The aortic valve is trileaflet  The leaflets are mildly thickened  The leaflets are mildly calcified  The leaflets exhibit normal mobility  There is mild regurgitation  There is no evidence of stenosis    Mitral Valve: There is mild thickening  There is severe annular calcification  The valve has been repaired with an annular ring  There is mild regurgitation  There is no evidence of stenosis    Tricuspid Valve: Tricuspid valve structure is normal  There is mild regurgitation  Status post thoracentesis on 03/01/2022-with removal of 2 2 serosanguineous fluid  Significant Findings / Test Results:   Lab Results   Component Value Date    WBC 8 29 03/04/2022    HGB 10 2 (L) 03/04/2022    HCT 28 9 (L) 03/04/2022    MCV 94 03/04/2022     (L) 03/04/2022   ·   Lab Results   Component Value Date    SODIUM 137 03/04/2022    K 4 1 03/04/2022     03/04/2022    CO2 25 03/04/2022    AGAP 9 03/04/2022    BUN 52 (H) 03/04/2022    CREATININE 1 81 (H) 03/04/2022    GLUC 95 03/04/2022    CALCIUM 7 8 (L) 03/04/2022    AST 22 03/04/2022    ALT 25 03/04/2022    ALKPHOS 98 03/04/2022    TP 5 7 (L) 03/04/2022    TBILI 0 76 03/04/2022    EGFR 34 03/04/2022   ·   Collected Updated Procedure Result Status Patient Facility Result Comment    03/02/2022 1633 03/05/2022 0001 Blood culture [[id number]]   Blood from Arm, Left    Preliminary result [address]  Component Value   Blood Culture No Growth at 48 hrs  P              03/02/2022 1633 03/05/2022 0001 Blood culture [[id number]]   Blood from Arm, Right    Preliminary result [address]  Component Value   Blood Culture No Growth at 48 hrs   P              03/02/2022 1601 03/03/2022 0101 Legionella antigen, urine [[id number]]   Urine, Catheter    Final result [address]  Component Value   Legionella Urinary Antigen Negative           03/02/2022 1601 03/03/2022 0101 Strep Pneumoniae, Urine [[id number]]   Urine, Catheter    Final result [address]  Component Value   Strep pneumoniae antigen, urine Negative           03/01/2022 1557 03/01/2022 1654 LD (LDH), Body Fluid [[id number]]   Body Fluid from Other    Final result [address]  Component Value Units   LD, Fluid 212  U/L           03/01/2022 1557 03/01/2022 1654 Total Protein, body fluid [[id number]]   Body Fluid from Other    Final result [address]  Component Value Units   Protein, Fluid 4 0  g/dL           03/01/2022 1556 03/04/2022 0837 Body fluid culture (Pleural Fluid Culture) and Gram stain [[id number]]   Body Fluid from Thoracentesis    Final result [address]  Component Value   Body Fluid Culture, Sterile No growth   Gram Stain Result No Polys or Bacteria seen              02/28/2022 1616 02/28/2022 1704 COVID/FLU/RSV - 2 hour TAT [[id number]]    Nares from Nose    Final result [address] - copy/paste COVID Guidelines URL to browser: [email or web address]   SARS-CoV-2 assay is a Nucleic Acid Amplification assay intended for the   qualitative detection of nucleic acid from SARS-CoV-2 in nasopharyngeal   swabs  Results are for the presumptive identification of SARS-CoV-2 RNA  Positive results are indicative of infection with SARS-CoV-2, the virus   causing COVID-19, but do not rule out bacterial infection or co-infection   with other viruses   Laboratories within the [place] and its   territories are required to report all positive results to the appropriate   public health authorities  Negative results do not preclude SARS-CoV-2   infection and should not be used as the sole basis for treatment or other   patient management decisions  Negative results must be combined with   clinical observations, patient history, and epidemiological information  This test has not been FDA cleared or approved  This test has been authorized by FDA under an Emergency Use Authorization   (EUA)  This test is only authorized for the duration of time the   declaration that circumstances exist justifying the authorization of the   emergency use of an in vitro diagnostic tests for detection of SARS-CoV-2   virus and/or diagnosis of COVID-19 infection under section 564(b)(1) of   the Act, 21 U  S C  120PWZ-9(C)(5), unless the authorization is terminated   or revoked sooner  The test has been validated but independent review by FDA   and CLIA is pending  Test performed using [employer]: This RT-PCR assay targets N2,   a region unique to SARS-CoV-2  A conserved region in the E-gene was chosen   for pan-Sarbecovirus detection which includes SARS-CoV-2  Component Value   SARS-CoV-2 Negative   INFLUENZA A PCR Negative   INFLUENZA B PCR Negative   RSV PCR Negative           ·     Incidental Findings:   · As mentioned above     Test Results Pending at Discharge (will require follow up): · None     Outpatient Tests Requested:  · Chest x-ray in 2-3 weeks    Complications:  Postprocedure pneumothorax    Reason for Admission:  Worsening short of breath  Hospital Course:   Griselda Herring is a 80 y o  male patient who originally presented to the hospital on 2/28/2022 due to worsening short of breath  Patient admitted under the diagnosis of acute respiratory failure with hypoxia secondary to CHF exacerbation as well as left pleural effusion    Patient was evaluated by Cardiology, received IV Lasix with 40 mg b i d   Patient also status post left thoracentesis with removal of 2 2 L of serosanguineous fluid complicated with postprocedure pneumothorax remained stable  With the treatment, patient condition improved  Patient also received IV antibiotic  Blood culture, urine Legionella pneumonia negative  Pleural fluid study shows exited if in nature with lymphocyte predominant  As per Cardiology, patient placed back on Lasix and continue metoprolol and Aldactone  Also continue Plavix and Eliquis  Patient initially requiring 2 L of oxygen  With the treatment, patient resting comfortably on room air  Home oxygen evaluation done for ambulatory oxygen requirement-does not qualify  Patient also evaluated by pulmonologist   Katarina Jaquez to continue current treatment  If pleural effusion reaccumulate, patient will need retapping  PT OT evaluated the patient, recommends home with home health aide  Patient also retaining some urine  Status post Schaffer placement, Schaffer removed  Postvoid trending down  No concern for retention of urine  Patient can continue Flomax  Treatment plan of care discussed with patient and his wife over the phone  Verbalizes to understand and agrees  Patient need repeat chest x-ray in 2-3 weeks  ER precaution provided  Please see above list of diagnoses and related plan for additional information  Condition at Discharge: stable    Discharge Day Visit / Exam:   Subjective:  Seen and evaluated during the round  Patient denies any significant complaint  Vitals: Blood Pressure: 127/55 (03/05/22 0725)  Pulse: 69 (03/05/22 0725)  Temperature: 97 9 °F (36 6 °C) (03/05/22 0725)  Temp Source: Oral (03/04/22 0745)  Respirations: 18 (03/05/22 0725)  Height: 5' 8" (172 7 cm) (03/01/22 1020)  Weight - Scale: 62 9 kg (138 lb 10 7 oz) (03/05/22 0546)  SpO2: 97 % (03/05/22 0725)  Exam:   Physical Exam  Vitals and nursing note reviewed  Constitutional:       Appearance: He is not diaphoretic  HENT:      Head: Normocephalic  Right Ear: There is no impacted cerumen        Nose: Nose normal  No congestion  Mouth/Throat:      Mouth: Mucous membranes are moist       Pharynx: No oropharyngeal exudate  Eyes:      General: No scleral icterus  Conjunctiva/sclera: Conjunctivae normal       Pupils: Pupils are equal, round, and reactive to light  Cardiovascular:      Rate and Rhythm: Normal rate  Heart sounds: No friction rub  No gallop  Pulmonary:      Effort: Pulmonary effort is normal  No respiratory distress  Breath sounds: No stridor  No wheezing or rhonchi  Comments: Decreased breath sound and left lower lung base which remained stable  Abdominal:      General: Abdomen is flat  Bowel sounds are normal  There is no distension  Palpations: There is no mass  Tenderness: There is no abdominal tenderness  Hernia: No hernia is present  Musculoskeletal:         General: Normal range of motion  Cervical back: Normal range of motion  Right lower leg: No edema  Left lower leg: No edema  Lymphadenopathy:      Cervical: No cervical adenopathy  Skin:     General: Skin is warm  Capillary Refill: Capillary refill takes less than 2 seconds  Neurological:      General: No focal deficit present  Mental Status: He is alert and oriented to person, place, and time  Cranial Nerves: No cranial nerve deficit  Sensory: No sensory deficit  Motor: No weakness  Psychiatric:         Mood and Affect: Mood normal          Discussion with Family: Updated  (wife) via phone  Discharge instructions/Information to patient and family:   See after visit summary for information provided to patient and family  Provisions for Follow-Up Care:  See after visit summary for information related to follow-up care and any pertinent home health orders         Disposition:   Home with VNA Services (Reminder: Complete face to face encounter)    Planned Readmission:  If condition get worse     Discharge Statement:  I spent >35 minutes discharging the patient  This time was spent on the day of discharge  I had direct contact with the patient on the day of discharge  Greater than 50% of the total time was spent examining patient, answering all patient questions, arranging and discussing plan of care with patient as well as directly providing post-discharge instructions  Additional time then spent on discharge activities  Discharge Medications:  See after visit summary for reconciled discharge medications provided to patient and/or family        **Please Note: This note may have been constructed using a voice recognition system**

## 2022-03-05 NOTE — CASE MANAGEMENT
Case Management Discharge Planning Note    Patient name Bari Reveal  Location /-40 MRN [id number]  : 1940 Date 3/5/2022       Current Admission Date: 2022  Current Admission Diagnosis:Acute respiratory failure with hypoxia [facility])   Patient Active Problem List    Diagnosis Date Noted    Lumbar compression deformity 2022    Postprocedural pneumothorax 2022    Acute respiratory failure with hypoxia ([facility] ) 2022    Acute on chronic diastolic CHF (congestive heart failure) ([facility] ) 2022    Stage 3a chronic kidney disease ([facility] ) 2022    Chronic a-fib ([facility] ) 2022    S[facility]    Stage 3 chronic kidney disease [facility] 2021    Multiple lung nodules on CT 2021    Carotid artery stenosis 2021    Bacteremia due to Pseudomonas 2021    Acute cystitis without hematuria 2021    Coronary artery disease involving coronary bypass graft 2021    Mitral regurgitation 2021    Hypertensive urgency 2021    Hyperlipidemia, mixed 2021      LOS (days): 5  Geometric Mean LOS (GMLOS) (days): 3 80  Days to GMLOS:-1     OBJECTIVE:  Risk of Unplanned Readmission Score: 22         Current admission status: Inpatient   Preferred Pharmacy:   [address] [place] [address]  Phone: [phone] Fax: [address]  [facility]  Phone: [phone] Fax: [phone]    Primary Care Provider: Sabrina Marquez DO    Primary Insurance: MEDICARE  Secondary Insurance: BLUE CROSS    DISCHARGE DETAILS:  Patient and wife are aware of accepting [facility] agency- [facility]  Notified [facility] of dc via [facility] and faxed the AVS via [employer]

## 2022-03-05 NOTE — ASSESSMENT & PLAN NOTE
Wt Readings from Last 3 Encounters:   03/05/22 62 9 kg (138 lb 10 7 oz)   12/19/21 68 kg (150 lb)   10/06/21 76 2 kg (167 lb 15 9 oz)     Patient has acute on chronic diastolic CHF exacerbation  Known history of coronary artery disease status post CABG 8 years ago and now recently had 2 stents 10/2021  Last 2D echo from 04/20/2021 showed EF of 60%  Patient had a complicated hospital course at the end of last year in [facility] and records are not completely available a but it seems like patient had an acute MI had 2 stents placed in the left main and ramus  Patient also had AFib with RVR underwent cardioversion  Also had episode of GI bleed and found have a stomach ulcer  Was admitted for almost 3 weeks  Recently he was seen outpatient by his cardiologist [facility] who stopped his Lasix  Patient appears to be gaining weight again and presented with worsening shortness of breath and found to have worsening left-sided pleural effusion  Resume p o  Lasix 40 mg   Plavix and Eliquis  Please note that patient is not on aspirin anymore as per his primary cardiologist   Status post thoracentesis with removal of 2 2 L serosanguineous fluid-exudative in nature as per light criteria

## 2022-03-05 NOTE — DISCHARGE INSTRUCTIONS
Pleural Effusion   AMBULATORY CARE:   Pleural effusion  is fluid buildup in the space between the layers of the pleura  The pleura is a thin piece of tissue with 2 layers  One layer rests directly on the lungs  The other rests on the chest wall  There is normally a small amount of fluid between these layers  This fluid helps your lungs move easily when you breathe  Common signs and symptoms:   · Cough, shortness of breath    · Breathing faster than usual    · Chest pain when you breathe in or cough    · Fever    Call your local emergency number (911 in the [address] [facility]) if:   · You find it very hard to breathe  · You feel faint, or you cannot think clearly  Seek care immediately if:   · Your breathing problems do not go away, or they get worse  Call your doctor if:   · Your lips or fingernails turn blue  · You have a fever  · Your pain does not go away or gets worse  · You cough up yellow, green, gray, or bloody mucus  · You have questions or concerns about your condition or care  Treatment  depends on the cause of your pleural effusion and your symptoms  You may need any of the following:  · Cardiac medicines  may be needed if your pleural effusion is caused by heart failure  · Antibiotics  help treat an infection caused by bacteria  · NSAIDs  help decrease swelling and pain or fever  This medicine is available with or without a doctor's order  NSAIDs can cause stomach bleeding or kidney problems in certain people  If you take blood thinner medicine, always ask your healthcare provider if NSAIDs are safe for you  Always read the medicine label and follow directions  · Prescription pain medicine  may be given  Ask your healthcare provider how to take this medicine safely  Some prescription pain medicines contain acetaminophen  Do not take other medicines that contain acetaminophen without talking to your healthcare provider  Too much acetaminophen may cause liver damage   Prescription pain medicine may cause constipation  Ask your healthcare provider how to prevent or treat constipation  · Chemotherapy  may be needed if your pleural effusion is caused by cancer  · Steroids ,or other types of medicines, may be given to decrease swelling  · Drainage  of extra pleural fluid may be done using thoracentesis or a chest tube  A chest tube may stay in your chest for days or weeks  This allows the extra fluid around your lungs to drain over time  You may need medicines put directly into your chest if the fluid does not drain out easily  · Surgery  may be needed if your pleural effusion keeps coming back or if it increases your risk for other problems  Prevent another pleural effusion:  Maintain a healthy lifestyle:  · Eat a variety of healthy foods  Healthy foods help with overall health  Healthy foods include fruit, vegetables, whole-grain breads, low-fat dairy products, beans, lean meat, and fish  Limit sugar, alcohol, and fat  · Do not smoke  and do not allow others to smoke around you  Nicotine and other chemicals in cigarettes and cigars increase your risk for lung infections such as pneumonia  Ask your healthcare provider for information if you currently smoke and need help to quit  E-cigarettes or smokeless tobacco still contain nicotine  Talk to your healthcare provider before you use these products  · Drink liquids as directed and rest as needed  Liquids help keep your air passages moist  This can help your body get rid of germs and other irritants  Ask your healthcare provider how much liquid to drink each day and which liquids are best for you  You may feel like resting more  Slowly start to do more each day  Rest when you feel it is needed  · Exercise regularly  Ask about the best exercise plan for you  Exercise will lower your blood pressure and decrease stress  This helps decrease your risk for another pleural effusion, or a lung infection      Follow up with your doctor or specialist as directed: You may need to see a specialist depending on the cause of your pleural effusion  Write down your questions so you remember to ask them during your visits  © Copyright [employer] 2022 Information is for End User's use only and may not be sold, redistributed or otherwise used for commercial purposes  All illustrations and images included in CareNotes® are the copyrighted property of [employer] A M [employer]  or Judd Galdamez   The above information is an  only  It is not intended as medical advice for individual conditions or treatments  Talk to your doctor, nurse or pharmacist before following any medical regimen to see if it is safe and effective for you  Heart Failure   AMBULATORY CARE:   Heart failure  is a condition that does not allow your heart to fill or pump properly  Not enough oxygen in your blood gets to your organs and tissues  Fluid may not move through your body properly  Fluid builds up and causes swelling and trouble breathing  This is known as congestive heart failure  Heart failure may start in the left or right ventricle  Heart failure is often caused by damage or injury to your heart  The damage may be caused by other heart problems, diabetes, or high blood pressure  The damage may have also been caused by an infection  Heart failure is a long-term condition that tends to get worse over time  It is important to manage your health to improve your quality of life         Common signs and symptoms:   · Trouble breathing with activity that worsens to trouble breathing at rest    · Shortness of breath while lying flat    · Severe shortness of breath and coughing at night that usually wakes you    · Feeling lightheaded when you stand up    · Purple color around your mouth and nails    · Confusion or anxiety    · Chest pain at night    · Periods of no breathing, then breathing fast    · Lack of energy (often worsened by physical activity), or trouble sleeping    · Swelling in your ankles, legs, or abdomen    · Heartbeat that is fast or not regular    · Fingers and toes feel cool to the touch    Call your local emergency number (911 in the [address]) if:   · You have any of the following signs of a heart attack:      ? Squeezing, pressure, or pain in your chest    ? You may  also have any of the following:     § Discomfort or pain in your back, neck, jaw, stomach, or arm    § Shortness of breath    § Nausea or vomiting    § Lightheadedness or a sudden cold sweat      Call your doctor if:   · Your heartbeat is fast, slow, or uneven all the time  · You have symptoms of worsening heart failure:      ? Shortness of breath at rest, at night, or that is getting worse in any way    ? Weight gain of 3 or more pounds (1 4 kg) in a day, or more than your healthcare provider says is okay    ? More swelling in your legs or ankles    ? Abdominal pain or swelling    ? More coughing    ? Loss of appetite    ? Feeling tired all the time    · You feel hopeless or depressed, or you have lost interest in things you used to enjoy  · You often feel worried or afraid  · You have questions or concerns about your condition or care  Treatment for heart failure  may include any of the following:  · Medicines  may be needed to help regulate your heart rhythm  You may also need medicines to lower your blood pressure, and to decrease extra fluids  · Oxygen  may help you breathe easier if your oxygen level is lower than normal  A CPAP machine may be used to keep your airway open while you sleep  · Cardiac rehab  is a program run by specialists who will help you safely strengthen your heart  In the program you will learn about exercise, relaxation, stress management, and heart-healthy nutrition  Cardiac rehab may be recommended if your heart failure is not severe      · Surgery  can be done to implant a pacemaker or another device in your chest to regulate your heart rhythm  Other types of surgery can open blocked heart vessels, replace a damaged heart valve, or remove scar tissue  Manage swelling from extra fluid:   · Elevate (raise) your legs above the level of your heart  This will help with fluid that builds up in your legs or ankles  Elevate your legs as often as possible during the day  Prop your legs on pillows or blankets to keep them elevated comfortably  Try not to stand for long periods of time during the day  Move around to keep your blood circulating  · Limit sodium (salt)  Ask how much sodium you can have each day  Your healthcare provider may give you a limit, such as 2,300 milligrams (mg) a day  Your provider or a dietitian can teach you how to read food labels for the number of mg in a food  He or she can also help you find ways to have less salt  For example, if you add salt to food as you cook, do not add more at the table  · Drink liquids as directed  You may need to limit the amount of liquid you drink within 24 hours  Your healthcare provider will tell you how much liquid to have and which liquids are best for you  He or she may tell you to limit liquid to 1 5 to 2 liters in a day  He or she will also tell you how often to drink liquid throughout the day  · Weigh yourself every morning  Use the same scale, in the same spot  Do this after you use the bathroom, but before you eat or drink  Wear the same type of clothing each time  Write down your weight and call your healthcare provider if you have a sudden weight gain  Swelling and weight gain are signs of fluid buildup  Manage heart failure: Your quality of life may improve with treatment and the following:  · Do not smoke  Nicotine and other chemicals in cigarettes and cigars can cause lung and heart damage  Ask your healthcare provider for information if you currently smoke and need help to quit  E-cigarettes or smokeless tobacco still contain nicotine   Talk to your healthcare provider before you use these products  · Do not drink alcohol or use illegal drugs  Alcohol and drugs can increase your risk for high blood pressure, diabetes, and coronary artery disease  · Eat heart-healthy foods  Heart-healthy foods include fruits, vegetables, lean meat (such as beef, chicken, or pork), and low-fat dairy products  Fatty fish such as salmon and tuna are also heart healthy  Other heart-healthy foods include walnuts, whole-grain breads, beans, and cooked beans  Replace butter and margarine with heart-healthy oils such as olive oil or canola oil  Your provider or a dietitian can help you create heart-healthy meal plans  · Manage any chronic health conditions you have  These include high blood pressure, diabetes, obesity, high cholesterol, metabolic syndrome, and COPD  You will have fewer symptoms if you manage these health conditions  Follow your healthcare provider's recommendations and follow up with him or her regularly  · Maintain a healthy weight  Being overweight can increase your risk for high blood pressure, diabetes, and coronary artery disease  These conditions can make your symptoms worse  Ask your healthcare provider how much you should weigh  Ask him or her to help you create a weight loss plan if you are overweight  · Stay active  Activity can help keep your symptoms from getting worse  Walking is a type of physical activity that helps maintain your strength and improve your mood  Physical activity also helps you manage your weight  Work with your healthcare provider to create an exercise plan that is right for you  · Get vaccines as directed  The flu and pneumonia can be severe for a person who has heart failure  Vaccines protect you from these infections  Get a flu shot every year as soon as it is recommended, usually in September or October  You may also need the pneumonia vaccine   Your healthcare provider can tell you if you need other vaccines, and when to get them  Follow up with your doctor or cardiologist within 7 days or as directed: You may need to return for other tests  You may need home health care  A healthcare provider will monitor your vital signs, weight, and make sure your medicines are working  Write down your questions so you remember to ask them during your visits  Join a support group:  Heart failure can be difficult to manage  It may be helpful to talk with others who have heart failure  You may learn how to better manage your condition or get emotional support  For more information:  · [address]  [place] , [place]   Phone: 2- 391 - [phone]  Web Address: https://www strong com/  [address] 2022 Information is for End User's use only and may not be sold, redistributed or otherwise used for commercial purposes  All illustrations and images included in CareNotes® are the copyrighted property of [employer]  or [address] [place], [place] [address]piter Galdamez   The above information is an  only  It is not intended as medical advice for individual conditions or treatments  Talk to your doctor, nurse or pharmacist before following any medical regimen to see if it is safe and effective for you

## 2022-03-05 NOTE — NURSING NOTE
AVS reviewed w pt and spouse, verbalize understanding of same, copy provided  DC'ed to home in stable condition w [facility] and script for CXR by Mar 19th

## 2022-03-05 NOTE — ASSESSMENT & PLAN NOTE
Lab Results   Component Value Date    EGFR 34 03/04/2022    EGFR 24 03/03/2022    EGFR 23 03/02/2022    CREATININE 1 81 (H) 03/04/2022    CREATININE 2 36 (H) 03/03/2022    CREATININE 2 44 (H) 03/02/2022   Baseline creatinine is 1 4-1 7  Currently patient present with creatinine of 1 85  Also fluid overloaded  Continue p o   Lasix  Patient renal function remained almost close to baseline

## 2022-03-05 NOTE — PROGRESS NOTES
[address]  Progress Note Candice Gonzales 1940, 80 y o  male MRN: [id number]  Unit/Bed#: -[place] Encounter: [id number]  Primary Care Provider: Kylah Vaqsuez DO   Date and time admitted to hospital: 2/28/2022  3:58 PM    Postprocedural pneumothorax  Assessment & Plan  Mild left-sided pneumothorax after procedure  Patient remained hemodynamically stable  Continue to monitor-serial chest x-ray q 4 hours  Critical care team is aware of the patient  Family updated  Remained stable on repeat x-ray  * Acute respiratory failure with hypoxia [facility])  Assessment & Plan  Patient has acute respiratory failure with hypoxia secondary to acute on chronic diastolic CHF exacerbation with left-sided pleural effusion  Currently patient is requiring 2 L of oxygen  Patient does not use any oxygen at home  Status post thoracentesis with removal of 2 2 L serosanguineous fluid-complicated with mild pneumothorax, continue to monitor  As per light criteria, exudate deep in nature  Patient placed on IV antibiotic-body fluid shows no growth  CT chest reviewed  Continue to improve    Lumbar compression deformity  Assessment & Plan  Mild superior endplate deformity of L1 and inferior endplate deformity of L2  As per family, patient has history of multiple fall  No neuro deficit  Continue conservative management    Chronic a-fib [facility])  Assessment & Plan  Patient has history of chronic AFib is on amiodarone 200 mg daily continue same for now  Also on Lopressor 100 mg twice daily  Continue Eliquis 2 5 mg twice daily     Stage 3a chronic kidney disease [facility])  Assessment & Plan  Lab Results   Component Value Date    EGFR 34 03/04/2022    EGFR 24 03/03/2022    EGFR 23 03/02/2022    CREATININE 1 81 (H) 03/04/2022    CREATININE 2 36 (H) 03/03/2022    CREATININE 2 44 (H) 03/02/2022   Baseline creatinine is 1 4-1 7  Currently patient present with creatinine of 1 85  Also fluid overloaded  Will resume p o  Lasix    Acute on chronic diastolic CHF (congestive heart failure) (HCC)  Assessment & Plan  Wt Readings from Last 3 Encounters:   03/04/22 63 9 kg (140 lb 12 8 oz)   12/19/21 68 kg (150 lb)   10/06/21 76 2 kg (167 lb 15 9 oz)     Patient has acute on chronic diastolic CHF exacerbation  Known history of coronary artery disease status post CABG 8 years ago and now recently had 2 stents 10/2021  Last 2D echo from 04/20/2021 showed EF of 60%  Patient had a complicated hospital course at the end of last year in [facility] and records are not completely available a but it seems like patient had an acute MI had 2 stents placed in the left main and ramus  Patient also had AFib with RVR underwent cardioversion  Also had episode of GI bleed and found have a stomach ulcer  Was admitted for almost 3 weeks  Recently he was seen outpatient by his cardiologist [facility] who stopped his Lasix  Patient appears to be gaining weight again and presented with worsening shortness of breath and found to have worsening left-sided pleural effusion  Resume p o  Lasix 40 mg   Plavix and Eliquis  Please note that patient is not on aspirin anymore as per his primary cardiologist   Status post thoracentesis with removal of 2 2 L serosanguineous fluid-exudative in nature as per light criteria  Patient placed on IV antibiotic  Hyperlipidemia, mixed  Assessment & Plan  Continue statin therapy    Hypertensive urgency  Assessment & Plan  Blood pressure elevated on presentation secondary to acute CHF exacerbation  Received IV Lasix following which blood pressure is starting to improve  Continue home regimen of metoprolol 100 mg twice daily  Blood pressure is much improved  Cardiology improved appreciated    Coronary artery disease involving coronary bypass graft  Assessment & Plan  Patient with known CADCABG x 2 in 2014  DAPHNEY to left main and ramus in 10/2021    Continue daily Plavix 75 mg  Not on aspirin to avoid triple therapy in setting of Eliquis use  Continue beta blocker and statin  Echo 3/1/2022 with EF 45% and hypokinesis in mid anteroseptal, apical septal and apical inferior walls  Optimized goal directed medical therapy with transition to metoprolol succinate and addition of spironolactone  Cardiology consult appreciated, recommendation as above          VTE Pharmacologic Prophylaxis:   Moderate Risk (Score 3-4) - Pharmacological DVT Prophylaxis Ordered: apixaban (Eliquis)  Patient Centered Rounds: I performed bedside rounds with nursing staff today  Discussions with Specialists or Other Care Team Provider:  Pulmonary    Education and Discussions with Family / Patient: Updated  (wife and daughter) at bedside  Time Spent for Care: 15 minutes  More than 50% of total time spent on counseling and coordination of care as described above  Current Length of Stay: 4 day(s)  Current Patient Status: Inpatient   Certification Statement: The patient will continue to require additional inpatient hospital stay due to To monitor above condition  Discharge Plan: Anticipate discharge in 24-48 hrs to home with home services  Code Status: Level 1 - Full Code    Subjective:   Seen and evaluated during the round  Patient resting comfortably  Denies any significant complaint  Objective:     Vitals:   Temp (24hrs), Av 9 °F (36 6 °C), Min:97 8 °F (36 6 °C), Max:98 1 °F (36 7 °C)    Temp:  [97 8 °F (36 6 °C)-98 1 °F (36 7 °C)] 98 1 °F (36 7 °C)  HR:  [65-74] 65  Resp:  [17-18] 17  BP: (128-133)/(60-78) 133/60  SpO2:  [94 %-98 %] 98 %  Body mass index is 21 41 kg/m²  Input and Output Summary (last 24 hours): Intake/Output Summary (Last 24 hours) at 3/4/2022 1908  Last data filed at 3/4/2022 1201  Gross per 24 hour   Intake 180 ml   Output 1000 ml   Net -820 ml       Physical Exam:   Physical Exam  Vitals and nursing note reviewed  Constitutional:       Appearance: He is not diaphoretic  HENT:      Head: Normocephalic  Nose: Nose normal  No congestion  Mouth/Throat:      Mouth: Mucous membranes are moist       Pharynx: No oropharyngeal exudate  Eyes:      General: No scleral icterus  Conjunctiva/sclera: Conjunctivae normal       Pupils: Pupils are equal, round, and reactive to light  Cardiovascular:      Rate and Rhythm: Normal rate  Heart sounds: No friction rub  No gallop  Pulmonary:      Effort: Pulmonary effort is normal       Comments: Decreased breath sound in left lower lobe  Abdominal:      General: Abdomen is flat  Bowel sounds are normal  There is no distension  Palpations: There is no mass  Tenderness: There is no abdominal tenderness  Hernia: No hernia is present  Musculoskeletal:         General: Normal range of motion  Cervical back: Normal range of motion  Right lower leg: No edema  Left lower leg: No edema  Skin:     General: Skin is warm  Capillary Refill: Capillary refill takes less than 2 seconds  Neurological:      General: No focal deficit present  Mental Status: He is alert and oriented to person, place, and time  Cranial Nerves: No cranial nerve deficit  Sensory: No sensory deficit  Motor: No weakness        Coordination: Coordination normal    Psychiatric:         Mood and Affect: Mood normal          Additional Data:     Labs:  Results from last 7 days   Lab Units 03/04/22  0602   WBC Thousand/uL 8 29   HEMOGLOBIN g/dL 10 2*   HEMATOCRIT % 28 9*   PLATELETS Thousands/uL 117*   NEUTROS PCT % 75   LYMPHS PCT % 10*   MONOS PCT % 11   EOS PCT % 3     Results from last 7 days   Lab Units 03/04/22  0602   SODIUM mmol/L 137   POTASSIUM mmol/L 4 1   CHLORIDE mmol/L 103   CO2 mmol/L 25   BUN mg/dL 52*   CREATININE mg/dL 1 81*   ANION GAP mmol/L 9   CALCIUM mg/dL 7 8*   ALBUMIN g/dL 2 4*   TOTAL BILIRUBIN mg/dL 0 76   ALK PHOS U/L 98   ALT U/L 25   AST U/L 22   GLUCOSE RANDOM mg/dL 95     Results from last 7 days   Lab Units 02/28/22  1616   INR  1 29*     Results from last 7 days   Lab Units 03/02/22  0049   POC GLUCOSE mg/dl 120         Results from last 7 days   Lab Units 03/03/22  0457 03/02/22  1632 03/02/22  0506   LACTIC ACID mmol/L  --  1 7  --    PROCALCITONIN ng/ml 3 06*  --  1 93*       Lines/Drains:  Invasive Devices  Report    Peripheral Intravenous Line            Peripheral IV 03/03/22 Left;Ventral (anterior) Forearm 1 day                      Imaging: Reviewed radiology reports from this admission including: chest xray    Recent Cultures (last 7 days):   Results from last 7 days   Lab Units 03/02/22  1633 03/02/22  1601 03/01/22  1556   BLOOD CULTURE  No Growth at 24 hrs  No Growth at 24 hrs   --   --    GRAM STAIN RESULT   --   --  No Polys or Bacteria seen   BODY FLUID CULTURE, STERILE   --   --  No growth   LEGIONELLA URINARY ANTIGEN   --  Negative  --        Last 24 Hours Medication List:   Current Facility-Administered Medications   Medication Dose Route Frequency Provider Last Rate    acetaminophen  650 mg Oral Q6H PRN Remo Nyhan, MD      amiodarone  200 mg Oral Daily Remo Nyhan, MD      apixaban  2 5 mg Oral BID Mercedez Feliciano MD      atorvastatin  40 mg Oral Daily Remo Nyhan, MD      cefTRIAXone  1,000 mg Intravenous Q24H Mercedez Feliciano MD 1,000 mg (03/04/22 1444)    clopidogrel  75 mg Oral Daily Mercedez Feliciano MD     Kodiryann Ifeoma Sawyer Mikayla ON 3/5/2022] furosemide  40 mg Oral Daily Mercedez Feliciano MD      metoprolol succinate  200 mg Oral Daily Julee Torres MD      oxyCODONE-acetaminophen  1 tablet Oral Q4H PRN Mercedez Feliciano MD      spironolactone  25 mg Oral Daily Julee Torres MD      tamsulosin  0 4 mg Oral Daily [facility]          Today, Patient Was Seen By: Mercedez Feliciano MD    **Please Note: This note may have been constructed using a voice recognition system  **

## 2022-03-05 NOTE — ASSESSMENT & PLAN NOTE
Lab Results   Component Value Date    EGFR 34 03/04/2022    EGFR 24 03/03/2022    EGFR 23 03/02/2022    CREATININE 1 81 (H) 03/04/2022    CREATININE 2 36 (H) 03/03/2022    CREATININE 2 44 (H) 03/02/2022   Baseline creatinine is 1 4-1 7  Currently patient present with creatinine of 1 85  Also fluid overloaded  Will resume p o   Lasix

## 2022-03-05 NOTE — CASE MANAGEMENT
Case Management Discharge Planning Note    Patient name Sylvain Kaur  Location [address] [id number] MRN [id number]  : 1940 Date 3/5/2022       Current Admission Date: 2022  Current Admission Diagnosis:Acute respiratory failure with hypoxia [facility])   Patient Active Problem List    Diagnosis Date Noted    Lumbar compression deformity 2022    Postprocedural pneumothorax 2022    Acute respiratory failure with hypoxia ([facility] ) 2022    Acute on chronic diastolic CHF (congestive heart failure) ([facility] ) 2022    Stage 3a chronic kidney disease ([facility] ) 2022    [facility]    Sepsis ([facility] ) 2021    Stage 3 chronic kidney disease ([facility] 2021    Multiple lung nodules on CT 2021    Carotid artery stenosis 2021    Bacteremia due to Pseudomonas 2021    Acute cystitis without hematuria 2021    Coronary artery disease involving coronary bypass graft 2021    Mitral regurgitation 2021    Hypertensive urgency 2021    Hyperlipidemia, mixed 2021      LOS (days): 5  Geometric Mean LOS (GMLOS) (days): 3 80  Days to GMLOS:-0 8     OBJECTIVE:  Risk of Unplanned Readmission Score: 20         Current admission status: Inpatient   Preferred Pharmacy:   [address] [place] [address] [address]  [address] [address]  Phone: [phone] Fax: [address]  [facility]  Phone: [phone] Fax: [phone]    Primary Care Provider: Grey Briceno DO    Primary Insurance: MEDICARE  Secondary Insurance: BLUE CROSS    DISCHARGE DETAILS:  [facility] accepted for Nursing/PT and OT  They have [facility] acute [facility] 3/5    Updated Face to Face and AVS

## 2022-03-05 NOTE — PLAN OF CARE
Problem: Nutrition/Hydration-ADULT  Goal: Nutrient/Hydration intake appropriate for improving, restoring or maintaining nutritional needs  Description: Monitor and assess patient's nutrition/hydration status for malnutrition  Collaborate with interdisciplinary team and initiate plan and interventions as ordered  Monitor patient's weight and dietary intake as ordered or per policy  Utilize nutrition screening tool and intervene as necessary  Determine patient's food preferences and provide high-protein, high-caloric foods as appropriate       INTERVENTIONS:  - Monitor oral intake, urinary output, labs, and treatment plans  - Assess nutrition and hydration status and recommend course of action  - Evaluate amount of meals eaten  - Assist patient with eating if necessary   - Allow adequate time for meals  - Recommend/ encourage appropriate diets, oral nutritional supplements, and vitamin/mineral supplements  - Order, calculate, and assess calorie counts as needed  - Recommend, monitor, and adjust tube feedings and TPN/PPN based on assessed needs  - Assess need for intravenous fluids  - Provide specific nutrition/hydration education as appropriate  - Include patient/family/caregiver in decisions related to nutrition  Outcome: Progressing     Problem: MOBILITY - ADULT  Goal: Maintain or return to baseline ADL function  Description: INTERVENTIONS:  -  Assess patient's ability to carry out ADLs; assess patient's baseline for ADL function and identify physical deficits which impact ability to perform ADLs (bathing, care of mouth/teeth, toileting, grooming, dressing, etc )  - Assess/evaluate cause of self-care deficits   - Assess range of motion  - Assess patient's mobility; develop plan if impaired  - Assess patient's need for assistive devices and provide as appropriate  - Encourage maximum independence but intervene and supervise when necessary  - Involve family in performance of ADLs  - Assess for home care needs following discharge   - Consider OT consult to assist with ADL evaluation and planning for discharge  - Provide patient education as appropriate  Outcome: Progressing  Goal: Maintains/Returns to pre admission functional level  Description: INTERVENTIONS:  - Perform BMAT or MOVE assessment daily    - Set and communicate daily mobility goal to care team and patient/family/caregiver  - Collaborate with rehabilitation services on mobility goals if consulted  - Perform Range of Motion  times a day  - Reposition patient every  hours    - Dangle patient  times a day  - Stand patient  times a day  - Ambulate patient  times a day  - Out of bed to chair  times a day   - Out of bed for meals  times a day  - Out of bed for toileting  - Record patient progress and toleration of activity level   Outcome: Progressing     Problem: Potential for Falls  Goal: Patient will remain free of falls  Description: INTERVENTIONS:  - Educate patient/family on patient safety including physical limitations  - Instruct patient to call for assistance with activity   - Consult OT/PT to assist with strengthening/mobility   - Keep Call bell within reach  - Keep bed low and locked with side rails adjusted as appropriate  - Keep care items and personal belongings within reach  - Initiate and maintain comfort rounds  - Make Fall Risk Sign visible to staff  - Offer Toileting every  Hours, in advance of need  - Initiate/Maintain alarm  - Obtain necessary fall risk management equipment:   - Apply yellow socks and bracelet for high fall risk patients  - Consider moving patient to room near nurses station  Outcome: Progressing     Problem: PAIN - ADULT  Goal: Verbalizes/displays adequate comfort level or baseline comfort level  Description: Interventions:  - Encourage patient to monitor pain and request assistance  - Assess pain using appropriate pain scale  - Administer analgesics based on type and severity of pain and evaluate response  - Implement non-pharmacological measures as appropriate and evaluate response  - Consider cultural and social influences on pain and pain management  - Notify physician/advanced practitioner if interventions unsuccessful or patient reports new pain  Outcome: Progressing     Problem: INFECTION - ADULT  Goal: Absence or prevention of progression during hospitalization  Description: INTERVENTIONS:  - Assess and monitor for signs and symptoms of infection  - Monitor lab/diagnostic results  - Monitor all insertion sites, i e  indwelling lines, tubes, and drains  - Monitor endotracheal if appropriate and nasal secretions for changes in amount and color  - [place] appropriate cooling/warming therapies per order  - Administer medications as ordered  - Instruct and encourage patient and family to use good hand hygiene technique  - Identify and instruct in appropriate isolation precautions for identified infection/condition  Outcome: Progressing  Goal: Absence of fever/infection during neutropenic period  Description: INTERVENTIONS:  - Monitor WBC    Outcome: Progressing     Problem: SAFETY ADULT  Goal: Maintain or return to baseline ADL function  Description: INTERVENTIONS:  -  Assess patient's ability to carry out ADLs; assess patient's baseline for ADL function and identify physical deficits which impact ability to perform ADLs (bathing, care of mouth/teeth, toileting, grooming, dressing, etc )  - Assess/evaluate cause of self-care deficits   - Assess range of motion  - Assess patient's mobility; develop plan if impaired  - Assess patient's need for assistive devices and provide as appropriate  - Encourage maximum independence but intervene and supervise when necessary  - Involve family in performance of ADLs  - Assess for home care needs following discharge   - Consider OT consult to assist with ADL evaluation and planning for discharge  - Provide patient education as appropriate  Outcome: Progressing  Goal: Maintains/Returns to pre admission functional level  Description: INTERVENTIONS:  - Perform BMAT or MOVE assessment daily    - Set and communicate daily mobility goal to care team and patient/family/caregiver  - Collaborate with rehabilitation services on mobility goals if consulted  - Perform Range of Motion  times a day  - Reposition patient every  hours    - Dangle patient  times a day  - Stand patient  times a day  - Ambulate patient  times a day  - Out of bed to chair  times a day   - Out of bed for meals  times a day  - Out of bed for toileting  - Record patient progress and toleration of activity level   Outcome: Progressing  Goal: Patient will remain free of falls  Description: INTERVENTIONS:  - Educate patient/family on patient safety including physical limitations  - Instruct patient to call for assistance with activity   - Consult OT/PT to assist with strengthening/mobility   - Keep Call bell within reach  - Keep bed low and locked with side rails adjusted as appropriate  - Keep care items and personal belongings within reach  - Initiate and maintain comfort rounds  - Make Fall Risk Sign visible to staff  - Offer Toileting every  Hours, in advance of need  - Initiate/Maintain alarm  - Obtain necessary fall risk management equipment:  - Apply yellow socks and bracelet for high fall risk patients  - Consider moving patient to room near nurses station  Outcome: Progressing     Problem: DISCHARGE PLANNING  Goal: Discharge to home or other facility with appropriate resources  Description: INTERVENTIONS:  - Identify barriers to discharge w/patient and caregiver  - Arrange for needed discharge resources and transportation as appropriate  - Identify discharge learning needs (meds, wound care, etc )  - Arrange for interpretive services to assist at discharge as needed  - Refer to Case Management Department for coordinating discharge planning if the patient needs post-hospital services based on physician/advanced practitioner order or complex needs related to functional status, cognitive ability, or social support system  Outcome: Progressing     Problem: Knowledge Deficit  Goal: Patient/family/caregiver demonstrates understanding of disease process, treatment plan, medications, and discharge instructions  Description: Complete learning assessment and assess knowledge base    Interventions:  - Provide teaching at level of understanding  - Provide teaching via preferred learning methods  Outcome: Progressing     Problem: Prexisting or High Potential for Compromised Skin Integrity  Goal: Skin integrity is maintained or improved  Description: INTERVENTIONS:  - Identify patients at risk for skin breakdown  - Assess and monitor skin integrity  - Assess and monitor nutrition and hydration status  - Monitor labs   - Assess for incontinence   - Turn and reposition patient  - Assist with mobility/ambulation  - Relieve pressure over bony prominences  - Avoid friction and shearing  - Provide appropriate hygiene as needed including keeping skin clean and dry  - Evaluate need for skin moisturizer/barrier cream  - Collaborate with interdisciplinary team   - Patient/family teaching  - Consider wound care consult   Outcome: Progressing     Problem: GENITOURINARY - ADULT  Goal: Urinary catheter remains patent  Description: INTERVENTIONS:  - Assess patency of urinary catheter  - If patient has a chronic zavala, consider changing catheter if non-functioning  - Follow guidelines for intermittent irrigation of non-functioning urinary catheter  Outcome: Progressing

## 2022-03-08 LAB
BACTERIA BLD CULT: NORMAL
BACTERIA BLD CULT: NORMAL

## 2022-03-22 ENCOUNTER — OFFICE VISIT (OUTPATIENT)
Dept: PULMONOLOGY | Facility: CLINIC | Age: 82
End: 2022-03-22
Payer: MEDICARE

## 2022-03-22 VITALS
OXYGEN SATURATION: 100 % | DIASTOLIC BLOOD PRESSURE: 60 MMHG | HEIGHT: 68 IN | WEIGHT: 140 LBS | BODY MASS INDEX: 21.22 KG/M2 | SYSTOLIC BLOOD PRESSURE: 120 MMHG | HEART RATE: 43 BPM

## 2022-03-22 DIAGNOSIS — J90 PLEURAL EFFUSION: ICD-10-CM

## 2022-03-22 DIAGNOSIS — J95.811 POSTPROCEDURAL PNEUMOTHORAX: ICD-10-CM

## 2022-03-22 DIAGNOSIS — J96.01 ACUTE RESPIRATORY FAILURE WITH HYPOXIA (HCC): ICD-10-CM

## 2022-03-22 PROCEDURE — 99214 OFFICE O/P EST MOD 30 MIN: CPT | Performed by: INTERNAL MEDICINE

## 2022-03-22 RX ORDER — ISOSORBIDE MONONITRATE 30 MG/1
TABLET, EXTENDED RELEASE ORAL
COMMUNITY
Start: 2021-10-25

## 2022-03-22 RX ORDER — NITROGLYCERIN 0.4 MG/1
TABLET SUBLINGUAL
COMMUNITY
Start: 2021-10-28

## 2022-03-22 RX ORDER — METOPROLOL TARTRATE 100 MG/1
TABLET ORAL
COMMUNITY

## 2022-03-22 RX ORDER — AMLODIPINE BESYLATE 5 MG/1
TABLET ORAL
COMMUNITY

## 2022-03-22 NOTE — PROGRESS NOTES
Pulmonary Follow Up Note   Michael Pugh 80 y o  male MRN: [id number]  3/22/2022    Assessment:  Bilateral pleural effusion   · S/p left thoracentesis removed 2 2 liters of serosanguineous fluid  · Fluid analysis consistent with pseudo exudate/lymphocytic predominance, negative cultures and cytology  · Most likely from CHF/fluid overload that has been chronic  · No prior occupational/environmental hazards exposure  · Repeat chest x-ray done at [address] [place] [address] on 03/14 showing a very minimal pleural effusion on the left  · Also noted a pneumo ex vacuo/entrapped lung post the procedure which is likely from the chronic effusion    Plan:   · Counseled extensively/discussed in details about the potential etiology   · Continue fluid/salt restriction and diuretics therapy as recommended by Cardiology  · No need for further follow-up with Pulmonary, unless had reaccumulation patient will call in to reschedule a follow-up      Return if symptoms worsen or fail to improve  History of Present Illness     Follow up for: HFU/pleural effusion    Background:  80 y o  male with a h/o CAD/CABG, CHF EF45%, AFib, HTN, dyslipidemia  Hospitalized in 02/2022 for dyspnea, cough and orthopnea  Found to have bilateral pleural effusion more on the left  Treated with diuretics, also antibiotics given the elevated procalcitonin  Thoracentesis performed by the critical care team, removed 2 2 L of serosanguineous fluid  Fluid analysis consistent with pseudo exudate/lymphocytic predominance, negative cultures and cytology  Postprocedure chest x-ray showed sign of trapped lung  Interval History  Since discharge from the hospital, feeling progressively better  Not completely back to baseline, able to participate with physical therapy at home  No significant respiratory symptoms, no wheezing, cough, chest pain, or new shortness of breath  States that he follows water/salt restrictions, diuretic therapy as recommended by Cardiology       No prior exposure to asbestos, lifelong nonsmoker, nonalcoholic  He served in [employer] for few years, 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  Review of Systems  As per hpi, all other systems reviewed and were negative    Studies:    Imaging and other studies: I have personally reviewed pertinent films in PACS      Pulmonary function testing:       EKG, Pathology, and Other Studies: I have personally reviewed pertinent reports  Past medical, surgical, social and family histories reviewed  Medications/Allergies: Reviewed      Vitals: Blood pressure 120/60, pulse (!) 43, height 5' 8" (1 727 m), weight 63 5 kg (140 lb), SpO2 100 %  Body mass index is 21 29 kg/m²  Oxygen Therapy  SpO2: 100 %      Physical Exam  Body mass index is 21 29 kg/m²     Gen: not in acute distress, pleasant, thin  Neck/Eyes: supple, no JVD appreciated, PERRL  Ear: normal appearance, no significant hearing impairment  Nose:  normal nasal mucosa, no drainage  Mouth:  unremarkable/normal appearance of lips, teeth and gums  Oropharynx: mucosa is moist, no focal lesions or erythema  Salivary glands: soft nontender  Chest: normal respiratory efforts, clear breath sounds bilaterally  CV: RRR, no murmurs appreciated, no edema  Abdomen: soft, non tender  Extremities:  No observed deformity   Skin: unremarkable  Neuro: AAO X3, no focal motor deficit        Labs:  Lab Results   Component Value Date    WBC 8 29 03/04/2022    HGB 10 2 (L) 03/04/2022    HCT 28 9 (L) 03/04/2022    MCV 94 03/04/2022     (L) 03/04/2022     Lab Results   Component Value Date    CALCIUM 7 8 (L) 03/04/2022    K 4 1 03/04/2022    CO2 25 03/04/2022     03/04/2022    BUN 52 (H) 03/04/2022    CREATININE 1 81 (H) 03/04/2022     No results found for: IGE  Lab Results   Component Value Date    ALT 25 03/04/2022    AST 22 03/04/2022    ALKPHOS 98 03/04/2022           Portions of the record may have been created with voice recognition software  Occasional wrong word or "sound a like" substitutions may have occurred due to the inherent limitations of voice recognition software  Read the chart carefully and recognize, using context, where substitutions have occurred    [facility]

## 2022-06-04 ENCOUNTER — APPOINTMENT (EMERGENCY)
Dept: CT IMAGING | Facility: HOSPITAL | Age: 82
End: 2022-06-04
Payer: MEDICARE

## 2022-06-04 ENCOUNTER — HOSPITAL ENCOUNTER (EMERGENCY)
Facility: HOSPITAL | Age: 82
Discharge: HOME | End: 2022-06-04
Attending: EMERGENCY MEDICINE | Admitting: EMERGENCY MEDICINE
Payer: MEDICARE

## 2022-06-04 VITALS
DIASTOLIC BLOOD PRESSURE: 74 MMHG | HEART RATE: 58 BPM | SYSTOLIC BLOOD PRESSURE: 163 MMHG | BODY MASS INDEX: 21.69 KG/M2 | OXYGEN SATURATION: 98 % | TEMPERATURE: 98.1 F | RESPIRATION RATE: 24 BRPM | WEIGHT: 142.64 LBS

## 2022-06-04 DIAGNOSIS — T07.XXXA MULTIPLE ABRASIONS: Primary | ICD-10-CM

## 2022-06-04 DIAGNOSIS — S51.812A SKIN TEAR OF LEFT FOREARM WITHOUT COMPLICATION, INITIAL ENCOUNTER: ICD-10-CM

## 2022-06-04 PROCEDURE — 93005 ELECTROCARDIOGRAM TRACING: CPT

## 2022-06-04 PROCEDURE — 72125 CT NECK SPINE W/O DYE: CPT

## 2022-06-04 PROCEDURE — 99284 EMERGENCY DEPT VISIT MOD MDM: CPT

## 2022-06-04 PROCEDURE — 90715 TDAP VACCINE 7 YRS/> IM: CPT | Performed by: EMERGENCY MEDICINE

## 2022-06-04 PROCEDURE — 99284 EMERGENCY DEPT VISIT MOD MDM: CPT | Performed by: EMERGENCY MEDICINE

## 2022-06-04 PROCEDURE — 70450 CT HEAD/BRAIN W/O DYE: CPT

## 2022-06-04 PROCEDURE — 90471 IMMUNIZATION ADMIN: CPT

## 2022-06-04 RX ADMIN — TETANUS TOXOID, REDUCED DIPHTHERIA TOXOID AND ACELLULAR PERTUSSIS VACCINE, ADSORBED 0.5 ML: 5; 2.5; 8; 8; 2.5 SUSPENSION INTRAMUSCULAR at 17:30

## 2022-06-04 NOTE — ED PROVIDER NOTES
Emergency Department Trauma Note  Kyra Avalos 80 y o  male MRN: [id number]  Unit/Bed#: ED 06/ED 06 Encounter: [id number]      Trauma Alert: Trauma Acuity: Trauma Evaluation  Model of Arrival:   via    Trauma Team: Current Providers  Attending Provider: Paloma Sandoval MD  Registered Nurse: Bailey Timmons, RN  Registered Nurse: Nacho Morelos RN  Consultants:     None      History of Present Illness     Chief Complaint:   Chief Complaint   Patient presents with    Fall     Pt tripped and fell onto cement - loc + thinners     HPI:  Kyra Avalos is a 80 y o  male who presents with fall  Mechanism:Details of Incident: pt tripped and fell onto cement - loc + thinners Injury Date: 06/04/22 Injury Time: 1600      Patient missed a step and fell  He struck his head  He did not pass out  He had no vomiting  He complains of no neck pain  He suffered multiple scrapes and abrasions  No other complaints at this time    Patient takes Eliquis      History provided by:  Patient   used: No    Fall  Mechanism of injury: fall    Injury location:  Head/neck and shoulder/arm  Head/neck injury location:  Head  Incident location:  Home  Time since incident:  20 minutes  Fall:     Fall occurred:  Standing    Entrapped after fall: no    Suspicion of alcohol use: no    Suspicion of drug use: no    Tetanus status:  Unknown  Prior to arrival data:     Bystander interventions:  None    Patient ambulatory at scene: yes      Blood loss:  None    Responsiveness at scene:  Alert    Orientation at scene:  Person, place, situation and time    Loss of consciousness: no      Amnesic to event: no      Airway interventions:  None    Airway condition since incident:  Stable    Breathing condition since incident:  Stable    Circulation condition since incident:  Stable    Mental status condition since incident:  Stable    Disability condition since incident:  Stable  Associated symptoms: no abdominal pain, no back pain, no chest pain, no difficulty breathing, no headaches, no hearing loss, no loss of consciousness, no nausea, no neck pain, no seizures and no vomiting      Review of Systems   Constitutional: Negative for chills and fever  HENT: Negative for ear pain, hearing loss, sore throat, trouble swallowing and voice change  Eyes: Negative for pain and discharge  Respiratory: Negative for cough, shortness of breath and wheezing  Cardiovascular: Negative for chest pain and palpitations  Gastrointestinal: Negative for abdominal pain, blood in stool, constipation, diarrhea, nausea and vomiting  Genitourinary: Negative for dysuria, flank pain, frequency and hematuria  Musculoskeletal: Negative for back pain, joint swelling, neck pain and neck stiffness  Skin: Negative for rash and wound  Neurological: Negative for dizziness, seizures, loss of consciousness, syncope, facial asymmetry and headaches  Psychiatric/Behavioral: Negative for hallucinations, self-injury and suicidal ideas  All other systems reviewed and are negative        Historical Information     Immunizations:   Immunization History   Administered Date(s) Administered    Tdap 11/26/2021, 06/04/2022       Past Medical History:   Diagnosis Date    CHF (congestive heart failure) (HCC)     Coronary artery disease     Hyperlipidemia     Hypertension     Paroxysmal atrial fibrillation (HCC)        Family History   Problem Relation Age of Onset    Hypertension Father      Past Surgical History:   Procedure Laterality Date    CARDIAC SURGERY      cabg x 2 2014 [facility]    CORONARY ANGIOPLASTY WITH STENT PLACEMENT  10/2021    DAPHNEY to left main and ramus    MITRAL VALVE REPAIR  2014    mitral ring     Social History     Tobacco Use    Smoking status: Never Smoker    Smokeless tobacco: Never Used   Vaping Use    Vaping Use: Never used   Substance Use Topics    Alcohol use: Not Currently    Drug use: Never     E-Cigarette/Vaping    E-Cigarette Use Never User      E-Cigarette/Vaping Substances    Nicotine No     THC No     CBD No     Flavoring No     Other No     Unknown No        Family History:   Family History   Problem Relation Age of Onset    Hypertension Father        Meds/Allergies   Prior to Admission Medications   Prescriptions Last Dose Informant Patient Reported?  Taking?   acetaminophen (TYLENOL) 325 mg tablet   No No   Sig: Take 2 tablets (650 mg total) by mouth every 6 (six) hours as needed for mild pain   albuterol (ProAir HFA) 90 mcg/act inhaler   No No   Sig: Inhale 2 puffs every 6 (six) hours as needed for wheezing   amLODIPine (NORVASC) 5 mg tablet   Yes No   Sig: Take by mouth   amiodarone 200 mg tablet   Yes No   Sig: Take 200 mg by mouth daily   apixaban (Eliquis) 2 5 mg   Yes No   Sig: Take 2 5 mg by mouth 2 (two) times a day     atorvastatin (LIPITOR) 40 mg tablet   Yes No   Sig: Take 40 mg by mouth daily   clopidogrel (PLAVIX) 75 mg tablet   Yes No   Sig: Take 75 mg by mouth daily   furosemide (LASIX) 40 mg tablet   No No   Sig: Take 1 tablet (40 mg total) by mouth daily   isosorbide mononitrate (IMDUR) 30 mg 24 hr tablet   Yes No   metoprolol succinate (TOPROL-XL) 200 MG 24 hr tablet   No No   Sig: Take 1 tablet (200 mg total) by mouth daily   metoprolol tartrate (LOPRESSOR) 100 mg tablet   Yes No   Sig: Take by mouth   nitroglycerin (Nitrostat) 0 4 mg SL tablet   Yes No   spironolactone (ALDACTONE) 25 mg tablet   No No   Sig: Take 1 tablet (25 mg total) by mouth daily   tamsulosin (FLOMAX) 0 4 mg   No No   Sig: Take 1 capsule (0 4 mg total) by mouth daily with dinner      Facility-Administered Medications: None       No Known Allergies    PHYSICAL EXAM    PE limited by:  Nothing    Objective   Vitals:   First set: Temperature: 98 1 °F (36 7 °C) (06/04/22 1657)  Pulse: 60 (06/04/22 1657)  Respirations: 20 (06/04/22 1657)  Blood Pressure: (!) 204/95 (06/04/22 1657)  SpO2: 96 % (06/04/22 1656)    Primary Survey: (A) Airway:  Intact  (B) Breathing:  Intact  (C) Circulation: Pulses:   normal  (D) Disabliity:  GCS Total:  15  (E) Expose:  Completed    Secondary Survey: (Click on Physical Exam tab above)  Physical Exam  Vitals and nursing note reviewed  Constitutional:       General: He is not in acute distress  Appearance: He is well-developed  HENT:      Head: Normocephalic  Comments: Abrasion/contusion noted left forehead  Right Ear: External ear normal       Left Ear: External ear normal    Eyes:      General: No scleral icterus  Right eye: No discharge  Left eye: No discharge  Extraocular Movements: Extraocular movements intact  Conjunctiva/sclera: Conjunctivae normal    Cardiovascular:      Rate and Rhythm: Normal rate and regular rhythm  Heart sounds: Normal heart sounds  No murmur heard  Pulmonary:      Effort: Pulmonary effort is normal       Breath sounds: Normal breath sounds  No wheezing or rales  Abdominal:      General: Bowel sounds are normal  There is no distension  Palpations: Abdomen is soft  Tenderness: There is no abdominal tenderness  There is no guarding or rebound  Musculoskeletal:         General: No deformity  Normal range of motion  Cervical back: Normal range of motion and neck supple  Comments: No cervical spine tenderness step-off or deformity  No T/L-spine tenderness step-off or deformity  No clavicular or chest wall tenderness  No chest wall crepitus  No rib tenderness  All long bones and joints nontender including shoulder, humerus, radius ulna, elbow, wrist, hips, femur, knee, tib-fib, ankle  Patient has full range of motion at all the above joints without pain  Skin:     General: Skin is warm and dry  Capillary Refill: Capillary refill takes less than 2 seconds  Findings: No rash  Comments: Multiple abrasions and skin tears, bilateral arms, bilateral legs  No lacerations     Neurological: General: No focal deficit present  Mental Status: He is alert and oriented to person, place, and time  Cranial Nerves: No cranial nerve deficit  Motor: No weakness  Psychiatric:         Mood and Affect: Mood normal          Behavior: Behavior normal          Thought Content: Thought content normal          Judgment: Judgment normal          Cervical spine cleared by clinical criteria? No (imaging required)      Invasive Devices  Report    None                 Lab Results:   Results Reviewed     None                 Imaging Studies:   Direct to CT: Yes  TRAUMA - CT head wo contrast   Final Result by Daniel Abel MD (06/04 1728)      No acute intracranial abnormality  Workstation performed: [id number]         TRAUMA - CT spine cervical wo contrast   Final Result by Daniel Abel MD (06/04 1728)      No cervical spine fracture or traumatic malalignment  Moderate degenerative changes from C3 to C6  Workstation performed: [id number]               Procedures  Procedures         ED Course  ED Course as of 06/04/22 1820   Sat Jun 04, 2022   1706 CXR was not obtained  Patient did not suffer chest trauma and has negative examination without tenderness or crepitus  Pelvic plain film was not obtained  Patient not suffer abdominal or pelvic trauma and had stable examination without hip tenderness and full range of motion    Patient deemed stable to proceed to any necessary CT imaging  Cervical spine was cleared only following advanced imaging  Patient was immediately placed in cervical collar upon ED evaluation  1818 Patient able to ambulate using a walker without difficulty  CT scans are negative  Patient is appropriate for discharge at this time             MDM  Number of Diagnoses or Management Options     Amount and/or Complexity of Data Reviewed  Tests in the radiology section of CPT®: ordered and reviewed            Disposition  [facility] Transfer: No  Final diagnoses:   Multiple abrasions   Skin tear of left forearm without complication, initial encounter     Time reflects when diagnosis was documented in both MDM as applicable and the Disposition within this note     Time User Action Codes Description Comment    6/4/2022  6:19 PM Katlyn Convoy Add [T07  XXXA] Multiple abrasions     6/4/2022  6:19 PM Katlyn Downs Add [T42 486G] Skin tear of left forearm without complication, initial encounter       ED Disposition     ED Disposition   Discharge    Condition   Stable    Date/Time   Sat Jun 4, 2022  6:19 PM    Comment   Ambika Sweet discharge to home/self care  Follow-up Information     Follow up With Specialties Details Why Contact Info    Georgia Gibbs DO Internal Medicine, Pediatrics   [address] [facility] [address] [phone]          Patient's Medications   Discharge Prescriptions    No medications on file     No discharge procedures on file      PDMP Review       Value Time User    PDMP Reviewed  Yes 3/5/2022 11:42 AM Ibrahima Ball MD          ED Provider  Electronically Signed by         Ilana Finn MD  06/04/22 [phone]

## 2022-06-04 NOTE — TRAUMA DOCUMENTATION
All wounds cleansed carefully with sterile water and dressed with xeroform Dsg and then dressed with sterile 4x4s and oren, secured with tape

## 2022-06-06 LAB
ATRIAL RATE: 67 BPM
QRS AXIS: 51 DEGREES
QRSD INTERVAL: 94 MS
QT INTERVAL: 468 MS
QTC INTERVAL: 471 MS
T WAVE AXIS: 52 DEGREES
VENTRICULAR RATE: 61 BPM

## 2022-06-06 PROCEDURE — 93010 ELECTROCARDIOGRAM REPORT: CPT | Performed by: INTERNAL MEDICINE

## 2022-08-01 NOTE — ASSESSMENT & PLAN NOTE
Discussed AREDS 2 supplements, UV protection and green leafy vegetables. Patient has history of chronic AFib is on amiodarone 200 mg daily continue same for now  Also on Lopressor 100 mg twice daily    Continue Eliquis 2 5 mg twice daily

## 2022-10-12 PROBLEM — A41.9 SEPSIS (HCC): Status: RESOLVED | Noted: 2021-04-26 | Resolved: 2022-10-12

## 2022-10-12 PROBLEM — N30.00 ACUTE CYSTITIS WITHOUT HEMATURIA: Status: RESOLVED | Noted: 2021-04-21 | Resolved: 2022-10-12

## 2022-10-31 ENCOUNTER — APPOINTMENT (EMERGENCY)
Dept: RADIOLOGY | Facility: HOSPITAL | Age: 82
End: 2022-10-31

## 2022-10-31 ENCOUNTER — HOSPITAL ENCOUNTER (INPATIENT)
Facility: HOSPITAL | Age: 82
LOS: 6 days | Discharge: NURSING, REHAB OR LONG-TERM CARE | End: 2022-11-06
Attending: EMERGENCY MEDICINE | Admitting: FAMILY MEDICINE

## 2022-10-31 DIAGNOSIS — S72.001A CLOSED FRACTURE OF RIGHT HIP, INITIAL ENCOUNTER (HCC): Primary | ICD-10-CM

## 2022-10-31 DIAGNOSIS — N17.9 AKI (ACUTE KIDNEY INJURY) (HCC): ICD-10-CM

## 2022-10-31 DIAGNOSIS — I25.810 CORONARY ARTERY DISEASE INVOLVING CORONARY BYPASS GRAFT OF NATIVE HEART WITHOUT ANGINA PECTORIS: ICD-10-CM

## 2022-10-31 DIAGNOSIS — S72.141A CLOSED 2-PART INTERTROCHANTERIC FRACTURE OF PROXIMAL END OF RIGHT FEMUR, INITIAL ENCOUNTER (HCC): ICD-10-CM

## 2022-10-31 DIAGNOSIS — I48.0 PAROXYSMAL ATRIAL FIBRILLATION (HCC): ICD-10-CM

## 2022-10-31 DIAGNOSIS — S72.001A CLOSED RIGHT HIP FRACTURE, INITIAL ENCOUNTER (HCC): ICD-10-CM

## 2022-10-31 DIAGNOSIS — D64.9 ACUTE ON CHRONIC ANEMIA: ICD-10-CM

## 2022-10-31 PROBLEM — I50.9 CHF (CONGESTIVE HEART FAILURE) (HCC): Status: ACTIVE | Noted: 2022-10-31

## 2022-10-31 LAB
ALBUMIN SERPL BCP-MCNC: 3.5 G/DL (ref 3.5–5)
ALP SERPL-CCNC: 125 U/L (ref 46–116)
ALT SERPL W P-5'-P-CCNC: 40 U/L (ref 12–78)
ANION GAP SERPL CALCULATED.3IONS-SCNC: 7 MMOL/L (ref 4–13)
AST SERPL W P-5'-P-CCNC: 22 U/L (ref 5–45)
BASOPHILS # BLD AUTO: 0.03 THOUSANDS/ÂΜL (ref 0–0.1)
BASOPHILS NFR BLD AUTO: 0 % (ref 0–1)
BILIRUB SERPL-MCNC: 0.94 MG/DL (ref 0.2–1)
BUN SERPL-MCNC: 37 MG/DL (ref 5–25)
CALCIUM SERPL-MCNC: 8.2 MG/DL (ref 8.3–10.1)
CARDIAC TROPONIN I PNL SERPL HS: 10 NG/L
CHLORIDE SERPL-SCNC: 103 MMOL/L (ref 96–108)
CO2 SERPL-SCNC: 28 MMOL/L (ref 21–32)
CREAT SERPL-MCNC: 2.22 MG/DL (ref 0.6–1.3)
EOSINOPHIL # BLD AUTO: 0.15 THOUSAND/ÂΜL (ref 0–0.61)
EOSINOPHIL NFR BLD AUTO: 2 % (ref 0–6)
ERYTHROCYTE [DISTWIDTH] IN BLOOD BY AUTOMATED COUNT: 15.9 % (ref 11.6–15.1)
GFR SERPL CREATININE-BSD FRML MDRD: 26 ML/MIN/1.73SQ M
GLUCOSE SERPL-MCNC: 129 MG/DL (ref 65–140)
HCT VFR BLD AUTO: 34.5 % (ref 36.5–49.3)
HGB BLD-MCNC: 11.1 G/DL (ref 12–17)
IMM GRANULOCYTES # BLD AUTO: 0.04 THOUSAND/UL (ref 0–0.2)
IMM GRANULOCYTES NFR BLD AUTO: 1 % (ref 0–2)
LYMPHOCYTES # BLD AUTO: 1.56 THOUSANDS/ÂΜL (ref 0.6–4.47)
LYMPHOCYTES NFR BLD AUTO: 21 % (ref 14–44)
MCH RBC QN AUTO: 32 PG (ref 26.8–34.3)
MCHC RBC AUTO-ENTMCNC: 32.2 G/DL (ref 31.4–37.4)
MCV RBC AUTO: 99 FL (ref 82–98)
MONOCYTES # BLD AUTO: 0.75 THOUSAND/ÂΜL (ref 0.17–1.22)
MONOCYTES NFR BLD AUTO: 10 % (ref 4–12)
NEUTROPHILS # BLD AUTO: 4.91 THOUSANDS/ÂΜL (ref 1.85–7.62)
NEUTS SEG NFR BLD AUTO: 66 % (ref 43–75)
NRBC BLD AUTO-RTO: 0 /100 WBCS
PLATELET # BLD AUTO: 146 THOUSANDS/UL (ref 149–390)
PMV BLD AUTO: 10.7 FL (ref 8.9–12.7)
POTASSIUM SERPL-SCNC: 4 MMOL/L (ref 3.5–5.3)
PROT SERPL-MCNC: 7.1 G/DL (ref 6.4–8.4)
RBC # BLD AUTO: 3.47 MILLION/UL (ref 3.88–5.62)
SODIUM SERPL-SCNC: 138 MMOL/L (ref 135–147)
WBC # BLD AUTO: 7.44 THOUSAND/UL (ref 4.31–10.16)

## 2022-10-31 RX ORDER — LIDOCAINE 50 MG/G
1 PATCH TOPICAL DAILY
Status: DISCONTINUED | OUTPATIENT
Start: 2022-11-01 | End: 2022-11-06 | Stop reason: HOSPADM

## 2022-10-31 RX ORDER — OXYCODONE HYDROCHLORIDE 5 MG/1
2.5 TABLET ORAL EVERY 4 HOURS PRN
Status: DISCONTINUED | OUTPATIENT
Start: 2022-10-31 | End: 2022-11-05

## 2022-10-31 RX ORDER — AMIODARONE HYDROCHLORIDE 200 MG/1
200 TABLET ORAL DAILY
Status: DISCONTINUED | OUTPATIENT
Start: 2022-11-01 | End: 2022-11-06 | Stop reason: HOSPADM

## 2022-10-31 RX ORDER — SPIRONOLACTONE 25 MG/1
25 TABLET ORAL DAILY
COMMUNITY
End: 2022-11-06

## 2022-10-31 RX ORDER — ATORVASTATIN CALCIUM 40 MG/1
40 TABLET, FILM COATED ORAL DAILY
Status: DISCONTINUED | OUTPATIENT
Start: 2022-11-01 | End: 2022-11-06 | Stop reason: HOSPADM

## 2022-10-31 RX ORDER — POLYETHYLENE GLYCOL 3350 17 G/17G
17 POWDER, FOR SOLUTION ORAL DAILY PRN
Status: DISCONTINUED | OUTPATIENT
Start: 2022-10-31 | End: 2022-11-06 | Stop reason: HOSPADM

## 2022-10-31 RX ORDER — ACETAMINOPHEN 325 MG/1
975 TABLET ORAL EVERY 8 HOURS SCHEDULED
Status: DISCONTINUED | OUTPATIENT
Start: 2022-10-31 | End: 2022-11-06 | Stop reason: HOSPADM

## 2022-10-31 RX ORDER — CALCIUM CARBONATE 200(500)MG
1000 TABLET,CHEWABLE ORAL DAILY PRN
Status: DISCONTINUED | OUTPATIENT
Start: 2022-10-31 | End: 2022-11-06 | Stop reason: HOSPADM

## 2022-10-31 RX ORDER — SPIRONOLACTONE 25 MG/1
25 TABLET ORAL DAILY
Status: DISCONTINUED | OUTPATIENT
Start: 2022-11-01 | End: 2022-11-01

## 2022-10-31 RX ORDER — OXYCODONE HYDROCHLORIDE 5 MG/1
5 TABLET ORAL EVERY 4 HOURS PRN
Status: DISCONTINUED | OUTPATIENT
Start: 2022-10-31 | End: 2022-11-06 | Stop reason: HOSPADM

## 2022-10-31 RX ORDER — AMOXICILLIN 250 MG
1 CAPSULE ORAL 2 TIMES DAILY
Status: DISCONTINUED | OUTPATIENT
Start: 2022-10-31 | End: 2022-11-06 | Stop reason: HOSPADM

## 2022-10-31 RX ORDER — ACETAMINOPHEN 325 MG/1
650 TABLET ORAL EVERY 6 HOURS PRN
Status: CANCELLED | OUTPATIENT
Start: 2022-10-31

## 2022-10-31 RX ORDER — FENTANYL CITRATE 50 UG/ML
50 INJECTION, SOLUTION INTRAMUSCULAR; INTRAVENOUS ONCE
Status: COMPLETED | OUTPATIENT
Start: 2022-10-31 | End: 2022-10-31

## 2022-10-31 RX ORDER — METOPROLOL TARTRATE 50 MG/1
100 TABLET, FILM COATED ORAL EVERY 12 HOURS SCHEDULED
Status: DISCONTINUED | OUTPATIENT
Start: 2022-10-31 | End: 2022-11-06 | Stop reason: HOSPADM

## 2022-10-31 RX ORDER — FUROSEMIDE 20 MG/1
20 TABLET ORAL 2 TIMES DAILY
COMMUNITY

## 2022-10-31 RX ORDER — HYDROMORPHONE HCL IN WATER/PF 6 MG/30 ML
0.2 PATIENT CONTROLLED ANALGESIA SYRINGE INTRAVENOUS EVERY 4 HOURS PRN
Status: DISCONTINUED | OUTPATIENT
Start: 2022-10-31 | End: 2022-11-05

## 2022-10-31 RX ORDER — ONDANSETRON 2 MG/ML
4 INJECTION INTRAMUSCULAR; INTRAVENOUS EVERY 6 HOURS PRN
Status: DISCONTINUED | OUTPATIENT
Start: 2022-10-31 | End: 2022-11-06 | Stop reason: HOSPADM

## 2022-10-31 RX ORDER — TAMSULOSIN HYDROCHLORIDE 0.4 MG/1
0.4 CAPSULE ORAL
Status: DISCONTINUED | OUTPATIENT
Start: 2022-10-31 | End: 2022-11-06 | Stop reason: HOSPADM

## 2022-10-31 RX ORDER — CLOPIDOGREL BISULFATE 75 MG/1
75 TABLET ORAL DAILY
Status: DISCONTINUED | OUTPATIENT
Start: 2022-11-01 | End: 2022-10-31

## 2022-10-31 RX ORDER — ALBUTEROL SULFATE 90 UG/1
2 AEROSOL, METERED RESPIRATORY (INHALATION) EVERY 6 HOURS PRN
Status: DISCONTINUED | OUTPATIENT
Start: 2022-10-31 | End: 2022-11-06 | Stop reason: HOSPADM

## 2022-10-31 RX ORDER — TAMSULOSIN HYDROCHLORIDE 0.4 MG/1
0.4 CAPSULE ORAL
COMMUNITY
End: 2022-11-06

## 2022-10-31 RX ORDER — FUROSEMIDE 20 MG/1
20 TABLET ORAL DAILY
Status: DISCONTINUED | OUTPATIENT
Start: 2022-11-01 | End: 2022-11-01

## 2022-10-31 RX ADMIN — ACETAMINOPHEN 975 MG: 325 TABLET ORAL at 16:47

## 2022-10-31 RX ADMIN — FENTANYL CITRATE 50 MCG: 50 INJECTION INTRAMUSCULAR; INTRAVENOUS at 13:43

## 2022-10-31 RX ADMIN — FENTANYL CITRATE 50 MCG: 50 INJECTION INTRAMUSCULAR; INTRAVENOUS at 15:23

## 2022-10-31 RX ADMIN — SENNOSIDES, DOCUSATE SODIUM 1 TABLET: 8.6; 5 TABLET ORAL at 16:47

## 2022-10-31 RX ADMIN — TAMSULOSIN HYDROCHLORIDE 0.4 MG: 0.4 CAPSULE ORAL at 16:47

## 2022-10-31 RX ADMIN — ACETAMINOPHEN 975 MG: 325 TABLET ORAL at 21:53

## 2022-10-31 RX ADMIN — METOPROLOL TARTRATE 100 MG: 50 TABLET, FILM COATED ORAL at 21:53

## 2022-10-31 RX ADMIN — OXYCODONE HYDROCHLORIDE 5 MG: 5 TABLET ORAL at 22:15

## 2022-10-31 RX ADMIN — HYDROMORPHONE HYDROCHLORIDE 0.2 MG: 0.2 INJECTION, SOLUTION INTRAMUSCULAR; INTRAVENOUS; SUBCUTANEOUS at 21:02

## 2022-10-31 RX ADMIN — OXYCODONE HYDROCHLORIDE 5 MG: 5 TABLET ORAL at 17:35

## 2022-10-31 NOTE — ASSESSMENT & PLAN NOTE
Background: Presented to the ED status post mechanical fall leaving house  Reports missing a step in ending up falling onto port    • XR confirms acute right hip fracture  • Anticoagulation on hold; to resume postoperatively  • Type and screen ordered   • Hgb 11 1 on admission; monitor closely and transfuse PRN  • Pain management with geriatric pain regimen  • Early mobilization when cleared   • Incentive spirometry   • Orthopedic surgery consulted; input appreciated   o Likely for surgery on 11/01 in the evening  o NPO at midnight  • PT/OT consulted; post operative evaluations needed for likely rehab placement   o Case management on board to assist with dispo  • Please see attending attestation for preoperative risk stratification/clearance

## 2022-10-31 NOTE — H&P
[address]  H&P- Jose Sales 1940, 80 y o  male MRN: [id number]  Unit/Bed#: ED 08 Encounter: [id number]  Primary Care Provider: Saadia Burgess DO   Date and time admitted to hospital: 10/31/2022 12:35 PM    * Closed right hip fracture, initial encounter [facility])  Assessment & Plan  Background: Presented to the ED status post mechanical fall leaving house  Reports missing a step in ending up falling onto port    • XR confirms acute right hip fracture  • Anticoagulation on hold; to resume postoperatively  • Type and screen ordered   • Hgb 11 1 on admission; monitor closely and transfuse PRN  • Pain management with geriatric pain regimen  • Early mobilization when cleared   • Incentive spirometry   • Orthopedic surgery consulted; input appreciated   o Likely for surgery on 11/01 in the evening  o NPO at midnight  • PT/OT consulted; post operative evaluations needed for likely rehab placement   o Case management on board to assist with dispo  • Please see attending attestation for preoperative risk stratification/clearance       Paroxysmal atrial fibrillation ([facility] [address] )  Assessment & Plan  · Anticoagulated with Eliquis; on hold preoperatively; resume when cleared by orthopedic surgery  · Rate/rhythm controlled on amiodarone and toprol; continue while inpatient  · Ongoing monitoring with routine vitals and maria luisa     CHF (congestive heart failure) ([facility] [address] )  Assessment & Plan  Wt Readings from Last 3 Encounters:   10/31/22 70 7 kg (155 lb 13 8 oz)   06/04/22 64 7 kg (142 lb 10 2 oz)   03/22/22 63 5 kg (140 lb)     · Euvolemic on admission  · Continue spironolactone and Lasix per PTA regimen  · Daily weight  · Intake and output  · Fluid restricted/sodium restricted/low-cholesterol diet  · Monitor volume status during hospitalization    Stage 3a chronic kidney disease [facility])  Assessment & Plan  Lab Results   Component Value Date    EGFR 26 10/31/2022    EGFR 34 03/04/2022    EGFR 24 03/03/2022 CREATININE 2 22 (H) 10/31/2022    CREATININE 1 81 (H) 03/04/2022    CREATININE 2 36 (H) 03/03/2022     · Baseline appears to be approximately 1 9-2 2 on review of records  · On admission does not qualify for OJEL criteria  · Avoid nephrotoxins, relative hypotension, and NSAIDs if possible  · Renally dose medications as appropriate  · Monitor with morning labs    Hyperlipidemia, mixed  Assessment & Plan  · Continue statin therapy    Coronary artery disease involving coronary bypass graft  Assessment & Plan  · With past medical history of quadruple bypass and valve replacement in 2013 or 2014 per wife  · Continue Plavix per PTA medication regimen  · Currently without any shortness of breath or chest pain      VTE Prophylaxis: On hold preoperatively  / sequential compression device   Code Status:  Full code  Discussion with family:  Wife present at the bedside; POA    Anticipated Length of Stay:  Patient will be admitted on an Inpatient basis with an anticipated length of stay of  > 2 midnights  Justification for Hospital Stay:  Orthopedic surgery intervention    Total Time for Visit, including Counseling / Coordination of Care: 45 minutes  Greater than 50% of this total time spent on direct patient counseling and coordination of care  Past Medical History:    Past Medical History:   Diagnosis Date   • CHF (congestive heart failure) ([facility] [address] )    • Coronary artery disease    • Hyperlipidemia    • Hypertension    • Paroxysmal atrial fibrillation [facility])        Chief Complaint:   Hip Injury (Pt reports walking outside his house and tripped over a step coming out of his front door, landing on butt, injuring R hip, pt unable to stand/walk, EMS called and found pt sitting on ground   -LOC - headstrike)      History of Present Illness:    Ronna Pompa is a 80 y o  male with past medical history as noted above who presents with Hip Injury (Pt reports walking outside his house and tripped over a step coming out of his front door, landing on butt, injuring R hip, pt unable to stand/walk, EMS called and found pt sitting on ground  -LOC - headstrike)      Patient presents to the ED status post fall coming out of his house missing a step and falling onto the porch with resultant hip injury and pain  X-ray on admission in the emergency department confirming acute fracture  Orthopedic surgery requesting admission for intervention on 11/01 in the evening following holding anticoagulation preoperatively  Will eventually need therapy evaluations postoperatively  See rest of plan as noted above    Review of Systems:    Review of Systems   Constitutional: Negative for fatigue and fever  Respiratory: Negative for shortness of breath  Cardiovascular: Negative for chest pain and palpitations  Gastrointestinal: Negative for abdominal pain, anal bleeding, constipation, diarrhea and vomiting  Genitourinary: Negative for difficulty urinating  Musculoskeletal: Positive for gait problem  Neurological: Negative for dizziness, syncope and headaches  Past Surgical History:     Past Medical History:   Diagnosis Date   • CHF (congestive heart failure) ([facility] [address] )    • Coronary artery disease    • Hyperlipidemia    • Hypertension    • Paroxysmal atrial fibrillation [facility])        Past Surgical History:   Procedure Laterality Date   • CARDIAC SURGERY      cabg x 2 2014 [facility]   • CORONARY ANGIOPLASTY WITH STENT PLACEMENT  10/2021    DAPHNEY to left main and ramus   • MITRAL VALVE REPAIR  2014    mitral ring       Meds/Allergies:    Prior to Admission medications    Medication Sig Start Date End Date Taking?  Authorizing Provider   acetaminophen (TYLENOL) 325 mg tablet Take 2 tablets (650 mg total) by mouth every 6 (six) hours as needed for mild pain 3/5/22   Mala Iqbal MD   albuterol (ProAir HFA) 90 mcg/act inhaler Inhale 2 puffs every 6 (six) hours as needed for wheezing 3/5/22   Mala Iqbal MD   amiodarone 200 mg tablet Take 200 mg by mouth daily    Historical Provider, MD   amLODIPine (NORVASC) 5 mg tablet Take by mouth    Historical Provider, MD   apixaban (Eliquis) 2 5 mg Take 2 5 mg by mouth 2 (two) times a day   10/25/21   Historical Provider, MD   atorvastatin (LIPITOR) 40 mg tablet Take 40 mg by mouth daily    Historical Provider, MD   clopidogrel (PLAVIX) 75 mg tablet Take 75 mg by mouth daily    Historical Provider, MD   furosemide (LASIX) 40 mg tablet Take 1 tablet (40 mg total) by mouth daily 3/6/22 6/4/22  Cody Zelaya MD   isosorbide mononitrate (IMDUR) 30 mg 24 hr tablet  10/25/21   Historical Provider, MD   metoprolol succinate (TOPROL-XL) 200 MG 24 hr tablet Take 1 tablet (200 mg total) by mouth daily 3/6/22 6/4/22  Cody Zelaya MD   metoprolol tartrate (LOPRESSOR) 100 mg tablet Take by mouth    Historical Provider, MD   nitroglycerin (Nitrostat) 0 4 mg SL tablet  10/28/21   Historical Provider, MD   spironolactone (ALDACTONE) 25 mg tablet Take 1 tablet (25 mg total) by mouth daily 3/6/22 6/4/22  Cody Zelaya MD   tamsulosin (FLOMAX) 0 4 mg Take 1 capsule (0 4 mg total) by mouth daily with dinner 3/5/22 6/3/22  oCdy Zelaya MD     I have reviewed home medications with patient family member      Allergies: No Known Allergies    Social History:     Marital Status: /Civil Union   Occupation: retired   Patient Pre-hospital Living Situation: home with wife   Patient Pre-hospital Level of Mobility: cane at baseline; walker in the house   Patient Pre-hospital Diet Restrictions: low sodium   Substance Use History:   Social History     Substance and Sexual Activity   Alcohol Use Not Currently     Social History     Tobacco Use   Smoking Status Never Smoker   Smokeless Tobacco Never Used     Social History     Substance and Sexual Activity   Drug Use Never       Family History:    Family History   Problem Relation Age of Onset   • Hypertension Father        Physical Exam:     Vitals:   Blood Pressure: 143/65 (10/31/22 1600)  Pulse: 56 (10/31/22 1600)  Temperature: (!) 96 6 °F (35 9 °C) (10/31/22 1242)  Temp Source: Temporal (10/31/22 1242)  Respirations: 18 (10/31/22 1600)  Height: 5' 9" (175 3 cm) (10/31/22 1242)  Weight - Scale: 70 7 kg (155 lb 13 8 oz) (10/31/22 1242)  SpO2: 96 % (10/31/22 1600)    Physical Exam  Vitals and nursing note reviewed  Constitutional:       Appearance: He is well-developed and well-groomed  He is not ill-appearing  HENT:      Head: Normocephalic and atraumatic  Eyes:      Conjunctiva/sclera: Conjunctivae normal    Cardiovascular:      Rate and Rhythm: Regular rhythm  Bradycardia present  Pulses: Normal pulses  Heart sounds: Murmur heard  Pulmonary:      Effort: Pulmonary effort is normal  No respiratory distress  Breath sounds: Normal breath sounds  Abdominal:      Palpations: Abdomen is soft  Tenderness: There is no abdominal tenderness  Musculoskeletal:         General: Tenderness and signs of injury present  Cervical back: Neck supple  Skin:     General: Skin is warm and dry  Capillary Refill: Capillary refill takes less than 2 seconds  Neurological:      Mental Status: He is alert and oriented to person, place, and time  Mental status is at baseline  Psychiatric:         Behavior: Behavior normal  Behavior is cooperative  Additional Data:     Lab Results: I have personally reviewed pertinent reports        Results from last 7 days   Lab Units 10/31/22  1304   WBC Thousand/uL 7 44   HEMOGLOBIN g/dL 11 1*   HEMATOCRIT % 34 5*   PLATELETS Thousands/uL 146*   NEUTROS PCT % 66   LYMPHS PCT % 21   MONOS PCT % 10   EOS PCT % 2     Results from last 7 days   Lab Units 10/31/22  1304   SODIUM mmol/L 138   POTASSIUM mmol/L 4 0   CHLORIDE mmol/L 103   CO2 mmol/L 28   BUN mg/dL 37*   CREATININE mg/dL 2 22*   ANION GAP mmol/L 7   CALCIUM mg/dL 8 2*   ALBUMIN g/dL 3 5   TOTAL BILIRUBIN mg/dL 0 94   ALK PHOS U/L 125*   ALT U/L 40   AST U/L 22 GLUCOSE RANDOM mg/dL 129                       Imaging: I have personally reviewed pertinent reports  XR hip/pelv 2-3 vws right if performed   Final Result by Laura Hensley MD (10/31 3664)      Right hip fracture  The study was marked in [facility] for immediate notification  Workstation performed: [id number]             EKG, Pathology, and Other Studies Reviewed on Admission:   · EKG:  Bradycardia sinus with rates approximately 57  · Outpatient documentation reviewed when available     Allscripts / Epic Records Reviewed: Yes     ** Please Note: This note has been constructed using a voice recognition system   **

## 2022-10-31 NOTE — CONSULTS
Consultation - Orthopedics   Bran Velasquez 80 y o  male MRN: [id number]  Unit/Bed#: ED 08 Encounter: [id number]      Assessment/Plan     Assessment:  Displaced intertrochanteric fracture proximal right femur; ambulatory dysfunction; atrial fibrillation, coronary artery disease, CHF, chronic kidney disease  Plan:  The risks, benefits, options and alternatives of treatment were discussed, including but not limited to: Infection, blood loss, DVT/PE, poor wound healing, anesthetic risks, loss of motion, nerve/blood vessel damage, poor fracture healing, fixation failure, malunion and need for additional surgery  After a thorough discussion, the plan will be to proceed with fixation of his right hip  However, the medical attending feels it would be best to postpone surgery at least until 11/01/2022 as the patient took his dose of Eliquis this morning  The patient will be NPO after midnight  No anticoagulation after midnight  History of Present Illness   Physician Requesting Consult: Tato Wagner MD  Reason for Consult / Principal Problem:  Right hip fracture  HPI: Bran Velasquez is a 80y o  year old male who presents with fall at home at approximately noon today  He states he was going outside the house when he some both at the door frame, falling onto the right lower extremity  He was taken by ambulance to the emergency room at [facility] where he was evaluated, x-rays obtained and he was subsequently admitted to the medical service with orthopedic consultation  The patient is seen by myself  He currently lives with his wife in their own home  He ambulates in the home with a walker and uses a cane outside the home  He does continue to drive and is generally an active individual   He denies any prodromal pain  He denies head trauma or loss of consciousness  He denies chest pain, shortness of breath, lightheadedness or dizziness  He denies extremity paresthesias      Consults    Review of Systems:  The patient is 10 organ system review is negative excluding as in the history of chief complaint above and as consistent with his medical history  Historical Information   Past Medical History:   Diagnosis Date   • CHF (congestive heart failure) ([facility] [address] )    • Coronary artery disease    • Hyperlipidemia    • Hypertension    • Paroxysmal atrial fibrillation (HCC)      Past Surgical History:   Procedure Laterality Date   • CARDIAC SURGERY      cabg x 2 2014 [address] [facility]   • CORONARY ANGIOPLASTY WITH STENT PLACEMENT  10/2021    DAPHNEY to left main and ramus   • MITRAL VALVE REPAIR  2014    mitral ring     Social History   Social History     Substance and Sexual Activity   Alcohol Use Not Currently     Social History     Substance and Sexual Activity   Drug Use Never     E-Cigarette/Vaping   • E-Cigarette Use Never User      E-Cigarette/Vaping Substances   • Nicotine No    • THC No    • CBD No    • Flavoring No    • Other No    • Unknown No      Social History     Tobacco Use   Smoking Status Never Smoker   Smokeless Tobacco Never Used     Family History: non-contributory    Meds/Allergies   all current active meds have been reviewed  No Known Allergies    Objective   Vitals: Blood pressure 143/65, pulse 56, temperature (!) 96 6 °F (35 9 °C), temperature source Temporal, resp  rate 18, height 5' 9" (1 753 m), weight 70 7 kg (155 lb 13 8 oz), SpO2 96 %  ,Body mass index is 23 02 kg/m²  No intake or output data in the 24 hours ending 10/31/22 1648  No intake/output data recorded  Invasive Devices  Report    Peripheral Intravenous Line  Duration           Peripheral IV 10/31/22 Left Antecubital <1 day                Physical Exam :  Eliel Jung is alert, oriented and seems to be in mild distress  HEENT exam is benign  Heart regular, pulses palpable  lungs clear  Abdomen soft and nontender  Skin with out lacerations or abrasions to the right hip area    He does indicate an injury to his left hand which is currently dressed and the dressings were not removed  Motor and sensory exams are grossly intact except as limited by his injury  Clavicles, ribs and cervical and upper thoracic spine nontender; pelvis nontender  Ortho Exam:  The right lower extremity is abducted, externally rotated and shortened  He does have tenderness to palpation of the right hip  Attempts at right hip range of motion were avoided due to the patient's injury  He had no tenderness to palpation of the mid shaft of the femur distally  The knee, lower leg and ankle and foot were nontender  He is able to actively move the ankle and foot without difficulty  Knee range of motion to any degree triggered complaints of hip pain  The left lower extremity examination is grossly benign although hip and knee range of motion on the left lower extremity did trigger right hip pain  The bilateral upper extremity examination is benign  Lab Results:   CBC:   Lab Results   Component Value Date    WBC 7 44 10/31/2022    HGB 11 1 (L) 10/31/2022    HCT 34 5 (L) 10/31/2022    MCV 99 (H) 10/31/2022     (L) 10/31/2022    MCH 32 0 10/31/2022    MCHC 32 2 10/31/2022    RDW 15 9 (H) 10/31/2022    MPV 10 7 10/31/2022    NRBC 0 10/31/2022     CMP:   Lab Results   Component Value Date    SODIUM 138 10/31/2022     10/31/2022    CO2 28 10/31/2022    BUN 37 (H) 10/31/2022    CREATININE 2 22 (H) 10/31/2022    CALCIUM 8 2 (L) 10/31/2022    AST 22 10/31/2022    ALT 40 10/31/2022    ALKPHOS 125 (H) 10/31/2022    EGFR 26 10/31/2022     PT/INR: No results found for: PT, INR  Imaging Studies: X-rays demonstrate an intertrochanteric fracture of the proximal right femur  EKG, Pathology, and Other Studies: I have personally reviewed pertinent reports

## 2022-10-31 NOTE — ASSESSMENT & PLAN NOTE
Wt Readings from Last 3 Encounters:   10/31/22 70 7 kg (155 lb 13 8 oz)   06/04/22 64 7 kg (142 lb 10 2 oz)   03/22/22 63 5 kg (140 lb)     · Euvolemic on admission  · Continue spironolactone and Lasix per PTA regimen  · Daily weight  · Intake and output  · Fluid restricted/sodium restricted/low-cholesterol diet  · Monitor volume status during hospitalization

## 2022-10-31 NOTE — H&P (VIEW-ONLY)
Consultation - Orthopedics   Ben Goldberg 80 y o  male MRN: [id number]  Unit/Bed#: ED 08 Encounter: [id number]      Assessment/Plan     Assessment:  Displaced intertrochanteric fracture proximal right femur; ambulatory dysfunction; atrial fibrillation, coronary artery disease, CHF, chronic kidney disease  Plan:  The risks, benefits, options and alternatives of treatment were discussed, including but not limited to: Infection, blood loss, DVT/PE, poor wound healing, anesthetic risks, loss of motion, nerve/blood vessel damage, poor fracture healing, fixation failure, malunion and need for additional surgery  After a thorough discussion, the plan will be to proceed with fixation of his right hip  However, the medical attending feels it would be best to postpone surgery at least until 11/01/2022 as the patient took his dose of Eliquis this morning  The patient will be NPO after midnight  No anticoagulation after midnight  History of Present Illness   Physician Requesting Consult: Shashank Kunz MD  Reason for Consult / Principal Problem:  Right hip fracture  HPI: Ben Goldberg is a 80y o  year old male who presents with fall at home at approximately noon today  He states he was going outside the house when he some both at the door frame, falling onto the right lower extremity  He was taken by ambulance to the emergency room at [facility] where he was evaluated, x-rays obtained and he was subsequently admitted to the medical service with orthopedic consultation  The patient is seen by myself  He currently lives with his wife in their own home  He ambulates in the home with a walker and uses a cane outside the home  He does continue to drive and is generally an active individual   He denies any prodromal pain  He denies head trauma or loss of consciousness  He denies chest pain, shortness of breath, lightheadedness or dizziness  He denies extremity paresthesias      Consults    Review of Systems:  The patient is 10 organ system review is negative excluding as in the history of chief complaint above and as consistent with his medical history  Historical Information   Past Medical History:   Diagnosis Date   • CHF (congestive heart failure) ([facility] [address] )    • Coronary artery disease    • Hyperlipidemia    • Hypertension    • Paroxysmal atrial fibrillation (HCC)      Past Surgical History:   Procedure Laterality Date   • CARDIAC SURGERY      cabg x 2 2014 [address] [facility]   • CORONARY ANGIOPLASTY WITH STENT PLACEMENT  10/2021    DAPHNEY to left main and ramus   • MITRAL VALVE REPAIR  2014    mitral ring     Social History   Social History     Substance and Sexual Activity   Alcohol Use Not Currently     Social History     Substance and Sexual Activity   Drug Use Never     E-Cigarette/Vaping   • E-Cigarette Use Never User      E-Cigarette/Vaping Substances   • Nicotine No    • THC No    • CBD No    • Flavoring No    • Other No    • Unknown No      Social History     Tobacco Use   Smoking Status Never Smoker   Smokeless Tobacco Never Used     Family History: non-contributory    Meds/Allergies   all current active meds have been reviewed  No Known Allergies    Objective   Vitals: Blood pressure 143/65, pulse 56, temperature (!) 96 6 °F (35 9 °C), temperature source Temporal, resp  rate 18, height 5' 9" (1 753 m), weight 70 7 kg (155 lb 13 8 oz), SpO2 96 %  ,Body mass index is 23 02 kg/m²  No intake or output data in the 24 hours ending 10/31/22 1648  No intake/output data recorded  Invasive Devices  Report    Peripheral Intravenous Line  Duration           Peripheral IV 10/31/22 Left Antecubital <1 day                Physical Exam :  Alex Giordano is alert, oriented and seems to be in mild distress  HEENT exam is benign  Heart regular, pulses palpable  lungs clear  Abdomen soft and nontender  Skin with out lacerations or abrasions to the right hip area    He does indicate an injury to his left hand which is currently dressed and the dressings were not removed  Motor and sensory exams are grossly intact except as limited by his injury  Clavicles, ribs and cervical and upper thoracic spine nontender; pelvis nontender  Ortho Exam:  The right lower extremity is abducted, externally rotated and shortened  He does have tenderness to palpation of the right hip  Attempts at right hip range of motion were avoided due to the patient's injury  He had no tenderness to palpation of the mid shaft of the femur distally  The knee, lower leg and ankle and foot were nontender  He is able to actively move the ankle and foot without difficulty  Knee range of motion to any degree triggered complaints of hip pain  The left lower extremity examination is grossly benign although hip and knee range of motion on the left lower extremity did trigger right hip pain  The bilateral upper extremity examination is benign  Lab Results:   CBC:   Lab Results   Component Value Date    WBC 7 44 10/31/2022    HGB 11 1 (L) 10/31/2022    HCT 34 5 (L) 10/31/2022    MCV 99 (H) 10/31/2022     (L) 10/31/2022    MCH 32 0 10/31/2022    MCHC 32 2 10/31/2022    RDW 15 9 (H) 10/31/2022    MPV 10 7 10/31/2022    NRBC 0 10/31/2022     CMP:   Lab Results   Component Value Date    SODIUM 138 10/31/2022     10/31/2022    CO2 28 10/31/2022    BUN 37 (H) 10/31/2022    CREATININE 2 22 (H) 10/31/2022    CALCIUM 8 2 (L) 10/31/2022    AST 22 10/31/2022    ALT 40 10/31/2022    ALKPHOS 125 (H) 10/31/2022    EGFR 26 10/31/2022     PT/INR: No results found for: PT, INR  Imaging Studies: X-rays demonstrate an intertrochanteric fracture of the proximal right femur  EKG, Pathology, and Other Studies: I have personally reviewed pertinent reports

## 2022-10-31 NOTE — ASSESSMENT & PLAN NOTE
· With past medical history of quadruple bypass and valve replacement in 2013 or 2014 per wife  · Continue Plavix per PTA medication regimen  · Currently without any shortness of breath or chest pain

## 2022-10-31 NOTE — ED PROVIDER NOTES
History  Chief Complaint   Patient presents with   • Hip Injury     Pt reports walking outside his house and tripped over a step coming out of his front door, landing on butt, injuring R hip, pt unable to stand/walk, EMS called and found pt sitting on ground  -LOC - headstrike     Patient tripped and fell injuring right hip  Complains of right hip pain since that time  Unable to ambulate or bear weight  Did not strike head  No other injuries or complaints  History provided by:  Patient   used: No    Hip Pain  Location:  Right hip  Quality:  Pain following fall  Severity:  Severe  Onset quality:  Sudden  Duration:  30 minutes  Timing:  Constant  Progression:  Unchanged  Chronicity:  New  Relieved by:  Nothing  Worsened by: Attempting to bear weight or palpation  Associated symptoms: no abdominal pain, no chest pain, no cough, no diarrhea, no ear pain, no fever, no headaches, no loss of consciousness, no myalgias, no nausea, no rash, no shortness of breath, no sore throat, no vomiting and no wheezing        Prior to Admission Medications   Prescriptions Last Dose Informant Patient Reported?  Taking?   acetaminophen (TYLENOL) 325 mg tablet   No No   Sig: Take 2 tablets (650 mg total) by mouth every 6 (six) hours as needed for mild pain   albuterol (ProAir HFA) 90 mcg/act inhaler   No No   Sig: Inhale 2 puffs every 6 (six) hours as needed for wheezing   amLODIPine (NORVASC) 5 mg tablet   Yes No   Sig: Take by mouth   amiodarone 200 mg tablet   Yes No   Sig: Take 200 mg by mouth daily   apixaban (Eliquis) 2 5 mg   Yes No   Sig: Take 2 5 mg by mouth 2 (two) times a day     atorvastatin (LIPITOR) 40 mg tablet   Yes No   Sig: Take 40 mg by mouth daily   clopidogrel (PLAVIX) 75 mg tablet   Yes No   Sig: Take 75 mg by mouth daily   furosemide (LASIX) 40 mg tablet   No No   Sig: Take 1 tablet (40 mg total) by mouth daily   isosorbide mononitrate (IMDUR) 30 mg 24 hr tablet   Yes No   metoprolol succinate (TOPROL-XL) 200 MG 24 hr tablet   No No   Sig: Take 1 tablet (200 mg total) by mouth daily   metoprolol tartrate (LOPRESSOR) 100 mg tablet   Yes No   Sig: Take by mouth   nitroglycerin (Nitrostat) 0 4 mg SL tablet   Yes No   spironolactone (ALDACTONE) 25 mg tablet   No No   Sig: Take 1 tablet (25 mg total) by mouth daily   tamsulosin (FLOMAX) 0 4 mg   No No   Sig: Take 1 capsule (0 4 mg total) by mouth daily with dinner      Facility-Administered Medications: None       Past Medical History:   Diagnosis Date   • CHF (congestive heart failure) ([facility] [address] )    • Coronary artery disease    • Hyperlipidemia    • Hypertension    • [facility]        Past Surgical History:   Procedure Laterality Date   • CARDIAC SURGERY      cabg x 2 2014 [facility]   • CORONARY ANGIOPLASTY WITH STENT PLACEMENT  10/2021    DAPHNEY to left main and ramus   • MITRAL VALVE REPAIR  2014    mitral ring       Family History   Problem Relation Age of Onset   • Hypertension Father      I have reviewed and agree with the history as documented  E-Cigarette/Vaping   • E-Cigarette Use Never User      E-Cigarette/Vaping Substances   • Nicotine No    • THC No    • CBD No    • Flavoring No    • Other No    • Unknown No      Social History     Tobacco Use   • Smoking status: Never Smoker   • Smokeless tobacco: Never Used   Vaping Use   • Vaping Use: Never used   Substance Use Topics   • Alcohol use: Not Currently   • Drug use: Never       Review of Systems   Constitutional: Negative for chills and fever  HENT: Negative for ear pain, hearing loss, sore throat, trouble swallowing and voice change  Eyes: Negative for pain and discharge  Respiratory: Negative for cough, shortness of breath and wheezing  Cardiovascular: Negative for chest pain and palpitations  Gastrointestinal: Negative for abdominal pain, blood in stool, constipation, diarrhea, nausea and vomiting     Genitourinary: Negative for dysuria, flank pain, frequency and hematuria  Musculoskeletal: Negative for joint swelling, myalgias, neck pain and neck stiffness  Skin: Negative for rash and wound  Neurological: Negative for dizziness, seizures, loss of consciousness, syncope, facial asymmetry and headaches  Psychiatric/Behavioral: Negative for hallucinations, self-injury and suicidal ideas  All other systems reviewed and are negative  Physical Exam  Physical Exam  Vitals and nursing note reviewed  Constitutional:       General: He is not in acute distress  Appearance: He is well-developed  HENT:      Head: Normocephalic and atraumatic  Right Ear: External ear normal       Left Ear: External ear normal    Eyes:      General: No scleral icterus  Right eye: No discharge  Left eye: No discharge  Extraocular Movements: Extraocular movements intact  Conjunctiva/sclera: Conjunctivae normal    Cardiovascular:      Rate and Rhythm: Normal rate and regular rhythm  Heart sounds: Normal heart sounds  No murmur heard  Pulmonary:      Effort: Pulmonary effort is normal       Breath sounds: Normal breath sounds  No wheezing or rales  Abdominal:      General: Bowel sounds are normal  There is no distension  Palpations: Abdomen is soft  Tenderness: There is no abdominal tenderness  There is no guarding or rebound  Musculoskeletal:         General: No deformity  Normal range of motion  Cervical back: Normal range of motion and neck supple  Comments: Right leg is shortened and rotated  There is tenderness in the area of the proximal femur  Distal neurovascular function is normal   Range of motion at the hip is greatly reduced by pain  Skin:     General: Skin is warm and dry  Findings: No rash  Neurological:      General: No focal deficit present  Mental Status: He is alert and oriented to person, place, and time  Cranial Nerves: No cranial nerve deficit     Psychiatric: Mood and Affect: Mood normal          Behavior: Behavior normal          Thought Content:  Thought content normal          Judgment: Judgment normal          Vital Signs  ED Triage Vitals [10/31/22 1242]   Temperature Pulse Respirations Blood Pressure SpO2   (!) 96 6 °F (35 9 °C) 63 20 (!) 181/78 99 %      Temp Source Heart Rate Source Patient Position - Orthostatic VS BP Location FiO2 (%)   Temporal Monitor Sitting Right arm --      Pain Score       10 - Worst Possible Pain           Vitals:    10/31/22 1345 10/31/22 1400 10/31/22 1445 10/31/22 1500   BP: 159/74 153/71 150/72 (!) 173/72   Pulse: 58 57 58 57   Patient Position - Orthostatic VS:             Visual Acuity  Visual Acuity    Flowsheet Row Most Recent Value   L Pupil Size (mm) 3   R Pupil Size (mm) 3          ED Medications  Medications   fentanyl citrate (PF) 100 MCG/2ML 50 mcg (50 mcg Intravenous Given 10/31/22 1343)   fentanyl citrate (PF) 100 MCG/2ML 50 mcg (50 mcg Intravenous Given 10/31/22 1523)       Diagnostic Studies  Results Reviewed     Procedure Component Value Units Date/Time    HS Troponin 0hr (reflex protocol) [[id number]]  (Normal) Collected: 10/31/22 1304    Lab Status: Final result Specimen: Blood from Arm, Left Updated: 10/31/22 1337     hs TnI 0hr 10 ng/L     Comprehensive metabolic panel [[id number]]  (Abnormal) Collected: 10/31/22 1304    Lab Status: Final result Specimen: Blood from Arm, Left Updated: 10/31/22 1331     Sodium 138 mmol/L      Potassium 4 0 mmol/L      Chloride 103 mmol/L      CO2 28 mmol/L      ANION GAP 7 mmol/L      BUN 37 mg/dL      Creatinine 2 22 mg/dL      Glucose 129 mg/dL      Calcium 8 2 mg/dL      AST 22 U/L      ALT 40 U/L      Alkaline Phosphatase 125 U/L      Total Protein 7 1 g/dL      Albumin 3 5 g/dL      Total Bilirubin 0 94 mg/dL      eGFR 26 ml/min/1 73sq m     Narrative:      Ollie guidelines for Chronic Kidney Disease (CKD):   •  Stage 1 with normal or high GFR (GFR > 90 mL/min/1 73 square meters)  •  Stage 2 Mild CKD (GFR = 60-89 mL/min/1 73 square meters)  •  Stage 3A Moderate CKD (GFR = 45-59 mL/min/1 73 square meters)  •  Stage 3B Moderate CKD (GFR = 30-44 mL/min/1 73 square meters)  •  Stage 4 Severe CKD (GFR = 15-29 mL/min/1 73 square meters)  •  Stage 5 End Stage CKD (GFR <15 mL/min/1 73 square meters)  Note: GFR calculation is accurate only with a steady state creatinine    CBC and differential [[id number]]  (Abnormal) Collected: 10/31/22 1304    Lab Status: Final result Specimen: Blood from Arm, Left Updated: 10/31/22 1313     WBC 7 44 Thousand/uL      RBC 3 47 Million/uL      Hemoglobin 11 1 g/dL      Hematocrit 34 5 %      MCV 99 fL      MCH 32 0 pg      MCHC 32 2 g/dL      RDW 15 9 %      MPV 10 7 fL      Platelets 288 Thousands/uL      nRBC 0 /100 WBCs      Neutrophils Relative 66 %      Immat GRANS % 1 %      Lymphocytes Relative 21 %      Monocytes Relative 10 %      Eosinophils Relative 2 %      Basophils Relative 0 %      Neutrophils Absolute 4 91 Thousands/µL      Immature Grans Absolute 0 04 Thousand/uL      Lymphocytes Absolute 1 56 Thousands/µL      Monocytes Absolute 0 75 Thousand/µL      Eosinophils Absolute 0 15 Thousand/µL      Basophils Absolute 0 03 Thousands/µL                  XR hip/pelv 2-3 vws right if performed   Final Result by Enoch Farnsworth MD (10/31 1304)      Right hip fracture  The study was marked in [facility] for immediate notification        Workstation performed: [id number]                    Procedures  ECG 12 Lead Documentation Only    Date/Time: 10/31/2022 1:52 PM  Performed by: Senthil Gregory MD  Authorized by: Senthil Gregory MD     ECG reviewed by me, the ED Provider: yes    Patient location:  ED  Previous ECG:     Previous ECG:  Unavailable  Interpretation:     Interpretation: non-specific    Rate:     ECG rate:  57    ECG rate assessment: bradycardic    Rhythm:     Rhythm: sinus bradycardia    Ectopy:     Ectopy: none QRS:     QRS axis:  Normal    QRS intervals:  Normal  Conduction:     Conduction: normal    ST segments:     ST segments:  Normal  T waves:     T waves: normal               ED Course  ED Course as of 10/31/22 1549   Mon Oct 31, 2022   1257 No trauma activation initiated as patient fell from a standing position and did not strike head  [address] contacted regarding possible admission, awaiting response   1418 Orthopedics reviewing case currently, attempting to determine when surgery can be performed and if patient can stay in this facility   1454 Ortho requests medical admission, will eval pt     1509 Medicine now stating patient should not have surgery until 7:00 a m  since he took Eliquis today at 7 am   Awaiting ortho response re: dispo                                               MDM  Number of Diagnoses or Management Options     Amount and/or Complexity of Data Reviewed  Clinical lab tests: ordered and reviewed  Tests in the radiology section of CPT®: ordered and reviewed  Decide to obtain previous medical records or to obtain history from someone other than the patient: yes  Review and summarize past medical records: yes  Independent visualization of images, tracings, or specimens: yes        Disposition  Final diagnoses:   Closed fracture of right hip, initial encounter [facility])     Time reflects when diagnosis was documented in both MDM as applicable and the Disposition within this note     Time User Action Codes Description Comment    10/31/2022  3:26 PM Pratik Coleman Add [S72 001A] Closed fracture of right hip, initial encounter [facility])       ED Disposition     ED Disposition   Admit    Condition   Stable    Date/Time   Mon Oct 31, 2022  3:49 PM    Comment              Follow-up Information    None         Patient's Medications   Discharge Prescriptions    No medications on file       No discharge procedures on file      PDMP Review       Value Time User    PDMP Reviewed  Yes 3/5/2022 11:42 AM Mala Iqbal MD          ED Provider  Electronically Signed by           Gwen Aleman MD  10/31/22 7747

## 2022-10-31 NOTE — ASSESSMENT & PLAN NOTE
· Anticoagulated with Eliquis; on hold preoperatively; resume when cleared by orthopedic surgery  · Rate/rhythm controlled on amiodarone and toprol; continue while inpatient  · Ongoing monitoring with routine vitals and maria luisa

## 2022-10-31 NOTE — ASSESSMENT & PLAN NOTE
Lab Results   Component Value Date    EGFR 26 10/31/2022    EGFR 34 03/04/2022    EGFR 24 03/03/2022    CREATININE 2 22 (H) 10/31/2022    CREATININE 1 81 (H) 03/04/2022    CREATININE 2 36 (H) 03/03/2022     · Baseline appears to be approximately 1 9-2 2 on review of records  · On admission does not qualify for JOEL criteria  · Avoid nephrotoxins, relative hypotension, and NSAIDs if possible  · Renally dose medications as appropriate  · Monitor with morning labs

## 2022-10-31 NOTE — PLAN OF CARE
Problem: MOBILITY - ADULT  Goal: Maintain or return to baseline ADL function  Description: INTERVENTIONS:  -  Assess patient's ability to carry out ADLs; assess patient's baseline for ADL function and identify physical deficits which impact ability to perform ADLs (bathing, care of mouth/teeth, toileting, grooming, dressing, etc )  - Assess/evaluate cause of self-care deficits   - Assess range of motion  - Assess patient's mobility; develop plan if impaired  - Assess patient's need for assistive devices and provide as appropriate  - Encourage maximum independence but intervene and supervise when necessary  - Involve family in performance of ADLs  - Assess for home care needs following discharge   - Consider OT consult to assist with ADL evaluation and planning for discharge  - Provide patient education as appropriate  Outcome: Progressing  Goal: Maintains/Returns to pre admission functional level  Description: INTERVENTIONS:  - Perform BMAT or MOVE assessment daily    - Set and communicate daily mobility goal to care team and patient/family/caregiver  - Collaborate with rehabilitation services on mobility goals if consulted  - Perform Range of Motion   times a day  - Reposition patient every   hours  - Dangle patient   times a day  - Stand patient   times a day  - Ambulate patient   times a day  - Out of bed to chair   times a day   - Out of bed for meals     times a day  - Out of bed for toileting  - Record patient progress and toleration of activity level   Outcome: Progressing     Problem: Potential for Falls  Goal: Patient will remain free of falls  Description: INTERVENTIONS:  - Educate patient/family on patient safety including physical limitations  - Instruct patient to call for assistance with activity   - Consult OT/PT to assist with strengthening/mobility   - Keep Call bell within reach  - Keep bed low and locked with side rails adjusted as appropriate  - Keep care items and personal belongings within reach  - Initiate and maintain comfort rounds  - Make Fall Risk Sign visible to staff  - Offer Toileting every   Hours, in advance of need  - Initiate/Maintain   alarm  - Obtain necessary fall risk management equipment:      - Apply yellow socks and bracelet for high fall risk patients  - Consider moving patient to room near nurses station  Outcome: Progressing     Problem: Prexisting or High Potential for Compromised Skin Integrity  Goal: Skin integrity is maintained or improved  Description: INTERVENTIONS:  - Identify patients at risk for skin breakdown  - Assess and monitor skin integrity  - Assess and monitor nutrition and hydration status  - Monitor labs   - Assess for incontinence   - Turn and reposition patient  - Assist with mobility/ambulation  - Relieve pressure over bony prominences  - Avoid friction and shearing  - Provide appropriate hygiene as needed including keeping skin clean and dry  - Evaluate need for skin moisturizer/barrier cream  - Collaborate with interdisciplinary team   - Patient/family teaching  - Consider wound care consult   Outcome: Progressing     Problem: SKIN/TISSUE INTEGRITY - ADULT  Goal: Skin Integrity remains intact(Skin Breakdown Prevention)  Description: Assess:  -Perform Chris assessment every    -Clean and moisturize skin every    -Inspect skin when repositioning, toileting, and assisting with ADLS  -Assess under medical devices such as   every    -Assess extremities for adequate circulation and sensation     Bed Management:  -Have minimal linens on bed & keep smooth, unwrinkled  -Change linens as needed when moist or perspiring  -Avoid sitting or lying in one position for more than   hours while in bed  -Keep HOB at  degrees     Toileting:  -Offer bedside commode  -Assess for incontinence every   -Use incontinent care products after each incontinent episode such as   Activity:  -Mobilize patient    times a day  -Encourage activity and walks on unit  -Encourage or provide ROM exercises   -Turn and reposition patient every   Hours  -Use appropriate equipment to lift or move patient in bed  -Instruct/ Assist with weight shifting every   when out of bed in chair  -Consider limitation of chair time   hour intervals    Skin Care:  -Avoid use of baby powder, tape, friction and shearing, hot water or constrictive clothing  -Relieve pressure over bony prominences using    -Do not massage red bony areas    Next Steps:  -Teach patient strategies to minimize risks such as     -Consider consults to  interdisciplinary teams such as     Outcome: Progressing  Goal: Incision(s), wounds(s) or drain site(s) healing without S/S of infection  Description: INTERVENTIONS  - Assess and document dressing, incision, wound bed, drain sites and surrounding tissue  - Provide patient and family education  - Perform skin care/dressing changes every     Outcome: Progressing     Problem: MUSCULOSKELETAL - ADULT  Goal: Maintain or return mobility to safest level of function  Description: INTERVENTIONS:  - Assess patient's ability to carry out ADLs; assess patient's baseline for ADL function and identify physical deficits which impact ability to perform ADLs (bathing, care of mouth/teeth, toileting, grooming, dressing, etc )  - Assess/evaluate cause of self-care deficits   - Assess range of motion  - Assess patient's mobility  - Assess patient's need for assistive devices and provide as appropriate  - Encourage maximum independence but intervene and supervise when necessary  - Involve family in performance of ADLs  - Assess for home care needs following discharge   - Consider OT consult to assist with ADL evaluation and planning for discharge  - Provide patient education as appropriate  Outcome: Progressing  Goal: Maintain proper alignment of affected body part  Description: INTERVENTIONS:  - Support, maintain and protect limb and body alignment  - Provide patient/ family with appropriate education  Outcome: Progressing     Problem: PAIN - ADULT  Goal: Verbalizes/displays adequate comfort level or baseline comfort level  Description: Interventions:  - Encourage patient to monitor pain and request assistance  - Assess pain using appropriate pain scale  - Administer analgesics based on type and severity of pain and evaluate response  - Implement non-pharmacological measures as appropriate and evaluate response  - Consider cultural and social influences on pain and pain management  - Notify physician/advanced practitioner if interventions unsuccessful or patient reports new pain  Outcome: Progressing     Problem: INFECTION - ADULT  Goal: Absence or prevention of progression during hospitalization  Description: INTERVENTIONS:  - Assess and monitor for signs and symptoms of infection  - Monitor lab/diagnostic results  - Monitor all insertion sites, i e  indwelling lines, tubes, and drains  - Monitor endotracheal if appropriate and nasal secretions for changes in amount and color  - [place] appropriate cooling/warming therapies per order  - Administer medications as ordered  - Instruct and encourage patient and family to use good hand hygiene technique  - Identify and instruct in appropriate isolation precautions for identified infection/condition  Outcome: Progressing  Goal: Absence of fever/infection during neutropenic period  Description: INTERVENTIONS:  - Monitor WBC    Outcome: Progressing     Problem: SAFETY ADULT  Goal: Maintain or return to baseline ADL function  Description: INTERVENTIONS:  -  Assess patient's ability to carry out ADLs; assess patient's baseline for ADL function and identify physical deficits which impact ability to perform ADLs (bathing, care of mouth/teeth, toileting, grooming, dressing, etc )  - Assess/evaluate cause of self-care deficits   - Assess range of motion  - Assess patient's mobility; develop plan if impaired  - Assess patient's need for assistive devices and provide as appropriate  - Encourage maximum independence but intervene and supervise when necessary  - Involve family in performance of ADLs  - Assess for home care needs following discharge   - Consider OT consult to assist with ADL evaluation and planning for discharge  - Provide patient education as appropriate  Outcome: Progressing  Goal: Maintains/Returns to pre admission functional level  Description: INTERVENTIONS:  - Perform BMAT or MOVE assessment daily    - Set and communicate daily mobility goal to care team and patient/family/caregiver  - Collaborate with rehabilitation services on mobility goals if consulted  - Perform Range of Motion   times a day  - Reposition patient every   hours  - Dangle patient   times a day  - Stand patient   times a day  - Ambulate patient   times a day  - Out of bed to chair   times a day   - Out of bed for meals     times a day  - Out of bed for toileting  - Record patient progress and toleration of activity level   Outcome: Progressing  Goal: Patient will remain free of falls  Description: INTERVENTIONS:  - Educate patient/family on patient safety including physical limitations  - Instruct patient to call for assistance with activity   - Consult OT/PT to assist with strengthening/mobility   - Keep Call bell within reach  - Keep bed low and locked with side rails adjusted as appropriate  - Keep care items and personal belongings within reach  - Initiate and maintain comfort rounds  - Make Fall Risk Sign visible to staff  - Offer Toileting every   Hours, in advance of need  - Initiate/Maintain   alarm  - Obtain necessary fall risk management equipment:      - Apply yellow socks and bracelet for high fall risk patients  - Consider moving patient to room near nurses station  Outcome: Progressing     Problem: DISCHARGE PLANNING  Goal: Discharge to home or other facility with appropriate resources  Description: INTERVENTIONS:  - Identify barriers to discharge w/patient and caregiver  - Arrange for needed discharge resources and transportation as appropriate  - Identify discharge learning needs (meds, wound care, etc )  - Arrange for interpretive services to assist at discharge as needed  - Refer to Case Management Department for coordinating discharge planning if the patient needs post-hospital services based on physician/advanced practitioner order or complex needs related to functional status, cognitive ability, or social support system  Outcome: Progressing     Problem: Knowledge Deficit  Goal: Patient/family/caregiver demonstrates understanding of disease process, treatment plan, medications, and discharge instructions  Description: Complete learning assessment and assess knowledge base    Interventions:  - Provide teaching at level of understanding  - Provide teaching via preferred learning methods  Outcome: Progressing

## 2022-11-01 ENCOUNTER — ANESTHESIA (INPATIENT)
Dept: PERIOP | Facility: HOSPITAL | Age: 82
DRG: 481 | End: 2022-11-01
Payer: MEDICARE

## 2022-11-01 ENCOUNTER — ANESTHESIA EVENT (INPATIENT)
Dept: PERIOP | Facility: HOSPITAL | Age: 82
DRG: 481 | End: 2022-11-01
Payer: MEDICARE

## 2022-11-01 ENCOUNTER — APPOINTMENT (INPATIENT)
Dept: RADIOLOGY | Facility: HOSPITAL | Age: 82
End: 2022-11-01

## 2022-11-01 LAB
ALBUMIN SERPL BCP-MCNC: 3.1 G/DL (ref 3.5–5)
ALP SERPL-CCNC: 100 U/L (ref 46–116)
ALT SERPL W P-5'-P-CCNC: 34 U/L (ref 12–78)
ANION GAP SERPL CALCULATED.3IONS-SCNC: 7 MMOL/L (ref 4–13)
AST SERPL W P-5'-P-CCNC: 18 U/L (ref 5–45)
ATRIAL RATE: 57 BPM
BASOPHILS # BLD AUTO: 0.01 THOUSANDS/ÂΜL (ref 0–0.1)
BASOPHILS NFR BLD AUTO: 0 % (ref 0–1)
BILIRUB SERPL-MCNC: 1.38 MG/DL (ref 0.2–1)
BUN SERPL-MCNC: 35 MG/DL (ref 5–25)
CALCIUM ALBUM COR SERPL-MCNC: 8.6 MG/DL (ref 8.3–10.1)
CALCIUM SERPL-MCNC: 7.9 MG/DL (ref 8.3–10.1)
CHLORIDE SERPL-SCNC: 102 MMOL/L (ref 96–108)
CO2 SERPL-SCNC: 27 MMOL/L (ref 21–32)
CREAT SERPL-MCNC: 1.94 MG/DL (ref 0.6–1.3)
EOSINOPHIL # BLD AUTO: 0.07 THOUSAND/ÂΜL (ref 0–0.61)
EOSINOPHIL NFR BLD AUTO: 1 % (ref 0–6)
ERYTHROCYTE [DISTWIDTH] IN BLOOD BY AUTOMATED COUNT: 16 % (ref 11.6–15.1)
GFR SERPL CREATININE-BSD FRML MDRD: 31 ML/MIN/1.73SQ M
GLUCOSE SERPL-MCNC: 105 MG/DL (ref 65–140)
HCT VFR BLD AUTO: 30.8 % (ref 36.5–49.3)
HGB BLD-MCNC: 10.1 G/DL (ref 12–17)
IMM GRANULOCYTES # BLD AUTO: 0.04 THOUSAND/UL (ref 0–0.2)
IMM GRANULOCYTES NFR BLD AUTO: 0 % (ref 0–2)
INR PPP: 1.31 (ref 0.84–1.19)
LYMPHOCYTES # BLD AUTO: 1.13 THOUSANDS/ÂΜL (ref 0.6–4.47)
LYMPHOCYTES NFR BLD AUTO: 12 % (ref 14–44)
MAGNESIUM SERPL-MCNC: 2 MG/DL (ref 1.6–2.6)
MCH RBC QN AUTO: 32.4 PG (ref 26.8–34.3)
MCHC RBC AUTO-ENTMCNC: 32.8 G/DL (ref 31.4–37.4)
MCV RBC AUTO: 99 FL (ref 82–98)
MONOCYTES # BLD AUTO: 1.12 THOUSAND/ÂΜL (ref 0.17–1.22)
MONOCYTES NFR BLD AUTO: 12 % (ref 4–12)
NEUTROPHILS # BLD AUTO: 6.75 THOUSANDS/ÂΜL (ref 1.85–7.62)
NEUTS SEG NFR BLD AUTO: 75 % (ref 43–75)
NRBC BLD AUTO-RTO: 0 /100 WBCS
P AXIS: 77 DEGREES
PHOSPHATE SERPL-MCNC: 4.2 MG/DL (ref 2.3–4.1)
PLATELET # BLD AUTO: 127 THOUSANDS/UL (ref 149–390)
PMV BLD AUTO: 10.5 FL (ref 8.9–12.7)
POTASSIUM SERPL-SCNC: 4.3 MMOL/L (ref 3.5–5.3)
PR INTERVAL: 278 MS
PROT SERPL-MCNC: 6.4 G/DL (ref 6.4–8.4)
PROTHROMBIN TIME: 16.4 SECONDS (ref 11.6–14.5)
QRS AXIS: 33 DEGREES
QRSD INTERVAL: 108 MS
QT INTERVAL: 474 MS
QTC INTERVAL: 461 MS
RBC # BLD AUTO: 3.12 MILLION/UL (ref 3.88–5.62)
SODIUM SERPL-SCNC: 136 MMOL/L (ref 135–147)
T WAVE AXIS: 63 DEGREES
VENTRICULAR RATE: 57 BPM
WBC # BLD AUTO: 9.12 THOUSAND/UL (ref 4.31–10.16)

## 2022-11-01 PROCEDURE — 0QH606Z INSERTION OF INTRAMEDULLARY INTERNAL FIXATION DEVICE INTO RIGHT UPPER FEMUR, OPEN APPROACH: ICD-10-PCS | Performed by: ORTHOPAEDIC SURGERY

## 2022-11-01 DEVICE — 5.0MM TI LOCKING SCREW W/T25 STARDRIVE 40MM F/IM NAIL-STER: Type: IMPLANTABLE DEVICE | Site: FEMUR | Status: FUNCTIONAL

## 2022-11-01 DEVICE — TFNA FENESTRATED HELICAL BLADE 105MM - STERILE
Type: IMPLANTABLE DEVICE | Site: FEMUR | Status: FUNCTIONAL
Brand: TFN-ADVANCE

## 2022-11-01 DEVICE — 10MM/130 DEG TI CANN TFNA 235MM/RIGHT - STERILE
Type: IMPLANTABLE DEVICE | Site: FEMUR | Status: FUNCTIONAL
Brand: TFN-ADVANCE

## 2022-11-01 RX ORDER — DEXAMETHASONE SODIUM PHOSPHATE 10 MG/ML
INJECTION, SOLUTION INTRAMUSCULAR; INTRAVENOUS AS NEEDED
Status: DISCONTINUED | OUTPATIENT
Start: 2022-11-01 | End: 2022-11-01

## 2022-11-01 RX ORDER — CEFAZOLIN SODIUM 2 G/50ML
SOLUTION INTRAVENOUS AS NEEDED
Status: DISCONTINUED | OUTPATIENT
Start: 2022-11-01 | End: 2022-11-01

## 2022-11-01 RX ORDER — CEFAZOLIN SODIUM 2 G/50ML
2000 SOLUTION INTRAVENOUS EVERY 8 HOURS
Status: COMPLETED | OUTPATIENT
Start: 2022-11-01 | End: 2022-11-02

## 2022-11-01 RX ORDER — ROCURONIUM BROMIDE 10 MG/ML
INJECTION, SOLUTION INTRAVENOUS AS NEEDED
Status: DISCONTINUED | OUTPATIENT
Start: 2022-11-01 | End: 2022-11-01

## 2022-11-01 RX ORDER — PHENYLEPHRINE HCL IN 0.9% NACL 1 MG/10 ML
SYRINGE (ML) INTRAVENOUS AS NEEDED
Status: DISCONTINUED | OUTPATIENT
Start: 2022-11-01 | End: 2022-11-01

## 2022-11-01 RX ORDER — EPHEDRINE SULFATE 50 MG/ML
INJECTION INTRAVENOUS AS NEEDED
Status: DISCONTINUED | OUTPATIENT
Start: 2022-11-01 | End: 2022-11-01

## 2022-11-01 RX ORDER — NEOSTIGMINE METHYLSULFATE 1 MG/ML
INJECTION INTRAVENOUS AS NEEDED
Status: DISCONTINUED | OUTPATIENT
Start: 2022-11-01 | End: 2022-11-01

## 2022-11-01 RX ORDER — SODIUM CHLORIDE, SODIUM LACTATE, POTASSIUM CHLORIDE, CALCIUM CHLORIDE 600; 310; 30; 20 MG/100ML; MG/100ML; MG/100ML; MG/100ML
50 INJECTION, SOLUTION INTRAVENOUS CONTINUOUS
Status: DISCONTINUED | OUTPATIENT
Start: 2022-11-01 | End: 2022-11-02 | Stop reason: SDUPTHER

## 2022-11-01 RX ORDER — HYDROMORPHONE HCL/PF 1 MG/ML
0.4 SYRINGE (ML) INJECTION
Status: DISCONTINUED | OUTPATIENT
Start: 2022-11-01 | End: 2022-11-01

## 2022-11-01 RX ORDER — CEFAZOLIN SODIUM 2 G/50ML
2000 SOLUTION INTRAVENOUS ONCE
Status: DISCONTINUED | OUTPATIENT
Start: 2022-11-01 | End: 2022-11-01

## 2022-11-01 RX ORDER — ONDANSETRON 2 MG/ML
INJECTION INTRAMUSCULAR; INTRAVENOUS AS NEEDED
Status: DISCONTINUED | OUTPATIENT
Start: 2022-11-01 | End: 2022-11-01

## 2022-11-01 RX ORDER — HYDROMORPHONE HCL/PF 1 MG/ML
0.4 SYRINGE (ML) INJECTION
Status: DISCONTINUED | OUTPATIENT
Start: 2022-11-01 | End: 2022-11-06 | Stop reason: HOSPADM

## 2022-11-01 RX ORDER — SODIUM CHLORIDE, SODIUM LACTATE, POTASSIUM CHLORIDE, CALCIUM CHLORIDE 600; 310; 30; 20 MG/100ML; MG/100ML; MG/100ML; MG/100ML
INJECTION, SOLUTION INTRAVENOUS CONTINUOUS PRN
Status: DISCONTINUED | OUTPATIENT
Start: 2022-11-01 | End: 2022-11-01

## 2022-11-01 RX ORDER — FENTANYL CITRATE 50 UG/ML
INJECTION, SOLUTION INTRAMUSCULAR; INTRAVENOUS AS NEEDED
Status: DISCONTINUED | OUTPATIENT
Start: 2022-11-01 | End: 2022-11-01

## 2022-11-01 RX ORDER — ONDANSETRON 2 MG/ML
4 INJECTION INTRAMUSCULAR; INTRAVENOUS ONCE AS NEEDED
Status: DISCONTINUED | OUTPATIENT
Start: 2022-11-01 | End: 2022-11-02 | Stop reason: SDUPTHER

## 2022-11-01 RX ORDER — HYDROMORPHONE HCL IN WATER/PF 6 MG/30 ML
0.2 PATIENT CONTROLLED ANALGESIA SYRINGE INTRAVENOUS ONCE
Status: COMPLETED | OUTPATIENT
Start: 2022-11-01 | End: 2022-11-01

## 2022-11-01 RX ORDER — FENTANYL CITRATE/PF 50 MCG/ML
25 SYRINGE (ML) INJECTION
Status: DISCONTINUED | OUTPATIENT
Start: 2022-11-01 | End: 2022-11-01

## 2022-11-01 RX ORDER — LIDOCAINE HYDROCHLORIDE 10 MG/ML
INJECTION, SOLUTION EPIDURAL; INFILTRATION; INTRACAUDAL; PERINEURAL AS NEEDED
Status: DISCONTINUED | OUTPATIENT
Start: 2022-11-01 | End: 2022-11-01

## 2022-11-01 RX ORDER — SODIUM CHLORIDE, SODIUM LACTATE, POTASSIUM CHLORIDE, CALCIUM CHLORIDE 600; 310; 30; 20 MG/100ML; MG/100ML; MG/100ML; MG/100ML
100 INJECTION, SOLUTION INTRAVENOUS CONTINUOUS
Status: DISCONTINUED | OUTPATIENT
Start: 2022-11-01 | End: 2022-11-02

## 2022-11-01 RX ORDER — FENTANYL CITRATE/PF 50 MCG/ML
25 SYRINGE (ML) INJECTION
Status: DISCONTINUED | OUTPATIENT
Start: 2022-11-01 | End: 2022-11-05

## 2022-11-01 RX ORDER — BUPIVACAINE HYDROCHLORIDE AND EPINEPHRINE 2.5; 5 MG/ML; UG/ML
INJECTION, SOLUTION EPIDURAL; INFILTRATION; INTRACAUDAL; PERINEURAL AS NEEDED
Status: DISCONTINUED | OUTPATIENT
Start: 2022-11-01 | End: 2022-11-01 | Stop reason: HOSPADM

## 2022-11-01 RX ORDER — PROPOFOL 10 MG/ML
INJECTION, EMULSION INTRAVENOUS AS NEEDED
Status: DISCONTINUED | OUTPATIENT
Start: 2022-11-01 | End: 2022-11-01

## 2022-11-01 RX ORDER — GLYCOPYRROLATE 0.2 MG/ML
INJECTION INTRAMUSCULAR; INTRAVENOUS AS NEEDED
Status: DISCONTINUED | OUTPATIENT
Start: 2022-11-01 | End: 2022-11-01

## 2022-11-01 RX ORDER — SODIUM CHLORIDE, SODIUM GLUCONATE, SODIUM ACETATE, POTASSIUM CHLORIDE, MAGNESIUM CHLORIDE, SODIUM PHOSPHATE, DIBASIC, AND POTASSIUM PHOSPHATE .53; .5; .37; .037; .03; .012; .00082 G/100ML; G/100ML; G/100ML; G/100ML; G/100ML; G/100ML; G/100ML
50 INJECTION, SOLUTION INTRAVENOUS CONTINUOUS
Status: DISCONTINUED | OUTPATIENT
Start: 2022-11-01 | End: 2022-11-01

## 2022-11-01 RX ORDER — MAGNESIUM HYDROXIDE 1200 MG/15ML
LIQUID ORAL AS NEEDED
Status: DISCONTINUED | OUTPATIENT
Start: 2022-11-01 | End: 2022-11-01 | Stop reason: HOSPADM

## 2022-11-01 RX ADMIN — EPHEDRINE SULFATE 10 MG: 50 INJECTION INTRAVENOUS at 12:18

## 2022-11-01 RX ADMIN — EPHEDRINE SULFATE 10 MG: 50 INJECTION INTRAVENOUS at 12:42

## 2022-11-01 RX ADMIN — SENNOSIDES, DOCUSATE SODIUM 1 TABLET: 8.6; 5 TABLET ORAL at 15:25

## 2022-11-01 RX ADMIN — HYDROMORPHONE HYDROCHLORIDE 0.2 MG: 0.2 INJECTION, SOLUTION INTRAMUSCULAR; INTRAVENOUS; SUBCUTANEOUS at 03:10

## 2022-11-01 RX ADMIN — DEXAMETHASONE SODIUM PHOSPHATE 10 MG: 10 INJECTION, SOLUTION INTRAMUSCULAR; INTRAVENOUS at 12:09

## 2022-11-01 RX ADMIN — FENTANYL CITRATE 25 MCG: 50 INJECTION INTRAMUSCULAR; INTRAVENOUS at 14:31

## 2022-11-01 RX ADMIN — ACETAMINOPHEN 975 MG: 325 TABLET ORAL at 15:25

## 2022-11-01 RX ADMIN — LIDOCAINE HYDROCHLORIDE 50 MG: 10 INJECTION, SOLUTION EPIDURAL; INFILTRATION; INTRACAUDAL; PERINEURAL at 12:09

## 2022-11-01 RX ADMIN — NEOSTIGMINE METHYLSULFATE 4 MG: 1 INJECTION INTRAVENOUS at 13:00

## 2022-11-01 RX ADMIN — METOPROLOL TARTRATE 100 MG: 50 TABLET, FILM COATED ORAL at 15:25

## 2022-11-01 RX ADMIN — FENTANYL CITRATE 50 MCG: 50 INJECTION, SOLUTION INTRAMUSCULAR; INTRAVENOUS at 12:04

## 2022-11-01 RX ADMIN — EPHEDRINE SULFATE 10 MG: 50 INJECTION INTRAVENOUS at 12:50

## 2022-11-01 RX ADMIN — Medication 100 MCG: at 13:19

## 2022-11-01 RX ADMIN — LIDOCAINE 5% 1 PATCH: 700 PATCH TOPICAL at 15:30

## 2022-11-01 RX ADMIN — AMIODARONE HYDROCHLORIDE 200 MG: 200 TABLET ORAL at 15:26

## 2022-11-01 RX ADMIN — PROPOFOL 50 MG: 10 INJECTION, EMULSION INTRAVENOUS at 12:10

## 2022-11-01 RX ADMIN — OXYCODONE HYDROCHLORIDE 5 MG: 5 TABLET ORAL at 03:49

## 2022-11-01 RX ADMIN — FENTANYL CITRATE 50 MCG: 50 INJECTION, SOLUTION INTRAMUSCULAR; INTRAVENOUS at 13:16

## 2022-11-01 RX ADMIN — PROPOFOL 100 MG: 10 INJECTION, EMULSION INTRAVENOUS at 12:09

## 2022-11-01 RX ADMIN — Medication 200 MCG: at 12:15

## 2022-11-01 RX ADMIN — PROPOFOL 30 MG: 10 INJECTION, EMULSION INTRAVENOUS at 13:15

## 2022-11-01 RX ADMIN — Medication 20 MG: at 12:32

## 2022-11-01 RX ADMIN — SODIUM CHLORIDE, SODIUM LACTATE, POTASSIUM CHLORIDE, CALCIUM CHLORIDE: 600; 310; 30; 20 INJECTION, SOLUTION INTRAVENOUS at 11:45

## 2022-11-01 RX ADMIN — ONDANSETRON 4 MG: 2 INJECTION INTRAMUSCULAR; INTRAVENOUS at 13:02

## 2022-11-01 RX ADMIN — Medication 30 MG: at 13:16

## 2022-11-01 RX ADMIN — SODIUM CHLORIDE, SODIUM GLUCONATE, SODIUM ACETATE, POTASSIUM CHLORIDE, MAGNESIUM CHLORIDE, SODIUM PHOSPHATE, DIBASIC, AND POTASSIUM PHOSPHATE 50 ML/HR: .53; .5; .37; .037; .03; .012; .00082 INJECTION, SOLUTION INTRAVENOUS at 05:18

## 2022-11-01 RX ADMIN — CEFAZOLIN SODIUM 2000 MG: 2 SOLUTION INTRAVENOUS at 19:42

## 2022-11-01 RX ADMIN — SODIUM CHLORIDE, SODIUM LACTATE, POTASSIUM CHLORIDE, AND CALCIUM CHLORIDE 75 ML/HR: .6; .31; .03; .02 INJECTION, SOLUTION INTRAVENOUS at 15:40

## 2022-11-01 RX ADMIN — CEFAZOLIN SODIUM 2000 MG: 2 SOLUTION INTRAVENOUS at 12:13

## 2022-11-01 RX ADMIN — OXYCODONE HYDROCHLORIDE 5 MG: 5 TABLET ORAL at 19:41

## 2022-11-01 RX ADMIN — ROCURONIUM BROMIDE 40 MG: 10 INJECTION, SOLUTION INTRAVENOUS at 12:09

## 2022-11-01 RX ADMIN — TAMSULOSIN HYDROCHLORIDE 0.4 MG: 0.4 CAPSULE ORAL at 16:44

## 2022-11-01 RX ADMIN — Medication 200 MCG: at 12:13

## 2022-11-01 RX ADMIN — EPHEDRINE SULFATE 10 MG: 50 INJECTION INTRAVENOUS at 13:19

## 2022-11-01 RX ADMIN — ACETAMINOPHEN 975 MG: 325 TABLET ORAL at 21:11

## 2022-11-01 RX ADMIN — FENTANYL CITRATE 25 MCG: 50 INJECTION INTRAMUSCULAR; INTRAVENOUS at 14:23

## 2022-11-01 RX ADMIN — ATORVASTATIN CALCIUM 40 MG: 40 TABLET, FILM COATED ORAL at 15:39

## 2022-11-01 RX ADMIN — Medication 100 MCG: at 12:50

## 2022-11-01 RX ADMIN — GLYCOPYRROLATE 0.4 MG: 0.2 INJECTION INTRAMUSCULAR; INTRAVENOUS at 13:00

## 2022-11-01 RX ADMIN — HYDROMORPHONE HYDROCHLORIDE 0.2 MG: 0.2 INJECTION, SOLUTION INTRAMUSCULAR; INTRAVENOUS; SUBCUTANEOUS at 10:17

## 2022-11-01 NOTE — PROGRESS NOTES
[address]  Progress Note Emily Kilpatrick 1940, 80 y o  male MRN: [id number]  Unit/Bed#: OR Robeline Encounter: [id number]  Primary Care Provider: Ana Kay DO   Date and time admitted to hospital: 10/31/2022 12:35 PM    * Closed 2-part intertrochanteric fracture of proximal end of right femur, initial encounter [facility])  Assessment & Plan  Background: Presented to the ED status post mechanical fall leaving house  Reports missing a step in ending up falling onto port    • XR confirms acute right hip fracture  • Anticoagulation on hold; to resume postoperatively  • Type and screen ordered   • Hgb 11 1 on admission; monitor closely and transfuse PRN  • Pain management with geriatric pain regimen  • Early mobilization when cleared   • Incentive spirometry   • Orthopedic surgery consulted; input appreciated   o Surgery for today   • PT/OT consulted; post operative evaluations needed for likely rehab placement   o Case management on board to assist with dispo      Paroxysmal atrial fibrillation ([facility] )  Assessment & Plan  · Anticoagulated with Eliquis; on hold preoperatively; resume when cleared by orthopedic surgery  · Rate/rhythm controlled on amiodarone and toprol; continue while inpatient  · Ongoing monitoring with routine vitals and maria luisa   · Likely resume Eliquis tomorrow - check Hgb     CHF (congestive heart failure) ([facility] )  Assessment & Plan  Wt Readings from Last 3 Encounters:   11/01/22 64 6 kg (142 lb 6 7 oz)   06/04/22 64 7 kg (142 lb 10 2 oz)   03/22/22 63 5 kg (140 lb)     · Most recent echo EF 45%, systolic and diastolic function wnl   · Euvolemic on admission  · Will hold home spirnolactone and lasix on day of surgery, can resume post op  · Daily weight  · Intake and output  · Fluid restricted/sodium restricted/low-cholesterol diet  · Monitor volume status during hospitalization    Stage 3a chronic kidney disease [facility])  Assessment & Plan  Lab Results   Component Value Date EGFR 31 11/01/2022    EGFR 26 10/31/2022    EGFR 34 03/04/2022    CREATININE 1 94 (H) 11/01/2022    CREATININE 2 22 (H) 10/31/2022    CREATININE 1 81 (H) 03/04/2022     · Baseline appears to be approximately 1 9-2 2 on review of records  · On admission does not qualify for JOEL criteria  · Avoid nephrotoxins, relative hypotension, and NSAIDs if possible  · Renally dose medications as appropriate  · Monitor with morning labs    Hyperlipidemia, mixed  Assessment & Plan  · Continue statin therapy    Coronary artery disease involving coronary bypass graft  Assessment & Plan  · With past medical history of quadruple bypass and valve replacement in 2013 or 2014 per wife  · Continue Plavix per PTA medication regimen  · Currently without any shortness of breath or chest pain  · EKG this admission Sinus bradycardia with 1st degree A-V block      VTE Pharmacologic Prophylaxis: VTE Score: 8 High Risk (Score >/= 5) - Pharmacological DVT Prophylaxis Contraindicated  Sequential Compression Devices Ordered  Currently on hold pre-op, resume tomorrow AM     Patient Centered Rounds: I performed bedside rounds with nursing staff today  Discussions with Specialists or Other Care Team Provider: CM, ortho     Education and Discussions with Family / Patient: Patient declined call to   Time Spent for Care: 30 minutes  More than 50% of total time spent on counseling and coordination of care as described above  Current Length of Stay: 1 day(s)  Current Patient Status: Inpatient   Certification Statement: The patient will continue to require additional inpatient hospital stay due to surgery today, therpay evalautions   Discharge Plan: Anticipate discharge in 48 hrs to discharge location to be determined pending rehab evaluations  Code Status: Level 1 - Full Code    Subjective:   Seen and examined pre-op, feeling well, does have pain in right hip  No other acute concerns    No chest pain, shortness a breath, headache, dizziness today       Objective:     Vitals:   Temp (24hrs), Av 2 °F (36 8 °C), Min:96 6 °F (35 9 °C), Max:98 6 °F (37 °C)    Temp:  [96 6 °F (35 9 °C)-98 6 °F (37 °C)] 98 3 °F (36 8 °C)  HR:  [56-63] 63  Resp:  [18-20] 20  BP: (118-181)/(55-78) 163/70  SpO2:  [95 %-99 %] 96 %  Body mass index is 20 97 kg/m²  Input and Output Summary (last 24 hours): Intake/Output Summary (Last 24 hours) at 2022 1151  Last data filed at 2022 1021  Gross per 24 hour   Intake 372 5 ml   Output 925 ml   Net -552 5 ml       Physical Exam:   Physical Exam  Vitals and nursing note reviewed  Constitutional:       General: He is not in acute distress  Appearance: Normal appearance  HENT:      Head: Normocephalic and atraumatic  Nose: No congestion  Mouth/Throat:      Mouth: Mucous membranes are moist    Eyes:      Conjunctiva/sclera: Conjunctivae normal    Cardiovascular:      Rate and Rhythm: Normal rate and regular rhythm  Pulses: Normal pulses  Heart sounds: Normal heart sounds  No murmur heard  Pulmonary:      Effort: Pulmonary effort is normal  No respiratory distress  Breath sounds: Normal breath sounds  Abdominal:      General: Bowel sounds are normal       Palpations: Abdomen is soft  Tenderness: There is no abdominal tenderness  Musculoskeletal:         General: Tenderness (rigth hip) present  Normal range of motion  Right lower leg: No edema  Left lower leg: No edema  Skin:     General: Skin is warm and dry  Neurological:      Mental Status: He is alert and oriented to person, place, and time            Additional Data:     Labs:  Results from last 7 days   Lab Units 22  0506   WBC Thousand/uL 9 12   HEMOGLOBIN g/dL 10 1*   HEMATOCRIT % 30 8*   PLATELETS Thousands/uL 127*   NEUTROS PCT % 75   LYMPHS PCT % 12*   MONOS PCT % 12   EOS PCT % 1     Results from last 7 days   Lab Units 22  0506   SODIUM mmol/L 136   POTASSIUM mmol/L 4 3   CHLORIDE mmol/L 102   CO2 mmol/L 27   BUN mg/dL 35*   CREATININE mg/dL 1 94*   ANION GAP mmol/L 7   CALCIUM mg/dL 7 9*   ALBUMIN g/dL 3 1*   TOTAL BILIRUBIN mg/dL 1 38*   ALK PHOS U/L 100   ALT U/L 34   AST U/L 18   GLUCOSE RANDOM mg/dL 105     Results from last 7 days   Lab Units 11/01/22  0506   INR  1 31*                   Lines/Drains:  Invasive Devices  Report    Peripheral Intravenous Line  Duration           Peripheral IV 10/31/22 Left Antecubital <1 day                      Imaging: Reviewed radiology reports from this admission including: xray(s)    Recent Cultures (last 7 days):         Last 24 Hours Medication List:   Current Facility-Administered Medications   Medication Dose Route Frequency Provider Last Rate   • acetaminophen  975 mg Oral Q8H [facility] SANDRINE Grajeda     • albuterol  2 puff Inhalation Q6H PRN SANDRINE Grajeda     • amiodarone  200 mg Oral Daily SANDRINE Christian     • [START ON 11/2/2022] apixaban  2 5 mg Oral BID Deanna Bautista PA-C     • atorvastatin  40 mg Oral Daily SANDRINE Grajeda     • calcium carbonate  1,000 mg Oral Daily PRN SANDRINE Grajeda     • cefazolin  2,000 mg Intravenous Once Khalif Brand     • HYDROmorphone  0 2 mg Intravenous Q4H PRN SANDRINE Grajeda     • lidocaine  1 patch Topical Daily SANDRINE Grajeda     • metoprolol tartrate  100 mg Oral Q12H [facility] SANDRINE Grajeda     • naloxone  0 04 mg Intravenous Q1MIN PRN SANDRINE Grajeda     • ondansetron  4 mg Intravenous Q6H PRN SANDRINE Grajeda     • oxyCODONE  2 5 mg Oral Q4H PRN SANDRINE Grajeda     • oxyCODONE  5 mg Oral Q4H PRN SANDRINE Grajeda     • polyethylene glycol  17 g Oral Daily PRN SANDRINE Grajeda     • senna-docusate sodium  1 tablet Oral BID SANDRINE Grajeda     • tamsulosin  0 4 mg Oral Daily With SANDRINE Pugh          Today, Patient Was Seen By: Deanna Bautista    **Please Note: This note may have been constructed using a voice recognition system  **

## 2022-11-01 NOTE — ANESTHESIA POSTPROCEDURE EVALUATION
Post-Op Assessment Note    CV Status:  Stable  Pain Score: 0    Pain management: adequate     Mental Status:  Sleepy   Hydration Status:  Euvolemic   PONV Controlled:  Controlled   Airway Patency:  Patent      Post Op Vitals Reviewed: Yes      Staff: CRNA         No complications documented      BP   94/55   Temp 97 9   Pulse  60   Resp 16   SpO2 100

## 2022-11-01 NOTE — OP NOTE
OPERATIVE REPORT  PATIENT NAME: Emi Craven    :  1940  MRN: [id number]  Pt Location:  OR ROOM 02    SURGERY DATE: 2022    Surgeon(s) and Role:     * Je Packer - Primary     * Felicia Al PA-C - Assisting    Preop Diagnosis:  Closed 2-part intertrochanteric fracture of proximal end of right femur, initial encounter ([place] [address] ) Jaqui Candelario    Post-Op Diagnosis Codes:     * Closed 2-part intertrochanteric fracture of proximal end of right femur, initial encounter ([place] [address] ) [S72 141A]    Procedure(s) (LRB):  INSERTION NAIL IM FEMUR ANTEGRADE (TROCHANTERIC) (Right)    Fluids:  700 cc    Estimated Blood Loss:   100 cc    Anesthesia Type:   General with supplemental local utilizing 20 cc of 0 25% Marcaine with epinephrine    Operative Indications:  Closed 2-part intertrochanteric fracture of proximal end of right femur, initial encounter ([place] [address] ) Tre Knutson is an 70-year-old male who fell on 10/31/2022 at approximately noon when he was trying to step out of his house and fell  He was taken to the emergency room at [address] [place] [address] by ambulance, x-rays were obtained demonstrating a right intertrochanteric fracture  Patient was admitted to the medical service and orthopedic consultation requested  The patient had taken his dose of Eliquis in the morning and the admitting medical hospitalist felt that it would be best to wait 24 hours before proceeding with surgery  The risks, benefits, options and alternatives of treatment were discussed with the patient and his wife it was elected to proceed with surgical fixation  Operative Findings: At the time of the procedure, the fracture was reduced and then stably fixated in standard fashion utilizing the [employer] TFNA system with a 130 degree neck angle, 10 mm diameter, 235 mm length nail with a 105 mm spiral blade proximally and 40 mm distal locking screw    The conclusion of the procedure, fluoroscopic images in the AP and lateral planes demonstrated reduction of the fracture and stable fixation  Complications:   None    Procedure and Technique:  Favian Avalos was placed on the fracture table with care taken to protect neurovascular structures and bony prominences  The left lower extremity was abducted, flexed and externally rotated to allow for fluoroscopic visualization of the affected hip  The right was placed in traction and the fracture reduced  Fluoroscopic images were obtained  Adjustments in the traction and alignment were performed to allow for adequate fracture alignment  An incision was made in line with the femoral shaft extending from the greater trochanter for approximately 5 cm  Sharp dissection was carried down through the skin and subcutaneous tissues and bleeding controlled with cautery  Soft tissues were then dissected down to the fascia which was divided in line with the incision  The greater trochanter was then palpated, a Jalloh elevator used to expose the greater trochanter and then the proximal femoral canal accessed in standard fashion  The awl was utilized to access the femoral canal taking care to ensure proper position on both AP and lateral views  A [employer] TFNA nail was then inserted in standard fashion utilizing the intermediate length, 130 degree neck angle and 10 mm diameter device and advanced under fluoroscopic guidance  Utilizing fluoroscopic guidance an incision was made over the lateral aspect of the proximal thigh for placement of the spiral blade  Sharp dissection was carried down through the skin and subcutaneous tissues and bleeding controlled with cautery  Tissues were dissected down to the fascia which was then divided  The guide for placement of the guide pin was positioned, advanced to the lateral cortex and a guide pin inserted to subchondral bone of the femoral head taking care to ensure proper position on both AP and lateral images    The lateral cortex was then drilled after a depth gauge was utilized to measure for the proper length spiral blade  The 105 mm spiral blade was then placed and advanced to the subchondral bone under direct visualization  The proximal screw was advanced to secure the spiral blade in the nail  The fracture was compressed as needed  Again, utilizing fluoroscopic guidance, an incision was made for the distal locking screw  Sharp dissection carried down through the skin and subcutaneous tissues and the fascia divided  The guide for placement of the screw was positioned against the cortex, a drill placed and a 40 mm screw then inserted in standard fashion  Fluoroscopic images were obtained demonstrating the fracture to be adequately aligned and stably fixated  Wounds were irrigated with saline solution  The fascia was approximated with 1  Vicryl problem  The subcutaneous tissues approximated with 2-0 Vicryl and the skin approximated with glue  Sterile dressings were then applied consisting of dry Telfa, gauze and Tegaderm  The patient was then taken from the fracture table and transferred to the recovery room in stable and satisfactory postop condition     I was present for the entire procedure, A qualified resident physician was not available and A physician assistant was required during the procedure for retraction tissue handling,dissection and suturing    Patient Disposition:  APU        SIGNATURE: Emma Regan  DATE: November 1, 2022  TIME: 1:23 PM

## 2022-11-01 NOTE — ASSESSMENT & PLAN NOTE
Wt Readings from Last 3 Encounters:   11/01/22 64 6 kg (142 lb 6 7 oz)   06/04/22 64 7 kg (142 lb 10 2 oz)   03/22/22 63 5 kg (140 lb)     · Most recent echo EF 30%, systolic and diastolic function wnl   · Euvolemic on admission  · Will hold home spirnolactone and lasix on day of surgery, can resume post op  · Daily weight  · Intake and output  · Fluid restricted/sodium restricted/low-cholesterol diet  · Monitor volume status during hospitalization

## 2022-11-01 NOTE — ASSESSMENT & PLAN NOTE
Lab Results   Component Value Date    EGFR 31 11/01/2022    EGFR 26 10/31/2022    EGFR 34 03/04/2022    CREATININE 1 94 (H) 11/01/2022    CREATININE 2 22 (H) 10/31/2022    CREATININE 1 81 (H) 03/04/2022     · Baseline appears to be approximately 1 9-2 2 on review of records  · On admission does not qualify for JOEL criteria  · Avoid nephrotoxins, relative hypotension, and NSAIDs if possible  · Renally dose medications as appropriate  · Monitor with morning labs

## 2022-11-01 NOTE — OCCUPATIONAL THERAPY NOTE
Occupational Therapy Cancellation Note       Patient Name: Alexis Dent  PSXWA'F Date: 11/1/2022  Problem List  Principal Problem:    Closed 2-part intertrochanteric fracture of proximal end of right femur, initial encounter [facility])  Active Problems:    Coronary artery disease involving coronary bypass graft    Hyperlipidemia, mixed    Stage 3a chronic kidney disease ([facility] )    CHF (congestive heart failure) ([facility])    Paroxysmal atrial fibrillation ([facility] )          11/01/22 0722   Note Type   Note type Evaluation; Cancelled Session   Cancel Reasons Patient to operating room       OT orders received  Chart review completed  Pt admitted to [address] [place] [address] 10/31/2022 with Dx: closed 2-part intertrochanteric fracture of proximal end of R femur  Pt with plans for OR for surgical fixation  Will hold OT services at this time and re-address as medically appropriate post surgery with updated WB status         Pollie Meigs CwiertniewiczR/L

## 2022-11-01 NOTE — ASSESSMENT & PLAN NOTE
· With past medical history of quadruple bypass and valve replacement in 2013 or 2014 per wife  · Continue Plavix per PTA medication regimen  · Currently without any shortness of breath or chest pain  · EKG this admission Sinus bradycardia with 1st degree A-V block

## 2022-11-01 NOTE — PLAN OF CARE
Problem: MOBILITY - ADULT  Goal: Maintain or return to baseline ADL function  Description: INTERVENTIONS:  -  Assess patient's ability to carry out ADLs; assess patient's baseline for ADL function and identify physical deficits which impact ability to perform ADLs (bathing, care of mouth/teeth, toileting, grooming, dressing, etc )  - Assess/evaluate cause of self-care deficits   - Assess range of motion  - Assess patient's mobility; develop plan if impaired  - Assess patient's need for assistive devices and provide as appropriate  - Encourage maximum independence but intervene and supervise when necessary  - Involve family in performance of ADLs  - Assess for home care needs following discharge   - Consider OT consult to assist with ADL evaluation and planning for discharge  - Provide patient education as appropriate  Outcome: Progressing  Goal: Maintains/Returns to pre admission functional level  Description: INTERVENTIONS:  - Perform BMAT or MOVE assessment daily    - Set and communicate daily mobility goal to care team and patient/family/caregiver  - Collaborate with rehabilitation services on mobility goals if consulted  - Perform Range of Motion 4 times a day  - Reposition patient every 2 hours    - Dangle patient 3 times a day  - Stand patient 3 times a day  - Ambulate patient 3 times a day  - Out of bed to chair 3 times a day   - Out of bed for meals 3 times a day  - Out of bed for toileting  - Record patient progress and toleration of activity level   Outcome: Progressing     Problem: Potential for Falls  Goal: Patient will remain free of falls  Description: INTERVENTIONS:  - Educate patient/family on patient safety including physical limitations  - Instruct patient to call for assistance with activity   - Consult OT/PT to assist with strengthening/mobility   - Keep Call bell within reach  - Keep bed low and locked with side rails adjusted as appropriate  - Keep care items and personal belongings within reach  - Initiate and maintain comfort rounds  - Make Fall Risk Sign visible to staff  - Offer Toileting every   Hours, in advance of need  - Initiate/Maintain   alarm  - Obtain necessary fall risk management equipment:      - Apply yellow socks and bracelet for high fall risk patients  - Consider moving patient to room near nurses station  Outcome: Progressing     Problem: Prexisting or High Potential for Compromised Skin Integrity  Goal: Skin integrity is maintained or improved  Description: INTERVENTIONS:  - Identify patients at risk for skin breakdown  - Assess and monitor skin integrity  - Assess and monitor nutrition and hydration status  - Monitor labs   - Assess for incontinence   - Turn and reposition patient  - Assist with mobility/ambulation  - Relieve pressure over bony prominences  - Avoid friction and shearing  - Provide appropriate hygiene as needed including keeping skin clean and dry  - Evaluate need for skin moisturizer/barrier cream  - Collaborate with interdisciplinary team   - Patient/family teaching  - Consider wound care consult   Outcome: Progressing     Problem: SKIN/TISSUE INTEGRITY - ADULT  Goal: Skin Integrity remains intact(Skin Breakdown Prevention)  Description: Assess:  -Perform Chrsi assessment every    -Clean and moisturize skin every    -Inspect skin when repositioning, toileting, and assisting with ADLS  -Assess under medical devices such as   every    -Assess extremities for adequate circulation and sensation     Bed Management:  -Have minimal linens on bed & keep smooth, unwrinkled  -Change linens as needed when moist or perspiring  -Avoid sitting or lying in one position for more than   hours while in bed  -Keep HOB at  degrees     Toileting:  -Offer bedside commode  -Assess for incontinence every   -Use incontinent care products after each incontinent episode such as   Activity:  -Mobilize patient    times a day  -Encourage activity and walks on unit  -Encourage or provide ROM exercises   -Turn and reposition patient every   Hours  -Use appropriate equipment to lift or move patient in bed  -Instruct/ Assist with weight shifting every   when out of bed in chair  -Consider limitation of chair time   hour intervals    Skin Care:  -Avoid use of baby powder, tape, friction and shearing, hot water or constrictive clothing  -Relieve pressure over bony prominences using    -Do not massage red bony areas    Next Steps:  -Teach patient strategies to minimize risks such as     -Consider consults to  interdisciplinary teams such as     Outcome: Progressing  Goal: Incision(s), wounds(s) or drain site(s) healing without S/S of infection  Description: INTERVENTIONS  - Assess and document dressing, incision, wound bed, drain sites and surrounding tissue  - Provide patient and family education  - Perform skin care/dressing changes every     Outcome: Progressing     Problem: MUSCULOSKELETAL - ADULT  Goal: Maintain or return mobility to safest level of function  Description: INTERVENTIONS:  - Assess patient's ability to carry out ADLs; assess patient's baseline for ADL function and identify physical deficits which impact ability to perform ADLs (bathing, care of mouth/teeth, toileting, grooming, dressing, etc )  - Assess/evaluate cause of self-care deficits   - Assess range of motion  - Assess patient's mobility  - Assess patient's need for assistive devices and provide as appropriate  - Encourage maximum independence but intervene and supervise when necessary  - Involve family in performance of ADLs  - Assess for home care needs following discharge   - Consider OT consult to assist with ADL evaluation and planning for discharge  - Provide patient education as appropriate  Outcome: Progressing  Goal: Maintain proper alignment of affected body part  Description: INTERVENTIONS:  - Support, maintain and protect limb and body alignment  - Provide patient/ family with appropriate education  Outcome: Progressing     Problem: PAIN - ADULT  Goal: Verbalizes/displays adequate comfort level or baseline comfort level  Description: Interventions:  - Encourage patient to monitor pain and request assistance  - Assess pain using appropriate pain scale0-10  - Administer analgesics based on type and severity of pain and evaluate response  - Implement non-pharmacological measures as appropriate and evaluate response  - Consider cultural and social influences on pain and pain management  - Notify physician/advanced practitioner if interventions unsuccessful or patient reports new pain  Outcome: Progressing     Problem: INFECTION - ADULT  Goal: Absence or prevention of progression during hospitalization  Description: INTERVENTIONS:  - Assess and monitor for signs and symptoms of infection  - Monitor lab/diagnostic results  - Monitor all insertion sites, i e  indwelling lines, tubes, and drains  - Monitor endotracheal if appropriate and nasal secretions for changes in amount and color  - [place] appropriate cooling/warming therapies per order  - Administer medications as ordered  - Instruct and encourage patient and family to use good hand hygiene technique  - Identify and instruct in appropriate isolation precautions for identified infection/condition  Outcome: Progressing  Goal: Absence of fever/infection during neutropenic period  Description: INTERVENTIONS:  - Monitor WBC    Outcome: Progressing     Problem: SAFETY ADULT  Goal: Maintain or return to baseline ADL function  Description: INTERVENTIONS:  -  Assess patient's ability to carry out ADLs; assess patient's baseline for ADL function and identify physical deficits which impact ability to perform ADLs (bathing, care of mouth/teeth, toileting, grooming, dressing, etc )  - Assess/evaluate cause of self-care deficits   - Assess range of motion  - Assess patient's mobility; develop plan if impaired  - Assess patient's need for assistive devices and provide as appropriate  - Encourage maximum independence but intervene and supervise when necessary  - Involve family in performance of ADLs  - Assess for home care needs following discharge   - Consider OT consult to assist with ADL evaluation and planning for discharge  - Provide patient education as appropriate  Outcome: Progressing  Goal: Maintains/Returns to pre admission functional level  Description: INTERVENTIONS:  - Perform BMAT or MOVE assessment daily    - Set and communicate daily mobility goal to care team and patient/family/caregiver  - Collaborate with rehabilitation services on mobility goals if consulted  - Perform Range of Motion 4 times a day  - Reposition patient every 2 hours  - Dangle patient 3 times a day  - Stand patient 3 times a day  - Ambulate patient 3 times a day  - Out of bed to chair 3 times a day   - Out of bed for meals 3 times a day  - Out of bed for toileting  - Record patient progress and toleration of activity level   Outcome: Progressing  Goal: Patient will remain free of falls  Description: INTERVENTIONS:  - Educate patient/family on patient safety including physical limitations  - Instruct patient to call for assistance with activity   - Consult OT/PT to assist with strengthening/mobility   - Keep Call bell within reach  - Keep bed low and locked with side rails adjusted as appropriate  - Keep care items and personal belongings within reach  - Initiate and maintain comfort rounds  - Make Fall Risk Sign visible to staff  - Offer Toileting every   Hours, in advance of need  - Initiate/Maintain   alarm  - Obtain necessary fall risk management equipment:      - Apply yellow socks and bracelet for high fall risk patients  - Consider moving patient to room near nurses station  Outcome: Progressing     Problem: DISCHARGE PLANNING  Goal: Discharge to home or other facility with appropriate resources  Description: INTERVENTIONS:  - Identify barriers to discharge w/patient and caregiver  - Arrange for needed discharge resources and transportation as appropriate  - Identify discharge learning needs (meds, wound care, etc )  - Arrange for interpretive services to assist at discharge as needed  - Refer to Case Management Department for coordinating discharge planning if the patient needs post-hospital services based on physician/advanced practitioner order or complex needs related to functional status, cognitive ability, or social support system  Outcome: Progressing     Problem: Knowledge Deficit  Goal: Patient/family/caregiver demonstrates understanding of disease process, treatment plan, medications, and discharge instructions  Description: Complete learning assessment and assess knowledge base    Interventions:  - Provide teaching at level of understanding  - Provide teaching via preferred learning methods  Outcome: Progressing

## 2022-11-01 NOTE — ASSESSMENT & PLAN NOTE
· Anticoagulated with Eliquis; on hold preoperatively; resume when cleared by orthopedic surgery  · Rate/rhythm controlled on amiodarone and toprol; continue while inpatient  · Ongoing monitoring with routine vitals and maria luisa   · Likely resume Eliquis tomorrow - check Hgb

## 2022-11-01 NOTE — DISCHARGE INSTRUCTIONS
Discharge Instructions - Orthopedics  Latosha Stinson 80 y o  male MRN: [id number]  Unit/Bed#: OW OR MAIN    Weight Bearing Status:                                           Weight bearing as tolerated on the right lower extremity    Pain:  Continue analgesics as directed  You pain medications were sent to the pharmacy on record  Dressing Instructions: You may remove dressing in 3 days (72 hours)  After dressing removal, you may shower and let water run over incisions  Do NOT submerge incisions for any length of time  Please do not soak or rub at the incision  Please do not put any lotions, creams, or ointments onto the incision  Appt Instructions: If you do not have your appointment, please call the clinic at [phone] to f/u with Dr Faith Perez or his PA, Alex Paiz PA-C, within 2 weeks  Otherwise followup as scheduled  Contact the office sooner if you experience any increased numbness/tingling in the extremities

## 2022-11-01 NOTE — ANESTHESIA PREPROCEDURE EVALUATION
Procedure:  INSERTION NAIL IM FEMUR ANTEGRADE (TROCHANTERIC) (Right Leg Upper)    Relevant Problems   CARDIO   (+) CHF (congestive heart failure) ([facility])   (+) Chronic a-fib ([facility])   (+) Coronary artery disease involving coronary bypass graft   (+) Hyperlipidemia, mixed   (+) Hypertensive urgency   (+) Mitral regurgitation   (+) Paroxysmal atrial fibrillation (HCC)      /RENAL   (+) Stage 3 chronic kidney disease ([facility])   (+) Stage 3a chronic kidney disease ([facility] [address] )      PULMONARY   (+) Acute respiratory failure with hypoxia (HCC)      Musculoskeletal and Integument   (+) Closed 2-part intertrochanteric fracture of proximal end of right femur, initial encounter ([facility] )       TTE March 2022:   Left Ventricle: Left ventricular cavity size is normal  Wall thickness is normal  The left ventricular ejection fraction is 45%  Systolic function is normal  Diastolic function is normal   •  The following segments are hypokinetic: mid anteroseptal, apical septal and apical inferior  •  All other segments are normal   •  Aortic Valve: The aortic valve is trileaflet  The leaflets are mildly thickened  The leaflets are mildly calcified  The leaflets exhibit normal mobility  There is mild regurgitation  There is no evidence of stenosis  •  Mitral Valve: There is mild thickening  There is severe annular calcification  The valve has been repaired with an annular ring  There is mild regurgitation  There is no evidence of stenosis  •  Tricuspid Valve: Tricuspid valve structure is normal  There is mild regurgitation        Physical Exam    Airway    Mallampati score: II  TM Distance: >3 FB  Neck ROM: full     Dental   No notable dental hx     Cardiovascular  Cardiovascular exam normal    Pulmonary  Pulmonary exam normal     Other Findings        Anesthesia Plan  ASA Score- 3     Anesthesia Type- general with ASA Monitors  Additional Monitors:   Airway Plan: ETT      Comment: Patient has taken Eliquis within the past 48 hours, not a candidate for spinal anesthetic  Plan Factors-Exercise tolerance (METS): >4 METS  Chart reviewed  EKG reviewed  Imaging results reviewed  Existing labs reviewed  Patient summary reviewed  Patient is not a current smoker  Induction- intravenous  Postoperative Plan- Plan for postoperative opioid use  Planned trial extubation    Informed Consent- Anesthetic plan and risks discussed with patient  I personally reviewed this patient with the CRNA  Discussed and agreed on the Anesthesia Plan with the CRNA  Aleida Jain

## 2022-11-01 NOTE — PLAN OF CARE
Problem: SKIN/TISSUE INTEGRITY - ADULT  Goal: Skin Integrity remains intact(Skin Breakdown Prevention)  Description: Assess:  -Perform Chris assessment every    -Clean and moisturize skin every    -Inspect skin when repositioning, toileting, and assisting with ADLS  -Assess under medical devices such as   every    -Assess extremities for adequate circulation and sensation     Bed Management:  -Have minimal linens on bed & keep smooth, unwrinkled  -Change linens as needed when moist or perspiring  -Avoid sitting or lying in one position for more than   hours while in bed  -Keep HOB at  degrees     Toileting:  -Offer bedside commode  -Assess for incontinence every   -Use incontinent care products after each incontinent episode such as   Activity:  -Mobilize patient   times a day  -Encourage activity and walks on unit  -Encourage or provide ROM exercises   -Turn and reposition patient every   Hours  -Use appropriate equipment to lift or move patient in bed  -Instruct/ Assist with weight shifting every   when out of bed in chair  -Consider limitation of chair time   hour intervals    Skin Care:  -Avoid use of baby powder, tape, friction and shearing, hot water or constrictive clothing  -Relieve pressure over bony prominences using    -Do not massage red bony areas    Next Steps:  -Teach patient strategies to minimize risks such as     -Consider consults to  interdisciplinary teams such as       Outcome: Progressing     Problem: PAIN - ADULT  Goal: Verbalizes/displays adequate comfort level or baseline comfort level  Description: Interventions:  - Encourage patient to monitor pain and request assistance  - Assess pain using appropriate pain scale  - Administer analgesics based on type and severity of pain and evaluate response  - Implement non-pharmacological measures as appropriate and evaluate response  - Consider cultural and social influences on pain and pain management  - Notify physician/advanced practitioner if interventions unsuccessful or patient reports new pain  Outcome: Not Progressing

## 2022-11-01 NOTE — ASSESSMENT & PLAN NOTE
Background: Presented to the ED status post mechanical fall leaving house  Reports missing a step in ending up falling onto port    • XR confirms acute right hip fracture  • Anticoagulation on hold; to resume postoperatively  • Type and screen ordered   • Hgb 11 1 on admission; monitor closely and transfuse PRN  • Pain management with geriatric pain regimen  • Early mobilization when cleared   • Incentive spirometry   • Orthopedic surgery consulted; input appreciated   o Surgery for today   • PT/OT consulted; post operative evaluations needed for likely rehab placement   o Case management on board to assist with dispo

## 2022-11-01 NOTE — PHYSICAL THERAPY NOTE
PHYSICAL THERAPY NOTE          Patient Name: Deneen Whitten  HTMVE'T Date: 11/1/2022 11/01/22 1754   Note Type   Note type Evaluation; Cancelled Session   Cancel Reasons Patient to operating room     Received order for PT consult  Chart reviewed  Pt admitted with diagnosis intertrochanteric fracture of proximal end of right femur  Pt in OR at this time for surgical repair  Will see patient as medically appropriate post op once new orders are received      Hira Barger

## 2022-11-01 NOTE — INTERVAL H&P NOTE
H&P reviewed  After examining the patient I find no changes in the patients condition since the H&P had been written      Vitals:    11/01/22 1116   BP: 163/70   Pulse: 63   Resp: 20   Temp: 98 3 °F (36 8 °C)   SpO2: 96%

## 2022-11-02 ENCOUNTER — APPOINTMENT (INPATIENT)
Dept: ULTRASOUND IMAGING | Facility: HOSPITAL | Age: 82
End: 2022-11-02

## 2022-11-02 LAB
ABO GROUP BLD: NORMAL
ABO GROUP BLD: NORMAL
ANION GAP SERPL CALCULATED.3IONS-SCNC: 10 MMOL/L (ref 4–13)
ANION GAP SERPL CALCULATED.3IONS-SCNC: 8 MMOL/L (ref 4–13)
BLD GP AB SCN SERPL QL: NEGATIVE
BUN SERPL-MCNC: 50 MG/DL (ref 5–25)
BUN SERPL-MCNC: 53 MG/DL (ref 5–25)
CALCIUM SERPL-MCNC: 7.9 MG/DL (ref 8.3–10.1)
CALCIUM SERPL-MCNC: 7.9 MG/DL (ref 8.3–10.1)
CHLORIDE SERPL-SCNC: 96 MMOL/L (ref 96–108)
CHLORIDE SERPL-SCNC: 99 MMOL/L (ref 96–108)
CO2 SERPL-SCNC: 25 MMOL/L (ref 21–32)
CO2 SERPL-SCNC: 27 MMOL/L (ref 21–32)
CREAT SERPL-MCNC: 2.54 MG/DL (ref 0.6–1.3)
CREAT SERPL-MCNC: 2.66 MG/DL (ref 0.6–1.3)
CREAT UR-MCNC: 131 MG/DL
ERYTHROCYTE [DISTWIDTH] IN BLOOD BY AUTOMATED COUNT: 16.8 % (ref 11.6–15.1)
ERYTHROCYTE [DISTWIDTH] IN BLOOD BY AUTOMATED COUNT: 17 % (ref 11.6–15.1)
GFR SERPL CREATININE-BSD FRML MDRD: 21 ML/MIN/1.73SQ M
GFR SERPL CREATININE-BSD FRML MDRD: 22 ML/MIN/1.73SQ M
GLUCOSE SERPL-MCNC: 135 MG/DL (ref 65–140)
GLUCOSE SERPL-MCNC: 163 MG/DL (ref 65–140)
HCT VFR BLD AUTO: 20.8 % (ref 36.5–49.3)
HCT VFR BLD AUTO: 22.4 % (ref 36.5–49.3)
HGB BLD-MCNC: 6.9 G/DL (ref 12–17)
HGB BLD-MCNC: 7.5 G/DL (ref 12–17)
MCH RBC QN AUTO: 32.5 PG (ref 26.8–34.3)
MCH RBC QN AUTO: 32.8 PG (ref 26.8–34.3)
MCHC RBC AUTO-ENTMCNC: 33.2 G/DL (ref 31.4–37.4)
MCHC RBC AUTO-ENTMCNC: 33.5 G/DL (ref 31.4–37.4)
MCV RBC AUTO: 98 FL (ref 82–98)
MCV RBC AUTO: 98 FL (ref 82–98)
PLATELET # BLD AUTO: 148 THOUSANDS/UL (ref 149–390)
PLATELET # BLD AUTO: 152 THOUSANDS/UL (ref 149–390)
PMV BLD AUTO: 10.4 FL (ref 8.9–12.7)
PMV BLD AUTO: 10.5 FL (ref 8.9–12.7)
POTASSIUM SERPL-SCNC: 4.8 MMOL/L (ref 3.5–5.3)
POTASSIUM SERPL-SCNC: 5 MMOL/L (ref 3.5–5.3)
RBC # BLD AUTO: 2.12 MILLION/UL (ref 3.88–5.62)
RBC # BLD AUTO: 2.29 MILLION/UL (ref 3.88–5.62)
RH BLD: NEGATIVE
RH BLD: NEGATIVE
SODIUM 24H UR-SCNC: 18 MOL/L
SODIUM SERPL-SCNC: 131 MMOL/L (ref 135–147)
SODIUM SERPL-SCNC: 134 MMOL/L (ref 135–147)
SPECIMEN EXPIRATION DATE: NORMAL
WBC # BLD AUTO: 13.63 THOUSAND/UL (ref 4.31–10.16)
WBC # BLD AUTO: 15.51 THOUSAND/UL (ref 4.31–10.16)

## 2022-11-02 PROCEDURE — 30233N1 TRANSFUSION OF NONAUTOLOGOUS RED BLOOD CELLS INTO PERIPHERAL VEIN, PERCUTANEOUS APPROACH: ICD-10-PCS | Performed by: FAMILY MEDICINE

## 2022-11-02 RX ORDER — SPIRONOLACTONE 25 MG/1
25 TABLET ORAL DAILY
Status: DISCONTINUED | OUTPATIENT
Start: 2022-11-02 | End: 2022-11-02

## 2022-11-02 RX ADMIN — SPIRONOLACTONE 25 MG: 25 TABLET ORAL at 04:31

## 2022-11-02 RX ADMIN — OXYCODONE HYDROCHLORIDE 5 MG: 5 TABLET ORAL at 01:40

## 2022-11-02 RX ADMIN — AMIODARONE HYDROCHLORIDE 200 MG: 200 TABLET ORAL at 08:18

## 2022-11-02 RX ADMIN — ATORVASTATIN CALCIUM 40 MG: 40 TABLET, FILM COATED ORAL at 08:19

## 2022-11-02 RX ADMIN — METOPROLOL TARTRATE 100 MG: 50 TABLET, FILM COATED ORAL at 08:18

## 2022-11-02 RX ADMIN — SENNOSIDES, DOCUSATE SODIUM 1 TABLET: 8.6; 5 TABLET ORAL at 17:56

## 2022-11-02 RX ADMIN — ACETAMINOPHEN 975 MG: 325 TABLET ORAL at 21:37

## 2022-11-02 RX ADMIN — SENNOSIDES, DOCUSATE SODIUM 1 TABLET: 8.6; 5 TABLET ORAL at 08:18

## 2022-11-02 RX ADMIN — CEFAZOLIN SODIUM 2000 MG: 2 SOLUTION INTRAVENOUS at 04:01

## 2022-11-02 RX ADMIN — SODIUM CHLORIDE, SODIUM LACTATE, POTASSIUM CHLORIDE, AND CALCIUM CHLORIDE 75 ML/HR: .6; .31; .03; .02 INJECTION, SOLUTION INTRAVENOUS at 04:03

## 2022-11-02 RX ADMIN — ACETAMINOPHEN 975 MG: 325 TABLET ORAL at 04:42

## 2022-11-02 RX ADMIN — METOPROLOL TARTRATE 100 MG: 50 TABLET, FILM COATED ORAL at 23:36

## 2022-11-02 RX ADMIN — APIXABAN 2.5 MG: 2.5 TABLET, FILM COATED ORAL at 08:18

## 2022-11-02 RX ADMIN — ACETAMINOPHEN 975 MG: 325 TABLET ORAL at 13:33

## 2022-11-02 RX ADMIN — CEFAZOLIN SODIUM 2000 MG: 2 SOLUTION INTRAVENOUS at 11:33

## 2022-11-02 RX ADMIN — TAMSULOSIN HYDROCHLORIDE 0.4 MG: 0.4 CAPSULE ORAL at 16:33

## 2022-11-02 RX ADMIN — LIDOCAINE 5% 1 PATCH: 700 PATCH TOPICAL at 16:33

## 2022-11-02 NOTE — ASSESSMENT & PLAN NOTE
· S/p CABG x4 and valve replacement in 2013 or 2014 per wife  · Continue Plavix per PTA medication regimen  · Currently without any shortness of breath or chest pain  · EKG this admission Sinus bradycardia with 1st degree A-V block  · Rates currently 60-80s

## 2022-11-02 NOTE — PLAN OF CARE
Problem: MOBILITY - ADULT  Goal: Maintain or return to baseline ADL function  Description: INTERVENTIONS:  -  Assess patient's ability to carry out ADLs; assess patient's baseline for ADL function and identify physical deficits which impact ability to perform ADLs (bathing, care of mouth/teeth, toileting, grooming, dressing, etc )  - Assess/evaluate cause of self-care deficits   - Assess range of motion  - Assess patient's mobility; develop plan if impaired  - Assess patient's need for assistive devices and provide as appropriate  - Encourage maximum independence but intervene and supervise when necessary  - Involve family in performance of ADLs  - Assess for home care needs following discharge   - Consider OT consult to assist with ADL evaluation and planning for discharge  - Provide patient education as appropriate  Outcome: Progressing  Goal: Maintains/Returns to pre admission functional level  Description: INTERVENTIONS:  - Perform BMAT or MOVE assessment daily    - Set and communicate daily mobility goal to care team and patient/family/caregiver  - Collaborate with rehabilitation services on mobility goals if consulted  - Perform Range of Motion 4 times a day  - Reposition patient every 2 hours    - Dangle patient 3 times a day  - Stand patient 3 times a day  - Ambulate patient 3 times a day  - Out of bed to chair 3 times a day   - Out of bed for meals 3 times a day  - Out of bed for toileting  - Record patient progress and toleration of activity level   Outcome: Progressing     Problem: Potential for Falls  Goal: Patient will remain free of falls  Description: INTERVENTIONS:  - Educate patient/family on patient safety including physical limitations  - Instruct patient to call for assistance with activity   - Consult OT/PT to assist with strengthening/mobility   - Keep Call bell within reach  - Keep bed low and locked with side rails adjusted as appropriate  - Keep care items and personal belongings within reach  - Initiate and maintain comfort rounds  - Make Fall Risk Sign visible to staff  - Offer Toileting every   Hours, in advance of need  - Initiate/Maintain   alarm  - Obtain necessary fall risk management equipment:      - Apply yellow socks and bracelet for high fall risk patients  - Consider moving patient to room near nurses station  Outcome: Progressing     Problem: Prexisting or High Potential for Compromised Skin Integrity  Goal: Skin integrity is maintained or improved  Description: INTERVENTIONS:  - Identify patients at risk for skin breakdown  - Assess and monitor skin integrity  - Assess and monitor nutrition and hydration status  - Monitor labs   - Assess for incontinence   - Turn and reposition patient  - Assist with mobility/ambulation  - Relieve pressure over bony prominences  - Avoid friction and shearing  - Provide appropriate hygiene as needed including keeping skin clean and dry  - Evaluate need for skin moisturizer/barrier cream  - Collaborate with interdisciplinary team   - Patient/family teaching  - Consider wound care consult   Outcome: Progressing     Problem: SKIN/TISSUE INTEGRITY - ADULT  Goal: Skin Integrity remains intact(Skin Breakdown Prevention)  Description: Assess:  -Perform Chris assessment every    -Clean and moisturize skin every    -Inspect skin when repositioning, toileting, and assisting with ADLS  -Assess under medical devices such as   every    -Assess extremities for adequate circulation and sensation     Bed Management:  -Have minimal linens on bed & keep smooth, unwrinkled  -Change linens as needed when moist or perspiring  -Avoid sitting or lying in one position for more than   hours while in bed  -Keep HOB at  degrees     Toileting:  -Offer bedside commode  -Assess for incontinence every   -Use incontinent care products after each incontinent episode such as   Activity:  -Mobilize patient    times a day  -Encourage activity and walks on unit  -Encourage or provide ROM exercises   -Turn and reposition patient every   Hours  -Use appropriate equipment to lift or move patient in bed  -Instruct/ Assist with weight shifting every   when out of bed in chair  -Consider limitation of chair time   hour intervals    Skin Care:  -Avoid use of baby powder, tape, friction and shearing, hot water or constrictive clothing  -Relieve pressure over bony prominences using    -Do not massage red bony areas    Next Steps:  -Teach patient strategies to minimize risks such as     -Consider consults to  interdisciplinary teams such as     Outcome: Progressing  Goal: Incision(s), wounds(s) or drain site(s) healing without S/S of infection  Description: INTERVENTIONS  - Assess and document dressing, incision, wound bed, drain sites and surrounding tissue  - Provide patient and family education  - Perform skin care/dressing changes every     Outcome: Progressing     Problem: MUSCULOSKELETAL - ADULT  Goal: Maintain or return mobility to safest level of function  Description: INTERVENTIONS:  - Assess patient's ability to carry out ADLs; assess patient's baseline for ADL function and identify physical deficits which impact ability to perform ADLs (bathing, care of mouth/teeth, toileting, grooming, dressing, etc )  - Assess/evaluate cause of self-care deficits   - Assess range of motion  - Assess patient's mobility  - Assess patient's need for assistive devices and provide as appropriate  - Encourage maximum independence but intervene and supervise when necessary  - Involve family in performance of ADLs  - Assess for home care needs following discharge   - Consider OT consult to assist with ADL evaluation and planning for discharge  - Provide patient education as appropriate  Outcome: Progressing  Goal: Maintain proper alignment of affected body part  Description: INTERVENTIONS:  - Support, maintain and protect limb and body alignment  - Provide patient/ family with appropriate education  Outcome: Progressing     Problem: PAIN - ADULT  Goal: Verbalizes/displays adequate comfort level or baseline comfort level  Description: Interventions:  - Encourage patient to monitor pain and request assistance  - Assess pain using appropriate pain scale0-10  - Administer analgesics based on type and severity of pain and evaluate response  - Implement non-pharmacological measures as appropriate and evaluate response  - Consider cultural and social influences on pain and pain management  - Notify physician/advanced practitioner if interventions unsuccessful or patient reports new pain  Outcome: Progressing     Problem: INFECTION - ADULT  Goal: Absence or prevention of progression during hospitalization  Description: INTERVENTIONS:  - Assess and monitor for signs and symptoms of infection  - Monitor lab/diagnostic results  - Monitor all insertion sites, i e  indwelling lines, tubes, and drains  - Monitor endotracheal if appropriate and nasal secretions for changes in amount and color  - [place] appropriate cooling/warming therapies per order  - Administer medications as ordered  - Instruct and encourage patient and family to use good hand hygiene technique  - Identify and instruct in appropriate isolation precautions for identified infection/condition  Outcome: Progressing  Goal: Absence of fever/infection during neutropenic period  Description: INTERVENTIONS:  - Monitor WBC    Outcome: Progressing     Problem: SAFETY ADULT  Goal: Maintain or return to baseline ADL function  Description: INTERVENTIONS:  -  Assess patient's ability to carry out ADLs; assess patient's baseline for ADL function and identify physical deficits which impact ability to perform ADLs (bathing, care of mouth/teeth, toileting, grooming, dressing, etc )  - Assess/evaluate cause of self-care deficits   - Assess range of motion  - Assess patient's mobility; develop plan if impaired  - Assess patient's need for assistive devices and provide as appropriate  - Encourage maximum independence but intervene and supervise when necessary  - Involve family in performance of ADLs  - Assess for home care needs following discharge   - Consider OT consult to assist with ADL evaluation and planning for discharge  - Provide patient education as appropriate  Outcome: Progressing  Goal: Maintains/Returns to pre admission functional level  Description: INTERVENTIONS:  - Perform BMAT or MOVE assessment daily    - Set and communicate daily mobility goal to care team and patient/family/caregiver  - Collaborate with rehabilitation services on mobility goals if consulted  - Perform Range of Motion 4 times a day  - Reposition patient every 2 hours  - Dangle patient 3 times a day  - Stand patient 3 times a day  - Ambulate patient 3 times a day  - Out of bed to chair 3 times a day   - Out of bed for meals 3 times a day  - Out of bed for toileting  - Record patient progress and toleration of activity level   Outcome: Progressing  Goal: Patient will remain free of falls  Description: INTERVENTIONS:  - Educate patient/family on patient safety including physical limitations  - Instruct patient to call for assistance with activity   - Consult OT/PT to assist with strengthening/mobility   - Keep Call bell within reach  - Keep bed low and locked with side rails adjusted as appropriate  - Keep care items and personal belongings within reach  - Initiate and maintain comfort rounds  - Make Fall Risk Sign visible to staff  - Offer Toileting every   Hours, in advance of need  - Initiate/Maintain   alarm  - Obtain necessary fall risk management equipment:      - Apply yellow socks and bracelet for high fall risk patients  - Consider moving patient to room near nurses station  Outcome: Progressing     Problem: DISCHARGE PLANNING  Goal: Discharge to home or other facility with appropriate resources  Description: INTERVENTIONS:  - Identify barriers to discharge w/patient and caregiver  - Arrange for needed discharge resources and transportation as appropriate  - Identify discharge learning needs (meds, wound care, etc )  - Arrange for interpretive services to assist at discharge as needed  - Refer to Case Management Department for coordinating discharge planning if the patient needs post-hospital services based on physician/advanced practitioner order or complex needs related to functional status, cognitive ability, or social support system  Outcome: Progressing     Problem: Knowledge Deficit  Goal: Patient/family/caregiver demonstrates understanding of disease process, treatment plan, medications, and discharge instructions  Description: Complete learning assessment and assess knowledge base    Interventions:  - Provide teaching at level of understanding  - Provide teaching via preferred learning methods  Outcome: Progressing

## 2022-11-02 NOTE — PROGRESS NOTES
Progress Note - Orthopedics   Wilmer Bliss 80 y o  male MRN: [id number]  Unit/Bed#: -01      Subjective:    80 y o male POD1 s/p right femoral TFN  No acute overnight events, no new complaints  Patient doing well  The patient complains of pain along the hip, though notes that the pain has much improved compared to before the surgery  Denies fevers, chills, CP, SOB, N/V, numbness or tingling  Patient has been up and working with PT/OT this morning       Labs:  0   Lab Value Date/Time    HCT 22 4 (L) 11/02/2022 0436    HCT 30 8 (L) 11/01/2022 0506    HCT 34 5 (L) 10/31/2022 1304    HGB 7 5 (L) 11/02/2022 0436    HGB 10 1 (L) 11/01/2022 0506    HGB 11 1 (L) 10/31/2022 1304    INR 1 31 (H) 11/01/2022 0506    WBC 13 63 (H) 11/02/2022 0436    WBC 9 12 11/01/2022 0506    WBC 7 44 10/31/2022 1304       Meds:    Current Facility-Administered Medications:   •  acetaminophen (TYLENOL) tablet 975 mg, 975 mg, Oral, Q8H [address], Migdalia Mccarthy PA-C, 975 mg at 11/02/22 0442  •  albuterol (PROVENTIL HFA,VENTOLIN HFA) inhaler 2 puff, 2 puff, Inhalation, Q6H PRN, Migdalia Mccarthy PA-C  •  amiodarone tablet 200 mg, 200 mg, Oral, Daily, Migdalia Mccarthy PA-C, 200 mg at 11/02/22 0818  •  apixaban (ELIQUIS) tablet 2 5 mg, 2 5 mg, Oral, Daily, Migdalia Mccarthy PA-C, 2 5 mg at 11/02/22 1865  •  atorvastatin (LIPITOR) tablet 40 mg, 40 mg, Oral, Daily, Migdalia Mccarthy PA-C, 40 mg at 11/02/22 8274  •  calcium carbonate (TUMS) chewable tablet 1,000 mg, 1,000 mg, Oral, Daily PRN, Migdalia Mccarthy PA-C  •  fentaNYL (SUBLIMAZE) injection 25 mcg, 25 mcg, Intravenous, Q3 min PRN, [place] Zambrano Vu, DO  •  HYDROmorphone (DILAUDID) injection 0 4 mg, 0 4 mg, Intravenous, Q5 Min PRN, [place] Zambrano Vu, DO  •  HYDROmorphone HCl (DILAUDID) injection 0 2 mg, 0 2 mg, Intravenous, Q4H PRN, Migdalia Mccarthy PA-C, 0 2 mg at 11/01/22 0310  •  lactated ringers bolus 1,000 mL, 1,000 mL, Intravenous, Once PRN **AND** lactated ringers bolus 1,000 mL, 1,000 mL, Intravenous, Once PRN, ARNOLD Stephenson-C  •  lactated ringers infusion, 100 mL/hr, Intravenous, Continuous, Yancy Gambino PA-C, Last Rate: 100 mL/hr at 11/02/22 0511, 100 mL/hr at 11/02/22 0511  •  lidocaine (LIDODERM) 5 % patch 1 patch, 1 patch, Topical, Daily, ARNOLD Stephenson-C, 1 patch at 11/01/22 1530  •  metoprolol tartrate (LOPRESSOR) tablet 100 mg, 100 mg, Oral, Q12H [address], Dayana Villalba PA-C, 100 mg at 11/02/22 0818  •  naloxone (NARCAN) 0 04 mg/mL syringe 0 04 mg, 0 04 mg, Intravenous, Q1MIN PRN, ARNOLD Stephenson-C  •  ondansetron [facility]) injection 4 mg, 4 mg, Intravenous, Q6H PRN, ARNOLD Stephenson-C  •  oxyCODONE (ROXICODONE) IR tablet 2 5 mg, 2 5 mg, Oral, Q4H PRN, ARNOLD Stephenson-C  •  oxyCODONE (ROXICODONE) IR tablet 5 mg, 5 mg, Oral, Q4H PRN, ARNOLD Stephenson-C, 5 mg at 11/02/22 0140  •  polyethylene glycol (MIRALAX) packet 17 g, 17 g, Oral, Daily PRN, ARNOLD Stephenson-C  •  senna-docusate sodium (SENOKOT S) 8 6-50 mg per tablet 1 tablet, 1 tablet, Oral, BID, ARNOLD Stephenson-C, 1 tablet at 11/02/22 0818  •  sodium chloride 0 9 % bolus 1,000 mL, 1,000 mL, Intravenous, Once PRN **AND** sodium chloride 0 9 % bolus 1,000 mL, 1,000 mL, Intravenous, Once PRN, Dayana Villalba PA-C  •  tamsulosin [facility]) capsule 0 4 mg, 0 4 mg, Oral, Daily With Ana Knapp PA-C, 0 4 mg at 11/01/22 1644    Blood Culture:   Lab Results   Component Value Date    BLOODCX No Growth After 5 Days  03/02/2022    BLOODCX No Growth After 5 Days  03/02/2022       Wound Culture:   No results found for: WOUNDCULT    Ins and Outs:  I/O last 24 hours: In: 4372 9 [P O :720; I V :3602 9; IV Piggyback:50]  Out: 450 [Urine:350; Blood:100]      Physical:  Vitals:    11/02/22 0701   BP: 138/63   Pulse: 77   Resp: 17   Temp: 98 4 °F (36 9 °C)   SpO2: 96%     Musculoskeletal: right Lower Extremity  · Patient sitting in his chair, comfortably, in no acute distress  · The dressings are intact, mild serosanguinous strike-through present   Skin above and below the dressings without lesions, ecchymosis, erythema, swelling, warmth, or other signs of infection  · Patient denies any pain with passive hip circumferential ROM  · Sensation intact to saphenous, sural, tibial, superficial peroneal nerve, and deep peroneal  · Motor intact to +FHL/EHL, +ankle dorsi/plantar flexion  Patient notes limited ankle ROM due to past history of fracture  · 2+ DP pulse, symmetric bilaterally  · Soft lower extremity compartments, negative Anne's sign  · Digits warm and well perfused  · Capillary refill < 2 seconds    Assessment:    82 y o male POD1 s/p right femoral TFN  Patient doing well  Ambulatory dysfunction; PAF; CHF; CKD; Hyperlipidemia; CAD with hx of bypass      Plan:  · WBAT RLE  · HGB 7 5 this AM  Greater than 2 gram drop qualifies for diagnosis of acute blood loss anemia, will monitor and administer IVF/prbc as indicated  Patient is currently asymptomatic, SLIM on board  · PT/OT gait training  · Pain control per primary team  · DVT ppx with Eliquis, currently on 2 5mg daily per post-operative protocols, may resume BID dosing 7 days post-surgery  · Dispo: Ortho will follow   · PT/OT recommending discharge to [facility]   CM on board  · The patient will follow up with Dr Gustavo Mejias once discharged in 7-10 days     Rocio Bhardwaj PA-C

## 2022-11-02 NOTE — NURSING NOTE
Notified SLIM Yancy Demo patient not voided since approximately 16:00 (post op) yesterday  bladder scanned several times throughout the evening since 19:00 (75ml,78 ml,121ml,144ml see I & O charting) per physician will order spirolactone  (home medication)and increase ivf LR to 100ml/hr    Will continue to monitor urine output and advise

## 2022-11-02 NOTE — ASSESSMENT & PLAN NOTE
Background: Presented to the ED status post mechanical fall leaving house  Reports missing a step in ending up falling onto door    • Xray: acute right hip fracture  • Anticoagulation eliquis on hold; to resume postoperatively  • Type and screen ordered   • Hgb 11 1 on admission; monitor closely and transfuse PRN  • Pain management with geriatric pain regimen  • Early mobilization when cleared  • Incentive spirometry   • Orthopedic surgery consulted; input appreciated   o POD 1: right femoral TFN  o WBAT  o eliquis daily 1 week, then resume BID  o Repeat Hgb with >2g drop overnight 10 >7 5  • PT/OT consulted; recommending rehab   o Case management on board to assist with dispo

## 2022-11-02 NOTE — OCCUPATIONAL THERAPY NOTE
Occupational Therapy Evaluation     Patient Name: Tyshawn Mac  VPKDV'D Date: 11/2/2022  Problem List  Principal Problem:    Closed 2-part intertrochanteric fracture of proximal end of right femur, initial encounter [facility])  Active Problems:    Coronary artery disease involving coronary bypass graft    Hyperlipidemia, mixed    Stage 3a chronic kidney disease ([facility] [address] )    CHF (congestive heart failure) ([facility])    Paroxysmal atrial fibrillation ([facility] [address] )    Past Medical History  Past Medical History:   Diagnosis Date    CHF (congestive heart failure) ([facility] )     Coronary artery disease     Hyperlipidemia     Hypertension     Paroxysmal atrial fibrillation ([facility] )      Past Surgical History  Past Surgical History:   Procedure Laterality Date    CARDIAC SURGERY      cabg x 2 2014 [facility]    CORONARY ANGIOPLASTY WITH STENT PLACEMENT  10/2021    DAPHNEY to left main and ramus    MITRAL VALVE REPAIR  2014    mitral ring    LA OPEN RX FEMUR FX+INTRAMED HALIMA Right 11/1/2022    Procedure: INSERTION NAIL IM FEMUR ANTEGRADE (TROCHANTERIC); Surgeon: Zana Wilson; Location:  MAIN OR;  Service: Orthopedics         11/02/22 1033   Note Type   Note type Evaluation   Pain Assessment   Pain Assessment Tool FLACC   Pain Location/Orientation Orientation: Right;Location: Hip   Pain Rating: FLACC (Rest) - Face 0   Pain Rating: FLACC (Rest) - Legs 0   Pain Rating: FLACC (Rest) - Activity 0   Pain Rating: FLACC (Rest) - Cry 0   Pain Rating: FLACC (Rest) - Consolability 0   Score: FLACC (Rest) 0   Pain Rating: FLACC (Activity) - Face 1   Pain Rating: FLACC (Activity) - Legs 0   Pain Rating: FLACC (Activity) - Activity 0   Pain Rating: FLACC (Activity) - Cry 1   Pain Rating: FLACC (Activity) - Consolability 0   Score: FLACC (Activity) 2   Restrictions/Precautions   Other Precautions Chair Alarm; Fall Risk;Pain;Multiple lines   Home Living   Type of Home House  (4+1 JACOBO R HR)   Home Layout One level;1/2 bath on main level;Bed/bath upstairs  (stair glide to 2nd floor)   Bathroom Shower/Tub Tub/shower unit   Bathroom Toilet Standard   Bathroom Equipment Grab bars in shower; Shower chair  (was not using shower chair PRN)   Home Equipment Walker;Cane;Stair glide  (RW in the home  SPC outside of home)   Additional Comments Pt reports living in a 2 storty home with 4+1 JACOBO  Pt ambulates with RW at baseline   Prior Function   Level of [place] Independent with ADLs; Independent with IADLS; Independent with functional mobility   Lives With Spouse   Receives Help From Family   IADLs Independent with driving; Independent with meal prep   Falls in the last 6 months 1 to 4   Comments Pt reports completing ADLs, light IADLs, functional ambulation and community mobility + driving @ Mod I  Pt reports having assistance for IADLs from family   ADL   Where Assessed Chair   Grooming Assistance 5  Supervision/Setup   Grooming Deficit Setup   UB Dressing Assistance 5  Supervision/Setup    [address] 2  Maximal Assistance   LB Dressing Deficit Steadying; Requires assistive device for steadying;Verbal cueing;Supervision/safety; Increased time to complete; Don/doff R sock; Don/doff L sock; Thread RLE into pants; Thread LLE into pants;Pull up over hips   Additional Comments Pt completing ADL tasks while seated at EOB  UB Dressing and grooming tasks @ S after set-up  LB dressing @ Max A for donning R socks and pants  Pt able to dary L sock @ Min A with increased time  Max A for CM around waist while satnding at RW d/t requiring BUE supported   Bed Mobility   Additional Comments Pt seated OOB at start and end of session with call bell in reach, chair alarm intact and all needs met at this time   Transfers   Sit to Stand 4  Minimal assistance   Additional items Assist x 1; Increased time required;Verbal cues   Stand to Sit   [facility] Assist)   Additional items Assist x 1; Increased time required;Verbal cues   Stand pivot 3  Moderate assistance   Additional items Assist x 1; Increased time required;Verbal cues   Additional Comments Pt completing functional transfers with use of RW for UB support  Min A for STS with increased time and vc'ing for technique and forward flexion  SPT @ Mod A for weight shifting, RLE progressiona nd RW management   Balance   Static Sitting Good   Dynamic Sitting Fair +   Static Standing Fair -   Dynamic Standing Poor +   Activity Tolerance   Activity Tolerance Patient limited by fatigue;Patient limited by pain   Medical Staff Made Aware Spoke with PT, Victoria   Nurse Made Aware Spoke with RNLuz   RUMAGED Assessment   RUE Assessment WFL   LUE Assessment   LUE Assessment WFL   Hand Function   Gross Motor Coordination Functional   Fine Motor Coordination Functional   Sensation   Light Touch No apparent deficits   Cognition   Overall Cognitive Status WFL   Arousal/Participation Alert; Responsive; Cooperative   Attention Attends with cues to redirect   Orientation Level Oriented X4   Memory Within functional limits   Following Commands Follows one step commands without difficulty   Assessment   Limitation Decreased ADL status; Decreased UE strength;Decreased Safe judgement during ADL;Decreased endurance;Decreased self-care trans;Decreased high-level ADLs   Prognosis Good   Assessment Pt is a 80 y o  male, admitted to [address] [place] [address] 10/31/2022 d/t experiencing a fall at home resulting in R hip pain  Dx: closed intertrochanteric fracture of R proximal end of femur  Pt underwent surgical intervention on 11/1/2022 - INSERTION NAIL IM FEMUR ANTEGRADE (TROCHANTERIC) (Right)  Pt is WBAT RLE  Pt with PMHx impacting their performance during ADL tasks, including: CHF, CAD, hyperlipidemia, HTN, a-fib  Prior to admission to the hospital Pt was performing ADLs without physical assistance  IADLs without physical assistance  Functional transfers/ambulation without physical assistance  Cognitive status was PTA was intact   OT order placed to assess Pt's ADLs, cognitive status, and performance during functional tasks in order to maximize safety and independence while making most appropriate d/c recommendations  Pt's clinical presentation is currently unstable/unpredictable given new onset deficits that effect Pt's occupational performance and ability to safely return to PLOF including decrease activity tolerance, decrease standing balance, decrease performance during ADL tasks, decrease safety awareness , decrease UB MS, increased pain, decrease generalized strength, decrease activity engagement, decrease performance during functional transfers, steps to enter home, high fall risk and limited insight to deficits combined with medical complications of hypotension, pain impacting overall mobility status, abnormal H&H, abnormal CBC, edema/swelling, decreased skin integrity, fear/retreat and need for input for mobility technique/safety  Personal factors affecting Pt at time of initial evaluation include: inaccessible home environment, step(s) to enter environment, multi-level environment, availability as recommended, advanced age, past experience, inability to perform current job functions, inability to perform IADLs, inability to perform ADLs, inability to ambulate household distances, inability to navigate community distances, limited insight into impairments, decreased initiation and engagement and recent fall(s)/fall history  Pt will benefit from continued skilled OT services to address deficits as defined above and to maximize level independence/participation during ADLs and functional tasks to facilitate return toward PLOF and improved quality of life  From an occupational therapy standpoint, recommendation at time of d/c would be post acute rehab  Plan   Treatment Interventions ADL retraining;Functional transfer training;UE strengthening/ROM; Endurance training;Equipment evaluation/education;Patient/family training;Neuromuscular reeducation; Compensatory technique education;Continued evaluation; Energy conservation; Activityengagement   Goal Expiration Date 11/16/22   OT Frequency 3-5x/wk   Recommendation   OT Discharge Recommendation Post acute rehabilitation services   [facility]   Lower Body Dressing 1   Bathing 2   Toileting 2   Upper Body Dressing 3   Grooming 3   Eating 4   Daily Activity Raw Score 15   Daily Activity Standardized Score (Calc for Raw Score >=11) 34 69   AM-[facility] Applied Cognition Inpatient   Following a Speech/Presentation 4   Understanding Ordinary Conversation 4   Taking Medications 3   Remembering Where Things Are Placed or Put Away 4   Remembering List of 4-5 Errands 4   Taking Care of Complicated Tasks 3   Applied Cognition Raw Score 22   Applied Cognition Standardized Score 47 83     The patient's raw score on the AM-PAC Daily Activity inpatient short form is 15, standardized score is 34 69, less than 39 4  Patients at this level are likely to benefit from DC to post-acute rehabilitation services  Please refer to the recommendation of the Occupational Therapist for safe DC planning  Pt goals to be met by 11/16/2022      Pt will demonstrate ability to complete supine<>sit @ Mod I in order to increase safety and independence during ADL tasks  Pt will demonstrate ability to complete LB dressing @ Mod I in order to increase safety and independence during meaningful tasks  Pt will demonstrate ability to dary/doff socks/shoes while sitting EOB @ Mod I in order to increase safety and independence during meaningful tasks  Pt will demonstrate ability to complete toileting tasks including CM and pericare @ Mod I in order to increase safety and independence during meaningful tasks  Pt will demonstrate ability to complete EOB, chair, toilet/commode transfers @ Mod I in order to increase performance and participation during functional tasks    Pt will demonstrate ability to stand for 7-8 minutes while maintaining G balance with use of RW for UB support PRN  Pt will demonstrate ability to tolerate 30-35 minute OT session with no vc'ing for deep breathing or use of energy conservation techniques in order to increase activity tolerance during functional tasks  Pt will demonstrate Good carryover of use of energy conservation/compensatory strategies during ADLs and functional tasks in order to increase safety and reduce risk for falls  Pt will demonstrate Good attention and participation in continued evaluation of functional ambulation house hold distances in order to assist with safe d/c planning  Pt will attend to continued cognitive assessments 100% of the time in order to provide most appropriate d/c recommendations  Pt will follow 100% simple 2-step commands and be A&O x4 consistently with environmental cues to increase participation in functional activities  Pt will identify 3 areas of interest/hobbies and 1 intervention on how to incorporate into daily life in order to increase interaction with environment and peers as well as increase participation in meaningful tasks  Pt will demonstrate 100% carryover of BUE HEP in order to increase BUE MS and increase performance during functional tasks upon d/c home      Panfilo Paez OTR/L

## 2022-11-02 NOTE — PLAN OF CARE
Problem: OCCUPATIONAL THERAPY ADULT  Goal: Performs self-care activities at highest level of function for planned discharge setting  See evaluation for individualized goals  Description: Treatment Interventions: ADL retraining, Functional transfer training, UE strengthening/ROM, Endurance training, Equipment evaluation/education, Patient/family training, Neuromuscular reeducation, Compensatory technique education, Continued evaluation, Energy conservation, Activityengagement          See flowsheet documentation for full assessment, interventions and recommendations  Note: Limitation: Decreased ADL status, Decreased UE strength, Decreased Safe judgement during ADL, Decreased endurance, Decreased self-care trans, Decreased high-level ADLs  Prognosis: Good  Assessment: Pt is a 80 y o  male, admitted to [facility] 10/31/2022 d/t experiencing a fall at home resulting in R hip pain  Dx: closed intertrochanteric fracture of R proximal end of femur  Pt underwent surgical intervention on 11/1/2022 - INSERTION NAIL IM FEMUR ANTEGRADE (TROCHANTERIC) (Right)  Pt is WBAT RLE  Pt with PMHx impacting their performance during ADL tasks, including: CHF, CAD, hyperlipidemia, HTN, a-fib  Prior to admission to the hospital Pt was performing ADLs without physical assistance  IADLs without physical assistance  Functional transfers/ambulation without physical assistance  Cognitive status was PTA was intact  OT order placed to assess Pt's ADLs, cognitive status, and performance during functional tasks in order to maximize safety and independence while making most appropriate d/c recommendations   Pt's clinical presentation is currently unstable/unpredictable given new onset deficits that effect Pt's occupational performance and ability to safely return to PLOF including decrease activity tolerance, decrease standing balance, decrease performance during ADL tasks, decrease safety awareness , decrease UB MS, increased pain, decrease generalized strength, decrease activity engagement, decrease performance during functional transfers, steps to enter home, high fall risk and limited insight to deficits combined with medical complications of hypotension, pain impacting overall mobility status, abnormal H&H, abnormal CBC, edema/swelling, decreased skin integrity, fear/retreat and need for input for mobility technique/safety  Personal factors affecting Pt at time of initial evaluation include: inaccessible home environment, step(s) to enter environment, multi-level environment, availability as recommended, advanced age, past experience, inability to perform current job functions, inability to perform IADLs, inability to perform ADLs, inability to ambulate household distances, inability to navigate community distances, limited insight into impairments, decreased initiation and engagement and recent fall(s)/fall history  Pt will benefit from continued skilled OT services to address deficits as defined above and to maximize level independence/participation during ADLs and functional tasks to facilitate return toward PLOF and improved quality of life  From an occupational therapy standpoint, recommendation at time of d/c would be post acute rehab       OT Discharge Recommendation: Post acute rehabilitation services

## 2022-11-02 NOTE — CASE MANAGEMENT
Case Management Progress Note    Patient name Wolf Begin  Location /-01 MRN [id number]  : 1940 Date 2022       LOS (days): 2  Geometric Mean LOS (GMLOS) (days): 4 40  Days to GMLOS:2 4        OBJECTIVE:        Current admission status: Inpatient  Preferred Pharmacy:   [address] [place] [address]  Phone: [phone] Fax: [address]  [facility]  Phone: [phone] Fax: [phone]    Primary Care Provider: Kaykay Langford DO    Primary Insurance: MEDICARE  Secondary Insurance: BLUE CROSS    PROGRESS NOTE:    CM discussed Advance Care Planning with primary provider based on scores of Goals of Care systems list

## 2022-11-02 NOTE — PHYSICAL THERAPY NOTE
PHYSICAL THERAPY EVALUATION  NAME:  Julissa Singh  DATE: 11/02/22    AGE:   80 y o  Mrn:   [id number]  ADMIT DX:  Closed fracture of right hip, initial encounter [facility]) [S72 001A]  Closed right hip fracture, initial encounter ([place] [address] ) [S72 001A]  Closed 2-part intertrochanteric fracture of proximal end of right femur, initial encounter ([place] [address] ) [S72 141A]  Unspecified multiple injuries, initial encounter [T07  XXXA]    Past Medical History:   Diagnosis Date   • CHF (congestive heart failure) ([place] [address] )    • Coronary artery disease    • Hyperlipidemia    • Hypertension    • Paroxysmal atrial fibrillation ([place] [address] )      Length Of Stay: 2  Performed at least 2 patient identifiers during session: Name and Birthday  PHYSICAL THERAPY EVALUATION :        11/02/22 1034   Note Type   Note type Evaluation   Pain Assessment   Pain Assessment Tool FLACC   Pain Location/Orientation Orientation: Right;Location: Hip   Pain Rating: FLACC (Rest) - Face 0   Pain Rating: FLACC (Rest) - Legs 0   Pain Rating: FLACC (Rest) - Activity 0   Pain Rating: FLACC (Rest) - Cry 0   Pain Rating: FLACC (Rest) - Consolability 0   Score: FLACC (Rest) 0   Pain Rating: FLACC (Activity) - Face 1   Pain Rating: FLACC (Activity) - Legs 0   Pain Rating: FLACC (Activity) - Activity 0   Pain Rating: FLACC (Activity) - Cry 1   Pain Rating: FLACC (Activity) - Consolability 0   Score: FLACC (Activity) 2   Restrictions/Precautions   Other Precautions Chair Alarm; Bed Alarm; Fall Risk;Pain, RLE WBAT   Home Living   Type of Home House  (4 + 1 JACOBO w/ RHR)   Home Layout Two level;1/2 bath on main level;Bed/bath upstairs   Bathroom Shower/Tub Tub/shower unit   Bathroom Toilet Standard   Bathroom Equipment Shower chair;Grab bars in [facility] [place] glide;Walker;Cane  (RW in home, [facility] out of home)   Additional Comments Pt reports living with spouse in 2  with 4+1 JACOBO, half bath on first floor and stairglide, SPC for community mobility, RW in home   Prior Function   Level of [place] Independent with ADLs; Independent with IADLS; Independent with functional mobility   Lives With Spouse   Receives Help From Family   IADLs Independent with driving; Independent with meal prep   Falls in the last 6 months 1 to 4   Comments Pt reports completing ADLs, IADLs, mobility and drives at mod I   Cognition   Overall Cognitive Status WFL   Orientation Level Oriented X4   Following Commands Follows one step commands without difficulty   RLE Assessment   RLE Assessment X  (hip flexion unable, knee/ankle strength/ROM WNL)   LLE Assessment   LLE Assessment WNL  (4/5 strength)   Light Touch   RLE Light Touch Grossly intact   LLE Light Touch Grossly intact   Bed Mobility   Additional Comments Pt seated OOB at start and end of session; bed mobility not assessed this session   Transfers   Sit to Stand 4  Minimal assistance   Additional items Assist x 1; Increased time required;Verbal cues   Stand to Sit   (steadying assist)   Additional items Increased time required;Verbal cues   Stand pivot 3  Moderate assistance   Additional items Assist x 1; Increased time required;Verbal cues   Additional Comments with RW, VC for hand placement and safety, min for force production, mod x 1 for SPT for weight shifting, RW management and RLE progression   Ambulation/Elevation   Gait pattern Antalgic; Improper Weight shift; Forward Flexion; Wide TED; Decreased R stance;Circumduction; Inconsistent candy; Foward flexed; Short stride; Excessively slow   Gait Assistance 3  Moderate assist   Additional items Assist x 1;Verbal cues   Assistive Device Rolling walker   Distance 10' with RW, pt with fair weight bearing through RLE however unable to complete hip flexion, pt progressed RLE by circumducting   Mod x 1 for weight shifting and RW management with chair follow, limited by fatigue   Balance   Static Sitting Good   Dynamic Sitting Fair +   Static Standing Fair -   Dynamic Standing Poor +   Ambulatory Poor   Endurance Deficit Endurance Deficit Yes   Endurance Deficit Description fatigue, pain   Activity Tolerance   Activity Tolerance Patient limited by fatigue;Patient limited by pain   Medical Staff Made Aware Phillip BALTAZAR   Nurse Made Aware Luz DENIS   Assessment   Prognosis Good   Problem List Decreased strength;Decreased range of motion;Decreased endurance; Impaired balance;Decreased mobility;Pain;Decreased skin integrity   Barriers to Discharge Inaccessible home environment;Decreased caregiver support   Barriers to Discharge Comments Pt requires increased assistance for mobility, limited activity tolerance   Goals   Patient Goals Get better   STG Expiration Date 11/16/22   Plan   Treatment/Interventions Functional transfer training;LE strengthening/ROM; Elevations; Therapeutic exercise; Endurance training;Patient/family training;Equipment eval/education; Bed mobility;Gait training; Compensatory technique education;Spoke to nursing;OT   PT Frequency 3-5x/wk   Recommendation   PT Discharge Recommendation   (acute rehab)   Equipment Recommended   (TBD by rehab)   Rashidaen 8 in Bed Without Bedrails 3   Lying on Back to Sitting on Edge of Flat Bed 3   Moving Bed to Chair 2   Standing Up From Chair 3   Walk in Room 2   Climb 3-5 Stairs 2   Basic Mobility Inpatient Raw Score 15   Basic Mobility Standardized Score 36 97   Highest Level Of Mobility   -[facility] Goal 4: Move to chair/commode   JH-HLM Achieved 6: Walk 10 steps or more   End of Consult   Patient Position at End of Consult Bedside chair; All needs within reach;Bed/Chair alarm activated     The patient's AM-PAC Basic Mobility Inpatient Short Form Raw Score is 15    A Raw score of less than or equal to 16 suggests the patient may benefit from discharge to post-acute rehabilitation services  Please also refer to the recommendation of the Physical Therapist for safe discharge planning      (Please find full objective findings from PT assessment regarding body systems outlined above)  Assessment: Pt is a 80 y o  male seen for PT evaluation s/p admission to [address] [place] [address] 18 on 10/31/2022 with Closed 2-part intertrochanteric fracture of proximal end of right femur, initial encounter ([facility] [address] )  Order placed for PT services  Upon evaluation: Pt is presenting with impaired functional mobility due to decreased strength, decreased ROM, decreased endurance, impaired balance, gait deviations, fall risk, and impaired skin integrity requiring  min - mod x 2 assistance for transfers and mod x 1 assistance for ambulation with RW   Pt's clinical presentation is currently unstable/unpredictable given the functional mobility deficits above coupled with fall risks as indicated by AM-PAC 6-Clicks: 27/60 as well as hx of falls, impaired balance, and polypharmacy and combined with medical complications of pain impacting overall mobility status, abnormal renal lab values, abnormal H&H, abnormal CBC, abnormal sodium values, and need for input for mobility technique/safety  Pt's PMHx and comorbidities that may affect physical performance and progress include: CHF, CKD, A fib, CAD, and orthopedic surgery  Personal factors affecting pt at time of IE include: inaccessible home environment, step(s) to enter environment, multi-level environment, past experience, inability to perform IADLs, inability to navigate level surfaces without external assistance, inability to ambulate household distances, inability to navigate community distances, and recent fall(s)/fall history  Pt will benefit from continued skilled PT services to address deficits as defined above and to maximize level of functional mobility to facilitate return toward PLOF and improved QOL   From PT/mobility standpoint, recommendation at time of d/c would be  acute rehab  pending progress in order to reduce fall risk and maximize pt's functional independence and consistency with mobility in order to facilitate return to PLOF   Recommend trial with walker next 1-2 sessions and ther ex next 1-2 sessions  Goals: Pt will: Perform bed mobility tasks with modified I to reposition in bed and prepare for transfers  Pt will perform transfers with modified I to increase Indep in home environment and prepare for ambulation  Pt will ambulate with RW for >/= 48' with  modified I  to improve gait quality and promote proper use of assistive device and to access home environment  Pt will complete >/= 5 steps with with bilateral handrails with consistent min A of 1 to return to home with JACOBO and return to multilevel home  Increase bilateral LE strength 1/2 grade to facilitate independent mobility and Increase all balance 1/2 grade to decrease risk for falls          Milvia Lopes

## 2022-11-02 NOTE — PLAN OF CARE
Problem: PHYSICAL THERAPY ADULT  Goal: Performs mobility at highest level of function for planned discharge setting  See evaluation for individualized goals  Description: Treatment/Interventions: Functional transfer training, LE strengthening/ROM, Elevations, Therapeutic exercise, Endurance training, Patient/family training, Equipment eval/education, Bed mobility, Gait training, Compensatory technique education, Spoke to nursing, OT  Equipment Recommended:  (TBD by rehab)       See flowsheet documentation for full assessment, interventions and recommendations  Note: Prognosis: Good  Problem List: Decreased strength, Decreased range of motion, Decreased endurance, Impaired balance, Decreased mobility, Pain, Decreased skin integrity  Assessment: Pt is a 80 y o  male seen for PT evaluation s/p admission to [address] [place] [address] on 10/31/2022 with Closed 2-part intertrochanteric fracture of proximal end of right femur, [facility]  Order placed for PT services  Upon evaluation: Pt is presenting with impaired functional mobility due to decreased strength, decreased ROM, decreased endurance, impaired balance, gait deviations, fall risk, and impaired skin integrity requiring  min - mod x 2 assistance for transfers and mod x 1 assistance for ambulation with RW   Pt's clinical presentation is currently unstable/unpredictable given the functional mobility deficits above coupled with fall risks as indicated by AM-PAC 6-Clicks: 18/57 as well as hx of falls, impaired balance, and polypharmacy and combined with medical complications of pain impacting overall mobility status, abnormal renal lab values, abnormal H&H, abnormal CBC, abnormal sodium values, and need for input for mobility technique/safety  Pt's PMHx and comorbidities that may affect physical performance and progress include: CHF, CKD, A fib, CAD, and orthopedic surgery   Personal factors affecting pt at time of IE include: inaccessible home environment, step(s) to enter environment, multi-level environment, past experience, inability to perform IADLs, inability to navigate level surfaces without external assistance, inability to ambulate household distances, inability to navigate community distances, and recent fall(s)/fall history  Pt will benefit from continued skilled PT services to address deficits as defined above and to maximize level of functional mobility to facilitate return toward PLOF and improved QOL  From PT/mobility standpoint, recommendation at time of d/c would be  acute rehab  pending progress in order to reduce fall risk and maximize pt's functional independence and consistency with mobility in order to facilitate return to PLOF  Recommend trial with walker next 1-2 sessions and ther ex next 1-2 sessions  Barriers to Discharge: Inaccessible home environment, Decreased caregiver support  Barriers to Discharge Comments: Pt requires increased assistance for mobility, limited activity tolerance  PT Discharge Recommendation:  (acute rehab)    See flowsheet documentation for full assessment

## 2022-11-02 NOTE — ASSESSMENT & PLAN NOTE
Wt Readings from Last 3 Encounters:   11/01/22 64 4 kg (142 lb)   06/04/22 64 7 kg (142 lb 10 2 oz)   03/22/22 63 5 kg (140 lb)     · Most recent echo EF 36%, systolic and diastolic function wnl   · Euvolemic on admission  · Got aldactone on 11/2, however would continue to hold diuretics  · Daily weight, Intake and output  · Fluid restricted/sodium restricted/low-cholesterol diet  · Monitor volume status during hospitalization  · Appears euvolemic on exam

## 2022-11-02 NOTE — ASSESSMENT & PLAN NOTE
Lab Results   Component Value Date    EGFR 31 11/01/2022    EGFR 26 10/31/2022    EGFR 34 03/04/2022    CREATININE 1 94 (H) 11/01/2022    CREATININE 2 22 (H) 10/31/2022    CREATININE 1 81 (H) 03/04/2022     · Baseline appears to be approximately 1 9-2 2 on review of records  · On admission does not qualify for JOEL criteria  · Avoid nephrotoxins, relative hypotension, and NSAIDs if possible   Got aldactone on 11/2 however would continue to hold further diuretics  · Renally dose medications as appropriate  · Cr rising to 2 5 today  · No void since yesterday- straight cathed 492cc, was bladder scanning for significantly lower volume (251cc)  · Continue flomax  · Urine studies pending  · I&O, standing weight - noted PTA lasix and aldactone held

## 2022-11-02 NOTE — ASSESSMENT & PLAN NOTE
· Anticoagulated with Eliquis; on hold preoperatively; resume when cleared by orthopedic surgery  · Rate/rhythm controlled on amiodarone and toprol; continue while inpatient  · Likely resume Eliquis tomorrow received this morning - noted Hgb dropped from 10 > 7 5  · Repeat labs  · Ortho recommending post op -daily eliquis for 1 week then BID

## 2022-11-02 NOTE — PROGRESS NOTES
[address]  Progress Note Valarie Dean 1940, 80 y o  male MRN: [id number]  Unit/Bed#: -[place] Encounter: [id number]  Primary Care Provider: Crystal Randolph DO   Date and time admitted to hospital: 10/31/2022 12:35 PM    * Closed 2-part intertrochanteric fracture of proximal end of right femur, initial encounter [facility])  Assessment & Plan  Background: Presented to the ED status post mechanical fall leaving house  Reports missing a step in ending up falling onto door    • Xray: acute right hip fracture  • Anticoagulation eliquis on hold; to resume postoperatively  • Type and screen ordered   • Hgb 11 1 on admission; monitor closely and transfuse PRN  • Pain management with geriatric pain regimen  • Early mobilization when cleared  • Incentive spirometry   • Orthopedic surgery consulted; input appreciated   o POD 1: right femoral TFN  o WBAT  o eliquis daily 1 week, then resume BID  o Repeat Hgb with >2g drop overnight 10 >7 5  • PT/OT consulted; recommending rehab   o Case management on board to assist with dispo      Paroxysmal atrial fibrillation (HCC)  Assessment & Plan  · Anticoagulated with Eliquis; on hold preoperatively; resume when cleared by orthopedic surgery  · Rate/rhythm controlled on amiodarone and toprol; continue while inpatient  · Likely resume Eliquis tomorrow received this morning - noted Hgb dropped from 10 > 7 5  · Repeat labs  · Ortho recommending post op -daily eliquis for 1 week then BID       CHF (congestive heart failure) ([facility] [address] )  Assessment & Plan  Wt Readings from Last 3 Encounters:   11/01/22 64 4 kg (142 lb)   06/04/22 64 7 kg (142 lb 10 2 oz)   03/22/22 63 5 kg (140 lb)     · Most recent echo EF 94%, systolic and diastolic function wnl   · Euvolemic on admission  · Got aldactone on 11/2, however would continue to hold diuretics  · Daily weight, Intake and output  · Fluid restricted/sodium restricted/low-cholesterol diet  · Monitor volume status during hospitalization  · Appears euvolemic on exam    Stage 3a chronic kidney disease [facility])  Assessment & Plan  Lab Results   Component Value Date    EGFR 31 11/01/2022    EGFR 26 10/31/2022    EGFR 34 03/04/2022    CREATININE 1 94 (H) 11/01/2022    CREATININE 2 22 (H) 10/31/2022    CREATININE 1 81 (H) 03/04/2022     · Baseline appears to be approximately 1 9-2 2 on review of records  · On admission does not qualify for JOEL criteria  · Avoid nephrotoxins, relative hypotension, and NSAIDs if possible  Got aldactone on 11/2 however would continue to hold further diuretics  · Renally dose medications as appropriate  · Cr rising to 2 5 today  · No void since yesterday- straight cathed 492cc, was bladder scanning for significantly lower volume (251cc)  · Continue flomax  · Urine studies pending  · I&O, standing weight - noted PTA lasix and aldactone held     Hyperlipidemia, mixed  Assessment & Plan  · Continue statin therapy    Coronary artery disease involving coronary bypass graft  Assessment & Plan  · S/p CABG x4 and valve replacement in 2013 or 2014 per wife  · Continue Plavix per PTA medication regimen  · Currently without any shortness of breath or chest pain  · EKG this admission Sinus bradycardia with 1st degree A-V block  · Rates currently 60-80s      VTE Pharmacologic Prophylaxis: VTE Score: 8 High Risk (Score >/= 5) - Pharmacological DVT Prophylaxis Ordered: apixaban (Eliquis)  Sequential Compression Devices Ordered  Patient Centered Rounds: I performed bedside rounds with nursing staff today  Discussions with Specialists or Other Care Team Provider:     Education and Discussions with Family / Patient: Updated  (wife) via phone  Time Spent for Care: 45 minutes  More than 50% of total time spent on counseling and coordination of care as described above      Current Length of Stay: 2 day(s)  Current Patient Status: Inpatient   Certification Statement: The patient will continue to require additional inpatient hospital stay due to POD 1, creatinine increase urine retention, and Hgb drop  Discharge Plan: Anticipate discharge in 24-48 hrs to rehab facility  Code Status: Level 1 - Full Code    Subjective:   Pt reports no voiding     Objective:     Vitals:   Temp (24hrs), Av 9 °F (36 6 °C), Min:97 5 °F (36 4 °C), Max:98 4 °F (36 9 °C)    Temp:  [97 5 °F (36 4 °C)-98 4 °F (36 9 °C)] 98 4 °F (36 9 °C)  HR:  [73-84] 77  Resp:  [17-20] 17  BP: (102-138)/(49-88) 138/63  SpO2:  [93 %-97 %] 96 %  Body mass index is 22 79 kg/m²  Input and Output Summary (last 24 hours): Intake/Output Summary (Last 24 hours) at 2022 1520  Last data filed at 2022 1500  Gross per 24 hour   Intake 3005 42 ml   Output 492 ml   Net 2513 42 ml       Physical Exam:   Physical Exam  Vitals and nursing note reviewed  Constitutional:       Appearance: He is well-developed  HENT:      Head: Normocephalic and atraumatic  Mouth/Throat:      Mouth: Mucous membranes are moist    Eyes:      Conjunctiva/sclera: Conjunctivae normal       Pupils: Pupils are equal, round, and reactive to light  Cardiovascular:      Rate and Rhythm: Normal rate and regular rhythm  Heart sounds: Normal heart sounds  No murmur heard  Pulmonary:      Effort: Pulmonary effort is normal  No respiratory distress  Breath sounds: Normal breath sounds  Abdominal:      General: Bowel sounds are normal  There is no distension  Palpations: Abdomen is soft  Tenderness: There is no abdominal tenderness  Musculoskeletal:      Cervical back: Neck supple  Right lower leg: No edema  Left lower leg: No edema  Skin:     General: Skin is warm and dry  Comments: WBAT right, unable to view surgical site     Neurological:      General: No focal deficit present  Mental Status: He is alert  Psychiatric:         Mood and Affect: Mood normal          Thought Content:  Thought content normal           Additional Data: Labs:  Results from last 7 days   Lab Units 11/02/22  0436 11/01/22  0506   WBC Thousand/uL 13 63* 9 12   HEMOGLOBIN g/dL 7 5* 10 1*   HEMATOCRIT % 22 4* 30 8*   PLATELETS Thousands/uL 152 127*   NEUTROS PCT %  --  75   LYMPHS PCT %  --  12*   MONOS PCT %  --  12   EOS PCT %  --  1     Results from last 7 days   Lab Units 11/02/22  0436 11/01/22  0506   SODIUM mmol/L 134* 136   POTASSIUM mmol/L 5 0 4 3   CHLORIDE mmol/L 99 102   CO2 mmol/L 25 27   BUN mg/dL 50* 35*   CREATININE mg/dL 2 54* 1 94*   ANION GAP mmol/L 10 7   CALCIUM mg/dL 7 9* 7 9*   ALBUMIN g/dL  --  3 1*   TOTAL BILIRUBIN mg/dL  --  1 38*   ALK PHOS U/L  --  100   ALT U/L  --  34   AST U/L  --  18   GLUCOSE RANDOM mg/dL 163* 105     Results from last 7 days   Lab Units 11/01/22  0506   INR  1 31*                   Lines/Drains:  Invasive Devices  Report    Peripheral Intravenous Line  Duration           Peripheral IV 10/31/22 Left Antecubital 2 days                      Imaging: Reviewed radiology reports from this admission including: xray(s)    Recent Cultures (last 7 days):         Last 24 Hours Medication List:   Current Facility-Administered Medications   Medication Dose Route Frequency Provider Last Rate   • acetaminophen  975 mg Oral [facility] Liliana Ayala PA-C     • albuterol  2 puff Inhalation Q6H PRN Liliana Ayala PA-C     • amiodarone  200 mg Oral Daily Liliana Ayala PA-C     • atorvastatin  40 mg Oral Daily Liliana Ayala PA-C     • calcium carbonate  1,000 mg Oral Daily PRN Liliana Ayala PA-C     • fentaNYL  25 mcg Intravenous Q3 min PRN Johnnie Alonso DO     • HYDROmorphone  0 4 mg Intravenous Q5 Min PRN Johnnie Alonso DO     • HYDROmorphone  0 2 mg Intravenous Q4H PRN Liliana Ayala PA-C     • lactated ringers  100 mL/hr Intravenous Continuous Yancy Gambino PA-C Stopped (11/02/22 1500)   • lidocaine  1 patch Topical Daily Liliana Ayala PA-C     • metoprolol tartrate  100 mg Oral Q12H [address] Liliana Ayala PA-C     • naloxone 0 04 mg Intravenous Q1MIN PRN Migdalia Mccarthy PA-C     • ondansetron  4 mg Intravenous Q6H PRN Migdalia Mccarthy PA-C     • oxyCODONE  2 5 mg Oral Q4H PRN Migdalia Mccarthy PA-C     • oxyCODONE  5 mg Oral Q4H PRN Migdalia Mccarthy PA-C     • polyethylene glycol  17 g Oral Daily PRN Migdalia Mccarthy PA-C     • senna-docusate sodium  1 tablet Oral BID Migdalia Mccarthy PA-C     • tamsulosin  0 4 mg Oral Daily With Dinner Migdalia Mccarthy PA-C          Today, Patient Was Seen By: Edward Alvarez    **Please Note: This note may have been constructed using a voice recognition system  **

## 2022-11-02 NOTE — QUICK NOTE
Repeat labs with critical Hgb 6 9 will transfuse with 2 units PRBC  Will hold eliquis at this time( did receive dose today)  Right LLE edema present without ecchymosis, pain, scant drainage on dressing  also noted creatinine continuing to rise, hyponatremia now 131 urine sodium/creatinine pending   Will add nephrology consult and US kidney/bladder

## 2022-11-03 ENCOUNTER — TELEPHONE (OUTPATIENT)
Dept: OTHER | Facility: HOSPITAL | Age: 82
End: 2022-11-03

## 2022-11-03 DIAGNOSIS — R33.9 URINARY RETENTION: Primary | ICD-10-CM

## 2022-11-03 PROBLEM — N18.9 ACUTE-ON-CHRONIC KIDNEY INJURY (HCC): Status: ACTIVE | Noted: 2022-02-28

## 2022-11-03 PROBLEM — D62 ACUTE BLOOD LOSS ANEMIA: Status: ACTIVE | Noted: 2022-11-03

## 2022-11-03 PROBLEM — E87.1 HYPONATREMIA: Status: ACTIVE | Noted: 2022-11-03

## 2022-11-03 LAB
ANION GAP SERPL CALCULATED.3IONS-SCNC: 9 MMOL/L (ref 4–13)
BUN SERPL-MCNC: 56 MG/DL (ref 5–25)
CALCIUM SERPL-MCNC: 7.2 MG/DL (ref 8.3–10.1)
CHLORIDE SERPL-SCNC: 98 MMOL/L (ref 96–108)
CO2 SERPL-SCNC: 24 MMOL/L (ref 21–32)
CREAT SERPL-MCNC: 2.5 MG/DL (ref 0.6–1.3)
CREAT UR-MCNC: 34.2 MG/DL
CREAT UR-MCNC: 34.2 MG/DL
ERYTHROCYTE [DISTWIDTH] IN BLOOD BY AUTOMATED COUNT: 16.8 % (ref 11.6–15.1)
GFR SERPL CREATININE-BSD FRML MDRD: 23 ML/MIN/1.73SQ M
GLUCOSE SERPL-MCNC: 113 MG/DL (ref 65–140)
HCT VFR BLD AUTO: 22.1 % (ref 36.5–49.3)
HCT VFR BLD AUTO: 23.9 % (ref 36.5–49.3)
HGB BLD-MCNC: 7.5 G/DL (ref 12–17)
HGB BLD-MCNC: 7.8 G/DL (ref 12–17)
MCH RBC QN AUTO: 32.2 PG (ref 26.8–34.3)
MCHC RBC AUTO-ENTMCNC: 33.9 G/DL (ref 31.4–37.4)
MCV RBC AUTO: 95 FL (ref 82–98)
MICROALBUMIN UR-MCNC: 22.2 MG/L (ref 0–20)
MICROALBUMIN/CREAT 24H UR: 65 MG/G CREATININE (ref 0–30)
OSMOLALITY UR/SERPL-RTO: 298 MMOL/KG (ref 282–298)
OSMOLALITY UR: 242 MMOL/KG
PLATELET # BLD AUTO: 123 THOUSANDS/UL (ref 149–390)
PMV BLD AUTO: 10.7 FL (ref 8.9–12.7)
POTASSIUM SERPL-SCNC: 4.3 MMOL/L (ref 3.5–5.3)
RBC # BLD AUTO: 2.33 MILLION/UL (ref 3.88–5.62)
SODIUM 24H UR-SCNC: 23 MOL/L
SODIUM SERPL-SCNC: 131 MMOL/L (ref 135–147)
WBC # BLD AUTO: 10.61 THOUSAND/UL (ref 4.31–10.16)

## 2022-11-03 PROCEDURE — 0T9B70Z DRAINAGE OF BLADDER WITH DRAINAGE DEVICE, VIA NATURAL OR ARTIFICIAL OPENING: ICD-10-PCS | Performed by: UROLOGY

## 2022-11-03 RX ORDER — SODIUM CHLORIDE 9 MG/ML
75 INJECTION, SOLUTION INTRAVENOUS CONTINUOUS
Status: DISCONTINUED | OUTPATIENT
Start: 2022-11-03 | End: 2022-11-03

## 2022-11-03 RX ADMIN — METOPROLOL TARTRATE 100 MG: 50 TABLET, FILM COATED ORAL at 09:27

## 2022-11-03 RX ADMIN — METOPROLOL TARTRATE 100 MG: 50 TABLET, FILM COATED ORAL at 22:15

## 2022-11-03 RX ADMIN — AMIODARONE HYDROCHLORIDE 200 MG: 200 TABLET ORAL at 09:26

## 2022-11-03 RX ADMIN — ACETAMINOPHEN 975 MG: 325 TABLET ORAL at 22:15

## 2022-11-03 RX ADMIN — SODIUM CHLORIDE 75 ML/HR: 0.9 INJECTION, SOLUTION INTRAVENOUS at 11:22

## 2022-11-03 RX ADMIN — LIDOCAINE 5% 1 PATCH: 700 PATCH TOPICAL at 17:30

## 2022-11-03 RX ADMIN — OXYCODONE HYDROCHLORIDE 5 MG: 5 TABLET ORAL at 23:40

## 2022-11-03 RX ADMIN — SENNOSIDES, DOCUSATE SODIUM 1 TABLET: 8.6; 5 TABLET ORAL at 17:32

## 2022-11-03 RX ADMIN — OXYCODONE HYDROCHLORIDE 2.5 MG: 5 TABLET ORAL at 09:32

## 2022-11-03 RX ADMIN — ATORVASTATIN CALCIUM 40 MG: 40 TABLET, FILM COATED ORAL at 09:27

## 2022-11-03 RX ADMIN — TAMSULOSIN HYDROCHLORIDE 0.4 MG: 0.4 CAPSULE ORAL at 17:30

## 2022-11-03 RX ADMIN — ACETAMINOPHEN 975 MG: 325 TABLET ORAL at 15:00

## 2022-11-03 RX ADMIN — ACETAMINOPHEN 975 MG: 325 TABLET ORAL at 04:57

## 2022-11-03 RX ADMIN — SENNOSIDES, DOCUSATE SODIUM 1 TABLET: 8.6; 5 TABLET ORAL at 09:27

## 2022-11-03 RX ADMIN — APIXABAN 2.5 MG: 2.5 TABLET, FILM COATED ORAL at 09:27

## 2022-11-03 RX ADMIN — POLYETHYLENE GLYCOL 3350 17 G: 17 POWDER, FOR SOLUTION ORAL at 09:32

## 2022-11-03 NOTE — ASSESSMENT & PLAN NOTE
· Na 133 > 131  · Fractional excretion Na suggesting prerenal - will add gentle IVF NS @ 75  · Completed, without significant improvement  · Repeat urine studies today  · Appreciate nephrology recommendations

## 2022-11-03 NOTE — ASSESSMENT & PLAN NOTE
Wt Readings from Last 3 Encounters:   11/03/22 70 9 kg (156 lb 3 2 oz)   06/04/22 64 7 kg (142 lb 10 2 oz)   03/22/22 63 5 kg (140 lb)     · Most recent echo EF 08%, systolic and diastolic function wnl   · Euvolemic on admission  · Got aldactone on 11/2- continue to hold diuretics  · Daily weight, Intake and output  · Fluid restricted/low-cholesterol diet  · Monitor volume status during hospitalization  · Appears euvolemic on exam

## 2022-11-03 NOTE — CONSULTS
Consultation - Urology   Julissa Singh 80 y o  male MRN: [id number]  Unit/Bed#: -01 Encounter: [id number]  Assessment:  80 y o  male w/ acute retention, POD1 s/p right femoral TFN  Renal US performed 11/2:   - Mild right renal atrophy  No hydronephrosis  Prostate enlargement  - Afebrile, VSS on room air  - WBC 10 61 (15 51)  - Hgb stable s/p 2 units PRBCs - 7 5 (7 8, 6 9)  - Stage 3a CKD - Cr - 2 50 (2 66, 2 54, 1 94) - baseline appears 1 9 - 2 2  - Hx Afib on Eliquis - restarted today, CHF, CAD s/p CABG   - Patient w/ straight catheterization performed twice since surgery another bladder scan since for 396 resulted in zavala placement with 700 return    Plan:  - Maintain zavala catheter to drainage  - Likely patient is acutely retaining second to post-operative state  - Monitor I/Os  - Continue flomax  - Based on 700 return with zavala insertion would recommend maintaining catheter for 7-10 days  - TOV will be scheduled as outpatient and if unable to be performed further work-up may be performed at that time  - Co-morbidities per primary team  _______________________________________________________________  Physician Requesting Consult: Ernie Mccartney MD    Additional consultants: nephrology, ortho    Reason for Consult / Principal Problem: urinary retention, on flomax    HPI: Julissa Singh is a 80y o  year old male who presented to [facility] ED following hip injury at home after which he was unable to stand/walk  In ED patient was noted to have displaced intertrochanteric fracture of proximal right femur and was taken to the OR for right femoral TFN which was performed 11/1  Early morning of 11/2 patient was noted to have not yet voided  At that time patient had several scans all for less than 200  Continued monitoring led to two straight catheterizations second to elevated bladder scans with return of 492 and 750  Patient then again bladder scanned for 396 with zavala placed and 700 return   This resulted in urology consult  Patient denies any issues with urination prior to surgery  He states he did have the urge to urinate but just felt he was unable to but denies any suprapubic pressure  He denies prior urologic intervention for any reason and has never seen a urologist in the past  He has been taking flomax - ordered by ortho likely pre-operatively and this continued in patient  He denies every having been told about an enlarged prostate  Patient denies associated fevers, chills, nausea, vomiting, or back pain  Historical Information   Past Medical History:   Diagnosis Date   • CHF (congestive heart failure) ([facility] [address] )    • Coronary artery disease    • Hyperlipidemia    • Hypertension    • Paroxysmal atrial fibrillation [facility])      Past Surgical History:   Procedure Laterality Date   • CARDIAC SURGERY      cabg x 2 2014 [facility]   • CORONARY ANGIOPLASTY WITH STENT PLACEMENT  10/2021    DAPHNEY to left main and ramus   • MITRAL VALVE REPAIR  2014    mitral ring   • UT OPEN RX FEMUR FX+INTRAMED HALIMA Right 11/1/2022    Procedure: INSERTION NAIL IM FEMUR ANTEGRADE (TROCHANTERIC); Surgeon: Allison Leonard; Location:  MAIN OR;  Service: Orthopedics     Social History   Social History     Substance and Sexual Activity   Alcohol Use Not Currently     Social History     Substance and Sexual Activity   Drug Use Never     Social History     Tobacco Use   Smoking Status Never Smoker   Smokeless Tobacco Never Used     Family History: non-contributory    Meds/Allergies   Home meds:   Prior to Admission medications    Medication Sig Start Date End Date Taking?  Authorizing Provider   acetaminophen (TYLENOL) 325 mg tablet Take 2 tablets (650 mg total) by mouth every 6 (six) hours as needed for mild pain 3/5/22  Yes Mary Deleon MD   amiodarone 200 mg tablet Take 200 mg by mouth daily   Yes Historical Provider, MD   amLODIPine (NORVASC) 5 mg tablet Take by mouth daily   Yes Historical Provider, MD   apixaban (ELIQUIS) 2 5 mg Take 2 5 mg by mouth 2 (two) times a day   10/25/21  Yes Historical Provider, MD   atorvastatin (LIPITOR) 40 mg tablet Take 40 mg by mouth daily   Yes Historical Provider, MD   clopidogrel (PLAVIX) 75 mg tablet Take 75 mg by mouth daily   Yes Historical Provider, MD   furosemide (LASIX) 20 mg tablet Take 20 mg by mouth 2 (two) times a day   Yes Historical Provider, MD   metoprolol tartrate (LOPRESSOR) 100 mg tablet Take 100 mg by mouth every 12 (twelve) hours   Yes Historical Provider, MD   spironolactone (ALDACTONE) 25 mg tablet Take 25 mg by mouth daily   Yes Historical Provider, MD   tamsulosin (FLOMAX) 0 4 mg Take 0 4 mg by mouth daily with dinner   Yes Historical Provider, MD   albuterol (ProAir HFA) 90 mcg/act inhaler Inhale 2 puffs every 6 (six) hours as needed for wheezing 3/5/22   Mala Iqbal MD   furosemide (LASIX) 40 mg tablet Take 1 tablet (40 mg total) by mouth daily 3/6/22 6/4/22  Mala Iqbal MD   isosorbide mononitrate (IMDUR) 30 mg 24 hr tablet  10/25/21   Historical Provider, MD   metoprolol succinate (TOPROL-XL) 200 MG 24 hr tablet Take 1 tablet (200 mg total) by mouth daily 3/6/22 6/4/22  Mala Iqbal MD   nitroglycerin (NITROSTAT) 0 4 mg SL tablet  10/28/21   Historical Provider, MD   spironolactone (ALDACTONE) 25 mg tablet Take 1 tablet (25 mg total) by mouth daily 3/6/22 6/4/22  Mala Iqbal MD   tamsulosin (FLOMAX) 0 4 mg Take 1 capsule (0 4 mg total) by mouth daily with dinner 3/5/22 6/3/22  Mlaa Iqbal MD     Scheduled Meds:  Current Facility-Administered Medications   Medication Dose Route Frequency Provider Last Rate   • acetaminophen  975 mg Oral [facility] Josafat Tracy PA-C     • albuterol  2 puff Inhalation Q6H PRN Josafat Tracy PA-C     • amiodarone  200 mg Oral Daily Josafat Tracy PA-C     • apixaban  2 5 mg Oral Daily SANDRINE Ram     • atorvastatin  40 mg Oral Daily Josafat Tracy PA-C     • calcium carbonate  1,000 mg Oral Daily PRN Dino Gutierrez OLEGARIO Sexton     • fentaNYL  25 mcg Intravenous Q3 min PRN Michaellda Maria D Vu, DO     • HYDROmorphone  0 4 mg Intravenous Q5 Min PRN Shyam Alonso, DO     • HYDROmorphone  0 2 mg Intravenous Q4H PRN Morganbedjerome Dunlap PA-C     • lidocaine  1 patch Topical Daily Zebed OLEGARIO Dunlap     • metoprolol tartrate  100 mg Oral Q12H [facility] OLGEARIO Dunlap     • naloxone  0 04 mg Intravenous Q1MIN PRN [facility] OLEGARIO Dunlap     • ondansetron  4 mg Intravenous Q6H PRN [facility] OLEGARIO Dunlap     • oxyCODONE  2 5 mg Oral Q4H [facility]N [facility] OLE[facility]lap     • oxyCODONE  5 mg Oral Q4H PRN [facility] OLEGARIO Dunlap     • polyethylene glycol  17 g Oral Daily PRN [facility] OLEGARIO Dunlap     • senna-docusate sodium  1 tablet Oral BID [facility]     • sodium chloride  75 mL/hr Intravenous Continuous Mace Ranks, CRNP     • tamsulosin  0 4 mg Oral Daily With Ian Sexton PA-C       Continuous Infusions:sodium chloride, 75 mL/hr      PRN Meds:  •  albuterol  •  calcium carbonate  •  fentaNYL  •  HYDROmorphone  •  HYDROmorphone  •  naloxone  •  ondansetron  •  oxyCODONE  •  oxyCODONE  •  polyethylene glycol    ALLERGIES: No Known Allergies    Review of Systems:  General: negative for - chills or fever  Cardiovascular: no chest pain or dyspnea on exertion  Respiratory: no cough, shortness of breath, or wheezing  Gastrointestinal: positive for - some increased suprapubic pressure especially during bladder scans and with palpation - relieved at this time  negative for - abdominal pain, appetite loss, change in stools or nausea/vomiting  Genitourinary: no dysuria, trouble voiding, or hematuria prior to arrival, since surgery patient does feel urge to urinate but states he is just unable  Musculoskeletal: positive for - surgical changes of right hip - burning sensation at times   Neurological: negative for - confusion, dizziness or headaches  Hematological and Lymphatic: positive for - acute blood loss anemia - required transfusion this stay  Dermatological : negative  Psychological: negative    Objective   Vitals:  Blood pressure 128/57, pulse 69, temperature 97 7 °F (36 5 °C), resp  rate 18, height 5' 9" (1 753 m), weight 70 9 kg (156 lb 3 2 oz), SpO2 96 %  Body mass index is 23 07 kg/m²  I/Os:  I/O       11/01 0701  11/02 0700 11/02 0701 11/03 0700 11/03 0701 11/04 0700    P  O  120 960 280    I V  (mL/kg) 2501 3 (35 7) 1446 7 (20 4)     Blood  387 1     IV Piggyback  50     Total Intake(mL/kg) 2621 3 (37 4) 2843 8 (40 1) 280 (3 9)    Urine (mL/kg/hr) 350 (0 2) 1242 (0 7) 700 (2 4)    Blood 100      Total Output 450 1242 700    Net +2171 3 +1601 8 -420               Invasive Lines/Tubes:  Invasive Devices  Report    Peripheral Intravenous Line  Duration           Peripheral IV 10/31/22 Left Antecubital 2 days          Drain  Duration           Urethral Catheter Double-lumen 16 Fr  <1 day              Physical Exam  Constitutional:       General: He is not in acute distress  Appearance: Normal appearance  He is normal weight  He is not ill-appearing  HENT:      Right Ear: External ear normal       Left Ear: External ear normal       Nose: Nose normal       Mouth/Throat:      Mouth: Mucous membranes are moist       Pharynx: Oropharynx is clear  Eyes:      Extraocular Movements: Extraocular movements intact  Pupils: Pupils are equal, round, and reactive to light  Cardiovascular:      Rate and Rhythm: Normal rate  Pulmonary:      Effort: Pulmonary effort is normal    Abdominal:      General: Abdomen is flat  Bowel sounds are normal  There is no distension  Tenderness: There is no abdominal tenderness  There is no guarding or rebound  Genitourinary:     Comments: Minimal discomfort of the suprapubic region with palpation  Schaffer is draining appropriately  Clear yellow output noted without any sediment or clot  Musculoskeletal:         General: Normal range of motion  Cervical back: Normal range of motion  Comments: Incision at right hip noted with minimal drainage  Skin:     General: Skin is warm and dry  Capillary Refill: Capillary refill takes less than 2 seconds  Neurological:      General: No focal deficit present  Mental Status: He is alert and oriented to person, place, and time  Psychiatric:         Mood and Affect: Mood normal          Behavior: Behavior normal      Lab Results and Cultures:   COVID:   Last COVID19 Screening Values     None         CBC:   Results from last 7 days   Lab Units 11/03/22  0456 11/03/22  0046 11/02/22  1608 11/02/22  0436 11/01/22  0506 10/31/22  1304   WBC Thousand/uL 10 61*  --  15 51* 13 63* 9 12 7 44   HEMOGLOBIN g/dL 7 5* 7 8* 6 9* 7 5* 10 1* 11 1*   HEMATOCRIT % 22 1* 23 9* 20 8* 22 4* 30 8* 34 5*   PLATELETS Thousands/uL 123*  --  148* 152 127* 146*     BMP/CMP:  Results from last 7 days   Lab Units 11/03/22  0456 11/02/22  1608 11/02/22  0436 11/01/22  0506 10/31/22  1304   POTASSIUM mmol/L 4 3 4 8 5 0 4 3 4 0   CHLORIDE mmol/L 98 96 99 102 103   CO2 mmol/L 24 27 25 27 28   BUN mg/dL 56* 53* 50* 35* 37*   CREATININE mg/dL 2 50* 2 66* 2 54* 1 94* 2 22*   CALCIUM mg/dL 7 2* 7 9* 7 9* 7 9* 8 2*     Coags:   Results from last 7 days   Lab Units 11/01/22  0506   INR  1 31*     Lipid panel:     HgbA1c: No results found for: HGBA1C    Urinalysis:   Lab Results   Component Value Date    COLORU Yellow 04/21/2021    CLARITYU Slightly Cloudy 04/21/2021    SPECGRAV 1 020 04/21/2021    PHUR 6 0 04/21/2021    LEUKOCYTESUR Small (A) 04/21/2021    NITRITE Negative 04/21/2021    GLUCOSEU Negative 04/21/2021    KETONESU Negative 04/21/2021    BILIRUBINUR Negative 04/21/2021    BLOODU Small (A) 04/21/2021   ,   Urine Culture:   Lab Results   Component Value Date    URINECX >100,000 cfu/ml Klebsiella pneumoniae (A) 04/21/2021     Wound Culure: No results found for: WOUNDCULT  Blood Culture:   Lab Results   Component Value Date    BLOODCX No Growth After 5 Days   03/02/2022 BLOODCX No Growth After 5 Days  03/02/2022     Imaging Studies: I have personally reviewed pertinent reports  EKG, Pathology, and Other Studies: I have personally reviewed pertinent reports  VTE Prophylaxis: Eliquis restarted today     Code Status: Level 1 - Full Code  Advance Directive and Living Will:      Power of :    POLST:      Counseling / Coordination of Care  Counseling/Coordination of Care: Total floor / unit time spent today 30 minutes  Greater than 50% of total time was spent with the patient and / or family counseling and / or coordination of care  A description of the counseling / coordination of care: review of chart, prior history, notes, labs, discussion with urology team, history and physical from patient, asessment, and plan        Deedee Palomino Billig  11/3/2022

## 2022-11-03 NOTE — ASSESSMENT & PLAN NOTE
Background: Presented to the ED status post mechanical fall leaving house  Reports missing a step in ending up falling onto door    • Xray: acute right hip fracture  • Anticoagulation eliquis on hold; resumed 11/2  • Type and screen ordered   • Hgb 11 1 on admission; monitor closely and transfuse PRN  o See acute blood loss anemia for detail  • Pain management with geriatric pain regimen  • Early mobilization when cleared  • Incentive spirometry   • Orthopedic surgery consulted; input appreciated   o POD 2: right femoral TFN  o WBAT  o eliquis daily 1 week, then resume BID  o Repeat Hgb with >2g drop overnight 10 >7 5  • PT/OT consulted; recommending rehab   o Case management on board to assist with dispo

## 2022-11-03 NOTE — OCCUPATIONAL THERAPY NOTE
Occupational Therapy Progress Note     Patient Name: Christin Quiles  VXAKE'H Date: 11/3/2022  Problem List  Principal Problem:    Closed 2-part intertrochanteric fracture of proximal end of right femur, initial encounter [facility])  Active Problems:    Coronary artery disease involving coronary bypass graft    Hyperlipidemia, mixed    Acute-on-chronic kidney injury ([facility] [address] )    CHF (congestive heart failure) (HCC)    Paroxysmal atrial fibrillation (HCC)    Acute blood loss anemia    Hyponatremia    Postoperative urinary retention          11/03/22 1233   Note Type   Note Type Treatment   Pain Assessment   Pain Assessment Tool FLACC   Pain Score 7  ("the pain isn't to bad when im sitting here")   Pain Location/Orientation Orientation: Right;Location: Hip   Pain Rating: FLACC (Rest) - Face 0   Pain Rating: FLACC (Rest) - Legs 0   Pain Rating: FLACC (Rest) - Activity 0   Pain Rating: FLACC (Rest) - Cry 0   Pain Rating: FLACC (Rest) - Consolability 0   Score: FLACC (Rest) 0   Pain Rating: FLACC (Activity) - Face 1   Pain Rating: FLACC (Activity) - Legs 1   Pain Rating: FLACC (Activity) - Activity 0   Pain Rating: FLACC (Activity) - Cry 1   Pain Rating: FLACC (Activity) - Consolability 0   Score: FLACC (Activity) 3   Restrictions/Precautions   Weight Bearing Precautions Per Order Yes   RLE Weight Bearing Per Order WBAT   Other Precautions Chair Alarm; Bed Alarm;Pain; Fall Risk;Multiple lines   ADL   Where Assessed Chair   Eating Assistance 5  Supervision/Setup   Eating Deficit Setup   Grooming Assistance 5  Supervision/Setup   Grooming Deficit Setup   UB Dressing Assistance 5  Supervision/Setup   UB Dressing Deficit Setup;Supervision/safety;Verbal cueing; Increased time to complete; Thread RUE; Thread LUE;Pull over head;Pull around back   LB Dressing Assistance 2  Maximal Assistance   LB Dressing Deficit Steadying; Requires assistive device for steadying;Verbal cueing;Supervision/safety; Increased time to complete; Don/doff R sock;Don/doff L sock; Thread RLE into pants; Thread LLE into pants;Pull up over hips   LB Dressing Comments Max A for donning B socks and pants around feet  Max A for balance and safety while standing at RW  Pt requiring BUE supported at all times to assist with weight baring  Toileting Comments Pt currently has catheter in place   Bed Mobility   Additional Comments Pt seated OOB at start and end of session with call bell in reach, chair alarm intact and all needs met at this time   Transfers   Sit to Stand 3  Moderate assistance   Additional items Assist x 1; Increased time required;Verbal cues   Stand to Sit 4  Minimal assistance   Additional items Assist x 1; Increased time required;Verbal cues   Stand pivot 2  Maximal assistance   Additional items Assist x 1; Increased time required;Verbal cues   Additional Comments Pt completing functional transfers with use of RW for UB support  Mod A for STS and Max A for SPT wit hincreased time and vc'ing for safety/technique  Pt noted to have slight R foot drop which Pt reports is from "an old ankle injury"  Pt requiring cues to  foot when progiressing forward  Right Upper Extremity- Strength   R Shoulder ABduction; Flexion   R Elbow Elbow flexion;Elbow extension   R Wrist Wrist flexion;Wrist extension   R Position Seated   R Weight/Reps/Sets 2 sets of 10   RUE Strength Comment Pt competing BUE HEP while seated upright in recliner chair  Pt completing 2 sets of 10 with exercises focusing on biceps, triceps, and shoulder/chest  Pt tolerating exercises well with Min vc'ing for maintaining proper movements and speed  Exercises completed this date to maximize overall BUE MS and increased safety and independence during ADLs and functional transfers  Pt verbalizes understanding and reports they will complete daily  Pt would benefit from continued review to ensure proper carryover upon d/c from [address] [place] [address]   At end of session, Pt seated OOB in recliner chair with call bell in reach, chair alarm intact and all needs met at this time  Left Upper Extremity-Strength   L Shoulder Flexion;ABduction   L Elbow Elbow flexion;Elbow extension   L Wrist Wrist flexion;Wrist extension   L Position Seated   L Weights/Reps/Sets 2 sets of 10   LUE Strength Comment Pt competing BUE HEP while seated upright in recliner chair  Pt completing 2 sets of 10 with exercises focusing on biceps, triceps, and shoulder/chest  Pt tolerating exercises well with Min vc'ing for maintaining proper movements and speed  Exercises completed this date to maximize overall BUE MS and increased safety and independence during ADLs and functional transfers  Pt verbalizes understanding and reports they will complete daily  Pt would benefit from continued review to ensure proper carryover upon d/c from [address] [place] [address]  At end of session, Pt seated OOB in recliner chair with call bell in reach, chair alarm intact and all needs met at this time  Cognition   Overall Cognitive Status WFL   Arousal/Participation Alert; Responsive; Cooperative   Attention Attends with cues to redirect   Orientation Level Oriented X4   Memory Within functional limits   Following Commands Follows one step commands without difficulty   Activity Tolerance   Activity Tolerance Patient limited by fatigue;Patient limited by pain   Medical Staff Made Aware Spoke with Madeleine Calderon and RN, Paola Ma   Assessment   Assessment Pt seen for treatment session #2 this date  Pt alert and agreeable to participate at this time  Pt completing ADLs and functional transfers with good participation although continues to be limited by pain  Pt participating well in Latesha [address] 15 with occasional cues for maintaining proper movements  Pt demonstrates Good potential to make progress towards goals with continued intensive OT services   Pt is currently limited by decrease activity tolerance, decrease standing balance, decrease performance during ADL tasks, decrease safety awareness , decrease UB MS, increased pain, decrease generalized strength, decrease activity engagement, decrease performance during functional transfers, high fall risk and limited insight to deficits   Pt would benefit from continued acute OT services to address deficits as well as post acute rehab upon d/c from [address] [place] [address]  Plan   Treatment Interventions ADL retraining;Functional transfer training;UE strengthening/ROM; Endurance training;Patient/family training;Equipment evaluation/education; Neuromuscular reeducation; Compensatory technique education;Continued evaluation; Energy conservation; Activityengagement   Goal Expiration Date 11/16/22   OT Treatment Day 1   OT Frequency 3-5x/wk   Recommendation   OT Discharge Recommendation Post acute rehabilitation services   AM-PAC Daily Activity Inpatient   Lower Body Dressing 1   Bathing 2   Toileting 2   Upper Body Dressing 3   Grooming 3   Eating 4   Daily Activity Raw Score 15   Daily Activity Standardized Score (Calc for Raw Score >=11) 34 69   AM-PAC Applied Cognition Inpatient   Following a Speech/Presentation 4   Understanding Ordinary Conversation 4   Taking Medications 3   Remembering Where Things Are Placed or Put Away 4   Remembering List of 4-5 Errands 4   Taking Care of Complicated Tasks 3   Applied Cognition Raw Score 22   Applied Cognition Standardized Score 47 83     Pt goals to be met by 11/16/2022        Pt will demonstrate ability to complete supine<>sit @ Mod I in order to increase safety and independence during ADL tasks  Pt will demonstrate ability to complete LB dressing @ Mod I in order to increase safety and independence during meaningful tasks  Pt will demonstrate ability to dary/doff socks/shoes while sitting EOB @ Mod I in order to increase safety and independence during meaningful tasks  Pt will demonstrate ability to complete toileting tasks including CM and pericare @ Mod I in order to increase safety and independence during meaningful tasks    Pt will demonstrate ability to complete EOB, chair, toilet/commode transfers @ Mod I in order to increase performance and participation during functional tasks  Pt will demonstrate ability to stand for 7-8 minutes while maintaining G balance with use of RW for UB support PRN  Pt will demonstrate ability to tolerate 30-35 minute OT session with no vc'ing for deep breathing or use of energy conservation techniques in order to increase activity tolerance during functional tasks  Pt will demonstrate Good carryover of use of energy conservation/compensatory strategies during ADLs and functional tasks in order to increase safety and reduce risk for falls  Pt will demonstrate Good attention and participation in continued evaluation of functional ambulation house hold distances in order to assist with safe d/c planning  Pt will attend to continued cognitive assessments 100% of the time in order to provide most appropriate d/c recommendations  Pt will follow 100% simple 2-step commands and be A&O x4 consistently with environmental cues to increase participation in functional activities  Pt will identify 3 areas of interest/hobbies and 1 intervention on how to incorporate into daily life in order to increase interaction with environment and peers as well as increase participation in meaningful tasks  Pt will demonstrate 100% carryover of BUE HEP in order to increase BUE MS and increase performance during functional tasks upon d/c home       Robel Melton OTR/L

## 2022-11-03 NOTE — ASSESSMENT & PLAN NOTE
· Anticoagulated with Eliquis; on hold preoperatively; resume when cleared by orthopedic surgery  · Rate/rhythm controlled on amiodarone and toprol; continue while inpatient  · Eliquis resumed 11/2 - noted Hgb dropped from 10 > 7 5  · Repeat labs  · Ortho recommending post op -daily eliquis for 1 week then BID

## 2022-11-03 NOTE — PROGRESS NOTES
-- Patient:  -- MRN: [id number]  -- Aidin Request ID: [id number]  -- Level of care reserved: [facility]  -- Partner Reserved: [facility], [address] [phone]  -- Clinical needs requested:  -- Geography searched: 20 miles around [phone]  -- Start of Service:  -- Request sent: 2:24pm EDT on 11/3/2022 by Zunilda Herr  -- Partner reserved: 3:19pm EDT on 11/3/2022 by Zunilda Herr  -- Choice list shared:

## 2022-11-03 NOTE — ASSESSMENT & PLAN NOTE
· Na 133 > 131  · Fractional excretion Na suggesting prerenal - will add gentle IVF NS @ 75  · Repeat urine studies today

## 2022-11-03 NOTE — CASE MANAGEMENT
Case Management Assessment & Discharge Planning Note    Patient name Christin Quiles  Location /-01 MRN [id number]  : 1940 Date 11/3/2022       Current Admission Date: 10/31/2022  Current Admission Diagnosis:Closed 2-part intertrochanteric fracture of proximal end of right femur, initial encounter [facility])   Patient Active Problem List    Diagnosis Date Noted   • Acute blood loss anemia 2022   • Hyponatremia 2022   • Postoperative urinary retention    • CHF (congestive heart failure) ([facility] [address] ) 10/31/2022   • Paroxysmal atrial fibrillation ([facility] [address] ) 10/31/2022   • Closed 2-part intertrochanteric fracture of proximal end of right femur, initial encounter ([facility] [address] ) 10/31/2022   • Lumbar compression deformity 2022   • Postprocedural pneumothorax 2022   • Acute respiratory failure with hypoxia ([facility] [address] ) 2022   • Acute on chronic diastolic CHF (congestive heart failure) ([facility] [address] ) 2022   • Acute-on-chronic kidney injury ([facility] [address] ) 2022   • Chronic a-fib ([facility] [address] ) 2022   • Stage 3 chronic kidney disease ([facility] [address] ) 2021   • Multiple lung nodules on CT 2021   • Carotid artery stenosis 2021   • Bacteremia due to Pseudomonas 2021   • Coronary artery disease involving coronary bypass graft 2021   • Mitral regurgitation 2021   • Hypertensive urgency 2021   • Hyperlipidemia, mixed 2021      LOS (days): 3  Geometric Mean LOS (GMLOS) (days): 4 40  Days to GMLOS:1 5     OBJECTIVE:    Risk of Unplanned Readmission Score: 27 35         Current admission status: Inpatient       Preferred Pharmacy:   [address]  Phone: [phone] Fax: [address]  [address] [place] [address]  Phone: [phone] Fax: [phone]    Primary Care Provider: Елена Jansen DO    Primary Insurance: MEDICARE  Secondary Insurance: BLUE CROSS    ASSESSMENT:  Active Health Care Proxies    There are no active Health Care Proxies on file  Patient Information  Admitted from[de-identified] Home  Mental Status: Alert  During Assessment patient was accompanied by: Not accompanied during assessment  Assessment information provided by[de-identified] Patient, Spouse  Primary Caregiver: Self  Support Systems: Spouse/significant other, Family members  [place] of Residence: [address] [place] [address] entry access options   Select all that apply : Stairs  Number of steps to enter home : 4  Type of Current Residence: 2 story home  Upon entering residence, is there a bedroom on the main floor (no further steps)?: No  A bedroom is located on the following floor levels of residence (select all that apply):: 2nd Floor  Upon entering residence, is there a bathroom on the main floor (no further steps)?: Yes  Number of steps to 2nd floor from main floor: One Flight  In the last 12 months, was there a time when you were not able to pay the mortgage or rent on time?: No  In the last 12 months, how many places have you lived?: 1  In the last 12 months, was there a time when you did not have a steady place to sleep or slept in a shelter (including now)?: No  Living Arrangements: Lives w/ Spouse/significant other    Activities of Daily Living Prior to Admission  Functional Status: Independent  Completes ADLs independently?: Yes  Ambulates independently?: Yes  Does patient use assisted devices?: Yes  Assisted Devices (DME) used: [employer]  Does patient currently own DME?: Yes  What DME does the patient currently own?: [employer]  Does patient have a history of Outpatient Therapy (PT/OT)?: No  Does the patient have a history of Short-Term Rehab?: No  Does patient have a history of HHC?: No  Does patient currently have [facility]?: No         Patient Information Continued  Income Source: Pension/nursing home  Does patient have prescription coverage?: Yes  Within the past 12 months, you worried that your food would run out before you got the money to buy more : Never true  Within the past 12 months, the food you bought just didn't last and you didn't have money to get more : Never true  Does patient receive dialysis treatments?: No  Does patient have a history of substance abuse?: No  Does patient have a history of Mental Health Diagnosis?: No         Means of Transportation  Means of Transport to Appts[de-identified] Drives Self  In the past 12 months, has lack of transportation kept you from medical appointments or from getting medications?: Yes  In the past 12 months, has lack of transportation kept you from meetings, work, or from getting things needed for daily living?: Yes  Was application for public transport provided?: No        DISCHARGE DETAILS:    Discharge planning discussed with[de-identified] pt and pts wife, who is at bedside  [place] of Choice: Yes     CM contacted family/caregiver?: Yes  Were Treatment Team discharge recommendations reviewed with patient/caregiver?: Yes  Did patient/caregiver verbalize understanding of patient care needs?: Yes  Were patient/caregiver advised of the risks associated with not following Treatment Team discharge recommendations?: Yes    Contacts  Patient Contacts: wife, Lynnette Aguilar  Relationship to Patient[de-identified] Family  Contact Method: In Person  Reason/Outcome: Discharge Planning                   Would you like to participate in our [address] service program?  : No - Declined    Treatment Team Recommendation: Short Term Rehab  Discharge Destination Plan[de-identified] Short Term Rehab                        STR has been recommended for pt at time of discharge  Pt is agreeable to STR  Pt would like referrals placed to [facility] and [facility], as per request, referrals placed in Aidin    A post acute care recommendation was made by your care team for STR  Discussed Freedom of Choice with patient   List of facilities given to patient via in person  patient aware the list is custom filtered for them by preference  and that [facility]'s post acute providers are designated       [facility] has accepted pt at time of discharge

## 2022-11-03 NOTE — CONSULTS
Consultation - Nephrology   Jose Hopkins 80 y o  male MRN: [id number]  Unit/Bed#: -01 Encounter: [id number]      A/P:  1  Acute kidney injury on top of chronic kidney disease versus progression of underlying chronic disease   Patient's creatinine at presentations 2 22 mg/deciliter  Status post maximization of hemodynamics with volume expansion, is creatinine reduced down to 1 94 mg/deciliter  Since that time, patient screening has essentially remained around 2 5 mg/deciliter in the post-op setting despite Schaffer catheter placement  Patient's true baseline creatinine is uncertain at this time, it can certainly be around 1 9 mg/deciliter, however would not be surprising if the patient creatinine does not improve below 2 2 mg/deciliter  Continue to maximize supportive care, and avoid nephrotoxins  Patient has not been exposed to nonsteroidal anti-inflammatory medications in order to IV contrast     Check urine studies, avoid potential nephrotoxins at this time  Will look for the patient's training to improve back down to around 2 2 mg/deciliter  2  Chronic kidney stage IIIB versus progression of underlying chronic kidney disease   As mentioned above, unclear true baseline creatinine  Will hope for the patient to be able to return down to 1 9 mg/deciliter, however his baseline credit may be closer to around 2 2 mg/tested  3  Possible renal vascular disease   Patient's right kidney is Borderline small compared to the left kidney  This may indicate underlying vascular conditions such as renal artery stenosis  Not pursue further at this time but should be evaluated in the outpatient setting with a renal artery Doppler  This may also be the cause for the patient's decline of 15 ML/minute an estimated GFR over the last 1 5 years  4  Hyponatremia   Follow-up urine studies, patient appears to be appropriately euvolemic, agree with Dandre Josephine fluid restriction at this time   Patient's volume expansion did not significantly improve serum sodium level  5  Right intertrochanteric fracture of proximal POD #2   Continue care according to orthopedic colleagues       Thank you for allowing us to participate in the care of your patient  Please feel free to contact us regarding the care of this patient, or any other questions/concerns that may be applicable  Patient Active Problem List   Diagnosis   • Bacteremia due to Pseudomonas   • Coronary artery disease involving coronary bypass graft   • Mitral regurgitation   • Hypertensive urgency   • Hyperlipidemia, mixed   • Carotid artery stenosis   • Stage 3 chronic kidney disease ([facility])   • Multiple lung nodules on CT   • Acute respiratory failure with hypoxia ([facility])   • Acute on chronic diastolic CHF (congestive heart failure) ([facility])   • Acute-on-chronic kidney injury ([facility] [address] )   • Chronic a-fib ([facility])   • Postprocedural pneumothorax   • Lumbar compression deformity   • CHF (congestive heart failure) ([facility])   • Paroxysmal atrial fibrillation ([facility])   • Closed 2-part intertrochanteric fracture of proximal end of right femur, initial encounter [facility] )   • Acute blood loss anemia   • Hyponatremia   • Postoperative urinary retention       History of Present Illness   Physician Requesting Consult: Aileen Noel MD  Reason for Consult / Principal Problem: Acute kidney injury  Hx and PE limited by:   HPI: Natalie Akers is a 80y o  year old male who presented to the emergency room on October 31 due to a fall which resulted in a right hip fracture  Patient apparently stepped out of his front door, tripped on a step and fell on his butt which led to the fracture  Today is postop day 2 status post open reduction internal fixation  As he continues his recovery towards this  From the renal standpoint, the patient was noted to have urinary retention, are urology colleagues have seen the patient and recommended continuation of Schaffer catheter  Despite this, the patient's creatinine remains increased at around 2 5 mg/deciliter  Further history from the kidney function shows that in the spring of 2021 is creatinine was around 1 5 mg/deciliter, by November 2021, his creatinine was up to 1 7 mg/deciliter  In March 2022, creatinine noted to be a 1 8 mg/deciliter  During this admission, the patient's granting, at best, was 1 94 mg/deciliter  Patient's creatinine since that measure was 2 54, 2 66, and this morning is 2 5 mg/deciliter  Patient has a mild hyponatremia of 131 mmol/leader  Notice significant electrode abnormalities currently present  History obtained from spouse, chart review and the patient    Constitutional ROS- Denies fatigue, fever, chills, night sweats, weight changes  HEENT ROS- Denies history of eye surgeries, glaucoma, headaches or history of trauma, blurred vision     Endocrine ROS- No history diabetes mellitus or thyroid disease  Cardiovascular ROS- Denies chest pain, palpitation, dyspnea exertion, orthopnea, claudication  Pulmonary ROS- Denies history of COPD, asthma  Denies cough, hemoptysis, shortness of breath  GI ROS- Denies abdominal pain, diarrhea, nausea, swallowing problems, vomiting, constipation, blood in stools, fecal incontinence  Hematological ROS- Denies history of easy bruising, blood clots, bleeding or blood transfusions  Genitourinary ROS- Denies recent hematuria, pyuria, flank pain, change in urinary stream, decreased urinary output, increased urinary frequency, nocturia, foamy urine, or urinary incontinence  Lymphatic ROS- Denies lymphadenopathy  Musculoskeletal ROS- Denies history of muscle weakness, joint pain  Dermatological ROS- Denies rash, wounds, ulcers, itching, jaundice  Psychiatric ROS- Denies anxiety, depression, hallucinations, disorientation  Neurological ROS- No stroke or TIA symptoms        Historical Information   Past Medical History:   Diagnosis Date   • CHF (congestive heart failure) ([facility] [address] )    • Coronary artery disease    • Hyperlipidemia    • Hypertension    • Paroxysmal atrial fibrillation [facility])      Past Surgical History:   Procedure Laterality Date   • CARDIAC SURGERY      cabg x 2 2014 [facility]   • CORONARY ANGIOPLASTY WITH STENT PLACEMENT  10/2021    DAPHNEY to left main and ramus   • MITRAL VALVE REPAIR  2014    mitral ring   • [place] OPEN RX FEMUR FX+INTRAMED HALIMA Right 11/1/2022    Procedure: INSERTION NAIL IM FEMUR ANTEGRADE (TROCHANTERIC); Surgeon: Mark Renner;   Location: [facility];  Service: Orthopedics     Social History   Social History     Substance and Sexual Activity   Alcohol Use Not Currently     Social History     Substance and Sexual Activity   Drug Use Never     Social History     Tobacco Use   Smoking Status Never Smoker   Smokeless Tobacco Never Used     Family History   Problem Relation Age of Onset   • Hypertension Father        Meds/Allergies   all current active meds have been reviewed, current meds:   Current Facility-Administered Medications   Medication Dose Route Frequency   • acetaminophen (TYLENOL) tablet 975 mg  975 mg Oral Q8H [address]   • albuterol (PROVENTIL HFA,VENTOLIN HFA) inhaler 2 puff  2 puff Inhalation Q6H PRN   • amiodarone tablet 200 mg  200 mg Oral Daily   • apixaban (ELIQUIS) tablet 2 5 mg  2 5 mg Oral Daily   • atorvastatin (LIPITOR) tablet 40 mg  40 mg Oral Daily   • calcium carbonate (TUMS) chewable tablet 1,000 mg  1,000 mg Oral Daily PRN   • fentaNYL (SUBLIMAZE) injection 25 mcg  25 mcg Intravenous Q3 min PRN   • HYDROmorphone (DILAUDID) injection 0 4 mg  0 4 mg Intravenous Q5 Min PRN   • HYDROmorphone HCl (DILAUDID) injection 0 2 mg  0 2 mg Intravenous Q4H PRN   • lidocaine (LIDODERM) 5 % patch 1 patch  1 patch Topical Daily   • metoprolol tartrate (LOPRESSOR) tablet 100 mg  100 mg Oral Q12H YOLANDA   • naloxone (NARCAN) 0 04 mg/mL syringe 0 04 mg  0 04 mg Intravenous Q1MIN PRN   • ondansetron (ZOFRAN) injection 4 mg  4 mg Intravenous Q6H PRN   • oxyCODONE (ROXICODONE) IR tablet 2 5 mg  2 5 mg Oral Q4H PRN • oxyCODONE (ROXICODONE) IR tablet 5 mg  5 mg Oral Q4H PRN   • polyethylene glycol (MIRALAX) packet 17 g  17 g Oral Daily PRN   • senna-docusate sodium (SENOKOT S) 8 6-50 mg per tablet 1 tablet  1 tablet Oral BID   • sodium chloride 0 9 % infusion  75 mL/hr Intravenous Continuous   • tamsulosin (FLOMAX) capsule 0 4 mg  0 4 mg Oral Daily With Dinner    and PTA meds:    Medications Prior to Admission   Medication   • acetaminophen (TYLENOL) 325 mg tablet   • amiodarone 200 mg tablet   • amLODIPine (NORVASC) 5 mg tablet   • apixaban (ELIQUIS) 2 5 mg   • atorvastatin (LIPITOR) 40 mg tablet   • clopidogrel (PLAVIX) 75 mg tablet   • furosemide (LASIX) 20 mg tablet   • metoprolol tartrate (LOPRESSOR) 100 mg tablet   • spironolactone (ALDACTONE) 25 mg tablet   • tamsulosin (FLOMAX) 0 4 mg   • albuterol (ProAir HFA) 90 mcg/act inhaler   • furosemide (LASIX) 40 mg tablet   • isosorbide mononitrate (IMDUR) 30 mg 24 hr tablet   • metoprolol succinate (TOPROL-XL) 200 MG 24 hr tablet   • nitroglycerin (NITROSTAT) 0 4 mg SL tablet   • spironolactone (ALDACTONE) 25 mg tablet   • tamsulosin (FLOMAX) 0 4 mg         No Known Allergies    Objective     Intake/Output Summary (Last 24 hours) at 11/3/2022 1412  Last data filed at 11/3/2022 1357  Gross per 24 hour   Intake 1492 08 ml   Output 1450 ml   Net 42 08 ml       Invasive Devices:   Urethral Catheter Double-lumen 16 Fr  (Active)   Reasons to continue Urinary Catheter  Acute urinary retention/obstruction failing urinary retention protocol 11/03/22 1046   Goal for Removal Voiding trial when ambulation improves 11/03/22 1046   Site Assessment Clean;Skin intact 11/03/22 1046   Schaffer Care Done 11/03/22 1046   Collection Container Standard drainage bag 11/03/22 1046   Securement Method Securing device (Describe) 11/03/22 1046   Output (mL) 700 mL 11/03/22 1046       Physical Exam      I/O last 3 completed shifts: In: 3312 5 [P O :960; I V :1915 4; Blood:387 1;  IV Piggyback:50]  Out: 1242 [Urine:1242]    Vitals:    11/03/22 0720   BP: 128/57   Pulse: 69   Resp: 18   Temp: 97 7 °F (36 5 °C)   SpO2: 96%       General Appearance:    No acute distress  Cooperative  Appears stated age  Head:    Normocephalic  Atraumatic  Normal jaw occlusion  Eyes:    Lids, conjunctiva normal  No scleral icterus  Ears:    Normal external ears  Nose:   Nares normal  No drainage  Mouth:   Lips, tongue normal  Mucosa normal  Phonation normal    Neck:   Supple  Symmetrical    Back:     Symmetric  No CVA tenderness  Lungs:     Normal respiratory effort  Clear to auscultation bilaterally  Chest wall:    No tenderness or deformity  Heart:    Regular rate and rhythm  Normal S1 and S2  No murmur  No JVD  No edema  Abdomen:     Soft  Non-tender  Bowel sounds active  Genitourinary: Schaffer catheter present  Extremities:   Extremities normal  Atraumatic  No cyanosis  Skin:   Warm and dry  No pallor, jaundice, rash, ecchymoses  Neurologic:   Alert and oriented to person, place, time  No focal deficit  Current Weight: Weight - Scale: 70 9 kg (156 lb 3 2 oz)  First Weight: Weight - Scale: 70 7 kg (155 lb 13 8 oz)    Lab Results:  I have personally reviewed pertinent labs      CBC:   Lab Results   Component Value Date    WBC 10 61 (H) 11/03/2022    HGB 7 5 (L) 11/03/2022    HCT 22 1 (L) 11/03/2022    MCV 95 11/03/2022     (L) 11/03/2022    MCH 32 2 11/03/2022    MCHC 33 9 11/03/2022    RDW 16 8 (H) 11/03/2022    MPV 10 7 11/03/2022     CMP:   Lab Results   Component Value Date    K 4 3 11/03/2022    CL 98 11/03/2022    CO2 24 11/03/2022    BUN 56 (H) 11/03/2022    CREATININE 2 50 (H) 11/03/2022    CALCIUM 7 2 (L) 11/03/2022    EGFR 23 11/03/2022     Phosphorus: No results found for: PHOS  Magnesium: No results found for: MG  Urinalysis: No results found for: COLORU, CLARITYU, SPECGRAV, PHUR, LEUKOCYTESUR, NITRITE, PROTEINUA, GLUCOSEU, KETONESU, BILIRUBINUR, BLOODU  Ionized Calcium: No results found for: CAION  Coagulation: No results found for: PT, INR, APTT  Troponin: No results found for: TROPONINI  ABG: No results found for: PHART, NMA6OEV, PO2ART, JNH1PMI, J3CFCAEQ, BEART, SOURCE    Results from last 7 days   Lab Units 11/03/22  0456 11/02/22  1608 11/02/22  0436 11/01/22  0506 10/31/22  1304   POTASSIUM mmol/L 4 3 4 8 5 0 4 3 4 0   CHLORIDE mmol/L 98 96 99 102 103   CO2 mmol/L 24 27 25 27 28   BUN mg/dL 56* 53* 50* 35* 37*   CREATININE mg/dL 2 50* 2 66* 2 54* 1 94* 2 22*   CALCIUM mg/dL 7 2* 7 9* 7 9* 7 9* 8 2*   ALK PHOS U/L  --   --   --  100 125*   ALT U/L  --   --   --  34 40   AST U/L  --   --   --  18 22       Radiology review:  Procedure: XR hip/pelv 2-3 vws right if performed    Result Date: 11/1/2022  Narrative: C-ARM - RIGHT HIP INDICATION: ORIF  Procedure guidance  COMPARISON:  10/31/2022 TECHNIQUE: FLUOROSCOPY TIME:   49 SECONDS 5 FLUOROSCOPIC IMAGES FINDINGS: Fluoroscopic guidance provided for procedure guidance  Osseous and soft tissue detail limited by technique  Impression: Fluoroscopic guidance provided for procedure guidance  Please refer to the separate procedure notes for additional details  Workstation performed: [id number]     Procedure: US kidney and bladder    Result Date: 11/3/2022  Narrative: RENAL ULTRASOUND INDICATION:   Acute renal insufficiency, urinary retention  COMPARISON: Renal ultrasound April 2021, CT chest abdomen and pelvis April 2021 TECHNIQUE:   Ultrasound of the retroperitoneum was performed with a curvilinear transducer utilizing volumetric sweeps and still imaging techniques  FINDINGS: KIDNEYS: Right renal atrophy  Normal size left kidney  Right kidney:  8 2 x 4 9 x 4 6 cm  Volume 97 9 mL Left kidney:  10 1 x 5 2 x 4 1 cm  Volume 111 7 mL Right kidney Right renal cortical thinning with normal echotexture  No mass is identified  No hydronephrosis  No shadowing calculi  No perinephric fluid collections  Left kidney Normal echogenicity and contour   No mass is identified  No hydronephrosis  No shadowing calculi  No perinephric fluid collections  URETERS: Nonvisualized  BLADDER: Normally distended  No focal thickening or mass lesions  Bilateral ureteral jets detected  Prostate gland is enlarged with a volume of 59 mL  Impression: Mild right renal atrophy  No hydronephrosis  Prostate enlargement  Workstation performed: [id number]       Counseling / Coordination of Care  Total ADDITIONAL floor / unit time spent today 60 minutes  Greater than 50% of total time was spent with the patient and / or family counseling and / or coordination of care  A description of the counseling / coordination of care: We reviewed his prior kidney function, and how it has reduced by around 15 ML/minute of estimated GFR, and what the future implications of this could mean  Case was discussed with the patient as well as his wife who was at the bedside  Parker Pedersen      This consultation note was produced in part using a dictation device which may document imprecise wording from author's original intent

## 2022-11-03 NOTE — ASSESSMENT & PLAN NOTE
Lab Results   Component Value Date    EGFR 23 11/03/2022    EGFR 21 11/02/2022    EGFR 22 11/02/2022    CREATININE 2 50 (H) 11/03/2022    CREATININE 2 66 (H) 11/02/2022    CREATININE 2 54 (H) 11/02/2022     · Baseline appears to be approximately 1 9-2 2 on review of records  · On admission does not qualify for JOEL criteria, Now qualifies with Cr  2 66  · Avoid nephrotoxins, relative hypotension, and NSAIDs if possible  Got aldactone on 11/2 however would continue to hold further diuretics  · Renally dose medications as appropriate  · Cr rising to 2 5 > 2 6 >2 5  · Nephrology consulted  · Urinary retention post-op, multi straight cath with zavala placed     · Continue flomax  · Urology consulted  · Urine studies pending  · I&O, standing weight - noted PTA lasix and aldactone held   · US bladder/kidney-mild right renal atrophy, no hydronephrosis, prostate enlargement

## 2022-11-03 NOTE — ASSESSMENT & PLAN NOTE
----- Message from Maria Rodrigues NP sent at 9/22/2020 12:45 PM CDT -----  Schedule f/u appt in 2 mo with labs prior.      · Anticoagulated with Eliquis; on hold preoperatively; resume when cleared by orthopedic surgery  · Rate/rhythm controlled on amiodarone and toprol; continue while inpatient  · Eliquis resumed 11/2 - noted Hgb dropped from 10 > 7 5  · Repeat labs  · Ortho recommending post op -daily eliquis for 1 week then BID

## 2022-11-03 NOTE — TELEPHONE ENCOUNTER
Patient seen in consult by surgical AP for our service this admission  He had fracture of proximal right femur, now POD#2 repair with nail insertion  Postop retention, zavala placed for 700 cc return  Pls assist in scheduling TOV in 7-10 days where available  Thanks!

## 2022-11-03 NOTE — NURSING NOTE
Repeat hgb 7 8 post 1 U pRBCs  Per SANDRINE Rojas, ok to hold off on second unit and recheck hgb with AM labs  Will continue to monitor

## 2022-11-03 NOTE — PLAN OF CARE
Problem: OCCUPATIONAL THERAPY ADULT  Goal: Performs self-care activities at highest level of function for planned discharge setting  See evaluation for individualized goals  Description: Treatment Interventions: ADL retraining, Functional transfer training, UE strengthening/ROM, Endurance training, Equipment evaluation/education, Patient/family training, Neuromuscular reeducation, Compensatory technique education, Continued evaluation, Energy conservation, Activityengagement          See flowsheet documentation for full assessment, interventions and recommendations  Outcome: Progressing  Note: Limitation: Decreased ADL status, Decreased UE strength, Decreased Safe judgement during ADL, Decreased endurance, Decreased self-care trans, Decreased high-level ADLs  Prognosis: Good  Assessment: Pt seen for treatment session #2 this date  Pt alert and agreeable to participate at this time  Pt completing ADLs and functional transfers with good participation although continues to be limited by pain  Pt participating well in Latesha Ramirez 15 with occasional cues for maintaining proper movements  Pt demonstrates Good potential to make progress towards goals with continued intensive OT services  Pt is currently limited by decrease activity tolerance, decrease standing balance, decrease performance during ADL tasks, decrease safety awareness , decrease UB MS, increased pain, decrease generalized strength, decrease activity engagement, decrease performance during functional transfers, high fall risk and limited insight to deficits   Pt would benefit from continued acute OT services to address deficits as well as post acute rehab upon d/c from [facility]       OT Discharge Recommendation: Post acute rehabilitation services

## 2022-11-03 NOTE — PROGRESS NOTES
[address]  Progress Note Valarie Dean 1940, 80 y o  male MRN: [id number]  Unit/Bed#: -[place] Encounter: [id number]  Primary Care Provider: Crystal Randolph DO   Date and time admitted to hospital: 10/31/2022 12:35 PM    * Closed 2-part intertrochanteric fracture of proximal end of right femur, initial encounter [facility])  Assessment & Plan  Background: Presented to the ED status post mechanical fall leaving house  Reports missing a step in ending up falling onto door  • Xray: acute right hip fracture  • Anticoagulation eliquis on hold; resumed 11/2  • Type and screen ordered   • Hgb 11 1 on admission; monitor closely and transfuse PRN  o See acute blood loss anemia for detail  • Pain management with geriatric pain regimen  • Early mobilization when cleared  • Incentive spirometry   • Orthopedic surgery consulted; input appreciated   o POD 2: right femoral TFN  o WBAT  o eliquis daily 1 week, then resume BID  o Repeat Hgb with >2g drop overnight 10 >7 5  • PT/OT consulted; recommending rehab   o Case management on board to assist with dispo      Hyponatremia  Assessment & Plan  · Na 133 > 131  · Fractional excretion Na suggesting prerenal - will add gentle IVF NS @ 75  · Repeat urine studies today    Acute blood loss anemia  Assessment & Plan  · Postoperative acute blood loss anemia  · Right hip edema no obvious ecchymosis present,  small serosanguineous drainage on surgical dressing, pale  Hemodynamically stable  · Hgb 10> 6 9 received 1 PRBC repeat Hgb 7 8  · Hgb this AM 7 5  · Urine present in urinal from straight cath blood tinged, likely from trauma   Schaffre placed this AM without blood noted  · Pt on eliquis continue daily dosing for now, monitor Hgb closely     Paroxysmal atrial fibrillation (HCC)  Assessment & Plan  · Anticoagulated with Eliquis; on hold preoperatively; resume when cleared by orthopedic surgery  · Rate/rhythm controlled on amiodarone and toprol; continue while inpatient  · Eliquis resumed 11/2 - noted Hgb dropped from 10 > 7 5  · Repeat labs  · Ortho recommending post op -daily eliquis for 1 week then BID       CHF (congestive heart failure) ([facility] [address] )  Assessment & Plan  Wt Readings from Last 3 Encounters:   11/03/22 70 9 kg (156 lb 3 2 oz)   06/04/22 64 7 kg (142 lb 10 2 oz)   03/22/22 63 5 kg (140 lb)     · Most recent echo EF 42%, systolic and diastolic function wnl   · Euvolemic on admission  · Got aldactone on 11/2- continue to hold diuretics  · Daily weight, Intake and output  · Fluid restricted/low-cholesterol diet  · Monitor volume status during hospitalization  · Appears euvolemic on exam    Acute-on-chronic kidney injury [facility])  Assessment & Plan  Lab Results   Component Value Date    EGFR 23 11/03/2022    EGFR 21 11/02/2022    EGFR 22 11/02/2022    CREATININE 2 50 (H) 11/03/2022    CREATININE 2 66 (H) 11/02/2022    CREATININE 2 54 (H) 11/02/2022     · Baseline appears to be approximately 1 9-2 2 on review of records  · On admission does not qualify for JOEL criteria, Now qualifies with Cr  2 66  · Avoid nephrotoxins, relative hypotension, and NSAIDs if possible  Got aldactone on 11/2 however would continue to hold further diuretics  · Renally dose medications as appropriate  · Cr rising to 2 5 > 2 6 >2 5  · Nephrology consulted  · Urinary retention post-op, multi straight cath with zavala placed     · Continue flomax  · Urology consulted  · Urine studies pending  · I&O, standing weight - noted PTA lasix and aldactone held   · US bladder/kidney-mild right renal atrophy, no hydronephrosis, prostate enlargement    Hyperlipidemia, mixed  Assessment & Plan  · Continue statin therapy    Coronary artery disease involving coronary bypass graft  Assessment & Plan  · S/p CABG x4 and valve replacement in 2013 or 2014 per wife  · Continue Plavix per PTA medication regimen  · Currently without any shortness of breath or chest pain  · EKG this admission Sinus bradycardia with 1st degree A-V block  · Rates currently 60-80s        VTE Pharmacologic Prophylaxis: VTE Score: 8 High Risk (Score >/= 5) - Pharmacological DVT Prophylaxis Ordered: apixaban (Eliquis)  Sequential Compression Devices Ordered  Patient Centered Rounds: I performed bedside rounds with nursing staff today  Discussions with Specialists or Other Care Team Provider: ortho, nephro, urology    Education and Discussions with Family / Patient: Updated  (wife) at bedside  Time Spent for Care: 45 minutes  More than 50% of total time spent on counseling and coordination of care as described above  Current Length of Stay: 3 day(s)  Current Patient Status: Inpatient   Certification Statement: The patient will continue to require additional inpatient hospital stay due to Acute blood loss anemia, hyponatremia, JOEL, postop management  Discharge Plan: Anticipate discharge in 24-48 hrs to rehab facility  Code Status: Level 1 - Full Code    Subjective:   Resting in bed this morning reports burning sensation at right hip incision intermittently reports utilize pain medication as needed, currently is ice pack against incision site  Still appears pale discussed hemoglobin 7 8after unit of blood last night this morning 7 5 will continue to monitor  Discussed renal function and urinary retention consults placed for Nephrology and Urology  Schaffer catheter placed this morning with 3rd straight cath noted penile ecchymosis without edema or pain-monitor  Patient does report some bladder tenderness with palpation and lower abdomen tenderness    Objective:     Vitals:   Temp (24hrs), Av °F (36 7 °C), Min:97 5 °F (36 4 °C), Max:98 8 °F (37 1 °C)    Temp:  [97 5 °F (36 4 °C)-98 8 °F (37 1 °C)] 97 7 °F (36 5 °C)  HR:  [67-71] 69  Resp:  [17-18] 18  BP: (116-131)/(50-63) 128/57  SpO2:  [95 %-98 %] 96 %  Body mass index is 23 07 kg/m²  Input and Output Summary (last 24 hours):      Intake/Output Summary (Last 24 hours) at 11/3/2022 1101  Last data filed at 11/3/2022 1046  Gross per 24 hour   Intake 2243 75 ml   Output 1942 ml   Net 301 75 ml       Physical Exam:   Physical Exam  Vitals and nursing note reviewed  Constitutional:       Appearance: He is well-developed  HENT:      Head: Normocephalic and atraumatic  Mouth/Throat:      Mouth: Mucous membranes are dry  Eyes:      Conjunctiva/sclera: Conjunctivae normal       Pupils: Pupils are equal, round, and reactive to light  Cardiovascular:      Rate and Rhythm: Normal rate  Pulses: Normal pulses  Heart sounds: Normal heart sounds  No murmur heard  Pulmonary:      Effort: Pulmonary effort is normal  No respiratory distress  Breath sounds: Normal breath sounds  Abdominal:      General: Bowel sounds are normal  There is no distension  Palpations: Abdomen is soft  Tenderness: There is abdominal tenderness in the suprapubic area  Musculoskeletal:      Cervical back: Neck supple  Right upper leg: Edema and tenderness present  Comments: Right hip incision with small serosanguineous drainage present, no ecchymosis, mild tenderness at incision   Skin:     General: Skin is warm and dry  Coloration: Skin is pale  Neurological:      Mental Status: He is alert  Additional Data:     Labs:  Results from last 7 days   Lab Units 11/03/22 0456 11/02/22 0436 11/01/22  0506   WBC Thousand/uL 10 61*   < > 9 12   HEMOGLOBIN g/dL 7 5*   < > 10 1*   HEMATOCRIT % 22 1*   < > 30 8*   PLATELETS Thousands/uL 123*   < > 127*   NEUTROS PCT %  --   --  75   LYMPHS PCT %  --   --  12*   MONOS PCT %  --   --  12   EOS PCT %  --   --  1    < > = values in this interval not displayed       Results from last 7 days   Lab Units 11/03/22 0456 11/02/22 0436 11/01/22  0506   SODIUM mmol/L 131*   < > 136   POTASSIUM mmol/L 4 3   < > 4 3   CHLORIDE mmol/L 98   < > 102   CO2 mmol/L 24   < > 27   BUN mg/dL 56*   < > 35*   CREATININE mg/dL 2 50*   < > 1 94*   ANION GAP mmol/L 9   < > 7   CALCIUM mg/dL 7 2*   < > 7 9*   ALBUMIN g/dL  --   --  3 1*   TOTAL BILIRUBIN mg/dL  --   --  1 38*   ALK PHOS U/L  --   --  100   ALT U/L  --   --  34   AST U/L  --   --  18   GLUCOSE RANDOM mg/dL 113   < > 105    < > = values in this interval not displayed       Results from last 7 days   Lab Units 11/01/22  0506   INR  1 31*                   Lines/Drains:  Invasive Devices  Report    Peripheral Intravenous Line  Duration           Peripheral IV 10/31/22 Left Antecubital 2 days          Drain  Duration           Urethral Catheter Double-lumen 16 Fr  <1 day              Urinary Catheter:  Goal for removal: Voiding trial when ambulation improves               Imaging: Reviewed radiology reports from this admission including: xray(s) and ultrasound(s)    Recent Cultures (last 7 days):         Last 24 Hours Medication List:   Current Facility-Administered Medications   Medication Dose Route Frequency Provider Last Rate   • acetaminophen  975 mg Oral [facility] Emma Klein PA-C     • albuterol  2 puff Inhalation Q6H PRN Emma Klein PA-C     • amiodarone  200 mg Oral Daily Emma Klein PA-C     • apixaban  2 5 mg Oral Daily SANDRINE Holly     • atorvastatin  40 mg Oral Daily Emma Klein PA-C     • calcium carbonate  1,000 mg Oral Daily PRN Emma Klein PA-C     • fentaNYL  25 mcg Intravenous Q3 min PRN Latoya Alonso, DO     • HYDROmorphone  0 4 mg Intravenous Q5 Min PRN Latoya Alonso, DO     • HYDROmorphone  0 2 mg Intravenous Q4H PRN Emma Klein PA-C     • lidocaine  1 patch Topical Daily Emma Klein PA-C     • metoprolol tartrate  100 mg Oral Q12H [facility] Emma Klein PA-C     • naloxone  0 04 mg Intravenous Q1MIN PRN Emma Klein PA-C     • ondansetron  4 mg Intravenous Q6H PRN Emma Klein PA-C     • oxyCODONE  2 5 mg Oral Q4H PRN Emma Klein PA-C     • oxyCODONE  5 mg Oral Q4H PRN Emma Klein PA-C     • polyethylene glycol  17 g Oral Daily PRN Christ Brooke PA-C     • senna-docusate sodium  1 tablet Oral BID Christ Brooke PA-C     • sodium chloride  75 mL/hr Intravenous Continuous SANDRINE Jones     • tamsulosin  0 4 mg Oral Daily With Dinner Christ Brooke PA-C          Today, Patient Was Seen By: Meredith Aggarwal    **Please Note: This note may have been constructed using a voice recognition system  **

## 2022-11-03 NOTE — PHYSICAL THERAPY NOTE
PHYSICAL THERAPY TREATMENT NOTE  NAME:  Magi Jerry  DATE: 11/03/22    Length Of Stay: 3  Performed at least 2 patient identifiers during session: Name and Birthday    TREATMENT FLOW SHEET:    11/03/22 1234   PT Last Visit   PT Visit Date 11/03/22   Note Type   Note Type Treatment   Pain Assessment   Pain Assessment Tool FLACC   Pain Score   (7 or 8/10 pain "I'm anticipating moving")   Pain Location/Orientation Orientation: Right;Location: Hip   Pain Rating: FLACC (Rest) - Face 0   Pain Rating: FLACC (Rest) - Legs 0   Pain Rating: FLACC (Rest) - Activity 0   Pain Rating: FLACC (Rest) - Cry 1   Pain Rating: FLACC (Rest) - Consolability 0   Score: FLACC (Rest) 1   Pain Rating: FLACC (Activity) - Face 1   Pain Rating: FLACC (Activity) - Legs 1   Pain Rating: FLACC (Activity) - Activity 0   Pain Rating: FLACC (Activity) - Cry 1   Pain Rating: FLACC (Activity) - Consolability 0   Score: FLACC (Activity) 3   Restrictions/Precautions   Weight Bearing Precautions Per Order Yes   RLE Weight Bearing Per Order WBAT   Other Precautions Chair Alarm; Bed Alarm; Fall Risk;Pain;Multiple lines   General   Chart Reviewed Yes   Response to Previous Treatment   (pt with pain, but able to participate)   Cognition   Orientation Level Oriented X4   Following Commands Follows one step commands without difficulty   Subjective   Subjective "I always had trouble bringing my toes up since I was little  I broke my ankle and had a cast on for too long"   Bed Mobility   Additional Comments Pt sitting OOB in recliner chair at start of session and end of session  Transfers   Sit to Stand 3  Moderate assistance   Additional items Assist x 1; Increased time required;Verbal cues   Stand to Sit 4  Minimal assistance  (to steadying assistance)   Additional items Assist x 1; Increased time required;Verbal cues   Stand pivot 2  Maximal assistance   Additional items Assist x 1; Increased time required;Verbal cues   Additional Comments use of RW  min verbal cues for hand placement  inc time and effort to achieve standing  mod manual cues to achieve standing  with manual cues for ant wt shift  stand to sit intially minAx1 for controlled descent then steadying assistance 2nd trial with cues for hand placement  spt with RW with maxAx1 wiht manaul cues for R LE advancement and verbal cues for sequence and technique  Ambulation/Elevation   Gait pattern Forward Flexion; Wide TED; Decreased foot clearance;Decreased R stance; Antalgic; Improper Weight shift;R Foot drag;Short stride; Excessively slow; Step to   Gait Assistance 2  Maximal assist  (to modAx1)   Additional items Assist x 1;Verbal cues   Assistive Device Rolling walker   Distance initially upon standing, wt shifting right and left for inc WB R LE  then ambulated 6'x1 with RW with maxAx1-->modAx1 with manual cues for wt shifting, manual cues for R foot advancement with verbal cues for sequence and technique, manual cues for wt shifting and R LE advancement  decreasing need for wt shifting last 2 steps  2nd trial ambulated 6'x1 with RW with modAx1 wiht pt able to advance R LE without physical assistance, improved wt shifting  verbal cues for foot placement, RW advancement and inc foot clearance  pt dragging R toes associated with foot drop  reports no brace ("it has been that way since I was little")  manual cues for wt shifting and RW management  chair follow for each trial    Stair Management Assistance   (not assessed due to safety concerns)   Balance   Static Sitting Good   Dynamic Sitting Fair +   Static Standing Fair -   Dynamic Standing Poor +   Ambulatory   (poor - to poor)   Endurance Deficit   Endurance Deficit Yes   Endurance Deficit Description fatigue, pain, min DODGE     Activity Tolerance   Activity Tolerance Patient limited by fatigue;Patient limited by pain   Medical Staff Made Aware Maicol BALTAZAR   Nurse Made Aware RN   Exercises   Quad Sets Supine;10 reps;Right   Glute Sets 10 reps;Bilateral;Sitting Hip Flexion Sitting;10 reps;AROM; Left;AAROM; Right   Knee AROM Long Arc Quad Sitting;10 reps;AROM; Left;AAROM; Right   Ankle Pumps Sitting;10 reps;AROM; Bilateral   Assessment   Prognosis Good   Problem List Decreased strength; Impaired balance;Decreased endurance;Decreased mobility; Decreased safety awareness;Decreased skin integrity;Pain   Barriers to Discharge Decreased caregiver support; Inaccessible home environment   Barriers to Discharge Comments requires increased assistance to complete mobility   Goals   Patient Goals "Go home"   PT Treatment Day 1   Plan   Treatment/Interventions ADL retraining;Functional transfer training;LE strengthening/ROM; Elevations; Therapeutic exercise; Endurance training;Patient/family training;Equipment eval/education; Bed mobility;Gait training; Compensatory technique education;Spoke to nursing;Spoke to case management;OT   Progress Slow progress, decreased activity tolerance   PT Frequency 3-5x/wk   Recommendation   PT Discharge Recommendation Post acute rehabilitation services  (acute inpatient rehab)   Equipment Recommended   (TBD by rehab)   Additional Comments pt would benefit from acute inpatient rehab to facilitate return to home as patient is motivated to return home when appropriate   AM-PAC Basic Mobility Inpatient   Turning in Bed Without Bedrails 3   Lying on Back to Sitting on Edge of Flat Bed 3   Moving Bed to Chair 2   Standing Up From Chair 2   Walk in Room 2   Climb 3-5 Stairs 1   Basic Mobility Inpatient Raw Score 13   Basic Mobility Standardized Score 33 99   Highest Level Of Mobility   -HLM Goal 4: Move to chair/commode   JH-HLM Achieved 6: Walk 10 steps or more   Education   Education Provided Mobility training;Home exercise program;Assistive device   Patient Reinforcement needed   End of Consult   Patient Position at End of Consult Bedside chair;Bed/Chair alarm activated; All needs within reach       The patient's AM-PAC Basic Mobility Inpatient Short Form Raw Score is 13  A Raw score of less than or equal to 16 suggests the patient may benefit from discharge to post-acute rehabilitation services  Please also refer to the recommendation of the Physical Therapist for safe discharge planning  Pt tolerated session fairly  He fatigues easily, requires increased time to complete activity and increased rest between all activities  He required increased assistance to achieve standing this date each trial and required increased assistance to ambulate initially with manual cues for R LE advancement  Ankle DF on right is minimal with pt needing to increase hip flexion to advance R LE  Second ambulation trial, patient able to ambulate with decreased assistance and advance R LE without assistance with decreased foot clearance  He requires cues for proper completion of LE TE and AAROM for R LE  He is limited by increased pain, decreased strength, balance, endurance as well as medical course, low hemoglobin requiring blood transfusion, now with zavala catheter and elevated renal values  He will continue to benefit from PT services to maximize LOF as medical status improves       [address]

## 2022-11-03 NOTE — ASSESSMENT & PLAN NOTE
Wt Readings from Last 3 Encounters:   11/03/22 70 9 kg (156 lb 3 2 oz)   06/04/22 64 7 kg (142 lb 10 2 oz)   03/22/22 63 5 kg (140 lb)     · Most recent echo EF 67%, systolic and diastolic function wnl   · Euvolemic on admission  · Got aldactone on 11/2- continue to hold diuretics  · Daily weight, Intake and output  · Fluid restricted/low-cholesterol diet  · Monitor volume status during hospitalization  · Gentle volume expansion done without improvement in hyponatremia- d/c  · Appears euvolemic continue to hold diuretics

## 2022-11-03 NOTE — PROGRESS NOTES
Progress Note - Orthopedics   Shilpa Rosales 80 y o  male MRN: [id number]  Unit/Bed#: OW ULTRASOUND      Subjective:    82 y o male POD2 s/p right femoral TFN  No acute overnight events, no new complaints  Patient doing well  The patient complains of ongoing pain along the hip, but notes that he has been up and walking with PT well  Urology and Nephrology has been consulted per SLIM due to ongoing issues with post-operative urinary retention and evidence of JOEL  A Schaffer catheter has been placed  Denies fevers, chills, CP, SOB, N/V, numbness or tingling       Labs:  0   Lab Value Date/Time    HCT 22 1 (L) 11/03/2022 0456    HCT 23 9 (L) 11/03/2022 0046    HCT 20 8 (L) 11/02/2022 1608    HGB 7 5 (L) 11/03/2022 0456    HGB 7 8 (L) 11/03/2022 0046    HGB 6 9 (LL) 11/02/2022 1608    INR 1 31 (H) 11/01/2022 0506    WBC 10 61 (H) 11/03/2022 0456    WBC 15 51 (H) 11/02/2022 1608    WBC 13 63 (H) 11/02/2022 0436       Meds:    Current Facility-Administered Medications:   •  acetaminophen (TYLENOL) tablet 975 mg, 975 mg, Oral, Q8H [facility], Kamilah Trujillo PA-C, 975 mg at 11/03/22 1500  •  albuterol (PROVENTIL HFA,VENTOLIN HFA) inhaler 2 puff, 2 puff, Inhalation, Q6H PRN, Kamilah Trujillo PA-C  •  amiodarone tablet 200 mg, 200 mg, Oral, Daily, Kamilah Trujillo PA-C, 200 mg at 11/03/22 1231  •  apixaban (ELIQUIS) tablet 2 5 mg, 2 5 mg, Oral, Daily, SANDRINE Figueroa, 2 5 mg at 11/03/22 9703  •  atorvastatin (LIPITOR) tablet 40 mg, 40 mg, Oral, Daily, Kamilah Trujillo PA-C, 40 mg at 11/03/22 4392  •  calcium carbonate (TUMS) chewable tablet 1,000 mg, 1,000 mg, Oral, Daily PRN, Kamilah Trujillo PA-C  •  fentaNYL (SUBLIMAZE) injection 25 mcg, 25 mcg, Intravenous, Q3 min PRN, Fernando Alonso, DO  •  HYDROmorphone (DILAUDID) injection 0 4 mg, 0 4 mg, Intravenous, Q5 Min PRN, Fernando Alonso, DO  •  HYDROmorphone HCl (DILAUDID) injection 0 2 mg, 0 2 mg, Intravenous, Q4H PRN, Kamilah Trujillo PA-C, 0 2 mg at 11/01/22 0310  •  lidocaine (LIDODERM) 5 % patch 1 patch, 1 patch, Topical, Daily, Mgidalia Mccarthy PA-C, 1 patch at 11/02/22 1633  •  metoprolol tartrate (LOPRESSOR) tablet 100 mg, 100 mg, Oral, Q12H [facility], Migdalia Mccarthy PA-C, 100 mg at 11/03/22 8496  •  naloxone (NARCAN) 0 04 mg/mL syringe 0 04 mg, 0 04 mg, Intravenous, Q1MIN PRN, Migdalia Mccarthy PA-C  •  ondansetron [facility]) injection 4 mg, 4 mg, Intravenous, Q6H PRN, Migdalia Mccarthy PA-C  •  oxyCODONE (ROXICODONE) IR tablet 2 5 mg, 2 5 mg, Oral, Q4H PRN, Migdalia Mccarthy PA-C, 2 5 mg at 11/03/22 0932  •  oxyCODONE (ROXICODONE) IR tablet 5 mg, 5 mg, Oral, Q4H PRN, Migdalia Mccarthy PA-C, 5 mg at 11/02/22 0140  •  polyethylene glycol (MIRALAX) packet 17 g, 17 g, Oral, Daily PRN, Migdalia Mccarthy PA-C, 17 g at 11/03/22 0932  •  senna-docusate sodium (SENOKOT S) 8 6-50 mg per tablet 1 tablet, 1 tablet, Oral, BID, Migdalia Mccarthy PA-C, 1 tablet at 11/03/22 4606  •  tamsulosin (FLOMAX) capsule 0 4 mg, 0 4 mg, Oral, Daily With Audrey Scott PA-C, 0 4 mg at 11/02/22 1633    Blood Culture:   Lab Results   Component Value Date    BLOODCX No Growth After 5 Days  03/02/2022    BLOODCX No Growth After 5 Days  03/02/2022       Wound Culture:   No results found for: WOUNDCULT    Ins and Outs:  I/O last 24 hours: In: 2873 [P O :1360; I V :1722 9; Blood:387 1; IV Piggyback:50]  Out: 2142 [Urine:2142]      Physical:  Vitals:    11/03/22 1510   BP: 128/56   Pulse: 64   Resp: 18   Temp: 98 1 °F (36 7 °C)   SpO2: 95%     Musculoskeletal: right Lower Extremity  · Patient sitting in his chair, comfortably, in no acute distress  · The dressings are intact, unchanged mild serosanguinous strike-through present  Skin above and below the dressings without lesions, ecchymosis, erythema, swelling, warmth, or other signs of infection  · Patient denies any pain with passive hip circumferential ROM    · Sensation intact to saphenous, sural, tibial, superficial peroneal nerve, and deep peroneal  · Motor intact to +FHL/EHL, +ankle dorsi/plantar flexion  Patient notes limited ankle ROM due to past history of fracture  · He is able to perform a weak straight leg raise  · 2+ DP pulse, symmetric bilaterally  · Soft lower extremity compartments, negative Anne's sign  · Digits warm and well perfused  · Capillary refill < 2 seconds    Assessment:    82 y o male POD2 s/p right femoral TFN  Ambulatory dysfunction; PAF; CHF; Hyperlipidemia; CAD with hx of bypass; ABLA; post-operative urinary retention; acute on chronic kidney injury      Plan:  · WBAT RLE  · HGB 7 5 this AM  Noted to have dropped to 6 9 yesterday evening, s/p 2 units PRBC per SLIM  Greater than 2 gram drop qualifies for diagnosis of acute blood loss anemia, will monitor and administer further IVF/prbc as indicated  Patient is currently asymptomatic, SLIM on board  · PT/OT gait training  · Pain control per primary team  · DVT ppx with Eliquis, currently on 2 5mg daily per post-operative protocols, may resume BID dosing 7 days post-surgery  · Dispo: Ortho will follow   · PT/OT recommending discharge to [facility]  CM on board, patient has been accepted to [facility] pending medical clearance     · The patient will follow up with Dr Kev Sykes office once discharged within 2 weeks     Cynthea Dancer, PA-C

## 2022-11-03 NOTE — PLAN OF CARE
Problem: Potential for Falls  Goal: Patient will remain free of falls  Description: INTERVENTIONS:  - Educate patient/family on patient safety including physical limitations  - Instruct patient to call for assistance with activity   - Consult OT/PT to assist with strengthening/mobility   - Keep Call bell within reach  - Keep bed low and locked with side rails adjusted as appropriate  - Keep care items and personal belongings within reach  - Initiate and maintain comfort rounds  - Make Fall Risk Sign visible to staff  - Offer Toileting every   Hours, in advance of need  - Initiate/Maintain   alarm  - Obtain necessary fall risk management equipment:      - Apply yellow socks and bracelet for high fall risk patients  - Consider moving patient to room near nurses station  Outcome: Progressing     Problem: PAIN - ADULT  Goal: Verbalizes/displays adequate comfort level or baseline comfort level  Description: Interventions:  - Encourage patient to monitor pain and request assistance  - Assess pain using appropriate pain scale0-10  - Administer analgesics based on type and severity of pain and evaluate response  - Implement non-pharmacological measures as appropriate and evaluate response  - Consider cultural and social influences on pain and pain management  - Notify physician/advanced practitioner if interventions unsuccessful or patient reports new pain  Outcome: Progressing

## 2022-11-03 NOTE — ASSESSMENT & PLAN NOTE
· Postoperative acute blood loss anemia  · Right hip edema no obvious ecchymosis present,  small serosanguineous drainage on surgical dressing, pale  Hemodynamically stable  · Hgb 10> 6 9 received 1 PRBC repeat Hgb 7 8  · Hgb this AM 7 5  · Urine present in urinal from straight cath blood tinged, likely from trauma   Schaffer placed this AM without blood noted  · Pt on eliquis continue daily dosing for now, monitor Hgb closely

## 2022-11-03 NOTE — PLAN OF CARE
Problem: PHYSICAL THERAPY ADULT  Goal: Performs mobility at highest level of function for planned discharge setting  See evaluation for individualized goals  Description: Treatment/Interventions: Functional transfer training, LE strengthening/ROM, Elevations, Therapeutic exercise, Endurance training, Patient/family training, Equipment eval/education, Bed mobility, Gait training, Compensatory technique education, Spoke to nursing, OT  Equipment Recommended:  (TBD by rehab)       See flowsheet documentation for full assessment, interventions and recommendations  Outcome: Progressing  Note: Prognosis: Good  Problem List: Decreased strength, Impaired balance, Decreased endurance, Decreased mobility, Decreased safety awareness, Decreased skin integrity, Pain  Assessment: Pt tolerated session fairly  He fatigues easily, requires increased time to complete activity and increased rest between all activities  He required increased assistance to achieve standing this date each trial and required increased assistance to ambulate initially with manual cues for R LE advancement  Ankle DF on right is minimal with pt needing to increase hip flexion to advance R LE  Second ambulation trial, patient able to ambulate with decreased assistance and advance R LE without assistance with decreased foot clearance  He requires cues for proper completion of LE TE and AAROM for R LE  He is limited by increased pain, decreased strength, balance, endurance as well as medical course, low hemoglobin requiring blood transfusion, now with zavala catheter and elevated renal values  He will continue to benefit from PT services to maximize LOF as medical status improves    Barriers to Discharge: Decreased caregiver support, Inaccessible home environment  Barriers to Discharge Comments: requires increased assistance to complete mobility  PT Discharge Recommendation: Post acute rehabilitation services (acute inpatient rehab)    See flowsheet documentation for full assessment

## 2022-11-04 ENCOUNTER — APPOINTMENT (INPATIENT)
Dept: CT IMAGING | Facility: HOSPITAL | Age: 82
End: 2022-11-04

## 2022-11-04 PROBLEM — D69.6 THROMBOCYTOPENIA (HCC): Status: ACTIVE | Noted: 2022-01-01

## 2022-11-04 LAB
25(OH)D3 SERPL-MCNC: 27.1 NG/ML (ref 30–100)
ALBUMIN SERPL BCP-MCNC: 2.3 G/DL (ref 3.5–5)
ALP SERPL-CCNC: 86 U/L (ref 46–116)
ALT SERPL W P-5'-P-CCNC: 16 U/L (ref 12–78)
ANION GAP SERPL CALCULATED.3IONS-SCNC: 4 MMOL/L (ref 4–13)
AST SERPL W P-5'-P-CCNC: 42 U/L (ref 5–45)
BILIRUB SERPL-MCNC: 0.64 MG/DL (ref 0.2–1)
BUN SERPL-MCNC: 48 MG/DL (ref 5–25)
CALCIUM ALBUM COR SERPL-MCNC: 8.7 MG/DL (ref 8.3–10.1)
CALCIUM SERPL-MCNC: 7.3 MG/DL (ref 8.3–10.1)
CHLORIDE SERPL-SCNC: 101 MMOL/L (ref 96–108)
CO2 SERPL-SCNC: 27 MMOL/L (ref 21–32)
CREAT SERPL-MCNC: 1.97 MG/DL (ref 0.6–1.3)
ERYTHROCYTE [DISTWIDTH] IN BLOOD BY AUTOMATED COUNT: 16.8 % (ref 11.6–15.1)
ERYTHROCYTE [DISTWIDTH] IN BLOOD BY AUTOMATED COUNT: 17.2 % (ref 11.6–15.1)
FERRITIN SERPL-MCNC: 418 NG/ML (ref 8–388)
GFR SERPL CREATININE-BSD FRML MDRD: 30 ML/MIN/1.73SQ M
GLUCOSE SERPL-MCNC: 114 MG/DL (ref 65–140)
HCT VFR BLD AUTO: 21.7 % (ref 36.5–49.3)
HCT VFR BLD AUTO: 26.4 % (ref 36.5–49.3)
HGB BLD-MCNC: 7.2 G/DL (ref 12–17)
HGB BLD-MCNC: 8.7 G/DL (ref 12–17)
IRON SATN MFR SERPL: 15 % (ref 20–50)
IRON SERPL-MCNC: 22 UG/DL (ref 65–175)
MAGNESIUM SERPL-MCNC: 2 MG/DL (ref 1.6–2.6)
MCH RBC QN AUTO: 31.8 PG (ref 26.8–34.3)
MCH RBC QN AUTO: 32 PG (ref 26.8–34.3)
MCHC RBC AUTO-ENTMCNC: 33 G/DL (ref 31.4–37.4)
MCHC RBC AUTO-ENTMCNC: 33.2 G/DL (ref 31.4–37.4)
MCV RBC AUTO: 96 FL (ref 82–98)
MCV RBC AUTO: 96 FL (ref 82–98)
PHOSPHATE SERPL-MCNC: 2.9 MG/DL (ref 2.3–4.1)
PLATELET # BLD AUTO: 125 THOUSANDS/UL (ref 149–390)
PLATELET # BLD AUTO: 127 THOUSANDS/UL (ref 149–390)
PMV BLD AUTO: 10.3 FL (ref 8.9–12.7)
PMV BLD AUTO: 10.4 FL (ref 8.9–12.7)
POTASSIUM SERPL-SCNC: 4.1 MMOL/L (ref 3.5–5.3)
PROT SERPL-MCNC: 5.3 G/DL (ref 6.4–8.4)
PTH-INTACT SERPL-MCNC: 57.6 PG/ML (ref 18.4–80.1)
RBC # BLD AUTO: 2.25 MILLION/UL (ref 3.88–5.62)
RBC # BLD AUTO: 2.74 MILLION/UL (ref 3.88–5.62)
SODIUM SERPL-SCNC: 132 MMOL/L (ref 135–147)
TIBC SERPL-MCNC: 151 UG/DL (ref 250–450)
WBC # BLD AUTO: 10.19 THOUSAND/UL (ref 4.31–10.16)
WBC # BLD AUTO: 9.43 THOUSAND/UL (ref 4.31–10.16)

## 2022-11-04 RX ORDER — OMEGA-3S/DHA/EPA/FISH OIL/D3 300MG-1000
800 CAPSULE ORAL DAILY
Status: DISCONTINUED | OUTPATIENT
Start: 2022-11-04 | End: 2022-11-06 | Stop reason: HOSPADM

## 2022-11-04 RX ADMIN — METOPROLOL TARTRATE 100 MG: 50 TABLET, FILM COATED ORAL at 08:43

## 2022-11-04 RX ADMIN — ATORVASTATIN CALCIUM 40 MG: 40 TABLET, FILM COATED ORAL at 08:42

## 2022-11-04 RX ADMIN — TAMSULOSIN HYDROCHLORIDE 0.4 MG: 0.4 CAPSULE ORAL at 17:18

## 2022-11-04 RX ADMIN — AMIODARONE HYDROCHLORIDE 200 MG: 200 TABLET ORAL at 08:42

## 2022-11-04 RX ADMIN — APIXABAN 2.5 MG: 2.5 TABLET, FILM COATED ORAL at 08:42

## 2022-11-04 RX ADMIN — ACETAMINOPHEN 975 MG: 325 TABLET ORAL at 13:01

## 2022-11-04 RX ADMIN — METOPROLOL TARTRATE 100 MG: 50 TABLET, FILM COATED ORAL at 21:01

## 2022-11-04 RX ADMIN — ACETAMINOPHEN 975 MG: 325 TABLET ORAL at 21:00

## 2022-11-04 RX ADMIN — ACETAMINOPHEN 975 MG: 325 TABLET ORAL at 05:31

## 2022-11-04 RX ADMIN — SENNOSIDES, DOCUSATE SODIUM 1 TABLET: 8.6; 5 TABLET ORAL at 08:41

## 2022-11-04 RX ADMIN — OXYCODONE HYDROCHLORIDE 5 MG: 5 TABLET ORAL at 08:42

## 2022-11-04 RX ADMIN — LIDOCAINE 5% 1 PATCH: 700 PATCH TOPICAL at 17:18

## 2022-11-04 RX ADMIN — SENNOSIDES, DOCUSATE SODIUM 1 TABLET: 8.6; 5 TABLET ORAL at 17:18

## 2022-11-04 RX ADMIN — OXYCODONE HYDROCHLORIDE 5 MG: 5 TABLET ORAL at 12:48

## 2022-11-04 RX ADMIN — CHOLECALCIFEROL (VITAMIN D3) 10 MCG (400 UNIT) TABLET 800 UNITS: at 13:03

## 2022-11-04 NOTE — ASSESSMENT & PLAN NOTE
· He is in sinus rhythm continue Eliquis 2 5 mg p o   Daily till 11/8, then increase to 2 5 mg p o  B i d   · Continue metoprolol and amiodarone

## 2022-11-04 NOTE — PROGRESS NOTES
Progress Note - Orthopedics   Wolf Nguyen 80 y o  male MRN: [id number]  Unit/Bed#: OW CT SCAN      Subjective:    80 y o male POD3 s/p right femoral TFN  No acute overnight events, no new complaints  The patient states that he has noticed an improvement in his hip pain compared to yesterday  Denies fevers, chills, CP, SOB, N/V, numbness or tingling  The patient is noted to have decreasing HGB despite transfusion, SLIM has ordered a CT of the right lower extremity to assess for possible post-operative hematoma  Per PT the patient has shown improvements in his activity tolerance today       Labs:  0   Lab Value Date/Time    HCT 21 7 (L) 11/04/2022 0457    HCT 22 1 (L) 11/03/2022 0456    HCT 23 9 (L) 11/03/2022 0046    HGB 7 2 (L) 11/04/2022 0457    HGB 7 5 (L) 11/03/2022 0456    HGB 7 8 (L) 11/03/2022 0046    INR 1 31 (H) 11/01/2022 0506    WBC 9 43 11/04/2022 0457    WBC 10 61 (H) 11/03/2022 0456    WBC 15 51 (H) 11/02/2022 1608       Meds:    Current Facility-Administered Medications:   •  acetaminophen (TYLENOL) tablet 975 mg, 975 mg, Oral, Q8H [facility], Josafat Tracy PA-C, 975 mg at 11/04/22 1301  •  albuterol (PROVENTIL HFA,VENTOLIN HFA) inhaler 2 puff, 2 puff, Inhalation, Q6H PRN, Josafat Tracy PA-C  •  amiodarone tablet 200 mg, 200 mg, Oral, Daily, Josafat Tracy PA-C, 200 mg at 11/04/22 8404  •  atorvastatin (LIPITOR) tablet 40 mg, 40 mg, Oral, Daily, Josafat Tracy PA-C, 40 mg at 11/04/22 2996  •  calcium carbonate (TUMS) chewable tablet 1,000 mg, 1,000 mg, Oral, Daily PRN, Josafat Tracy PA-C  •  cholecalciferol (VITAMIN D3) tablet 800 Units, 800 Units, Oral, Daily, Killian Hugoine, CRNP, 800 Units at 11/04/22 1303  •  fentaNYL (SUBLIMAZE) injection 25 mcg, 25 mcg, Intravenous, Q3 min PRN, Eureka Early Vu, DO  •  HYDROmorphone (DILAUDID) injection 0 4 mg, 0 4 mg, Intravenous, Q5 Min PRN, Eureka Early Vu, DO  •  HYDROmorphone HCl (DILAUDID) injection 0 2 mg, 0 2 mg, Intravenous, Q4H PRN, Josafat Tracy PA-C, 0 2 mg at 11/01/22 0310  •  lidocaine (LIDODERM) 5 % patch 1 patch, 1 patch, Topical, Daily, Faiza Montgomery PA-C, 1 patch at 11/03/22 1730  •  metoprolol tartrate (LOPRESSOR) tablet 100 mg, 100 mg, Oral, Q12H [facility], Faiza Montgomery PA-C, 100 mg at 11/04/22 3329  •  naloxone (NARCAN) 0 04 mg/mL syringe 0 04 mg, 0 04 mg, Intravenous, Q1MIN PRN, Faiza Montgomery PA-C  •  ondansetron [facility]) injection 4 mg, 4 mg, Intravenous, Q6H PRN, Faiza Montgomery PA-C  •  oxyCODONE (ROXICODONE) IR tablet 2 5 mg, 2 5 mg, Oral, Q4H PRN, Faiza Montgomery PA-C, 2 5 mg at 11/03/22 0932  •  oxyCODONE (ROXICODONE) IR tablet 5 mg, 5 mg, Oral, Q4H PRN, Faiza Montgomery PA-C, 5 mg at 11/04/22 1248  •  polyethylene glycol (MIRALAX) packet 17 g, 17 g, Oral, Daily PRN, Faiza Montgomery PA-C, 17 g at 11/03/22 0932  •  senna-docusate sodium (SENOKOT S) 8 6-50 mg per tablet 1 tablet, 1 tablet, Oral, BID, Faiza Montgomery PA-C, 1 tablet at 11/04/22 2072  •  tamsulosin (FLOMAX) capsule 0 4 mg, 0 4 mg, Oral, Daily With Alexandre Oviedo PA-C, 0 4 mg at 11/03/22 1730    Blood Culture:   Lab Results   Component Value Date    BLOODCX No Growth After 5 Days  03/02/2022    BLOODCX No Growth After 5 Days  03/02/2022       Wound Culture:   No results found for: WOUNDCULT    Ins and Outs:  I/O last 24 hours: In: 1567 5 [P O :940; I V :277 5; Blood:350]  Out: 1800 [Urine:1800]      Physical:  Vitals:    11/04/22 1332   BP: 138/61   Pulse: 66   Resp: 18   Temp: 98 1 °F (36 7 °C)   SpO2: 96%     Musculoskeletal: right Lower Extremity  · Patient is lying in bed, comfortably, in no acute distress  · The dressings are intact, unchanged mild serosanguinous strike-through present  The dressings were removed today, revealing well approximated incisions with glue, without active drainage, bleeding, or signs of infection   There are several small, serous blisters scattered circumferentially along the incisions where the tegaderm was placed, as well as a larger area of superficial skin irritation a few mm beyond the distal incision  · There is a small area of ecchymosis present along the proximal lateral thigh, noted swelling throughout the thigh  Thigh remains soft and compressible, no significant tenderness to palpation  · Patient denies any pain with passive hip circumferential ROM, though he does express hesitancy with testing  · Sensation intact to saphenous, sural, tibial, superficial peroneal nerve, and deep peroneal  · Motor intact to +FHL/EHL, +ankle dorsi/plantar flexion  Patient notes limited ankle ROM due to past history of fracture  · 2+ DP pulse, symmetric bilaterally  · Soft lower extremity compartments, negative Anne's sign  · Digits warm and well perfused  · Capillary refill < 2 seconds    Assessment:    82 y o male POD3 s/p right femoral TFN  Ambulatory dysfunction; PAF; CHF; Hyperlipidemia; CAD with hx of bypass; ABLA; post-operative urinary retention; acute on chronic kidney injury      Plan:  · WBAT RLE  · HGB noted to have decreased to 7 2 this AM s/p transfusion of 1 unit PRBC  · Patient is currently asymptomatic, SLIM on board and will be transfusing another unit of PRBC this evening  Surgical site looks well, without evidence of active bleeding  · CT scan of the right lower extremity ordered by SLIM shows no sign of hematoma or hemorrhage  · Will continue to monitor and transfuse as needed  · PT/OT gait training  · Pain control per primary team  · DVT ppx with Eliquis, currently on 2 5mg daily per post-operative protocols, may resume BID dosing 7 days post-surgery  · Dispo: Ortho will follow   · PT/OT recommending discharge to [facility] on board, patient has been accepted to [facility] pending medical clearance     · The patient will follow up with Dr Silvano Hull office once discharged within 2 weeks     Wilfredo Rogers PA-C

## 2022-11-04 NOTE — PLAN OF CARE
Problem: MOBILITY - ADULT  Goal: Maintain or return to baseline ADL function  Description: INTERVENTIONS:  -  Assess patient's ability to carry out ADLs; assess patient's baseline for ADL function and identify physical deficits which impact ability to perform ADLs (bathing, care of mouth/teeth, toileting, grooming, dressing, etc )  - Assess/evaluate cause of self-care deficits   - Assess range of motion  - Assess patient's mobility; develop plan if impaired  - Assess patient's need for assistive devices and provide as appropriate  - Encourage maximum independence but intervene and supervise when necessary  - Involve family in performance of ADLs  - Assess for home care needs following discharge   - Consider OT consult to assist with ADL evaluation and planning for discharge  - Provide patient education as appropriate  Outcome: Progressing  Goal: Maintains/Returns to pre admission functional level  Description: INTERVENTIONS:  - Perform BMAT or MOVE assessment daily    - Set and communicate daily mobility goal to care team and patient/family/caregiver  - Collaborate with rehabilitation services on mobility goals if consulted  - Perform Range of Motion 4 times a day  - Reposition patient every 2 hours    - Dangle patient 3 times a day  - Stand patient 3 times a day  - Ambulate patient 3 times a day  - Out of bed to chair 3 times a day   - Out of bed for meals 3 times a day  - Out of bed for toileting  - Record patient progress and toleration of activity level   Outcome: Progressing     Problem: Potential for Falls  Goal: Patient will remain free of falls  Description: INTERVENTIONS:  - Educate patient/family on patient safety including physical limitations  - Instruct patient to call for assistance with activity   - Consult OT/PT to assist with strengthening/mobility   - Keep Call bell within reach  - Keep bed low and locked with side rails adjusted as appropriate  - Keep care items and personal belongings within reach  - Initiate and maintain comfort rounds  - Make Fall Risk Sign visible to staff  - Offer Toileting every   Hours, in advance of need  - Initiate/Maintain   alarm  - Obtain necessary fall risk management equipment:      - Apply yellow socks and bracelet for high fall risk patients  - Consider moving patient to room near nurses station  Outcome: Progressing     Problem: Prexisting or High Potential for Compromised Skin Integrity  Goal: Skin integrity is maintained or improved  Description: INTERVENTIONS:  - Identify patients at risk for skin breakdown  - Assess and monitor skin integrity  - Assess and monitor nutrition and hydration status  - Monitor labs   - Assess for incontinence   - Turn and reposition patient  - Assist with mobility/ambulation  - Relieve pressure over bony prominences  - Avoid friction and shearing  - Provide appropriate hygiene as needed including keeping skin clean and dry  - Evaluate need for skin moisturizer/barrier cream  - Collaborate with interdisciplinary team   - Patient/family teaching  - Consider wound care consult   Outcome: Progressing     Problem: SKIN/TISSUE INTEGRITY - ADULT  Goal: Skin Integrity remains intact(Skin Breakdown Prevention)  Description: Assess:  -Perform Chris assessment every    -Clean and moisturize skin every    -Inspect skin when repositioning, toileting, and assisting with ADLS  -Assess under medical devices such as   every    -Assess extremities for adequate circulation and sensation     Bed Management:  -Have minimal linens on bed & keep smooth, unwrinkled  -Change linens as needed when moist or perspiring  -Avoid sitting or lying in one position for more than   hours while in bed  -Keep HOB at  degrees     Toileting:  -Offer bedside commode  -Assess for incontinence every   -Use incontinent care products after each incontinent episode such as   Activity:  -Mobilize patient    times a day  -Encourage activity and walks on unit  -Encourage or provide ROM exercises   -Turn and reposition patient every   Hours  -Use appropriate equipment to lift or move patient in bed  -Instruct/ Assist with weight shifting every   when out of bed in chair  -Consider limitation of chair time   hour intervals    Skin Care:  -Avoid use of baby powder, tape, friction and shearing, hot water or constrictive clothing  -Relieve pressure over bony prominences using    -Do not massage red bony areas    Next Steps:  -Teach patient strategies to minimize risks such as     -Consider consults to  interdisciplinary teams such as     Outcome: Progressing  Goal: Incision(s), wounds(s) or drain site(s) healing without S/S of infection  Description: INTERVENTIONS  - Assess and document dressing, incision, wound bed, drain sites and surrounding tissue  - Provide patient and family education  - Perform skin care/dressing changes every     Outcome: Progressing     Problem: MUSCULOSKELETAL - ADULT  Goal: Maintain or return mobility to safest level of function  Description: INTERVENTIONS:  - Assess patient's ability to carry out ADLs; assess patient's baseline for ADL function and identify physical deficits which impact ability to perform ADLs (bathing, care of mouth/teeth, toileting, grooming, dressing, etc )  - Assess/evaluate cause of self-care deficits   - Assess range of motion  - Assess patient's mobility  - Assess patient's need for assistive devices and provide as appropriate  - Encourage maximum independence but intervene and supervise when necessary  - Involve family in performance of ADLs  - Assess for home care needs following discharge   - Consider OT consult to assist with ADL evaluation and planning for discharge  - Provide patient education as appropriate  Outcome: Progressing  Goal: Maintain proper alignment of affected body part  Description: INTERVENTIONS:  - Support, maintain and protect limb and body alignment  - Provide patient/ family with appropriate education  Outcome: Progressing     Problem: PAIN - ADULT  Goal: Verbalizes/displays adequate comfort level or baseline comfort level  Description: Interventions:  - Encourage patient to monitor pain and request assistance  - Assess pain using appropriate pain scale0-10  - Administer analgesics based on type and severity of pain and evaluate response  - Implement non-pharmacological measures as appropriate and evaluate response  - Consider cultural and social influences on pain and pain management  - Notify physician/advanced practitioner if interventions unsuccessful or patient reports new pain  Outcome: Progressing     Problem: INFECTION - ADULT  Goal: Absence or prevention of progression during hospitalization  Description: INTERVENTIONS:  - Assess and monitor for signs and symptoms of infection  - Monitor lab/diagnostic results  - Monitor all insertion sites, i e  indwelling lines, tubes, and drains  - Monitor endotracheal if appropriate and nasal secretions for changes in amount and color  - [place] appropriate cooling/warming therapies per order  - Administer medications as ordered  - Instruct and encourage patient and family to use good hand hygiene technique  - Identify and instruct in appropriate isolation precautions for identified infection/condition  Outcome: Progressing     Problem: SAFETY ADULT  Goal: Maintain or return to baseline ADL function  Description: INTERVENTIONS:  -  Assess patient's ability to carry out ADLs; assess patient's baseline for ADL function and identify physical deficits which impact ability to perform ADLs (bathing, care of mouth/teeth, toileting, grooming, dressing, etc )  - Assess/evaluate cause of self-care deficits   - Assess range of motion  - Assess patient's mobility; develop plan if impaired  - Assess patient's need for assistive devices and provide as appropriate  - Encourage maximum independence but intervene and supervise when necessary  - Involve family in performance of ADLs  - Assess for home care needs following discharge   - Consider OT consult to assist with ADL evaluation and planning for discharge  - Provide patient education as appropriate  Outcome: Progressing  Goal: Maintains/Returns to pre admission functional level  Description: INTERVENTIONS:  - Perform BMAT or MOVE assessment daily    - Set and communicate daily mobility goal to care team and patient/family/caregiver  - Collaborate with rehabilitation services on mobility goals if consulted  - Perform Range of Motion 4 times a day  - Reposition patient every 2 hours  - Dangle patient 3 times a day  - Stand patient 3 times a day  - Ambulate patient 3 times a day  - Out of bed to chair 3 times a day   - Out of bed for meals 3 times a day  - Out of bed for toileting  - Record patient progress and toleration of activity level   Outcome: Progressing  Goal: Patient will remain free of falls  Description: INTERVENTIONS:  - Educate patient/family on patient safety including physical limitations  - Instruct patient to call for assistance with activity   - Consult OT/PT to assist with strengthening/mobility   - Keep Call bell within reach  - Keep bed low and locked with side rails adjusted as appropriate  - Keep care items and personal belongings within reach  - Initiate and maintain comfort rounds  - Make Fall Risk Sign visible to staff  - Offer Toileting every   Hours, in advance of need  - Initiate/Maintain   alarm  - Obtain necessary fall risk management equipment:      - Apply yellow socks and bracelet for high fall risk patients  - Consider moving patient to room near nurses station  Outcome: Progressing     Problem: DISCHARGE PLANNING  Goal: Discharge to home or other facility with appropriate resources  Description: INTERVENTIONS:  - Identify barriers to discharge w/patient and caregiver  - Arrange for needed discharge resources and transportation as appropriate  - Identify discharge learning needs (meds, wound care, etc )  - Arrange for interpretive services to assist at discharge as needed  - Refer to Case Management Department for coordinating discharge planning if the patient needs post-hospital services based on physician/advanced practitioner order or complex needs related to functional status, cognitive ability, or social support system  Outcome: Progressing     Problem: Knowledge Deficit  Goal: Patient/family/caregiver demonstrates understanding of disease process, treatment plan, medications, and discharge instructions  Description: Complete learning assessment and assess knowledge base    Interventions:  - Provide teaching at level of understanding  - Provide teaching via preferred learning methods  Outcome: Progressing     Problem: GENITOURINARY - ADULT  Goal: Maintains or returns to baseline urinary function  Description: INTERVENTIONS:  - Assess urinary function  - Encourage oral fluids to ensure adequate hydration if ordered  - Administer IV fluids as ordered to ensure adequate hydration  - Administer ordered medications as needed  - Offer frequent toileting  - Follow urinary retention protocol if ordered  Outcome: Progressing  Goal: Absence of urinary retention  Description: INTERVENTIONS:  - Assess patient’s ability to void and empty bladder  - Monitor I/O  - Bladder scan as needed  - Discuss with physician/AP medications to alleviate retention as needed  - Discuss catheterization for long term situations as appropriate  Outcome: Progressing  Goal: Urinary catheter remains patent  Description: INTERVENTIONS:  - Assess patency of urinary catheter  - If patient has a chronic zavala, consider changing catheter if non-functioning  - Follow guidelines for intermittent irrigation of non-functioning urinary catheter  Outcome: Progressing

## 2022-11-04 NOTE — ASSESSMENT & PLAN NOTE
Lab Results   Component Value Date    EGFR 30 11/04/2022    EGFR 23 11/03/2022    EGFR 21 11/02/2022    CREATININE 1 97 (H) 11/04/2022    CREATININE 2 50 (H) 11/03/2022    CREATININE 2 66 (H) 11/02/2022     · Baseline appears to be approximately 1 9-2 2 on review of records  · On admission does not qualify for JOEL criteria, Now qualifies with Cr  2 66  · Avoid nephrotoxins, relative hypotension, and NSAIDs if possible  Got aldactone on 11/2 however would continue to hold further diuretics  · Renally dose medications as appropriate  · Cr rising to 2 5 > 2 6 >2 5  · Nephrology consulted  · Urinary retention post-op, multi straight cath with zavala placed     · Repeat Urine studies   · I&O, standing weight - noted PTA lasix and aldactone held -euvolemic   · US bladder/kidney-mild right renal atrophy, no hydronephrosis, prostate enlargement  ·

## 2022-11-04 NOTE — ASSESSMENT & PLAN NOTE
· Patient does have chronic anemia with hemoglobin ranging between 10 8-11  · He presented with hemoglobin 11 1  · Postop he started dropping requiring 2 units of transfusion  · Hemoglobin improved yesterday after transfusion to 8 7, unfortunately today it dropped to 8 2 again I do not see any evidence of bleed he is not having any melena or hematochezia discussed with nursing there is no evidence of bleed on the hip CT was done without any evidence of hematoma abdomen is benign and no evidence of hematuria  · With CABG and CKD would like to keep hemoglobin greater than 8  · Okay to continue Eliquis 2 5 mg daily right now prior to increasing to b i d  As per recommendations of Orthopedics as no evidence of any gross bleed  · Also in light of CKD could have underlying anemia as well  · Iron panel reviewed anemia of chronic disease with mild iron-deficiency  Start ferrous gluconate 324 mg p o   Daily   · If patient remains hemodynamically stable with stable hemoglobin then Plavix 75 mg daily he uses for CABG can be restarted on November 8th

## 2022-11-04 NOTE — OCCUPATIONAL THERAPY NOTE
Occupational Therapy Progress Note     Patient Name: Sissy Will  [id number] Date: 11/4/2022  Problem List  Principal Problem:    Closed 2-part intertrochanteric fracture of proximal end of right femur, initial encounter [facility])  Active Problems:    Coronary artery disease involving coronary bypass graft    Hyperlipidemia, mixed    Acute-on-chronic kidney injury ([facility] [address] )    CHF (congestive heart failure) (HCC)    Paroxysmal atrial fibrillation ([facility] )    Acute blood loss anemia    Hyponatremia    Postoperative urinary retention    Thrombocytopenia ([facility] )          11/04/22 1321   Note Type   Note Type Cancelled Session   Cancel Reasons Medical status       Chart review complete  Attempted to see Pt this afternoon for OT treatment session, however Pt currently receiving a unit of blood and is unavailable  Will continue to follow and address as medically apropriate and as schedule allows         Carla Hamilton

## 2022-11-04 NOTE — ASSESSMENT & PLAN NOTE
· Patient presented with sodium 136  He is on diuretics so sodium studies could be not reliant- initially he was placed on fluids which worsened his sodium he was placed on fluid restriction of 1 2 L by Nephrology and his sodium has improved to 134  · Discussed with Nephrology will go up on the restriction to 1 5 L also will restart his Lasix 20 mg p o  B i d   Which will help him keep his sodium steady  · He is eating and drinking without any issues

## 2022-11-04 NOTE — PLAN OF CARE
Pt's ROM of RLE extremely limited d/t pain and stiffness  Pt with urinary retention and zavala placed  Pt received 1 unit of PRBCs and last Hgb 7 5  Problem: MOBILITY - ADULT  Goal: Maintain or return to baseline ADL function  Description: INTERVENTIONS:  -  Assess patient's ability to carry out ADLs; assess patient's baseline for ADL function and identify physical deficits which impact ability to perform ADLs (bathing, care of mouth/teeth, toileting, grooming, dressing, etc )  - Assess/evaluate cause of self-care deficits   - Assess range of motion  - Assess patient's mobility; develop plan if impaired  - Assess patient's need for assistive devices and provide as appropriate  - Encourage maximum independence but intervene and supervise when necessary  - Involve family in performance of ADLs  - Assess for home care needs following discharge   - Consider OT consult to assist with ADL evaluation and planning for discharge  - Provide patient education as appropriate  Outcome: Progressing  Goal: Maintains/Returns to pre admission functional level  Description: INTERVENTIONS:  - Perform BMAT or MOVE assessment daily    - Set and communicate daily mobility goal to care team and patient/family/caregiver  - Collaborate with rehabilitation services on mobility goals if consulted  - Perform Range of Motion 4 times a day  - Reposition patient every 2 hours    - Dangle patient 3 times a day  - Stand patient 3 times a day  - Ambulate patient 3 times a day  - Out of bed to chair 3 times a day   - Out of bed for meals 3 times a day  - Out of bed for toileting  - Record patient progress and toleration of activity level   Outcome: Progressing     Problem: Potential for Falls  Goal: Patient will remain free of falls  Description: INTERVENTIONS:  - Educate patient/family on patient safety including physical limitations  - Instruct patient to call for assistance with activity   - Consult OT/PT to assist with strengthening/mobility   - Keep Call bell within reach  - Keep bed low and locked with side rails adjusted as appropriate  - Keep care items and personal belongings within reach  - Initiate and maintain comfort rounds  - Make Fall Risk Sign visible to staff  - Offer Toileting every   Hours, in advance of need  - Initiate/Maintain   alarm  - Obtain necessary fall risk management equipment:      - Apply yellow socks and bracelet for high fall risk patients  - Consider moving patient to room near nurses station  Outcome: Progressing     Problem: Prexisting or High Potential for Compromised Skin Integrity  Goal: Skin integrity is maintained or improved  Description: INTERVENTIONS:  - Identify patients at risk for skin breakdown  - Assess and monitor skin integrity  - Assess and monitor nutrition and hydration status  - Monitor labs   - Assess for incontinence   - Turn and reposition patient  - Assist with mobility/ambulation  - Relieve pressure over bony prominences  - Avoid friction and shearing  - Provide appropriate hygiene as needed including keeping skin clean and dry  - Evaluate need for skin moisturizer/barrier cream  - Collaborate with interdisciplinary team   - Patient/family teaching  - Consider wound care consult   Outcome: Progressing     Problem: SKIN/TISSUE INTEGRITY - ADULT  Goal: Skin Integrity remains intact(Skin Breakdown Prevention)  Description: Assess:  -Perform Chris assessment every    -Clean and moisturize skin every    -Inspect skin when repositioning, toileting, and assisting with ADLS  -Assess under medical devices such as   every    -Assess extremities for adequate circulation and sensation     Bed Management:  -Have minimal linens on bed & keep smooth, unwrinkled  -Change linens as needed when moist or perspiring  -Avoid sitting or lying in one position for more than   hours while in bed  -Keep HOB at  degrees     Toileting:  -Offer bedside commode  -Assess for incontinence every     -Use incontinent care products after each incontinent episode such as   Activity:  -Mobilize patient   times a day  -Encourage activity and walks on unit  -Encourage or provide ROM exercises   -Turn and reposition patient every   Hours  -Use appropriate equipment to lift or move patient in bed  -Instruct/ Assist with weight shifting every   when out of bed in chair  -Consider limitation of chair time   hour intervals    Skin Care:  -Avoid use of baby powder, tape, friction and shearing, hot water or constrictive clothing  -Relieve pressure over bony prominences using    -Do not massage red bony areas    Next Steps:  -Teach patient strategies to minimize risks such as     -Consider consults to  interdisciplinary teams such as     Outcome: Progressing  Goal: Incision(s), wounds(s) or drain site(s) healing without S/S of infection  Description: INTERVENTIONS  - Assess and document dressing, incision, wound bed, drain sites and surrounding tissue  - Provide patient and family education  - Perform skin care/dressing changes every     Outcome: Progressing     Problem: MUSCULOSKELETAL - ADULT  Goal: Maintain or return mobility to safest level of function  Description: INTERVENTIONS:  - Assess patient's ability to carry out ADLs; assess patient's baseline for ADL function and identify physical deficits which impact ability to perform ADLs (bathing, care of mouth/teeth, toileting, grooming, dressing, etc )  - Assess/evaluate cause of self-care deficits   - Assess range of motion  - Assess patient's mobility  - Assess patient's need for assistive devices and provide as appropriate  - Encourage maximum independence but intervene and supervise when necessary  - Involve family in performance of ADLs  - Assess for home care needs following discharge   - Consider OT consult to assist with ADL evaluation and planning for discharge  - Provide patient education as appropriate  Outcome: Progressing  Goal: Maintain proper alignment of affected body part  Description: INTERVENTIONS:  - Support, maintain and protect limb and body alignment  - Provide patient/ family with appropriate education  Outcome: Progressing     Problem: PAIN - ADULT  Goal: Verbalizes/displays adequate comfort level or baseline comfort level  Description: Interventions:  - Encourage patient to monitor pain and request assistance  - Assess pain using appropriate pain scale0-10  - Administer analgesics based on type and severity of pain and evaluate response  - Implement non-pharmacological measures as appropriate and evaluate response  - Consider cultural and social influences on pain and pain management  - Notify physician/advanced practitioner if interventions unsuccessful or patient reports new pain  Outcome: Progressing     Problem: INFECTION - ADULT  Goal: Absence or prevention of progression during hospitalization  Description: INTERVENTIONS:  - Assess and monitor for signs and symptoms of infection  - Monitor lab/diagnostic results  - Monitor all insertion sites, i e  indwelling lines, tubes, and drains  - Monitor endotracheal if appropriate and nasal secretions for changes in amount and color  - [place] appropriate cooling/warming therapies per order  - Administer medications as ordered  - Instruct and encourage patient and family to use good hand hygiene technique  - Identify and instruct in appropriate isolation precautions for identified infection/condition  Outcome: Progressing     Problem: SAFETY ADULT  Goal: Maintain or return to baseline ADL function  Description: INTERVENTIONS:  -  Assess patient's ability to carry out ADLs; assess patient's baseline for ADL function and identify physical deficits which impact ability to perform ADLs (bathing, care of mouth/teeth, toileting, grooming, dressing, etc )  - Assess/evaluate cause of self-care deficits   - Assess range of motion  - Assess patient's mobility; develop plan if impaired  - Assess patient's need for assistive devices and provide as appropriate  - Encourage maximum independence but intervene and supervise when necessary  - Involve family in performance of ADLs  - Assess for home care needs following discharge   - Consider OT consult to assist with ADL evaluation and planning for discharge  - Provide patient education as appropriate  Outcome: Progressing  Goal: Maintains/Returns to pre admission functional level  Description: INTERVENTIONS:  - Perform BMAT or MOVE assessment daily    - Set and communicate daily mobility goal to care team and patient/family/caregiver  - Collaborate with rehabilitation services on mobility goals if consulted  - Perform Range of Motion 4 times a day  - Reposition patient every 2 hours  - Dangle patient 3 times a day  - Stand patient 3 times a day  - Ambulate patient 3 times a day  - Out of bed to chair 3 times a day   - Out of bed for meals 3 times a day  - Out of bed for toileting  - Record patient progress and toleration of activity level   Outcome: Progressing  Goal: Patient will remain free of falls  Description: INTERVENTIONS:  - Educate patient/family on patient safety including physical limitations  - Instruct patient to call for assistance with activity   - Consult OT/PT to assist with strengthening/mobility   - Keep Call bell within reach  - Keep bed low and locked with side rails adjusted as appropriate  - Keep care items and personal belongings within reach  - Initiate and maintain comfort rounds  - Make Fall Risk Sign visible to staff  - Offer Toileting every   Hours, in advance of need  - Initiate/Maintain   alarm  - Obtain necessary fall risk management equipment:      - Apply yellow socks and bracelet for high fall risk patients  - Consider moving patient to room near nurses station  Outcome: Progressing     Problem: DISCHARGE PLANNING  Goal: Discharge to home or other facility with appropriate resources  Description: INTERVENTIONS:  - Identify barriers to discharge w/patient and caregiver  - Arrange for needed discharge resources and transportation as appropriate  - Identify discharge learning needs (meds, wound care, etc )  - Arrange for interpretive services to assist at discharge as needed  - Refer to Case Management Department for coordinating discharge planning if the patient needs post-hospital services based on physician/advanced practitioner order or complex needs related to functional status, cognitive ability, or social support system  Outcome: Progressing     Problem: Knowledge Deficit  Goal: Patient/family/caregiver demonstrates understanding of disease process, treatment plan, medications, and discharge instructions  Description: Complete learning assessment and assess knowledge base    Interventions:  - Provide teaching at level of understanding  - Provide teaching via preferred learning methods  Outcome: Progressing     Problem: GENITOURINARY - ADULT  Goal: Maintains or returns to baseline urinary function  Description: INTERVENTIONS:  - Assess urinary function  - Encourage oral fluids to ensure adequate hydration if ordered  - Administer IV fluids as ordered to ensure adequate hydration  - Administer ordered medications as needed  - Offer frequent toileting  - Follow urinary retention protocol if ordered  Outcome: Progressing  Goal: Absence of urinary retention  Description: INTERVENTIONS:  - Assess patient’s ability to void and empty bladder  - Monitor I/O  - Bladder scan as needed  - Discuss with physician/AP medications to alleviate retention as needed  - Discuss catheterization for long term situations as appropriate  Outcome: Progressing  Goal: Urinary catheter remains patent  Description: INTERVENTIONS:  - Assess patency of urinary catheter  - If patient has a chronic zavala, consider changing catheter if non-functioning  - Follow guidelines for intermittent irrigation of non-functioning urinary catheter  Outcome: Progressing

## 2022-11-04 NOTE — NURSING NOTE
Pt declining to get OOB for standing weight  Pt educated on importance of getting OOB after surgery and importance of getting accurate weight  Pt stated understanding but stated "It causes him too much pain"  Pt given routine tylenol and offered to get PRN pain meds if needed in order for pt to try and get OOB  Pt still declining to get OOB  On call SLIM made aware  Bed weight obtained

## 2022-11-04 NOTE — ASSESSMENT & PLAN NOTE
· Requiring zavala catheter placment  · Urology evaluated- will need voiding trial OP in 7-10 days  · Continue flomax  · Noted enlarged prostate on US

## 2022-11-04 NOTE — ASSESSMENT & PLAN NOTE
· S/p CABG x4 and valve replacement in 2013 or 2014 per wife  · Plavix have been on hold since admission    As discussed with the orthopedics Plavix can be restarted on postop day 7  11/8 obviously if hemoglobin and hemodynamically stable

## 2022-11-04 NOTE — PROGRESS NOTES
[address]  Progress Note Bharathi Rodriges 1940, 80 y o  male MRN: [id number]  Unit/Bed#: -[place] Encounter: [id number]  Primary Care Provider: Luis Eduardo Quintero DO   Date and time admitted to hospital: 10/31/2022 12:35 PM    * Closed 2-part intertrochanteric fracture of proximal end of right femur, initial encounter [facility])  Assessment & Plan  Background: Presented to the ED status post mechanical fall leaving house  Reports missing a step in ending up falling onto door  • Xray: acute right hip fracture  • Anticoagulation eliquis on hold; resumed 11/2  • Type and screen ordered   • Hgb 11 1 on admission; monitor closely and transfuse PRN  o See acute blood loss anemia for detail  • Pain management with geriatric pain regimen  • Early mobilization when cleared  • Incentive spirometry   • Orthopedic surgery consulted; input appreciated   o POD 3: right femoral TFN  o WBAT  o eliquis daily 1 week, then resume BID  o Repeat Hgb with >2g drop overnight 10 >7 5 (see under anemia )  • PT/OT consulted; recommending rehab   o Case management on board to assist with dispo- accepted at [facility]      Thrombocytopenia [facility])  Assessment & Plan  · Fluctuates 120s-150s  · Monitor while on anticoagulation    Postoperative urinary retention  Assessment & Plan  · Requiring zavala catheter placment  · Urology evaluated- will need OP follow-up and  voiding trial in 7-10 days  · Continue flomax  · Noted enlarged prostate on US    Hyponatremia  Assessment & Plan  · Na 133 > 131  · Fractional excretion Na suggesting prerenal - will add gentle IVF NS @ 75  · Completed, without significant improvement  · Repeat urine studies today  · Appreciate nephrology recommendations  · Will decrease fluid restriction to 1 2L    Acute blood loss anemia  Assessment & Plan  · Postoperative acute blood loss anemia  · Right hip edema no obvious ecchymosis present,  small serosanguineous drainage on surgical dressing, pale  Hemodynamically stable  · Hgb 10> 6 9 received 1 PRBC repeat Hgb 7 8  · Hematuria in urinal from straight cath, likely from trauma  Schaffer placed this AM without hematuria  · Pt on eliquis continue daily dosing for now, monitor Hgb closely   · Hgb 7 5 > 7 2 will transfuse 1 PRBC repeat cbc  This CBC @ 1400  · Iron panel pending  · VitD level low 27, normal PTH- add supplementation    Paroxysmal atrial fibrillation (HCC)  Assessment & Plan  · Anticoagulated with Eliquis; on hold preoperatively; resume when cleared by orthopedic surgery  · Rate/rhythm controlled on amiodarone and toprol; continue while inpatient  · Eliquis resumed 11/2 - noted Hgb dropped from 10 > 7 5  · Repeat labs  · Ortho recommending post op -daily eliquis for 1 week then BID       CHF (congestive heart failure) ([facility] [address] )  Assessment & Plan  Wt Readings from Last 3 Encounters:   11/04/22 72 kg (158 lb 11 7 oz)   06/04/22 64 7 kg (142 lb 10 2 oz)   03/22/22 63 5 kg (140 lb)     · Most recent echo EF 64%, systolic and diastolic function wnl   · Euvolemic on admission  · Got aldactone on 11/2- continue to hold diuretics  · Daily weight, Intake and output  · Fluid restricted/low-cholesterol diet  · Monitor volume status during hospitalization  · Gentle volume expansion done without improvement in hyponatremia- d/c  · Appears euvolemic continue to hold diuretics    Acute-on-chronic kidney injury [facility])  Assessment & Plan  Lab Results   Component Value Date    EGFR 30 11/04/2022    EGFR 23 11/03/2022    EGFR 21 11/02/2022    CREATININE 1 97 (H) 11/04/2022    CREATININE 2 50 (H) 11/03/2022    CREATININE 2 66 (H) 11/02/2022     · Baseline appears to be approximately 1 9-2 2 on review of records  · On admission does not qualify for JOEL criteria, Now qualifies with Cr  2 66  · Avoid nephrotoxins, relative hypotension, and NSAIDs if possible   Got aldactone on 11/2 however would continue to hold further diuretics  · Renally dose medications as appropriate  · Cr rising to 2 5 > 2 6 >2 5  · Nephrology consulted  · Urinary retention post-op, multi straight cath with zavala placed  · Repeat Urine studies   · I&O, standing weight - noted PTA lasix and aldactone held -euvolemic   · US bladder/kidney-mild right renal atrophy, no hydronephrosis, prostate enlargement  · JOEL now resolved and at baseline    Hyperlipidemia, mixed  Assessment & Plan  · Continue statin therapy    Coronary artery disease involving coronary bypass graft  Assessment & Plan  · S/p CABG x4 and valve replacement in 2013 or 2014 per wife  · Continue Plavix per PTA medication regimen  · Currently without any shortness of breath or chest pain  · EKG this admission Sinus bradycardia with 1st degree A-V block  · Rates currently 60-80s      VTE Pharmacologic Prophylaxis: VTE Score: 8 High Risk (Score >/= 5) - Pharmacological DVT Prophylaxis Ordered: apixaban (Eliquis)  Sequential Compression Devices Ordered  Patient Centered Rounds: I performed bedside rounds with nursing staff today  Discussions with Specialists or Other Care Team Provider: nephrology, CM    Education and Discussions with Family / Patient: Updated  (wife) at bedside  update provided to pt at bedside, will update wife at bedside this afternoon    Time Spent for Care: 30 minutes  More than 50% of total time spent on counseling and coordination of care as described above  Current Length of Stay: 4 day(s)  Current Patient Status: Inpatient   Certification Statement: The patient will continue to require additional inpatient hospital stay due to anemia requiring blood transfusions, hyponatremia  Discharge Plan: Anticipate discharge in 24-48 hrs to rehab facility  Code Status: Level 1 - Full Code    Subjective:   Having pain this morning and buring at incision site, reviewed pain management with pt and asking for PRN medications, last dose oxycodone was 2300 last evening   Encouraged utilizing medication to make pain tolerable so he can better participate in PT and ambulate  Denies any other complaints at this time  No SOB, chest pain, N/V/D    Objective:     Vitals:   Temp (24hrs), Av 5 °F (36 9 °C), Min:98 1 °F (36 7 °C), Max:99 °F (37 2 °C)    Temp:  [98 1 °F (36 7 °C)-99 °F (37 2 °C)] 98 1 °F (36 7 °C)  HR:  [64-72] 64  Resp:  [16-18] 18  BP: (116-128)/(49-57) 128/56  SpO2:  [95 %-99 %] 99 %  Body mass index is 23 44 kg/m²  Input and Output Summary (last 24 hours): Intake/Output Summary (Last 24 hours) at 2022 1219  Last data filed at 2022 1117  Gross per 24 hour   Intake 1287 5 ml   Output 1100 ml   Net 187 5 ml       Physical Exam:   Physical Exam  Vitals and nursing note reviewed  Constitutional:       Appearance: He is well-developed  HENT:      Head: Normocephalic and atraumatic  Mouth/Throat:      Mouth: Mucous membranes are moist    Eyes:      Conjunctiva/sclera: Conjunctivae normal       Pupils: Pupils are equal, round, and reactive to light  Cardiovascular:      Rate and Rhythm: Normal rate and regular rhythm  Pulses: Normal pulses  Heart sounds: Normal heart sounds  No murmur heard  Pulmonary:      Effort: Pulmonary effort is normal  No respiratory distress  Breath sounds: Normal breath sounds  Abdominal:      General: Bowel sounds are normal  There is no distension  Palpations: Abdomen is soft  Tenderness: There is no abdominal tenderness  Musculoskeletal:         General: Swelling (RLE thigh) and tenderness (incision R hip) present  Cervical back: Neck supple  Skin:     General: Skin is warm and dry  Coloration: Skin is pale  Neurological:      General: No focal deficit present  Mental Status: He is alert  Psychiatric:         Mood and Affect: Mood normal          Thought Content:  Thought content normal           Additional Data:     Labs:  Results from last 7 days   Lab Units 22  0457 22  0436 22  0506   WBC Thousand/uL 9 43 < > 9 12   HEMOGLOBIN g/dL 7 2*   < > 10 1*   HEMATOCRIT % 21 7*   < > 30 8*   PLATELETS Thousands/uL 125*   < > 127*   NEUTROS PCT %  --   --  75   LYMPHS PCT %  --   --  12*   MONOS PCT %  --   --  12   EOS PCT %  --   --  1    < > = values in this interval not displayed  Results from last 7 days   Lab Units 11/04/22  0457   SODIUM mmol/L 132*   POTASSIUM mmol/L 4 1   CHLORIDE mmol/L 101   CO2 mmol/L 27   BUN mg/dL 48*   CREATININE mg/dL 1 97*   ANION GAP mmol/L 4   CALCIUM mg/dL 7 3*   ALBUMIN g/dL 2 3*   TOTAL BILIRUBIN mg/dL 0 64   ALK PHOS U/L 86   ALT U/L 16   AST U/L 42   GLUCOSE RANDOM mg/dL 114     Results from last 7 days   Lab Units 11/01/22  0506   INR  1 31*                   Lines/Drains:  Invasive Devices  Report    Peripheral Intravenous Line  Duration           Peripheral IV 11/04/22 Left Antecubital <1 day          Drain  Duration           Urethral Catheter Double-lumen 16 Fr  1 day              Urinary Catheter:  Goal for removal: N/A- Discharging with Schaffer               Imaging: No pertinent imaging reviewed      Recent Cultures (last 7 days):         Last 24 Hours Medication List:   Current Facility-Administered Medications   Medication Dose Route Frequency Provider Last Rate   • acetaminophen  975 mg Oral [facility] Elin Branch PA-C     • albuterol  2 puff Inhalation Q6H PRN Elin Branch PA-C     • amiodarone  200 mg Oral Daily Elin Branch PA-C     • apixaban  2 5 mg Oral Daily Db Coil, CRNP     • atorvastatin  40 mg Oral Daily Elin Branch PA-C     • calcium carbonate  1,000 mg Oral Daily PRN Elin Branch PA-C     • cholecalciferol  800 Units Oral Daily Db Coil, CRNP     • fentaNYL  25 mcg Intravenous Q3 min PRN Mickywig Randolph Vu, DO     • HYDROmorphone  0 4 mg Intravenous Q5 Min PRN Elizabeth Randolph Vu, DO     • HYDROmorphone  0 2 mg Intravenous Q4H PRN Elin Branch PA-C     • lidocaine  1 patch Topical Daily Elin Branch PA-C     • metoprolol tartrate  100 mg Oral Q12H [facility] Steffen Vega PA-C     • naloxone  0 04 mg Intravenous Q1MIN PRN Steffen Vega PA-C     • ondansetron  4 mg Intravenous Q6H PRN Steffen Vega PA-C     • oxyCODONE  2 5 mg Oral Q4H PRN Steffen Vega PA-C     • oxyCODONE  5 mg Oral Q4H PRN Steffen Vega PA-C     • polyethylene glycol  17 g Oral Daily PRN Steffen Vega PA-C     • senna-docusate sodium  1 tablet Oral BID Steffen Vega PA-C     • tamsulosin  0 4 mg Oral Daily With Dinner Steffen Vega PA-C          Today, Patient Was Seen By: Salena Virgen    **Please Note: This note may have been constructed using a voice recognition system  **

## 2022-11-04 NOTE — ASSESSMENT & PLAN NOTE
Wt Readings from Last 3 Encounters:   11/04/22 72 kg (158 lb 11 7 oz)   06/04/22 64 7 kg (142 lb 10 2 oz)   03/22/22 63 5 kg (140 lb)     · Most recent echo EF 83%, systolic and diastolic function wnl   · Restart patient's Lasix 20 mg p o  B i d  That will help keep his sodium steady as well  Hold Aldactone for now till his kidney function remained stable at baseline    · Otherwise euvolemic

## 2022-11-04 NOTE — PHYSICAL THERAPY NOTE
PHYSICAL THERAPY TREATMENT NOTE  NAME:  Florie Bloch  DATE: 11/04/22    Length Of Stay: 4  Performed at least 2 patient identifiers during session: Name and ID bracelet    TREATMENT FLOWSHEET:    11/04/22 1032   PT Last Visit   PT Visit Date 11/04/22   Note Type   Note Type Treatment   Pain Assessment   Pain Assessment Tool 0-10   Pain Score 9   Pain Location/Orientation Orientation: Right;Location: Hip   Pain Onset/Description Onset: Ongoing   Patient's Stated Pain Goal No pain   Hospital Pain Intervention(s) Ambulation/increased activity;Repositioned   Restrictions/Precautions   Weight Bearing Precautions Per Order Yes   RLE Weight Bearing Per Order WBAT   Other Precautions Chair Alarm; Bed Alarm; Fall Risk;Pain;WBS   General   Chart Reviewed Yes   Response to Previous Treatment Patient with no complaints from previous session  Family/Caregiver Present No   Cognition   Overall Cognitive Status WFL   Arousal/Participation Alert; Responsive   Attention Within functional limits   Orientation Level Oriented X4   Memory Within functional limits   Following Commands Follows one step commands without difficulty   Subjective   Subjective "Everyone apologizes for hurting me "   Bed Mobility   Supine to Sit 3  Moderate assistance   Additional items Assist x 1;HOB elevated; Bedrails; Increased time required;Verbal cues;LE management  (UB management)   Additional Comments Pt  tolerated sitting at EOB supported with BUE x 3 mins   Transfers   Sit to Stand 3  Moderate assistance   Additional items Assist x 1; Armrests; Increased time required;Verbal cues  (RW)   Stand to Sit 3  Moderate assistance   Additional items Assist x 1; Armrests; Increased time required;Verbal cues   Stand pivot 3  Moderate assistance   Additional items Assist x 1; Armrests; Increased time required;Verbal cues  (RW, SPS performed x2)   Toilet transfer 3  Moderate assistance   Additional items Assist x 1; Armrests; Increased time required;Verbal cues;Commode  (RW)   Ambulation/Elevation   Gait pattern Improper Weight shift;Decreased foot clearance; Antalgic; Wide TED; Decreased R stance; Short stride; Excessively slow; Step to   Gait Assistance 3  Moderate assist   Additional items Assist x 1;Verbal cues   Assistive Device Rolling walker   Distance 5ft   Balance   Static Sitting Good   Dynamic Sitting Fair +   Static Standing Fair   Dynamic Standing Fair -   Ambulatory Poor +   Endurance Deficit   Endurance Deficit Yes   Activity Tolerance   Activity Tolerance Patient limited by pain; Patient limited by fatigue   Nurse Made Aware RN aware   Exercises   Quad Sets Sitting;10 reps;AROM; Bilateral   Heelslides Sitting;10 reps;AROM; Bilateral   Glute Sets Sitting;10 reps;AROM; Bilateral   Hip Flexion Sitting;10 reps;AROM; Bilateral   Hip Abduction Sitting;10 reps;AROM; Bilateral   Hip Adduction Sitting;10 reps;AROM; Bilateral   Knee AROM Long Arc Quad Sitting;10 reps;AROM; Right;AAROM; Left   Ankle Pumps Sitting;10 reps;AROM; Bilateral   Marching Sitting;10 reps;AROM; Left;AAROM; Right   Assessment   Prognosis Good   Problem List Decreased strength;Decreased range of motion; Impaired balance;Decreased endurance;Decreased mobility; Decreased safety awareness;Decreased skin integrity;Orthopedic restrictions;Pain   Goals   Patient Goals to have less pain   PT Treatment Day 2   Plan   Treatment/Interventions Functional transfer training;LE strengthening/ROM; Therapeutic exercise; Endurance training;Patient/family training;Equipment eval/education; Bed mobility;Gait training;Spoke to nursing   Progress Progressing toward goals   PT Frequency 3-5x/wk   Recommendation   PT Discharge Recommendation Post acute rehabilitation services   AM-PAC Basic Mobility Inpatient   Turning in Bed Without Bedrails 3   Lying on Back to Sitting on Edge of Flat Bed 3   Moving Bed to Chair 2   Standing Up From Chair 2   Walk in Room 2   Climb 3-5 Stairs 1   Basic Mobility Inpatient Raw Score 13   Basic Mobility Standardized Score 33 99   Highest Level Of Mobility   -[facility] Goal 4: Move to chair/commode   -HL Achieved 6: Walk 10 steps or more       The patient's AM-PAC Basic Mobility Inpatient Short Form Raw Score is 13, Standardized Score is 33 99  A standardized score less than 42 9 suggests the patient may benefit from discharge to post-acute rehabilitation services  Please also refer to the recommendation of the Physical Therapist for safe discharge planning  Pt seen for PT treatment session this date with interventions consisting of bed mobility tasks  Transfer training, gait training, toilet transfer seated TE, and education provided as needed for safety and direction to improve functional mobility and activity tolerance  Pt agreeable to PT treatment session upon arrival, pt found resting in bed  At end of session, pt left sitting OOB in recliner positioned for comfort with chair alarm activated and all needs in reach  In comparison to previous session, pt with improvements in amount of activity tolerated   Continue to recommend STR at time of d/c in order to maximize pt's functional independence and safety w/ mobility  Pt continues to be functioning below baseline level  PT will continue to see pt while here in order to address the deficits listed above and provide interventions consistent w/ POC in effort to achieve STGs      Ban Unger

## 2022-11-04 NOTE — QUICK NOTE
Hgb 7 2 this morning, received 1 unit PRBC  CT lower extremity performed to evaluate for hematoma  No evidence of hematoma s/p ORIF  Noted displacement of the lesser trochanter fracture fragment, now retracted proximally by 4 cm  Results reviewed with Orthopedic surgery Vic Morton, and Dr Xiomy Ramírez  Will transfuse additional 1 unit PRBC to keep Hgb >8 with hx CKD

## 2022-11-04 NOTE — PROGRESS NOTES
Progress Note - Nephrology   Sissy Will 80 y o  male MRN: [id number]  Unit/Bed#: -01 Encounter: [id number]    Assessment and Plan    1  Acute kidney injury on top of chronic kidney disease versus progression of underlying chronic disease  Resolved with renal function currently below baseline  Nonoliguric  Continue to trend renal function, avoid nephrotoxins, monitor for urinary retention  2  Chronic kidney stage IIIB versus progression of underlying chronic kidney disease    3  Possible renal vascular disease    4  Hyponatremia  Consider reducing fluid restriction from 1500 mL to 1200 mL  5  Right intertrochanteric fracture of proximal POD #2    Follow up reason for today's visit:     Closed 2-part intertrochanteric fracture of proximal end of right femur, initial encounter [facility])    Patient Active Problem List   Diagnosis   • Bacteremia due to Pseudomonas   • Coronary artery disease involving coronary bypass graft   • Mitral regurgitation   • Hypertensive urgency   • Hyperlipidemia, mixed   • Carotid artery stenosis   • Stage 3 chronic kidney disease (HCC)   • Multiple lung nodules on CT   • Acute respiratory failure with hypoxia ([facility])   • Acute on chronic diastolic CHF (congestive heart failure) ([facility] [address] )   • Acute-on-chronic kidney injury ([facility] [address] )   • Chronic a-fib ([facility])   • Postprocedural pneumothorax   • Lumbar compression deformity   • CHF (congestive heart failure) ([facility])   • Paroxysmal atrial fibrillation ([facility] [address] )   • Closed 2-part intertrochanteric fracture of proximal end of right femur, initial encounter ([facility] [facility] )   • Acute blood loss anemia   • Hyponatremia   • Postoperative urinary retention   • Thrombocytopenia (HCC)         Subjective:   Denies complaints  A complete 10 point review of systems was performed and is otherwise negative  Objective:     Vitals: Blood pressure (!) 116/49, pulse 66, temperature 98 1 °F (36 7 °C), resp   rate 16, height 5' 9" (1 753 m), weight 72 kg (158 lb 11 7 oz), SpO2 95 % ,Body mass index is 23 44 kg/m²  Weight (last 2 days)     Date/Time Weight    11/04/22 0531 72 (158 73)    11/04/22 0457 72 (158 73)    11/03/22 0600 70 9 (156 2)    11/02/22 0440 70 (154 32)            Intake/Output Summary (Last 24 hours) at 11/4/2022 1012  Last data filed at 11/4/2022 0846  Gross per 24 hour   Intake 937 5 ml   Output 1800 ml   Net -862 5 ml     I/O last 3 completed shifts: In: 1364 6 [P O :700; I V :277 5; Blood:387 1]  Out: 2550 [Urine:2550]    Urethral Catheter Double-lumen 16 Fr  (Active)   Reasons to continue Urinary Catheter  Acute urinary retention/obstruction failing urinary retention protocol 11/03/22 1046   Goal for Removal Voiding trial when ambulation improves 11/03/22 1046   Site Assessment Skin intact; Clean 11/03/22 2315   Schaffer Care Done 11/03/22 2100   Collection Container Standard drainage bag 11/03/22 2315   Securement Method Securing device (Describe) 11/03/22 2315   Output (mL) 350 mL 11/04/22 0457       Physical Exam: BP (!) 116/49   Pulse 66   Temp 98 1 °F (36 7 °C)   Resp 16   Ht 5' 9" (1 753 m)   Wt 72 kg (158 lb 11 7 oz)   SpO2 95%   BMI 23 44 kg/m²     General Appearance:    No acute distress  Cooperative  Appears stated age  Head:    Normocephalic  Atraumatic  Normal jaw occlusion  Eyes:    Lids, conjunctiva normal  No scleral icterus  Ears:    Normal external ears  Nose:   Nares normal  No drainage  Mouth:   Lips, tongue normal  Mucosa normal  Phonation normal    Neck:   Supple  Symmetrical    Back:     Symmetric  No CVA tenderness  Lungs:     Normal respiratory effort  Clear to auscultation bilaterally  Chest wall:    No tenderness or deformity  Heart:    Regular rate and rhythm  Normal S1 and S2  No murmur  No JVD  No edema  Abdomen:     Soft  Non-tender  Bowel sounds active  Genitourinary: Schaffer catheter present with clear, yellow urine output  Extremities:   Extremities normal  Atraumatic  No cyanosis  Skin:   Warm and dry  No pallor, jaundice, rash, ecchymoses  Neurologic:   Alert and oriented to person, place, time  No focal deficit  Lab, Imaging and other studies: I have personally reviewed pertinent labs  CBC:   Lab Results   Component Value Date    WBC 9 43 11/04/2022    HGB 7 2 (L) 11/04/2022    HCT 21 7 (L) 11/04/2022    MCV 96 11/04/2022     (L) 11/04/2022    MCH 32 0 11/04/2022    MCHC 33 2 11/04/2022    RDW 17 2 (H) 11/04/2022    MPV 10 4 11/04/2022     CMP:   Lab Results   Component Value Date    K 4 1 11/04/2022     11/04/2022    CO2 27 11/04/2022    BUN 48 (H) 11/04/2022    CREATININE 1 97 (H) 11/04/2022    CALCIUM 7 3 (L) 11/04/2022    AST 42 11/04/2022    ALT 16 11/04/2022    ALKPHOS 86 11/04/2022    EGFR 30 11/04/2022         Results from last 7 days   Lab Units 11/04/22  0457 11/03/22  0456 11/02/22  1608 11/02/22  0436 11/01/22  0506 10/31/22  1304   POTASSIUM mmol/L 4 1 4 3 4 8   < > 4 3 4 0   CHLORIDE mmol/L 101 98 96   < > 102 103   CO2 mmol/L 27 24 27   < > 27 28   BUN mg/dL 48* 56* 53*   < > 35* 37*   CREATININE mg/dL 1 97* 2 50* 2 66*   < > 1 94* 2 22*   CALCIUM mg/dL 7 3* 7 2* 7 9*   < > 7 9* 8 2*   ALK PHOS U/L 86  --   --   --  100 125*   ALT U/L 16  --   --   --  34 40   AST U/L 42  --   --   --  18 22    < > = values in this interval not displayed           Phosphorus:   Lab Results   Component Value Date    PHOS 2 9 11/04/2022     Magnesium:   Lab Results   Component Value Date    MG 2 0 11/04/2022     Urinalysis: No results found for: [place], SPECGRAV, PHUR, LEUKOCYTESUR, NITRITE, PROTEINUA, GLUCOSEU, KETONESU, BILIRUBINUR, BLOODU  Ionized Calcium: No results found for: CAION  Coagulation: No results found for: PT, INR, APTT  Troponin: No results found for: TROPONINI  ABG: No results found for: PHART, PEL0JYH, PO2ART, NDK3DTJ, O7CCOWEC, BEART, SOURCE  Radiology review:     IMAGING  Procedure: XR hip/pelv 2-3 vws right if performed    Result Date: 11/1/2022  Narrative: C-ARM - RIGHT HIP INDICATION: ORIF  Procedure guidance  COMPARISON:  10/31/2022 TECHNIQUE: FLUOROSCOPY TIME:   49 SECONDS 5 FLUOROSCOPIC IMAGES FINDINGS: Fluoroscopic guidance provided for procedure guidance  Osseous and soft tissue detail limited by technique  Impression: Fluoroscopic guidance provided for procedure guidance  Please refer to the separate procedure notes for additional details  Workstation performed: [id number]     Procedure: US kidney and bladder    Result Date: 11/3/2022  Narrative: RENAL ULTRASOUND INDICATION:   Acute renal insufficiency, urinary retention  COMPARISON: Renal ultrasound April 2021, CT chest abdomen and pelvis April 2021 TECHNIQUE:   Ultrasound of the retroperitoneum was performed with a curvilinear transducer utilizing volumetric sweeps and still imaging techniques  FINDINGS: KIDNEYS: Right renal atrophy  Normal size left kidney  Right kidney:  8 2 x 4 9 x 4 6 cm  Volume 97 9 mL Left kidney:  10 1 x 5 2 x 4 1 cm  Volume 111 7 mL Right kidney Right renal cortical thinning with normal echotexture  No mass is identified  No hydronephrosis  No shadowing calculi  No perinephric fluid collections  Left kidney Normal echogenicity and contour  No mass is identified  No hydronephrosis  No shadowing calculi  No perinephric fluid collections  URETERS: Nonvisualized  BLADDER: Normally distended  No focal thickening or mass lesions  Bilateral ureteral jets detected  Prostate gland is enlarged with a volume of 59 mL  Impression: Mild right renal atrophy  No hydronephrosis  Prostate enlargement   Workstation performed: [id number]       Current Facility-Administered Medications   Medication Dose Route Frequency   • acetaminophen (TYLENOL) tablet 975 mg  975 mg Oral Q8H [address]   • albuterol (PROVENTIL HFA,VENTOLIN HFA) inhaler 2 puff  2 puff Inhalation Q6H PRN   • amiodarone tablet 200 mg  200 mg Oral Daily   • apixaban (ELIQUIS) tablet 2 5 mg  2 5 mg Oral Daily   • atorvastatin (LIPITOR) tablet 40 mg  40 mg Oral Daily   • calcium carbonate (TUMS) chewable tablet 1,000 mg  1,000 mg Oral Daily PRN   • fentaNYL (SUBLIMAZE) injection 25 mcg  25 mcg Intravenous Q3 min PRN   • HYDROmorphone (DILAUDID) injection 0 4 mg  0 4 mg Intravenous Q5 Min PRN   • HYDROmorphone HCl (DILAUDID) injection 0 2 mg  0 2 mg Intravenous Q4H PRN   • lidocaine (LIDODERM) 5 % patch 1 patch  1 patch Topical Daily   • metoprolol tartrate (LOPRESSOR) tablet 100 mg  100 mg Oral Q12H YOLANDA   • naloxone (NARCAN) 0 04 mg/mL syringe 0 04 mg  0 04 mg Intravenous Q1MIN PRN   • ondansetron (ZOFRAN) injection 4 mg  4 mg Intravenous Q6H PRN   • oxyCODONE (ROXICODONE) IR tablet 2 5 mg  2 5 mg Oral Q4H PRN   • oxyCODONE (ROXICODONE) IR tablet 5 mg  5 mg Oral Q4H PRN   • polyethylene glycol (MIRALAX) packet 17 g  17 g Oral Daily PRN   • senna-docusate sodium (SENOKOT S) 8 6-50 mg per tablet 1 tablet  1 tablet Oral BID   • tamsulosin (FLOMAX) capsule 0 4 mg  0 4 mg Oral Daily With Dinner     Medications Discontinued During This Encounter   Medication Reason   • clopidogrel (PLAVIX) tablet 75 mg    • furosemide (LASIX) tablet 20 mg    • spironolactone (ALDACTONE) tablet 25 mg    • sodium chloride 0 9 % irrigation solution Patient Discharge   • bupivacaine-epinephrine (PF) (MARCAINE/EPINEPHRINE PF) 0 25 %-1:[id number] injection Patient Discharge   • HYDROmorphone (DILAUDID) injection 0 4 mg    • fentaNYL (SUBLIMAZE) injection 25 mcg    • ceFAZolin (ANCEF) IVPB (premix in dextrose) 2,000 mg 50 mL    • apixaban (ELIQUIS) tablet 2 5 mg    • lactated ringers infusion Duplicate order   • spironolactone (ALDACTONE) tablet 25 mg    • multi-electrolyte (PLASMALYTE-A/ISOLYTE-S PH 7 4) IV solution    • ondansetron (ZOFRAN) injection 4 mg Duplicate order   • apixaban (ELIQUIS) tablet 2 5 mg    • lactated ringers infusion    • sodium chloride 0 9 % infusion        Lisa Horn    Portions of the record may have been created with voice recognition software  Occasional wrong word or "sound a like" substitutions may have occurred due to the inherent limitations of voice recognition software  Read the chart carefully and recognize, using context, where substitutions have occurred

## 2022-11-04 NOTE — ASSESSMENT & PLAN NOTE
· Chronic thrombocytopenia stable Patient seen and examined.  Agree with above.   -S/p CORNELIA to [place]  -[facility] for CORNELIA placement  -home tomorrow if medically stable    Corie Ga MD  [facility]  [address], [address]  [place] [address]  office: [phone]  pager: [phone]

## 2022-11-04 NOTE — ASSESSMENT & PLAN NOTE
Lab Results   Component Value Date    EGFR 30 11/04/2022    EGFR 23 11/03/2022    EGFR 21 11/02/2022    CREATININE 1 97 (H) 11/04/2022    CREATININE 2 50 (H) 11/03/2022    CREATININE 2 66 (H) 11/02/2022     · Patient has CKD stage 3 Baseline appears to be approximately 1 9-2 2 on review of records  · On admission does not qualify for JOEL criteria, Now qualifies with Cr  2 66  · Avoid nephrotoxins, relative hypotension, and NSAIDs if possible  · Will hold Aldactone for now to not worsened any kidney function Lasix will be restarted to help with the sodium keep it at base    · He had urinary retention and Schaffer was inserted on 11/03  · Creatinine is at baseline

## 2022-11-04 NOTE — PLAN OF CARE
Problem: PHYSICAL THERAPY ADULT  Goal: Performs mobility at highest level of function for planned discharge setting  See evaluation for individualized goals  Description: Treatment/Interventions: Functional transfer training, LE strengthening/ROM, Elevations, Therapeutic exercise, Endurance training, Patient/family training, Equipment eval/education, Bed mobility, Gait training, Compensatory technique education, Spoke to nursing, OT  Equipment Recommended:  (TBD by rehab)       See flowsheet documentation for full assessment, interventions and recommendations  Outcome: Progressing  Note: Prognosis: Good  Problem List: Decreased strength, Decreased range of motion, Impaired balance, Decreased endurance, Decreased mobility, Decreased safety awareness, Decreased skin integrity, Orthopedic restrictions, Pain  Assessment: Pt seen for PT treatment session this date with interventions consisting of bed mobility tasks  Transfer training, gait training, toilet transfer seated TE, and education provided as needed for safety and direction to improve functional mobility and activity tolerance  Pt agreeable to PT treatment session upon arrival, pt found resting in bed  At end of session, pt left sitting OOB in recliner positioned for comfort with chair alarm activated and all needs in reach  In comparison to previous session, pt with improvements in amount of activity tolerated   Continue to recommend STR at time of d/c in order to maximize pt's functional independence and safety w/ mobility  Pt continues to be functioning below baseline level  PT will continue to see pt while here in order to address the deficits listed above and provide interventions consistent w/ POC in effort to achieve STGs    Barriers to Discharge: Decreased caregiver support, Inaccessible home environment  Barriers to Discharge Comments: requires increased assistance to complete mobility  PT Discharge Recommendation: Post acute rehabilitation services    See flowsheet documentation for full assessment

## 2022-11-04 NOTE — ASSESSMENT & PLAN NOTE
· Postoperative acute blood loss anemia  · Right hip edema no obvious ecchymosis present,  small serosanguineous drainage on surgical dressing, pale  Hemodynamically stable  · Hgb 10> 6 9 received 1 PRBC repeat Hgb 7 8  · Urine present in urinal from straight cath blood tinged, likely from trauma   Schaffer placed this AM without hematuria  · Pt on eliquis continue daily dosing for now, monitor Hgb closely   · Hgb 7 5 > 7 2 will transfuse 1 PRBC repeat cbc  This afternoon  · Iron panel pending

## 2022-11-04 NOTE — ASSESSMENT & PLAN NOTE
· Pod #4 INSERTION NAIL IM FEMUR ANTEGRADE (TROCHANTERIC) (Right)  • Xray: acute right hip fracture  • Hgb 11 1 on admission;   • Pain only on movement continue oxycodone p r n  With Tylenol  • Incentive spirometry   · As discussed with orthopedics if hemoglobin remains stable can restart Plavix and postop day 7  Which is 11/8    Also to be on Eliquis 2 5 mg daily for 1 week and then could resume to 2 5 mg twice a day which will be 11 /8  · Evaluated the dressing without any blood saturation  · CT of the lower extremity done on 11 /4 without evidence of hematoma as hemoglobin was intermittently dropping

## 2022-11-05 PROBLEM — I50.32 CHRONIC HEART FAILURE WITH PRESERVED EJECTION FRACTION (HCC): Status: ACTIVE | Noted: 2022-10-31

## 2022-11-05 PROBLEM — D64.9 ACUTE ON CHRONIC ANEMIA: Status: ACTIVE | Noted: 2022-11-03

## 2022-11-05 LAB
ABO GROUP BLD BPU: NORMAL
ABO GROUP BLD BPU: NORMAL
ANION GAP SERPL CALCULATED.3IONS-SCNC: 6 MMOL/L (ref 4–13)
BASOPHILS # BLD AUTO: 0.02 THOUSANDS/ÂΜL (ref 0–0.1)
BASOPHILS NFR BLD AUTO: 0 % (ref 0–1)
BPU ID: NORMAL
BPU ID: NORMAL
BUN SERPL-MCNC: 43 MG/DL (ref 5–25)
CALCIUM SERPL-MCNC: 7.6 MG/DL (ref 8.3–10.1)
CHLORIDE SERPL-SCNC: 102 MMOL/L (ref 96–108)
CO2 SERPL-SCNC: 26 MMOL/L (ref 21–32)
CREAT SERPL-MCNC: 1.77 MG/DL (ref 0.6–1.3)
CROSSMATCH: NORMAL
CROSSMATCH: NORMAL
EOSINOPHIL # BLD AUTO: 0.21 THOUSAND/ÂΜL (ref 0–0.61)
EOSINOPHIL NFR BLD AUTO: 2 % (ref 0–6)
ERYTHROCYTE [DISTWIDTH] IN BLOOD BY AUTOMATED COUNT: 17.2 % (ref 11.6–15.1)
GFR SERPL CREATININE-BSD FRML MDRD: 34 ML/MIN/1.73SQ M
GLUCOSE SERPL-MCNC: 131 MG/DL (ref 65–140)
HCT VFR BLD AUTO: 24.7 % (ref 36.5–49.3)
HGB BLD-MCNC: 8.2 G/DL (ref 12–17)
IMM GRANULOCYTES # BLD AUTO: 0.04 THOUSAND/UL (ref 0–0.2)
IMM GRANULOCYTES NFR BLD AUTO: 1 % (ref 0–2)
LYMPHOCYTES # BLD AUTO: 1.14 THOUSANDS/ÂΜL (ref 0.6–4.47)
LYMPHOCYTES NFR BLD AUTO: 13 % (ref 14–44)
MCH RBC QN AUTO: 31.9 PG (ref 26.8–34.3)
MCHC RBC AUTO-ENTMCNC: 33.2 G/DL (ref 31.4–37.4)
MCV RBC AUTO: 96 FL (ref 82–98)
MONOCYTES # BLD AUTO: 0.86 THOUSAND/ÂΜL (ref 0.17–1.22)
MONOCYTES NFR BLD AUTO: 10 % (ref 4–12)
NEUTROPHILS # BLD AUTO: 6.47 THOUSANDS/ÂΜL (ref 1.85–7.62)
NEUTS SEG NFR BLD AUTO: 74 % (ref 43–75)
NRBC BLD AUTO-RTO: 0 /100 WBCS
PLATELET # BLD AUTO: 129 THOUSANDS/UL (ref 149–390)
PMV BLD AUTO: 10.2 FL (ref 8.9–12.7)
POTASSIUM SERPL-SCNC: 4.2 MMOL/L (ref 3.5–5.3)
RBC # BLD AUTO: 2.57 MILLION/UL (ref 3.88–5.62)
SODIUM SERPL-SCNC: 134 MMOL/L (ref 135–147)
UNIT DISPENSE STATUS: NORMAL
UNIT DISPENSE STATUS: NORMAL
UNIT PRODUCT CODE: NORMAL
UNIT PRODUCT CODE: NORMAL
UNIT PRODUCT VOLUME: 350 ML
UNIT PRODUCT VOLUME: 350 ML
UNIT RH: NORMAL
UNIT RH: NORMAL
WBC # BLD AUTO: 8.74 THOUSAND/UL (ref 4.31–10.16)

## 2022-11-05 RX ORDER — OXYCODONE HYDROCHLORIDE 5 MG/1
2.5 TABLET ORAL EVERY 4 HOURS PRN
Status: DISCONTINUED | OUTPATIENT
Start: 2022-11-05 | End: 2022-11-06 | Stop reason: HOSPADM

## 2022-11-05 RX ORDER — FUROSEMIDE 20 MG/1
20 TABLET ORAL
Status: DISCONTINUED | OUTPATIENT
Start: 2022-11-05 | End: 2022-11-06 | Stop reason: HOSPADM

## 2022-11-05 RX ORDER — DOXYCYCLINE HYCLATE 50 MG/1
324 CAPSULE, GELATIN COATED ORAL
Status: DISCONTINUED | OUTPATIENT
Start: 2022-11-06 | End: 2022-11-06 | Stop reason: HOSPADM

## 2022-11-05 RX ADMIN — AMIODARONE HYDROCHLORIDE 200 MG: 200 TABLET ORAL at 08:46

## 2022-11-05 RX ADMIN — METOPROLOL TARTRATE 100 MG: 50 TABLET, FILM COATED ORAL at 08:45

## 2022-11-05 RX ADMIN — LIDOCAINE 5% 1 PATCH: 700 PATCH TOPICAL at 16:16

## 2022-11-05 RX ADMIN — HYDROMORPHONE HYDROCHLORIDE 0.2 MG: 0.2 INJECTION, SOLUTION INTRAMUSCULAR; INTRAVENOUS; SUBCUTANEOUS at 05:22

## 2022-11-05 RX ADMIN — HYDROMORPHONE HYDROCHLORIDE 0.2 MG: 0.2 INJECTION, SOLUTION INTRAMUSCULAR; INTRAVENOUS; SUBCUTANEOUS at 00:25

## 2022-11-05 RX ADMIN — APIXABAN 2.5 MG: 2.5 TABLET, FILM COATED ORAL at 14:13

## 2022-11-05 RX ADMIN — ATORVASTATIN CALCIUM 40 MG: 40 TABLET, FILM COATED ORAL at 08:45

## 2022-11-05 RX ADMIN — ACETAMINOPHEN 975 MG: 325 TABLET ORAL at 14:13

## 2022-11-05 RX ADMIN — OXYCODONE HYDROCHLORIDE 5 MG: 5 TABLET ORAL at 14:15

## 2022-11-05 RX ADMIN — SENNOSIDES, DOCUSATE SODIUM 1 TABLET: 8.6; 5 TABLET ORAL at 08:45

## 2022-11-05 RX ADMIN — FUROSEMIDE 20 MG: 20 TABLET ORAL at 16:16

## 2022-11-05 RX ADMIN — CHOLECALCIFEROL (VITAMIN D3) 10 MCG (400 UNIT) TABLET 800 UNITS: at 08:45

## 2022-11-05 RX ADMIN — METOPROLOL TARTRATE 100 MG: 50 TABLET, FILM COATED ORAL at 20:52

## 2022-11-05 RX ADMIN — TAMSULOSIN HYDROCHLORIDE 0.4 MG: 0.4 CAPSULE ORAL at 16:16

## 2022-11-05 RX ADMIN — SENNOSIDES, DOCUSATE SODIUM 1 TABLET: 8.6; 5 TABLET ORAL at 17:40

## 2022-11-05 RX ADMIN — ACETAMINOPHEN 975 MG: 325 TABLET ORAL at 05:22

## 2022-11-05 RX ADMIN — OXYCODONE HYDROCHLORIDE 5 MG: 5 TABLET ORAL at 22:25

## 2022-11-05 RX ADMIN — ACETAMINOPHEN 975 MG: 325 TABLET ORAL at 20:51

## 2022-11-05 NOTE — PROGRESS NOTES
[address]  Progress Note Bharathi Rodriges 1940, 80 y o  male MRN: [id number]  Unit/Bed#: -01 Encounter: [id number]  Primary Care Provider: Luis Eduardo Quintero DO   Date and time admitted to hospital: 10/31/2022 12:35 PM    * Closed 2-part intertrochanteric fracture of proximal end of right femur, initial encounter ([facility]  Assessment & Plan  · Pod #4 INSERTION NAIL IM FEMUR ANTEGRADE (TROCHANTERIC) (Right)  • Xray: acute right hip fracture  • Hgb 11 1 on admission;   • Pain only on movement continue oxycodone p r n  With Tylenol  • Incentive spirometry   · As discussed with orthopedics if hemoglobin remains stable can restart Plavix and postop day 7  Which is 11/8  Also to be on Eliquis 2 5 mg daily for 1 week and then could resume to 2 5 mg twice a day which will be 11 /8  · Evaluated the dressing without any blood saturation  · CT of the lower extremity done on 11 /4 without evidence of hematoma as hemoglobin was intermittently dropping    Thrombocytopenia (HCC)  Assessment & Plan  · Chronic thrombocytopenia stable    Postoperative urinary retention  Assessment & Plan  · Requiring zavala catheter placment  · Urology evaluated- will need OP follow-up and  voiding trial in 7-10 days placed 11/3  · Continue flomax  · Noted enlarged prostate on US    Hyponatremia  Assessment & Plan  · Patient presented with sodium 136  He is on diuretics so sodium studies could be not reliant- initially he was placed on fluids which worsened his sodium he was placed on fluid restriction of 1 2 L by Nephrology and his sodium has improved to 134  · Discussed with Nephrology will go up on the restriction to 1 5 L also will restart his Lasix 20 mg p o  B i d   Which will help him keep his sodium steady  · He is eating and drinking without any issues     Acute on chronic anemia  Assessment & Plan  · Patient does have chronic anemia with hemoglobin ranging between 10 8-11  · He presented with hemoglobin 11 1  · Postop he started dropping requiring 2 units of transfusion  · Hemoglobin improved yesterday after transfusion to 8 7, unfortunately today it dropped to 8 2 again I do not see any evidence of bleed he is not having any melena or hematochezia discussed with nursing there is no evidence of bleed on the hip CT was done without any evidence of hematoma abdomen is benign and no evidence of hematuria  · With CABG and CKD would like to keep hemoglobin greater than 8  · Okay to continue Eliquis 2 5 mg daily right now prior to increasing to b i d  As per recommendations of Orthopedics as no evidence of any gross bleed  · Also in light of CKD could have underlying anemia as well  · Iron panel reviewed anemia of chronic disease with mild iron-deficiency  Start ferrous gluconate 324 mg p o  Daily   · If patient remains hemodynamically stable with stable hemoglobin then Plavix 75 mg daily he uses for CABG can be restarted on November 8th    Paroxysmal atrial fibrillation [facility])  Assessment & Plan  · He is in sinus rhythm continue Eliquis 2 5 mg p o  Daily till 11/8, then increase to 2 5 mg p o  B i d   · Continue metoprolol and amiodarone      Chronic heart failure with preserved ejection fraction [facility])  Assessment & Plan  Wt Readings from Last 3 Encounters:   11/04/22 72 kg (158 lb 11 7 oz)   06/04/22 64 7 kg (142 lb 10 2 oz)   03/22/22 63 5 kg (140 lb)     · Most recent echo EF 39%, systolic and diastolic function wnl   · Restart patient's Lasix 20 mg p o  B i d  That will help keep his sodium steady as well  Hold Aldactone for now till his kidney function remained stable at baseline    · Otherwise euvolemic    Acute-on-chronic kidney injury [facility])  Assessment & Plan  Lab Results   Component Value Date    EGFR 30 11/04/2022    EGFR 23 11/03/2022    EGFR 21 11/02/2022    CREATININE 1 97 (H) 11/04/2022    CREATININE 2 50 (H) 11/03/2022    CREATININE 2 66 (H) 11/02/2022     · Patient has CKD stage 3 Baseline appears to be approximately 1 9-2 2 on review of records  · On admission does not qualify for JOEL criteria, Now qualifies with Cr  2 66  · Avoid nephrotoxins, relative hypotension, and NSAIDs if possible  · Will hold Aldactone for now to not worsened any kidney function Lasix will be restarted to help with the sodium keep it at base  · He had urinary retention and Scahffer was inserted on   · Creatinine is at baseline    Hyperlipidemia, mixed  Assessment & Plan  · Continue statin therapy    Coronary artery disease involving coronary bypass graft  Assessment & Plan  · S/p CABG x4 and valve replacement in  or  per wife  · Plavix have been on hold since admission  As discussed with the orthopedics Plavix can be restarted on postop day 7   obviously if hemoglobin and hemodynamically stable           VTE Pharmacologic Prophylaxis: VTE Score: 8 Moderate Risk (Score 3-4) - Pharmacological DVT Prophylaxis Ordered: apixaban (Eliquis)  Patient Centered Rounds: I performed bedside rounds with nursing staff today  Discussions with Specialists or Other Care Team Provider: ortho    Education and Discussions with Family / Patient:  Patient will update wife  Time Spent for Care: 30 minutes  More than 50% of total time spent on counseling and coordination of care as described above  Current Length of Stay: 5 day(s)  Current Patient Status: Inpatient   Certification Statement: The patient will continue to require additional inpatient hospital stay due to Anemia  Discharge Plan: Anticipate discharge in 24-48 hrs to rehab facility      Code Status: Level 1 - Full Code    Subjective:   Patient seen and examined he denies any chest pain or shortness of breath his hip is only hurts when he moves it    Objective:     Vitals:   Temp (24hrs), Av 4 °F (36 9 °C), Min:97 9 °F (36 6 °C), Max:98 8 °F (37 1 °C)    Temp:  [97 9 °F (36 6 °C)-98 8 °F (37 1 °C)] 98 4 °F (36 9 °C)  HR:  [65-71] 67  Resp:  [14-20] 14  BP: (117-138)/(46-74) 137/59  SpO2:  [93 %-98 %] 95 %  Body mass index is 23 6 kg/m²  Input and Output Summary (last 24 hours): Intake/Output Summary (Last 24 hours) at 11/5/2022 1216  Last data filed at 11/5/2022 9576  Gross per 24 hour   Intake 240 ml   Output 1000 ml   Net -760 ml       Physical Exam:   Physical Exam  Vitals and nursing note reviewed  Constitutional:       Appearance: He is well-developed  HENT:      Head: Normocephalic and atraumatic  Eyes:      Conjunctiva/sclera: Conjunctivae normal    Cardiovascular:      Rate and Rhythm: Normal rate and regular rhythm  Heart sounds: No murmur heard  Pulmonary:      Effort: Pulmonary effort is normal  No respiratory distress  Breath sounds: Normal breath sounds  No wheezing or rales  Abdominal:      General: There is no distension  Palpations: Abdomen is soft  Tenderness: There is no abdominal tenderness  Musculoskeletal:         General: No swelling  Cervical back: Neck supple  Skin:     General: Skin is warm and dry  Neurological:      Mental Status: He is alert and oriented to person, place, and time           Additional Data:     Labs:  Results from last 7 days   Lab Units 11/05/22  0831   WBC Thousand/uL 8 74   HEMOGLOBIN g/dL 8 2*   HEMATOCRIT % 24 7*   PLATELETS Thousands/uL 129*   NEUTROS PCT % 74   LYMPHS PCT % 13*   MONOS PCT % 10   EOS PCT % 2     Results from last 7 days   Lab Units 11/05/22  0831 11/04/22  0457   SODIUM mmol/L 134* 132*   POTASSIUM mmol/L 4 2 4 1   CHLORIDE mmol/L 102 101   CO2 mmol/L 26 27   BUN mg/dL 43* 48*   CREATININE mg/dL 1 77* 1 97*   ANION GAP mmol/L 6 4   CALCIUM mg/dL 7 6* 7 3*   ALBUMIN g/dL  --  2 3*   TOTAL BILIRUBIN mg/dL  --  0 64   ALK PHOS U/L  --  86   ALT U/L  --  16   AST U/L  --  42   GLUCOSE RANDOM mg/dL 131 114     Results from last 7 days   Lab Units 11/01/22  0506   INR  1 31*                   Lines/Drains:  Invasive Devices  Report    Peripheral Intravenous Line  Duration Peripheral IV 11/05/22 Dorsal (posterior); Right Forearm <1 day          Drain  Duration           Urethral Catheter Double-lumen 16 Fr  2 days              Urinary Catheter:  Goal for removal: N/A- Discharging with Schaffer               Imaging: Reviewed radiology reports from this admission including: xray(s)    Recent Cultures (last 7 days):         Last 24 Hours Medication List:   Current Facility-Administered Medications   Medication Dose Route Frequency Provider Last Rate   • acetaminophen  975 mg Oral [facility] Camilo Zaman PA-C     • albuterol  2 puff Inhalation Q6H PRN Camilo Zaman PA-C     • amiodarone  200 mg Oral Daily Camilo Zaman PA-C     • apixaban  2 5 mg Oral Daily Aileen Noel MD     • atorvastatin  40 mg Oral Daily Camilo Zaman PA-C     • calcium carbonate  1,000 mg Oral Daily PRN Camilo Zaman PA-C     • cholecalciferol  800 Units Oral Daily SANDRINE Rodriguez     • [START ON 11/6/2022] ferrous gluconate  324 mg Oral Daily Before Breakfast Aileen Noel MD     • furosemide  20 mg Oral BID (diuretic) Aileen Noel MD     • HYDROmorphone  0 4 mg Intravenous Q5 Min PRN Quinn Halsted Vu, DO     • lidocaine  1 patch Topical Daily Camilo Zaman PA-C     • metoprolol tartrate  100 mg Oral Q12H [facility] Camilo Zaman PA-C     • naloxone  0 04 mg Intravenous Q1MIN PRN Camilo Zaman PA-C     • ondansetron  4 mg Intravenous Q6H PRN Camilo Zaman PA-C     • oxyCODONE  2 5 mg Oral Q4H PRN Aileen Noel MD     • oxyCODONE  5 mg Oral Q4H PRN Camilo Zaman PA-C     • polyethylene glycol  17 g Oral Daily PRN Camilo Zaman PA-C     • senna-docusate sodium  1 tablet Oral BID Camilo Zaman PA-C     • tamsulosin  0 4 mg Oral Daily With Dinner Camilo Zaman PA-C          Today, Patient Was Seen By: Aileen Noel    **Please Note: This note may have been constructed using a voice recognition system  **

## 2022-11-05 NOTE — CASE MANAGEMENT
Case Management Progress Note    Patient name Shadi Busby  Location /-01 MRN [id number]  : 1940 Date 2022       LOS (days): 5  Geometric Mean LOS (GMLOS) (days): 4 40  Days to GMLOS:-0 5        OBJECTIVE:        Current admission status: Inpatient  Preferred Pharmacy:   [facility]  [address] [address]  Phone: [phone] Fax: [facility], [address] [place] [address],[address]  [facility]  Phone: [phone] Fax: [phone]    Primary Care Provider: Rox Mina DO    Primary Insurance: MEDICARE  Secondary Insurance: BLUE CROSS    PROGRESS NOTE:  CM reviewed chart and AIDIN, if Pt cleared he can be admitted to [facility] over the w/e  Per Admissions: Report # [phone] - [phone]  If you can't reach anyone at the Report# please call [phone] and ask to have the nurse supervisor paged  Addendum: HOWARD confirmed above w/ [facility], and they requested contacting [phone] to update

## 2022-11-05 NOTE — PROGRESS NOTES
NEPHROLOGY PROGRESS NOTE   Deneen Whitten 80 y o  male MRN: [id number]  Unit/Bed#: -[place] Encounter: [id number]    Assessment/Plan:    79 yo man with CKD 3B, mild right renal atrophy, PAF, CHF, CAD status post CABG admitted 10/31 for fall s/p right femoral TFN 11/1  Nephrology following along for JOEL on CKD, hyponatremia  1  Acute kidney injury (POA) atop chronic kidney disease  · Suspect resolved  Will continue to monitor for improvement  Continue indwelling urinary catheter per Urology  2  Stage IIIB chronic kidney disease with baseline creatinine probably around 1 7-1 9 mg/dL  3  Mild right renal atrophy, possible renovascular disease  · Possibly attributable to declining kidney function  Can consider outpatient renal artery Doppler versus MRA without contrast  4  Hyponatremia  · Serum sodium improved to 134 millimoles per L today  Widen fluid restriction 1 5 L per day and reinitiate home dose Lasix  5  Proteinuria  · Re-evaluate as an outpatient  6  Hip fracture status post right femoral TFN 11/1  7  Postoperative urinary retention  · Indwelling urinary catheter intact and for outpatient follow-up with Urology    ROS  Seen and examined sitting in chair  No physical complaints  Wondering when catheter is coming out  A complete 10 point review of systems have been performed and are otherwise negative  Historical Information   Past Medical History:   Diagnosis Date   • CHF (congestive heart failure) ([facility] )    • Coronary artery disease    • Hyperlipidemia    • Hypertension    • Paroxysmal atrial fibrillation [facility])      Past Surgical History:   Procedure Laterality Date   • CARDIAC SURGERY      cabg x 2 2014 [facility]   • CORONARY ANGIOPLASTY WITH STENT PLACEMENT  10/2021    DAPHNEY to left main and ramus   • MITRAL VALVE REPAIR  2014    mitral ring   • NH OPEN RX FEMUR FX+INTRAMED HALIMA Right 11/1/2022    Procedure: INSERTION NAIL IM FEMUR ANTEGRADE (TROCHANTERIC);   Surgeon: Allison Leonard; Location:  MAIN OR;  Service: Orthopedics     Social History   Social History     Substance and Sexual Activity   Alcohol Use Not Currently     Social History     Substance and Sexual Activity   Drug Use Never     Social History     Tobacco Use   Smoking Status Never Smoker   Smokeless Tobacco Never Used       Family History:   Family History   Problem Relation Age of Onset   • Hypertension Father        Medications:  Pertinent medications were reviewed  Current Facility-Administered Medications   Medication Dose Route Frequency Provider Last Rate   • acetaminophen  975 mg Oral [facility] Kathi Valles PA-C     • albuterol  2 puff Inhalation Q6H PRN Kathi Valles PA-C     • amiodarone  200 mg Oral Daily Kathi Valles PA-C     • apixaban  2 5 mg Oral Daily Piedad Riggins MD     • atorvastatin  40 mg Oral Daily Kathi Valles PA-C     • calcium carbonate  1,000 mg Oral Daily PRN Kathi Valles PA-C     • cholecalciferol  800 Units Oral Daily SANDRINE Fitzgerald     • [START ON 11/6/2022] ferrous gluconate  324 mg Oral Daily Before Breakfast Piedad Riggins MD     • furosemide  20 mg Oral BID (diuretic) Piedad Riggins MD     • HYDROmorphone  0 4 mg Intravenous Q5 Min PRN Milena Alonso DO     • lidocaine  1 patch Topical Daily Kathi Valles PA-C     • metoprolol tartrate  100 mg Oral Q12H [facility] Kathi Valles PA-C     • naloxone  0 04 mg Intravenous Q1MIN PRN Kathi Valles PA-C     • ondansetron  4 mg Intravenous Q6H PRN Kathi Valles PA-C     • oxyCODONE  2 5 mg Oral Q4H PRN Piedad Riggins MD     • oxyCODONE  5 mg Oral Q4H PRN Kathi Valles PA-C     • polyethylene glycol  17 g Oral Daily PRN Kathi Valles PA-C     • senna-docusate sodium  1 tablet Oral BID Kathi Valles PA-C     • tamsulosin  0 4 mg Oral Daily With Aaliyah Sexton PA-C           No Known Allergies      Vitals:   /59   Pulse 67   Temp 98 4 °F (36 9 °C)   Resp 14   Ht 5' 9" (1 753 m)   Wt 72 5 kg (159 lb 13 3 oz) SpO2 95%   BMI 23 60 kg/m²   Body mass index is 23 6 kg/m²  SpO2: 95 %,   SpO2 Activity: At Rest,   O2 Device: None (Room air)      Intake/Output Summary (Last 24 hours) at 11/5/2022 1246  Last data filed at 11/5/2022 0828  Gross per 24 hour   Intake 240 ml   Output 1000 ml   Net -760 ml     Invasive Devices  Report    Peripheral Intravenous Line  Duration           Peripheral IV 11/05/22 Dorsal (posterior); Right Forearm <1 day          Drain  Duration           Urethral Catheter Double-lumen 16 Fr  2 days                Physical Exam  General: conscious, cooperative, in no acute distress  Eyes: conjunctivae pink, anicteric sclerae  ENT: lips and mucous membranes moist  Neck: supple, no JVD, no masses  Chest: clear breath sounds bilaterally, no crackles, ronchus or wheezings  CVS: S1 & S2, normal rate, regular rhythm  Abdomen: soft, non-tender, non-distended, normoactive bowel sounds  Extremities: no edema of both legs  Skin: no rash  Neuro: awake, alert, oriented  :  Indwelling draining clear yellow urine    Diagnostic Data:  Lab: I have personally reviewed pertinent lab results  ,   CBC:  Results from last 7 days   Lab Units 11/05/22  0831   WBC Thousand/uL 8 74   HEMOGLOBIN g/dL 8 2*   HEMATOCRIT % 24 7*   PLATELETS Thousands/uL 129*      CMP:   Lab Results   Component Value Date    SODIUM 134 (L) 11/05/2022    K 4 2 11/05/2022     11/05/2022    CO2 26 11/05/2022    BUN 43 (H) 11/05/2022    CREATININE 1 77 (H) 11/05/2022    CALCIUM 7 6 (L) 11/05/2022    EGFR 34 11/05/2022   ,   PT/INR: No results found for: PT, INR,   Magnesium: No components found for: MAG,  Phosphorous: No results found for: PHOS    Microbiology:  @LABRCNTIP,(urinecx:7)@        Khushi Boggs    Portions of the record may have been created with voice recognition software  Occasional wrong word or "sound a like" substitutions may have occurred due to the inherent limitations of voice recognition software   Read the chart carefully and recognize, using context, where substitutions have occurred

## 2022-11-05 NOTE — PLAN OF CARE
Problem: MOBILITY - ADULT  Goal: Maintain or return to baseline ADL function  Description: INTERVENTIONS:  -  Assess patient's ability to carry out ADLs; assess patient's baseline for ADL function and identify physical deficits which impact ability to perform ADLs (bathing, care of mouth/teeth, toileting, grooming, dressing, etc )  - Assess/evaluate cause of self-care deficits   - Assess range of motion  - Assess patient's mobility; develop plan if impaired  - Assess patient's need for assistive devices and provide as appropriate  - Encourage maximum independence but intervene and supervise when necessary  - Involve family in performance of ADLs  - Assess for home care needs following discharge   - Consider OT consult to assist with ADL evaluation and planning for discharge  - Provide patient education as appropriate  Outcome: Progressing  Goal: Maintains/Returns to pre admission functional level  Description: INTERVENTIONS:  - Perform BMAT or MOVE assessment daily    - Set and communicate daily mobility goal to care team and patient/family/caregiver  - Collaborate with rehabilitation services on mobility goals if consulted  - Perform Range of Motion 4 times a day  - Reposition patient every 2 hours    - Dangle patient 3 times a day  - Stand patient 3 times a day  - Ambulate patient 3 times a day  - Out of bed to chair 3 times a day   - Out of bed for meals 3 times a day  - Out of bed for toileting  - Record patient progress and toleration of activity level   Outcome: Progressing     Problem: Potential for Falls  Goal: Patient will remain free of falls  Description: INTERVENTIONS:  - Educate patient/family on patient safety including physical limitations  - Instruct patient to call for assistance with activity   - Consult OT/PT to assist with strengthening/mobility   - Keep Call bell within reach  - Keep bed low and locked with side rails adjusted as appropriate  - Keep care items and personal belongings within reach  - Initiate and maintain comfort rounds  - Make Fall Risk Sign visible to staff  - Offer Toileting every   Hours, in advance of need  - Initiate/Maintain   alarm  - Obtain necessary fall risk management equipment:      - Apply yellow socks and bracelet for high fall risk patients  - Consider moving patient to room near nurses station  Outcome: Progressing     Problem: Prexisting or High Potential for Compromised Skin Integrity  Goal: Skin integrity is maintained or improved  Description: INTERVENTIONS:  - Identify patients at risk for skin breakdown  - Assess and monitor skin integrity  - Assess and monitor nutrition and hydration status  - Monitor labs   - Assess for incontinence   - Turn and reposition patient  - Assist with mobility/ambulation  - Relieve pressure over bony prominences  - Avoid friction and shearing  - Provide appropriate hygiene as needed including keeping skin clean and dry  - Evaluate need for skin moisturizer/barrier cream  - Collaborate with interdisciplinary team   - Patient/family teaching  - Consider wound care consult   Outcome: Progressing     Problem: SKIN/TISSUE INTEGRITY - ADULT  Goal: Skin Integrity remains intact(Skin Breakdown Prevention)  Description: Assess:  -Perform Chris assessment every    -Clean and moisturize skin every    -Inspect skin when repositioning, toileting, and assisting with ADLS  -Assess under medical devices such as   every    -Assess extremities for adequate circulation and sensation     Bed Management:  -Have minimal linens on bed & keep smooth, unwrinkled  -Change linens as needed when moist or perspiring  -Avoid sitting or lying in one position for more than   hours while in bed  -Keep HOB at  degrees     Toileting:  -Offer bedside commode  -Assess for incontinence every   -Use incontinent care products after each incontinent episode such as   Activity:  -Mobilize patient    times a day  -Encourage activity and walks on unit  -Encourage or provide ROM exercises   -Turn and reposition patient every   Hours  -Use appropriate equipment to lift or move patient in bed  -Instruct/ Assist with weight shifting every   when out of bed in chair  -Consider limitation of chair time   hour intervals    Skin Care:  -Avoid use of baby powder, tape, friction and shearing, hot water or constrictive clothing  -Relieve pressure over bony prominences using    -Do not massage red bony areas    Next Steps:  -Teach patient strategies to minimize risks such as     -Consider consults to  interdisciplinary teams such as     Outcome: Progressing  Goal: Incision(s), wounds(s) or drain site(s) healing without S/S of infection  Description: INTERVENTIONS  - Assess and document dressing, incision, wound bed, drain sites and surrounding tissue  - Provide patient and family education  - Perform skin care/dressing changes every     Outcome: Progressing     Problem: MUSCULOSKELETAL - ADULT  Goal: Maintain or return mobility to safest level of function  Description: INTERVENTIONS:  - Assess patient's ability to carry out ADLs; assess patient's baseline for ADL function and identify physical deficits which impact ability to perform ADLs (bathing, care of mouth/teeth, toileting, grooming, dressing, etc )  - Assess/evaluate cause of self-care deficits   - Assess range of motion  - Assess patient's mobility  - Assess patient's need for assistive devices and provide as appropriate  - Encourage maximum independence but intervene and supervise when necessary  - Involve family in performance of ADLs  - Assess for home care needs following discharge   - Consider OT consult to assist with ADL evaluation and planning for discharge  - Provide patient education as appropriate  Outcome: Progressing  Goal: Maintain proper alignment of affected body part  Description: INTERVENTIONS:  - Support, maintain and protect limb and body alignment  - Provide patient/ family with appropriate education  Outcome: Progressing     Problem: PAIN - ADULT  Goal: Verbalizes/displays adequate comfort level or baseline comfort level  Description: Interventions:  - Encourage patient to monitor pain and request assistance  - Assess pain using appropriate pain scale0-10  - Administer analgesics based on type and severity of pain and evaluate response  - Implement non-pharmacological measures as appropriate and evaluate response  - Consider cultural and social influences on pain and pain management  - Notify physician/advanced practitioner if interventions unsuccessful or patient reports new pain  Outcome: Progressing     Problem: INFECTION - ADULT  Goal: Absence or prevention of progression during hospitalization  Description: INTERVENTIONS:  - Assess and monitor for signs and symptoms of infection  - Monitor lab/diagnostic results  - Monitor all insertion sites, i e  indwelling lines, tubes, and drains  - Monitor endotracheal if appropriate and nasal secretions for changes in amount and color  - [place] appropriate cooling/warming therapies per order  - Administer medications as ordered  - Instruct and encourage patient and family to use good hand hygiene technique  - Identify and instruct in appropriate isolation precautions for identified infection/condition  Outcome: Progressing     Problem: SAFETY ADULT  Goal: Maintain or return to baseline ADL function  Description: INTERVENTIONS:  -  Assess patient's ability to carry out ADLs; assess patient's baseline for ADL function and identify physical deficits which impact ability to perform ADLs (bathing, care of mouth/teeth, toileting, grooming, dressing, etc )  - Assess/evaluate cause of self-care deficits   - Assess range of motion  - Assess patient's mobility; develop plan if impaired  - Assess patient's need for assistive devices and provide as appropriate  - Encourage maximum independence but intervene and supervise when necessary  - Involve family in performance of ADLs  - Assess for home care needs following discharge   - Consider OT consult to assist with ADL evaluation and planning for discharge  - Provide patient education as appropriate  Outcome: Progressing  Goal: Maintains/Returns to pre admission functional level  Description: INTERVENTIONS:  - Perform BMAT or MOVE assessment daily    - Set and communicate daily mobility goal to care team and patient/family/caregiver  - Collaborate with rehabilitation services on mobility goals if consulted  - Perform Range of Motion 4 times a day  - Reposition patient every 2 hours  - Dangle patient 3 times a day  - Stand patient 3 times a day  - Ambulate patient 3 times a day  - Out of bed to chair 3 times a day   - Out of bed for meals 3 times a day  - Out of bed for toileting  - Record patient progress and toleration of activity level   Outcome: Progressing  Goal: Patient will remain free of falls  Description: INTERVENTIONS:  - Educate patient/family on patient safety including physical limitations  - Instruct patient to call for assistance with activity   - Consult OT/PT to assist with strengthening/mobility   - Keep Call bell within reach  - Keep bed low and locked with side rails adjusted as appropriate  - Keep care items and personal belongings within reach  - Initiate and maintain comfort rounds  - Make Fall Risk Sign visible to staff  - Offer Toileting every   Hours, in advance of need  - Initiate/Maintain   alarm  - Obtain necessary fall risk management equipment:      - Apply yellow socks and bracelet for high fall risk patients  - Consider moving patient to room near nurses station  Outcome: Progressing     Problem: DISCHARGE PLANNING  Goal: Discharge to home or other facility with appropriate resources  Description: INTERVENTIONS:  - Identify barriers to discharge w/patient and caregiver  - Arrange for needed discharge resources and transportation as appropriate  - Identify discharge learning needs (meds, wound care, etc )  - Arrange for interpretive services to assist at discharge as needed  - Refer to Case Management Department for coordinating discharge planning if the patient needs post-hospital services based on physician/advanced practitioner order or complex needs related to functional status, cognitive ability, or social support system  Outcome: Progressing     Problem: Knowledge Deficit  Goal: Patient/family/caregiver demonstrates understanding of disease process, treatment plan, medications, and discharge instructions  Description: Complete learning assessment and assess knowledge base    Interventions:  - Provide teaching at level of understanding  - Provide teaching via preferred learning methods  Outcome: Progressing     Problem: GENITOURINARY - ADULT  Goal: Maintains or returns to baseline urinary function  Description: INTERVENTIONS:  - Assess urinary function  - Encourage oral fluids to ensure adequate hydration if ordered  - Administer IV fluids as ordered to ensure adequate hydration  - Administer ordered medications as needed  - Offer frequent toileting  - Follow urinary retention protocol if ordered  Outcome: Progressing  Goal: Absence of urinary retention  Description: INTERVENTIONS:  - Assess patient’s ability to void and empty bladder  - Monitor I/O  - Bladder scan as needed  - Discuss with physician/AP medications to alleviate retention as needed  - Discuss catheterization for long term situations as appropriate  Outcome: Progressing  Goal: Urinary catheter remains patent  Description: INTERVENTIONS:  - Assess patency of urinary catheter  - If patient has a chronic zavala, consider changing catheter if non-functioning  - Follow guidelines for intermittent irrigation of non-functioning urinary catheter  Outcome: Progressing

## 2022-11-05 NOTE — PROGRESS NOTES
Progress Note - Orthopedics   Obinna Parker 80 y o  male MRN: [id number]  Unit/Bed#: -01      Subjective:    80 y o male POD4 s/p right femoral TFN  No acute overnight events, no new complaints  The patient notes ongoing soreness along the hip but no significant pain  The patient denies any shortness or breath, chest pain, N/V/D, headache, dizziness, fever or chills  He denies any numbness or tingling       Labs:  0   Lab Value Date/Time    HCT 24 7 (L) 11/05/2022 0831    HCT 26 4 (L) 11/04/2022 1458    HCT 21 7 (L) 11/04/2022 0457    HGB 8 2 (L) 11/05/2022 0831    HGB 8 7 (L) 11/04/2022 1458    HGB 7 2 (L) 11/04/2022 0457    INR 1 31 (H) 11/01/2022 0506    WBC 8 74 11/05/2022 0831    WBC 10 19 (H) 11/04/2022 1458    WBC 9 43 11/04/2022 0457       Meds:    Current Facility-Administered Medications:   •  acetaminophen (TYLENOL) tablet 975 mg, 975 mg, Oral, Q8H [address], Lily Huang PA-C, 975 mg at 11/05/22 0522  •  albuterol (PROVENTIL HFA,VENTOLIN HFA) inhaler 2 puff, 2 puff, Inhalation, Q6H PRN, Lily Huang PA-C  •  amiodarone tablet 200 mg, 200 mg, Oral, Daily, Lily Huang PA-C, 200 mg at 11/05/22 2984  •  apixaban (ELIQUIS) tablet 2 5 mg, 2 5 mg, Oral, Daily, Magaly Villa MD  •  atorvastatin (LIPITOR) tablet 40 mg, 40 mg, Oral, Daily, Lily Huang PA-C, 40 mg at 11/05/22 0845  •  calcium carbonate (TUMS) chewable tablet 1,000 mg, 1,000 mg, Oral, Daily PRN, Lily Huang PA-C  •  cholecalciferol (VITAMIN D3) tablet 800 Units, 800 Units, Oral, Daily, SANDRINE Higgins, 800 Units at 11/05/22 0845  •  [START ON 11/6/2022] ferrous gluconate (FERGON) tablet 324 mg, 324 mg, Oral, Daily Before Breakfast, Magaly Villa MD  •  furosemide (LASIX) tablet 20 mg, 20 mg, Oral, BID (diuretic), Magaly Villa MD  •  HYDROmorphone (DILAUDID) injection 0 4 mg, 0 4 mg, Intravenous, Q5 Min PRN, Susan Alonso DO  •  lidocaine (LIDODERM) 5 % patch 1 patch, 1 patch, Topical, Daily, Lily Huang PA-C, 1 patch at 11/04/22 1718  •  metoprolol tartrate (LOPRESSOR) tablet 100 mg, 100 mg, Oral, Q12H [address], Kamilah Trujillo PA-C, 100 mg at 11/05/22 0845  •  naloxone (NARCAN) 0 04 mg/mL syringe 0 04 mg, 0 04 mg, Intravenous, Q1MIN PRN, Kamilah Trujillo PA-C  •  ondansetron [facility]) injection 4 mg, 4 mg, Intravenous, Q6H PRN, Kamilah Trujillo PA-C  •  oxyCODONE (ROXICODONE) IR tablet 2 5 mg, 2 5 mg, Oral, Q4H PRN, Robbie Lewis MD  •  oxyCODONE (ROXICODONE) IR tablet 5 mg, 5 mg, Oral, Q4H PRN, Kamilah Trujillo PA-C, 5 mg at 11/04/22 1248  •  polyethylene glycol (MIRALAX) packet 17 g, 17 g, Oral, Daily PRN, Kamilah Trujillo PA-C, 17 g at 11/03/22 0932  •  senna-docusate sodium (SENOKOT S) 8 6-50 mg per tablet 1 tablet, 1 tablet, Oral, BID, Kamilah Trujillo PA-C, 1 tablet at 11/05/22 0845  •  tamsulosin (FLOMAX) capsule 0 4 mg, 0 4 mg, Oral, Daily With Jalyn Gilbert PA-C, 0 4 mg at 11/04/22 1718    Blood Culture:   Lab Results   Component Value Date    BLOODCX No Growth After 5 Days  03/02/2022    BLOODCX No Growth After 5 Days  03/02/2022       Wound Culture:   No results found for: WOUNDCULT    Ins and Outs:  I/O last 24 hours: In: 830 [P O :480; Blood:350]  Out: 1000 [Urine:1000]      Physical:  Vitals:    11/05/22 1119   BP: 137/59   Pulse: 67   Resp: 14   Temp: 98 4 °F (36 9 °C)   SpO2: 95%     Musculoskeletal: right Lower Extremity  · Patient is sitting in his chair, comfortably, in no acute distress  · Surgical incisions are well approximated with glue  The blisters noted yesterday from the tegaderm irritation are covered in light dressings  No active bleeding, drainage, or signs of dehiscence or infection  · The ecchymosis present along the proximal lateral thigh noted on exam yesterday does appear to have increased, ongoing swelling throughout the thigh  Thigh remains soft and compressible, no significant tenderness to palpation     · Sensation intact to saphenous, sural, tibial, superficial peroneal nerve, and deep peroneal  · Motor intact to +FHL/EHL, +ankle dorsi/plantar flexion  Patient notes limited ankle ROM due to past history of fracture  · 2+ DP pulse, symmetric bilaterally  · Soft lower extremity compartments, negative Anne's sign  · Digits warm and well perfused  · Capillary refill < 2 seconds    Assessment:    82 y o male POD 4 s/p right femoral TFN  Ambulatory dysfunction; PAF; CHF; Hyperlipidemia; CAD with hx of bypass; ABLA; post-operative urinary retention; acute on chronic kidney injury      Plan:  · WBAT RLE  · HGB 8 2 this AM  HGB yesterday noted to be 8 7 following transfusion of 1 unit of PRBC  · Patient is currently asymptomatic, [facility] on board  · CT scan of the right lower extremity ordered by [facility] shows no sign of hematoma or hemorrhage  · Will continue to monitor and transfuse as needed  · PT/OT gait training  · Pain control per primary team  · DVT ppx with Eliquis, currently on 2 5mg daily per post-operative protocols, may resume BID dosing 7 days post-surgery  Noted by [facility] team today that the patient is also on Plavix  Recommend re-starting Plavix on post operative day 7 as long as the patient is hemodynamically stable  · Dispo: Ortho will follow   · PT/OT recommending discharge to [facility] on board, patient has been accepted to [facility] pending medical clearance     · The patient will follow up with Dr Mega Camacho office once discharged within 2 weeks     Elvin Green PA-C

## 2022-11-05 NOTE — PLAN OF CARE
Problem: MOBILITY - ADULT  Goal: Maintain or return to baseline ADL function  Description: INTERVENTIONS:  -  Assess patient's ability to carry out ADLs; assess patient's baseline for ADL function and identify physical deficits which impact ability to perform ADLs (bathing, care of mouth/teeth, toileting, grooming, dressing, etc )  - Assess/evaluate cause of self-care deficits   - Assess range of motion  - Assess patient's mobility; develop plan if impaired  - Assess patient's need for assistive devices and provide as appropriate  - Encourage maximum independence but intervene and supervise when necessary  - Involve family in performance of ADLs  - Assess for home care needs following discharge   - Consider OT consult to assist with ADL evaluation and planning for discharge  - Provide patient education as appropriate  Outcome: Progressing  Goal: Maintains/Returns to pre admission functional level  Description: INTERVENTIONS:  - Perform BMAT or MOVE assessment daily    - Set and communicate daily mobility goal to care team and patient/family/caregiver  - Collaborate with rehabilitation services on mobility goals if consulted  - Perform Range of Motion 4 times a day  - Reposition patient every 2 hours    - Dangle patient 3 times a day  - Stand patient 3 times a day  - Ambulate patient 3 times a day  - Out of bed to chair 3 times a day   - Out of bed for meals 3 times a day  - Out of bed for toileting  - Record patient progress and toleration of activity level   Outcome: Progressing     Problem: Potential for Falls  Goal: Patient will remain free of falls  Description: INTERVENTIONS:  - Educate patient/family on patient safety including physical limitations  - Instruct patient to call for assistance with activity   - Consult OT/PT to assist with strengthening/mobility   - Keep Call bell within reach  - Keep bed low and locked with side rails adjusted as appropriate  - Keep care items and personal belongings within reach  - Initiate and maintain comfort rounds  - Make Fall Risk Sign visible to staff  - Offer Toileting every   Hours, in advance of need  - Initiate/Maintain   alarm  - Obtain necessary fall risk management equipment:      - Apply yellow socks and bracelet for high fall risk patients  - Consider moving patient to room near nurses station  Outcome: Progressing

## 2022-11-06 ENCOUNTER — TELEPHONE (OUTPATIENT)
Dept: OTHER | Facility: HOSPITAL | Age: 82
End: 2022-11-06

## 2022-11-06 VITALS
OXYGEN SATURATION: 98 % | TEMPERATURE: 97.9 F | HEIGHT: 69 IN | BODY MASS INDEX: 23.67 KG/M2 | DIASTOLIC BLOOD PRESSURE: 60 MMHG | SYSTOLIC BLOOD PRESSURE: 128 MMHG | WEIGHT: 159.83 LBS | RESPIRATION RATE: 21 BRPM | HEART RATE: 79 BPM

## 2022-11-06 DIAGNOSIS — N17.9 AKI (ACUTE KIDNEY INJURY) (HCC): Primary | ICD-10-CM

## 2022-11-06 LAB
ABO GROUP BLD BPU: NORMAL
ANION GAP SERPL CALCULATED.3IONS-SCNC: 9 MMOL/L (ref 4–13)
BASOPHILS # BLD AUTO: 0.02 THOUSANDS/ÂΜL (ref 0–0.1)
BASOPHILS NFR BLD AUTO: 0 % (ref 0–1)
BPU ID: NORMAL
BUN SERPL-MCNC: 42 MG/DL (ref 5–25)
CALCIUM SERPL-MCNC: 7.7 MG/DL (ref 8.3–10.1)
CHLORIDE SERPL-SCNC: 102 MMOL/L (ref 96–108)
CO2 SERPL-SCNC: 23 MMOL/L (ref 21–32)
CREAT SERPL-MCNC: 1.68 MG/DL (ref 0.6–1.3)
CROSSMATCH: NORMAL
EOSINOPHIL # BLD AUTO: 0.22 THOUSAND/ÂΜL (ref 0–0.61)
EOSINOPHIL NFR BLD AUTO: 3 % (ref 0–6)
ERYTHROCYTE [DISTWIDTH] IN BLOOD BY AUTOMATED COUNT: 17.3 % (ref 11.6–15.1)
FLUAV RNA RESP QL NAA+PROBE: NEGATIVE
FLUBV RNA RESP QL NAA+PROBE: NEGATIVE
GFR SERPL CREATININE-BSD FRML MDRD: 37 ML/MIN/1.73SQ M
GLUCOSE SERPL-MCNC: 100 MG/DL (ref 65–140)
HCT VFR BLD AUTO: 27 % (ref 36.5–49.3)
HGB BLD-MCNC: 8.4 G/DL (ref 12–17)
IMM GRANULOCYTES # BLD AUTO: 0.04 THOUSAND/UL (ref 0–0.2)
IMM GRANULOCYTES NFR BLD AUTO: 1 % (ref 0–2)
LYMPHOCYTES # BLD AUTO: 0.97 THOUSANDS/ÂΜL (ref 0.6–4.47)
LYMPHOCYTES NFR BLD AUTO: 14 % (ref 14–44)
MCH RBC QN AUTO: 31.9 PG (ref 26.8–34.3)
MCHC RBC AUTO-ENTMCNC: 31.1 G/DL (ref 31.4–37.4)
MCV RBC AUTO: 103 FL (ref 82–98)
MONOCYTES # BLD AUTO: 0.8 THOUSAND/ÂΜL (ref 0.17–1.22)
MONOCYTES NFR BLD AUTO: 11 % (ref 4–12)
NEUTROPHILS # BLD AUTO: 5.14 THOUSANDS/ÂΜL (ref 1.85–7.62)
NEUTS SEG NFR BLD AUTO: 71 % (ref 43–75)
NRBC BLD AUTO-RTO: 0 /100 WBCS
PLATELET # BLD AUTO: 117 THOUSANDS/UL (ref 149–390)
PMV BLD AUTO: 9.8 FL (ref 8.9–12.7)
POTASSIUM SERPL-SCNC: 4.1 MMOL/L (ref 3.5–5.3)
RBC # BLD AUTO: 2.63 MILLION/UL (ref 3.88–5.62)
RSV RNA RESP QL NAA+PROBE: NEGATIVE
SARS-COV-2 RNA RESP QL NAA+PROBE: NEGATIVE
SODIUM SERPL-SCNC: 134 MMOL/L (ref 135–147)
UNIT DISPENSE STATUS: NORMAL
UNIT PRODUCT CODE: NORMAL
UNIT PRODUCT VOLUME: 350 ML
UNIT RH: NORMAL
WBC # BLD AUTO: 7.19 THOUSAND/UL (ref 4.31–10.16)

## 2022-11-06 PROCEDURE — 30233N1 TRANSFUSION OF NONAUTOLOGOUS RED BLOOD CELLS INTO PERIPHERAL VEIN, PERCUTANEOUS APPROACH: ICD-10-PCS | Performed by: FAMILY MEDICINE

## 2022-11-06 RX ORDER — CLOPIDOGREL BISULFATE 75 MG/1
75 TABLET ORAL DAILY
Qty: 30 TABLET | Refills: 0
Start: 2022-11-08

## 2022-11-06 RX ORDER — OXYCODONE HYDROCHLORIDE 5 MG/1
5 TABLET ORAL EVERY 4 HOURS PRN
Qty: 60 TABLET | Refills: 0 | Status: SHIPPED | OUTPATIENT
Start: 2022-11-06 | End: 2022-11-06 | Stop reason: SDUPTHER

## 2022-11-06 RX ORDER — ACETAMINOPHEN 325 MG/1
650 TABLET ORAL EVERY 6 HOURS PRN
Qty: 240 TABLET | Refills: 0
Start: 2022-11-06

## 2022-11-06 RX ORDER — DOXYCYCLINE HYCLATE 50 MG/1
324 CAPSULE, GELATIN COATED ORAL
Qty: 30 TABLET | Refills: 0
Start: 2022-11-07

## 2022-11-06 RX ORDER — LIDOCAINE 50 MG/G
1 PATCH TOPICAL DAILY
Qty: 30 PATCH | Refills: 0
Start: 2022-11-06

## 2022-11-06 RX ORDER — OXYCODONE HYDROCHLORIDE 5 MG/1
5 TABLET ORAL EVERY 4 HOURS PRN
Qty: 60 TABLET | Refills: 0 | Status: SHIPPED | OUTPATIENT
Start: 2022-11-06 | End: 2022-11-16

## 2022-11-06 RX ADMIN — ATORVASTATIN CALCIUM 40 MG: 40 TABLET, FILM COATED ORAL at 09:03

## 2022-11-06 RX ADMIN — OXYCODONE HYDROCHLORIDE 5 MG: 5 TABLET ORAL at 06:01

## 2022-11-06 RX ADMIN — SENNOSIDES, DOCUSATE SODIUM 1 TABLET: 8.6; 5 TABLET ORAL at 09:03

## 2022-11-06 RX ADMIN — CHOLECALCIFEROL (VITAMIN D3) 10 MCG (400 UNIT) TABLET 800 UNITS: at 09:03

## 2022-11-06 RX ADMIN — ACETAMINOPHEN 975 MG: 325 TABLET ORAL at 06:01

## 2022-11-06 RX ADMIN — APIXABAN 2.5 MG: 2.5 TABLET, FILM COATED ORAL at 09:03

## 2022-11-06 RX ADMIN — METOPROLOL TARTRATE 100 MG: 50 TABLET, FILM COATED ORAL at 09:03

## 2022-11-06 RX ADMIN — FERROUS GLUCONATE 324 MG: 324 TABLET ORAL at 06:02

## 2022-11-06 RX ADMIN — OXYCODONE HYDROCHLORIDE 5 MG: 5 TABLET ORAL at 10:19

## 2022-11-06 RX ADMIN — AMIODARONE HYDROCHLORIDE 200 MG: 200 TABLET ORAL at 09:03

## 2022-11-06 RX ADMIN — FUROSEMIDE 20 MG: 20 TABLET ORAL at 09:03

## 2022-11-06 NOTE — ASSESSMENT & PLAN NOTE
· Patient presented with sodium 136  He is on diuretics so sodium studies could be not reliant- initially he was placed on fluids which worsened his sodium he was placed on fluid restriction of 1 2 L by Nephrology and his sodium has improved to 134  · Discussed with Nephrology will go up on the restriction to 1 5 L also will restart his Lasix 20 mg p o  B i d   Which will help him keep his sodium steady  · He is eating and drinking without any issues   · Stable now with fluid restriction of 1 5 and lasix

## 2022-11-06 NOTE — ASSESSMENT & PLAN NOTE
· Pod #4 INSERTION NAIL IM FEMUR ANTEGRADE (TROCHANTERIC) (Right)  • Xray: acute right hip fracture  • Hgb 11 1 on admission;   • Pain only on movement continue oxycodone p r n  With Tylenol  • Incentive spirometry   · As discussed with orthopedics if hemoglobin remains stable can restart Plavix and postop day 7  Which is 11/8    Also to be on Eliquis 2 5 mg daily for 1 week and then could resume to 2 5 mg twice a day which will be 11 /8  · Evaluated the dressing without any blood saturation  · CT of the lower extremity done on 11 /4 without evidence of hematoma as hemoglobin was intermittently dropping  · Will follow up with orthopedics as OP

## 2022-11-06 NOTE — PROGRESS NOTES
NEPHROLOGY PROGRESS NOTE   Sissy Will 80 y o  male MRN: [id number]  Unit/Bed#: -01 Encounter: [id number]    Assessment/Plan:    81 yo man with CKD 3B, mild right renal atrophy, PAF, CHF, CAD status post CABG admitted 10/31 for fall s/p right femoral TFN 11/1  Nephrology following along for JOEL on CKD, hyponatremia  Tentative discharge today to [facility]     1  Acute kidney injury (POA) atop chronic kidney disease  ? Resolved, outpatient follow-up with Nephrology  2  Stage IIIB chronic kidney disease with baseline creatinine probably around 1 7-1 9 mg/dL  3  Mild right renal atrophy, possible renovascular disease  ? Consider outpatient renal artery Doppler versus MRA without contrast  4  Hyponatremia  ? Continue 50 oz fluid restriction per day an outpatient lab work  5  Proteinuria  ? Re-evaluate as an outpatient  6  Hip fracture status post right femoral TFN 11/1  7  Postoperative urinary retention  ? Indwelling catheter intact-outpatient follow-up Urology      ROS  Examined sitting in chair  No physical complaints  A complete 10 point review of systems have been performed and are otherwise negative  Historical Information   Past Medical History:   Diagnosis Date   • CHF (congestive heart failure) ([facility] [address] )    • Coronary artery disease    • Hyperlipidemia    • Hypertension    • Paroxysmal atrial fibrillation [facility])      Past Surgical History:   Procedure Laterality Date   • CARDIAC SURGERY      cabg x 2 2014 [facility]   • CORONARY ANGIOPLASTY WITH STENT PLACEMENT  10/2021    DAPHNEY to left main and ramus   • MITRAL VALVE REPAIR  2014    mitral ring   • IL OPEN RX FEMUR FX+INTRAMED HALIMA Right 11/1/2022    Procedure: INSERTION NAIL IM FEMUR ANTEGRADE (TROCHANTERIC); Surgeon: Eron Sheehan;   Location:  MAIN OR;  Service: Orthopedics     Social History   Social History     Substance and Sexual Activity   Alcohol Use Not Currently     Social History     Substance and Sexual Activity   Drug Use Never Social History     Tobacco Use   Smoking Status Never Smoker   Smokeless Tobacco Never Used       Family History:   Family History   Problem Relation Age of Onset   • Hypertension Father        Medications:  Pertinent medications were reviewed  Current Facility-Administered Medications   Medication Dose Route Frequency Provider Last Rate   • acetaminophen  975 mg Oral [facility] Elin Branch PA-C     • albuterol  2 puff Inhalation Q6H PRN Elin Branch PA-C     • amiodarone  200 mg Oral Daily Elin Branch PA-C     • apixaban  2 5 mg Oral Daily Shaista Kohler MD     • atorvastatin  40 mg Oral Daily Elin Branch PA-C     • calcium carbonate  1,000 mg Oral Daily PRN Elin Branch PA-C     • cholecalciferol  800 Units Oral Daily SANDRINE Ramirez     • ferrous gluconate  324 mg Oral Daily Before Breakfast Shaista Kohler MD     • furosemide  20 mg Oral BID (diuretic) Shaista Kohler MD     • HYDROmorphone  0 4 mg Intravenous Q5 Min PRN Elizabeth Alonso DO     • lidocaine  1 patch Topical Daily Elin Branch PA-C     • metoprolol tartrate  100 mg Oral Q12H [address] Elin Branch PA-C     • naloxone  0 04 mg Intravenous Q1MIN PRN Elin Branch PA-C     • ondansetron  4 mg Intravenous Q6H PRN Elin Branch PA-C     • oxyCODONE  2 5 mg Oral Q4H PRN Shaista Kohler MD     • oxyCODONE  5 mg Oral Q4H PRN Elin Branch PA-C     • polyethylene glycol  17 g Oral Daily PRN Elin Branch PA-C     • senna-docusate sodium  1 tablet Oral BID Elin Branch PA-C     • tamsulosin  0 4 mg Oral Daily With Mildred Hunter PA-C           No Known Allergies      Vitals:   /61 (BP Location: Left arm)   Pulse 73   Temp 98 1 °F (36 7 °C)   Resp 18   Ht 5' 9" (1 753 m)   Wt 72 5 kg (159 lb 13 3 oz)   SpO2 95%   BMI 23 60 kg/m²   Body mass index is 23 6 kg/m²    SpO2: 95 %,   SpO2 Activity: At Rest,   O2 Device: None (Room air)      Intake/Output Summary (Last 24 hours) at 11/6/2022 1103  Last data filed at 11/6/2022 0900  Gross per 24 hour   Intake 510 ml   Output 1600 ml   Net -1090 ml     Invasive Devices  Report    Peripheral Intravenous Line  Duration           Peripheral IV 11/05/22 Dorsal (posterior); Right Forearm 1 day          Drain  Duration           Urethral Catheter Double-lumen 16 Fr  3 days                Physical Exam  General: conscious, cooperative, in no acute distress  Eyes: conjunctivae pink, anicteric sclerae  ENT: lips and mucous membranes moist  Neck: supple, no JVD, no masses  Chest:  Diminished breath sounds bilaterally, no crackles, ronchus or wheezings  CVS: S1 & S2, normal rate, regular rhythm  Abdomen: soft, non-tender, non-distended, normoactive bowel sounds  Extremities: no edema of both legs  Skin: no rash  Neuro: awake, alert, oriented  :  Indwelling urinary catheter intact draining clear yellow urine    Diagnostic Data:  Lab: I have personally reviewed pertinent lab results  ,   CBC:  Results from last 7 days   Lab Units 11/06/22  0610   WBC Thousand/uL 7 19   HEMOGLOBIN g/dL 8 4*   HEMATOCRIT % 27 0*   PLATELETS Thousands/uL 117*      CMP:   Lab Results   Component Value Date    SODIUM 134 (L) 11/06/2022    K 4 1 11/06/2022     11/06/2022    CO2 23 11/06/2022    BUN 42 (H) 11/06/2022    CREATININE 1 68 (H) 11/06/2022    CALCIUM 7 7 (L) 11/06/2022    EGFR 37 11/06/2022   ,   PT/INR: No results found for: PT, INR,   Magnesium: No components found for: MAG,  Phosphorous: No results found for: PHOS    Microbiology:  @LABRCNTIP,(urinecx:7)@        Saurabh Batch    Portions of the record may have been created with voice recognition software  Occasional wrong word or "sound a like" substitutions may have occurred due to the inherent limitations of voice recognition software  Read the chart carefully and recognize, using context, where substitutions have occurred

## 2022-11-06 NOTE — ASSESSMENT & PLAN NOTE
· S/p CABG x4 and valve replacement in 2013 or 2014 per wife  · Plavix have been on hold since admission    As discussed with the orthopedics Plavix can be restarted on postop day 7  11/8 obviously if hemoglobin and hemodynamically stable   · Will check Hgb on Tuesday

## 2022-11-06 NOTE — ASSESSMENT & PLAN NOTE
· Requiring zavala catheter placment  · Urology evaluated- will need OP follow-up and  voiding trial in 7-10 days placed 11/3  · Continue flomax  · Noted enlarged prostate on US

## 2022-11-06 NOTE — DISCHARGE SUMMARY
[address]  Discharge- [facility] [place] 1940, 80 y o  male MRN: [id number]  Unit/Bed#: -01 Encounter: [id number]  Primary Care Provider: Antionette Mckenzie DO   Date and time admitted to hospital: 10/31/2022 12:35 PM    * Closed 2-part intertrochanteric fracture of proximal end of right femur, initial encounter ([facility] )  Assessment & Plan  · Pod #4 INSERTION NAIL IM FEMUR ANTEGRADE (TROCHANTERIC) (Right)  • Xray: acute right hip fracture  • Hgb 11 1 on admission;   • Pain only on movement continue oxycodone p r n  With Tylenol  • Incentive spirometry   · As discussed with orthopedics if hemoglobin remains stable can restart Plavix and postop day 7  Which is 11/8  Also to be on Eliquis 2 5 mg daily for 1 week and then could resume to 2 5 mg twice a day which will be 11 /8  · Evaluated the dressing without any blood saturation  · CT of the lower extremity done on 11 /4 without evidence of hematoma as hemoglobin was intermittently dropping  · Will follow up with orthopedics as OP     Thrombocytopenia (HCC)  Assessment & Plan  · Chronic thrombocytopenia stable    Postoperative urinary retention  Assessment & Plan  · Requiring zavala catheter placment  · Urology evaluated- will need OP follow-up and  voiding trial in 7-10 days placed 11/3  · Continue flomax  · Noted enlarged prostate on US    Hyponatremia  Assessment & Plan  · Patient presented with sodium 136  He is on diuretics so sodium studies could be not reliant- initially he was placed on fluids which worsened his sodium he was placed on fluid restriction of 1 2 L by Nephrology and his sodium has improved to 134  · Discussed with Nephrology will go up on the restriction to 1 5 L also will restart his Lasix 20 mg p o  B i d   Which will help him keep his sodium steady  · He is eating and drinking without any issues   · Stable now with fluid restriction of 1 5 and lasix    Acute on chronic anemia  Assessment & Plan  · Patient does have chronic anemia with hemoglobin ranging between 10 8-11  · He presented with hemoglobin 11 1  · Postop he started dropping requiring 2 units of transfusion  · Hemoglobin improved yesterday after transfusion to 8 7, unfortunately today it dropped to 8 2 again I do not see any evidence of bleed he is not having any melena or hematochezia discussed with nursing there is no evidence of bleed on the hip CT was done without any evidence of hematoma abdomen is benign and no evidence of hematuria  · With CABG and CKD would like to keep hemoglobin greater than 8  · Okay to continue Eliquis 2 5 mg daily right now prior to increasing to b i d  As per recommendations of Orthopedics as no evidence of any gross bleed  · Also in light of CKD could have underlying anemia as well  · Iron panel reviewed anemia of chronic disease with mild iron-deficiency  Start ferrous gluconate 324 mg p o  Daily   · If patient remains hemodynamically stable with stable hemoglobin then Plavix 75 mg daily he uses for CABG can be restarted on November 8th    Recheck Hgb on Tuesday - has remained stable since last transfusion     Paroxysmal atrial fibrillation ([facility] [address] )  Assessment & Plan  · He is in sinus rhythm continue Eliquis 2 5 mg p o  Daily till 11/8, then increase to 2 5 mg p o  B i d   · Continue metoprolol and amiodarone      Chronic heart failure with preserved ejection fraction [facility])  Assessment & Plan  Wt Readings from Last 3 Encounters:   11/05/22 72 5 kg (159 lb 13 3 oz)   06/04/22 64 7 kg (142 lb 10 2 oz)   03/22/22 63 5 kg (140 lb)     · Most recent echo EF 15%, systolic and diastolic function wnl   · Restart patient's Lasix 20 mg p o  B i d  That will help keep his sodium steady as well      · Hold Aldactone - can now resume  · Otherwise euvolemic    Acute-on-chronic kidney injury [facility])  Assessment & Plan  Lab Results   Component Value Date    EGFR 37 11/06/2022    EGFR 34 11/05/2022    EGFR 30 11/04/2022    CREATININE 1 68 (H) 11/06/2022 CREATININE 1 77 (H) 11/05/2022    CREATININE 1 97 (H) 11/04/2022     · Patient has CKD stage 3 Baseline appears to be approximately 1 9-2 2 on review of records  · On admission does not qualify for JOEL criteria, Now qualifies with Cr  2 66  · Avoid nephrotoxins, relative hypotension, and NSAIDs if possible  · Will hold Aldactone for now to not worsened any kidney function Lasix will be restarted to help with the sodium keep it at base  · He had urinary retention and Schaffer was inserted on 11/03  · Creatinine is at baseline  · Will follow up with urology for void trial     Hyperlipidemia, mixed  Assessment & Plan  · Continue statin therapy    Coronary artery disease involving coronary bypass graft  Assessment & Plan  · S/p CABG x4 and valve replacement in 2013 or 2014 per wife  · Plavix have been on hold since admission  As discussed with the orthopedics Plavix can be restarted on postop day 7  11/8 obviously if hemoglobin and hemodynamically stable   · Will check Hgb on Tuesday     Medical Problems             Resolved Problems  Date Reviewed: 11/5/2022   None               Discharging Physician / Practitioner: Ismael Scott  PCP: Yuval Fallon DO  Admission Date:   Admission Orders (From admission, onward)     Ordered        10/31/22 1549  INPATIENT ADMISSION  Once                      Discharge Date: 11/06/22    Consultations During Hospital Stay:  · Orthopedics  · Nephrology   · Urology  · PT  · OT    Procedures Performed:   · 11/1 INSERTION NAIL IM FEMUR ANTEGRADE (TROCHANTERIC) (rigth)  · Blood transfusion - total 2 units PRBC    Significant Findings / Test Results:   XR hip/pelv 2-3 vws right if performed - Result Date: 10/31/2022  · Right hip fracture       Lab Results   Component Value Date    HGB 8 4 (L) 11/06/2022    HGB 8 2 (L) 11/05/2022    HGB 8 7 (L) 11/04/2022    HGB 7 2 (L) 11/04/2022    HGB 7 5 (L) 11/03/2022    HGB 7 8 (L) 11/03/2022     Incidental Findings:   · None    Test Results Pending at Discharge (will require follow up): · None     Outpatient Tests Requested:  · Hgb on Tuesday (CBC)    Complications:  Post-op anemia    Reason for Admission: trochanteric fracture    Hospital Course:   Latosha Stinson is a 80 y o  male patient with history of atrial fibrillation on Eliquis, CHF, stage 3 CKD, CAD who originally presented to the hospital on 10/31/2022 due to fall at home with right hip pain  In ER found to have acute fracture of right femur  Orthopedics consulted and due to Eliquis use, waited until next day for procedure  Patient tolerated procedure and worked with PT/OT after who recommended rehab  Post-op did have > 2 point Hgb drop qualifying for acute blood loss anemia  Was transfused 1 unit PRBC  Then had additional drop and given another 1 unit  Since has been stable in 8 range  Iron panel did show iron deficiency and started on supplementation  Also post-op did qualify for JOEL  Nephrology consulted and post fluids and zavala decompression did improve  Urology consulted due to urinary retention and zavala placed with plans for outpatient voiding trial in 7-10 days  Patient will be discharged to rehab today  Please see above list of diagnoses and related plan for additional information  Condition at Discharge: fair    Discharge Day Visit / Exam:   Subjective:  Seen and examined  Feels tired  Discussed discharge and patient agreeable for outpatient follow up as suggested and discharge to rehab  Vitals: Blood Pressure: 127/61 (11/06/22 0855)  Pulse: 73 (11/06/22 0855)  Temperature: 98 1 °F (36 7 °C) (11/06/22 0733)  Temp Source: Temporal (11/05/22 0400)  Respirations: 18 (11/06/22 0855)  Height: 5' 9" (175 3 cm) (11/01/22 1116)  Weight - Scale: 72 5 kg (159 lb 13 3 oz) (11/05/22 0600)  SpO2: 95 % (11/06/22 0855)  Exam:   Physical Exam  Vitals and nursing note reviewed  Constitutional:       General: He is not in acute distress  Appearance: Normal appearance     HENT:      Head: Normocephalic and atraumatic  Nose: No congestion  Mouth/Throat:      Mouth: Mucous membranes are moist    Eyes:      Conjunctiva/sclera: Conjunctivae normal    Cardiovascular:      Rate and Rhythm: Normal rate and regular rhythm  Pulses: Normal pulses  Heart sounds: Normal heart sounds  No murmur heard  Pulmonary:      Effort: Pulmonary effort is normal  No respiratory distress  Breath sounds: Normal breath sounds  Abdominal:      General: Bowel sounds are normal       Palpations: Abdomen is soft  Tenderness: There is no abdominal tenderness  Musculoskeletal:         General: No swelling or tenderness  Right lower leg: No edema  Left lower leg: No edema  Skin:     General: Skin is warm and dry  Findings: No bruising  Neurological:      Mental Status: He is alert and oriented to person, place, and time  Discussion with Family: Updated  (wife) via phone  Discharge instructions/Information to patient and family:   See after visit summary for information provided to patient and family  Provisions for Follow-Up Care:  See after visit summary for information related to follow-up care and any pertinent home health orders  Disposition:   Acute Rehab at [facility] Readmission: None     Discharge Statement:  I spent 45 minutes discharging the patient  This time was spent on the day of discharge  I had direct contact with the patient on the day of discharge  Greater than 50% of the total time was spent examining patient, answering all patient questions, arranging and discussing plan of care with patient as well as directly providing post-discharge instructions  Additional time then spent on discharge activities  Discharge Medications:  See after visit summary for reconciled discharge medications provided to patient and/or family        **Please Note: This note may have been constructed using a voice recognition system**

## 2022-11-06 NOTE — NURSING NOTE
Peripheral IV removed  Belongings reviewed  Report called to [facility] to Pauline  All questions answered  Paper script for pain medication and follow up bloodwork sent with AVS and Summary of Care  Pt to be picked up around 1230 via [address]

## 2022-11-06 NOTE — CASE MANAGEMENT
Case Management Progress Note    Patient name Tyshawn Gear  Location /-01 MRN [id number]  : 1940 Date 2022       LOS (days): 6  Geometric Mean LOS (GMLOS) (days): 4 40  Days to GMLOS:-1 3        OBJECTIVE:        Current admission status: Inpatient  Preferred Pharmacy:   [facility], [place] [address] [address]  [address] [facility] [address]  Phone: [phone] Fax: [facility], [address] [place] [address],[address]  [address]  Phone: [phone] Fax: [phone]    Primary Care Provider: Rojelio Dancer, DO    Primary Insurance: MEDICARE  Secondary Insurance: BLUE CROSS    PROGRESS NOTE:        Pt is medically stable for discharge    CM placing a transportation request to [facility] in  [facility] as per [id number] will  pt at 1200 via [address] [facility] to transport to [facility]  [employer]

## 2022-11-06 NOTE — ASSESSMENT & PLAN NOTE
Lab Results   Component Value Date    EGFR 37 11/06/2022    EGFR 34 11/05/2022    EGFR 30 11/04/2022    CREATININE 1 68 (H) 11/06/2022    CREATININE 1 77 (H) 11/05/2022    CREATININE 1 97 (H) 11/04/2022     · Patient has CKD stage 3 Baseline appears to be approximately 1 9-2 2 on review of records  · On admission does not qualify for JOEL criteria, Now qualifies with Cr  2 66  · Avoid nephrotoxins, relative hypotension, and NSAIDs if possible  · Will hold Aldactone for now to not worsened any kidney function Lasix will be restarted to help with the sodium keep it at base    · He had urinary retention and Schaffer was inserted on 11/03  · Creatinine is at baseline  · Will follow up with urology for void trial

## 2022-11-06 NOTE — DISCHARGE INSTR - AVS FIRST PAGE
Follow up with orthopedics   If you do not have your appointment, please call the clinic at [phone] to f/u with Dr Shell Greer or his PA, Bethany Vazquez PA-C, within 2 weeks    For your Eliquis - take 2 5 mg daily until 1 week after surgery 11/8 then can resume BID   For Plavix - continue to hold until 11/8   Repeat Hgb on Tuesday this week   Continue to take iron supplement for iron deficiency and follow up with your primary care provider

## 2022-11-06 NOTE — ASSESSMENT & PLAN NOTE
Wt Readings from Last 3 Encounters:   11/05/22 72 5 kg (159 lb 13 3 oz)   06/04/22 64 7 kg (142 lb 10 2 oz)   03/22/22 63 5 kg (140 lb)     · Most recent echo EF 36%, systolic and diastolic function wnl   · Restart patient's Lasix 20 mg p o  B i d  That will help keep his sodium steady as well      · Hold Aldactone - can now resume  · Otherwise euvolemic

## 2022-11-06 NOTE — PLAN OF CARE
Problem: MOBILITY - ADULT  Goal: Maintain or return to baseline ADL function  Description: INTERVENTIONS:  -  Assess patient's ability to carry out ADLs; assess patient's baseline for ADL function and identify physical deficits which impact ability to perform ADLs (bathing, care of mouth/teeth, toileting, grooming, dressing, etc )  - Assess/evaluate cause of self-care deficits   - Assess range of motion  - Assess patient's mobility; develop plan if impaired  - Assess patient's need for assistive devices and provide as appropriate  - Encourage maximum independence but intervene and supervise when necessary  - Involve family in performance of ADLs  - Assess for home care needs following discharge   - Consider OT consult to assist with ADL evaluation and planning for discharge  - Provide patient education as appropriate  Outcome: Progressing  Goal: Maintains/Returns to pre admission functional level  Description: INTERVENTIONS:  - Perform BMAT or MOVE assessment daily    - Set and communicate daily mobility goal to care team and patient/family/caregiver  - Collaborate with rehabilitation services on mobility goals if consulted  - Perform Range of Motion 4 times a day  - Reposition patient every 2 hours    - Dangle patient 3 times a day  - Stand patient 3 times a day  - Ambulate patient 3 times a day  - Out of bed to chair 3 times a day   - Out of bed for meals 3 times a day  - Out of bed for toileting  - Record patient progress and toleration of activity level   Outcome: Progressing     Problem: Potential for Falls  Goal: Patient will remain free of falls  Description: INTERVENTIONS:  - Educate patient/family on patient safety including physical limitations  - Instruct patient to call for assistance with activity   - Consult OT/PT to assist with strengthening/mobility   - Keep Call bell within reach  - Keep bed low and locked with side rails adjusted as appropriate  - Keep care items and personal belongings within reach  - Initiate and maintain comfort rounds  - Make Fall Risk Sign visible to staff  - Offer Toileting every   Hours, in advance of need  - Initiate/Maintain   alarm  - Obtain necessary fall risk management equipment:      - Apply yellow socks and bracelet for high fall risk patients  - Consider moving patient to room near nurses station  Outcome: Progressing     Problem: Prexisting or High Potential for Compromised Skin Integrity  Goal: Skin integrity is maintained or improved  Description: INTERVENTIONS:  - Identify patients at risk for skin breakdown  - Assess and monitor skin integrity  - Assess and monitor nutrition and hydration status  - Monitor labs   - Assess for incontinence   - Turn and reposition patient  - Assist with mobility/ambulation  - Relieve pressure over bony prominences  - Avoid friction and shearing  - Provide appropriate hygiene as needed including keeping skin clean and dry  - Evaluate need for skin moisturizer/barrier cream  - Collaborate with interdisciplinary team   - Patient/family teaching  - Consider wound care consult   Outcome: Progressing     Problem: SKIN/TISSUE INTEGRITY - ADULT  Goal: Skin Integrity remains intact(Skin Breakdown Prevention)  Description: Assess:  -Perform Chris assessment every    -Clean and moisturize skin every    -Inspect skin when repositioning, toileting, and assisting with ADLS  -Assess under medical devices such as   every    -Assess extremities for adequate circulation and sensation     Bed Management:  -Have minimal linens on bed & keep smooth, unwrinkled  -Change linens as needed when moist or perspiring  -Avoid sitting or lying in one position for more than   hours while in bed  -Keep HOB at  degrees     Toileting:  -Offer bedside commode  -Assess for incontinence every   -Use incontinent care products after each incontinent episode such as   Activity:  -Mobilize patient    times a day  -Encourage activity and walks on unit  -Encourage or provide ROM exercises   -Turn and reposition patient every   Hours  -Use appropriate equipment to lift or move patient in bed  -Instruct/ Assist with weight shifting every   when out of bed in chair  -Consider limitation of chair time   hour intervals    Skin Care:  -Avoid use of baby powder, tape, friction and shearing, hot water or constrictive clothing  -Relieve pressure over bony prominences using    -Do not massage red bony areas    Next Steps:  -Teach patient strategies to minimize risks such as     -Consider consults to  interdisciplinary teams such as     Outcome: Progressing  Goal: Incision(s), wounds(s) or drain site(s) healing without S/S of infection  Description: INTERVENTIONS  - Assess and document dressing, incision, wound bed, drain sites and surrounding tissue  - Provide patient and family education  - Perform skin care/dressing changes every     Outcome: Progressing     Problem: MUSCULOSKELETAL - ADULT  Goal: Maintain or return mobility to safest level of function  Description: INTERVENTIONS:  - Assess patient's ability to carry out ADLs; assess patient's baseline for ADL function and identify physical deficits which impact ability to perform ADLs (bathing, care of mouth/teeth, toileting, grooming, dressing, etc )  - Assess/evaluate cause of self-care deficits   - Assess range of motion  - Assess patient's mobility  - Assess patient's need for assistive devices and provide as appropriate  - Encourage maximum independence but intervene and supervise when necessary  - Involve family in performance of ADLs  - Assess for home care needs following discharge   - Consider OT consult to assist with ADL evaluation and planning for discharge  - Provide patient education as appropriate  Outcome: Progressing  Goal: Maintain proper alignment of affected body part  Description: INTERVENTIONS:  - Support, maintain and protect limb and body alignment  - Provide patient/ family with appropriate education  Outcome: Progressing     Problem: PAIN - ADULT  Goal: Verbalizes/displays adequate comfort level or baseline comfort level  Description: Interventions:  - Encourage patient to monitor pain and request assistance  - Assess pain using appropriate pain scale0-10  - Administer analgesics based on type and severity of pain and evaluate response  - Implement non-pharmacological measures as appropriate and evaluate response  - Consider cultural and social influences on pain and pain management  - Notify physician/advanced practitioner if interventions unsuccessful or patient reports new pain  Outcome: Progressing     Problem: INFECTION - ADULT  Goal: Absence or prevention of progression during hospitalization  Description: INTERVENTIONS:  - Assess and monitor for signs and symptoms of infection  - Monitor lab/diagnostic results  - Monitor all insertion sites, i e  indwelling lines, tubes, and drains  - Monitor endotracheal if appropriate and nasal secretions for changes in amount and color  - [place] appropriate cooling/warming therapies per order  - Administer medications as ordered  - Instruct and encourage patient and family to use good hand hygiene technique  - Identify and instruct in appropriate isolation precautions for identified infection/condition  Outcome: Progressing     Problem: SAFETY ADULT  Goal: Maintain or return to baseline ADL function  Description: INTERVENTIONS:  -  Assess patient's ability to carry out ADLs; assess patient's baseline for ADL function and identify physical deficits which impact ability to perform ADLs (bathing, care of mouth/teeth, toileting, grooming, dressing, etc )  - Assess/evaluate cause of self-care deficits   - Assess range of motion  - Assess patient's mobility; develop plan if impaired  - Assess patient's need for assistive devices and provide as appropriate  - Encourage maximum independence but intervene and supervise when necessary  - Involve family in performance of ADLs  - Assess for home care needs following discharge   - Consider OT consult to assist with ADL evaluation and planning for discharge  - Provide patient education as appropriate  Outcome: Progressing  Goal: Maintains/Returns to pre admission functional level  Description: INTERVENTIONS:  - Perform BMAT or MOVE assessment daily    - Set and communicate daily mobility goal to care team and patient/family/caregiver  - Collaborate with rehabilitation services on mobility goals if consulted  - Perform Range of Motion 4 times a day  - Reposition patient every 2 hours  - Dangle patient 3 times a day  - Stand patient 3 times a day  - Ambulate patient 3 times a day  - Out of bed to chair 3 times a day   - Out of bed for meals 3 times a day  - Out of bed for toileting  - Record patient progress and toleration of activity level   Outcome: Progressing  Goal: Patient will remain free of falls  Description: INTERVENTIONS:  - Educate patient/family on patient safety including physical limitations  - Instruct patient to call for assistance with activity   - Consult OT/PT to assist with strengthening/mobility   - Keep Call bell within reach  - Keep bed low and locked with side rails adjusted as appropriate  - Keep care items and personal belongings within reach  - Initiate and maintain comfort rounds  - Make Fall Risk Sign visible to staff  - Offer Toileting every   Hours, in advance of need  - Initiate/Maintain   alarm  - Obtain necessary fall risk management equipment:      - Apply yellow socks and bracelet for high fall risk patients  - Consider moving patient to room near nurses station  Outcome: Progressing     Problem: DISCHARGE PLANNING  Goal: Discharge to home or other facility with appropriate resources  Description: INTERVENTIONS:  - Identify barriers to discharge w/patient and caregiver  - Arrange for needed discharge resources and transportation as appropriate  - Identify discharge learning needs (meds, wound care, etc )  - Arrange for interpretive services to assist at discharge as needed  - Refer to Case Management Department for coordinating discharge planning if the patient needs post-hospital services based on physician/advanced practitioner order or complex needs related to functional status, cognitive ability, or social support system  Outcome: Progressing     Problem: Knowledge Deficit  Goal: Patient/family/caregiver demonstrates understanding of disease process, treatment plan, medications, and discharge instructions  Description: Complete learning assessment and assess knowledge base    Interventions:  - Provide teaching at level of understanding  - Provide teaching via preferred learning methods  Outcome: Progressing     Problem: GENITOURINARY - ADULT  Goal: Maintains or returns to baseline urinary function  Description: INTERVENTIONS:  - Assess urinary function  - Encourage oral fluids to ensure adequate hydration if ordered  - Administer IV fluids as ordered to ensure adequate hydration  - Administer ordered medications as needed  - Offer frequent toileting  - Follow urinary retention protocol if ordered  Outcome: Progressing  Goal: Absence of urinary retention  Description: INTERVENTIONS:  - Assess patient’s ability to void and empty bladder  - Monitor I/O  - Bladder scan as needed  - Discuss with physician/AP medications to alleviate retention as needed  - Discuss catheterization for long term situations as appropriate  Outcome: Progressing  Goal: Urinary catheter remains patent  Description: INTERVENTIONS:  - Assess patency of urinary catheter  - If patient has a chronic zavala, consider changing catheter if non-functioning  - Follow guidelines for intermittent irrigation of non-functioning urinary catheter  Outcome: Progressing

## 2022-11-06 NOTE — ASSESSMENT & PLAN NOTE
· Patient does have chronic anemia with hemoglobin ranging between 10 8-11  · He presented with hemoglobin 11 1  · Postop he started dropping requiring 2 units of transfusion  · Hemoglobin improved yesterday after transfusion to 8 7, unfortunately today it dropped to 8 2 again I do not see any evidence of bleed he is not having any melena or hematochezia discussed with nursing there is no evidence of bleed on the hip CT was done without any evidence of hematoma abdomen is benign and no evidence of hematuria  · With CABG and CKD would like to keep hemoglobin greater than 8  · Okay to continue Eliquis 2 5 mg daily right now prior to increasing to b i d  As per recommendations of Orthopedics as no evidence of any gross bleed  · Also in light of CKD could have underlying anemia as well  · Iron panel reviewed anemia of chronic disease with mild iron-deficiency  Start ferrous gluconate 324 mg p o   Daily   · If patient remains hemodynamically stable with stable hemoglobin then Plavix 75 mg daily he uses for CABG can be restarted on November 8th    Recheck Hgb on Tuesday - has remained stable since last transfusion

## 2022-11-07 NOTE — TELEPHONE ENCOUNTER
Spoke to wife, pt currently in [facility] for therapy  Appt scheduled for 12/21 per wife request do to unable to walk   Wife will check with [facility] if they have the zavala removal orders

## 2022-11-08 NOTE — TELEPHONE ENCOUNTER
Lana Crain from [facility] called to schedule PVR appt for pt   Please review if standing appt is appropriate and Lana Crain stated if appt is moved up if he can be schedule 11/15/22    Call [phone]  [facility] )

## 2022-11-10 NOTE — TELEPHONE ENCOUNTER
Regino Gaytan from [facility]  [place] calling to request the orders for zavala removal be faxed again  She stated they did not receive them and need to make sure they have them in time for his appointment on 11/15/22      Fax number to send these orders to is [phone] and to the attention of Gaye Quiñones

## 2022-11-15 ENCOUNTER — CLINICAL SUPPORT (OUTPATIENT)
Dept: UROLOGY | Facility: CLINIC | Age: 82
End: 2022-11-15

## 2022-11-15 DIAGNOSIS — R33.8 POSTOPERATIVE URINARY RETENTION: Primary | ICD-10-CM

## 2022-11-15 DIAGNOSIS — N99.89 POSTOPERATIVE URINARY RETENTION: Primary | ICD-10-CM

## 2022-11-15 LAB — POST-VOID RESIDUAL VOLUME, ML POC: 47 ML

## 2022-11-15 NOTE — PROGRESS NOTES
Procedures:  Pt presents for PVR  Bladder scan revealed 47 ML  No need to replace zavala   Pt will keep appt previously scheduled for Dec

## 2022-11-17 NOTE — TELEPHONE ENCOUNTER
Lab work completed  Please call to set up HFU appt for patient    See previous note from Fanny Becker

## 2022-11-20 ENCOUNTER — HOSPITAL ENCOUNTER (EMERGENCY)
Facility: HOSPITAL | Age: 82
Discharge: NURSING, REHAB OR LONG-TERM CARE | End: 2022-11-21
Attending: EMERGENCY MEDICINE

## 2022-11-20 ENCOUNTER — APPOINTMENT (EMERGENCY)
Dept: CT IMAGING | Facility: HOSPITAL | Age: 82
End: 2022-11-20

## 2022-11-20 DIAGNOSIS — E87.6 HYPOKALEMIA: ICD-10-CM

## 2022-11-20 DIAGNOSIS — R74.01 TRANSAMINITIS: ICD-10-CM

## 2022-11-20 DIAGNOSIS — K80.20 CHOLELITHIASES: ICD-10-CM

## 2022-11-20 DIAGNOSIS — R17 TOTAL BILIRUBIN, ELEVATED: ICD-10-CM

## 2022-11-20 DIAGNOSIS — N39.0 UTI (URINARY TRACT INFECTION): Primary | ICD-10-CM

## 2022-11-20 LAB
ALBUMIN SERPL BCP-MCNC: 2.2 G/DL (ref 3.5–5)
ALBUMIN SERPL BCP-MCNC: 2.5 G/DL (ref 3.5–5)
ALP SERPL-CCNC: 676 U/L (ref 46–116)
ALP SERPL-CCNC: 717 U/L (ref 46–116)
ALT SERPL W P-5'-P-CCNC: 153 U/L (ref 12–78)
ALT SERPL W P-5'-P-CCNC: 248 U/L (ref 12–78)
ANION GAP SERPL CALCULATED.3IONS-SCNC: 7 MMOL/L (ref 4–13)
ANION GAP SERPL CALCULATED.3IONS-SCNC: 9 MMOL/L (ref 4–13)
AST SERPL W P-5'-P-CCNC: 269 U/L (ref 5–45)
AST SERPL W P-5'-P-CCNC: 384 U/L (ref 5–45)
BACTERIA UR QL AUTO: ABNORMAL /HPF
BASOPHILS # BLD AUTO: 0.01 THOUSANDS/ÂΜL (ref 0–0.1)
BASOPHILS NFR BLD AUTO: 0 % (ref 0–1)
BILIRUB SERPL-MCNC: 3.42 MG/DL (ref 0.2–1)
BILIRUB SERPL-MCNC: 3.48 MG/DL (ref 0.2–1)
BILIRUB UR QL STRIP: ABNORMAL
BUN SERPL-MCNC: 30 MG/DL (ref 5–25)
BUN SERPL-MCNC: 31 MG/DL (ref 5–25)
CALCIUM ALBUM COR SERPL-MCNC: 8.8 MG/DL (ref 8.3–10.1)
CALCIUM ALBUM COR SERPL-MCNC: 9 MG/DL (ref 8.3–10.1)
CALCIUM SERPL-MCNC: 7.4 MG/DL (ref 8.3–10.1)
CALCIUM SERPL-MCNC: 7.8 MG/DL (ref 8.3–10.1)
CHLORIDE SERPL-SCNC: 104 MMOL/L (ref 96–108)
CHLORIDE SERPL-SCNC: 104 MMOL/L (ref 96–108)
CLARITY UR: ABNORMAL
CO2 SERPL-SCNC: 25 MMOL/L (ref 21–32)
CO2 SERPL-SCNC: 28 MMOL/L (ref 21–32)
COLOR UR: ABNORMAL
CREAT SERPL-MCNC: 1.71 MG/DL (ref 0.6–1.3)
CREAT SERPL-MCNC: 1.71 MG/DL (ref 0.6–1.3)
EOSINOPHIL # BLD AUTO: 0 THOUSAND/ÂΜL (ref 0–0.61)
EOSINOPHIL NFR BLD AUTO: 0 % (ref 0–6)
ERYTHROCYTE [DISTWIDTH] IN BLOOD BY AUTOMATED COUNT: 16.4 % (ref 11.6–15.1)
GFR SERPL CREATININE-BSD FRML MDRD: 36 ML/MIN/1.73SQ M
GFR SERPL CREATININE-BSD FRML MDRD: 36 ML/MIN/1.73SQ M
GLUCOSE SERPL-MCNC: 186 MG/DL (ref 65–140)
GLUCOSE SERPL-MCNC: 190 MG/DL (ref 65–140)
GLUCOSE UR STRIP-MCNC: NEGATIVE MG/DL
HCT VFR BLD AUTO: 27.8 % (ref 36.5–49.3)
HGB BLD-MCNC: 8.7 G/DL (ref 12–17)
HGB UR QL STRIP.AUTO: ABNORMAL
IMM GRANULOCYTES # BLD AUTO: 0.09 THOUSAND/UL (ref 0–0.2)
IMM GRANULOCYTES NFR BLD AUTO: 1 % (ref 0–2)
KETONES UR STRIP-MCNC: NEGATIVE MG/DL
LACTATE SERPL-SCNC: 1.5 MMOL/L (ref 0.5–2)
LEUKOCYTE ESTERASE UR QL STRIP: ABNORMAL
LIPASE SERPL-CCNC: 364 U/L (ref 73–393)
LYMPHOCYTES # BLD AUTO: 0.32 THOUSANDS/ÂΜL (ref 0.6–4.47)
LYMPHOCYTES NFR BLD AUTO: 3 % (ref 14–44)
MAGNESIUM SERPL-MCNC: 1.9 MG/DL (ref 1.6–2.6)
MCH RBC QN AUTO: 30.1 PG (ref 26.8–34.3)
MCHC RBC AUTO-ENTMCNC: 31.3 G/DL (ref 31.4–37.4)
MCV RBC AUTO: 96 FL (ref 82–98)
MONOCYTES # BLD AUTO: 0.92 THOUSAND/ÂΜL (ref 0.17–1.22)
MONOCYTES NFR BLD AUTO: 7 % (ref 4–12)
NEUTROPHILS # BLD AUTO: 11.38 THOUSANDS/ÂΜL (ref 1.85–7.62)
NEUTS SEG NFR BLD AUTO: 89 % (ref 43–75)
NITRITE UR QL STRIP: NEGATIVE
NON-SQ EPI CELLS URNS QL MICRO: ABNORMAL /HPF
NRBC BLD AUTO-RTO: 0 /100 WBCS
PH UR STRIP.AUTO: 5.5 [PH]
PLATELET # BLD AUTO: 230 THOUSANDS/UL (ref 149–390)
PMV BLD AUTO: 9.8 FL (ref 8.9–12.7)
POTASSIUM SERPL-SCNC: 3 MMOL/L (ref 3.5–5.3)
POTASSIUM SERPL-SCNC: 3.4 MMOL/L (ref 3.5–5.3)
PROT SERPL-MCNC: 5.8 G/DL (ref 6.4–8.4)
PROT SERPL-MCNC: 6.5 G/DL (ref 6.4–8.4)
PROT UR STRIP-MCNC: ABNORMAL MG/DL
RBC # BLD AUTO: 2.89 MILLION/UL (ref 3.88–5.62)
RBC #/AREA URNS AUTO: ABNORMAL /HPF
SODIUM SERPL-SCNC: 138 MMOL/L (ref 135–147)
SODIUM SERPL-SCNC: 139 MMOL/L (ref 135–147)
SP GR UR STRIP.AUTO: 1.02 (ref 1–1.03)
UROBILINOGEN UR QL STRIP.AUTO: 2 E.U./DL
WBC # BLD AUTO: 12.72 THOUSAND/UL (ref 4.31–10.16)
WBC #/AREA URNS AUTO: ABNORMAL /HPF

## 2022-11-20 RX ORDER — AMOXICILLIN AND CLAVULANATE POTASSIUM 875; 125 MG/1; MG/1
1 TABLET, FILM COATED ORAL EVERY 12 HOURS SCHEDULED
Qty: 14 TABLET | Refills: 0 | Status: ON HOLD | OUTPATIENT
Start: 2022-11-20 | End: 2022-11-27

## 2022-11-20 RX ORDER — AMOXICILLIN AND CLAVULANATE POTASSIUM 875; 125 MG/1; MG/1
1 TABLET, FILM COATED ORAL ONCE
Status: COMPLETED | OUTPATIENT
Start: 2022-11-20 | End: 2022-11-20

## 2022-11-20 RX ORDER — CEFTRIAXONE 1 G/50ML
1000 INJECTION, SOLUTION INTRAVENOUS ONCE
Status: COMPLETED | OUTPATIENT
Start: 2022-11-20 | End: 2022-11-20

## 2022-11-20 RX ORDER — POTASSIUM CHLORIDE 20 MEQ/1
20 TABLET, EXTENDED RELEASE ORAL 2 TIMES DAILY
Qty: 6 TABLET | Refills: 0 | Status: ON HOLD | OUTPATIENT
Start: 2022-11-20 | End: 2022-11-23

## 2022-11-20 RX ORDER — IODIXANOL 320 MG/ML
98 INJECTION, SOLUTION INTRAVASCULAR
Status: COMPLETED | OUTPATIENT
Start: 2022-11-20 | End: 2022-11-20

## 2022-11-20 RX ORDER — POTASSIUM CHLORIDE 20 MEQ/1
40 TABLET, EXTENDED RELEASE ORAL ONCE
Status: COMPLETED | OUTPATIENT
Start: 2022-11-20 | End: 2022-11-20

## 2022-11-20 RX ORDER — POTASSIUM CHLORIDE 14.9 MG/ML
20 INJECTION INTRAVENOUS ONCE
Status: COMPLETED | OUTPATIENT
Start: 2022-11-20 | End: 2022-11-21

## 2022-11-20 RX ADMIN — SODIUM CHLORIDE 500 ML: 0.9 INJECTION, SOLUTION INTRAVENOUS at 18:57

## 2022-11-20 RX ADMIN — POTASSIUM CHLORIDE 20 MEQ: 14.9 INJECTION, SOLUTION INTRAVENOUS at 23:35

## 2022-11-20 RX ADMIN — IODIXANOL 98 ML: 320 INJECTION, SOLUTION INTRAVASCULAR at 19:55

## 2022-11-20 RX ADMIN — AMOXICILLIN AND CLAVULANATE POTASSIUM 1 TABLET: 875; 125 TABLET, FILM COATED ORAL at 21:52

## 2022-11-20 RX ADMIN — POTASSIUM CHLORIDE 40 MEQ: 1500 TABLET, EXTENDED RELEASE ORAL at 21:00

## 2022-11-20 RX ADMIN — CEFTRIAXONE 1000 MG: 1 INJECTION, SOLUTION INTRAVENOUS at 21:03

## 2022-11-21 ENCOUNTER — APPOINTMENT (EMERGENCY)
Dept: CT IMAGING | Facility: HOSPITAL | Age: 82
End: 2022-11-21

## 2022-11-21 ENCOUNTER — HOSPITAL ENCOUNTER (INPATIENT)
Facility: HOSPITAL | Age: 82
LOS: 2 days | End: 2022-11-23
Attending: STUDENT IN AN ORGANIZED HEALTH CARE EDUCATION/TRAINING PROGRAM | Admitting: STUDENT IN AN ORGANIZED HEALTH CARE EDUCATION/TRAINING PROGRAM

## 2022-11-21 ENCOUNTER — APPOINTMENT (EMERGENCY)
Dept: RADIOLOGY | Facility: HOSPITAL | Age: 82
End: 2022-11-21

## 2022-11-21 VITALS
TEMPERATURE: 97.5 F | OXYGEN SATURATION: 94 % | WEIGHT: 159.39 LBS | DIASTOLIC BLOOD PRESSURE: 50 MMHG | BODY MASS INDEX: 23.54 KG/M2 | SYSTOLIC BLOOD PRESSURE: 93 MMHG | HEART RATE: 97 BPM | RESPIRATION RATE: 18 BRPM

## 2022-11-21 DIAGNOSIS — E87.20 LACTIC ACIDOSIS: ICD-10-CM

## 2022-11-21 DIAGNOSIS — S37.30XA INJURY OF URETHRA, INITIAL ENCOUNTER: ICD-10-CM

## 2022-11-21 DIAGNOSIS — R65.21 SEPTIC SHOCK (HCC): Primary | ICD-10-CM

## 2022-11-21 DIAGNOSIS — N17.9 ACUTE KIDNEY INJURY (HCC): ICD-10-CM

## 2022-11-21 DIAGNOSIS — I21.4 NSTEMI (NON-ST ELEVATED MYOCARDIAL INFARCTION) (HCC): ICD-10-CM

## 2022-11-21 DIAGNOSIS — R74.01 TRANSAMINITIS: ICD-10-CM

## 2022-11-21 DIAGNOSIS — A41.9 SEPTIC SHOCK (HCC): Primary | ICD-10-CM

## 2022-11-21 DIAGNOSIS — R31.0 GROSS HEMATURIA: ICD-10-CM

## 2022-11-21 PROBLEM — N39.0 UTI (URINARY TRACT INFECTION): Status: ACTIVE | Noted: 2022-11-21

## 2022-11-21 PROBLEM — R31.9 HEMATURIA: Status: ACTIVE | Noted: 2022-01-01

## 2022-11-21 PROBLEM — S72.101D CLOSED FRACTURE OF TROCHANTER OF RIGHT FEMUR WITH ROUTINE HEALING: Status: ACTIVE | Noted: 2022-11-21

## 2022-11-21 PROBLEM — R79.89 ELEVATED TROPONIN LEVEL NOT DUE MYOCARDIAL INFARCTION: Status: ACTIVE | Noted: 2022-01-01

## 2022-11-21 PROBLEM — R60.0 LEG EDEMA, RIGHT: Status: ACTIVE | Noted: 2022-11-21

## 2022-11-21 PROBLEM — R73.9 HYPERGLYCEMIA: Status: ACTIVE | Noted: 2022-01-01

## 2022-11-21 PROBLEM — R77.8 ELEVATED TROPONIN LEVEL NOT DUE MYOCARDIAL INFARCTION: Status: ACTIVE | Noted: 2022-01-01

## 2022-11-21 PROBLEM — S91.301A WOUND OF RIGHT FOOT: Status: ACTIVE | Noted: 2022-11-21

## 2022-11-21 LAB
2HR DELTA HS TROPONIN: -140 NG/L
ALBUMIN SERPL BCP-MCNC: 2.2 G/DL (ref 3.5–5)
ALP SERPL-CCNC: 785 U/L (ref 46–116)
ALT SERPL W P-5'-P-CCNC: 307 U/L (ref 12–78)
ANION GAP SERPL CALCULATED.3IONS-SCNC: 21 MMOL/L (ref 4–13)
AST SERPL W P-5'-P-CCNC: 276 U/L (ref 5–45)
BASE EX.OXY STD BLDV CALC-SCNC: 78.4 % (ref 60–80)
BASE EXCESS BLDV CALC-SCNC: -12.6 MMOL/L
BASOPHILS # BLD MANUAL: 0 THOUSAND/UL (ref 0–0.1)
BASOPHILS NFR MAR MANUAL: 0 % (ref 0–1)
BILIRUB DIRECT SERPL-MCNC: 3.24 MG/DL (ref 0–0.2)
BILIRUB SERPL-MCNC: 4.36 MG/DL (ref 0.2–1)
BUN SERPL-MCNC: 42 MG/DL (ref 5–25)
CALCIUM SERPL-MCNC: 7.8 MG/DL (ref 8.3–10.1)
CARDIAC TROPONIN I PNL SERPL HS: 145 NG/L
CARDIAC TROPONIN I PNL SERPL HS: 5 NG/L
CHLORIDE SERPL-SCNC: 107 MMOL/L (ref 96–108)
CO2 SERPL-SCNC: 16 MMOL/L (ref 21–32)
CREAT SERPL-MCNC: 3.19 MG/DL (ref 0.6–1.3)
EOSINOPHIL # BLD MANUAL: 0 THOUSAND/UL (ref 0–0.4)
EOSINOPHIL NFR BLD MANUAL: 0 % (ref 0–6)
ERYTHROCYTE [DISTWIDTH] IN BLOOD BY AUTOMATED COUNT: 18 % (ref 11.6–15.1)
GFR SERPL CREATININE-BSD FRML MDRD: 17 ML/MIN/1.73SQ M
GLUCOSE SERPL-MCNC: 71 MG/DL (ref 65–140)
HCO3 BLDV-SCNC: 13.7 MMOL/L (ref 24–30)
HCT VFR BLD AUTO: 30.1 % (ref 36.5–49.3)
HGB BLD-MCNC: 9.1 G/DL (ref 12–17)
INR PPP: 1.85 (ref 0.84–1.19)
LACTATE SERPL-SCNC: 1.4 MMOL/L (ref 0.5–2)
LACTATE SERPL-SCNC: 10 MMOL/L (ref 0.5–2)
LIPASE SERPL-CCNC: 95 U/L (ref 73–393)
LYMPHOCYTES # BLD AUTO: 0.18 THOUSAND/UL (ref 0.6–4.47)
LYMPHOCYTES # BLD AUTO: 1 % (ref 14–44)
MACROCYTES BLD QL AUTO: PRESENT
MAGNESIUM SERPL-MCNC: 1.8 MG/DL (ref 1.6–2.6)
MCH RBC QN AUTO: 30.5 PG (ref 26.8–34.3)
MCHC RBC AUTO-ENTMCNC: 30.2 G/DL (ref 31.4–37.4)
MCV RBC AUTO: 101 FL (ref 82–98)
MONOCYTES # BLD AUTO: 0.54 THOUSAND/UL (ref 0–1.22)
MONOCYTES NFR BLD: 3 % (ref 4–12)
NEUTROPHILS # BLD MANUAL: 17.4 THOUSAND/UL (ref 1.85–7.62)
NEUTS BAND NFR BLD MANUAL: 6 % (ref 0–8)
NEUTS SEG NFR BLD AUTO: 90 % (ref 43–75)
O2 CT BLDV-SCNC: 11.3 ML/DL
PCO2 BLDV: 33 MM HG (ref 42–50)
PH BLDV: 7.24 [PH] (ref 7.3–7.4)
PLATELET # BLD AUTO: 193 THOUSANDS/UL (ref 149–390)
PLATELET BLD QL SMEAR: ADEQUATE
PMV BLD AUTO: 10.8 FL (ref 8.9–12.7)
PO2 BLDV: 52.5 MM HG (ref 35–45)
POLYCHROMASIA BLD QL SMEAR: PRESENT
POTASSIUM SERPL-SCNC: 3.9 MMOL/L (ref 3.5–5.3)
PROCALCITONIN SERPL-MCNC: 150.57 NG/ML
PROCALCITONIN SERPL-MCNC: 171.74 NG/ML
PROT SERPL-MCNC: 6.1 G/DL (ref 6.4–8.4)
PROTHROMBIN TIME: 21.4 SECONDS (ref 11.6–14.5)
RBC # BLD AUTO: 2.98 MILLION/UL (ref 3.88–5.62)
SODIUM SERPL-SCNC: 144 MMOL/L (ref 135–147)
WBC # BLD AUTO: 18.12 THOUSAND/UL (ref 4.31–10.16)
WBC TOXIC VACUOLES BLD QL SMEAR: PRESENT

## 2022-11-21 RX ORDER — SODIUM CHLORIDE, SODIUM GLUCONATE, SODIUM ACETATE, POTASSIUM CHLORIDE, MAGNESIUM CHLORIDE, SODIUM PHOSPHATE, DIBASIC, AND POTASSIUM PHOSPHATE .53; .5; .37; .037; .03; .012; .00082 G/100ML; G/100ML; G/100ML; G/100ML; G/100ML; G/100ML; G/100ML
125 INJECTION, SOLUTION INTRAVENOUS CONTINUOUS
Status: DISCONTINUED | OUTPATIENT
Start: 2022-11-21 | End: 2022-11-22

## 2022-11-21 RX ORDER — SODIUM CHLORIDE, SODIUM GLUCONATE, SODIUM ACETATE, POTASSIUM CHLORIDE, MAGNESIUM CHLORIDE, SODIUM PHOSPHATE, DIBASIC, AND POTASSIUM PHOSPHATE .53; .5; .37; .037; .03; .012; .00082 G/100ML; G/100ML; G/100ML; G/100ML; G/100ML; G/100ML; G/100ML
1000 INJECTION, SOLUTION INTRAVENOUS ONCE
Status: COMPLETED | OUTPATIENT
Start: 2022-11-21 | End: 2022-11-21

## 2022-11-21 RX ORDER — VANCOMYCIN HYDROCHLORIDE 1 G/200ML
15 INJECTION, SOLUTION INTRAVENOUS ONCE
Status: COMPLETED | OUTPATIENT
Start: 2022-11-21 | End: 2022-11-21

## 2022-11-21 RX ORDER — CEFEPIME HYDROCHLORIDE 2 G/50ML
2000 INJECTION, SOLUTION INTRAVENOUS ONCE
Status: COMPLETED | OUTPATIENT
Start: 2022-11-21 | End: 2022-11-21

## 2022-11-21 RX ORDER — ALBUMIN, HUMAN INJ 5% 5 %
25 SOLUTION INTRAVENOUS ONCE
Status: COMPLETED | OUTPATIENT
Start: 2022-11-21 | End: 2022-11-22

## 2022-11-21 RX ORDER — INSULIN LISPRO 100 [IU]/ML
1-5 INJECTION, SOLUTION INTRAVENOUS; SUBCUTANEOUS EVERY 6 HOURS SCHEDULED
Status: DISCONTINUED | OUTPATIENT
Start: 2022-11-22 | End: 2022-11-23 | Stop reason: HOSPADM

## 2022-11-21 RX ORDER — CEFEPIME HYDROCHLORIDE 1 G/50ML
1000 INJECTION, SOLUTION INTRAVENOUS EVERY 24 HOURS
Status: DISCONTINUED | OUTPATIENT
Start: 2022-11-22 | End: 2022-11-23 | Stop reason: HOSPADM

## 2022-11-21 RX ORDER — SODIUM CHLORIDE, SODIUM GLUCONATE, SODIUM ACETATE, POTASSIUM CHLORIDE, MAGNESIUM CHLORIDE, SODIUM PHOSPHATE, DIBASIC, AND POTASSIUM PHOSPHATE .53; .5; .37; .037; .03; .012; .00082 G/100ML; G/100ML; G/100ML; G/100ML; G/100ML; G/100ML; G/100ML
1121 INJECTION, SOLUTION INTRAVENOUS ONCE
Status: COMPLETED | OUTPATIENT
Start: 2022-11-21 | End: 2022-11-21

## 2022-11-21 RX ADMIN — CEFEPIME HYDROCHLORIDE 2000 MG: 2 INJECTION, SOLUTION INTRAVENOUS at 20:18

## 2022-11-21 RX ADMIN — SODIUM CHLORIDE, SODIUM GLUCONATE, SODIUM ACETATE, POTASSIUM CHLORIDE, MAGNESIUM CHLORIDE, SODIUM PHOSPHATE, DIBASIC, AND POTASSIUM PHOSPHATE 1121 ML: .53; .5; .37; .037; .03; .012; .00082 INJECTION, SOLUTION INTRAVENOUS at 20:20

## 2022-11-21 RX ADMIN — NOREPINEPHRINE BITARTRATE 1 MCG/MIN: 1 INJECTION INTRAVENOUS at 20:54

## 2022-11-21 RX ADMIN — ALBUMIN (HUMAN) 25 G: 12.5 INJECTION, SOLUTION INTRAVENOUS at 22:04

## 2022-11-21 RX ADMIN — SODIUM CHLORIDE, SODIUM GLUCONATE, SODIUM ACETATE, POTASSIUM CHLORIDE, MAGNESIUM CHLORIDE, SODIUM PHOSPHATE, DIBASIC, AND POTASSIUM PHOSPHATE 1000 ML: .53; .5; .37; .037; .03; .012; .00082 INJECTION, SOLUTION INTRAVENOUS at 19:31

## 2022-11-21 RX ADMIN — VANCOMYCIN HYDROCHLORIDE 1000 MG: 1 INJECTION, SOLUTION INTRAVENOUS at 21:07

## 2022-11-21 NOTE — DISCHARGE INSTRUCTIONS
Today you were found to have gall bladder stones, on your lab work your liver enzymes were elevated, injury total bilirubin  You were not having any pain related to this nor any vomiting, nausea  It is recommended that you follow-up with a GI specialist as an outpatient and we have placed a referral to one  This would need further workup including a possible MRCP as an outpatient to assess your gallbladder more  Please make an appointment for this

## 2022-11-21 NOTE — ED PROVIDER NOTES
History  Chief Complaint   Patient presents with   • Abdominal Pain     Pt comes from [facility], complaining of rlq abdominal pain x2 days  Taking oxy for recent surgery     The patient is an 20-year-old male with a past medical history of urinary retention, who presents emergency department today via EMS from [facility] for lower abdominal pain x 2 days  The patient states that he has been at [facility] around 3 weeks for rehab s/p right leg fracture repair  He states that he had postoperative urinary retention, Schaffer was kept intact  Was removed by Urology 5 days ago  Patient was given Percocet by the nursing facility around 5:00 p m  Today due to his abdominal pain is states that his abdominal pain has drastically improved  Patient states that his last bowel movement was this morning  States he is passing gas  Denies any fevers chills nausea vomiting, new falls or traumas back pain  Abdominal Pain  Pain location:  RLQ, suprapubic and LLQ  Pain radiates to:  Does not radiate  Pain severity:  Moderate  Duration:  2 days  Timing:  Constant  Progression:  Partially resolved  Chronicity:  New  Relieved by: oxy  Worsened by:  Palpation  Ineffective treatments: Bowel activity, position changes and movement  Associated symptoms: no anorexia, no belching, no chest pain, no chills, no constipation, no cough, no diarrhea, no dysuria, no fatigue, no fever, no hematuria, no nausea, no shortness of breath and no vomiting    Risk factors: recent hospitalization        Prior to Admission Medications   Prescriptions Last Dose Informant Patient Reported?  Taking?   acetaminophen (TYLENOL) 325 mg tablet   No No   Sig: Take 2 tablets (650 mg total) by mouth every 6 (six) hours as needed for mild pain or moderate pain   albuterol (ProAir HFA) 90 mcg/act inhaler   No No   Sig: Inhale 2 puffs every 6 (six) hours as needed for wheezing   amiodarone 200 mg tablet   Yes No   Sig: Take 200 mg by mouth daily   apixaban (ELIQUIS) 2 5 mg   No No   Sig: Take 2 5 mg daily through 11/8, then resume taking 2 5 mg twice daily   atorvastatin (LIPITOR) 40 mg tablet   Yes No   Sig: Take 40 mg by mouth daily   clopidogrel (PLAVIX) 75 mg tablet   No No   Sig: Take 1 tablet (75 mg total) by mouth daily Do not start before November 8, 2022  ferrous gluconate (FERGON) 324 mg tablet   No No   Sig: Take 1 tablet (324 mg total) by mouth daily before breakfast Do not start before November 7, 2022    furosemide (LASIX) 20 mg tablet   Yes No   Sig: Take 20 mg by mouth 2 (two) times a day   lidocaine (LIDODERM) 5 %   No No   Sig: Apply 1 patch topically daily Remove & Discard patch within 12 hours or as directed by MD   metoprolol tartrate (LOPRESSOR) 100 mg tablet   Yes No   Sig: Take 100 mg by mouth every 12 (twelve) hours   nitroglycerin (NITROSTAT) 0 4 mg SL tablet   Yes No   spironolactone (ALDACTONE) 25 mg tablet   No No   Sig: Take 1 tablet (25 mg total) by mouth daily   tamsulosin (FLOMAX) 0 4 mg   No No   Sig: Take 1 capsule (0 4 mg total) by mouth daily with dinner      Facility-Administered Medications: None       Past Medical History:   Diagnosis Date   • [facility] (cong[facility] failure) ([facility] )    • Coronary artery disease    • Hyperlipidemia    • Hypertension    • Paroxysmal atrial fibrillation ([facility] )        Past Surgical History:   Procedure Laterality Date   • CARDIAC SURGERY      cabg x 2 2014 [facility]   • CORONARY ANGIOPLASTY WITH STENT PLACEMENT  10/2021    DAPHNEY to left main and ramus   • MITRAL VALVE REPAIR  2014    mitral ring   • IN OPEN RX FEMUR FX+INTRAMED HALIMA Right 11/1/2022    Procedure: INSERTION NAIL IM FEMUR ANTEGRADE (TROCHANTERIC); Surgeon: Eber Rico; Location:  MAIN OR;  Service: Orthopedics       Family History   Problem Relation Age of Onset   • Hypertension Father      I have reviewed and agree with the history as documented      E-Cigarette/Vaping   • E-Cigarette Use Never User      E-Cigarette/Vaping Substances   • Nicotine No    • THC No    • CBD No    • Flavoring No    • Other No    • Unknown No      Social History     Tobacco Use   • Smoking status: Never   • Smokeless tobacco: Never   Vaping Use   • Vaping Use: Never used   Substance Use Topics   • Alcohol use: Not Currently   • Drug use: Never       Review of Systems   Constitutional: Negative for chills, fatigue and fever  HENT: Negative for ear pain  Eyes: Negative for pain and visual disturbance  Respiratory: Negative for cough, shortness of breath and stridor  Cardiovascular: Negative for chest pain and palpitations  Gastrointestinal: Positive for abdominal pain  Negative for anorexia, constipation, diarrhea, nausea and vomiting  Genitourinary: Negative for dysuria and hematuria  Musculoskeletal: Negative for arthralgias and back pain  Skin: Negative for color change and rash  Neurological: Negative for seizures and speech difficulty  All other systems reviewed and are negative  Physical Exam  Physical Exam  Vitals and nursing note reviewed  Constitutional:       General: He is not in acute distress  Appearance: He is well-developed  HENT:      Head: Normocephalic and atraumatic  Mouth/Throat:      Mouth: Mucous membranes are moist    Eyes:      Extraocular Movements: Extraocular movements intact  Conjunctiva/sclera: Conjunctivae normal       Pupils: Pupils are equal, round, and reactive to light  Cardiovascular:      Rate and Rhythm: Normal rate and regular rhythm  Heart sounds: Normal heart sounds  No murmur heard  Pulmonary:      Effort: Pulmonary effort is normal  No respiratory distress  Breath sounds: Normal breath sounds  Abdominal:      Palpations: Abdomen is soft  Tenderness: There is abdominal tenderness in the right lower quadrant, suprapubic area and left lower quadrant  There is no right CVA tenderness or left CVA tenderness  Hernia: No hernia is present  Musculoskeletal:         General: No swelling  Cervical back: Neck supple  Skin:     General: Skin is warm and dry  Capillary Refill: Capillary refill takes less than 2 seconds  Neurological:      General: No focal deficit present  Mental Status: He is alert and oriented to person, place, and time     Psychiatric:         Mood and Affect: Mood normal          Vital Signs  ED Triage Vitals   Temperature Pulse Respirations Blood Pressure SpO2   11/20/22 1849 11/20/22 1849 11/20/22 1849 11/20/22 1849 11/20/22 1849   97 5 °F (36 4 °C) 66 18 168/74 97 %      Temp src Heart Rate Source Patient Position - Orthostatic VS BP Location FiO2 (%)   -- 11/20/22 1849 11/20/22 1849 11/20/22 1930 --    Monitor Sitting Right arm       Pain Score       --                  Vitals:    11/20/22 1930 11/20/22 2000 11/20/22 2030 11/20/22 2200   BP: 148/65 159/67 148/65 147/61   Pulse: 70 76 72 69   Patient Position - Orthostatic VS: Lying Lying Lying Lying         Visual Acuity      ED Medications  Medications   sodium chloride 0 9 % bolus 500 mL (0 mL Intravenous Stopped 11/20/22 1957)   iodixanol (VISIPAQUE) 320 MG/ML injection 98 mL (98 mL Intravenous Given 11/20/22 1955)   cefTRIAXone (ROCEPHIN) IVPB (premix in dextrose) 1,000 mg 50 mL (0 mg Intravenous Stopped 11/20/22 2133)   potassium chloride (K-DUR,KLOR-CON) CR tablet 40 mEq (40 mEq Oral Given 11/20/22 2100)   amoxicillin-clavulanate (AUGMENTIN) 875-125 mg per tablet 1 tablet (1 tablet Oral Given 11/20/22 2152)       Diagnostic Studies  Results Reviewed     Procedure Component Value Units Date/Time    Comprehensive metabolic panel [[id number]]  (Abnormal) Collected: 11/20/22 2159    Lab Status: Final result Specimen: Blood from Arm, Left Updated: 11/20/22 2219     Sodium 138 mmol/L      Potassium 3 0 mmol/L      Chloride 104 mmol/L      CO2 25 mmol/L      ANION GAP 9 mmol/L      BUN 30 mg/dL      Creatinine 1 71 mg/dL      Glucose 186 mg/dL      Calcium 7 4 mg/dL      Corrected Calcium 8 8 mg/dL       U/L       U/L      Alkaline Phosphatase 717 U/L      Total Protein 5 8 g/dL      Albumin 2 2 g/dL      Total Bilirubin 3 48 mg/dL      eGFR 36 ml/min/1 73sq m     Narrative:      [place] guidelines for Chronic Kidney Disease (CKD):   •  Stage 1 with normal or high GFR (GFR > 90 mL/min/1 73 square meters)  •  Stage 2 Mild CKD (GFR = 60-89 mL/min/1 73 square meters)  •  Stage 3A Moderate CKD (GFR = 45-59 mL/min/1 73 square meters)  •  Stage 3B Moderate CKD (GFR = 30-44 mL/min/1 73 square meters)  •  Stage 4 Severe CKD (GFR = 15-29 mL/min/1 73 square meters)  •  Stage 5 End Stage CKD (GFR <15 mL/min/1 73 square meters)  Note: GFR calculation is accurate only with a steady state creatinine    Urine Microscopic [[id number]]  (Abnormal) Collected: 11/20/22 1957    Lab Status: Final result Specimen: Urine, Clean Catch Updated: 11/20/22 2039     RBC, UA 30-50 /hpf      WBC, UA Innumerable /hpf      Epithelial Cells Occasional /hpf      Bacteria, UA Moderate /hpf     Urine culture [[id number]] Collected: 11/20/22 1957    Lab Status: In process Specimen: Urine, Clean Catch Updated: 11/20/22 2039    UA w Reflex to Microscopic w Reflex to Culture [[id number]]  (Abnormal) Collected: 11/20/22 1957    Lab Status: Final result Specimen: Urine, Clean Catch Updated: 11/20/22 2022     Color, UA Luz     Clarity, UA Cloudy     Specific [place], UA 1 020     pH, UA 5 5     Leukocytes, UA Large     Nitrite, UA Negative     Protein, UA 30 (1+) mg/dl      Glucose, UA Negative mg/dl      Ketones, UA Negative mg/dl      Urobilinogen, UA 2 0 E U /dl      Bilirubin, UA Small     Occult Blood, UA Large    Lactic acid [[id number]]  (Normal) Collected: 11/20/22 1859    Lab Status: Final result Specimen: Blood from Arm, Left Updated: 11/20/22 1925     LACTIC ACID 1 5 mmol/L     Narrative:      Result may be elevated if tourniquet was used during collection  Comprehensive metabolic panel [[id number]]  (Abnormal) Collected: 11/20/22 1859    Lab Status: Final result Specimen: Blood from Arm, Left Updated: 11/20/22 1924     Sodium 139 mmol/L      Potassium 3 4 mmol/L      Chloride 104 mmol/L      CO2 28 mmol/L      ANION GAP 7 mmol/L      BUN 31 mg/dL      Creatinine 1 71 mg/dL      Glucose 190 mg/dL      Calcium 7 8 mg/dL      Corrected Calcium 9 0 mg/dL       U/L       U/L      Alkaline Phosphatase 676 U/L      Total Protein 6 5 g/dL      Albumin 2 5 g/dL      Total Bilirubin 3 42 mg/dL      eGFR 36 ml/min/1 73sq m     Narrative:      National Kid[employer] Disease Foundation guidelines for Chronic Kidney Disease (CKD):   •  Stage 1 with normal or high GFR (GFR > 90 mL/min/1 73 square meters)  •  Stage 2 Mild CKD (GFR = 60-89 mL/min/1 73 square meters)  •  Stage 3A Moderate CKD (GFR = 45-59 mL/min/1 73 square meters)  •  Stage 3B Moderate CKD (GFR = 30-44 mL/min/1 73 square meters)  •  Stage 4 Severe CKD (GFR = 15-29 mL/min/1 73 square meters)  •  Stage 5 End Stage CKD (GFR <15 mL/min/1 73 square meters)  Note: GFR calculation is accurate only with a steady state creatinine    Magnesium [[id number]]  (Normal) Collected: 11/20/22 1859    Lab Status: Final result Specimen: Blood from Arm, Left Updated: 11/20/22 1924     Magnesium 1 9 mg/dL     Lipase [[id number]]  (Normal) Collected: 11/20/22 1859    Lab Status: Final result Specimen: Blood from Arm, Left Updated: 11/20/22 1924     Lipase 364 u/L     CBC and differential [[id number]]  (Abnormal) Collected: 11/20/22 1859    Lab Status: Final result Specimen: Blood from Arm, Left Updated: 11/20/22 1907     WBC 12 72 Thousand/uL      RBC 2 89 Million/uL      Hemoglobin 8 7 g/dL      Hematocrit 27 8 %      MCV 96 fL      MCH 30 1 pg      MCHC 31 3 g/dL      RDW 16 4 %      MPV 9 8 fL      Platelets 889 Thousands/uL      nRBC 0 /100 WBCs      Neutrophils Relative 89 %      Immat GRANS % 1 %      Lymphocytes Relative 3 % Monocytes Relative 7 %      Eosinophils Relative 0 %      Basophils Relative 0 %      Neutrophils Absolute 11 38 Thousands/µL      Immature Grans Absolute 0 09 Thousand/uL      Lymphocytes Absolute 0 32 Thousands/µL      Monocytes Absolute 0 92 Thousand/µL      Eosinophils Absolute 0 00 Thousand/µL      Basophils Absolute 0 01 Thousands/µL                  CT abdomen pelvis with contrast   Final Result by Tisha Ferguson MD (11/20 2025)      No specific findings to account for left lower quadrant pain  There is colonic diverticulosis, but no acute inflammation is seen  Gallbladder is distended and contains multiple gallstones  No obvious inflammatory changes in that location although correlation for any tenderness or right upper quadrant pain or fever is advised  Chronic left pleural effusion and postop changes of the heart similar to the prior study  Evidence of old rib injuries and perhaps a more acute right 12th rib injury  Correlate for any recent trauma or pain or tenderness in the region  The study was [facility] in [facility] for immediate notification  Workstation performed: [id number]                    Procedures  Procedures         ED Course  ED Course as of 11/20/22 2228   Sun Nov 20, 2022   Amey Peabody Creatinine(!): 1 71  1 6-1 8   1943 Patient's bladder scan upon arrival revealed 150 cc     1951 Hemoglobin(!): 8 7  Was 7 7 previously    2017 Put out 350 and bladder scan reveals 150 retention                                             MDM  Number of Diagnoses or Management Options  Cholelithiases  Hypokalemia  Total bilirubin, elevated  Transaminitis  UTI (urinary tract infection)  Diagnosis management comments: Previous urine culture grew out Klebsiella which was pansensitive, the patient Augmentin  One dose of Rocephin here in the emergency department  Found have slightly low potassium and gave prescription for potassium supplements x3 days      Patient on CT scan noted to have distended gallbladder with multiple gallstones  The patient did not have nausea vomiting, jaundice, difficulty with eating, right upper quadrant abdominal pain  Today's symptoms were lower abdominal pain, found have urinary tract infection  This was an incidental finding of the multiple gallstones that has been noted on previous CT scans  Today however patient's lab work illustrated a transaminitis with an elevated bilirubin of 3 4  Did briefly discuss with Dr Shoaib Holder  Due to this being more of a incidental finding today due to the patient's other symptoms evaluation, it was recommended to the nursing facility upon discharge that the patient follow-up with a GI specialist as an outpatient for further evaluation of his gallbladder, possible MRCP  Would recommend trending liver enzymes in patient's symptoms  Given stat referral to GI specialist upon today's discharge  Patient resting comfortably in no acute distress         Amount and/or Complexity of Data Reviewed  Clinical lab tests: ordered and reviewed  Tests in the radiology section of CPT®: ordered and reviewed  Decide to obtain previous medical records or to obtain history from someone other than the patient: yes  Review and summarize past medical records: yes  Discuss the patient with other providers: yes  Independent visualization of images, tracings, or specimens: yes    Risk of Complications, Morbidity, and/or Mortality  Presenting problems: moderate  Diagnostic procedures: moderate  Management options: moderate    Patient Progress  Patient progress: stable      Disposition  Final diagnoses:   UTI (urinary tract infection)   Hypokalemia   Cholelithiases   Transaminitis   Total bilirubin, elevated     Time reflects when diagnosis was documented in both MDM as applicable and the Disposition within this note     Time User Action Codes Description Comment    11/20/2022  8:37 PM Maynard Parents Add [N39 0] UTI (urinary tract infection)     11/20/2022 9:28 PM Belle Breslow Add [E87 6] Hypokalemia     11/20/2022  9:54 PM Belle Breslow Add [K80 20] Cholelithiases     11/20/2022 10:00 PM Belle Breslow Add [R74 01] Transaminitis     11/20/2022 10:00 PM Belle Breslow Add [R17] Total bilirubin, elevated       ED Disposition     ED Disposition   Discharge    Condition   Stable    Date/Time   Sun Nov 20, 2022  8:37 PM    Comment   Pilar Garcia discharge to home/self care                 Follow-up Information     Follow up With Specialties Details Why [address] [place] [address],  Gastroenterology Schedule an appointment as soon as possible for a visit   [address]  [address] [facility]  [phone]            Patient's Medications   Discharge Prescriptions    AMOXICILLIN-CLAVULANATE (AUGMENTIN) 875-125 MG PER TABLET    Take 1 tablet by mouth every 12 (twelve) hours for 7 days       Start Date: 11/20/2022End Date: 11/27/2022       Order Dose: 1 tablet       Quantity: 14 tablet    Refills: 0    POTASSIUM CHLORIDE (K-DUR,KLOR-CON) 20 MEQ TABLET    Take 1 tablet (20 mEq total) by mouth 2 (two) times a day for 3 days       Start Date: 11/20/2022End Date: 11/23/2022       Order Dose: 20 mEq       Quantity: 6 tablet    Refills: 0           PDMP Review       Value Time User    PDMP Reviewed  Yes 3/5/2022 11:42 AM Jonny Rodriguez MD          ED Provider  Electronically Signed by           Matt Bliss PA-C  11/20/22 2139       Matt Bliss PA-C  11/20/22 2229

## 2022-11-21 NOTE — ED PROVIDER NOTES
History  Chief Complaint   Patient presents with   • Hypotension     Pt discharged from this facility at [phone], per nursing facility pt having increased lethargy and hypotension        History provided by:  Patient, EMS personnel and medical records  Fatigue  Severity:  Moderate  Onset quality:  Gradual  Duration:  8 hours  Timing:  Constant  Progression:  Worsening  Chronicity:  New  Context: change in medication, recent infection and urinary tract infection    Relieved by:  Nothing  Worsened by:  Nothing  Ineffective treatments:  None tried  Associated symptoms: abdominal pain, lethargy and shortness of breath    Associated symptoms: no chest pain, no cough, no diarrhea, no dizziness, no falls, no fever, no frequency, no hematochezia, no loss of consciousness, no melena, no myalgias, no nausea, no near-syncope, no syncope, no urgency and no vomiting      14-year-old male  Resides at a skilled nursing facility  Recently diagnosed with urinary tract infection  Incidentally, the patient's LFTs were elevated  CT imaging showed cholelithiasis without signs of cholecystitis/obstruction  The case discussed with General surgery who recommended urgent GI follow-up being that the patient has right upper quadrant was nontender  Per staff at the nursing facility, the patient has had increased lethargy over the course of the last few hours  At one point, the patient's blood pressure was 75/40  Upon arrival in the ED, the patient's blood pressure is 154/90  Prior to Admission Medications   Prescriptions Last Dose Informant Patient Reported?  Taking?   acetaminophen (TYLENOL) 325 mg tablet   No No   Sig: Take 2 tablets (650 mg total) by mouth every 6 (six) hours as needed for mild pain or moderate pain   albuterol (ProAir HFA) 90 mcg/act inhaler   No No   Sig: Inhale 2 puffs every 6 (six) hours as needed for wheezing   amiodarone 200 mg tablet   Yes No   Sig: Take 200 mg by mouth daily   amoxicillin-clavulanate (AUGMENTIN) 875-125 mg per tablet   No No   Sig: Take 1 tablet by mouth every 12 (twelve) hours for 7 days   apixaban (ELIQUIS) 2 5 mg   No No   Sig: Take 2 5 mg daily through 11/8, then resume taking 2 5 mg twice daily   atorvastatin (LIPITOR) 40 mg tablet   Yes No   Sig: Take 40 mg by mouth daily   clopidogrel (PLAVIX) 75 mg tablet   No No   Sig: Take 1 tablet (75 mg total) by mouth daily Do not start before November 8, 2022  ferrous gluconate (FERGON) 324 mg tablet   No No   Sig: Take 1 tablet (324 mg total) by mouth daily before breakfast Do not start before November 7, 2022    furosemide (LASIX) 20 mg tablet   Yes No   Sig: Take 20 mg by mouth 2 (two) times a day   lidocaine (LIDODERM) 5 %   No No   Sig: Apply 1 patch topically daily Remove & Discard patch within 12 hours or as directed by MD   metoprolol tartrate (LOPRESSOR) 100 mg tablet   Yes No   Sig: Take 100 mg by mouth every 12 (twelve) hours   nitroglycerin (NITROSTAT) 0 4 mg SL tablet   Yes No   potassium chloride (K-DUR,KLOR-CON) 20 mEq tablet   No No   Sig: Take 1 tablet (20 mEq total) by mouth 2 (two) times a day for 3 days   spironolactone (ALDACTONE) 25 mg tablet   No No   Sig: Take 1 tablet (25 mg total) by mouth daily   tamsulosin (FLOMAX) 0 4 mg   No No   Sig: Take 1 capsule (0 4 mg total) by mouth daily with dinner      Facility-Administered Medications: None       Past Medical History:   Diagnosis Date   • CHF (congestive heart failure) (HCC)    • Coronary artery disease    • Hyperlipidemia    • Hypertension    • Paroxysmal atrial fibrillation (HCC)        Past Surgical History:   Procedure Laterality Date   • CARDIAC SURGERY      cabg x 2 2014 [facility]   • CORONARY ANGIOPLASTY WITH STENT PLACEMENT  10/2021    DAPHNEY to left main and ramus   • MITRAL VALVE REPAIR  2014    mitral ring   • AL OPEN RX FEMUR FX+INTRAMED HALIMA Right 11/1/2022    Procedure: INSERTION NAIL IM FEMUR ANTEGRADE (TROCHANTERIC); Surgeon: Shadi Santiago;   Location: [facility];  Service: Orthopedics       Family History   Problem Relation Age of Onset   • Hypertension Father      I have reviewed and agree with the history as documented  E-Cigarette/Vaping   • E-Cigarette Use Never User      E-Cigarette/Vaping Substances   • Nicotine No    • THC No    • CBD No    • Flavoring No    • Other No    • Unknown No      Social History     Tobacco Use   • Smoking status: Never   • Smokeless tobacco: Never   Vaping Use   • Vaping Use: Never used   Substance Use Topics   • Alcohol use: Not Currently   • Drug use: Never     Review of Systems   Unable to perform ROS: Acuity of condition   Constitutional: Positive for activity change, appetite change, chills and fatigue  Negative for fever  HENT: Negative for congestion, rhinorrhea, sinus pressure, sinus pain and sore throat  Respiratory: Positive for shortness of breath  Negative for cough, chest tightness and wheezing  Cardiovascular: Negative for chest pain, palpitations, syncope and near-syncope  Gastrointestinal: Positive for abdominal pain  Negative for abdominal distention, diarrhea, hematochezia, melena, nausea and vomiting  Genitourinary: Positive for flank pain  Negative for decreased urine volume, difficulty urinating, frequency and urgency  Musculoskeletal: Negative for back pain, falls, myalgias, neck pain and neck stiffness  Skin: Positive for color change and pallor  Negative for rash and wound  Neurological: Negative for dizziness, loss of consciousness, syncope, weakness, light-headedness and numbness  Hematological: Bruises/bleeds easily  Psychiatric/Behavioral: Negative for agitation, confusion and decreased concentration  Physical Exam  Physical Exam  Vitals and nursing note reviewed  Constitutional:       General: He is in acute distress  Appearance: He is ill-appearing  He is not toxic-appearing  HENT:      Head: Normocephalic and atraumatic        Right Ear: External ear normal  There is no impacted cerumen  Left Ear: External ear normal  There is no impacted cerumen  Nose: No congestion or rhinorrhea  Mouth/Throat:      Mouth: Mucous membranes are dry  Pharynx: No oropharyngeal exudate or posterior oropharyngeal erythema  Eyes:      General: Scleral icterus present  Right eye: No discharge  Left eye: No discharge  Extraocular Movements: Extraocular movements intact  Pupils: Pupils are equal, round, and reactive to light  Cardiovascular:      Rate and Rhythm: Regular rhythm  Tachycardia present  Pulses: Normal pulses  Heart sounds: No murmur heard  Pulmonary:      Effort: Respiratory distress present  Breath sounds: No stridor  No wheezing, rhonchi or rales  Comments: Bibasilar decreased breath sounds  Chest:      Chest wall: No tenderness  Abdominal:      General: Bowel sounds are normal  There is no distension  Palpations: Abdomen is soft  Tenderness: There is abdominal tenderness  There is no right CVA tenderness, left CVA tenderness, guarding or rebound  Comments: Mild tenderness to palpation along the lower abdomen patent  No tenderness along the right upper quadrant/epigastrium  Musculoskeletal:         General: No swelling or tenderness  Normal range of motion  Cervical back: Neck supple  No tenderness  Right lower leg: Edema present  Left lower leg: Edema present  Skin:     General: Skin is warm and dry  Capillary Refill: Capillary refill takes 2 to 3 seconds  Coloration: Skin is not jaundiced or pale  Findings: No bruising, erythema, lesion or rash  Neurological:      General: No focal deficit present  Mental Status: He is alert and oriented to person, place, and time  Mental status is at baseline  Cranial Nerves: No cranial nerve deficit     Psychiatric:         Mood and Affect: Mood normal          Behavior: Behavior normal        Vital Signs  ED Triage Vitals [11/21/22 1838]   Temperature Pulse Respirations Blood Pressure SpO2   99 3 °F (37 4 °C) 60 20 154/89 99 %      Temp Source Heart Rate Source Patient Position - Orthostatic VS BP Location FiO2 (%)   Temporal Monitor Sitting Left arm --      Pain Score       --           Vitals:    11/21/22 2015 11/21/22 2030 11/21/22 2100 11/21/22 2115   BP: (!) 86/48 (!) 77/43 92/53 (!) 79/45   Pulse: 93 91 98 95   Patient Position - Orthostatic VS:         ED Medications  Medications   norepinephrine (LEVOPHED) 4 mg (STANDARD CONCENTRATION) IV in sodium chloride 0 9% 250 mL (6 mcg/min Intravenous Rate/Dose Change 11/21/22 2120)   vancomycin (VANCOCIN) IVPB (premix in dextrose) 1,000 mg 200 mL (1,000 mg Intravenous New Bag 11/21/22 2107)   cefepime (MAXIPIME) IVPB (premix in dextrose) 1,000 mg 50 mL (has no administration in time range)   multi-electrolyte (ISOLYTE-S PH 7 4) bolus 1,000 mL (0 mL Intravenous Stopped 11/21/22 2018)   multi-electrolyte (ISOLYTE-S PH 7 4) bolus 1,121 mL (0 mL Intravenous Stopped 11/21/22 2114)   cefepime (MAXIPIME) IVPB (premix in dextrose) 2,000 mg 50 mL (0 mg Intravenous Stopped 11/21/22 2031)     Diagnostic Studies  Results Reviewed     Procedure Component Value Units Date/Time    HS Troponin I 2hr [[id number]]     Lab Status: No result Specimen: Blood     HS Troponin I 4hr [[id number]]     Lab Status: No result Specimen: Blood     HS Troponin 0hr (reflex protocol) [[id number]]  (Abnormal) Collected: 11/21/22 1923    Lab Status: Final result Specimen: Blood from Arm, Left Updated: 11/21/22 2100     hs TnI 0hr 145 ng/L     UA w Reflex to Microscopic w Reflex to Culture [[id number]]     Lab Status: No result Specimen: Urine     Procalcitonin [[id number]]  (Abnormal) Collected: 11/21/22 1923    Lab Status: Final result Specimen: Blood from Arm, Left Updated: 11/21/22 2053     Procalcitonin 150 57 ng/ml     Protime-INR [[id number]]  (Abnormal) Collected: 11/21/22 1923    Lab Status: Final result Specimen: Blood from Arm, Left Updated: 11/21/22 2009     Protime 21 4 seconds      INR 1 85    Manual Differential(PHLEBS Do Not Order) [[id number]]  (Abnormal) Collected: 11/21/22 1923    Lab Status: Final result Specimen: Blood from Arm, Left Updated: 11/21/22 1955     Segmented % 90 %      Bands % 6 %      Lymphocytes % 1 %      Monocytes % 3 %      Eosinophils, % 0 %      Basophils % 0 %      Absolute Neutrophils 17 40 Thousand/uL      Lymphocytes Absolute 0 18 Thousand/uL      Monocytes Absolute 0 54 Thousand/uL      Eosinophils Absolute 0 00 Thousand/uL      Basophils Absolute 0 00 Thousand/uL      Total Counted --     Toxic Vacuolated Neutrophils Present     Macrocytes Present     Polychromasia Present     Platelet Estimate Adequate    Lactic acid, plasma [[id number]]  (Abnormal) Collected: 11/21/22 1923    Lab Status: Final result Specimen: Blood from Arm, Left Updated: 11/21/22 1954     LACTIC ACID 10 0 mmol/L     Narrative:      Result may be elevated if tourniquet was used during collection      Lactic acid 2 Hours [[id number]]     Lab Status: No result Specimen: Blood     Basic metabolic panel [[id number]]  (Abnormal) Collected: 11/21/22 1923    Lab Status: Final result Specimen: Blood from Arm, Left Updated: 11/21/22 1948     Sodium 144 mmol/L      Potassium 3 9 mmol/L      Chloride 107 mmol/L      CO2 16 mmol/L      ANION GAP 21 mmol/L      BUN 42 mg/dL      Creatinine 3 19 mg/dL      Glucose 71 mg/dL      Calcium 7 8 mg/dL      eGFR 17 ml/min/1 73sq m     Narrative:      [place] guidelines for Chronic Kidney Disease (CKD):   •  Stage 1 with normal or high GFR (GFR > 90 mL/min/1 73 square meters)  •  Stage 2 Mild CKD (GFR = 60-89 mL/min/1 73 square meters)  •  Stage 3A Moderate CKD (GFR = 45-59 mL/min/1 73 square meters)  •  Stage 3B Moderate CKD (GFR = 30-44 mL/min/1 73 square meters)  •  Stage 4 Severe CKD (GFR = 15-29 mL/min/1 73 square meters)  •  Stage 5 End Stage CKD (GFR <15 mL/min/1 73 square meters)  Note: GFR calculation is accurate only with a steady state creatinine    Hepatic function panel [[id number]]  (Abnormal) Collected: 11/21/22 1923    Lab Status: Final result Specimen: Blood from Arm, Left Updated: 11/21/22 1948     Total Bilirubin 4 36 mg/dL      Bilirubin, Direct 3 24 mg/dL      Alkaline Phosphatase 785 U/L       U/L       U/L      Total Protein 6 1 g/dL      Albumin 2 2 g/dL     Lipase [[id number]]  (Normal) Collected: 11/21/22 1923    Lab Status: Final result Specimen: Blood from Arm, Left Updated: 11/21/22 1948     Lipase 95 u/L     Magnesium [[id number]]  (Normal) Collected: 11/21/22 1923    Lab Status: Final result Specimen: Blood from Arm, Left Updated: 11/21/22 1948     Magnesium 1 8 mg/dL     CBC and differential [[id number]]  (Abnormal) Collected: 11/21/22 1923    Lab Status: Final result Specimen: Blood from Arm, Left Updated: 11/21/22 1934     WBC 18 12 Thousand/uL      RBC 2 98 Million/uL      Hemoglobin 9 1 g/dL      Hematocrit 30 1 %       fL      MCH 30 5 pg      MCHC 30 2 g/dL      RDW 18 0 %      MPV 10 8 fL      Platelets 669 Thousands/uL     Narrative: This is an appended report  These results have been appended to a previously verified report  Blood gas, venous [[id number]]  (Abnormal) Collected: 11/21/22 1923    Lab Status: Final result Specimen: Blood from Arm, Left Updated: 11/21/22 1932     pH, Marty 7 237     pCO2, Marty 33 0 mm Hg      pO2, Marty 52 5 mm Hg      HCO3, Marty 13 7 mmol/L      Base Excess, Marty -12 6 mmol/L      O2 Content, Marty 11 3 ml/dL      O2 HGB, VENOUS 78 4 %     Blood culture #2 [[id number]] Collected: 11/21/22 1923    Lab Status: In process Specimen: Blood from Hand, Left Updated: 11/21/22 1928    Blood culture #1 [[id number]] Collected: 11/21/22 1923    Lab Status:  In process Specimen: Blood from Arm, Left Updated: 11/21/22 1928             CT chest abdomen pelvis wo contrast   Final Result by Bernice Chaves [facility] (11/21 2106)      No definite acute inflammatory process identified  Cholelithiasis without CT evidence of acute cholecystitis  Moderate left pleural effusion  Mild scattered groundglass opacities in the lungs, favored to represent areas of subsegmental atelectasis, however, superimposed pneumonia cannot be entirely excluded  Additional findings, as described  Workstation performed: [id number]         XR chest 1 view portable    (Results Pending)          Procedures  ECG 12 Lead Documentation Only    Date/Time: 11/21/2022 8:43 PM  Performed by: Hanna Grant DO  Authorized by: Hanna Grant DO     Indications / Diagnosis:  Septic shock  Patient location:  ED  Previous ECG:     Previous ECG:  Unavailable  Interpretation:     Interpretation: abnormal    Rate:     ECG rate:  88    ECG rate assessment: normal    Rhythm:     Rhythm: sinus rhythm    Ectopy:     Ectopy: none    QRS:     QRS axis:  Normal    QRS intervals:  Normal  Conduction:     Conduction: normal    ST segments:     ST segments:  Abnormal    Elevation:  AVR    Depression:  I, II, aVF, V3, V4, V5 and V6  CriticalCare Time  Performed by: Hanna Grant DO  Authorized by:  Hanna Grant DO     Critical care provider statement:     Critical care time (minutes):  75    Critical care time was exclusive of:  Separately billable procedures and treating other patients and teaching time    Critical care was necessary to treat or prevent imminent or life-threatening deterioration of the following conditions:  Shock, sepsis, respiratory failure, dehydration, circulatory failure, hepatic failure and metabolic crisis    Critical care was time spent personally by me on the following activities:  Blood draw for specimens, obtaining history from patient or surrogate, development of treatment plan with patient or surrogate, discussions with consultants, evaluation of patient's response to treatment, examination of patient, review of old charts, re-evaluation of patient's condition, ordering and review of radiographic studies, ordering and review of laboratory studies and ordering and performing treatments and interventions      ED Course  ED Course as of 11/21/22 2125   [facility] Nov 21, 2022   1931 Leukocytosis  Elevated compared to yesterday's value  2006 Mild uptrend of LFTs, bilirubin  VBG significant for a metabolic acidosis  Lactic acid 10 0  BMP significant for acute kidney injury  No other significant electrolyte abnormalities  2009 Last blood pressure 90/43  Will call sepsis alert and order IV cefepime  The patient is currently at CT scan  2032 Upon re-evaluation, the patient appears lethargic but is aroused by verbal stimuli  Obeys commands  Not confused  GCS 14    2042 Repeat blood pressure 80/53  Patient continues to be easily aroused with verbal stimuli  Once awake, mental status is normal   Will empirically begin Levophed for treatment of septic shock  2045 ECG showing signs of global ischemia with diffuse ST depressions  No signs of STEMI at this time  Diffuse depressions likely secondary to demand ischemia secondary to sepsis/septic shock  2054 [facility] contacted   2108 No definitive inflammatory process noted in the chest/abdomen/pelvis  2119 Critical care medicine evaluated the patient at bedside  Will admit to the ICU        MDM    Disposition  Final diagnoses:   Septic shock ([address] )   Transaminitis   NSTEMI (non-ST elevated myocardial infarction) ([facility] [address] )   Acute kidney injury ([facility] [address] )   Lactic acidosis     Time reflects when diagnosis was documented in both MDM as applicable and the Disposition within this note     Time User Action Codes Description Comment    11/21/2022  9:14 PM Donnita Ivory Add [A41 9,  R65 21] Septic shock ([address] )     11/21/2022  9:14 PM Donnita Ivory Add [R74 01] Transaminitis     11/21/2022  9:15 PM Hal[place]a, Peg Southern Add [I21 4] NSTEMI (non-ST elevated myocardial infarction) ([facility] [address] )     11/21/2022  9:15 PM [place] [employer] Add [N17 9] Acute kidney injury ([facility] [address] )     11/21/2022  9:16 PM [place] [employer] Add [E87 20] Lactic acidosis       ED Disposition     ED Disposition   Admit    Condition   Stable    Date/Time   Mon Nov 21, 2022  9:14 PM    Comment   Case was discussed with Dr Rhett Cordova and the patient's admission status was agreed to be Admission Status: inpatient status to the service of Dr Rhett Cordova   Follow-up Information    None         Patient's Medications   Discharge Prescriptions    No medications on file       No discharge procedures on file      PDMP Review       Value Time User    PDMP Reviewed  Yes 3/5/2022 11:42 AM Jana Skaggs MD          ED Provider  Electronically Signed by           Nadine Wills DO  11/21/22 2129

## 2022-11-21 NOTE — ED NOTES
This RN assumed care of patient at 2300  Patient set for discharge  MD made aware repeat K+ came back at 3 0  20 meq IV K+ ordered by provider and to be administered  Patient resting comfortably at this time w/ call bell and urinal in reach and no requests        Becki Victoria RN  11/20/22 1665

## 2022-11-21 NOTE — ED NOTES
Patient moved into a hospital bed for comfort       Windy Prakash RN  11/21/22 698 Brandon Dudley RN  11/21/22 6533

## 2022-11-22 PROBLEM — S91.301A WOUND OF RIGHT FOOT: Chronic | Status: ACTIVE | Noted: 2022-01-01

## 2022-11-22 LAB
4HR DELTA HS TROPONIN: 21 NG/L
ABO GROUP BLD: NORMAL
ALBUMIN SERPL BCP-MCNC: 2.1 G/DL (ref 3.5–5)
ALP SERPL-CCNC: 460 U/L (ref 46–116)
ALT SERPL W P-5'-P-CCNC: 221 U/L (ref 12–78)
ANION GAP SERPL CALCULATED.3IONS-SCNC: 11 MMOL/L (ref 4–13)
ANION GAP SERPL CALCULATED.3IONS-SCNC: 11 MMOL/L (ref 4–13)
AST SERPL W P-5'-P-CCNC: 212 U/L (ref 5–45)
ATRIAL RATE: 84 BPM
BACTERIA UR QL AUTO: ABNORMAL /HPF
BILIRUB SERPL-MCNC: 4.88 MG/DL (ref 0.2–1)
BILIRUB UR QL STRIP: ABNORMAL
BLD GP AB SCN SERPL QL: NEGATIVE
BUN SERPL-MCNC: 41 MG/DL (ref 5–25)
BUN SERPL-MCNC: 50 MG/DL (ref 5–25)
CA-I BLD-SCNC: 0.95 MMOL/L (ref 1.12–1.32)
CALCIUM ALBUM COR SERPL-MCNC: 8.2 MG/DL (ref 8.3–10.1)
CALCIUM SERPL-MCNC: 6.7 MG/DL (ref 8.3–10.1)
CALCIUM SERPL-MCNC: 7.2 MG/DL (ref 8.3–10.1)
CARDIAC TROPONIN I PNL SERPL HS: 1176 NG/L (ref 8–18)
CARDIAC TROPONIN I PNL SERPL HS: 1414 NG/L (ref 8–18)
CARDIAC TROPONIN I PNL SERPL HS: 1431 NG/L (ref 8–18)
CARDIAC TROPONIN I PNL SERPL HS: 1434 NG/L (ref 8–18)
CARDIAC TROPONIN I PNL SERPL HS: 166 NG/L
CARDIAC TROPONIN I PNL SERPL HS: 534 NG/L (ref 8–18)
CHLORIDE SERPL-SCNC: 104 MMOL/L (ref 96–108)
CHLORIDE SERPL-SCNC: 105 MMOL/L (ref 96–108)
CLARITY UR: ABNORMAL
CO2 SERPL-SCNC: 21 MMOL/L (ref 21–32)
CO2 SERPL-SCNC: 23 MMOL/L (ref 21–32)
COLOR UR: ABNORMAL
CREAT SERPL-MCNC: 2.68 MG/DL (ref 0.6–1.3)
CREAT SERPL-MCNC: 2.73 MG/DL (ref 0.6–1.3)
ERYTHROCYTE [DISTWIDTH] IN BLOOD BY AUTOMATED COUNT: 17.6 % (ref 11.6–15.1)
ERYTHROCYTE [DISTWIDTH] IN BLOOD BY AUTOMATED COUNT: 17.7 % (ref 11.6–15.1)
EST. AVERAGE GLUCOSE BLD GHB EST-MCNC: 85 MG/DL
GFR SERPL CREATININE-BSD FRML MDRD: 20 ML/MIN/1.73SQ M
GFR SERPL CREATININE-BSD FRML MDRD: 21 ML/MIN/1.73SQ M
GLUCOSE SERPL-MCNC: 113 MG/DL (ref 65–140)
GLUCOSE SERPL-MCNC: 124 MG/DL (ref 65–140)
GLUCOSE SERPL-MCNC: 128 MG/DL (ref 65–140)
GLUCOSE SERPL-MCNC: 78 MG/DL (ref 65–140)
GLUCOSE SERPL-MCNC: 85 MG/DL (ref 65–140)
GLUCOSE SERPL-MCNC: 89 MG/DL (ref 65–140)
GLUCOSE SERPL-MCNC: 90 MG/DL (ref 65–140)
GLUCOSE UR STRIP-MCNC: NEGATIVE MG/DL
HBA1C MFR BLD: 4.6 %
HCT VFR BLD AUTO: 19.3 % (ref 36.5–49.3)
HCT VFR BLD AUTO: 26.7 % (ref 36.5–49.3)
HCT VFR BLD AUTO: 27 % (ref 36.5–49.3)
HCT VFR BLD AUTO: 28.1 % (ref 36.5–49.3)
HGB BLD-MCNC: 6.1 G/DL (ref 12–17)
HGB BLD-MCNC: 8.6 G/DL (ref 12–17)
HGB BLD-MCNC: 8.7 G/DL (ref 12–17)
HGB BLD-MCNC: 9 G/DL (ref 12–17)
HGB UR QL STRIP.AUTO: ABNORMAL
KETONES UR STRIP-MCNC: NEGATIVE MG/DL
LACTATE SERPL-SCNC: 1.5 MMOL/L (ref 0.5–2)
LACTATE SERPL-SCNC: 2.6 MMOL/L (ref 0.5–2)
LACTATE SERPL-SCNC: 5.3 MMOL/L (ref 0.5–2)
LEUKOCYTE ESTERASE UR QL STRIP: ABNORMAL
MAGNESIUM SERPL-MCNC: 2.3 MG/DL (ref 1.6–2.6)
MCH RBC QN AUTO: 30.2 PG (ref 26.8–34.3)
MCH RBC QN AUTO: 30.5 PG (ref 26.8–34.3)
MCHC RBC AUTO-ENTMCNC: 31.6 G/DL (ref 31.4–37.4)
MCHC RBC AUTO-ENTMCNC: 32.2 G/DL (ref 31.4–37.4)
MCV RBC AUTO: 94 FL (ref 82–98)
MCV RBC AUTO: 97 FL (ref 82–98)
NITRITE UR QL STRIP: NEGATIVE
NON-SQ EPI CELLS URNS QL MICRO: ABNORMAL /HPF
NRBC BLD AUTO-RTO: 0 /100 WBCS
P AXIS: 92 DEGREES
PH UR STRIP.AUTO: 5 [PH]
PHOSPHATE SERPL-MCNC: 5.5 MG/DL (ref 2.3–4.1)
PLATELET # BLD AUTO: 117 THOUSANDS/UL (ref 149–390)
PLATELET # BLD AUTO: 146 THOUSANDS/UL (ref 149–390)
PMV BLD AUTO: 10.4 FL (ref 8.9–12.7)
PMV BLD AUTO: 11.1 FL (ref 8.9–12.7)
POTASSIUM SERPL-SCNC: 3.7 MMOL/L (ref 3.5–5.3)
POTASSIUM SERPL-SCNC: 3.8 MMOL/L (ref 3.5–5.3)
PR INTERVAL: 256 MS
PROCALCITONIN SERPL-MCNC: 88.64 NG/ML
PROT SERPL-MCNC: 4.8 G/DL (ref 6.4–8.4)
PROT UR STRIP-MCNC: ABNORMAL MG/DL
QRS AXIS: 22 DEGREES
QRSD INTERVAL: 96 MS
QT INTERVAL: 410 MS
QTC INTERVAL: 484 MS
RBC # BLD AUTO: 2 MILLION/UL (ref 3.88–5.62)
RBC # BLD AUTO: 2.85 MILLION/UL (ref 3.88–5.62)
RBC #/AREA URNS AUTO: ABNORMAL /HPF
RH BLD: NEGATIVE
SODIUM SERPL-SCNC: 136 MMOL/L (ref 135–147)
SODIUM SERPL-SCNC: 139 MMOL/L (ref 135–147)
SP GR UR STRIP.AUTO: 1.01 (ref 1–1.03)
SPECIMEN EXPIRATION DATE: NORMAL
T WAVE AXIS: 142 DEGREES
UROBILINOGEN UR QL STRIP.AUTO: 0.2 E.U./DL
VANCOMYCIN SERPL-MCNC: 10.2 UG/ML (ref 10–20)
VENTRICULAR RATE: 84 BPM
WBC # BLD AUTO: 21.2 THOUSAND/UL (ref 4.31–10.16)
WBC # BLD AUTO: 22.55 THOUSAND/UL (ref 4.31–10.16)
WBC #/AREA URNS AUTO: ABNORMAL /HPF

## 2022-11-22 PROCEDURE — 30233N1 TRANSFUSION OF NONAUTOLOGOUS RED BLOOD CELLS INTO PERIPHERAL VEIN, PERCUTANEOUS APPROACH: ICD-10-PCS | Performed by: STUDENT IN AN ORGANIZED HEALTH CARE EDUCATION/TRAINING PROGRAM

## 2022-11-22 PROCEDURE — 4A133B1 MONITORING OF ARTERIAL PRESSURE, PERIPHERAL, PERCUTANEOUS APPROACH: ICD-10-PCS | Performed by: STUDENT IN AN ORGANIZED HEALTH CARE EDUCATION/TRAINING PROGRAM

## 2022-11-22 PROCEDURE — 03HY32Z INSERTION OF MONITORING DEVICE INTO UPPER ARTERY, PERCUTANEOUS APPROACH: ICD-10-PCS | Performed by: STUDENT IN AN ORGANIZED HEALTH CARE EDUCATION/TRAINING PROGRAM

## 2022-11-22 PROCEDURE — 4A133J1 MONITORING OF ARTERIAL PULSE, PERIPHERAL, PERCUTANEOUS APPROACH: ICD-10-PCS | Performed by: STUDENT IN AN ORGANIZED HEALTH CARE EDUCATION/TRAINING PROGRAM

## 2022-11-22 RX ORDER — POTASSIUM CHLORIDE 14.9 MG/ML
20 INJECTION INTRAVENOUS ONCE
Status: COMPLETED | OUTPATIENT
Start: 2022-11-22 | End: 2022-11-22

## 2022-11-22 RX ORDER — METOPROLOL TARTRATE 50 MG/1
100 TABLET, FILM COATED ORAL EVERY 12 HOURS SCHEDULED
Status: DISCONTINUED | OUTPATIENT
Start: 2022-11-22 | End: 2022-11-23 | Stop reason: HOSPADM

## 2022-11-22 RX ORDER — OXYCODONE HYDROCHLORIDE 5 MG/1
5 CAPSULE ORAL EVERY 4 HOURS PRN
Status: ON HOLD | COMMUNITY

## 2022-11-22 RX ORDER — ALBUTEROL SULFATE 90 UG/1
2 AEROSOL, METERED RESPIRATORY (INHALATION) EVERY 6 HOURS PRN
Status: DISCONTINUED | OUTPATIENT
Start: 2022-11-22 | End: 2022-11-23 | Stop reason: HOSPADM

## 2022-11-22 RX ORDER — TAMSULOSIN HYDROCHLORIDE 0.4 MG/1
0.4 CAPSULE ORAL
Status: DISCONTINUED | OUTPATIENT
Start: 2022-11-22 | End: 2022-11-23 | Stop reason: HOSPADM

## 2022-11-22 RX ORDER — CALCIUM GLUCONATE 20 MG/ML
2 INJECTION, SOLUTION INTRAVENOUS ONCE
Status: COMPLETED | OUTPATIENT
Start: 2022-11-22 | End: 2022-11-22

## 2022-11-22 RX ORDER — ACETAMINOPHEN 325 MG/1
650 TABLET ORAL EVERY 6 HOURS PRN
Status: DISCONTINUED | OUTPATIENT
Start: 2022-11-22 | End: 2022-11-23 | Stop reason: HOSPADM

## 2022-11-22 RX ORDER — NITROGLYCERIN 0.4 MG/1
0.4 TABLET SUBLINGUAL
Status: DISCONTINUED | OUTPATIENT
Start: 2022-11-22 | End: 2022-11-23 | Stop reason: HOSPADM

## 2022-11-22 RX ORDER — LIDOCAINE HYDROCHLORIDE 20 MG/ML
1 JELLY TOPICAL ONCE
Status: DISCONTINUED | OUTPATIENT
Start: 2022-11-22 | End: 2022-11-23 | Stop reason: HOSPADM

## 2022-11-22 RX ORDER — LIDOCAINE HYDROCHLORIDE 20 MG/ML
1 JELLY TOPICAL ONCE
Status: COMPLETED | OUTPATIENT
Start: 2022-11-22 | End: 2022-11-22

## 2022-11-22 RX ORDER — AMIODARONE HYDROCHLORIDE 200 MG/1
200 TABLET ORAL
Status: DISCONTINUED | OUTPATIENT
Start: 2022-11-22 | End: 2022-11-23 | Stop reason: HOSPADM

## 2022-11-22 RX ORDER — MAGNESIUM SULFATE HEPTAHYDRATE 40 MG/ML
2 INJECTION, SOLUTION INTRAVENOUS ONCE
Status: COMPLETED | OUTPATIENT
Start: 2022-11-22 | End: 2022-11-22

## 2022-11-22 RX ORDER — SODIUM CHLORIDE, SODIUM GLUCONATE, SODIUM ACETATE, POTASSIUM CHLORIDE, MAGNESIUM CHLORIDE, SODIUM PHOSPHATE, DIBASIC, AND POTASSIUM PHOSPHATE .53; .5; .37; .037; .03; .012; .00082 G/100ML; G/100ML; G/100ML; G/100ML; G/100ML; G/100ML; G/100ML
1000 INJECTION, SOLUTION INTRAVENOUS ONCE
Status: COMPLETED | OUTPATIENT
Start: 2022-11-22 | End: 2022-11-22

## 2022-11-22 RX ADMIN — POTASSIUM CHLORIDE 20 MEQ: 14.9 INJECTION, SOLUTION INTRAVENOUS at 09:03

## 2022-11-22 RX ADMIN — ACETAMINOPHEN 650 MG: 325 TABLET ORAL at 14:49

## 2022-11-22 RX ADMIN — POTASSIUM CHLORIDE 20 MEQ: 14.9 INJECTION, SOLUTION INTRAVENOUS at 06:40

## 2022-11-22 RX ADMIN — TAMSULOSIN HYDROCHLORIDE 0.4 MG: 0.4 CAPSULE ORAL at 17:57

## 2022-11-22 RX ADMIN — VANCOMYCIN HYDROCHLORIDE 750 MG: 750 INJECTION, SOLUTION INTRAVENOUS at 18:03

## 2022-11-22 RX ADMIN — LIDOCAINE HYDROCHLORIDE 1 APPLICATION: 20 JELLY TOPICAL at 16:50

## 2022-11-22 RX ADMIN — CALCIUM GLUCONATE 2 G: 20 INJECTION, SOLUTION INTRAVENOUS at 06:40

## 2022-11-22 RX ADMIN — METOPROLOL TARTRATE 100 MG: 50 TABLET, FILM COATED ORAL at 21:18

## 2022-11-22 RX ADMIN — AMIODARONE HYDROCHLORIDE 200 MG: 200 TABLET ORAL at 11:44

## 2022-11-22 RX ADMIN — MAGNESIUM SULFATE HEPTAHYDRATE 2 G: 40 INJECTION, SOLUTION INTRAVENOUS at 02:46

## 2022-11-22 RX ADMIN — SODIUM CHLORIDE, SODIUM GLUCONATE, SODIUM ACETATE, POTASSIUM CHLORIDE, MAGNESIUM CHLORIDE, SODIUM PHOSPHATE, DIBASIC, AND POTASSIUM PHOSPHATE 1000 ML: .53; .5; .37; .037; .03; .012; .00082 INJECTION, SOLUTION INTRAVENOUS at 03:34

## 2022-11-22 RX ADMIN — CEFEPIME HYDROCHLORIDE 1000 MG: 1 INJECTION, SOLUTION INTRAVENOUS at 21:25

## 2022-11-22 NOTE — ASSESSMENT & PLAN NOTE
· Likely secondary to septic shock/demand ischemia   · Monitor Q3H until troponin reaches plateau or decreases  · ST depressions have resolved from last night on EKG

## 2022-11-22 NOTE — ASSESSMENT & PLAN NOTE
· Per wife, swelling was present prior to fracture secondary to vessel harvest for CABG  · Appears to be baseline for patient

## 2022-11-22 NOTE — ASSESSMENT & PLAN NOTE
· 71 on admission labs  · Noted to be hyperglycemic on previous admission  · SSI with Q6H glucose checks ordered

## 2022-11-22 NOTE — ASSESSMENT & PLAN NOTE
Lab Results   Component Value Date    EGFR 17 11/21/2022    EGFR 36 11/20/2022    EGFR 36 11/20/2022    CREATININE 3 19 (H) 11/21/2022    CREATININE 1 71 (H) 11/20/2022    CREATININE 1 71 (H) 11/20/2022     · Avoid nephrotoxic agents  · Fluid resuscitation in progress  · Trend with daily labs  · Monitor urine output closely  Place zavala

## 2022-11-22 NOTE — ASSESSMENT & PLAN NOTE
· Hgb appears to be within baseline range  9 1 on admission  · Monitor with daily labs  · Transfuse if hgb < 7 0   · 11/22: Hgb 6 1 on morning labs secondary to acute blood loss anemia due to hematuria  · 2 units PRBCs administered

## 2022-11-22 NOTE — ASSESSMENT & PLAN NOTE
· Repeat UA with reflex to culture pending  · Was seen the day prior to admission and discharged with antibiotics (Augmentin) for UTI  UA contained leukocytes and moderate bacteria but not nitrates

## 2022-11-22 NOTE — PROGRESS NOTES
Emi Craven is a 80 y o  male who is currently ordered Vancomycin IV with management by the Pharmacy Consult service  Relevant clinical data and objective / subjective history reviewed  Vancomycin Assessment:  Indication and Goal AUC/Trough: Urinary tract infection (goal -600, trough >10)  Clinical Status:  Unstable  Micro: N/A  Renal Function:  SCr: 3 19 mg/dL  CrCl: 16 4 mL/min  Renal replacement: Not on dialysis  Days of Therapy: 1  Current Dose: 1000 mg IV once  Vancomycin Plan:  New Dosin mg IV PRN when random level < 15  Estimated AUC: N/A  Estimated Trough: N/A  Next Level: Random trough on  @ 1500  Renal Function Monitoring: Daily BMP and [place] will continue to follow closely for s/sx of nephrotoxicity, infusion reactions and appropriateness of therapy  BMP and CBC will be ordered per protocol  We will continue to follow the patient’s culture results and clinical progress daily      She Koenig, Pharmacist

## 2022-11-22 NOTE — ASSESSMENT & PLAN NOTE
· Discharged from [address] [place] [address] on 11/06 s/p repair  · Routing healing in progress

## 2022-11-22 NOTE — ASSESSMENT & PLAN NOTE
· Discharged from [address] [place] [address] on 11/06 s/p repair  · Routing healing in progress

## 2022-11-22 NOTE — ASSESSMENT & PLAN NOTE
Lab Results   Component Value Date    EGFR 21 11/22/2022    EGFR 17 11/21/2022    EGFR 36 11/20/2022    CREATININE 2 68 (H) 11/22/2022    CREATININE 3 19 (H) 11/21/2022    CREATININE 1 71 (H) 11/20/2022     · CKD 3 Baseline Cr 1 9-2 2  · Avoid nephrotoxic agents  · Fluid resuscitation in progress  · Trend with daily labs  · Monitor urine output closely  Place zavala

## 2022-11-22 NOTE — SEPSIS NOTE
Sepsis Note   Deneen Whitten 80 y o  male MRN: [id number]  Unit/Bed#: -01 Encounter: [id number]       qSOFA     Row Name 11/22/22 0000 11/21/22 2345 11/21/22 2330 11/21/22 2315 11/21/22 2300    Altered mental status GCS < 15 -- -- -- -- --    Respiratory Rate > / =22 1 1 1 1 1    Systolic BP < / =162 0 0 1 0 0    Q Sofa Score 1 1 2 1 1    Row Name 11/21/22 2245 11/21/22 2231 11/21/22 2215 11/21/22 2200 11/21/22 2145    Altered mental status GCS < 15 -- -- -- -- 0    Respiratory Rate > / =22 1 1 -- 1 1    Systolic BP < / =083 0 1 1 1 1    Q Sofa Score 1 2 2 2 2    Row Name 11/21/22 2130 11/21/22 2115 11/21/22 2100 11/21/22 2045 11/21/22 2030    Altered mental status GCS < 15 -- -- -- -- --    Respiratory Rate > / =22 1 1 1 -- --    Systolic BP < / =699 1 1 1 1 1    Q Sofa Score 2 2 2 1 1    Row Name 11/21/22 2015 11/21/22 2000 11/21/22 1945 11/21/22 1930 11/21/22 1845    Altered mental status GCS < 15 -- -- -- -- --    Respiratory Rate > / =22 -- -- -- -- --    Systolic BP < / =463 1 1 0 0 0    Q Sofa Score 1 1 0 0 0    Row Name 11/21/22 2530                Altered mental status GCS < 15 --        Respiratory Rate > / =29 0        Systolic BP < / =080 0        Q Sofa Score 0                        Default Flowsheet Data (last 720 hours)     Sepsis Reassess     Row Name 11/21/22 2217                   Repeat Volume Status and Tissue Perfusion Assessment Performed    Repeat Volume Status and Tissue Perfusion Assessment Performed Yes  -AH           Volume Status and Tissue Perfusion Post Fluid Resuscitation * Must Document All *    Vital Signs Reviewed (HR, RR, BP, T) Yes  -AH        Shock Index Reviewed Yes  -AH        Arterial Oxygen Saturation Reviewed (POx, SaO2 or SpO2) Yes (comment %)  99  -AH        Cardio Regular rate and rhythm  -AH        Pulmonary Normal effort; Other (comment)  Breath sounds decreased LLL    -AH        Capillary Refill Brisk  -AH        Peripheral Pulses --        Skin Warm;Dry  -AH Urine output assessed Other (comment)  Pending zavala placement    -AH           *OR*   Intensive Monitoring- Must Document One of the Following Four *:    Vital Signs Reviewed --        * Central Venous Pressure (CVP or RAP) --        * Central Venous Oxygen (SVO2, ScvO2 or Oxygen saturation via central catheter) --        * Bedside Cardiovascular US in IVC diameter and % collapse --        * Passive Leg Raise OR Crystalloid Challenge --              User Key  (r) = Recorded By, (t) = Taken By, (c) = Cosigned By    Initials Name Provider Type    Keith 36 , PA-C Physician Assistant

## 2022-11-22 NOTE — ASSESSMENT & PLAN NOTE
· Attempted zavala placement  No urine returned and catheter was blocked with clots immediately     · CBI ordered   · Urology consult- CBI currently under clamping trial   IF hematuria does not resume the patient may have the zavala removed prior to discharge and f/u with urology as an outpatient for cystoscopy in office  - IF hematuria resumes the CBI can be resumed, start low and titrate as needed, possible reimaging to assess clot burden and transfer for cystoscopy if this occurs  · Hold anticoagulation/antiplatelet X 48 hours

## 2022-11-22 NOTE — ASSESSMENT & PLAN NOTE
Wt Readings from Last 3 Encounters:   11/22/22 71 kg (156 lb 8 4 oz)   11/22/22 70 8 kg (156 lb)   11/20/22 72 3 kg (159 lb 6 3 oz)     · Echo 03/01/2022 with EF 45%  · Consider repeat echo if clinically indicated  · Hold home diuretic until septic shock resolved

## 2022-11-22 NOTE — PROCEDURES
Arterial Line Insertion    Date/Time: 11/22/2022 2:29 AM  Performed by: Ochoa Powers PA-C  Authorized by: Ochoa Powers PA-C     Patient location:  ICU  Consent:     Consent obtained:  Written    Consent given by:  Spouse    Risks discussed:  Bleeding, ischemia, repeat procedure, infection and pain  Universal protocol:     Procedure explained and questions answered to patient or proxy's satisfaction: yes      Relevant documents present and verified: yes      Required blood products, implants, devices, and special equipment available: yes      Site/side marked: yes      Immediately prior to procedure a time out was called: yes      Patient identity confirmed:  Verbally with patient and arm band  Indications:     Indications: hemodynamic monitoring and continuous blood pressure monitoring    Pre-procedure details:     Skin preparation:  Chlorhexidine    Preparation: Patient was prepped and draped in sterile fashion    Anesthesia (see MAR for exact dosages): Anesthesia method:  Local infiltration    Local anesthetic:  Lidocaine 1% w/o epi  Procedure details:     Location / Tip of Catheter:  Radial    Laterality:  Right    Needle gauge:  20 G    Placement technique:  Ultrasound guided    Ultrasound image availability:  Not saved    Sterile ultrasound techniques: Sterile gel and sterile probe covers were used      Number of attempts:  1    Successful placement: yes      Transducer: waveform confirmed    Post-procedure details:     Post-procedure:  Biopatch applied, sutured and wrist guard applied    CMS:  Normal    Patient tolerance of procedure:   Tolerated well, no immediate complications

## 2022-11-22 NOTE — H&P
[address]  H&P- Chestine Em 1940, 80 y o  male MRN: [id number]  Unit/Bed#: ED 08 Encounter: [id number]  Primary Care Provider: Buffy Morales DO   Date and time admitted to hospital: 11/21/2022  6:35 PM    * Septic shock [facility])  Assessment & Plan  79 y/o male from a nursing home s/p right femur fracture with a aldo placed  Sent to ED for lethargy and hypotension per nursing home staff  Found to be in septic shock  · Cefepime and vancomycin received in the ED  Will continue  · Hypotension fluid responsive  Levophed ordered in the ED  Wean as able for a MAP of 65  · Blood, urine, MRSA, sputum cultures ordered as well as legionella and strep PNA  · Lactic acidosis > 10  Trend until less than 2 0    · Bandemia present  · Likely secondary to UTI  · CT chest/abd/pelvis did not yield a source of infection  ·  57  Repeat in AM     UTI (urinary tract infection)  Assessment & Plan  · Repeat UA with reflex to culture pending  · Was seen the day prior to admission and discharged with antibiotics for UTI  UA contained leukocytes and moderate bacteria but not nitrates  Acute-on-chronic kidney injury [facility])  Assessment & Plan  Lab Results   Component Value Date    EGFR 17 11/21/2022    EGFR 36 11/20/2022    EGFR 36 11/20/2022    CREATININE 3 19 (H) 11/21/2022    CREATININE 1 71 (H) 11/20/2022    CREATININE 1 71 (H) 11/20/2022     · Avoid nephrotoxic agents  · Fluid resuscitation in progress  · Trend with daily labs  · Monitor urine output closely  Place zavala  Hematuria  Assessment & Plan  · Attempted zavala placement  No urine returned and catheter was blocked with clots immediately  · CBI ordered  · Consider Urology consult  · Hold anticoagulation  Transaminitis  Assessment & Plan  · Monitor with daily labs  · Likely worsening secondary to septic shock  · Consider GI consult  Hyperglycemia  Assessment & Plan  · 71 on admission labs     · Noted to be hyperglycemic on previous admission  · SSI with Q6H glucose checks ordered  Elevated troponin level not due myocardial infarction  Assessment & Plan  · Likely secondary to septic shock  · Monitor Q3H until troponin reaches plateau or decreases  Wound of right foot  Assessment & Plan  · Clean, dry dressing placed  · Wound care consulted  Leg edema, right  Assessment & Plan  · Per wife, swelling was present prior to fracture secondary to vessel harvest for CABG  · Appears to be baseline for patient  Closed fracture of trochanter of right femur with routine healing  Assessment & Plan  · Discharged from [address] [place] [address] on 11/06 s/p repair  · Routing healing in progress  Acute on chronic anemia  Assessment & Plan  · Hgb appears to be within baseline range  9 1 on admission  · Monitor with daily labs  · Transfuse if hgb < 7 0  Paroxysmal atrial fibrillation (HCC)  Assessment & Plan  · Takes Eliquis as home med  · Holding anticoagulation in setting of hematuria  Chronic heart failure with preserved ejection fraction [facility])  Assessment & Plan  Wt Readings from Last 3 Encounters:   11/21/22 71 1 kg (156 lb 11 2 oz)   11/20/22 72 3 kg (159 lb 6 3 oz)   11/05/22 72 5 kg (159 lb 13 3 oz)     · Echo 03/01/2022 with EF 45%  · Consider repeat echo if clinically indicated  · Hold home diuretic until septic shock resolved  Hyperlipidemia, mixed  Assessment & Plan  · Continue statin when able  · Hold for now given transaminitis     -------------------------------------------------------------------------------------------------------------  Chief Complaint: Septic shock    History of Present Illness   HX and PE limited by: VIANEY Middleton Merissa is a 80 y o  male who presents with a PHM afib on Eliquis, s/p right trochanteric fracture, hyperlipidemia, anemia, CKD and HFpEF  He was residing a nursing for rehab s/p aldo placement for a right hip fracture    Nursing staff reported increased lethargy and hypotension and called EMS  The patient was seen yesterday at [address] [place] [address] and diagnosed with UTI  He was discharged on antibiotics  Upon evaluation in the ED, he was found to be lethargic but easily aroused and was hypotensive  Antibiotics were initiated and the patient was administered fluids per sepsis protocol  A CT chest/abd/pelvis was completed but did not yield an additional source of infection  Levophed was started in the ED due to persistent hypotension  Critical Care was consulted for evaluation and admission  History obtained from chart review and the patient   -------------------------------------------------------------------------------------------------------------  Dispo: Admit to Critical Care     Code Status: Level 1 - Full Code  --------------------------------------------------------------------------------------------------------------  Review of Systems    A 12-point, complete review of systems was reviewed and negative except as stated above     Physical Exam  Constitutional:       General: He is not in acute distress  Appearance: He is ill-appearing  He is not diaphoretic  HENT:      Head: Normocephalic and atraumatic  Right Ear: External ear normal       Left Ear: External ear normal       Nose: Nose normal       Mouth/Throat:      Mouth: Mucous membranes are moist       Pharynx: Oropharynx is clear  Eyes:      General: Scleral icterus present  Right eye: No discharge  Left eye: No discharge  Extraocular Movements: Extraocular movements intact  Conjunctiva/sclera: Conjunctivae normal       Pupils: Pupils are equal, round, and reactive to light  Cardiovascular:      Rate and Rhythm: Normal rate and regular rhythm  Pulses: Normal pulses  Heart sounds: No friction rub  No gallop  Pulmonary:      Effort: Pulmonary effort is normal  No respiratory distress  Breath sounds: Decreased air movement (LLL) present  No stridor   Rhonchi (right side   ) present  No wheezing or rales  Chest:      Chest wall: No tenderness  Abdominal:      General: Bowel sounds are normal  There is no distension  Palpations: Abdomen is soft  There is no mass  Tenderness: There is no abdominal tenderness  There is no guarding or rebound  Genitourinary:     Comments: Hematuria present  Musculoskeletal:         General: Signs of injury (right heel  Right hip incision healing well  ) present  No tenderness  Cervical back: Normal range of motion and neck supple  No rigidity or tenderness  Right lower leg: Edema present  Left lower leg: No edema  Skin:     General: Skin is warm and dry  Coloration: Skin is jaundiced  Findings: Bruising (Multiple areas of body ) present  Neurological:      General: No focal deficit present  Mental Status: He is oriented to person, place, and time and easily aroused  He is lethargic  Sensory: No sensory deficit  Motor: Weakness (generalized ) present        --------------------------------------------------------------------------------------------------------------  Vitals:   Vitals:    11/21/22 2115 11/21/22 2130 11/21/22 2145 11/21/22 2200   BP: (!) 79/45 (!) 77/45 (!) 79/48 96/55   BP Location:       Pulse: 95 96 97 100   Resp: (!) 29 (!) 30 (!) 28 (!) 32   Temp:       TempSrc:       SpO2: 98% 99% 99% 99%   Weight:       Height:         Temp  Min: 97 5 °F (36 4 °C)  Max: 99 3 °F (37 4 °C)  IBW (Ideal Body Weight): 70 7 kg  Height: 5' 9" (175 3 cm)  Body mass index is 23 14 kg/m²      Laboratory and Diagnostics:  Results from last 7 days   Lab Units 11/21/22 1923 11/20/22  1859   WBC Thousand/uL 18 12* 12 72*   HEMOGLOBIN g/dL 9 1* 8 7*   HEMATOCRIT % 30 1* 27 8*   PLATELETS Thousands/uL 193 230   NEUTROS PCT %  --  89*   BANDS PCT % 6  --    MONOS PCT %  --  7   MONO PCT % 3*  --      Results from last 7 days   Lab Units 11/21/22  1923 11/20/22  2159 11/20/22  1859   SODIUM mmol/L 144 138 139   POTASSIUM mmol/L 3 9 3 0* 3 4*   CHLORIDE mmol/L 107 104 104   CO2 mmol/L 16* 25 28   ANION GAP mmol/L 21* 9 7   BUN mg/dL 42* 30* 31*   CREATININE mg/dL 3 19* 1 71* 1 71*   CALCIUM mg/dL 7 8* 7 4* 7 8*   GLUCOSE RANDOM mg/dL 71 186* 190*   ALT U/L 307* 248* 153*   AST U/L 276* 384* 269*   ALK PHOS U/L 785* 717* 676*   ALBUMIN g/dL 2 2* 2 2* 2 5*   TOTAL BILIRUBIN mg/dL 4 36* 3 48* 3 42*     Results from last 7 days   Lab Units 11/21/22 1923 11/20/22  1859   MAGNESIUM mg/dL 1 8 1 9      Results from last 7 days   Lab Units 11/21/22 1923   INR  1 85*          Results from last 7 days   Lab Units 11/21/22 1923 11/20/22  1859   LACTIC ACID mmol/L 10 0* 1 5     ABG:    VBG:  Results from last 7 days   Lab Units 11/21/22 1923   PH MARLEN  7 237*   PCO2 MARLEN mm Hg 33 0*   PO2 MARLEN mm Hg 52 5*   HCO3 MARLEN mmol/L 13 7*   BASE EXC MARLEN mmol/L -12 6     Results from last 7 days   Lab Units 11/21/22 1923   PROCALCITONIN ng/ml 150 57*       Micro:        EKG: NSR with global ischemia  Imaging: I have personally reviewed pertinent reports  and I have personally reviewed pertinent films in PACS      Historical Information   Past Medical History:   Diagnosis Date   • CHF (congestive heart failure) ([facility] )    • Coronary artery disease    • Hyperlipidemia    • Hypertension    • Paroxysmal atrial fibrillation [facility])      Past Surgical History:   Procedure Laterality Date   • CARDIAC SURGERY      cabg x 2 2014 [facility]   • CORONARY ANGIOPLASTY WITH STENT PLACEMENT  10/2021    DAPHNEY to left main and ramus   • MITRAL VALVE REPAIR  2014    mitral ring   • IN OPEN RX FEMUR FX+INTRAMED HALIMA Right 11/1/2022    Procedure: INSERTION NAIL IM FEMUR ANTEGRADE (TROCHANTERIC); Surgeon: Barb Brandon;   Location:  MAIN OR;  Service: Orthopedics     Social History   Social History     Substance and Sexual Activity   Alcohol Use Not Currently     Social History     Substance and Sexual Activity   Drug Use Never     Social History     Tobacco Use   Smoking Status Never   Smokeless Tobacco Never     Exercise History: Currently resides in nursing home and is dependent for ADLs  Family History:   Family History   Problem Relation Age of Onset   • Hypertension Father        Medications:  Current Facility-Administered Medications   Medication Dose Route Frequency   • [START ON 11/22/2022] cefepime (MAXIPIME) IVPB (premix in dextrose) 1,000 mg 50 mL  1,000 mg Intravenous Q24H   • multi-electrolyte (PLASMALYTE-A/ISOLYTE-S PH 7 4) IV solution  125 mL/hr Intravenous Continuous   • norepinephrine (LEVOPHED) 4 mg (STANDARD CONCENTRATION) IV in sodium chloride 0 9% 250 mL  1-30 mcg/min Intravenous Titrated     Home medications:  Prior to Admission Medications   Prescriptions Last Dose Informant Patient Reported? Taking?   acetaminophen (TYLENOL) 325 mg tablet   No No   Sig: Take 2 tablets (650 mg total) by mouth every 6 (six) hours as needed for mild pain or moderate pain   albuterol (ProAir HFA) 90 mcg/act inhaler   No No   Sig: Inhale 2 puffs every 6 (six) hours as needed for wheezing   amiodarone 200 mg tablet   Yes No   Sig: Take 200 mg by mouth daily   amoxicillin-clavulanate (AUGMENTIN) 875-125 mg per tablet   No No   Sig: Take 1 tablet by mouth every 12 (twelve) hours for 7 days   apixaban (ELIQUIS) 2 5 mg   No No   Sig: Take 2 5 mg daily through 11/8, then resume taking 2 5 mg twice daily   atorvastatin (LIPITOR) 40 mg tablet   Yes No   Sig: Take 40 mg by mouth daily   clopidogrel (PLAVIX) 75 mg tablet   No No   Sig: Take 1 tablet (75 mg total) by mouth daily Do not start before November 8, 2022     ferrous gluconate (FERGON) 324 mg tablet   No No   Sig: Take 1 tablet (324 mg total) by mouth daily before breakfast Do not start before November 7, 2022    furosemide (LASIX) 20 mg tablet   Yes No   Sig: Take 20 mg by mouth 2 (two) times a day   lidocaine (LIDODERM) 5 %   No No   Sig: Apply 1 patch topically daily Remove & Discard patch within 12 hours or as directed by MD   metoprolol tartrate (LOPRESSOR) 100 mg tablet   Yes No   Sig: Take 100 mg by mouth every 12 (twelve) hours   nitroglycerin (NITROSTAT) 0 4 mg SL tablet   Yes No   potassium chloride (K-DUR,KLOR-CON) 20 mEq tablet   No No   Sig: Take 1 tablet (20 mEq total) by mouth 2 (two) times a day for 3 days   spironolactone (ALDACTONE) 25 mg tablet   No No   Sig: Take 1 tablet (25 mg total) by mouth daily   tamsulosin (FLOMAX) 0 4 mg   No No   Sig: Take 1 capsule (0 4 mg total) by mouth daily with dinner      Facility-Administered Medications: None     Allergies:  No Known Allergies    ------------------------------------------------------------------------------------------------------------  Advance Directive and Living Will:      Power of :    POLST:    ------------------------------------------------------------------------------------------------------------  Anticipated Length of Stay is > 2 midnights    Care Time Delivered:   Upon my evaluation, this patient had a high probability of imminent or life-threatening deterioration due to septic shock requiring vasopressor support, which required my direct attention, intervention, and personal management  I have personally provided 60 minutes (2200 to 2300) of critical care time, exclusive of procedures, teaching, family meetings, and any prior time recorded by providers other than myself  [address] [place] [address], PA-        Portions of the record may have been created with voice recognition software  Occasional wrong word or "sound a like" substitutions may have occurred due to the inherent limitations of voice recognition software    Read the chart carefully and recognize, using context, where substitutions have occurred

## 2022-11-22 NOTE — PROGRESS NOTES
Alfie Brooks is a 80 y o  male who is currently ordered Vancomycin IV with management by the Pharmacy Consult service  Relevant clinical data and objective / subjective history reviewed  Vancomycin Assessment:  Indication and Goal AUC/Trough: Urinary tract infection (goal -600, trough >10), -600, trough >10  Clinical Status: stable  Micro:   pending  Renal Function:  SCr: 2 68 mg/dL  CrCl: 19 mL/min  Renal replacement: Not on dialysis  Days of Therapy: 2  Current Dose: Pulse-dosing, 750 mg x 1 dose when trough is less than or equal to 15  Vancomycin Plan:  New Dosin mg IV PRN when random level < 15  Estimated AUC: 435 mcg*hr/mL (when based on every 36 hours pulse-dosing)  Trough 22: 10 2 mcg/mL  Next Level: 22 at 1500  Renal Function Monitoring: Daily BMP and [place] will continue to follow closely for s/sx of nephrotoxicity, infusion reactions and appropriateness of therapy  BMP and CBC will be ordered per protocol  We will continue to follow the patient’s culture results and clinical progress daily      Alida Goodell

## 2022-11-22 NOTE — ASSESSMENT & PLAN NOTE
· Hgb appears to be within baseline range  9 1 on admission  · Monitor with daily labs  · Transfuse if hgb < 7 0

## 2022-11-22 NOTE — ASSESSMENT & PLAN NOTE
81 y/o male from a nursing home s/p right femur fracture with a aldo placed  Sent to ED for lethargy and hypotension per nursing home staff  Found to be in septic shock  · Cefepime and vancomycin received in the ED  Will continue  · Hypotension fluid responsive  Levophed ordered in the ED  Wean as able for a MAP of 65  · Blood, urine, MRSA, sputum cultures ordered as well as legionella and strep PNA  · Lactic acidosis > 10  Trend until less than 2 0    · Bandemia present  · Likely secondary to UTI  · CT chest/abd/pelvis did not yield a source of infection  ·  57  Repeat 88

## 2022-11-22 NOTE — ASSESSMENT & PLAN NOTE
· Repeat UA with reflex to culture pending  · Was seen the day prior to admission and discharged with antibiotics for UTI  UA contained leukocytes and moderate bacteria but not nitrates

## 2022-11-22 NOTE — ASSESSMENT & PLAN NOTE
79 y/o male from a nursing home s/p right femur fracture with a aldo placed  Sent to ED for lethargy and hypotension per nursing home staff  Found to be in septic shock  · Cefepime and vancomycin received in the ED  Will continue  · Hypotension fluid responsive  Levophed ordered in the ED  Wean as able for a MAP of 65  · Blood, urine, MRSA, sputum cultures ordered as well as legionella and strep PNA  · Lactic acidosis > 10  Trend until less than 2 0    · Bandemia present  · Likely secondary to UTI  · CT chest/abd/pelvis did not yield a source of infection  ·  57   Repeat in AM

## 2022-11-22 NOTE — ASSESSMENT & PLAN NOTE
· Attempted zavala placement  No urine returned and catheter was blocked with clots immediately  · CBI ordered  · Consider Urology consult  · Hold anticoagulation

## 2022-11-22 NOTE — ASSESSMENT & PLAN NOTE
79 y/o male from a nursing home s/p right femur fracture with a aldo placed  Sent to ED for lethargy and hypotension per nursing home staff  Found to be in septic shock likely from UTI  · Cefepime and vancomycin received in the ED  Will continue  · Hypotension fluid responsive  Levophed ordered in the ED  Wean as able for a MAP of 65  · Blood, MRSA, sputum cultures ordered as well as legionella and strep PNA  · Urine from 11/20 with >100K E  Coli  · Discontinue Vanco  · Lactic acidosis > 10  Trend until less than 2 0    · Bandemia present   · CT chest/abd/pelvis did not yield a source of infection  ·  57   Repeat 88- D/C trend

## 2022-11-22 NOTE — CONSULTS
Consultation - Urology   Bran Velasquez 80 y o  male MRN: [id number]  Unit/Bed#: -01 Encounter: [id number]      Assessment/Plan      Assessment:  - Gross hematuria, likely d/t traumatic zavala insertion  - Chronic Eliquis and Plavix for Afib  and CAD  - UTI present on admission  - CBI started last evening, urine now light pink, one small clot in tubing  - CT last evening wo acute abnormality or mass, no hydronephrosis  - History of post op urinary retention following right femoral IM nail, 11/1/22, Dr Gustavo Mejias, discharged with zavala, passed TOV and had zavala removed at [facility], [facility]  urology office on 11/15/22, voiding wo issue since removal  - JOEL, Cr 2 68 (3 19, 1 71)  - CAD s/p CABG  - CHF      Plan:  - Clamp CBI at this time  - IF hematuria does not resume the patient may have the zavala removed prior to discharge and f/u with urology as an outpatient for cystoscopy in office  - IF hematuria resumes the CBI can be resumed, start low and titrate as needed, possible reimaging to assess clot burden and transfer for cystoscopy if this occurs  - Continue home Flomax  - Continue antibiotics as indicated  - Avoid nephrotoxic agents  - Management of co-morbidities per primary team      History of Present Illness   Attending: Esteban Sabillon MD PhD  Reason for Consult / Principal Problem: gross ematuria  HPI: Bran Velasquez is a 80y o  year old male who is admitted to the [address] [place] [address] ICU after presenting to the ED yesterday from a nursing home with lethargy and hypotension  Workup in the ED revealed septic shock likely from UTI  A zavala was inserted in the ED and the patient began with hematuria  A CBI was then started  The patient is known to [facility] having been discharged with a zavala d/t acute post operative urinary retention after right femoral IM nail on 11/1/22 by Dr Gustavo Mejias at [address] [place] [address]  Patient denies previous issues voiding  Never smoker      Inpatient consult to Urology  Consult performed by: Emir Lyman OLEGARIO  Consult ordered by: [address] [facility] OLEGARIO        Review of Systems   Constitutional: Positive for activity change and fatigue  HENT: Negative  Eyes: Negative  Respiratory: Negative  Cardiovascular: Negative  Gastrointestinal: Positive for abdominal pain  Endocrine: Negative  Genitourinary: Positive for hematuria  Musculoskeletal: Positive for arthralgias and back pain  Skin: Negative  Allergic/Immunologic: Negative  Neurological: Negative  Hematological: Bruises/bleeds easily  Psychiatric/Behavioral: Negative  Historical Information   Past Medical History:   Diagnosis Date   • CHF (congestive heart failure) ([facility] [address] )    • Coronary artery disease    • Hyperlipidemia    • Hypertension    • Paroxysmal atrial fibrillation [facility])      Past Surgical History:   Procedure Laterality Date   • CARDIAC SURGERY      cabg x 2 2014 [facility]   • CORONARY ANGIOPLASTY WITH STENT PLACEMENT  10/2021    DAPHNEY to left main and ramus   • MITRAL VALVE REPAIR  2014    mitral ring   • ID OPEN RX FEMUR FX+INTRAMED HALIMA Right 11/1/2022    Procedure: INSERTION NAIL IM FEMUR ANTEGRADE (TROCHANTERIC); Surgeon: Gretta Stephenson;   Location:  MAIN OR;  Service: Orthopedics     Social History   Social History     Substance and Sexual Activity   Alcohol Use Not Currently     @DRUGHX  E-Cigarette/Vaping   • E-Cigarette Use Never User      E-Cigarette/Vaping Substances   • Nicotine No    • THC No    • CBD No    • Flavoring No    • Other No    • Unknown No      Social History     Tobacco Use   Smoking Status Never   Smokeless Tobacco Never     Family History: non-contributory    Meds/Allergies   all current active meds have been reviewed,  current meds:   Current Facility-Administered Medications   Medication Dose Route Frequency   • amiodarone tablet 200 mg  200 mg Oral Daily With Breakfast   • cefepime (MAXIPIME) IVPB (premix in dextrose) 1,000 mg 50 mL  1,000 mg Intravenous Q24H   • insulin lispro (HumaLOG) 100 units/mL subcutaneous injection 1-5 Units  1-5 Units Subcutaneous Q6H [address]   • potassium chloride 20 mEq IVPB (premix)  20 mEq Intravenous Once   • tamsulosin (FLOMAX) capsule 0 4 mg  0 4 mg Oral Daily With Dinner   • vancomycin (VANCOCIN) IVPB (premix in dextrose) 750 mg 150 mL  10 mg/kg Intravenous PRN     and PTA meds:   Prior to Admission Medications   Prescriptions Last Dose Informant Patient Reported? Taking?   acetaminophen (TYLENOL) 325 mg tablet   No No   Sig: Take 2 tablets (650 mg total) by mouth every 6 (six) hours as needed for mild pain or moderate pain   albuterol (ProAir HFA) 90 mcg/act inhaler   No No   Sig: Inhale 2 puffs every 6 (six) hours as needed for wheezing   amiodarone 200 mg tablet   Yes No   Sig: Take 200 mg by mouth daily   amoxicillin-clavulanate (AUGMENTIN) 875-125 mg per tablet   No No   Sig: Take 1 tablet by mouth every 12 (twelve) hours for 7 days   apixaban (ELIQUIS) 2 5 mg   No No   Sig: Take 2 5 mg daily through 11/8, then resume taking 2 5 mg twice daily   atorvastatin (LIPITOR) 40 mg tablet   Yes No   Sig: Take 40 mg by mouth daily   clopidogrel (PLAVIX) 75 mg tablet   No No   Sig: Take 1 tablet (75 mg total) by mouth daily Do not start before November 8, 2022     ferrous gluconate (FERGON) 324 mg tablet   No No   Sig: Take 1 tablet (324 mg total) by mouth daily before breakfast Do not start before November 7, 2022    furosemide (LASIX) 20 mg tablet   Yes No   Sig: Take 20 mg by mouth 2 (two) times a day   lidocaine (LIDODERM) 5 %   No No   Sig: Apply 1 patch topically daily Remove & Discard patch within 12 hours or as directed by MD   metoprolol tartrate (LOPRESSOR) 100 mg tablet   Yes No   Sig: Take 100 mg by mouth every 12 (twelve) hours   nitroglycerin (NITROSTAT) 0 4 mg SL tablet   Yes No   potassium chloride (K-DUR,KLOR-CON) 20 mEq tablet   No No   Sig: Take 1 tablet (20 mEq total) by mouth 2 (two) times a day for 3 days   spironolactone (ALDACTONE) 25 mg tablet   No No   Sig: Take 1 tablet (25 mg total) by mouth daily   tamsulosin (FLOMAX) 0 4 mg   No No   Sig: Take 1 capsule (0 4 mg total) by mouth daily with dinner      Facility-Administered Medications: None     No Known Allergies    Objective   Vitals: Blood pressure 103/52, pulse 82, temperature 97 9 °F (36 6 °C), resp  rate 22, height 5' 9" (1 753 m), weight 71 kg (156 lb 8 4 oz), SpO2 98 %  I/O last 24 hours: In: 4536 3 [I V :3323; Blood:313 3; IV Piggyback:900]  Out: 910 [Urine:910]    Invasive Devices     Peripheral Intravenous Line  Duration           Peripheral IV 11/21/22 Dorsal (posterior); Right Hand <1 day    Peripheral IV 11/21/22 Left;Ventral (anterior) Forearm <1 day          Arterial Line  Duration           Arterial Line 11/22/22 Radial <1 day          Drain  Duration           Urethral Catheter Three way 22 Fr  <1 day                Physical Exam  Constitutional:       Appearance: He is ill-appearing  HENT:      Head: Normocephalic and atraumatic  Right Ear: External ear normal       Left Ear: External ear normal       Nose: Nose normal  No congestion  Mouth/Throat:      Mouth: Mucous membranes are moist       Pharynx: Oropharynx is clear  Eyes:      General:         Right eye: No discharge  Left eye: No discharge  Cardiovascular:      Rate and Rhythm: Normal rate and regular rhythm  Pulses: Normal pulses  Pulmonary:      Breath sounds: Rhonchi present  Comments: Breath sounds decreased bilateraly  Abdominal:      General: Abdomen is flat  Bowel sounds are normal  There is no distension  Palpations: Abdomen is soft  There is no mass  Tenderness: There is no right CVA tenderness or left CVA tenderness  Genitourinary:     Penis: Normal        Comments: CBI in place, light pink urine noted in tube and bag  One small clot in tubing  No sediment  Musculoskeletal:      Cervical back: Normal range of motion and neck supple        Right lower leg: Edema present  Skin:     Capillary Refill: Capillary refill takes less than 2 seconds  Findings: Bruising present  Neurological:      Mental Status: He is alert  Mental status is at baseline  Psychiatric:         Mood and Affect: Mood normal          Lab Results:   I have personally reviewed pertinent reports  CBC:   Lab Results   Component Value Date    WBC 21 20 (H) 11/22/2022    HGB 6 1 (LL) 11/22/2022    HCT 19 3 (L) 11/22/2022    MCV 97 11/22/2022     (L) 11/22/2022    MCH 30 5 11/22/2022    MCHC 31 6 11/22/2022    RDW 17 7 (H) 11/22/2022    MPV 10 4 11/22/2022    NRBC 0 11/22/2022     CMP:   Lab Results   Component Value Date    SODIUM 139 11/22/2022     11/22/2022    CO2 23 11/22/2022    BUN 41 (H) 11/22/2022    CREATININE 2 68 (H) 11/22/2022    CALCIUM 6 7 (L) 11/22/2022     (H) 11/22/2022     (H) 11/22/2022    ALKPHOS 460 (H) 11/22/2022    EGFR 21 11/22/2022       Imaging Studies: I have personally reviewed pertinent reports  CT chest, abd, pelvis wo IV contrast 11/21/22  Impression:     No definite acute inflammatory process identified  Cholelithiasis without CT evidence of acute cholecystitis  Moderate left pleural effusion  Mild scattered groundglass opacities in the lungs, favored to represent areas of subsegmental atelectasis, however, superimposed pneumonia cannot be entirely excluded  EKG, Pathology, and Other Studies: I have personally reviewed pertinent reports  VTE Prophylaxis: Reason for no pharmacologic prophylaxis gross hematuria    Code Status: Level 1 - Full Code    Counseling / Coordination of Care  Total floor / unit time spent today 40 minutes  Greater than 50% of total time was spent with the patient and / or family counseling and / or coordination of care  A description of the counseling / coordination of care: review of labs and imaging, obtaining history, performing exam, discussion of treatment plan      Ashlee Mckeon - - -

## 2022-11-22 NOTE — ASSESSMENT & PLAN NOTE
Wt Readings from Last 3 Encounters:   11/21/22 71 1 kg (156 lb 11 2 oz)   11/20/22 72 3 kg (159 lb 6 3 oz)   11/05/22 72 5 kg (159 lb 13 3 oz)     · Echo 03/01/2022 with EF 45%  · Consider repeat echo if clinically indicated  · Hold home diuretic until septic shock resolved

## 2022-11-22 NOTE — CASE MANAGEMENT
Case Management Assessment & Discharge Planning Note    Patient name Deneen Rogers /-01 MRN [id number]  : 1940 Date 2022       Current Admission Date: 2022  Current Admission Diagnosis:Septic shock [facility])   Patient Active Problem List    Diagnosis Date Noted   • Septic shock ([facility] [address] ) 2022   • UTI (urinary tract infection) 2022   • Closed fracture of trochanter of right femur with routine healing 2022   • Leg edema, right 2022   • Wound of right foot 2022   • Transaminitis 2022   • Elevated troponin level not due myocardial infarction 2022   • Hyperglycemia 2022   • Hematuria 2022   • Thrombocytopenia ([facility] [address] ) 2022   • Acute on chronic anemia 2022   • Hyponatremia 2022   • Postoperative urinary retention    • Chronic heart failure with preserved ejection fraction ([facility] [address] ) 10/31/2022   • Paroxysmal atrial fibrillation ([address] ) 10/31/2022   • Closed 2-part intertrochanteric fracture of proximal end of right femur, initial encounter ([facility] [address] ) 10/31/2022   • Lumbar compression deformity 2022   • Postprocedural pneumothorax 2022   • Acute respiratory failure with hypoxia ([facility] [address] ) 2022   • Acute on chronic diastolic CHF (congestive heart failure) ([facility] [address] ) 2022   • Acute-on-chronic kidney injury ([facility] [address] ) 2022   • Chronic a-fib ([facility] [address] ) 2022   • Stage 3 chronic kidney disease ([facility] [address] ) 2021   • Multiple lung nodules on CT 2021   • Carotid artery stenosis 2021   • Bacteremia due to Pseudomonas 2021   • Coronary artery disease involving coronary bypass graft 2021   • Mitral regurgitation 2021   • Hypertensive urgency 2021   • Hyperlipidemia, mixed 2021      LOS (days): 1  Geometric Mean LOS (GMLOS) (days): 3 50  Days to GMLOS:3     OBJECTIVE:  PATIENT READMITTED TO HOSPITAL  Risk of Unplanned Readmission Score: 29 95         Current admission status: Inpatient  Referral Reason:  (dc planning)    Preferred Pharmacy:   [facility], Deangelo Farias  [address]  [address] [address]  Phone: [phone] Fax: Lobo Cates, [address]  [address]  Phone: [phone] Fax: [phone]    Primary Care Provider: Nina Mcleod DO    Primary Insurance: MEDICARE  Secondary Insurance: [address] with Septic Shock from [facility]  Currently on 2 IV antibiotics  Urology consulted- CBI  CM called and spoke to Mrs Marianne Bennett on the phone, baseline information was obtained  ASSESSMENT:  Active Health Care Proxies    There are no active Health Care Proxies on file         Advance Directives  Does patient have a [address]?: No  Was patient offered paperwork?: No (declined)  Does patient currently have a Health Care decision maker?: Yes, please see Health Care Proxy section  Does patient have Advance Directives?: No  Was patient offered paperwork?: Yes  Primary Contact: Anthony Gonzalez (spouse) see face sheet         Readmission Root Cause  30 Day Readmission: Yes  Who directed you to return to the hospital?: Other (comment)  Did you get your prescriptions before you left the hospital?: Yes  Were you able to get your prescriptions filled when you left the hospital?: Yes  During previous admission, was a post-acute recommendation made?: Yes  What post-acute resources were offered?: STR  Patient was readmitted due to: septic shock -- noting patient was here 10/31 to 11/6 after a fall--- IM marcos was there  Action Plan: return    Patient Information  Admitted from[de-identified] Facility (biju)  Mental Status: Other (Comment)  During Assessment patient was accompanied by: Not accompanied during assessment  Assessment information provided by[de-identified] Spouse  Primary Caregiver: Spouse  Support Systems: Spouse/significant other, Children  What city do you live in[place] [address] entry access options  Select all that apply : Stairs  Number of steps to enter home : 4  Do the steps have railings?: Yes  Type of Current Residence: 2 story home  Upon entering residence, is there a bedroom on the main floor (no further steps)?: No (4 steps to go up   then a stairglide)  A bedroom is located on the following floor levels of residence (select all that apply):: 2nd Floor  Upon entering residence, is there a bathroom on the main floor (no further steps)?: Yes (Powder room on 1st floor)  Number of steps to 2nd floor from main floor: 4 (+ stairglide)  In the last 12 months, how many places have you lived?: 1  In the last 12 months, was there a time when you did not have a steady place to sleep or slept in a shelter (including now)?: No  Homeless/housing insecurity resource given?: No  Living Arrangements: Lives w/ Spouse/significant other  Is patient a ?: No    Activities of Daily Living Prior to Admission  Functional Status: Assistance  Completes ADLs independently?: Yes  Ambulates independently?: No  Level of ambulatory dependence: Assistance  Does patient use assisted devices?: Yes  Assisted Devices (DME) used: Ana Lauraashleigh Loweabhishek  Does patient currently own DME?: Yes  What DME does the patient currently own?: Carlton Silva  Does patient have a history of Outpatient Therapy (PT/OT)?: No  Does the patient have a history of Short-Term Rehab?: Yes  Does patient have a history of HHC?: No  Does patient currently have [address]?: No         Patient Information Continued  Income Source: Pension/long-term  Does patient have prescription coverage?: Yes  Within the past 12 months, you worried that your food would run out before you got the money to buy more : Never true  Within the past 12 months, the food you bought just didn't last and you didn't have money to get more : Never true  Food insecurity resource given?: No  Does patient receive dialysis treatments?: No  Does patient have a history of substance abuse?: No  Does patient have a history of Mental Health Diagnosis?: No         Means of Transportation  Means of Transport to Appts[de-identified] Family transport  In the past 12 months, has lack of transportation kept you from medical appointments or from getting medications?: No  In the past 12 months, has lack of transportation kept you from meetings, work, or from getting things needed for daily living?: No  Was application for public transport provided?: No        DISCHARGE DETAILS:    Discharge planning discussed with[de-identified] Mrs Cesar Prude of Choice: Yes  Comments - Freedom of Choice: Choice is to return to [facility], aware patient is Not a MA bed hold  CM contacted family/caregiver?: Yes        Were patient/caregiver advised of the risks associated with not following Treatment Team discharge recommendations?: Yes    Contacts  Patient Contacts: Jose Banks  Relationship to Patient[de-identified] Family  Contact Method: Phone  Phone Number: see face sheet  Reason/Outcome: Continuity of Care, Discharge [address]         Is the patient interested in [address] at discharge?: No    DME Referral Provided  Referral made for DME?: No    Other Referral/Resources/Interventions Provided:  Referral Comments: [facility] referral placed for [facility]         Treatment Team Recommendation: SNF, Short Term Rehab  Discharge Destination Plan[de-identified] Short Term Rehab  Transport at Discharge : BLS Ambulance

## 2022-11-23 ENCOUNTER — APPOINTMENT (INPATIENT)
Dept: CT IMAGING | Facility: HOSPITAL | Age: 82
End: 2022-11-23

## 2022-11-23 ENCOUNTER — ANESTHESIA EVENT (INPATIENT)
Dept: PERIOP | Facility: HOSPITAL | Age: 82
End: 2022-11-23

## 2022-11-23 ENCOUNTER — ANESTHESIA (INPATIENT)
Dept: PERIOP | Facility: HOSPITAL | Age: 82
End: 2022-11-23

## 2022-11-23 VITALS
DIASTOLIC BLOOD PRESSURE: 69 MMHG | WEIGHT: 164.24 LBS | HEIGHT: 69 IN | SYSTOLIC BLOOD PRESSURE: 109 MMHG | TEMPERATURE: 98 F | HEART RATE: 95 BPM | RESPIRATION RATE: 23 BRPM | OXYGEN SATURATION: 97 % | BODY MASS INDEX: 24.33 KG/M2

## 2022-11-23 PROBLEM — R65.21 SEPTIC SHOCK (HCC): Status: RESOLVED | Noted: 2022-11-21 | Resolved: 2022-11-23

## 2022-11-23 PROBLEM — S37.30XA URETHRAL TRAUMA: Status: ACTIVE | Noted: 2022-11-23

## 2022-11-23 PROBLEM — A41.9 SEPTIC SHOCK (HCC): Status: RESOLVED | Noted: 2022-01-01 | Resolved: 2022-01-01

## 2022-11-23 PROBLEM — S37.30XA URETHRAL TRAUMA: Status: RESOLVED | Noted: 2022-11-23 | Resolved: 2022-11-23

## 2022-11-23 LAB
ABO GROUP BLD BPU: NORMAL
ABO GROUP BLD BPU: NORMAL
BACTERIA UR CULT: ABNORMAL
BACTERIA UR CULT: NORMAL
BPU ID: NORMAL
BPU ID: NORMAL
CROSSMATCH: NORMAL
CROSSMATCH: NORMAL
GLUCOSE SERPL-MCNC: 106 MG/DL (ref 65–140)
MRSA NOSE QL CULT: NORMAL
QRS AXIS: 16 DEGREES
QRSD INTERVAL: 110 MS
QT INTERVAL: 436 MS
QTC INTERVAL: 527 MS
S PNEUM AG UR QL: NEGATIVE
T WAVE AXIS: 140 DEGREES
UNIT DISPENSE STATUS: NORMAL
UNIT DISPENSE STATUS: NORMAL
UNIT PRODUCT CODE: NORMAL
UNIT PRODUCT CODE: NORMAL
UNIT PRODUCT VOLUME: 350 ML
UNIT PRODUCT VOLUME: 350 ML
UNIT RH: NORMAL
UNIT RH: NORMAL
VENTRICULAR RATE: 88 BPM

## 2022-11-23 RX ORDER — HYDROMORPHONE HCL/PF 1 MG/ML
0.5 SYRINGE (ML) INJECTION EVERY 6 HOURS PRN
Status: CANCELLED | OUTPATIENT
Start: 2022-11-23

## 2022-11-23 RX ORDER — PROPOFOL 10 MG/ML
INJECTION, EMULSION INTRAVENOUS AS NEEDED
Status: DISCONTINUED | OUTPATIENT
Start: 2022-11-23 | End: 2022-11-23

## 2022-11-23 RX ORDER — VASOPRESSIN 20 U/ML
INJECTION PARENTERAL AS NEEDED
Status: DISCONTINUED | OUTPATIENT
Start: 2022-11-23 | End: 2022-11-23

## 2022-11-23 RX ORDER — SODIUM CHLORIDE, SODIUM LACTATE, POTASSIUM CHLORIDE, CALCIUM CHLORIDE 600; 310; 30; 20 MG/100ML; MG/100ML; MG/100ML; MG/100ML
INJECTION, SOLUTION INTRAVENOUS CONTINUOUS PRN
Status: DISCONTINUED | OUTPATIENT
Start: 2022-11-23 | End: 2022-11-23

## 2022-11-23 RX ORDER — HYDROMORPHONE HCL IN WATER/PF 6 MG/30 ML
0.2 PATIENT CONTROLLED ANALGESIA SYRINGE INTRAVENOUS EVERY 4 HOURS PRN
Status: CANCELLED | OUTPATIENT
Start: 2022-11-23

## 2022-11-23 RX ORDER — METOPROLOL TARTRATE 50 MG/1
100 TABLET, FILM COATED ORAL EVERY 12 HOURS SCHEDULED
Status: CANCELLED | OUTPATIENT
Start: 2022-11-23

## 2022-11-23 RX ORDER — LIDOCAINE HYDROCHLORIDE 10 MG/ML
INJECTION, SOLUTION EPIDURAL; INFILTRATION; INTRACAUDAL; PERINEURAL AS NEEDED
Status: DISCONTINUED | OUTPATIENT
Start: 2022-11-23 | End: 2022-11-23

## 2022-11-23 RX ORDER — ALBUTEROL SULFATE 90 UG/1
2 AEROSOL, METERED RESPIRATORY (INHALATION) EVERY 6 HOURS PRN
Status: CANCELLED | OUTPATIENT
Start: 2022-11-23

## 2022-11-23 RX ORDER — INSULIN LISPRO 100 [IU]/ML
1-5 INJECTION, SOLUTION INTRAVENOUS; SUBCUTANEOUS EVERY 6 HOURS SCHEDULED
Status: CANCELLED | OUTPATIENT
Start: 2022-11-23

## 2022-11-23 RX ORDER — NOREPINEPHRINE BITARTRATE 1 MG/ML
INJECTION, SOLUTION INTRAVENOUS AS NEEDED
Status: DISCONTINUED | OUTPATIENT
Start: 2022-11-23 | End: 2022-11-23

## 2022-11-23 RX ORDER — POTASSIUM CHLORIDE 14.9 MG/ML
20 INJECTION INTRAVENOUS ONCE
Status: DISCONTINUED | OUTPATIENT
Start: 2022-11-23 | End: 2022-11-23 | Stop reason: HOSPADM

## 2022-11-23 RX ORDER — HYDROMORPHONE HCL/PF 1 MG/ML
0.5 SYRINGE (ML) INJECTION EVERY 6 HOURS PRN
Status: DISCONTINUED | OUTPATIENT
Start: 2022-11-23 | End: 2022-11-23 | Stop reason: HOSPADM

## 2022-11-23 RX ORDER — NITROGLYCERIN 0.4 MG/1
0.4 TABLET SUBLINGUAL
Status: CANCELLED | OUTPATIENT
Start: 2022-11-23

## 2022-11-23 RX ORDER — HYDROMORPHONE HCL IN WATER/PF 6 MG/30 ML
0.2 PATIENT CONTROLLED ANALGESIA SYRINGE INTRAVENOUS EVERY 4 HOURS PRN
Status: DISCONTINUED | OUTPATIENT
Start: 2022-11-23 | End: 2022-11-23 | Stop reason: HOSPADM

## 2022-11-23 RX ORDER — TAMSULOSIN HYDROCHLORIDE 0.4 MG/1
0.4 CAPSULE ORAL
Status: CANCELLED | OUTPATIENT
Start: 2022-11-23

## 2022-11-23 RX ORDER — CEFEPIME HYDROCHLORIDE 1 G/50ML
1000 INJECTION, SOLUTION INTRAVENOUS EVERY 24 HOURS
Status: CANCELLED | OUTPATIENT
Start: 2022-11-23

## 2022-11-23 RX ORDER — AMIODARONE HYDROCHLORIDE 200 MG/1
200 TABLET ORAL
Status: CANCELLED | OUTPATIENT
Start: 2022-11-23

## 2022-11-23 RX ORDER — CEFAZOLIN SODIUM 1 G/3ML
INJECTION, POWDER, FOR SOLUTION INTRAMUSCULAR; INTRAVENOUS AS NEEDED
Status: DISCONTINUED | OUTPATIENT
Start: 2022-11-23 | End: 2022-11-23

## 2022-11-23 RX ORDER — ACETAMINOPHEN 325 MG/1
650 TABLET ORAL EVERY 6 HOURS PRN
Status: CANCELLED | OUTPATIENT
Start: 2022-11-23

## 2022-11-23 RX ORDER — ONDANSETRON 2 MG/ML
INJECTION INTRAMUSCULAR; INTRAVENOUS AS NEEDED
Status: DISCONTINUED | OUTPATIENT
Start: 2022-11-23 | End: 2022-11-23

## 2022-11-23 RX ORDER — HYDROMORPHONE HCL IN WATER/PF 6 MG/30 ML
0.2 PATIENT CONTROLLED ANALGESIA SYRINGE INTRAVENOUS ONCE
Status: COMPLETED | OUTPATIENT
Start: 2022-11-23 | End: 2022-11-23

## 2022-11-23 RX ADMIN — SODIUM CHLORIDE, SODIUM LACTATE, POTASSIUM CHLORIDE, AND CALCIUM CHLORIDE: .6; .31; .03; .02 INJECTION, SOLUTION INTRAVENOUS at 05:39

## 2022-11-23 RX ADMIN — VASOPRESSIN 0.8 UNITS: 20 INJECTION INTRAVENOUS at 06:14

## 2022-11-23 RX ADMIN — NOREPINEPHRINE BITARTRATE 16 MCG: 1 INJECTION INTRAVENOUS at 05:52

## 2022-11-23 RX ADMIN — HYDROMORPHONE HYDROCHLORIDE 0.5 MG: 1 INJECTION, SOLUTION INTRAMUSCULAR; INTRAVENOUS; SUBCUTANEOUS at 01:10

## 2022-11-23 RX ADMIN — ONDANSETRON 4 MG: 2 INJECTION INTRAMUSCULAR; INTRAVENOUS at 06:21

## 2022-11-23 RX ADMIN — NOREPINEPHRINE BITARTRATE 5 MCG/MIN: 1 INJECTION INTRAVENOUS at 05:54

## 2022-11-23 RX ADMIN — POTASSIUM CHLORIDE 20 MEQ: 14.9 INJECTION, SOLUTION INTRAVENOUS at 02:48

## 2022-11-23 RX ADMIN — VASOPRESSIN 0.8 UNITS: 20 INJECTION INTRAVENOUS at 06:06

## 2022-11-23 RX ADMIN — HYDROMORPHONE HYDROCHLORIDE 0.2 MG: 0.2 INJECTION, SOLUTION INTRAMUSCULAR; INTRAVENOUS; SUBCUTANEOUS at 00:07

## 2022-11-23 RX ADMIN — CEFAZOLIN 1000 MG: 1 INJECTION, POWDER, FOR SOLUTION INTRAMUSCULAR; INTRAVENOUS at 06:04

## 2022-11-23 RX ADMIN — PROPOFOL 150 MG: 10 INJECTION, EMULSION INTRAVENOUS at 05:50

## 2022-11-23 RX ADMIN — NOREPINEPHRINE BITARTRATE 16 MCG: 1 INJECTION INTRAVENOUS at 06:11

## 2022-11-23 RX ADMIN — LIDOCAINE HYDROCHLORIDE 50 MG: 10 INJECTION, SOLUTION EPIDURAL; INFILTRATION; INTRACAUDAL; PERINEURAL at 05:50

## 2022-11-23 NOTE — WOUND OSTOMY CARE
Consult Note - Wound   Abiel White 80 y o  male MRN: [id number]  Unit/Bed#: MICU 08 Encounter: [id number]        History and Present Illness:  Patient is 79 yo male admitted to [facility] for urethral trauma  Patient has history of CKD, CHF, and a-fib  Patient now has supra pubic catheter and is incontinent of bowel  Patient is max assist with turning from side to side for assessment  Patient seen today in MICU on critical care low air loss mattress  Assessment Findings:     1  POA Right anterior foot- unclear as to etiology of wound as wound presents as pressure, however is not in typical location of pressure injury  Purple, intact skin does not navdeep, no drainage present  2  POA STI sacral/buttocks- purple, nonblanchable erythema, intact, no drainage  DTI's have the potential to evolve into full thickness unstageable, stage III, or stage IV wounds  3  POA DTI right heel- black and purple intact nonblanchable erythema and fragile, no drainage  DTI's have the potential to evolve into full thickness unstageable, stage III, or stage IV wounds  4  POA DTI right lateral ankle- intact, purple nonblanchable erythema, no drainage  DTI's have the potential to evolve into full thickness unstageable, stage III, or stage IV wounds  No induration, fluctuance, odor, warmth/temperature differences, redness, or purulence noted to the above noted wounds and skin areas assessed  New dressings applied per orders listed below  Patient tolerated well- no s/s of non-verbal pain or discomfort observed during the encounter  Bedside nurse aware of plan of care  See flow sheets for more detailed assessment findings  Wound care will continue to follow  Skin care Plan:  1-Cleanse sacro-buttocks and B/L heels with soap and water  Apply Allevyn foam  Steve with T for treatment to right heel and sacrum and P for prevention to left heel  Change every other day and PRN     2-Turn/reposition q2h or when medically stable for pressure re-distribution on skin   3-Elevate heels to offload pressure  4-Moisturize skin daily with skin nourishing cream  5-Ehob cushion in chair when out of bed  6-Calazime paste to marcus rectal areaTID and PRN       Wounds:  Wound 11/03/22 Skin Tear Thigh Anterior;Right (Active)   Wound Image   11/23/22 1352   Wound Description Other (Comment) 11/22/22 1600   Wound Length (cm) 2 cm 11/23/22 1352   Wound Width (cm) 2 cm 11/23/22 1352   Wound Depth (cm) 0 cm 11/23/22 1352   Wound Surface Area (cm^2) 4 cm^2 11/23/22 1352   Wound Volume (cm^3) 0 cm^3 11/23/22 1352   Calculated Wound Volume (cm^3) 0 cm^3 11/23/22 1352   Dressing Open to air 11/21/22 0419       Wound 11/21/22 Sacrum (Active)   Wound Image   11/23/22 1355   Wound Description Beefy red;Fragile;Light purple;Non-blanchable erythema 11/23/22 1610   Pressure Injury Stage DTPI 11/23/22 1355   Marcus-wound Assessment Fragile;Erythema 11/23/22 1610   Wound Length (cm) 4 cm 11/23/22 1355   Wound Width (cm) 6 cm 11/23/22 1355   Wound Depth (cm) 0 cm 11/23/22 1355   Wound Surface Area (cm^2) 24 cm^2 11/23/22 1355   Wound Volume (cm^3) 0 cm^3 11/23/22 1355   Calculated Wound Volume (cm^3) 0 cm^3 11/23/22 1355   Tunneling 0 cm 11/23/22 1355   Tunneling in depth located at 0 11/23/22 1355   Undermining 0 11/23/22 1355   Undermining is depth extending from 0 11/23/22 1355   Wound Site Closure CHANDAN 11/23/22 1355   Drainage Amount None 11/23/22 1610   Non-staged Wound Description Not applicable 23/25/54 5711   Treatments Cleansed 11/23/22 1355   Dressing Foam, Silicon (eg  Allevyn, etc) 11/23/22 1610   Wound packed? No 11/23/22 1355   Packing- # removed 0 11/23/22 1355   Packing- # inserted 0 11/23/22 3912   Dressing Changed New 11/23/22 1355   Patient Tolerance Tolerated well 11/23/22 1355   Dressing Status Clean;Dry; Intact 11/23/22 1610       Wound 11/21/22 Heel Right (Active)   Wound Image   11/23/22 1349   Wound Description Necrotic;Non-blanchable erythema 11/23/22 1349   Pressure Injury Stage DTPI 11/23/22 1349   Rosaura-wound Assessment Fragile 11/23/22 1349   Wound Length (cm) 8 cm 11/23/22 1349   Wound Width (cm) 10 cm 11/23/22 1349   Wound Depth (cm) 0 cm 11/23/22 1349   Wound Surface Area (cm^2) 80 cm^2 11/23/22 1349   Wound Volume (cm^3) 0 cm^3 11/23/22 1349   Calculated Wound Volume (cm^3) 0 cm^3 11/23/22 1349   Tunneling 0 cm 11/23/22 1349   Tunneling in depth located at 0 11/23/22 1349   Undermining 0 11/23/22 1349   Undermining is depth extending from 0 11/23/22 1349   Drainage Amount None 11/22/22 1600   Treatments Elevated 11/22/22 2000   Dressing Open to air 11/23/22 1610   Dressing Changed Changed 11/21/22 2300   Patient Tolerance Tolerated well 11/21/22 2300   Dressing Status Dry; Intact; Clean 11/22/22 1600       Wound 11/21/22 Pressure Injury Right;Lateral (Active)   Wound Image   11/23/22 1350   Wound Length (cm) 5 cm 11/23/22 1350   Wound Width (cm) 3 5 cm 11/23/22 1350   Wound Depth (cm) 0 cm 11/23/22 1350   Wound Surface Area (cm^2) 17 5 cm^2 11/23/22 1350   Wound Volume (cm^3) 0 cm^3 11/23/22 1350   Calculated Wound Volume (cm^3) 0 cm^3 11/23/22 1350       Wound 11/23/22 Pretibial Distal;Right (Active)   Wound Image   11/23/22 1349   Wound Description Non-blanchable erythema 11/23/22 1349   Rosaura-wound Assessment Edema 11/23/22 1349   Wound Length (cm) 12 cm 11/23/22 1349   Wound Width (cm) 8 cm 11/23/22 1349   Wound Depth (cm) 0 cm 11/23/22 1349   Wound Surface Area (cm^2) 96 cm^2 11/23/22 1349   Wound Volume (cm^3) 0 cm^3 11/23/22 1349   Calculated Wound Volume (cm^3) 0 cm^3 11/23/22 1349   Tunneling 0 cm 11/23/22 1349   Tunneling in depth located at 0 11/23/22 1349   Undermining 0 11/23/22 1349   Undermining is depth extending from 0 11/23/22 1349   Wound Site Closure CHANDAN 11/23/22 1349   Drainage Amount None 11/23/22 1349   Non-staged Wound Description Not applicable 14/84/73 8819   Treatments Cleansed 11/23/22 1349   Dressing Protective barrier 11/23/22 1349   Wound packed? No 11/23/22 1349   Packing- # removed 0 11/23/22 1349   Packing- # inserted 0 11/23/22 6618   Dressing Changed New 11/23/22 1349   Patient Tolerance Tolerated well 11/23/22 1349   Dressing Status Clean; Intact;Dry 11/23/22 1349       Wound 11/23/22 Penis N/A (Active)   Wound Description Clean;Dry; Intact 11/23/22 0730   Rosaura-wound Assessment Clean;Dry; Intact 11/23/22 0730   Wound Site Closure Unable to assess 11/23/22 0730   Drainage Amount None 11/23/22 0730   Dressing Open to air 11/23/22 0730         Brigette MCCLAINN, RN, [facility]

## 2022-11-23 NOTE — ASSESSMENT & PLAN NOTE
Wt Readings from Last 3 Encounters:   11/22/22 71 kg (156 lb 8 4 oz)   11/22/22 70 8 kg (156 lb)   11/20/22 72 3 kg (159 lb 6 3 oz)     · Echo 03/01/2022 with EF 45%

## 2022-11-23 NOTE — ASSESSMENT & PLAN NOTE
· Cefepime and vancomycin received in the ED   currently on cefepime  · Hypotension fluid responsive  Levophed ordered in the ED   currently off Levophed  · Blood, MRSA, sputum cultures ordered as well as legionella and strep PNA  · Urine from 11/20 with >100K E  Coli  · Discontinued Vanco  · Lactic acidosis > 10  Trend until less than 2 0    · Bandemia present   · CT chest/abd/pelvis did not yield a source of infection  ·  57   Repeat 88- D/C trend

## 2022-11-23 NOTE — DISCHARGE INSTR - OTHER ORDERS
Skin care Plan:  1-Apply Xeroform gauze to right lateral and medial foot and cover Allevyn silicone bordered foam to B/L heel and feet kim t for treatment change every other day and as needed for soilage and displacement  2-Turn/reposition q2h or when medically stable for pressure re-distribution on skin   3-Elevate heels to offload pressure, prevalon boots in bed   4-Moisturize skin daily with skin nourishing cream  5-Ehob cushion in chair when out of bed    6-Calazime paste to sacrum/buttocks TID and PRN  7-P-500 specialty mattress

## 2022-11-23 NOTE — OP NOTE
OPERATIVE REPORT  PATIENT NAME: Zaid Munguia    :  1940  MRN: [id number]  Pt Location: [facility] ROOM 01    SURGERY DATE: 2022    Surgeon(s) and Role:     Madelyn Patterson MD - Primary    Preop Diagnosis:  Gross hematuria [R31 0]  Injury of urethra, initial encounter [S37 30XA]    Post-Op Diagnosis Codes:     * Gross hematuria [R31 0]     * Injury of urethra, initial encounter [S37 30XA]    Procedure(s) (LRB):  CYSTOSCOPY (N/A)  INSERTION SUPRAPUBIC CATHETER PERCUTANEOUS (N/A)    Specimen(s):  * No specimens in log *    Estimated Blood Loss:   Minimal    Drains:  Suprapubic Catheter 18 Fr  (Active)   Number of days: 0       [REMOVED] Urethral Catheter Straight-tip 18 Fr  (Removed)   Number of days: 0       Anesthesia Type:   Choice    Operative Indications:  Gross hematuria [R31 0]  Injury of urethra, initial encounter [S37 30XA]  Sepsis    Operative Findings:  Urethra not passable  Only clot and damaged tissue seen, with small fat particles  Suprapubic tube placed blindly with [place] sheath  Copious dark urine return  Small clot irrigated  Urine clear  Complications:   None    Procedure and Technique:  Patient identified, brought to the operating, and placed on table supine position  After induction general anesthesia he was carefully placed in dorsal lithotomy position  Groin, pubic area, and suprapubic area were prepped and draped in usual sterile fashion  A formal time-out was performed  The 21 [place] rigid cystoscope was placed per urethra  Once reaching the bulbar urethra, I was not able to identify urethral lumen  There are numerous clots and ratty soft tissue  There was some small fat globules noted  It was clear that the urethra perforated at this location  I was unable to safely pass this with the cystoscope or with a guidewire  I then placed to patient in Trendelenburg position  A small stab wound was made 2 finger breaths above the symphysis pubis in the midline    A spinal needle was placed and the bladder was identified with urine drainage  The 18 gauge he suprapubic [place] needle was then placed into the bladder and trocar was removed  Guidewire was fed through the needle into the bladder  The needle was removed and the fascial dilating needle was then placed and the fascia was dilated in cruciate form  The dilator with obturator was then placed into the bladder  The largest available was a 21 [place]  The obturator and wire were then removed in an 25 [place] catheter was placed through the sheath into the bladder with 10 mL in the balloon  Gush of urine returned immediately  A stay suture was placed  The bladder was completely drained  It was irrigated with 1 L of saline  Small clots returned but no large clots  The efflux after drainage ran clear  Patient had become hypotensive immediately on induction and was requiring multiple medications  Decision was made at this point to terminate the procedure and recover the patient resuscitate  Consideration had been given to scrotal evaluation, though scrotal was edematous the not have the appearance of infection at this time  Will need further observation     I was present for the entire procedure    Patient Disposition:  PACU         SIGNATURE: Lucia Remy MD  DATE: November 23, 2022  TIME: 6:33 AM

## 2022-11-23 NOTE — H&P
[address] [facility]  H&P- Aki [facility] 1940, 80 y o  male MRN: [id number]  Unit/Bed#: [facility] 015-94 Encounter: [id number]  Primary Care Provider: Jaime Hawkins DO   Date and time admitted to hospital: 11/23/2022  4:04 AM    * Urethral trauma  Assessment & Plan  · CT obtained showing urethral damage with air in the perineum, penis and surrounding tissue  New zavala catheter in malposition below the prostate with balloon in the penis  CBI placed on hold  · Urology consulted  Septic shock (HCC)  Assessment & Plan  · Cefepime and vancomycin received in the ED   currently on cefepime  · Hypotension fluid responsive  Levophed ordered in the ED   currently off Levophed  · Blood, MRSA, sputum cultures ordered as well as legionella and strep PNA  · Urine from 11/20 with >100K E  Coli  · Discontinued Vanco  · Lactic acidosis > 10  Trend until less than 2 0    · Bandemia present   · CT chest/abd/pelvis did not yield a source of infection  ·  57  Repeat 88- D/C trend    Hematuria  Assessment & Plan  · Attempted zavala placement  No urine returned and catheter was blocked with clots immediately  · CBI ordered initially; currently on hold due to urethral trauma  · Urology consult  · Hold anticoagulation/antiplatelet X 48 hours    UTI (urinary tract infection)  Assessment & Plan  Continue cefepime  Hyperglycemia  Assessment & Plan  Continue sliding scale  Transaminitis  Assessment & Plan  · Monitor with daily labs  · Likely secondary to shock liver       Closed fracture of trochanter of right femur with routine healing  Assessment & Plan  · Discharged from [address] [place] [address] on 11/06 s/p repair  · Routing healing in progress      Acute on chronic anemia  Assessment & Plan  Lab Results   Component Value Date    EGFR 20 11/22/2022    EGFR 21 11/22/2022    EGFR 17 11/21/2022    CREATININE 2 73 (H) 11/22/2022    CREATININE 2 68 (H) 11/22/2022    CREATININE 3 19 (H) 11/21/2022 · CKD 3 Baseline Cr 1 9-2 2  · Avoid nephrotoxic agents  · Trend with daily labs  Chronic heart failure with preserved ejection fraction [facility])  Assessment & Plan  Wt Readings from Last 3 Encounters:   11/22/22 71 kg (156 lb 8 4 oz)   11/22/22 70 8 kg (156 lb)   11/20/22 72 3 kg (159 lb 6 3 oz)     · Echo 03/01/2022 with EF 45%  Acute-on-chronic kidney injury [facility])  Assessment & Plan  Lab Results   Component Value Date    EGFR 20 11/22/2022    EGFR 21 11/22/2022    EGFR 17 11/21/2022    CREATININE 2 73 (H) 11/22/2022    CREATININE 2 68 (H) 11/22/2022    CREATININE 3 19 (H) 11/21/2022       Hyperlipidemia, mixed  Assessment & Plan  · Continue statin when able  · Hold for now given transaminitis  VTE Prophylaxis: Pharmacologic VTE Prophylaxis contraindicated due to Hematuria  / sequential compression device   Code Status: Level 1 - Full Code   POLST: There is no POLST form on file for this patient (pre-hospital)    Anticipated Length of Stay:  Patient will be admitted on an Inpatient basis with an anticipated length of stay of  greater than 2 midnights  Justification for Hospital Stay: Please see detailed plans noted above  Traumatic injury to the urethra  Continued antibiotic treatment for urinary tract infection/sepsis  Chief Complaint: Will abnormal CT of the abdomen findings with abdominal distention and bloating  History of Present Illness:  Josafat Lowe is a 80 y o  male who has past medical history significant for chronic atrial fibrillation, hyperlipidemia, chronic anemia, CKD, chronic heart failure with preserved ejection fraction  The patient was a transfer from [facility] where he stayed for approximately 2 days due to hematuria and septic shock which could be due to infection, distributive shock and cardiomyopathy    Reportedly, the patient was lethargic in the nursing facility where he was receiving rehabilitation for recent femoral fracture repair and was noted to be hypotensive  He was just diagnosed with the UTI and was placed on antibiotics  Initially, the patient was admitted in ICU with Levophed started  For the hematuria, the patient was seen by Urology and CBI was started and continued  However within the past few hours prior to transfer, the patient was noted to be having abdominal distension with hypogastric discomfort  Also there was failure of CBI drainage  Hence CBI was held  A CT of the abdomen and pelvis showed at the Schaffer catheter was mi position  Also the catheter balloon was located at the base of the penis below at the level of the prostate  There is presence of gas within soft tissues in the perineum and penis most pronounced on the left and urethral injury was suspected  The patient was therefore immediately transferred to [facility] for urology opinion  Currently, patient is generally comfortable although feels pulled to the hypogastric area  Patient is not in any acute pain      Review of Systems:    Constitutional:  Denies fever with occasional chills  Eyes:  Denies change in visual acuity   HENT:  Denies nasal congestion or sore throat   Respiratory:  Denies cough or shortness of breath   Cardiovascular:  Denies chest pain or edema   GI:  Denies abdominal pain but with hypogastric discomfort on palpation, nausea, vomiting, bloody stools or diarrhea   :  Denies dysuria   Musculoskeletal:  Denies back pain or joint pain   Integument:  Denies rash   Neurologic:  Denies headache, focal weakness or sensory changes   Endocrine:  Denies polyuria or polydipsia   Psychiatric:  Denies depression or anxiety     Past Medical and Surgical History:   Past Medical History:   Diagnosis Date   • CHF (congestive heart failure) ([facility] [address] )    • Coronary artery disease    • Hyperlipidemia    • Hypertension    • Paroxysmal atrial fibrillation [facility])      Past Surgical History:   Procedure Laterality Date   • CARDIAC SURGERY      cabg x 2 2014 [facility]   • CORONARY ANGIOPLASTY WITH STENT PLACEMENT  10/2021    DAPHNEY to left main and ramus   • MITRAL VALVE REPAIR  2014    mitral ring   • NV OPEN RX FEMUR FX+INTRAMED HALIMA Right 11/1/2022    Procedure: INSERTION NAIL IM FEMUR ANTEGRADE (TROCHANTERIC); Surgeon: Moise Townsend; Location: [facility];  Service: Orthopedics       Meds/Allergies:  Medications Prior to Admission   Medication   • acetaminophen (TYLENOL) 325 mg tablet   • albuterol (ProAir HFA) 90 mcg/act inhaler   • amiodarone 200 mg tablet   • amoxicillin-clavulanate (AUGMENTIN) 875-125 mg per tablet   • apixaban (ELIQUIS) 2 5 mg   • atorvastatin (LIPITOR) 40 mg tablet   • clopidogrel (PLAVIX) 75 mg tablet   • ferrous gluconate (FERGON) 324 mg tablet   • furosemide (LASIX) 20 mg tablet   • lidocaine (LIDODERM) 5 %   • metoprolol tartrate (LOPRESSOR) 100 mg tablet   • nitroglycerin (NITROSTAT) 0 4 mg SL tablet   • oxyCODONE (OXY-IR) 5 MG capsule   • potassium chloride (K-DUR,KLOR-CON) 20 mEq tablet   • spironolactone (ALDACTONE) 25 mg tablet   • tamsulosin (FLOMAX) 0 4 mg       Allergies: No Known Allergies  History:  Marital Status: /Civil Union   Occupation:  Retired  Patient Pre-hospital Living Situation:  Lives at home  Patient Pre-hospital Level of Mobility:  Ambulatory  Patient Pre-hospital Diet Restrictions:  Regular  Substance Use History:   Social History     Substance and Sexual Activity   Alcohol Use Not Currently     Social History     Tobacco Use   Smoking Status Never   Smokeless Tobacco Never     Social History     Substance and Sexual Activity   Drug Use Never       Family History:  Family History   Problem Relation Age of Onset   • Hypertension Father        Physical Exam:     Vitals:   109/69, heart rate 95, respiratory rate 23  Constitutional:  Well developed, well nourished, no acute distress, non-toxic appearance and lying flat on bed not in any cardiorespiratory distress    Eyes:  PERRL, conjunctiva normal HENT:  Atraumatic, external ears normal, nose normal, oropharynx moist, no pharyngeal exudates  Neck- normal range of motion, no tenderness, supple   Respiratory:  No respiratory distress, normal breath sounds, no rales, no wheezing   Cardiovascular:  Normal rate, normal rhythm, no murmurs, no gallops, no rubs   GI:  Soft, nondistended, flat with hypogastric fullness, normal bowel sounds, nontender, no organomegaly, no mass, no rebound, no guarding   :  No costovertebral angle tenderness   Musculoskeletal:  No edema, no tenderness, no deformities  Back- no tenderness  Integument:  Well hydrated, no rash   Lymphatic:  No lymphadenopathy noted   Neurologic:  Alert &awake, communicative, CN 2-12 normal, normal motor function, normal sensory function, no focal deficits noted   Psychiatric:  Speech and behavior appropriate       Lab Results: I have personally reviewed pertinent reports  Results from last 7 days   Lab Units 11/22/22 2221 11/22/22  0527 11/21/22 1923 11/20/22  1859   WBC Thousand/uL 22 55*   < > 18 12* 12 72*   HEMOGLOBIN g/dL 8 6*   < > 9 1* 8 7*   HEMATOCRIT % 26 7*   < > 30 1* 27 8*   PLATELETS Thousands/uL 146*   < > 193 230   NEUTROS PCT %  --   --   --  89*   LYMPHS PCT %  --   --   --  3*   LYMPHO PCT %  --   --  1*  --    MONOS PCT %  --   --   --  7   MONO PCT %  --   --  3*  --    EOS PCT %  --   --  0 0    < > = values in this interval not displayed  Results from last 7 days   Lab Units 11/22/22 2221 11/22/22  0527   POTASSIUM mmol/L 3 8 3 7   CHLORIDE mmol/L 104 105   CO2 mmol/L 21 23   BUN mg/dL 50* 41*   CREATININE mg/dL 2 73* 2 68*   CALCIUM mg/dL 7 2* 6 7*   ALK PHOS U/L  --  460*   ALT U/L  --  221*   AST U/L  --  212*     Results from last 7 days   Lab Units 11/21/22 1923   INR  1 85*           Imaging: I have personally reviewed pertinent reports        CT abdomen pelvis wo contrast    Result Date: 11/23/2022  Narrative: CT ABDOMEN AND PELVIS WITHOUT IV CONTRAST INDICATION:   Increased lethargy, scrotal edema  Abdominal pain, flank pain, decreased urine volume, difficulty urinating, urinary frequency and urgency  Mild lower abdominal tenderness  Leukocytosis  Recently diagnosed with urinary tract infection  COMPARISON:  November 21, 2022  TECHNIQUE:  CT examination of the abdomen and pelvis was performed without intravenous contrast  Axial, sagittal, and coronal 2D reformatted images were created from the source data and submitted for interpretation  Radiation dose length product (DLP) for this visit:  668 mGy-cm   This examination, like all CT scans performed in the [facility], was performed utilizing techniques to minimize radiation dose exposure, including the use of iterative reconstruction and automated exposure control  Enteric contrast was not administered  FINDINGS: ABDOMEN LOWER CHEST:  There is a a moderate-sized left pleural effusion  There is bibasilar atelectasis containing some air bronchograms, left greater than right  There is coronary artery disease  There has been prior CABG and valve replacement surgery  The visualized ascending thoracic aorta measures 4 3 cm diameter  LIVER/BILIARY TREE:  Unremarkable  GALLBLADDER:  There are gallstone(s) within the gallbladder, without pericholecystic inflammatory changes  SPLEEN:  Unremarkable  PANCREAS:  Unremarkable  ADRENAL GLANDS:  Unremarkable  KIDNEYS/URETERS:  Persistent nephrograms are present; this raises concern for possible ATN  There is mild dilatation of the renal collecting systems with slight caliectasis bilaterally  The ureters are mildly dilated bilaterally to the level of the urinary bladder, likely related to the degree of urinary bladder distention  There is bilateral perinephric stranding and fluid, left greater than right, increased compared to the prior study  Fluid tracks caudally bilaterally into the pelvis    There is a 3 mm nonobstructing calculus in the lower pole right kidney  STOMACH AND BOWEL:  No bowel obstruction  There is colonic diverticulosis without evidence of acute diverticulitis  APPENDIX:  No findings to suggest appendicitis  ABDOMINOPELVIC CAVITY:  As described above  There is a small amount of ascites, increased compared to the prior study  No evidence of pneumoperitoneum  VESSELS:  Atherosclerosis  No abdominal aortic aneurysm  PELVIS REPRODUCTIVE ORGANS:  The prostate is enlarged  Please see below  URINARY BLADDER:  The urinary bladder is distended, extending nearly to the level of the umbilicus  The urinary bladder volume is approximately 970 mL  There is gas layering non-dependently within the anterior aspect of the urinary bladder lumen  Since the prior study a Schaffer catheter has been inserted, however, the catheter is malpositioned  The catheter balloon is located near the base of the penis, below the level of the prostate  There is gas within the soft tissues of the perineum and penis, most pronounced on the left  ABDOMINAL WALL/INGUINAL REGIONS:  The inferior aspect of the left gluteus john muscle is asymmetrically enlarged compared with the right and there is overlying subcutaneous edema; these findings are new compared with November 21, 2022  There is body wall edema, worse compared to the prior study  There are small to moderate bilateral fat-containing inguinal hernias  OSSEOUS STRUCTURES:  No new fractures  Age indeterminant fracture posterior right 12th rib redemonstrated  Old healed left rib fracture deformities  There is multilevel degenerative change of the spine  Redemonstrated prior ORIF right hip, transfixing intertrochanteric fracture, without significant healing (fracture diagnosed October 31, 2022)  Prior sternotomy  Impression: The Schaffer catheter is malpositioned  The catheter balloon is located near the base of the penis, below the level of the prostate    There is gas within the soft tissues of the perineum and penis, most pronounced on the left  Urethral injury is suspected  Urology consultation and follow-up is recommended  The urinary bladder is distended, extending nearly to the level of the umbilicus  The urinary bladder volume is approximately 970 mL  There is gas layering non-dependently within the anterior aspect of the urinary bladder  There is mild dilatation of the renal collecting systems and ureters bilaterally, likely related to the degree of urinary bladder distention  There is bilateral perinephric stranding and fluid, left greater than right, increased compared with November 21, 2022  Fluid tracks caudally bilaterally into the pelvis  Correlation with urinalysis and urine culture and sensitivity is recommended  Persistent nephrograms are present; this raises concern for possible ATN  Clinical and laboratory correlation is recommended  Consider Nephrology consultation  There is a small amount of ascites, increased compared to the prior study  There is body wall edema, worse compared to the prior study  The inferior aspect of the left gluteus john muscle appears asymmetrically enlarged compared with the contralateral right side and there is overlying subcutaneous edema  This may be related to asymmetric edema, myositis, or hematoma  Clinical correlation and follow-up is recommended  There is a moderate-sized left pleural effusion  There is bibasilar atelectasis containing some air bronchograms, left greater than right  Superimposed pneumonia should be excluded clinically  Other nonemergent findings, as described above  Please see discussion  I personally discussed this study with Abner Nielsen on 11/23/2022 at 1:40 AM  Workstation performed: [id number]     CT chest abdomen pelvis wo contrast    Result Date: 11/21/2022  Narrative: CT CHEST, ABDOMEN AND PELVIS WITHOUT IV CONTRAST INDICATION:   Sepsis   COMPARISON:  Multiple priors most recently same day chest radiograph TECHNIQUE: CT examination of the chest, abdomen and pelvis was performed without intravenous contrast  Axial, sagittal, and coronal 2D reformatted images were created from the source data and submitted for interpretation  Radiation dose length product (DLP) for this visit:  867 mGy-cm   This examination, like all CT scans performed in the [facility], was performed utilizing techniques to minimize radiation dose exposure, including the use of iterative reconstruction and automated exposure control  Enteric contrast was not administered  FINDINGS: CHEST LUNGS:  Few scattered reticular and groundglass opacities throughout the lungs are nonspecific  Patchy bibasilar opacities probably representing dependent atelectasis  PLEURA:  Moderate left pleural effusion  HEART/GREAT VESSELS: Thoracic aortic and coronary artery atherosclerosis  No thoracic aortic aneurysm  MEDIASTINUM AND JENNIFER:  Unremarkable  CHEST WALL AND LOWER NECK:  Unremarkable  ABDOMEN LIVER/BILIARY TREE:  Unremarkable  GALLBLADDER:  There are gallstone(s) within the gallbladder, without pericholecystic inflammatory changes  SPLEEN:  Unremarkable  PANCREAS:  Unremarkable  ADRENAL GLANDS:  Unremarkable  KIDNEYS/URETERS:  Retained contrast related to recent administration  No hydronephrosis  STOMACH AND BOWEL:  There is colonic diverticulosis without evidence of acute diverticulitis  APPENDIX:  A normal appendix was visualized  ABDOMINOPELVIC CAVITY:  No ascites  No pneumoperitoneum  No lymphadenopathy  VESSELS:  Atherosclerotic changes are present  No evidence of aneurysm  PELVIS REPRODUCTIVE ORGANS:  The prostate is enlarged  URINARY BLADDER:  Excreted contrast material within the urinary bladder  No acute abnormality or suspicious mass identified  ABDOMINAL WALL/INGUINAL REGIONS:  Unremarkable  OSSEOUS STRUCTURES:  Nondisplaced age-indeterminate right posterior 12th rib fracture as seen on recent CT of 11/20/2022    Bilateral chronic healed rib fractures  Elva Boss Spinal degenerative changes are noted  Left humeral head suture anchors  Right femoral cephalomedullary nail  Sternotomy wires  Impression: No definite acute inflammatory process identified  Cholelithiasis without CT evidence of acute cholecystitis  Moderate left pleural effusion  Mild scattered groundglass opacities in the lungs, favored to represent areas of subsegmental atelectasis, however, superimposed pneumonia cannot be entirely excluded  Additional findings, as described  Workstation performed: [id number]     XR chest 1 view portable    Result Date: 11/22/2022  Narrative: CHEST INDICATION:   Shortness of breath  COMPARISON:  3/4/2022 EXAM PERFORMED/VIEWS:  XR CHEST PORTABLE FINDINGS:  Low lung volumes  Basilar atelectasis  Chronic small left pleural effusion, possible small right effusion  No pneumothorax  Heart, mediastinal and hilar structures are within normal limits  Surgical changes  Mitral valve repair  No acute osseous or soft tissue pathology  Left rotator cuff repair and sternotomy  Impression: Low lung volumes with basilar atelectasis and small, chronic left, possible right pleural effusion  Workstation performed: [id number]     XR hip/pelv 2-3 vws right if performed    Result Date: 11/1/2022  Narrative: C-ARM - RIGHT HIP INDICATION: ORIF  Procedure guidance  COMPARISON:  10/31/2022 TECHNIQUE: FLUOROSCOPY TIME:   49 SECONDS 5 FLUOROSCOPIC IMAGES FINDINGS: Fluoroscopic guidance provided for procedure guidance  Osseous and soft tissue detail limited by technique  Impression: Fluoroscopic guidance provided for procedure guidance  Please refer to the separate procedure notes for additional details  Workstation performed: [id number]     XR hip/pelv 2-3 vws right if performed    Result Date: 10/31/2022  Narrative: RIGHT HIP INDICATION:   Right hip pain   COMPARISON:  None VIEWS:  XR HIP/PELV 2-3 VWS RIGHT W PELVIS IF PERFORMED FINDINGS: Right hip comminuted displaced trochanteric fracture with varus deformity  Bilateral mild degenerative hip joints  No lytic or blastic osseous lesion  Soft tissues are unremarkable  The visualized lumbar spine is unremarkable  Impression: Right hip fracture  The study was marked in [facility] for immediate notification  Workstation performed: Isak Jack kidney and bladder    Result Date: 11/3/2022  Narrative: RENAL ULTRASOUND INDICATION:   Acute renal insufficiency, urinary retention  COMPARISON: Renal ultrasound April 2021, CT chest abdomen and pelvis April 2021 TECHNIQUE:   Ultrasound of the retroperitoneum was performed with a curvilinear transducer utilizing volumetric sweeps and still imaging techniques  FINDINGS: KIDNEYS: Right renal atrophy  Normal size left kidney  Right kidney:  8 2 x 4 9 x 4 6 cm  Volume 97 9 mL Left kidney:  10 1 x 5 2 x 4 1 cm  Volume 111 7 mL Right kidney Right renal cortical thinning with normal echotexture  No mass is identified  No hydronephrosis  No shadowing calculi  No perinephric fluid collections  Left kidney Normal echogenicity and contour  No mass is identified  No hydronephrosis  No shadowing calculi  No perinephric fluid collections  URETERS: Nonvisualized  BLADDER: Normally distended  No focal thickening or mass lesions  Bilateral ureteral jets detected  Prostate gland is enlarged with a volume of 59 mL  Impression: Mild right renal atrophy  No hydronephrosis  Prostate enlargement  Workstation performed: [id number]     CT abdomen pelvis with contrast    Result Date: 11/20/2022  Narrative: CT ABDOMEN AND PELVIS WITH IV CONTRAST INDICATION:   LLQ pain  COMPARISON:  4/23/2021 TECHNIQUE:  CT examination of the abdomen and pelvis was performed  Axial, sagittal, and coronal 2D reformatted images were created from the source data and submitted for interpretation  Radiation dose length product (DLP) for this visit:  726 mGy-cm     This examination, like all CT scans performed in the [facility], was performed utilizing techniques to minimize radiation dose exposure, including the use of iterative reconstruction and automated exposure control  IV Contrast:  98 mL of iodixanol (VISIPAQUE) Enteric Contrast:  Enteric contrast was not administered  FINDINGS: ABDOMEN LOWER CHEST:  There is a chronic left pleural effusion which appears similar to the prior scan with atelectasis in the posterior left lower lobe  Postop changes at the heart are noted  LIVER/BILIARY TREE:  There appears to be some mild periportal edema  No liver masses are seen  GALLBLADDER:  The gallbladder is distended  Gallstones are present  The wall does not appear thick or inflamed  SPLEEN:  Unremarkable  PANCREAS:  Unremarkable  ADRENAL GLANDS:  Unremarkable  KIDNEYS/URETERS:  Tiny nonobstructive stone at the lower pole of the right kidney  Kidneys are otherwise unremarkable  STOMACH AND BOWEL:  There is diverticulosis of the sigmoid colon  No acute inflammation seen  No bowel obstruction  APPENDIX:  No findings to suggest appendicitis  ABDOMINOPELVIC CAVITY:  No ascites  No pneumoperitoneum  No lymphadenopathy  VESSELS:  Atherosclerotic changes are present  No evidence of aneurysm  PELVIS REPRODUCTIVE ORGANS:  Prostate measures 4 2 x 5 5 cm  URINARY BLADDER:  Unremarkable  ABDOMINAL WALL/INGUINAL REGIONS:  Small fat-containing left inguinal hernia without complications  There seems to be slight edema along the flanks  OSSEOUS STRUCTURES:  Orthopedic fixation of the right femoral neck  Small amount of heterotopic bone in the adjacent soft tissues  Chronic degenerative changes of lumbar spine  There is a fracture of the right 12th rib which is nondisplaced but could be acute based on its morphology  Correlate for any trauma to the region  There is a chronic appearing injury of the right 9th rib laterally    There is evidence of old healed left-sided rib trauma bilaterally at the axillary line involving several ribs      Impression: No specific findings to account for left lower quadrant pain  There is colonic diverticulosis, but no acute inflammation is seen  Gallbladder is distended and contains multiple gallstones  No obvious inflammatory changes in that location although correlation for any tenderness or right upper quadrant pain or fever is advised  Chronic left pleural effusion and postop changes of the heart similar to the prior study  Evidence of old rib injuries and perhaps a more acute right 12th rib injury  Correlate for any recent trauma or pain or tenderness in the region  The study was marked in [facility] for immediate notification  Workstation performed: [id number]     CT lower extremity wo contrast right    Result Date: 11/4/2022  Narrative: CT right hip without IV contrast INDICATION: Status post hip surgery with acute blood loss anemia 5 g ruling out hematoma/bleed  COMPARISON: Intraoperative fluoroscopy images of the right hip from 11/1/2022  Right hip plain films from 10/31/2022  TECHNIQUE: CT examination of the above was performed  This examination, like all CT scans performed in the [facility], was performed utilizing techniques to minimize radiation dose exposure, including the use of iterative reconstruction and automated exposure control software  Sagittal and coronal two dimensional reconstructed images were also submitted for interpretation  Rad dose  580 mGy-cm FINDINGS: OSSEOUS STRUCTURES:  Status post intramedullary nail fixation of right femur intertrochanteric fracture with hardware in appropriate position  Compared to the preoperative intraoperative images, there is displacement of the lesser trochanter fracture fragment, now retracted proximally by 4 cm (series 601 image 54 ) VISUALIZED MUSCULATURE:  There is intramuscular gas in the gluteus musculature and subcutaneous tissues overlying the right hip, expected recent postoperative finding    There is no evidence of hemorrhage in the muscle surrounding the right femur  SOFT TISSUES:  Unremarkable  OTHER PERTINENT FINDINGS: None  Impression: Status post ORIF of right femur intertrochanteric fracture, without surrounding hematoma  Compared to the preoperative intraoperative images, there is displacement of the lesser trochanter fracture fragment, now retracted proximally by 4 cm (series 601 image 54 ) Workstation performed: [id number]         ** Please Note: Dragon 360 Dictation voice to text software was used in the creation of this document   **

## 2022-11-23 NOTE — ASSESSMENT & PLAN NOTE
Lab Results   Component Value Date    EGFR 20 11/22/2022    EGFR 21 11/22/2022    EGFR 17 11/21/2022    CREATININE 2 73 (H) 11/22/2022    CREATININE 2 68 (H) 11/22/2022    CREATININE 3 19 (H) 11/21/2022

## 2022-11-23 NOTE — PLAN OF CARE
Problem: Potential for Falls  Goal: Patient will remain free of falls  Description: INTERVENTIONS:  - Educate patient/family on patient safety including physical limitations  - Instruct patient to call for assistance with activity   - Consult OT/PT to assist with strengthening/mobility   - Keep Call bell within reach  - Keep bed low and locked with side rails adjusted as appropriate  - Keep care items and personal belongings within reach  - Initiate and maintain comfort rounds  - Make Fall Risk Sign visible to staff  - Offer Toileting every two Hours, in advance of need  - Initiate/Maintain bedalarm  - Apply yellow socks and bracelet for high fall risk patients  - Consider moving patient to room near nurses station  Outcome: Progressing     Problem: MOBILITY - ADULT  Goal: Maintain or return to baseline ADL function  Description: INTERVENTIONS:  -  Assess patient's ability to carry out ADLs; assess patient's baseline for ADL function and identify physical deficits which impact ability to perform ADLs (bathing, care of mouth/teeth, toileting, grooming, dressing, etc )  - Assess/evaluate cause of self-care deficits   - Assess range of motion  - Assess patient's mobility; develop plan if impaired  - Assess patient's need for assistive devices and provide as appropriate  - Encourage maximum independence but intervene and supervise when necessary  - Involve family in performance of ADLs  - Assess for home care needs following discharge   - Consider OT consult to assist with ADL evaluation and planning for discharge  - Provide patient education as appropriate  Outcome: Not Progressing  Goal: Maintains/Returns to pre admission functional level  Description: INTERVENTIONS:  - Perform BMAT or MOVE assessment daily    - Set and communicate daily mobility goal to care team and patient/family/caregiver  - Collaborate with rehabilitation services on mobility goals if consulted  - Perform Range of Motion three times a day    - Reposition patient every two hours    - Dangle patient three times a day  - Stand patient three times a day  - Ambulate patient three times a day  - Out of bed to chair three times a day   - Out of bed for meals three times a day  - Out of bed for toileting  - Record patient progress and toleration of activity level   Outcome: Not Progressing     Problem: Prexisting or High Potential for Compromised Skin Integrity  Goal: Skin integrity is maintained or improved  Description: INTERVENTIONS:  - Identify patients at risk for skin breakdown  - Assess and monitor skin integrity  - Assess and monitor nutrition and hydration status  - Monitor labs   - Assess for incontinence   - Turn and reposition patient  - Assist with mobility/ambulation  - Relieve pressure over bony prominences  - Avoid friction and shearing  - Provide appropriate hygiene as needed including keeping skin clean and dry  - Evaluate need for skin moisturizer/barrier cream  - Collaborate with interdisciplinary team   - Patient/family teaching  - Consider wound care consult   Outcome: Not Progressing

## 2022-11-23 NOTE — PROGRESS NOTES
-- Patient: Nahid Burgess  -- MRN: [id number]  -- Aidin Request ID: [id number]  -- Level of care reserved: [facility]  -- Partner Reserved: [facility], [address] [phone]  -- Clinical needs requested:  -- [place]ography [place] [phone]  -- Start of Service:  -- Request sent: 10:18am EST on 11/23/2022 by Camila Taylor  -- Partner reserved: 10:44am EST on 11/23/2022 by Camila Taylor  -- Choice list shared:

## 2022-11-23 NOTE — ASSESSMENT & PLAN NOTE
· Discharged from [address] [place] [address] on 11/06 s/p repair  · Routing healing in progress

## 2022-11-23 NOTE — CONSULTS
Consult - [address] 80 y o  male MRN: [id number]  Unit/Bed#: MICU 08 Encounter: [id number]      -------------------------------------------------------------------------------------------------------------  Chief Complaint:  Urethral trauma and septic shock    History of Present Illness     HPI as per Dr Mike Garcia: "Rosmery Whitmore is a 80 y o  male who has past medical history significant for chronic atrial fibrillation, hyperlipidemia, chronic anemia, CKD, chronic heart failure with preserved ejection fraction  The patient was a transfer from [facility] where he stayed for approximately 2 days due to hematuria and septic shock which could be due to infection, distributive shock and cardiomyopathy  Reportedly, the patient was lethargic in the nursing facility where he was receiving rehabilitation for recent femoral fracture repair and was noted to be hypotensive  He was just diagnosed with the UTI and was placed on antibiotics  Initially, the patient was admitted in ICU with Levophed started  For the hematuria, the patient was seen by Urology and CBI was started and continued  However within the past few hours prior to transfer, the patient was noted to be having abdominal distension with hypogastric discomfort  Also there was failure of CBI drainage  Hence CBI was held  A CT of the abdomen and pelvis showed at the Schaffer catheter was mi position  Also the catheter balloon was located at the base of the penis below at the level of the prostate  There is presence of gas within soft tissues in the perineum and penis most pronounced on the left and urethral injury was suspected  The patient was therefore immediately transferred to [facility] for urology opinion  "     Upon arrival to [facility] he underwent immediate cystoscopy with urology  The urethral lumen was unable to be identified and a suprapupic catheter was placed    The patient later became hypotensive and was requiring re-initiation of Levophed  He was later transferred to the ICU one levophed for further monitoring      -------------------------------------------------------------------------------------------------------------  Assessment and Plan:    Neuro:   • Diagnosis: No active issues      CV:   • Diagnosis: Septic shock  o Plan: Likely urinary source  o Patient received cefepime and vancomycin in the ED on 11/21  - Vancomycin dc'd  o F/u blood cx, urine cx, MRSA, legionella and strep PNA  - Urine culture from 11/20 growing >100k E   Coli  - Cefepime discontinued and switched to Ancef on 11/23 following culture sensitivities  • Blood culture show no growth at 12:00 p m , MRSA and strep PNA negative  • CT CAP did not yield signs of infx  - Monitor WBC count and fever curve  - Wean Levophed as able  - Lactic acidosis  • Lactic acid greater than 10 upon arrival  • Resolved  • Diagnosis:  Chronic heart failure with preserved EF  - Plan:  Last echocardiogram showed an EF of 45% on 03/01/2022  - Home metoprolol tartrate on hold in setting of hypotension  - Home spironolactone on hold in setting of hypotension  - Monitor I's and O's and daily weights  • Diagnosis:  Hyperlipidemia  o Plan:  Continue statin following resolution of transaminitis  • Diagnosis:  CAD  o Home Plavix on hold in setting of hematuria  o Home Lopressor on hold in setting of hypotension  o Home statin on hold in setting of transaminitis  - Restart home meds as able  • Diagnosis:  Paroxysmal Afib  o Plan:  Home Eliquis on hold in setting of hematuria  o Home Lopressor 100 mg q 12 on hold in setting of hypotension -> restart as able  o Continue home amiodarone 200 mg daily  • Diagnosis: HTN  o Home Lopressor and spironolactone on hold in setting of hypotension  • Diagnosis:  Elevated troponin  o Likely secondary to septic shock  o Continue to monitor  • Diagnosis:  Postoperative hypotension  o Plan:  Patient became hypotensive requiring pressors following placement of suprapubic catheter with Urology requiring re-initiation of Levophed  - Currently on Levophed -> wean as able  - Will continue intermittent fluid boluses carefully in setting of CHF  - Monitor blood pressure closely      Pulm:  • Diagnosis: No active issues      GI:   • Diagnosis:  Transaminitis  o Plan:  Likely secondary to septic shock  o Monitor daily labs      :   • Diagnosis: Urethral trauma  o Plan: CT showing urethral damage with air in the perineum, penis and surrounding tissue  New zavala catheter in malposition below the prostate with balloon in the penis  o Patient was [facility] to [facility] for urgen urological evaluation  o Urology following  o S/p cystoscopy with suprapubic catheter placement 11/23  • Diagnosis: Hematuria  o Plan: Zavala placement was attempted -> No urine returned  Catheter was blocked with clots immediately  o CBI was originally ordered however is held secondary to urethral trauma  - Plan as above for urethral trauma  • Diagnosis:  JOEL on CKD 3  o Plan:  Baseline creatinine 1 9-2 2  • Today creatinine was 2 73, likely secondary to septic shock  - Monitor daily BMP  - Avoid nephrotoxic medications      F/E/N:   • F:  Isolate at 75 mL/hr  • E:  Monitor daily CMP  • N:  Cardiovascular diet      Heme/Onc:   • Diagnosis: Anemia  o Plan: In setting of CKD3 and hematuria  o Baseline hemoglobin around 8  o Hemoglobin dropped to 6 8 on 11/23 -> 1U PRBCs ordered  o Follow-up a m   CBC  • Diagnosis:  DVT prophylaxis  o Plan:  Continue SCDs  o SubQ heparin held in setting of recent hematuria      Endo:   • Diagnosis:  Hyperglycemia  o Plan:  Continue SSI  o Monitor blood sugars      ID:   • Diagnosis: UTI  o Plan:  Urine cultures growing E coli  o Cefepime discontinued and switched to Ancef on 11/23 following culture sensitivities  o Monitor WBC count and fever curve      MSK/Skin:   • Diagnosis:  Recent closed fracture of trochanter of right femur  o Plan:  [facility] from [facility] on [facility]/06 status post repair  o PT/OT  • Diagnosis:  Wound on right heel  o Plan:  Wound care consulted  o Continue to monitor    Disposition: Continue Critical Care   Code Status: Level 1 - Full Code  --------------------------------------------------------------------------------------------------------------  Review of Systems   Constitutional: Positive for fatigue  Negative for activity change, appetite change, chills and fever  HENT: Negative for congestion, ear discharge, ear pain, hearing loss, sinus pain, sore throat, tinnitus and trouble swallowing  Eyes: Negative for pain and visual disturbance  Respiratory: Negative for cough, chest tightness, shortness of breath and wheezing  Cardiovascular: Negative for chest pain and leg swelling  Gastrointestinal: Positive for abdominal pain  Negative for abdominal distention, anal bleeding, blood in stool, constipation, diarrhea, nausea, rectal pain and vomiting  Endocrine: Negative for cold intolerance and heat intolerance  Genitourinary: Positive for hematuria  Negative for difficulty urinating, dysuria and frequency  Musculoskeletal: Negative for arthralgias and myalgias  Skin: Negative for rash  Allergic/Immunologic: Negative for environmental allergies and food allergies  Neurological: Negative for dizziness, light-headedness, numbness and headaches  Hematological: Negative for adenopathy  Psychiatric/Behavioral: Negative for agitation, behavioral problems, confusion and decreased concentration  A 12-point, complete review of systems was reviewed and negative except as stated above     Physical Exam  Constitutional:       Appearance: He is well-developed and well-nourished  HENT:      Head: Normocephalic and atraumatic  Nose: Nose normal       Mouth/Throat:      Mouth: Oropharynx is clear and moist    Eyes:      General:         Right eye: No discharge  Left eye: No discharge  Conjunctiva/sclera: Conjunctivae normal       Pupils: Pupils are equal, round, and reactive to light  Neck:      Thyroid: No thyromegaly  Cardiovascular:      Rate and Rhythm: Normal rate and regular rhythm  Heart sounds: Normal heart sounds  No murmur heard  No friction rub  No gallop  Pulmonary:      Effort: Pulmonary effort is normal  No respiratory distress  Breath sounds: Normal breath sounds  No stridor  No wheezing or rales  Comments: 3L NC  Chest:      Chest wall: No tenderness  Abdominal:      General: Bowel sounds are normal  There is no distension  Palpations: Abdomen is soft  There is no mass  Tenderness: There is no abdominal tenderness  There is no guarding or rebound  Genitourinary:     Comments: Suprapubic catheter in place draining yellow urine  Musculoskeletal:         General: No edema  Lymphadenopathy:      Cervical: No cervical adenopathy  Skin:     General: Skin is warm and dry  Findings: Lesion (Posterior right heel) present  Neurological:      Mental Status: He is alert  Psychiatric:         Mood and Affect: Mood and affect normal          Behavior: Behavior normal          Thought Content: Thought content normal          Judgment: Judgment normal        --------------------------------------------------------------------------------------------------------------  Vitals:   Vitals:    11/23/22 1400 11/23/22 1415 11/23/22 1430 11/23/22 1445   BP: 103/51 106/52 (!) 109/47 (!) 109/48   Pulse: 70 70 70 70   Resp: (!) 32 (!) 25 (!) 26 (!) 26   Temp:       TempSrc:       SpO2: 98% 99% 100% 99%   Weight:       Height:         Temp  Min: 95 4 °F (35 2 °C)  Max: 99 3 °F (37 4 °C)  IBW (Ideal Body Weight): 68 4 kg  Height: 5' 8" (172 7 cm)  Body mass index is 25 71 kg/m²    N/A    Laboratory and Diagnostics:  Results from last 7 days   Lab Units 11/23/22  1301 11/23/22  0714 11/22/22  2221 11/22/22  1710 11/22/22  1201 11/22/22  0527 11/21/22  1923 11/20/22  1859   WBC Thousand/uL  --  25 39* 22 55*  --   --  21 20* 18 12* 12 72*   HEMOGLOBIN g/dL 6 8* 7 4* 8 6* 9 0* 8 7* 6 1* 9 1* 8 7*   HEMATOCRIT % 22 0* 24 0* 26 7* 28 1* 27 0* 19 3* 30 1* 27 8*   PLATELETS Thousands/uL  --  142* 146*  --   --  117* 193 230   NEUTROS PCT %  --   --   --   --   --   --   --  89*   BANDS PCT %  --   --   --   --   --   --  6  --    MONOS PCT %  --   --   --   --   --   --   --  7   MONO PCT %  --   --   --   --   --   --  3*  --      Results from last 7 days   Lab Units 11/23/22  0714 11/22/22 2221 11/22/22 0527 11/21/22 1923 11/20/22 2159 11/20/22  1859   SODIUM mmol/L 141 136 139 144 138 139   POTASSIUM mmol/L 4 6 3 8 3 7 3 9 3 0* 3 4*   CHLORIDE mmol/L 113* 104 105 107 104 104   CO2 mmol/L 19* 21 23 16* 25 28   ANION GAP mmol/L 9 11 11 21* 9 7   BUN mg/dL 53* 50* 41* 42* 30* 31*   CREATININE mg/dL 2 70* 2 73* 2 68* 3 19* 1 71* 1 71*   CALCIUM mg/dL 7 5* 7 2* 6 7* 7 8* 7 4* 7 8*   GLUCOSE RANDOM mg/dL 98 124 90 71 186* 190*   ALT U/L 157*  --  221* 307* 248* 153*   AST U/L 98*  --  212* 276* 384* 269*   ALK PHOS U/L 346*  --  460* 785* 717* 676*   ALBUMIN g/dL 1 7*  --  2 1* 2 2* 2 2* 2 5*   TOTAL BILIRUBIN mg/dL 2 38*  --  4 88* 4 36* 3 48* 3 42*     Results from last 7 days   Lab Units 11/23/22  0714 11/22/22  0527 11/21/22 1923 11/20/22  1859   MAGNESIUM mg/dL 2 6 2 3 1 8 1 9   PHOSPHORUS mg/dL  --  5 5*  --   --       Results from last 7 days   Lab Units 11/23/22  0714 11/21/22 1923   INR  1 69* 1 85*          Results from last 7 days   Lab Units 11/23/22  1301 11/22/22  0527 11/22/22  0240 11/21/22  2334 11/21/22  2142 11/21/22  1923 11/20/22  1859   LACTIC ACID mmol/L 1 5 1 5 2 6* 5 3* 1 4 10 0* 1 5     ABG:    VBG:  Results from last 7 days   Lab Units 11/21/22 1923   PH MARLEN  7 237*   PCO2 MARLEN mm Hg 33 0*   PO2 MARLEN mm Hg 52 5*   HCO3 MARLEN mmol/L 13 7*   BASE EXC MARLEN mmol/L -12 6     Results from last 7 days   Lab Units 11/23/22  1301 11/22/22 0527 11/21/22 1923   PROCALCITONIN ng/ml 47 28* 88 64* 171 74*  150 57*       Micro:  Results from last 7 days   Lab Units 11/22/22  1408 11/21/22  2253 11/21/22 1923 11/20/22 1957   BLOOD CULTURE   --   --  No Growth at 24 hrs  No Growth at 24 hrs   --    URINE CULTURE   --   --   --  >100,000 cfu/ml Escherichia coli*   MRSA CULTURE ONLY   --  No Methicillin Resistant Staphlyococcus aureus (MRSA) isolated  --   --    STREP PNEUMONIAE ANTIGEN, URINE  Negative  --   --   --        Imaging: I have personally reviewed pertinent reports  Historical Information   Past Medical History:   Diagnosis Date   • CHF (congestive heart failure) ([facility] [address] )    • Coronary artery disease    • Hyperlipidemia    • Hypertension    • Paroxysmal atrial fibrillation [facility])      Past Surgical History:   Procedure Laterality Date   • CARDIAC SURGERY      cabg x 2 2014 [facility]   • CORONARY ANGIOPLASTY WITH STENT PLACEMENT  10/2021    DAPHNEY to left main and ramus   • MITRAL VALVE REPAIR  2014    mitral ring   • MI OPEN RX FEMUR FX+INTRAMED HALIMA Right 11/1/2022    Procedure: INSERTION NAIL IM FEMUR ANTEGRADE (TROCHANTERIC); Surgeon: Kelly Lilly;   Location:  MAIN OR;  Service: Orthopedics     Social History   Social History     Substance and Sexual Activity   Alcohol Use Not Currently     Social History     Substance and Sexual Activity   Drug Use Never     Social History     Tobacco Use   Smoking Status Never   Smokeless Tobacco Never       Family History:   Family History   Problem Relation Age of Onset   • Hypertension Father      I have reviewed this patient's family history and commented on sigificant items within the HPI      Medications:  Current Facility-Administered Medications   Medication Dose Route Frequency   • acetaminophen (TYLENOL) tablet 650 mg  650 mg Oral Q6H PRN   • amiodarone tablet 200 mg  200 mg Oral Daily With Breakfast   • ceFAZolin (ANCEF) IVPB (premix in dextrose) 1,000 mg 50 mL  1,000 mg Intravenous Q12H • HYDROmorphone HCl (DILAUDID) injection 0 2 mg  0 2 mg Intravenous Q4H PRN   • insulin lispro (HumaLOG) 100 units/mL subcutaneous injection 1-5 Units  1-5 Units Subcutaneous Q6H [facility]   • multi-electrolyte (PLASMALYTE-A/ISOLYTE-S PH 7 4) IV solution  75 mL/hr Intravenous Continuous   • norepinephrine (LEVOPHED) 4 mg (STANDARD CONCENTRATION) IV in sodium chloride 0 9% 250 mL  1-30 mcg/min Intravenous Titrated   • oxyCODONE (ROXICODONE) IR tablet 2 5 mg  2 5 mg Oral Q6H PRN    Or   • oxyCODONE (ROXICODONE) IR tablet 5 mg  5 mg Oral Q6H PRN   • tamsulosin (FLOMAX) capsule 0 4 mg  0 4 mg Oral Daily With Dinner     Home medications:  Prior to Admission Medications   Prescriptions Last Dose Informant Patient Reported? Taking?   acetaminophen (TYLENOL) 325 mg tablet   No No   Sig: Take 2 tablets (650 mg total) by mouth every 6 (six) hours as needed for mild pain or moderate pain   albuterol (ProAir HFA) 90 mcg/act inhaler   No No   Sig: Inhale 2 puffs every 6 (six) hours as needed for wheezing   amiodarone 200 mg tablet   Yes No   Sig: Take 200 mg by mouth daily   amoxicillin-clavulanate (AUGMENTIN) 875-125 mg per tablet   No No   Sig: Take 1 tablet by mouth every 12 (twelve) hours for 7 days   apixaban (ELIQUIS) 2 5 mg   No No   Sig: Take 2 5 mg daily through 11/8, then resume taking 2 5 mg twice daily   atorvastatin (LIPITOR) 40 mg tablet   Yes No   Sig: Take 40 mg by mouth daily   clopidogrel (PLAVIX) 75 mg tablet   No No   Sig: Take 1 tablet (75 mg total) by mouth daily Do not start before November 8, 2022     ferrous gluconate (FERGON) 324 mg tablet   No No   Sig: Take 1 tablet (324 mg total) by mouth daily before breakfast Do not start before November 7, 2022    furosemide (LASIX) 20 mg tablet   Yes No   Sig: Take 20 mg by mouth 2 (two) times a day   lidocaine (LIDODERM) 5 %   No No   Sig: Apply 1 patch topically daily Remove & Discard patch within 12 hours or as directed by MD   metoprolol tartrate (LOPRESSOR) 100 mg tablet   Yes No   Sig: Take 100 mg by mouth every 12 (twelve) hours   nitroglycerin (NITROSTAT) 0 4 mg SL tablet   Yes No   oxyCODONE (OXY-IR) 5 MG capsule   Yes No   Sig: Take 5 mg by mouth every 4 (four) hours as needed for moderate pain or severe pain   potassium chloride (K-DUR,KLOR-CON) 20 mEq tablet   No No   Sig: Take 1 tablet (20 mEq total) by mouth 2 (two) times a day for 3 days   spironolactone (ALDACTONE) 25 mg tablet   No No   Sig: Take 1 tablet (25 mg total) by mouth daily   tamsulosin (FLOMAX) 0 4 mg   No No   Sig: Take 1 capsule (0 4 mg total) by mouth daily with dinner      Facility-Administered Medications: None     Allergies:  No Known Allergies  ------------------------------------------------------------------------------------------------------------  Advance Directive and Living Will:      Power of :    POLST:    ------------------------------------------------------------------------------------------------------------  Anticipated Length of Stay is > 2 midnights    Care Time Delivered:   Upon my evaluation, this patient had a high probability of imminent or life-threatening deterioration due to Urethral trauma and septic shock, which required my direct attention, intervention, and personal management  I have personally provided 60 minutes of critical care time, exclusive of procedures, teaching, family meetings, and any prior time recorded by providers other than myself  Hoa Norris MD        Portions of the record may have been created with voice recognition software  Occasional wrong word or "sound a like" substitutions may have occurred due to the inherent limitations of voice recognition software    Read the chart carefully and recognize, using context, where substitutions have occurred

## 2022-11-23 NOTE — ANESTHESIA POSTPROCEDURE EVALUATION
Post-Op Assessment Note    CV Status:  Stable  Pain Score: 0    Pain management: adequate     Mental Status:  Alert   Hydration Status:  Stable   PONV Controlled:  None   Airway Patency:  Patent  Airway: intubated   Two or more mitigation strategies used for obstructive sleep apnea   Post Op Vitals Reviewed: Yes      Staff: CRNA         No notable events documented      BP   102/48   Temp   98   Pulse  59   Resp   16   SpO2   100

## 2022-11-23 NOTE — ASSESSMENT & PLAN NOTE
· Discharged from [address] [place] [address] on 11/06 s/p repair  · Routing healing in progress

## 2022-11-23 NOTE — ASSESSMENT & PLAN NOTE
· CT obtained showing urethral damage with air in the perineum, penis and surrounding tissue  New zavala catheter in malposition below the prostate with balloon in the penis  CBI placed on hold  · Urology consulted

## 2022-11-23 NOTE — EMTALA/ACUTE CARE TRANSFER
[address] [facility] UNIT  [address]  [facility] [address]  Dept: [phone]      ACUTE CARE TRANSFER CONSENT    NAME Katelynn MEANS 1940                              MRN [id number]    I have been informed of my rights regarding examination, treatment, and transfer   by Dr Keshia Scott MD PhD    Benefits:  Specialty service available  Risks:        Consent for Transfer:  I acknowledge that my medical condition has been evaluated and explained to me by the treating physician or other qualified medical person and/or my attending physician, who has recommended that I be [facility]   at [facility]    The above potential benefits of such transfer, the potential risks associated with such transfer, and the probable risks of not being transferred have been explained to me, and I fully understand them  The doctor has explained that, in my case, the benefits of transfer outweigh the risks  I agree to be transferred  I authorize the performance of emergency medical procedures and treatments upon me in both transit and upon arrival at the receiving facility  Additionally, I authorize the release of any and all medical records to the receiving facility and request they be transported with me, if possible  I understand that the safest mode of transportation during a medical emergency is an ambulance and that the Hospital advocates the use of this mode of transport  Risks of traveling to the receiving facility by car, including absence of medical control, life sustaining equipment, such as oxygen, and medical personnel has been explained to me and I fully understand them  (FAVIOLA CORRECT BOX BELOW)  [x ]  I consent to the stated transfer and to be transported by ambulance/helicopter    [  ]  I consent to the stated transfer, but refuse transportation by ambulance and accept full responsibility for my transportation by car  I understand the risks of non-ambulance transfers and I exonerate the Hospital and its staff from any deterioration in my condition that results from this refusal     X___________________________________________    DATE  22  TIME________  Signature of patient or legally responsible individual signing on patient behalf           RELATIONSHIP TO PATIENT_________________________          Provider Certification    NAME Vikki Jiménez                                         1940                              MRN [id number]    A medical screening exam was performed on the above named patient  Based on the examination:    Condition Necessitating Transfer Hematuria on presentation with urethral damage requiring Urology which is not available at [address] [place] [address]  Patient Condition:  Stable    Reason for Transfer:  Urology specialty needed  Transfer Requirements: Facility     · Space available and qualified personnel available for treatment as acknowledged by  PACS  · Agreed to accept transfer and to provide appropriate medical treatment as acknowledged by hospitalist service  · Appropriate medical records of the examination and treatment of the patient are provided at the time of transfer   [facility], [address] _______  · Transfer will be performed by qualified personnel from    and appropriate transfer equipment as required, including the use of necessary and appropriate life support measures      Provider Certification: I have examined the patient and explained the following risks and benefits of being transferred/refusing transfer to the patient/family:         Based on these reasonable risks and benefits to the patient and/or the unborn child(geo), and based upon the information available at the time of the patient’s examination, I certify that the medical benefits reasonably to be expected from the provision of appropriate medical treatments at another medical facility outweigh the increasing risks, if any, to the individual’s medical condition, and in the case of labor to the unborn child, from effecting the transfer      X____________________________________________ DATE 11/23/22        TIME_______      ORIGINAL - SEND TO MEDICAL RECORDS   COPY - SEND WITH PATIENT DURING TRANSFER

## 2022-11-23 NOTE — ASSESSMENT & PLAN NOTE
Lab Results   Component Value Date    EGFR 20 11/22/2022    EGFR 21 11/22/2022    EGFR 17 11/21/2022    CREATININE 2 73 (H) 11/22/2022    CREATININE 2 68 (H) 11/22/2022    CREATININE 3 19 (H) 11/21/2022     · CKD 3 Baseline Cr 1 9-2 2  · Avoid nephrotoxic agents  · Trend with daily labs

## 2022-11-23 NOTE — ASSESSMENT & PLAN NOTE
· Attempted zavala placement  No urine returned and catheter was blocked with clots immediately  · CBI ordered initially; currently on hold due to urethral trauma  · Urology consult    · Hold anticoagulation/antiplatelet X 48 hours

## 2022-11-23 NOTE — CASE MANAGEMENT
Case Management Assessment & Discharge Planning Note    Patient name Eulogio Benavides  Location MICU 08/MICU 31 MRN [id number]  : 1940 Date 2022       Current Admission Date: 2022  Current Admission Diagnosis:Urethral trauma   Patient Active Problem List    Diagnosis Date Noted   • Urethral trauma 2022   • Septic shock ([address] ) 2022   • UTI (urinary tract infection) 2022   • Closed fracture of trochanter of right femur with routine healing 2022   • Leg edema, right 2022   • Pressure injury sacrum and right heel 2022   • Transaminitis 2022   • Elevated troponin level not due myocardial infarction 2022   • Hyperglycemia 2022   • Hematuria 2022   • Thrombocytopenia ([facility] ) 2022   • Acute on chronic anemia 2022   • Hyponatremia 2022   • Postoperative urinary retention    • Chronic heart failure with preserved ejection fraction ([address] ) 10/31/2022   • Paroxysmal atrial fibrillation ([address] ) 10/31/2022   • Closed 2-part intertrochanteric fracture of proximal end of right femur, initial encounter ([facility] ) 10/31/2022   • Lumbar compression deformity 2022   • Postprocedural pneumothorax 2022   • Acute respiratory failure with hypoxia ([address] ) 2022   • Acute on chronic diastolic CHF (congestive heart failure) ([facility] ) 2022   • Acute-on-chronic kidney injury ([address] ) 2022   • Chronic a-fib ([address] ) 2022   • Stage 3 chronic kidney disease ([address] ) 2021   • Multiple lung nodules on CT 2021   • Carotid artery stenosis 2021   • Bacteremia due to Pseudomonas 2021   • Coronary artery disease involving coronary bypass graft 2021   • Mitral regurgitation 2021   • Hypertensive urgency 2021   • Hyperlipidemia, mixed 2021      LOS (days): 0  Geometric Mean LOS (GMLOS) (days):   Days to GMLOS:     OBJECTIVE:  PATIENT READMITTED TO HOSPITAL            Current admission status: Inpatient Preferred Pharmacy:   [facility]  [address] [place] [address]  Phone: [phone] Fax: [facility], [address] [place] [address],[address]  [address]  Phone: [phone] Fax: [phone]    Primary Care Provider: Chris Collins DO    Primary Insurance: MEDICARE  Secondary Insurance: BLUE CROSS    ASSESSMENT:  Active Health Care Proxies    There are no active Health Care Proxies on file  Readmission Root Cause  30 Day Readmission: No (transfer from [facility])    Patient Information  Admitted from[de-identified] Other (comment) (transfer from [facility])  Mental Status: Intubated  Is patient a ?: Yes                                 DISCHARGE DETAILS:          Other Referral/Resources/Interventions Provided:  Interventions: Facility Return         Treatment Team Recommendation: Short Term Rehab  Discharge Destination Plan[de-identified] Short Term Rehab  Transport at Discharge : BLS Ambulance            Additional Comments: Patient transferred from [facility] for surgery  Please see CM assessment completed at [facility] on 11/22/22  Patient resides at home with wife, ambulates with cane/walker, receives assistance with ADLs  Patient was at [facility] for inpatient rehab when readmitted, plan is for patient to return to [facility] at discharge to resume rehab  Return referral placed to [facility] in [address] [place] [address] to follow

## 2022-11-23 NOTE — ANESTHESIA PREPROCEDURE EVALUATION
Procedure:  CYSTOSCOPY EVACUATION OF CLOTS (Bladder)  INSERTION SUPRAPUBIC CATHETER PERCUTANEOUS (Bladder)    Relevant Problems   CARDIO   (+) Chronic a-fib (HCC)   (+) Coronary artery disease involving coronary bypass graft   (+) Hyperlipidemia, mixed   (+) Hypertensive urgency   (+) Mitral regurgitation   (+) Paroxysmal atrial fibrillation (HCC)      /RENAL   (+) Acute-on-chronic kidney injury (HCC)   (+) Stage 3 chronic kidney disease (HCC)      HEMATOLOGY   (+) Acute on chronic anemia   (+) Thrombocytopenia (HCC)      PULMONARY   (+) Acute respiratory failure with hypoxia (HCC)   (+) Postprocedural pneumothorax        Physical Exam    Airway    Mallampati score: I  TM Distance: >3 FB  Neck ROM: full     Dental   Comment: Edentulous,     Cardiovascular  Cardiovascular exam normal    Pulmonary  Pulmonary exam normal     Other Findings        Anesthesia Plan  ASA Score- 3 Emergent    Anesthesia Type- general with ASA Monitors  Additional Monitors:   Airway Plan: LMA  Plan Factors-Exercise tolerance (METS): >4 METS  Chart reviewed  EKG reviewed  Existing labs reviewed  Patient summary reviewed  Patient is not a current smoker  Induction- intravenous  Postoperative Plan- Plan for postoperative opioid use  Informed Consent- Anesthetic plan and risks discussed with patient  I personally reviewed this patient with the CRNA  Discussed and agreed on the Anesthesia Plan with the CRNA  Amy Donis

## 2022-11-23 NOTE — CONSULTS
UROLOGY CONSULTATION NOTE     Patient Identifiers: Diane Elizondo (MRN [id number])  Service Requesting Consultation:  Medicine  Service Providing Consultation:  Urology, Aileen Thomas MD    Date of Service: 11/23/2022  Inpatient consult to Urology  Consult performed by: Aileen Thomas MD  Consult ordered by: Penny Ramsey MD          Reason for Consultation:  Clot retention, misplaced Schaffer catheter potential urethral injury  History of Present Illness:     Diane Elizondo is a 80 y o  old with a history of postoperative urinary retention after right hip surgery  He was admitted to the hospital with hematuria and sepsis  He failed multiple void trials and developed gross hematuria  Plan was for transfer yesterday for evaluation with cystoscopy however transfer was canceled by the outside institution as he reported urine was draining well without hematuria  Unfortunately, hematuria persisted and attempt was made to replace his catheter which was thought to be in position  Patient developed abdominal pain and some distention  CT scan was performed revealing that the Schaffer catheter was not in place and was in fact inflated in his urethra likely causing micro injury to the urethra  This also revealed some air adjacent to the urethra from attempts at irrigation of the catheter  Patient was urgently transferred middle of the night for evaluation and urologic intervention at this time due to the urethral trauma and urinary retention persisting  He is quite uncomfortable in the suprapubic area does not complain of other symptoms at this time      Past Medical, Past Surgical History:     Past Medical History:   Diagnosis Date   • [facility]    • Coronary artery disease    • Hyperlipidemia    • Hypertension    • Paroxysmal atrial fibrillation ([facility] )    :    Past Surgical History:   Procedure Laterality Date   • CARDIAC SURGERY      cabg x 2 2014 [facility]   • CORONARY ANGIOPLASTY WITH STENT PLACEMENT  10/2021    DAPHNEY to left main and ramus   • MITRAL VALVE REPAIR  2014    mitral ring   • [place] OPEN RX FEMUR FX+INTRAMED HALIMA Right 11/1/2022    Procedure: INSERTION NAIL IM FEMUR ANTEGRADE (TROCHANTERIC); Surgeon: Dwayne Burns; Location: [facility];  Service: Orthopedics   :    Medications, Allergies:     Current Facility-Administered Medications   Medication Dose Route Frequency   • [MAR Hold] acetaminophen (TYLENOL) tablet 650 mg  650 mg Oral Q6H PRN   • [MAR Hold] albuterol (PROVENTIL HFA,VENTOLIN HFA) inhaler 2 puff  2 puff Inhalation Q6H PRN   • [MAR Hold] amiodarone tablet 200 mg  200 mg Oral Daily With Breakfast   • [MAR Hold] cefepime (MAXIPIME) 1,000 mg in dextrose 5 % 50 mL IVPB  1,000 mg Intravenous Q24H   • [MAR Hold] HYDROmorphone (DILAUDID) injection 0 5 mg  0 5 mg Intravenous Q6H PRN   • [MAR Hold] HYDROmorphone HCl (DILAUDID) injection 0 2 mg  0 2 mg Intravenous Q4H PRN   • [MAR Hold] insulin lispro (HumaLOG) 100 units/mL subcutaneous injection 1-5 Units  1-5 Units Subcutaneous Q6H [address]   • [MAR Hold] metoprolol tartrate (LOPRESSOR) tablet 100 mg  100 mg Oral Q12H [address]   • [MAR Hold] nitroglycerin (NITROSTAT) SL tablet 0 4 mg  0 4 mg Sublingual Q5 Min PRN   • [MAR Hold] tamsulosin (FLOMAX) capsule 0 4 mg  0 4 mg Oral Daily With Dinner       Allergies:  No Known Allergies:    Social and Family History:   Social History:   Social History     Tobacco Use   • Smoking status: Never   • Smokeless tobacco: Never   Vaping Use   • Vaping Use: Never used   Substance Use Topics   • Alcohol use: Not Currently   • Drug use: Never     Social History     Tobacco Use   Smoking Status Never   Smokeless Tobacco Never       Family History:  Family History   Problem Relation Age of Onset   • Hypertension Father    :     Review of Systems:     General: Fever, chills, or night sweats: negative  Cardiac: Negative for chest pain      Pulmonary: Negative for shortness of breath  Gastrointestinal: Abdominal pain positive  Nausea, vomiting, or diarrhea negative,  Genitourinary: See HPI above  Patient does have hematuria  All other systems queried were negative  Physical Exam:   General: Patient is pleasant and in NAD  Awake and alert  /67   Pulse 70   Temp 97 9 °F (36 6 °C) (Temporal)   Resp 20   Ht 5' 8" (1 727 m)   Wt 76 7 kg (169 lb 1 6 oz)   BMI 25 71 kg/m² Temp (24hrs), Av 9 °F (36 6 °C), Min:97 5 °F (36 4 °C), Max:98 3 °F (36 8 °C)  current; Temperature: 97 9 °F (36 6 °C)  No intake/output data recorded  Head: Normocephalic, without obvious abnormality, atraumatic  Eyes: negative  Lungs: clear to auscultation bilaterally  Heart: regular rate and rhythm  Abdomen: Soft, suprapubic fullness, otherwise nontender no rebound  Extremities: extremities normal, warm and well-perfused; no cyanosis, clubbing, or edema  Neurologic: Grossly normal    (Male): Penis uncircumcised, phallus normal, meatus patent  Schaffer catheter in the urethra but clearly not within the bladder based on amount of catheter externally  There is penile and foreskin swelling  There is some scrotal swelling as well  Hematuria from catheter  Labs:     Lab Results   Component Value Date    HGB 8 6 (L) 2022    HCT 26 7 (L) 2022    WBC 22 55 (H) 2022     (L) 2022   ]    Lab Results   Component Value Date    K 3 8 2022     2022    CO2 21 2022    BUN 50 (H) 2022    CREATININE 2 73 (H) 2022    CALCIUM 7 2 (L) 2022   ]    Imaging:   I personally reviewed the images and report of the following studies, and reviewed them with the patient:    CT Abdomen: Personally reviewed by me with above-noted findings of distended bladder, Schaffer catheter balloon in the bulbar urethra, specks of air around the urethra        ASSESSMENT:     80 y o  old male with  misplaced Schaffer catheter, sepsis, hematuria, likely micro urethral perforation  PLAN:     Discussed intervention with patient  Recommendation is for cystoscopy, potential clot evacuation fulguration, Schaffer catheter placement if possible, possible suprapubic tube placement  Discussed that there have been multiple failed void trials and difficulty placing catheter so suprapubic tube management may be beneficial in the short-term  Will depend on intraoperative findings  Questions answered and consent was obtained  Thank you for allowing me to participate in this patients’ care  Please do not hesitate to call with any additional questions    Quita Urrutia MD

## 2022-11-23 NOTE — ASSESSMENT & PLAN NOTE
· CT obtained showing urethral damage with air in the perineum, penis and surrounding tissue  New zavala catheter in malposition below the prostate with balloon in the penis  CBI placed on hold  · Urology on consult  · Transfer to [facility]

## 2022-11-23 NOTE — DISCHARGE SUMMARY
[address]  Discharge- Timmy Tirado 1940, 80 y o  male MRN: [id number]  Unit/Bed#: -01 Encounter: [id number]  Primary Care Provider: Ruben Santos DO   Date and time admitted to hospital: 11/21/2022  6:35 PM    Urethral trauma  Assessment & Plan  · CT obtained showing urethral damage with air in the perineum, penis and surrounding tissue  New zavala catheter in malposition below the prostate with balloon in the penis  CBI placed on hold  · Urology on consult  · Transfer to [facility]  * Septic shock [facility])  Assessment & Plan  79 y/o male from a nursing home s/p right femur fracture with a aldo placed  Sent to ED for lethargy and hypotension per nursing home staff  Found to be in septic shock likely from UTI  · Cefepime and vancomycin received in the ED  Will continue  · Hypotension fluid responsive  Levophed ordered in the ED  Wean as able for a MAP of 65  · Blood, MRSA, sputum cultures ordered as well as legionella and strep PNA  · Urine from 11/20 with >100K E  Coli  · Discontinue Vanco  · Lactic acidosis > 10  Trend until less than 2 0    · Bandemia present   · CT chest/abd/pelvis did not yield a source of infection  ·  57  Repeat 88- D/C trend    Hematuria  Assessment & Plan  · Attempted zavala placement  No urine returned and catheter was blocked with clots immediately  · CBI ordered   · Urology consult- CBI currently under clamping trial   IF hematuria does not resume the patient may have the zavala removed prior to discharge and f/u with urology as an outpatient for cystoscopy in office  - IF hematuria resumes the CBI can be resumed, start low and titrate as needed, possible reimaging to assess clot burden and transfer for cystoscopy if this occurs  · Hold anticoagulation/antiplatelet X 48 hours    UTI (urinary tract infection)  Assessment & Plan  · Repeat UA with reflex to culture pending     · Was seen the day prior to admission and discharged with antibiotics (Augmentin) for UTI  UA contained leukocytes and moderate bacteria but not nitrates  Acute-on-chronic kidney injury [facility])  Assessment & Plan  Lab Results   Component Value Date    EGFR 21 11/22/2022    EGFR 17 11/21/2022    EGFR 36 11/20/2022    CREATININE 2 68 (H) 11/22/2022    CREATININE 3 19 (H) 11/21/2022    CREATININE 1 71 (H) 11/20/2022     · CKD 3 Baseline Cr 1 9-2 2  · Avoid nephrotoxic agents  · Fluid resuscitation in progress  · Trend with daily labs  · Monitor urine output closely  Place zavala  Transaminitis  Assessment & Plan  · Monitor with daily labs  · Likely secondary to shock liver       Hyperglycemia  Assessment & Plan  · 71 on admission labs  · Noted to be hyperglycemic on previous admission  · SSI with Q6H glucose checks ordered  Elevated troponin level not due myocardial infarction  Assessment & Plan  · Likely secondary to septic shock/demand ischemia   · Monitor Q3H until troponin reaches plateau or decreases  · ST depressions have resolved from last night on EKG  Pressure injury sacrum and right heel  Assessment & Plan  · POA on sacrum and right heel  · Clean, dry dressing placed  · Wound care consulted  Leg edema, right  Assessment & Plan  · Per wife, swelling was present prior to fracture secondary to vessel harvest for CABG  · Appears to be baseline for patient  Closed fracture of trochanter of right femur with routine healing  Assessment & Plan  · Discharged from [facility] on 11/06 s/p repair  · Routing healing in progress  Acute on chronic anemia  Assessment & Plan  · Hgb appears to be within baseline range  9 1 on admission  · Monitor with daily labs  · Transfuse if hgb < 7 0   · 11/22: Hgb 6 1 on morning labs secondary to acute blood loss anemia due to hematuria  · 2 units PRBCs administered  Paroxysmal atrial fibrillation (HCC)  Assessment & Plan  · Takes Eliquis as home med    · Holding anticoagulation in setting of hematuria  Chronic heart failure with preserved ejection fraction [facility])  Assessment & Plan  Wt Readings from Last 3 Encounters:   11/22/22 71 kg (156 lb 8 4 oz)   11/22/22 70 8 kg (156 lb)   11/20/22 72 3 kg (159 lb 6 3 oz)     · Echo 03/01/2022 with EF 45%  · Consider repeat echo if clinically indicated  · Hold home diuretic until septic shock resolved  Hyperlipidemia, mixed  Assessment & Plan  · Continue statin when able  · Hold for now given transaminitis  Medical Problems     Resolved Problems  Date Reviewed: 11/23/2022   None         Admission Date:   Admission Orders (From admission, onward)     Ordered        11/21/22 2116  INPATIENT ADMISSION  Once                        Admitting Diagnosis: Lactic acidosis [E87 20]  Hypotension [I95 9]  Transaminitis [R74 01]  NSTEMI (non-ST elevated myocardial infarction) ([facility] [address] ) [I21 4]  Acute kidney injury ([facility] [address] ) [N17 9]  Septic shock ([facility] [address] ) [A41 9, R65 21]    HPI: Sissy Will is a 80 y o  male who presents with a PHM afib on Eliquis, s/p right trochanteric fracture, hyperlipidemia, anemia, CKD and HFpEF  He was residing a nursing for rehab s/p aldo placement for a right hip fracture  Nursing staff reported increased lethargy and hypotension and called EMS  The patient was seen yesterday at [facility] and diagnosed with UTI  He was discharged on antibiotics  Upon evaluation in the ED, he was found to be lethargic but easily aroused and was hypotensive  Antibiotics were initiated and the patient was administered fluids per sepsis protocol  A CT chest/abd/pelvis was completed but did not yield an additional source of infection  Levophed was started in the ED due to persistent hypotension  Critical Care was consulted for evaluation and admission  11/21 CT chest/abd/pelvis: No acute inflammatory process identified  Cholelithiasis w/o cholecystitis  Left mod pleural effusion  Mild groundglass opacities in lungs favoring subsegmental atelectasis  Superimposed PNA can't be excluded  11/22 Levophed weaned off   11/23 CT abd/pelvis: The Schaffer catheter is malpositioned  The catheter balloon is located near the base of the penis, below the level of the prostate  There is gas within the soft tissues of the perineum and penis, most pronounced on the left  Urethral injury is suspected  Urology consultation and follow-up is recommended      The urinary bladder is distended, extending nearly to the level of the umbilicus  The urinary bladder volume is approximately 970 mL  There is gas layering non-dependently within the anterior aspect of the urinary bladder  There is mild dilatation of the renal collecting systems and ureters bilaterally, likely related to the degree of urinary bladder distention  There is bilateral perinephric stranding and fluid, left greater than right, increased compared with November 21, 2022  Fluid tracks caudally bilaterally into the pelvis  Correlation with urinalysis and urine culture and sensitivity is recommended      Persistent nephrograms are present; this raises concern for possible ATN  Clinical and laboratory correlation is recommended  Consider Nephrology consultation      There is a small amount of ascites, increased compared to the prior study  There is body wall edema, worse compared to the prior study  The inferior aspect of the left gluteus john muscle appears asymmetrically enlarged compared with the   contralateral right side and there is overlying subcutaneous edema  This may be related to asymmetric edema, myositis, or hematoma  Clinical correlation and follow-up is recommended      There is a moderate-sized left pleural effusion  There is bibasilar atelectasis containing some air bronchograms, left greater than right  Superimposed pneumonia should be excluded clinically  3L NC   ABX: Cefepime D3 on 11/23, E  Coli urine culture, Vanco d/c 11/22  Gtts: None  CBI on hold      Procedures Performed: Orders Placed This Encounter   Procedures   • Critical Care   • ED ECG Documentation Only       Summary of Hospital Course: See above    Significant Findings, Care, Treatment and Services Provided: See above  Complications: Schaffer malpositioned  Condition at Discharge: good         Discharge instructions/Information to patient and family:   See after visit summary for information provided to patient and family  Provisions for Follow-Up Care:  See after visit summary for information related to follow-up care and any pertinent home health orders  PCP: Maite Gonzalez DO    Disposition: [facility]    Planned Readmission: Yes at [facility]  Discharge Statement   I spent 20 minutes discharging the patient  This time was spent on the day of discharge  I had direct contact with the patient on the day of discharge  Additional documentation is required if more than 30 minutes were spent on discharge  Discharge Medications:  See after visit summary for reconciled discharge medications provided to patient and family

## 2022-11-23 NOTE — QUICK NOTE
Patient is seen in the Medical Intensive Care Unit  He is awake, alert, pleasant, fully aware of his history, scenario and surroundings  Currently denies any pain, feels a little groggy since anesthesia still  I discussed his current situation with misplaced Schaffer catheter subsequent obliterated/impassable urethra, suprapubic tube placed early this morning in the operating room, currently it is draining clear brown/isidro urine  He feels so much better  Will plan serial scrotal exams, has fluid within the sac likely related to urethral trauma and retrograde irrigation fluid into the scrotal tissues  Fortunately no skin edema/compromise or infection appearance  Would consider bedside percutaneous aspiration if no improvements with conservative watch/wait approach for now      Favian Corbett  11/23/22

## 2022-11-24 PROBLEM — I25.10 CAD (CORONARY ARTERY DISEASE): Status: ACTIVE | Noted: 2021-04-21

## 2022-11-24 NOTE — ASSESSMENT & PLAN NOTE
· CT obtained showing urethral damage with air in the perineum, penis and surrounding tissue  New zavala catheter in malposition below the prostate with balloon in the penis  CBI placed on hold  · Urology consulted, following patient  · Suprapubic catheter placed, draining clear urine  · Urology will continue to follow, no further interventions at this time

## 2022-11-24 NOTE — ASSESSMENT & PLAN NOTE
· Discharged from [address] [place] [address] on 11/06 s/p repair  · Routine healing in progress

## 2022-11-24 NOTE — PROGRESS NOTES
ICU TRANSFER  NOTE     Name: Thang Mina   Age & Sex: 80 y o  male   MRN: [id number]  Unit/Bed#: MICU 08   Encounter: [id number]  Hospital Stay Days: 1    Accepting team: SLIM  Code Status: Level 1 - Full Code  Disposition: Patient requires Med/Surg    ASSESSMENT/PLAN     Principal Problem:    Urethral trauma  Active Problems:    Septic shock ([facility] [address] )    Acute-on-chronic kidney injury ([facility] [address] )    UTI (urinary tract infection)    Hematuria    Transaminitis    CAD (coronary artery disease)    Hyperlipidemia, mixed    Chronic atrial fibrillation (HCC)    Chronic heart failure with preserved ejection fraction (HCC)    Acute on chronic anemia    Closed fracture of trochanter of right femur with routine healing    Hyperglycemia      Septic shock ([facility])  Assessment & Plan  · Cefepime and vancomycin received in the ED  Initially resumed on cefepime  · Hypotension fluid responsive  Levophed ordered in the ED   currently off Levophed  · Urine from 11/20 with >100K E  Coli  · Discontinued Vanco, continued cefepime  · susceptibility panel done and transitioned to Ancef   · Continue Ancef    * Urethral trauma  Assessment & Plan  · CT obtained showing urethral damage with air in the perineum, penis and surrounding tissue  New zavala catheter in malposition below the prostate with balloon in the penis  CBI placed on hold  · Urology consulted, following patient  · Suprapubic catheter placed, draining clear urine  · Urology will continue to follow, no further interventions at this time  Hematuria  Assessment & Plan  · Attempted zavala placement  No urine returned and catheter was blocked with clots immediately  · CBI ordered initially; currently on hold due to urethral trauma    · Urology consulted   · Suprapubic catheter placed, draining clear urine  · Hgb stable, continue Eliquis and Plavix    UTI (urinary tract infection)  Assessment & Plan  Continue Ancef    Acute-on-chronic kidney injury [facility])  Assessment & Plan  Lab Results   Component Value Date    EGFR 22 11/24/2022    EGFR 20 11/23/2022    EGFR 20 11/22/2022    CREATININE 2 54 (H) 11/24/2022    CREATININE 2 70 (H) 11/23/2022    CREATININE 2 73 (H) 11/22/2022     Lab Results   Component Value Date    EGFR 22 11/24/2022    EGFR 20 11/23/2022    EGFR 20 11/22/2022    CREATININE 2 54 (H) 11/24/2022    CREATININE 2 70 (H) 11/23/2022    CREATININE 2 73 (H) 11/22/2022     · CKD 3 Baseline Cr 1 9-2 2  · Cr improving  · Avoid nephrotoxic agents  · Trend with daily labs  Transaminitis  Assessment & Plan  · Monitor with daily labs  · Likely secondary to shock liver       Hyperglycemia  Assessment & Plan  Continue sliding scale  Closed fracture of trochanter of right femur with routine healing  Assessment & Plan  · Discharged from [address] [place] [address] on 11/06 s/p repair  · Routine healing in progress  Acute on chronic anemia  Assessment & Plan  · Urethral injury, urology consultation  · Suprapubic catheter placed, draining clear urine  · Hgb stable    Chronic heart failure with preserved ejection fraction [facility])  Assessment & Plan  Wt Readings from Last 3 Encounters:   11/23/22 76 7 kg (169 lb 1 6 oz)   11/23/22 74 5 kg (164 lb 3 9 oz)   11/22/22 70 8 kg (156 lb)     · Echo 03/01/2022 with EF 45%  · Holding Home Lasix, Aldactone, and Metoprolol due to hypotension previously requiring pressor support  · Patient off pressors at time of transfer  · If patient remains hemodynamically stable, would reinitiate home Lasix, Aldactone, and metoprolol           Chronic atrial fibrillation (HCC)  Assessment & Plan  · NSR currently  · Eliquis initially held due to urethral trauma  · Hgb stable  Suprapubic catheter placed, draining clear urine  · Continue Eliquis    Hyperlipidemia, mixed  Assessment & Plan  · Continue statin when able  · Hold for now given transaminitis  CAD (coronary artery disease)  Assessment & Plan  Plavix initially held due to urethral trauma     Suprapubic catheter placed draining clear urine  Hgb stable  Continue Plavix      VTE Pharmacologic Prophylaxis: Reason for no pharmacologic prophylaxis On Eliquis and Plavix  VTE Mechanical Prophylaxis: sequential compression device    HOSPITAL COURSE     Mr Neyda Humphries is an 77-year-old male with PMHx of chronic atrial fibrillation, hyperlipidemia, chronic anemia, CKD, chronic heart failure with preserved ejection fraction  Came as transfer from [facility] where he stayed for 2 days due to hematuria and septic shock  Reportedly, the patient was lethargic in the nursing facility where he was receiving rehabilitation for recent femoral fracture repair and was noted to be hypotensive  He was just diagnosed with the UTI and was placed on antibiotics  Initially, the patient was admitted in ICU with Levophed started  For the hematuria, the patient was seen by Urology and CBI was started and continued  However within the past few hours prior to transfer, the patient was noted to be having abdominal distension with hypogastric discomfort  Also there was failure of CBI drainage and CBI was held  A CT of the abdomen and pelvis showed at the Schaffer catheter was mispositioned  There was presence of gas within soft tissues in the perineum and penis most pronounced on the left and urethral injury was suspected  The patient was therefore immediately transferred to [facility] for urology opinion  Upon arrival [facility] he underwent immediate cystoscopy with urology  The urethral lumen was unable to be identified and a suprapupic catheter was placed  The patient later became hypotensive and was requiring re-initiation of Levophed  He was later transferred to the ICU on levophed for further monitoring  Levophed was weaned off overnight  Presently holding home Lasix, Aldactone, and metoprolol at time of transfer  Clear urine coming from suprapubic path  Hgb stable will restart AC  Urology following with no further interventions planned  Patient is stable for transfer out of unit to med/surge  SUBJECTIVE     Patient's only complaint this morning is of the noise and commotion outside of this hospital room  He was not able to sleep overnight  Patient was refusing a m  Labs early this morning, however he was later agreeable to blood draws in the late morning  Presently denies any fever, chills, nausea, vomiting, diarrhea, constipation, chest pain, shortness a breath  OBJECTIVE     Vitals:    11/24/22 0900 11/24/22 1000 11/24/22 1100 11/24/22 1200   BP: 126/65 111/56 121/59 119/57   BP Location:       Pulse: 74 74 74 76   Resp: (!) 23 16 (!) 29 19   Temp:    98 5 °F (36 9 °C)   TempSrc:    Oral   SpO2: 99% 93% 94% 91%   Weight:       Height:         I/O last 24 hours: In: 2628 8 [I V :2178 8; Blood:350; IV Piggyback:100]  Out: 680 [Urine:680]    Physical Exam  Constitutional:       General: He is not in acute distress  Appearance: Normal appearance  Cardiovascular:      Rate and Rhythm: Normal rate and regular rhythm  Pulses: Normal pulses  Heart sounds: Normal heart sounds  No murmur heard  Pulmonary:      Effort: Pulmonary effort is normal  No respiratory distress  Breath sounds: Normal breath sounds  No wheezing, rhonchi or rales  Abdominal:      General: Abdomen is flat  Bowel sounds are normal  There is no distension  Palpations: Abdomen is soft  Tenderness: There is no abdominal tenderness  Genitourinary:     Comments: Suprapubic catheter in draining clear liquid  Musculoskeletal:      Right lower leg: Edema present  Left lower leg: Edema present  Neurological:      Mental Status: He is alert and oriented to person, place, and time  Psychiatric:         Mood and Affect: Mood normal          Behavior: Behavior normal          Thought Content: Thought content normal        LABORATORY DATA     Labs: I have personally reviewed pertinent reports      Results from last 7 days   Lab Units 11/24/22  0912 11/23/22  2110 11/23/22  1301 11/23/22  0714 11/22/22  2221 11/22/22  0527 11/21/22  1923 11/20/22  1859   WBC Thousand/uL 11 35*  --   --  25 39* 22 55*   < > 18 12* 12 72*   HEMOGLOBIN g/dL 7 6* 8 0* 6 8* 7 4* 8 6*   < > 9 1* 8 7*   HEMATOCRIT % 24 8* 26 1* 22 0* 24 0* 26 7*   < > 30 1* 27 8*   PLATELETS Thousands/uL 87*  --   --  142* 146*   < > 193 230   NEUTROS PCT %  --   --   --   --   --   --   --  89*   MONOS PCT %  --   --   --   --   --   --   --  7   MONO PCT %  --   --   --   --   --   --  3*  --     < > = values in this interval not displayed  Results from last 7 days   Lab Units 11/24/22  0912 11/23/22  0714 11/22/22 2221 11/22/22  0527   POTASSIUM mmol/L 3 7 4 6 3 8 3 7   CHLORIDE mmol/L 112* 113* 104 105   CO2 mmol/L 21 19* 21 23   BUN mg/dL 58* 53* 50* 41*   CREATININE mg/dL 2 54* 2 70* 2 73* 2 68*   CALCIUM mg/dL 7 6* 7 5* 7 2* 6 7*   ALK PHOS U/L 290* 346*  --  460*   ALT U/L 73 157*  --  221*   AST U/L 41 98*  --  212*     Results from last 7 days   Lab Units 11/24/22  0912 11/23/22  0714 11/22/22  0527   MAGNESIUM mg/dL 2 7* 2 6 2 3     Results from last 7 days   Lab Units 11/24/22  0912 11/22/22  0527   PHOSPHORUS mg/dL 4 5* 5 5*      Results from last 7 days   Lab Units 11/23/22  0714 11/21/22 1923   INR  1 69* 1 85*     Results from last 7 days   Lab Units 11/23/22  1301   LACTIC ACID mmol/L 1 5         Micro:  Lab Results   Component Value Date    BLOODCX No Growth at 48 hrs  11/21/2022    BLOODCX No Growth at 48 hrs  11/21/2022    BLOODCX No Growth After 5 Days  03/02/2022    BLOODCX No Growth After 5 Days  03/02/2022    URINECX No Growth <1000 cfu/mL 11/22/2022    URINECX >100,000 cfu/ml Escherichia coli (A) 11/20/2022    URINECX >100,000 cfu/ml Klebsiella pneumoniae (A) 04/21/2021     IMAGING & DIAGNOSTIC TESTING     Imaging: I have personally reviewed pertinent reports      CT abdomen pelvis wo contrast    Result Date: 11/23/2022  Impression: The Schaffer catheter is malpositioned  The catheter balloon is located near the base of the penis, below the level of the prostate  There is gas within the soft tissues of the perineum and penis, most pronounced on the left  Urethral injury is suspected  Urology consultation and follow-up is recommended  The urinary bladder is distended, extending nearly to the level of the umbilicus  The urinary bladder volume is approximately 970 mL  There is gas layering non-dependently within the anterior aspect of the urinary bladder  There is mild dilatation of the renal collecting systems and ureters bilaterally, likely related to the degree of urinary bladder distention  There is bilateral perinephric stranding and fluid, left greater than right, increased compared with November 21, 2022  Fluid tracks caudally bilaterally into the pelvis  Correlation with urinalysis and urine culture and sensitivity is recommended  Persistent nephrograms are present; this raises concern for possible ATN  Clinical and laboratory correlation is recommended  Consider Nephrology consultation  There is a small amount of ascites, increased compared to the prior study  There is body wall edema, worse compared to the prior study  The inferior aspect of the left gluteus john muscle appears asymmetrically enlarged compared with the contralateral right side and there is overlying subcutaneous edema  This may be related to asymmetric edema, myositis, or hematoma  Clinical correlation and follow-up is recommended  There is a moderate-sized left pleural effusion  There is bibasilar atelectasis containing some air bronchograms, left greater than right  Superimposed pneumonia should be excluded clinically  Other nonemergent findings, as described above  Please see discussion    I personally discussed this study with Williams Noriega on 11/23/2022 at 1:40 AM  Workstation performed: [id number]     EKG, Pathology, and Other Studies: I have personally reviewed pertinent reports  ALLERGIES   No Known Allergies  MEDICATIONS     Current Facility-Administered Medications   Medication Dose Route Frequency Provider Last Rate   • acetaminophen  650 mg Oral Q6H PRN Kandi Babinski, MD     • amiodarone  200 mg Oral Daily With Breakfast Kandi Babinski, MD     • apixaban  2 5 mg Oral BID Ashley Patel MD     • cefazolin  1,000 mg Intravenous Q12H Kandi Babinski, MD 1,000 mg (11/24/22 0602)   • clopidogrel  75 mg Oral Daily Ashley Patel MD     • HYDROmorphone  0 2 mg Intravenous Q4H PRN Shira Duvall DO     • insulin lispro  1-5 Units Subcutaneous Q6H [address] Kandi Babinski, MD     • oxyCODONE  2 5 mg Oral Q6H PRN Mayuri Belcher MD      Or   • oxyCODONE  5 mg Oral Q6H PRN Emanuel Payne MD     • tamsulosin  0 4 mg Oral Daily With Kaleb Rivas MD          acetaminophen, 650 mg, Q6H PRN  HYDROmorphone, 0 2 mg, Q4H PRN  oxyCODONE, 2 5 mg, Q6H PRN   Or  oxyCODONE, 5 mg, Q6H PRN        Portions of the record may have been created with voice recognition software  Occasional wrong word or "sound a like" substitutions may have occurred due to the inherent limitations of voice recognition software    Read the chart carefully and recognize, using context, where substitutions have occurred     ==  Tricia Fontana DO  [facility]  Internal Medicine Residency PGY-2

## 2022-11-24 NOTE — PROGRESS NOTES
Progress Note - Urology  Kylah Canela 80 y o  male MRN: [id number]  Unit/Bed#: MICU 08 Encounter: [id number]    Assessment:  80-year-old male postop day 1 status post ureteroscopy and suprapubic tube placement  Urine is clear yellow today  There is still significant scrotal edema  Plan:  - elevate the scrotum with rolled towels to improve edema  - maintain suprapubic tube to gravity drainage  - will need arrangements for return to office for initial suprapubic tube exchange in 1 month (last week of December)  -     Subjective:  Doing well this morning  Reports that he did not sleep well  Making good urine output  2216cc in 24 hours  Hemodynamically stable    Objective:     Blood pressure 126/63, pulse 78, temperature 98 5 °F (36 9 °C), temperature source Oral, resp  rate (!) 25, height 5' 8" (1 727 m), weight 76 7 kg (169 lb 1 6 oz), SpO2 97 %  ,Body mass index is 25 71 kg/m²  Intake/Output Summary (Last 24 hours) at 11/24/2022 1817  Last data filed at 11/24/2022 1130  Gross per 24 hour   Intake 1758 75 ml   Output 620 ml   Net 1138 75 ml       Invasive Devices     Peripheral Intravenous Line  Duration           Peripheral IV 11/21/22 Dorsal (posterior); Right Hand 2 days    Peripheral IV 11/22/22 Distal;Left;Upper;Ventral (anterior) Arm 1 day          Drain  Duration           Suprapubic Catheter 18 Fr  1 day                Physical Exam:     General:  no acute distress  Head:  Normocephalic, atraumatic  ENMT: Nares patent, moist mucous membranes  Cardiovascular:  Regular rate  Respiratory:  Patient has unlabored respirations     Abdomen:  Abdomen nondistended, suprapubic tube in place to drainage urine clear yellow  Psych: Normal affect  Genitourinary:  Marked penoscrotal edema which is tender to palpation,     Lab, Imaging and other studies:  CBC:   Lab Results   Component Value Date    WBC 11 35 (H) 11/24/2022    HGB 7 6 (L) 11/24/2022    HCT 24 8 (L) 11/24/2022    MCV 97 11/24/2022    PLT 87 (L) 11/24/2022    MCH 29 6 11/24/2022    MCHC 30 6 (L) 11/24/2022    RDW 17 4 (H) 11/24/2022    MPV 11 3 11/24/2022   , CMP:   Lab Results   Component Value Date    SODIUM 141 11/24/2022    K 3 7 11/24/2022     (H) 11/24/2022    CO2 21 11/24/2022    BUN 58 (H) 11/24/2022    CREATININE 2 54 (H) 11/24/2022    CALCIUM 7 6 (L) 11/24/2022    AST 41 11/24/2022    ALT 73 11/24/2022    ALKPHOS 290 (H) 11/24/2022    EGFR 22 11/24/2022

## 2022-11-24 NOTE — ASSESSMENT & PLAN NOTE
Lab Results   Component Value Date    EGFR 22 11/24/2022    EGFR 20 11/23/2022    EGFR 20 11/22/2022    CREATININE 2 54 (H) 11/24/2022    CREATININE 2 70 (H) 11/23/2022    CREATININE 2 73 (H) 11/22/2022     Lab Results   Component Value Date    EGFR 22 11/24/2022    EGFR 20 11/23/2022    EGFR 20 11/22/2022    CREATININE 2 54 (H) 11/24/2022    CREATININE 2 70 (H) 11/23/2022    CREATININE 2 73 (H) 11/22/2022     · CKD 3 Baseline Cr 1 9-2 2  · Cr improving  · Avoid nephrotoxic agents  · Trend with daily labs

## 2022-11-24 NOTE — ASSESSMENT & PLAN NOTE
Plavix initially held due to urethral trauma     Suprapubic catheter placed draining clear urine  Hgb stable  Continue Plavix

## 2022-11-24 NOTE — ASSESSMENT & PLAN NOTE
· NSR currently  · Eliquis initially held due to urethral trauma  · Hgb stable  Suprapubic catheter placed, draining clear urine     · Continue Eliquis

## 2022-11-24 NOTE — ASSESSMENT & PLAN NOTE
· Cefepime and vancomycin received in the ED  Initially resumed on cefepime  · Hypotension fluid responsive  Levophed ordered in the ED   currently off Levophed  · Urine from 11/20 with >100K E  Coli  · Discontinued Vanco, continued cefepime  · susceptibility panel done and transitioned to Ancef   · Continue Ancef

## 2022-11-24 NOTE — ASSESSMENT & PLAN NOTE
· Attempted zavala placement  No urine returned and catheter was blocked with clots immediately  · CBI ordered initially; currently on hold due to urethral trauma    · Urology consulted   · Suprapubic catheter placed, draining clear urine  · Hgb stable, continue Eliquis and Plavix

## 2022-11-24 NOTE — ASSESSMENT & PLAN NOTE
· Urethral injury, urology consultation  · Suprapubic catheter placed, draining clear urine  · Hgb stable

## 2022-11-24 NOTE — ASSESSMENT & PLAN NOTE
Wt Readings from Last 3 Encounters:   11/23/22 76 7 kg (169 lb 1 6 oz)   11/23/22 74 5 kg (164 lb 3 9 oz)   11/22/22 70 8 kg (156 lb)     · Echo 03/01/2022 with EF 45%       · Holding Home Lasix, Aldactone, and Metoprolol due to hypotension previously requiring pressor support  · Patient off pressors at time of transfer  · If patient remains hemodynamically stable, would reinitiate home Lasix, Aldactone, and metoprolol

## 2022-11-24 NOTE — PROGRESS NOTES
Progress Note - Critical Care   Abiel White 80 y o  male MRN: [id number]  Unit/Bed#: MICU 08 Encounter: [id number]    SUBJECTIVE:  No complaints     HPI/24HR Event:  Levo off     ROS  Denies headache, n/v, chest pain, shortness of breath, abdominal pain, weakness, numbness  Assessment & Plan:   -Neuro:   Dx: No active issues   - Analgesia: PRN analgesics   - Sedation - None  RASS goal 0  - Delirium ppx: CAM-ICU, sleep hygiene    CV:   - Dx: Hypotension    - Likely due to septic shock    - Plan listed in ID     - Currently on levophed    - Wean as tolerated    - Consider Midodrine   - Dx: HFpEF    - Last ECHO with EF 45% back in March 2022   - Home spironolactone on hold due to hypotension   - Dx: Hyperlipidimia     - Continue statin if transaminitis resolves   - Dx: CAD   - Plavix on hold in the setting of hematuria  - Dx: Hx of Afib   - Hold eliquis in the setting of hematuria    - Amiodarone 200mg daily   - Dx: Hx of HTN    - Holding home metoprolol   - Dx: Elevated troponin    - Most likely Type II MI  - MAP goal > 65        Lung:   - Dx: No active issues  - Continue Respiratory Protocol and Airway Clearance Protocol    GI:   - Dx: Transaminitis    - Most likely due to shock    - Will monitor for resolution     FEN:   - Fluids: 75mL/hr maintenance   - Consider d/c due to patient on diet    - Electrolytes: trend and replete as needed  - Nutrition: Cardiac diet     :   - Dx: Urethral trauma   - [facility] [place] [facility]ic [facility]    - s/p cystoscopy and suprapubic catheter placed 11/23   - Dx:  Hematuria    - Unable to perform CBI    - Urology recommendation appreciated regarding irrigation  - Dx: JOEL on CKD 3    - Baseline 1 9 - 2 2    - Recent creatinine 2 7    - Monitor BUN and creatinine     ID:   - Dx: Septic shock due to UTI    - Initially on cefepime and vancomycin    - susceptibility panel done and transitioned to Ancef   - Monitor WBC/temp curve    Heme:   - Dx: Anemia    - CKD3 and hematuria - Baseline Hb 8   -  Dropped to 6 8   - Patient received 1U of PRBC    - Follow up Hb with 8   - Trend Hb   - DVT ppx: Holding pharm DVT pxp, SCDs     Endo:   - Dx: Hyperglycemia   - SSI  - Goal -180    Msk/Skin:   - Dx: Recent closed fracture of trochanter of right femur    - PT/OT  - Dx; Wound on right heel    - Wound care consulted   - Turning/repositioning  - Wound care     Disposition: Transfer out of ICU     Invasive lines and devices: Invasive Devices     Peripheral Intravenous Line  Duration           Peripheral IV 22 Dorsal (posterior); Right Hand 2 days    Peripheral IV 22 Distal;Left;Upper;Ventral (anterior) Arm 1 day          Drain  Duration           Suprapubic Catheter 18 Fr  <1 day                   Physical exam  General:  Resting comfortably in bed in no acute distress  Neuro: GCS 15 (Eye 4, Verbal 5, Motor 6)  HENT:  Normocephalic and atraumatic, PERRL  Heart:  RRR, pulses intact  Lungs:  Bilateral breath sounds present  Abdomen:   Bowel sounds present, soft  Skin:   Warm, dry skin/Incision site clean dry and intact    Vitals  Temperature:   Temp (24hrs), Av 9 °F (36 6 °C), Min:97 1 °F (36 2 °C), Max:98 5 °F (36 9 °C)    Current: Temperature: 98 5 °F (36 9 °C)    Vitals:    22 0200 22 0300 22 0400 22 0500   BP: 107/57 109/54 105/55 114/55   BP Location: Right arm Right arm Right arm Right arm   Pulse: 72 72 70 72   Resp: (!) 30 20 19 14   Temp:       TempSrc:       SpO2: 99% 98% 95% 99%   Weight:       Height:                 Non-Invasive/Invasive Ventilation Settings:  Respiratory    Lab Data (Last 4 hours)    None         O2/Vent Data (Last 4 hours)    None              No results found for: PHART, SFI5YLN, PO2ART, DQO3HTI, Y7XTXMIL, BEART, SOURCE  SpO2: SpO2: 99 %    Intake and Outputs:  I/O        0701   0700  0701   0700    I V  (mL/kg) 500 (6 5) 500 (6 5)    Blood  350    IV Piggyback  100    Total Intake(mL/kg) 500 (6 5) 950 (12  4)    Urine (mL/kg/hr)  350 (0 2)    Total Output  350    Net +500 +600                Labs:   Results from last 7 days   Lab Units 11/23/22  2110 11/23/22  1301 11/23/22  0714 11/22/22  2221 11/22/22  1201 11/22/22  0527 11/21/22  1923 11/20/22  1859   WBC Thousand/uL  --   --  25 39* 22 55*  --  21 20* 18 12* 12 72*   HEMOGLOBIN g/dL 8 0* 6 8* 7 4* 8 6*   < > 6 1* 9 1* 8 7*   HEMATOCRIT % 26 1* 22 0* 24 0* 26 7*   < > 19 3* 30 1* 27 8*   PLATELETS Thousands/uL  --   --  142* 146*  --  117* 193 230   NEUTROS PCT %  --   --   --   --   --   --   --  89*   MONOS PCT %  --   --   --   --   --   --   --  7   MONO PCT %  --   --   --   --   --   --  3*  --     < > = values in this interval not displayed  Results from last 7 days   Lab Units 11/23/22  0714 11/22/22 2221 11/22/22  0527 11/21/22  1923   SODIUM mmol/L 141 136 139 144   POTASSIUM mmol/L 4 6 3 8 3 7 3 9   CHLORIDE mmol/L 113* 104 105 107   CO2 mmol/L 19* 21 23 16*   BUN mg/dL 53* 50* 41* 42*   CREATININE mg/dL 2 70* 2 73* 2 68* 3 19*   CALCIUM mg/dL 7 5* 7 2* 6 7* 7 8*   ALK PHOS U/L 346*  --  460* 785*   ALT U/L 157*  --  221* 307*   AST U/L 98*  --  212* 276*     Results from last 7 days   Lab Units 11/23/22  0714 11/22/22  0527 11/21/22  1923   MAGNESIUM mg/dL 2 6 2 3 1 8     Results from last 7 days   Lab Units 11/22/22  0527   PHOSPHORUS mg/dL 5 5*      Results from last 7 days   Lab Units 11/23/22  0714 11/21/22  1923   INR  1 69* 1 85*     Results from last 7 days   Lab Units 11/23/22  1301   LACTIC ACID mmol/L 1 5       Micro:  Lab Results   Component Value Date    BLOODCX No Growth at 24 hrs  11/21/2022    BLOODCX No Growth at 24 hrs  11/21/2022    BLOODCX No Growth After 5 Days  03/02/2022    BLOODCX No Growth After 5 Days   03/02/2022    URINECX No Growth <1000 cfu/mL 11/22/2022    URINECX >100,000 cfu/ml Escherichia coli (A) 11/20/2022    URINECX >100,000 cfu/ml Klebsiella pneumoniae (A) 04/21/2021       Resulted Labs    Pending Labs      Imaging Studies    Imaging:   No orders to display     I have personally reviewed pertinent reports  Allergies: No Known Allergies      Medications:   Scheduled Meds:  Current Facility-Administered Medications   Medication Dose Route Frequency Provider Last Rate   • acetaminophen  650 mg Oral Q6H PRN Basilia Mcneal MD     • amiodarone  200 mg Oral Daily With Breakfast Basilia Mcneal MD     • cefazolin  1,000 mg Intravenous Q12H Basilia Mcneal MD 1,000 mg (11/24/22 0602)   • HYDROmorphone  0 2 mg Intravenous Q4H PRN Basilia Mcneal MD     • insulin lispro  1-5 Units Subcutaneous Q6H [address] Basilia Mcneal MD     • multi-electrolyte  75 mL/hr Intravenous Continuous Melvin Mina MD 75 mL/hr (11/23/22 2055)   • norepinephrine (LEVOPHED) 4 mg (STANDARD CONCENTRATION) IV in sodium chloride 0 9% 250 mL  1-30 mcg/min Intravenous Titrated Oniel Johnson MD Stopped (11/23/22 1727)   • oxyCODONE  2 5 mg Oral Q6H PRN Mayuri Ortiz MD      Or   • oxyCODONE  5 mg Oral Q6H PRN Melvin Mina MD     • tamsulosin  0 4 mg Oral Daily With Jose Alberto Patel MD       Continuous Infusions:multi-electrolyte, 75 mL/hr, Last Rate: 75 mL/hr (11/23/22 2055)  norepinephrine (LEVOPHED) 4 mg (STANDARD CONCENTRATION) IV in sodium chloride 0 9% 250 mL, 1-30 mcg/min, Last Rate: Stopped (11/23/22 1727)      PRN Meds:  acetaminophen, 650 mg, Q6H PRN  HYDROmorphone, 0 2 mg, Q4H PRN  oxyCODONE, 2 5 mg, Q6H PRN   Or  oxyCODONE, 5 mg, Q6H PRN        Counseling / Coordination of Care  Total Critical Care time spent 33 minutes excluding procedures, teaching and family updates  Code Status: Level 1 - Full Code     Portions of the record may have been created with voice recognition software  Occasional wrong word or "sound a like" substitutions may have occurred due to the inherent limitations of voice recognition software    Read the chart carefully and recognize, using context, where substitutions have occurred       Cezar Belle MD

## 2022-11-25 NOTE — PLAN OF CARE
Problem: PHYSICAL THERAPY ADULT  Goal: Performs mobility at highest level of function for planned discharge setting  See evaluation for individualized goals  Description: Treatment/Interventions: Functional transfer training, LE strengthening/ROM, Therapeutic exercise, Cognitive reorientation, Endurance training, Patient/family training, Bed mobility, Equipment eval/education, Gait training, Spoke to nursing          See flowsheet documentation for full assessment, interventions and recommendations  Note: Prognosis: Fair  Problem List: Decreased strength, Decreased range of motion, Decreased endurance, Impaired balance, Decreased coordination, Decreased mobility, Impaired judgement, Decreased cognition, Decreased safety awareness, Pain, Orthopedic restrictions  Assessment: Pt seen for high complexity PT evaluation due to decrease in functional mobility status compared to baseline  Pt with active PT eval/treat orders at this time  Pt is a 80 y o  M who presented to [facility] as a transfer from [facility] on 11/23/22 where he initially presented with lethargy, hematuria, septic shock  Pt is s/p insertion suprapubic catheter  Pt  has a past medical history of CHF (congestive heart failure) ([facility] [address] ), Coronary artery disease, Hyperlipidemia, Hypertension, and Paroxysmal atrial fibrillation ([facility] [address] )  Pt [facility] from [facility], however, typically resides with wife in [facility] with 4+1STE  Pt presents with decreased strength, balance, endurance that contribute to limitations in bed mobility, functional transfers, functional mobility  Pt requires Mod A x 2 for all mobility at this time  Pt left upright in bedside chair with chair alarm intact and with all needs in reach  Pt will benefit from skilled therapy in order to address current impairments and functional limitations  PT to follow pt and recommending rehab once medically cleared    The patient's AM-PAC Basic Mobility Inpatient Short Form Raw Score is 7  A Raw score of less than or equal to 16 suggests the patient may benefit from discharge to post-acute rehabilitation services  Please also refer to the recommendation of the Physical Therapist for safe discharge planning  Barriers to Discharge: Inaccessible home environment, Decreased caregiver support     PT Discharge Recommendation: Post acute rehabilitation services    See flowsheet documentation for full assessment

## 2022-11-25 NOTE — OCCUPATIONAL THERAPY NOTE
Occupational Therapy Evaluation     Patient Name: Elisabeth Helm  UIQHB'L Date: 11/25/2022  Problem List  Principal Problem:    Urethral trauma  Active Problems:    CAD (coronary artery disease)    Hyperlipidemia, mixed    Acute-on-chronic kidney injury ([facility] [address] )    Chronic atrial fibrillation (HCC)    Chronic heart failure with preserved ejection fraction (HCC)    Acute on chronic anemia    Septic shock (HCC)    UTI (urinary tract infection)    Closed fracture of trochanter of right femur with routine healing    Transaminitis    Hyperglycemia    Hematuria    Past Medical History  Past Medical History:   Diagnosis Date    CHF (congestive heart failure) ([facility])     Coronary artery disease     Hyperlipidemia     Hypertension     Paroxysmal atrial fibrillation ([facility] [address] )      Past Surgical History  Past Surgical History:   Procedure Laterality Date    CARDIAC SURGERY      cabg x 2 2014 [facility]    CORONARY ANGIOPLASTY WITH STENT PLACEMENT  10/2021    DAPHNEY to left main and ramus    MITRAL VALVE REPAIR  2014    mitral ring    GA OPEN RX FEMUR FX+INTRAMED HALIMA Right 11/1/2022    Procedure: INSERTION NAIL IM FEMUR ANTEGRADE (TROCHANTERIC); Surgeon: Bishop Hedrick; Location:  MAIN OR;  Service: Orthopedics           11/25/22 0845   OT Last Visit   OT Visit Date 11/25/22   Note Type   Note type Evaluation   Pain Assessment   Pain Score 3   Pain Location/Orientation Orientation: Right;Location: Leg   Restrictions/Precautions   Weight Bearing Precautions Per Order Yes   RLE Weight Bearing Per Order WBAT  (PTA pt had a fall and is s/p IMN)   Braces or Orthoses   (R prevalon boot)   Other Precautions Cognitive; Chair Alarm; Bed Alarm;WBS;Multiple lines;Telemetry;O2;Fall Risk;Pain   Home Living   Type of [address] [place] [address] One level;Bed/bath upstairs;Stairs to enter with rails   Bathroom Shower/Tub Tub/shower unit   Bathroom Toilet Standard   Bathroom Equipment Grab bars in shower; Shower chair   Bathroom Accessibility Accessible   Home Equipment Walker;Cane;Stair glide;Grab bars   Additional Comments Pt was admitted from [facility] 2* fall and IMN of RLE, for rehab  Home set up prior to going to SNF is as listed above   Prior Function   Level of [place] Independent with ADLs; Independent with functional mobility; Independent with IADLS   Lives With Spouse   Receives Help From Family   IADLs Independent with driving; Independent with meal prep; Independent with medication management   Falls in the last 6 months 1 to 4   Vocational Retired   Comments Prior to this admission pt states he was requiring assist from facility staff at [employer] for all ADLS and IADLs however prior to admission to SNF was I with all ADLS and IADLS including driving   Lifestyle   Autonomy Currently requiring assist with ADLS and IADLS   Reciprocal Relationships supportive wife   Service to Others retired   Intrinsic Gratification none stated   Subjective   Subjective "I am so tired"   ADL   Where Assessed Chair   Grooming Assistance 4  Minimal Assistance   UB Bathing Assistance 3  Moderate Assistance   LB Bathing Assistance 2  Maximal Assistance   [address] [facility] 3  Moderate Assistance   LB Dressing Assistance 2  Maximal [address]  2  Maximal Assistance   Additional Comments Significantly deconditioned, increased DODGE/SOB with any activity   Bed Mobility   Supine to Sit 3  Moderate assistance   Additional items Assist x 2;HOB elevated; Increased time required;LE management;Verbal cues   Additional Comments OOB at end of session   Transfers   Sit to Stand 3  Moderate assistance   Additional items Assist x 2; Increased time required;Verbal cues   Stand to Sit 3  Moderate assistance   Additional items Assist x 2; Increased time required;Verbal cues   Stand pivot 3  Moderate assistance   Additional items Assist x 2; Increased time required;Verbal cues   Additional Comments HHA   Functional Mobility   Functional Mobility 3 Moderate assistance   Additional Comments Ax2 few steps toward chair   Additional items Hand hold assistance   Balance   Static Sitting Fair   Dynamic Sitting Fair -   Static Standing Poor   Dynamic Standing Poor -   Ambulatory Poor -   Activity Tolerance   Activity Tolerance Patient limited by fatigue   Medical Staff Made Aware PT and NSG aware   Nurse Made Aware yes   RUE Assessment   RUE Assessment WFL   LUE Assessment   LUE Assessment WFL   Psychosocial   Psychosocial (WDL) WDL   Cognition   Overall Cognitive Status Impaired   Arousal/Participation Responsive   Attention Attends with cues to redirect   Orientation Level Oriented X4   Memory Decreased recall of recent events   Following Commands Follows one step commands with increased time or repetition   Comments Pt with increased lethargy, difficulity answering questions at times or else does not respond to questions without prompting for same  Assessment   Limitation Decreased ADL status; Decreased UE ROM; Decreased UE strength;Decreased Safe judgement during ADL;Decreased cognition;Decreased endurance;Decreased self-care trans;Decreased high-level ADLs   Prognosis Fair   Assessment Pt is a 80 y o  male seen for OT evaluation s/p admit to [facility] on 11/23/2022 w/ Urethral trauma  Comorbidities affecting pt's functional performance at time of assessment include: CAD, hyperlipidemia, acute CKD, chronic a-fib, chronic heart failure, acute on chronic anemia, septic shock, UTI, closed fracure of trochanter of R femur, transaminitis, hyperglycemia, hematuria  Personal factors affecting pt at time of IE include:difficulty performing ADLS, difficulty performing IADLS , limited insight into deficits, flat affect, decreased initiation and engagement  and health management   Prior to admission, pt was at [facility] where he reports needing assist for all ADLs and IADLS   OF NOTE: Per chart review pt was recently I with all ADLS and IALD until a recent fall 10/31 where he sustained a R femur fx and is s/p IMN and WBAT on RLE, pt was sent [facility] and has been there since  Upon evaluation: Pt presents supine on 2 L O2 via NC with the following vital signs: WNL  Pt requires overall mod A x 2 for tranfers and mobility and min- max A for self cares 2* the following deficits impacting occupational performance: weakness, decreased ROM, decreased strength, decreased balance, decreased tolerance, impaired attention, impaired initiation, impaired problem solving, decreased safety awareness and decreased coping skills  Pt resting in chair at end of session with all needs in reach, alarm on, all lines in place and SCD's on  Pt to benefit from continued skilled OT tx while in the hospital to address deficits as defined above and maximize level of functional independence w ADL's and functional mobility  Occupational Performance areas to address include: grooming, bathing/shower, toilet hygiene, dressing, medication management, health maintenance, functional mobility, clothing management and social participation  The patient's raw score on the AM-PAC Daily Activity inpatient short form is 14  , standardized score is 30 46  , less than 39 4  Patients at this level are likely to benefit from discharge to post-acute rehabilitation services  Please refer to the recommendation of the Occupational Therapist for safe discharge planning  Goals   Patient Goals to rest   Long Term Goal #1 see below   Plan   Treatment Interventions ADL retraining;Functional transfer training;UE strengthening/ROM; Endurance training;Cognitive reorientation;Patient/family training; Compensatory technique education;Continued evaluation; Energy conservation; Activityengagement   Goal Expiration Date 12/09/22   OT Frequency Other (comment)  (2-4 x/wk)   Recommendation   OT Discharge Recommendation Post acute rehabilitation services   AM-Located within [facility] Daily Activity Inpatient   Lower Body Dressing 2   Bathing 2 Toileting 2   Upper Body Dressing 2   Grooming 3   Eating 3   Daily Activity Raw Score 14   Daily Activity Standardized Score (Calc for Raw Score >=11) 33 39   AM-PAC Applied Cognition Inpatient   Following a Speech/Presentation 2   Understanding Ordinary Conversation 3   Taking Medications 2   Remembering Where Things Are Placed or Put Away 2   Remembering List of 4-5 Errands 2   Taking Care of Complicated Tasks 2   Applied Cognition Raw Score 13   Applied Cognition Standardized Score 30 46     OT goals to be addressed in the next 14 days:    Pt will complete supine to sit transfer with S using B/L UEs to initiate bed mobility     Pt will tolerate sitting at EOB 20 minutes with S assist and stable vital signs, as prerequisite for further engagement in ADLS     Pt will complete grooming task with S assist and increased time to increase independence in functional tasks    Pt will complete UB/LB ADLS with Min A and use of AD/DME as needed to increase independence in functional tasks    Pt will complete functional transfers/ mobility with Min assist and appropriate DME on/off all ADL surfaces     Pt will be attentive 100% of the time during ongoing formal cognitive assessment to assist w/ safe D/C planning and increase safety for meaningful tasks

## 2022-11-25 NOTE — ASSESSMENT & PLAN NOTE
Lab Results   Component Value Date    EGFR 26 11/25/2022    EGFR 22 11/24/2022    EGFR 20 11/23/2022    CREATININE 2 25 (H) 11/25/2022    CREATININE 2 54 (H) 11/24/2022    CREATININE 2 70 (H) 11/23/2022       Acute on chronic kidney disease  Monitor kidney function closely  Avoid nephrotoxins  Suprapubic catheter in place

## 2022-11-25 NOTE — PLAN OF CARE
Problem: OCCUPATIONAL THERAPY ADULT  Goal: Performs self-care activities at highest level of function for planned discharge setting  See evaluation for individualized goals  Description: Treatment Interventions: ADL retraining, Functional transfer training, UE strengthening/ROM, Endurance training, Cognitive reorientation, Patient/family training, Compensatory technique education, Continued evaluation, Energy conservation, Activityengagement          See flowsheet documentation for full assessment, interventions and recommendations  11/25/2022 1519 by Priscila Tierney OT  Note: Limitation: Decreased ADL status, Decreased UE ROM, Decreased UE strength, Decreased Safe judgement during ADL, Decreased cognition, Decreased endurance, Decreased self-care trans, Decreased high-level ADLs  Prognosis: Fair  Assessment: Pt is a 80 y o  male seen for OT evaluation s/p admit to [facility] on 11/23/2022 w/ Urethral trauma  Comorbidities affecting pt's functional performance at time of assessment include: CAD, hyperlipidemia, acute CKD, chronic a-fib, chronic heart failure, acute on chronic anemia, septic shock, UTI, closed fracure of trochanter of R femur, transaminitis, hyperglycemia, hematuria  Personal factors affecting pt at time of IE include:difficulty performing ADLS, difficulty performing IADLS , limited insight into deficits, flat affect, decreased initiation and engagement  and health management   Prior to admission, pt was at [facility] where he reports needing assist for all ADLs and IADLS  OF NOTE: Per chart review pt was recently I with all ADLS and IALD until a recent fall 10/31 where he sustained a R femur fx and is s/p IMN and WBAT on RLE, pt was sent to [facility] for rehab from [facility] and has been there since  Upon evaluation: Pt presents supine on 2 L O2 via NC with the following vital signs: WNL   Pt requires overall mod A x 2 for tranfers and mobility and min- max A for self cares 2* the following deficits impacting occupational performance: weakness, decreased ROM, decreased strength, decreased balance, decreased tolerance, impaired attention, impaired initiation, impaired problem solving, decreased safety awareness and decreased coping skills  Pt resting in chair at end of session with all needs in reach, alarm on, all lines in place and SCD's on  Pt to benefit from continued skilled OT tx while in the hospital to address deficits as defined above and maximize level of functional independence w ADL's and functional mobility  Occupational Performance areas to address include: grooming, bathing/shower, toilet hygiene, dressing, medication management, health maintenance, functional mobility, clothing management and social participation  The patient's raw score on the AM-PAC Daily Activity inpatient short form is 14  , standardized score is 30 46  , less than 39 4  Patients at this level are likely to benefit from discharge to post-acute rehabilitation services  Please refer to the recommendation of the Occupational Therapist for safe discharge planning  OT Discharge Recommendation: Post acute rehabilitation services       11/25/2022 1518 by Earl Nogueira OT  Note: Limitation: Decreased ADL status, Decreased UE ROM, Decreased UE strength, Decreased Safe judgement during ADL, Decreased cognition, Decreased endurance, Decreased self-care trans, Decreased high-level ADLs  Prognosis: Fair  Assessment: Pt is a 80 y o  male seen for OT evaluation s/p admit to [facility] on 11/23/2022 w/ Urethral trauma  Comorbidities affecting pt's functional performance at time of assessment include: CAD, hyperlipidemia, acute CKD, chronic a-fib, chronic heart failure, acute on chronic anemia, septic shock, UTI, closed fracure of trochanter of R femur, transaminitis, hyperglycemia, hematuria   Personal factors affecting pt at time of IE include:difficulty performing ADLS, difficulty performing IADLS , limited insight into deficits, flat affect, decreased initiation and engagement  and health management   Prior to admission, pt was at [facility] where he reports needing assist for all ADLs and IADLS  OF NOTE: Per chart review pt was recently I with all ADLS and IALD until a recent fall 10/31 where he sustained a R femur fx and is s/p IMN and WBAT on RLE, pt was sent to [facility] for rehab from [facility] and has been there since  Upon evaluation: Pt presents supine on 2 L O2 via NC with the following vital signs: WNL  Pt requires overall mod A x 2 for tranfers and mobility and min- max A for self cares 2* the following deficits impacting occupational performance: weakness, decreased ROM, decreased strength, decreased balance, decreased tolerance, impaired attention, impaired initiation, impaired problem solving, decreased safety awareness and decreased coping skills  Pt resting in chair at end of session with all needs in reach, alarm on, all lines in place and SCD's on  Pt to benefit from continued skilled OT tx while in the hospital to address deficits as defined above and maximize level of functional independence w ADL's and functional mobility  Occupational Performance areas to address include: grooming, bathing/shower, toilet hygiene, dressing, medication management, health maintenance, functional mobility, clothing management and social participation  The patient's raw score on the AM-PAC Daily Activity inpatient short form is 14  , standardized score is 30 46  , less than 39 4  Patients at this level are likely to benefit from discharge to post-acute rehabilitation services  Please refer to the recommendation of the Occupational Therapist for safe discharge planning       OT Discharge Recommendation: Post acute rehabilitation services

## 2022-11-25 NOTE — ASSESSMENT & PLAN NOTE
Antibiotics transition to IV cefazolin based on culture sensitivities  Transition to p o   Keflex to complete 7 day course of antibiotics

## 2022-11-25 NOTE — PROGRESS NOTES
[address] [facility]  Progress Note Daniela Guy 1940, 80 y o  male MRN: [id number]  Unit/Bed#: [facility] 431-01 Encounter: [id number]  Primary Care Provider: Chris Collins DO   Date and time admitted to hospital: 11/23/2022  4:04 AM    * Urethral trauma  Assessment & Plan  Urethral trauma status post cystoscopy with suprapubic catheter in place  Urology following      Hematuria  Assessment & Plan  · Due to urethral injury  · Hematuria improving  · Urology following    Hyperglycemia  Assessment & Plan  A1c 4 6  Monitor    Transaminitis  Assessment & Plan  · Likely due to shock liver  · Monitor      Closed fracture of trochanter of right femur with routine healing  Assessment & Plan  · Discharged from [address] [place] [address] on 11/06 s/p repair  · DVT prophylaxis  · Physical therapy    UTI (urinary tract infection)  Assessment & Plan  Antibiotics transition to IV cefazolin based on culture sensitivities  Transition to p o  Keflex to complete 7 day course of antibiotics    Septic shock (HCC)  Assessment & Plan  · Present on admission  · Resolved    Acute on chronic anemia  Assessment & Plan  Lab Results   Component Value Date    EGFR 26 11/25/2022    EGFR 22 11/24/2022    EGFR 20 11/23/2022    CREATININE 2 25 (H) 11/25/2022    CREATININE 2 54 (H) 11/24/2022    CREATININE 2 70 (H) 11/23/2022     · Multifactorial  · Iron studies noted  · Iron supplementation  · Monitor hemoglobin    Chronic heart failure with preserved ejection fraction [facility])  Assessment & Plan  Wt Readings from Last 3 Encounters:   11/23/22 76 7 kg (169 lb 1 6 oz)   11/23/22 74 5 kg (164 lb 3 9 oz)   11/22/22 70 8 kg (156 lb)     · Echo 03/01/2022 with EF 45%     · Continue beta-blocker  · Resume Lasix  · Monitor I/O, daily weights  · Less than 2 g salt diet fluid restriction            Chronic atrial fibrillation (HCC)  Assessment & Plan  Continue amiodarone, beta-blocker  Eliquis for anticoagulation    Acute-on-chronic kidney injury [facility])  Assessment & Plan  Lab Results   Component Value Date    EGFR 26 2022    EGFR 22 2022    EGFR 20 2022    CREATININE 2 25 (H) 2022    CREATININE 2 54 (H) 2022    CREATININE 2 70 (H) 2022       Acute on chronic kidney disease  Monitor kidney function closely  Avoid nephrotoxins  Suprapubic catheter in place      Hyperlipidemia, mixed  Assessment & Plan  · Continue Lipitor    CAD (coronary artery disease)  Assessment & Plan  Continue Plavix, beta-blocker, Lipitor              VTE Pharmacologic Prophylaxis: VTE Score: 21 High Risk (Score >/= 5) - Pharmacological DVT Prophylaxis Ordered: apixaban (Eliquis)  Sequential Compression Devices Ordered  Patient Centered Rounds: I performed bedside rounds with nursing staff today  Discussions with Specialists or Other Care Team Provider:     Education and Discussions with Family / Patient: patient, updated spouse Lalo   Time Spent for Care: 30 minutes  More than 50% of total time spent on counseling and coordination of care as described above  Current Length of Stay: 2 day(s)  Current Patient Status: Inpatient   Certification Statement: The patient will continue to require additional inpatient hospital stay due to as outlined  Discharge Plan: awaiting clinical and symptomatic improvement    Code Status: Level 1 - Full Code    Subjective:     Sitting up in chair  Reports feeling tired  Cough unable to expectorate phlegm  History chart labs medications reviewed    Objective:     Vitals:   Temp (24hrs), Av °F (36 7 °C), Min:97 6 °F (36 4 °C), Max:98 3 °F (36 8 °C)    Temp:  [97 6 °F (36 4 °C)-98 3 °F (36 8 °C)] 98 3 °F (36 8 °C)  HR:  [] 101  Resp:  [12-25] 20  BP: (113-141)/(60-76) 113/65  SpO2:  [90 %-100 %] 100 %  Body mass index is 25 71 kg/m²  Input and Output Summary (last 24 hours):      Intake/Output Summary (Last 24 hours) at 2022 1607  Last data filed at 2022 1000  Gross per 24 hour   Intake 120 ml   Output 810 ml   Net -690 ml       Physical Exam:   Physical Exam     Sitting up in chair  Dyspneic at rest but able to complete sentences  Features of protein calorie malnutrition noted  Neck supple   Lungs diminished breath sounds bilaterally  Crackles noted  Heart sounds S1 and S2 noted  Abdomen soft nontender  Bilateral lower extremity edema noted  No rash    Additional Data:     Labs:  Results from last 7 days   Lab Units 11/25/22  0752 11/21/22  1923 11/20/22  1859   WBC Thousand/uL 12 99*   < > 12 72*   HEMOGLOBIN g/dL 8 8*   < > 8 7*   HEMATOCRIT % 28 4*   < > 27 8*   PLATELETS Thousands/uL 92*   < > 230   BANDS PCT % 3   < >  --    NEUTROS PCT %  --   --  89*   LYMPHS PCT %  --   --  3*   LYMPHO PCT % 0*   < >  --    MONOS PCT %  --   --  7   MONO PCT % 1*   < >  --    EOS PCT % 1   < > 0    < > = values in this interval not displayed  Results from last 7 days   Lab Units 11/25/22  0752   SODIUM mmol/L 143   POTASSIUM mmol/L 3 6   CHLORIDE mmol/L 113*   CO2 mmol/L 22   BUN mg/dL 55*   CREATININE mg/dL 2 25*   ANION GAP mmol/L 8   CALCIUM mg/dL 7 9*   ALBUMIN g/dL 1 9*   TOTAL BILIRUBIN mg/dL 1 16*   ALK PHOS U/L 289*   ALT U/L 38   AST U/L 31   GLUCOSE RANDOM mg/dL 95     Results from last 7 days   Lab Units 11/23/22  0714   INR  1 69*     Results from last 7 days   Lab Units 11/24/22  1807 11/24/22  1208 11/24/22  0550 11/24/22  0000 11/23/22  1750 11/23/22  1205 11/23/22  0009 11/22/22  2044 11/22/22  1833 11/22/22  1149 11/22/22  0524 11/22/22  0247   POC GLUCOSE mg/dl 129 122 106 98 112 86 106 128 113 78 85 89     Results from last 7 days   Lab Units 11/21/22  1923   HEMOGLOBIN A1C % 4 6     Results from last 7 days   Lab Units 11/23/22  1301 11/22/22  0527 11/22/22  0240 11/21/22  2334 11/21/22  2142 11/21/22  1923   LACTIC ACID mmol/L 1 5 1 5 2 6* 5 3*   < > 10 0*   PROCALCITONIN ng/ml 47 28* 88 64*  --   --   --  171 74*  150 57*    < > = values in this interval not displayed  Lines/Drains:  Invasive Devices     Peripheral Intravenous Line  Duration           Peripheral IV 11/25/22 Left;Ventral (anterior) Forearm <1 day          Drain  Duration           Suprapubic Catheter 18 Fr  2 days                      Imaging: Reviewed radiology reports from this admission including: chest xray, chest CT scan, abdominal/pelvic CT and ECHO    Recent Cultures (last 7 days):   Results from last 7 days   Lab Units 11/22/22  1407 11/21/22  1923 11/20/22 1957   BLOOD CULTURE   --  No Growth at 72 hrs  No Growth at 72 hrs   --    URINE CULTURE  No Growth <1000 cfu/mL  --  >100,000 cfu/ml Escherichia coli*       Last 24 Hours Medication List:   Current Facility-Administered Medications   Medication Dose Route Frequency Provider Last Rate   • acetaminophen  650 mg Oral Q6H PRN Yen Holland MD     • albuterol  2 puff Inhalation Q4H PRN Ingrid Samuels MD     • amiodarone  200 mg Oral Daily With Breakfast Yen Holland MD     • apixaban  2 5 mg Oral BID Josephine Chavez, DO     • atorvastatin  40 mg Oral Daily With Kit Hammonds MD     • cephalexin  500 mg Oral [facility] Ingrid Samuels MD     • clopidogrel  75 mg Oral Daily Josephine Chavez, DO     • furosemide  20 mg Oral BID Ingrid Samuels MD     • HYDROmorphone  0 2 mg Intravenous Q4H PRN Josephine Chavez, DO     • iron sucrose  200 mg Intravenous Daily Ingrid Samuels  mg (11/25/22 1036)   • metoprolol tartrate  25 mg Oral Q12H Jose Gage MD     • oxyCODONE  2 5 mg Oral Q6H PRN Josephine Chavez, DO      Or   • oxyCODONE  5 mg Oral Q6H PRN Josephine Chavez, DO     • tamsulosin  0 4 mg Oral Daily With Dieter Hanna MD          Today, Patient Was Seen By: Ingrid Samuels MD    **Please Note: This note may have been constructed using a voice recognition system  **

## 2022-11-25 NOTE — CASE MANAGEMENT
Case Management Discharge Planning Note    Patient name Akbar Child  Location [facility] 116-81 MRN [id number]  : 1940 Date 2022       Current Admission Date: 2022  Current Admission Diagnosis:Urethral trauma   Patient Active Problem List    Diagnosis Date Noted   • Urethral trauma 2022   • Septic shock ([address] ) 2022   • UTI (urinary tract infection) 2022   • Closed fracture of trochanter of right femur with routine healing 2022   • Leg edema, right 2022   • Pressure injury sacrum and right heel 2022   • Transaminitis 2022   • Elevated troponin level not due myocardial infarction 2022   • Hyperglycemia 2022   • Hematuria 2022   • Thrombocytopenia ([address] ) 2022   • Acute on chronic anemia 2022   • Hyponatremia 2022   • Postoperative urinary retention    • Chronic heart failure with preserved ejection fraction ([address] ) 10/31/2022   • Paroxysmal atrial fibrillation ([address] ) 10/31/2022   • Closed 2-part intertrochanteric fracture of proximal end of right femur, initial encounter ([facility] ) 10/31/2022   • Lumbar compression deformity 2022   • Postprocedural pneumothorax 2022   • Acute respiratory failure with hypoxia ([address] ) 2022   • Acute on chronic diastolic CHF (congestive heart failure) ([address] ) 2022   • Acute-on-chronic kidney injury ([address] ) 2022   • Chronic atrial fibrillation ([address] ) 2022   • Stage 3 chronic kidney disease ([address] ) 2021   • Multiple lung nodules on CT 2021   • Carotid artery stenosis 2021   • Bacteremia due to Pseudomonas 2021   • CAD (coronary artery disease) 2021   • Mitral regurgitation 2021   • Hypertensive urgency 2021   • Hyperlipidemia, mixed 2021      LOS (days): 2  Geometric Mean LOS (GMLOS) (days): 4 80  Days to GMLOS:2 4     OBJECTIVE:  Risk of Unplanned Readmission Score: 34 04         Current admission status: Inpatient Preferred Pharmacy:   [facility]  [address] [facility] [address]  Phone: [phone] Fax: Lobo Cates, [facility] [facility]  [address]  Phone: [phone] Fax: [phone]    Primary Care Provider: Kayden Graham DO    Primary Insurance: MEDICARE  Secondary Insurance: BLUE CROSS    DISCHARGE DETAILS:    Other Referral/Resources/Interventions Provided:  Interventions: SNF  Referral Comments: Per coordination rounds with [facility], pt is not yet medically ready for d/c at this time  Pt is a current LTC resident at [facility], plan to return once medically cleared for d/c  CM team will continue to follow      Treatment Team Recommendation: SNF  Discharge Destination Plan[de-identified] SNF  Transport at Discharge : [facility]

## 2022-11-25 NOTE — RESPIRATORY THERAPY NOTE
RT Protocol Note  Thang Mina 80 y o  male MRN: [id number]  Unit/Bed#: [facility] 431-01 Encounter: [id number]    Assessment    Principal Problem:    Urethral trauma  Active Problems:    CAD (coronary artery disease)    Hyperlipidemia, mixed    Acute-on-chronic kidney injury ([address] )    Chronic atrial fibrillation (HCC)    Chronic heart failure with preserved ejection fraction (HCC)    Acute on chronic anemia    Septic shock (HCC)    UTI (urinary tract infection)    Closed fracture of trochanter of right femur with routine healing    Transaminitis    Hyperglycemia    Hematuria      Home Pulmonary Medications:  none       Past Medical History:   Diagnosis Date    CHF (congestive heart failure) (HCC)     Coronary artery disease     Hyperlipidemia     Hypertension     Paroxysmal atrial fibrillation (HCC)      Social History     Socioeconomic History    Marital status: /Civil Union     Spouse name: None    Number of children: None    Years of education: None    Highest education level: None   Occupational History    None   Tobacco Use    Smoking status: Never    Smokeless tobacco: Never   Vaping Use    Vaping Use: Never used   Substance and Sexual Activity    Alcohol use: Not Currently    Drug use: Never    Sexual activity: Not Currently     Comment: defer   Other Topics Concern    None   Social History Narrative    None     Social Determinants of Health     Financial Resource Strain: Not on file   Food Insecurity: No Food Insecurity    Worried About Running Out of Food in the Last Year: Never true    Ran Out of Food in the Last Year: Never true   Transportation Needs: No Transportation Needs    Lack of Transportation (Medical): No    Lack of Transportation (Non-Medical):  No   Physical Activity: Not on file   Stress: Not on file   Social Connections: Not on file   Intimate Partner Violence: Not on file   Housing Stability: Low Risk     Unable to Pay for Housing in the Last Year: No    Number of Places Lived in the Last Year: 1    Unstable Housing in the Last Year: No       Subjective         Objective    Physical Exam:   Assessment Type: (P) Pre-treatment  General Appearance: (P) Awake, Alert  Respiratory Pattern: (P) Shallow  Chest Assessment: (P) Chest expansion symmetrical  Bilateral Breath Sounds: (P) Diminished  R Breath Sounds: (P) Expiratory wheezes    Vitals:  Blood pressure 141/76, pulse (!) 107, temperature 97 6 °F (36 4 °C), resp  rate 20, height 5' 8" (1 727 m), weight 76 7 kg (169 lb 1 6 oz), SpO2 99 %  Imaging and other studies: I have personally reviewed pertinent reports  Plan    Respiratory Plan: (P) No distress/Pulmonary history        Resp Comments: called to assess pt for i/e wheezes ans sob  pt stated breathign was a little hard at this time  pt noted to have e wheeses on right  dimished otherwise  pt given one albuterol udn for possible improvement no change post bronchodialator  will  order albuterol mdi q4 prn for sob and wheezing    pt has no pulmonary hx

## 2022-11-25 NOTE — ASSESSMENT & PLAN NOTE
· Discharged from [address] [place] [address] on 11/06 s/p repair     · DVT prophylaxis  · Physical therapy

## 2022-11-25 NOTE — ASSESSMENT & PLAN NOTE
Lab Results   Component Value Date    EGFR 26 11/25/2022    EGFR 22 11/24/2022    EGFR 20 11/23/2022    CREATININE 2 25 (H) 11/25/2022    CREATININE 2 54 (H) 11/24/2022    CREATININE 2 70 (H) 11/23/2022     · Multifactorial  · Iron studies noted  · Iron supplementation  · Monitor hemoglobin

## 2022-11-25 NOTE — ASSESSMENT & PLAN NOTE
Wt Readings from Last 3 Encounters:   11/23/22 76 7 kg (169 lb 1 6 oz)   11/23/22 74 5 kg (164 lb 3 9 oz)   11/22/22 70 8 kg (156 lb)     · Echo 03/01/2022 with EF 45%     · Continue beta-blocker  · Resume Lasix  · Monitor I/O, daily weights  · Less than 2 g salt diet fluid restriction

## 2022-11-25 NOTE — PROGRESS NOTES
Progress Note - urology  [facility] 80 [facility] MRN: [id number]  Unit/Bed#: [facility] 431-01 Encounter: [id number]    Assessment & Plan:    19-year-old male with urinary retention after hip surgery with multiple failed void trials and hospitalization for hematuria in sepsis  With malposition urethral Schaffer catheter  Status post cystoscopy insertion of suprapubic catheter percutaneous with Dr Julee Chaves on 11/23:    -intraoperative findings revealed urethra not possible an obliterated  Suprapubic catheter placed blindly with Eagle sheath copious dark urine retained small clots irrigated urine clear   -patient undergoing manual irrigations through SPT, urine on evaluation reveals yellow to isidro in color  Recommend manual irrigation p r n   -patient was scrotal edema, continue with Tylenol for scrotal discomfort, scrotal elevation  Made a sling out of patient's pillow case to assist with elevation  Physical examination reveals extremely edematous phallus in scrotum, tender to palpation although no areas of erythema noted, fluctuance or sign of active infection  Continue with conservative management   -maintain SPT to drainage, plans for exchange in 4 weeks in the office  Will arrange  -hemoglobin stable at 8 8 increased from 7 6, reactive leukocytosis 12 99    Urology to follow peripherally      Subjective/Objective   Chief Complaint:  Scrotal discomfort    Subjective:   Patient currently sitting comfortably in chair in no acute distress resting peacefully  He reports that he has some discomfort in his scrotum  And feels as though it is a burning sensation  Otherwise denies difficulty or discomfort with suprapubic catheter    Objective:     Blood pressure 141/76, pulse (!) 107, temperature 97 6 °F (36 4 °C), resp  rate 20, height 5' 8" (1 727 m), weight 76 7 kg (169 lb 1 6 oz), SpO2 99 %  ,Body mass index is 25 71 kg/m²        Intake/Output Summary (Last 24 hours) at 11/25/2022 1226  Last data filed at 11/25/2022 1000  Gross per 24 hour   Intake 120 ml   Output 810 ml   Net -690 ml       Invasive Devices     Peripheral Intravenous Line  Duration           Peripheral IV 11/25/22 Left;Ventral (anterior) Forearm <1 day          Drain  Duration           Suprapubic Catheter 18 Fr  2 days              Physical Exam  Constitutional:       General: He is not in acute distress  Appearance: He is normal weight  He is not ill-appearing, toxic-appearing or diaphoretic  HENT:      Head: Normocephalic and atraumatic  Right Ear: External ear normal       Left Ear: External ear normal       Nose: Nose normal       Mouth/Throat:      Pharynx: Oropharynx is clear  Eyes:      Conjunctiva/sclera: Conjunctivae normal    Cardiovascular:      Rate and Rhythm: Normal rate and regular rhythm  Pulses: Normal pulses  Heart sounds: Normal heart sounds  No murmur heard  No friction rub  No gallop  Pulmonary:      Effort: Pulmonary effort is normal  No respiratory distress  Breath sounds: No wheezing, rhonchi or rales  Abdominal:      General: Bowel sounds are normal  There is no distension  Tenderness: There is no abdominal tenderness  Genitourinary:     Comments:  Physical examination reveals extremely edematous phallus in scrotum, tender to palpation although no areas of erythema noted, fluctuance or sign of active infection  SPT in place recently emptied, urine in graduated cylinder yellow isidro in color  Musculoskeletal:         General: Normal range of motion  Cervical back: Normal range of motion  Neurological:      General: No focal deficit present  Mental Status: He is alert and oriented to person, place, and time  Psychiatric:         Mood and Affect: Mood normal          Behavior: Behavior normal          Thought Content: Thought content normal          Judgment: Judgment normal            Lab, Imaging and other studies:I have personally reviewed pertinent lab results   [facility]  Lab Results   Component Value Date    SODIUM 143 11/25/2022    K 3 6 11/25/2022     (H) 11/25/2022    CO2 22 11/25/2022    BUN 55 (H) 11/25/2022    CREATININE 2 25 (H) 11/25/2022    GLUC 95 11/25/2022    CALCIUM 7 9 (L) 11/25/2022       VTE Pharmacologic Prophylaxis: Eliquis   VTE Mechanical Prophylaxis: sequential compression device      Sidney Addison PA-C

## 2022-11-25 NOTE — PHYSICAL THERAPY NOTE
Physical Therapy Evaluation     Patient's Name: Kylah Canela    Admitting Diagnosis  Hematuria [R31 9]    Problem List  Patient Active Problem List   Diagnosis    Bacteremia due to Pseudomonas    CAD (coronary artery disease)    Mitral regurgitation    Hypertensive urgency    Hyperlipidemia, mixed    Carotid artery stenosis    Stage 3 chronic kidney disease ([facility])    Multiple lung nodules on CT    Acute respiratory failure with hypoxia ([facility])    Acute on chronic diastolic CHF (congestive heart failure) ([facility] )    Acute-on-chronic kidney injury ([facility] )    Chronic atrial fibrillation ([facility])    Postprocedural pneumothorax    Lumbar compression deformity    Chronic heart failure with preserved ejection fraction ([facility])    Paroxysmal atrial fibrillation ([facility] )    Closed 2-part intertrochanteric fracture of proximal end of right femur, initial encounter ([place] [address] )    Acute on chronic anemia    Hyponatremia    Postoperative urinary retention    Thrombocytopenia ([facility])    Septic shock ([facility])    UTI (urinary tract infection)    Closed fracture of trochanter of right femur with routine healing    Leg edema, right    Pressure injury sacrum and right heel    Transaminitis    Elevated troponin level not due myocardial infarction    Hyperglycemia    Hematuria    Urethral trauma       Past Medical History  Past Medical History:   Diagnosis Date    CHF (congestive heart failure) ([facility] )     Coronary artery disease     Hyperlipidemia     Hypertension     Paroxysmal atrial fibrillation ([facility] )        Past Surgical History  Past Surgical History:   Procedure Laterality Date    CARDIAC SURGERY      cabg x 2 2014 [facility]    CORONARY ANGIOPLASTY WITH STENT PLACEMENT  10/2021    DAPHNEY to left main and ramus    MITRAL VALVE REPAIR  2014    mitral ring    [facility] FEMUR FX+INTRAMED HALIMA Right 11/1/2022    Procedure: INSERTION NAIL IM FEMUR ANTEGRADE (TROCHANTERIC); Surgeon: Jameel Durán;   Location:  MAIN OR;  Service: Orthopedics          11/25/22 7245   PT Last Visit   PT Visit Date 11/25/22   Note Type   Note type Evaluation   Pain Assessment   Pain Assessment Tool 0-10   Pain Score 3   Pain Location/Orientation Orientation: Right;Location: Leg   Restrictions/Precautions   Weight Bearing Precautions Per Order Yes   RLE Weight Bearing Per Order WBAT  (recent fall s/p IM nail)   Other Precautions Cognitive; Bed Alarm; Chair Alarm;Multiple lines; Fall Risk;Pain;WBS; Telemetry;O2   Home Living   Type of [address] One level  (4+1STE, stair glide to second floor)   Bathroom Shower/Tub Tub/shower unit   Bathroom Toilet Standard   Bathroom Equipment Grab bars in shower; Shower chair   Home Equipment Walker;Cane;Stair glide   Additional Comments Admitted from [facility]  Typically lives at above home set up  Prior Function   Level of [place] Independent with ADLs; Independent with functional mobility; Independent with IADLS   Lives With Spouse   IADLs Independent with driving   Falls in the last 6 months 1 to 4   Vocational Retired   [facility] is prior to recent fall  Pt report he was using WC at [facility], just working on trying to  bars  General   Family/Caregiver Present No   Cognition   Overall Cognitive Status Impaired   Arousal/Participation Responsive   Attention Attends with cues to redirect   Orientation Level Oriented X4   Memory Decreased recall of precautions;Decreased recall of recent events   Following Commands Follows one step commands with increased time or repetition   Comments Requires increased time for processing and response, at times requires multiple cues to answer questions  Subjective   Subjective Agreeable to participate  RLE Assessment   RLE Assessment   (functionally 2+/5)   LLE Assessment   LLE Assessment   (functionally 2+/5)   Bed Mobility   Supine to Sit 3  Moderate assistance   Additional items Assist x 2;HOB elevated; Increased time required;Verbal cues;LE management   Additional Comments Left OOB in chair with chair alarm intact and all needs in reach  Transfers   Sit to Stand 3  Moderate assistance   Additional items Assist x 2; Increased time required;Verbal cues   Stand to Sit 3  Moderate assistance   Additional items Assist x 2; Increased time required;Verbal cues   Additional Comments with b/l HHA and knee block   Ambulation/Elevation   Gait pattern Knees flexed; Step to;Excessively slow; Short stride; Foward flexed;Decreased foot clearance;Shuffling; Improper Weight shift   Gait Assistance 3  Moderate assist   Additional items Assist x 2;Verbal cues; Tactile cues   Assistive Device Other (Comment)  (b/l HHA and knee block)   Distance 3 ft from bed to chair   Balance   Static Sitting Fair   Dynamic Sitting Fair -   Static Standing Poor   Dynamic Standing Poor -   Ambulatory Poor -   Endurance Deficit   Endurance Deficit Yes   Endurance Deficit Description significant SOB   Activity Tolerance   Activity Tolerance Patient limited by fatigue;Patient limited by pain   Medical Staff Made Aware OT   Nurse Made Aware RN cleared pt to be seen by PT   Assessment   Prognosis Fair   Problem List Decreased strength;Decreased range of motion;Decreased endurance; Impaired balance;Decreased coordination;Decreased mobility; Impaired judgement;Decreased cognition;Decreased safety awareness;Pain;Orthopedic restrictions   Assessment Pt seen for high complexity PT evaluation due to decrease in functional mobility status compared to baseline  Pt with active PT eval/treat orders at this time  Pt is a 80 y o  M who presented [facility] as a transfer from [facility] on 11/23/22 where he initially presented with lethargy, hematuria, septic shock  Pt is s/p insertion suprapubic catheter  Pt  has a past medical history of CHF (congestive heart failure) ([facility] ), Coronary artery disease, Hyperlipidemia, Hypertension, and Paroxysmal atrial fibrillation ([facility] )    Pt admitted from [facility], however, typically resides with wife in [facility] with 4+1STE  Pt presents with decreased strength, balance, endurance that contribute to limitations in bed mobility, functional transfers, functional mobility  Pt requires Mod A x 2 for all mobility at this time  Pt left upright in bedside chair with chair alarm intact and with all needs in reach  Pt will benefit from skilled therapy in order to address current impairments and functional limitations  PT to follow pt and recommending rehab once medically cleared  The patient's AM-PAC Basic Mobility Inpatient Short Form Raw Score is 7  A Raw score of less than or equal to 16 suggests the patient may benefit from discharge to post-acute rehabilitation services  Please also refer to the recommendation of the Physical Therapist for safe discharge planning  Barriers to Discharge Inaccessible home environment;Decreased caregiver support   Goals   Patient Goals to rest   STG Expiration Date 12/09/22   Short Term Goal #1 1  Pt will demonstrate ability to perform all aspects of bed mobility with I in order to increase independence and decrease burden on caregivers  2  Pt will demonstrate ability to perform functional transfers with Mod I in order to increase independence and decrease burden on caregivers  3  Pt will demonstrate ability to ambulate 200 ft with least restrictive AD with Mod I in order to return to mobility safely  4  Pt will demonstrate ability to negotiate full flight steps with/without HR and Mod I in order to return to household/community mobility safely  5  Pt will demonstrate improved balance by one grade order to decrease risk of falls  6  Pt will increase b/l LE strength by 1 grade in order to increase ease of functional mobility and transfers  Plan   Treatment/Interventions Functional transfer training;LE strengthening/ROM; Therapeutic exercise;Cognitive reorientation; Endurance training;Patient/family training;Bed mobility; Equipment eval/education;Gait training;Spoke to nursing   PT Frequency 2-3x/wk   Recommendation   PT Discharge Recommendation Post acute rehabilitation services   AM-PAC Basic Mobility Inpatient   Turning in Bed Without Bedrails 2   Lying on Back to Sitting on Edge of Flat Bed 1   Moving Bed to Chair 1   Standing Up From Chair 1   Walk in Room 1   Climb 3-5 Stairs 1   Basic Mobility Inpatient Raw Score 7   Turning Head Towards Sound 3   Follow Simple Instructions 3   Low Function Basic Mobility Raw Score 13   Low Function Basic Mobility Standardized Score 20 14   Highest Level Of Mobility   JH-HLM Goal 2: Bed activities/Dependent transfer   JH-HLM Achieved 4: Move to chair/commode   Modified Cosby Scale   Modified Cosby Scale 4         Muriel Coelho, PT, DPT

## 2022-11-26 NOTE — PROGRESS NOTES
[facility]  Progress Note Ming Melendez 1940, 80 y o  male MRN: [id number]  Unit/Bed#: [facility] 431-01 Encounter: [id number]  Primary Care Provider: Joshua Trivedi DO   Date and time admitted to hospital: 11/23/2022  4:04 AM    * Urethral trauma  Assessment & Plan  Urethral trauma status post cystoscopy with suprapubic catheter in place  Urology input noted      Hematuria  Assessment & Plan  · Due to urethral injury  · Hematuria improving  · Urology following    Hyperglycemia  Assessment & Plan  A1c 4 6  Monitor    Transaminitis  Assessment & Plan  · Likely due to shock liver  · Monitor      Closed fracture of trochanter of right femur with routine healing  Assessment & Plan  · Discharged from [facility] on 11/06 s/p repair  · DVT prophylaxis  · Physical therapy    UTI (urinary tract infection)  Assessment & Plan  Antibiotics transition to IV cefazolin based on culture sensitivities  Transition to p o  Keflex to complete 7 day course of antibiotics    Septic shock (HCC)  Assessment & Plan  · Present on admission  · Resolved    Acute on chronic anemia  Assessment & Plan  Lab Results   Component Value Date    EGFR 26 11/25/2022    EGFR 22 11/24/2022    EGFR 20 11/23/2022    CREATININE 2 25 (H) 11/25/2022    CREATININE 2 54 (H) 11/24/2022    CREATININE 2 70 (H) 11/23/2022     · Multifactorial  · Iron studies noted  · Iron supplementation  · Monitor hemoglobin    Chronic heart failure with preserved ejection fraction [facility])  Assessment & Plan  Wt Readings from Last 3 Encounters:   11/23/22 76 7 kg (169 lb 1 6 oz)   11/23/22 74 5 kg (164 lb 3 9 oz)   11/22/22 70 8 kg (156 lb)     · Echo 03/01/2022 with EF 45%     · Continue beta-blocker  · Lasix  · Monitor I/O, daily weights  · Less than 2 g salt diet fluid restriction            Chronic atrial fibrillation (HCC)  Assessment & Plan  Continue amiodarone, beta-blocker  Eliquis for anticoagulation    Acute-on-chronic kidney injury [facility])  Assessment & Plan  Lab Results   Component Value Date    EGFR 26 2022    EGFR 22 2022    EGFR 20 2022    CREATININE 2 25 (H) 2022    CREATININE 2 54 (H) 2022    CREATININE 2 70 (H) 2022       Acute on chronic kidney disease  Monitor kidney function closely  Avoid nephrotoxins  Suprapubic catheter in place      Hyperlipidemia, mixed  Assessment & Plan  · Continue Lipitor    CAD (coronary artery disease)  Assessment & Plan  Continue Plavix, beta-blocker, Lipitor            VTE Pharmacologic Prophylaxis: VTE Score: 21 High Risk (Score >/= 5) - Pharmacological DVT Prophylaxis Ordered: apixaban (Eliquis)  Sequential Compression Devices Ordered  Patient Centered Rounds: I performed bedside rounds with nursing staff today  Discussions with Specialists or Other Care Team Provider:     Education and Discussions with Family / Patient: Patient, called updated spouse Suzanne Montiel questions answered  Time Spent for Care: 30 minutes  More than 50% of total time spent on counseling and coordination of care as described above      Current Length of Stay: 3 day(s)  Current Patient Status: Inpatient   Certification Statement: The patient will continue to require additional inpatient hospital stay due to As outlined  Discharge Plan: Awaiting clinical and symptomatic improvement    Code Status: Level 1 - Full Code    Subjective:       Patient reports right upper extremity pain and swelling at the IV site  His requesting to discontinue IV access  Discussed with the patient need for IV access, his left upper extremity also with ecchymosis and some swelling  He has reluctantly agreed to keep the IV access  Encourage incentive spirometry   Encouraged out of bed into chair  Reports cough nonproductive sputum    Objective:     Vitals:   Temp (24hrs), Av 2 °F (36 8 °C), Min:97 8 °F (36 6 °C), Max:98 5 °F (36 9 °C)    Temp:  [97 8 °F (36 6 °C)-98 5 °F (36 9 °C)] 98 3 °F (36 8 °C)  HR:  [] 79  Resp:  [17-18] 18  BP: (121-139)/(59-66) 139/64  SpO2:  [97 %-99 %] 97 %  Body mass index is 25 71 kg/m²  Input and Output Summary (last 24 hours): Intake/Output Summary (Last 24 hours) at 11/26/2022 1556  Last data filed at 11/26/2022 1317  Gross per 24 hour   Intake 120 ml   Output 1725 ml   Net -1605 ml       Physical Exam:   Physical Exam       Comfortably sitting up in bed  Features of protein calorie malnutrition noted  Neck supple  Lungs diminished breath sounds bilaterally  Heart sounds S1-S2 noted  Abdomen soft nontender  Awake alert obeys simple commands  Pedal edema noted right more than left  No rash    Additional Data:     Labs:  Results from last 7 days   Lab Units 11/25/22  0752 11/21/22  1923 11/20/22  1859   WBC Thousand/uL 12 99*   < > 12 72*   HEMOGLOBIN g/dL 8 8*   < > 8 7*   HEMATOCRIT % 28 4*   < > 27 8*   PLATELETS Thousands/uL 92*   < > 230   BANDS PCT % 3   < >  --    NEUTROS PCT %  --   --  89*   LYMPHS PCT %  --   --  3*   LYMPHO PCT % 0*   < >  --    MONOS PCT %  --   --  7   MONO PCT % 1*   < >  --    EOS PCT % 1   < > 0    < > = values in this interval not displayed       Results from last 7 days   Lab Units 11/25/22  0752   SODIUM mmol/L 143   POTASSIUM mmol/L 3 6   CHLORIDE mmol/L 113*   CO2 mmol/L 22   BUN mg/dL 55*   CREATININE mg/dL 2 25*   ANION GAP mmol/L 8   CALCIUM mg/dL 7 9*   ALBUMIN g/dL 1 9*   TOTAL BILIRUBIN mg/dL 1 16*   ALK PHOS U/L 289*   ALT U/L 38   AST U/L 31   GLUCOSE RANDOM mg/dL 95     Results from last 7 days   Lab Units 11/23/22  0714   INR  1 69*     Results from last 7 days   Lab Units 11/24/22  1807 11/24/22  1208 11/24/22  0550 11/24/22  0000 11/23/22  1750 11/23/22  1205 11/23/22  0009 11/22/22  2044 11/22/22  1833 11/22/22  1149 11/22/22  0524 11/22/22  0247   POC GLUCOSE mg/dl 129 122 106 98 112 86 106 128 113 78 85 89     Results from last 7 days   Lab Units 11/21/22  1923   HEMOGLOBIN A1C % 4 6     Results from last 7 days   Lab Units 11/23/22  1301 11/22/22  0527 11/22/22  0240 11/21/22  2334 11/21/22  2142 11/21/22 1923   LACTIC ACID mmol/L 1 5 1 5 2 6* 5 3*   < > 10 0*   PROCALCITONIN ng/ml 47 28* 88 64*  --   --   --  171 74*  150 57*    < > = values in this interval not displayed  Lines/Drains:  Invasive Devices     Peripheral Intravenous Line  Duration           Peripheral IV 11/26/22 Right Antecubital <1 day          Drain  Duration           Suprapubic Catheter 18 Fr  3 days                    Imaging: Reviewed radiology reports from this admission including: chest CT scan, abdominal/pelvic CT and ECHO    Recent Cultures (last 7 days):   Results from last 7 days   Lab Units 11/22/22  1407 11/21/22 1923 11/20/22 1957   BLOOD CULTURE   --  No Growth After 4 Days  No Growth After 4 Days    --    URINE CULTURE  No Growth <1000 cfu/mL  --  >100,000 cfu/ml Escherichia coli*       Last 24 Hours Medication List:   Current Facility-Administered Medications   Medication Dose Route Frequency Provider Last Rate   • acetaminophen  650 mg Oral Q6H PRN Jassi Lewis MD     • albuterol  2 puff Inhalation Q4H PRN Andrae Lora MD     • amiodarone  200 mg Oral Daily With Breakfast Jassi Lewis MD     • apixaban  2 5 mg Oral BID Theron Stewart DO     • atorvastatin  40 mg Oral Daily With Daysi Mcclendon MD     • cephalexin  500 mg Oral [facility] Andrae Lora MD     • clopidogrel  75 mg Oral Daily Theron Stewart DO     • furosemide  40 mg Intravenous Once Andrae Lora MD     • furosemide  20 mg Oral BID Andrae Lora MD     • HYDROmorphone  0 2 mg Intravenous Q4H PRN Theron Stewart DO     • iron sucrose  200 mg Intravenous Daily Andrae Lora  mg (11/25/22 1036)   • metoprolol tartrate  25 mg Oral Q12H [address] Andrae Lora MD     • oxyCODONE  2 5 mg Oral Q6H PRN Theron Stewart DO      Or   • oxyCODONE  5 mg Oral Q6H PRN Theron Stewart DO     • [START ON 11/27/2022] spironolactone  25 mg Oral Daily Sanjuana Skaggs MD     • tamsulosin  0 4 mg Oral Daily With Kaleb Rivas MD          Today, Patient Was Seen By: Sanjuana Skaggs MD    **Please Note: This note may have been constructed using a voice recognition system  **

## 2022-11-26 NOTE — ASSESSMENT & PLAN NOTE
Wt Readings from Last 3 Encounters:   11/23/22 76 7 kg (169 lb 1 6 oz)   11/23/22 74 5 kg (164 lb 3 9 oz)   11/22/22 70 8 kg (156 lb)     · Echo 03/01/2022 with EF 45%     · Continue beta-blocker  · Lasix  · Monitor I/O, daily weights  · Less than 2 g salt diet fluid restriction

## 2022-11-26 NOTE — ASSESSMENT & PLAN NOTE
· Discharged from [address] [place] [address] on 11/06 s/p repair     · DVT prophylaxis  · Physical therapy

## 2022-11-27 LAB
BACTERIA BLD CULT: NORMAL
BACTERIA BLD CULT: NORMAL

## 2022-11-27 NOTE — PROGRESS NOTES
[address] [facility]  Progress Note Dwayne Josue 1940, 80 y o  male MRN: [id number]  Unit/Bed#: [facility] 431-01 Encounter: [id number]  Primary Care Provider: Jessica Garcia DO   Date and time admitted to hospital: 11/23/2022  4:04 AM    * Urethral trauma  Assessment & Plan  Urethral trauma status post cystoscopy with suprapubic catheter in place  Urology input noted      Hematuria  Assessment & Plan  · Due to urethral injury  · Hematuria resolved    Hyperglycemia  Assessment & Plan  A1c 4 6  Monitor    Transaminitis  Assessment & Plan  · Likely due to shock liver  · Monitor      Closed fracture of trochanter of right femur with routine healing  Assessment & Plan  · Discharged from [address] [place] [address] on 11/06 s/p repair  · DVT prophylaxis  · Physical therapy    UTI (urinary tract infection)  Assessment & Plan  Antibiotics transition to IV cefazolin based on culture sensitivities  Transition to p o  Keflex to complete 7 day course of antibiotics    Septic shock (HCC)  Assessment & Plan  · Present on admission  · Resolved    Acute on chronic anemia  Assessment & Plan  Lab Results   Component Value Date    EGFR 30 11/27/2022    EGFR 26 11/25/2022    EGFR 22 11/24/2022    CREATININE 1 98 (H) 11/27/2022    CREATININE 2 25 (H) 11/25/2022    CREATININE 2 54 (H) 11/24/2022     · Multifactorial  · Iron studies noted  · Iron supplementation  · Monitor hemoglobin    Chronic heart failure with preserved ejection fraction [facility])  Assessment & Plan  Wt Readings from Last 3 Encounters:   11/27/22 72 7 kg (160 lb 4 4 oz)   11/23/22 74 5 kg (164 lb 3 9 oz)   11/22/22 70 8 kg (156 lb)     · Echo 03/01/2022 with EF 45%     · Continue beta-blocker  · Lasix  · Monitor I/O, daily weights  · Less than 2 g salt diet fluid restriction            Chronic atrial fibrillation (HCC)  Assessment & Plan  Continue amiodarone, beta-blocker  Eliquis for anticoagulation    Acute-on-chronic kidney injury [facility])  Assessment & Plan  Lab Results   Component Value Date    EGFR 30 2022    EGFR 26 2022    EGFR 22 2022    CREATININE 1 98 (H) 2022    CREATININE 2 25 (H) 2022    CREATININE 2 54 (H) 2022       Acute on chronic kidney disease  Monitor kidney function closely  Avoid nephrotoxins  Suprapubic catheter in place      Hyperlipidemia, mixed  Assessment & Plan  · Continue Lipitor    CAD (coronary artery disease)  Assessment & Plan  Continue Plavix, beta-blocker, Lipitor            VTE Pharmacologic Prophylaxis: VTE Score: 21 High Risk (Score >/= 5) - Pharmacological DVT Prophylaxis Ordered: apixaban (Eliquis)  Sequential Compression Devices Ordered  Patient Centered Rounds: I performed bedside rounds with nursing staff today  Discussions with Specialists or Other Care Team Provider:     Education and Discussions with Family / Patient: Discussed with the patient, discussed with spouse Meliton Trujillo questions answered  Time Spent for Care: 30 minutes  More than 50% of total time spent on counseling and coordination of care as described above  Current Length of Stay: 4 day(s)  Current Patient Status: Inpatient   Certification Statement: The patient will continue to require additional inpatient hospital stay due to as outlined  Discharge Plan: Awaiting clinical symptomatic improvement    Code Status: Level 1 - Full Code    Subjective:     Patient sitting up in bed  Reports feeling tired  Appetite fair  Reports cough unable to expectorate  Bilateral upper extremity swelling erythema noted likely due to IV infiltration  Limb elevation encourage  Discussed with RN at bedside  Encouraged incentive spirometry    Objective:     Vitals:   Temp (24hrs), Av 3 °F (36 8 °C), Min:97 8 °F (36 6 °C), Max:98 5 °F (36 9 °C)    Temp:  [97 8 °F (36 6 °C)-98 5 °F (36 9 °C)] 97 8 °F (36 6 °C)  HR:  [74-85] 81  Resp:  [16-24] 16  BP: (118-136)/(57-61) 118/57  SpO2:  [95 %-99 %] 96 %  Body mass index is 24 37 kg/m²       Input and Output Summary (last 24 hours): Intake/Output Summary (Last 24 hours) at 11/27/2022 1540  Last data filed at 11/27/2022 1307  Gross per 24 hour   Intake 400 ml   Output 2200 ml   Net -1800 ml       Physical Exam:   Physical Exam       Comfortably sitting up in bed  Features of protein calorie malnutrition noted  Neck supple  Lungs diminished breath sounds  Crackles and rhonchi noted  Heart sounds S1 and S2 noted  Abdomen soft nontender  Awake obeys simple commands  Anasarca noted  No rash      Additional Data:     Labs:  Results from last 7 days   Lab Units 11/25/22  0752 11/21/22  1923 11/20/22  1859   WBC Thousand/uL 12 99*   < > 12 72*   HEMOGLOBIN g/dL 8 8*   < > 8 7*   HEMATOCRIT % 28 4*   < > 27 8*   PLATELETS Thousands/uL 92*   < > 230   BANDS PCT % 3   < >  --    NEUTROS PCT %  --   --  89*   LYMPHS PCT %  --   --  3*   LYMPHO PCT % 0*   < >  --    MONOS PCT %  --   --  7   MONO PCT % 1*   < >  --    EOS PCT % 1   < > 0    < > = values in this interval not displayed       Results from last 7 days   Lab Units 11/27/22  0549 11/25/22  0752   SODIUM mmol/L 143 143   POTASSIUM mmol/L 3 5 3 6   CHLORIDE mmol/L 110* 113*   CO2 mmol/L 26 22   BUN mg/dL 46* 55*   CREATININE mg/dL 1 98* 2 25*   ANION GAP mmol/L 7 8   CALCIUM mg/dL 8 3 7 9*   ALBUMIN g/dL  --  1 9*   TOTAL BILIRUBIN mg/dL  --  1 16*   ALK PHOS U/L  --  289*   ALT U/L  --  38   AST U/L  --  31   GLUCOSE RANDOM mg/dL 95 95     Results from last 7 days   Lab Units 11/23/22  0714   INR  1 69*     Results from last 7 days   Lab Units 11/24/22  1807 11/24/22  1208 11/24/22  0550 11/24/22  0000 11/23/22  1750 11/23/22  1205 11/23/22  0009 11/22/22  2044 11/22/22  1833 11/22/22  1149 11/22/22  0524 11/22/22  0247   POC GLUCOSE mg/dl 129 122 106 98 112 86 106 128 113 78 85 89     Results from last 7 days   Lab Units 11/21/22  1923   HEMOGLOBIN A1C % 4 6     Results from last 7 days   Lab Units 11/23/22  1301 11/22/22  0527 11/22/22  0240 11/21/22  3297 11/21/22 2142 11/21/22 1923   LACTIC ACID mmol/L 1 5 1 5 2 6* 5 3*   < > 10 0*   PROCALCITONIN ng/ml 47 28* 88 64*  --   --   --  171 74*  150 57*    < > = values in this interval not displayed  Lines/Drains:  Invasive Devices     Peripheral Intravenous Line  Duration           Peripheral IV 11/26/22 Right Antecubital 1 day          Drain  Duration           Suprapubic Catheter 18 Fr  4 days                      Imaging: Reviewed radiology reports from this admission including: chest xray    Recent Cultures (last 7 days):   Results from last 7 days   Lab Units 11/22/22  1407 11/21/22 1923 11/20/22 1957   BLOOD CULTURE   --  No Growth After 5 Days  No Growth After 5 Days    --    URINE CULTURE  No Growth <1000 cfu/mL  --  >100,000 cfu/ml Escherichia coli*       Last 24 Hours Medication List:   Current Facility-Administered Medications   Medication Dose Route Frequency Provider Last Rate   • acetaminophen  650 mg Oral Q6H PRN Genie Lomeli MD     • albuterol  2 puff Inhalation Q4H PRN Reyna Finch MD     • amiodarone  200 mg Oral Daily With Breakfast Genie Lomeli MD     • apixaban  2 5 mg Oral BID Elliot Orozco DO     • atorvastatin  40 mg Oral Daily With Vaishnavi Claudio MD     • benzonatate  200 mg Oral TID Reyna Finch MD     • cephalexin  500 mg Oral [facility] Reyna Finch MD     • clopidogrel  75 mg Oral Daily Elliot Orozoc DO     • [START ON 11/28/2022] furosemide  20 mg Oral BID Reyna Finch MD     • guaiFENesin  600 mg Oral Q12H [address] Reyna Finch MD     • HYDROmorphone  0 2 mg Intravenous Q4H PRN Elliot Orozco DO     • metoprolol tartrate  25 mg Oral Q12H [address] Reyna Finch MD     • oxyCODONE  2 5 mg Oral Q6H PRN Elliot Orozco DO      Or   • oxyCODONE  5 mg Oral Q6H PRN Elloit Orozco DO     • spironolactone  25 mg Oral Daily Reyna Finch MD     • tamsulosin  0 4 mg Oral Daily With Abbi Wiseman MD          Today, Patient Was Seen By: Kenzie Bazan MD    **Please Note: This note may have been constructed using a voice recognition system  **

## 2022-11-27 NOTE — ASSESSMENT & PLAN NOTE
· Discharged from [address] [place] [address] on 11/06 s/p repair     · DVT prophylaxis  · Physical therapy

## 2022-11-27 NOTE — ASSESSMENT & PLAN NOTE
Lab Results   Component Value Date    EGFR 30 11/27/2022    EGFR 26 11/25/2022    EGFR 22 11/24/2022    CREATININE 1 98 (H) 11/27/2022    CREATININE 2 25 (H) 11/25/2022    CREATININE 2 54 (H) 11/24/2022       Acute on chronic kidney disease  Monitor kidney function closely  Avoid nephrotoxins  Suprapubic catheter in place

## 2022-11-27 NOTE — ASSESSMENT & PLAN NOTE
Wt Readings from Last 3 Encounters:   11/27/22 72 7 kg (160 lb 4 4 oz)   11/23/22 74 5 kg (164 lb 3 9 oz)   11/22/22 70 8 kg (156 lb)     · Echo 03/01/2022 with EF 45%     · Continue beta-blocker  · Lasix  · Monitor I/O, daily weights  · Less than 2 g salt diet fluid restriction

## 2022-11-27 NOTE — ASSESSMENT & PLAN NOTE
Lab Results   Component Value Date    EGFR 30 11/27/2022    EGFR 26 11/25/2022    EGFR 22 11/24/2022    CREATININE 1 98 (H) 11/27/2022    CREATININE 2 25 (H) 11/25/2022    CREATININE 2 54 (H) 11/24/2022     · Multifactorial  · Iron studies noted  · Iron supplementation  · Monitor hemoglobin

## 2022-11-28 PROBLEM — R26.2 AMBULATORY DYSFUNCTION: Status: ACTIVE | Noted: 2022-11-28

## 2022-11-28 NOTE — ASSESSMENT & PLAN NOTE
Wt Readings from Last 3 Encounters:   11/28/22 71 4 kg (157 lb 6 5 oz)   11/23/22 74 5 kg (164 lb 3 9 oz)   11/22/22 70 8 kg (156 lb)     · Echo 03/01/2022 with EF 45%     · Continue beta-blocker  · Lasix  · Monitor I/O, daily weights  · Less than 2 g salt diet fluid restriction

## 2022-11-28 NOTE — PLAN OF CARE
Problem: OCCUPATIONAL THERAPY ADULT  Goal: Performs self-care activities at highest level of function for planned discharge setting  See evaluation for individualized goals  Description: Treatment Interventions: ADL retraining, Functional transfer training, UE strengthening/ROM, Endurance training, Cognitive reorientation, Patient/family training, Compensatory technique education, Continued evaluation, Energy conservation, Activityengagement          See flowsheet documentation for full assessment, interventions and recommendations  Outcome: Progressing  Note: Limitation: Decreased ADL status, Decreased UE ROM, Decreased UE strength, Decreased Safe judgement during ADL, Decreased cognition, Decreased endurance, Decreased self-care trans, Decreased high-level ADLs  Prognosis: Fair  Assessment: Patient participated in Skilled OT session this date with interventions consisting of ADL re training with the use of correct body mechanics,  therapeutic activities to: increase activity tolerance, increase trunk control and increase OOB/ sitting tolerance   Patient agreeable to OT treatment session, upon arrival patient was found supine in bed  Patient requiring frequent rest periods  Patient continues to be functioning below baseline level, occupational performance remains limited secondary to factors listed above and increased risk for falls and injury  From OT standpoint, recommendation at time of d/c would be Short Term Rehab  Patient to benefit from continued Occupational Therapy treatment while in the hospital to address deficits as defined above and maximize level of functional independence with ADLs and functional mobility       OT Discharge Recommendation: Post acute rehabilitation services

## 2022-11-28 NOTE — ASSESSMENT & PLAN NOTE
Multifactorial with deconditioning contributing  Safe ambulation  Fall precautions  Physical therapy

## 2022-11-28 NOTE — PLAN OF CARE
Problem: PHYSICAL THERAPY ADULT  Goal: Performs mobility at highest level of function for planned discharge setting  See evaluation for individualized goals  Description: Treatment/Interventions: Functional transfer training, LE strengthening/ROM, Therapeutic exercise, Cognitive reorientation, Endurance training, Patient/family training, Bed mobility, Equipment eval/education, Gait training, Spoke to nursing          See flowsheet documentation for full assessment, interventions and recommendations  Outcome: Progressing  Note: Prognosis: Fair  Problem List: Decreased strength, Decreased endurance, Impaired balance, Decreased mobility, Decreased cognition, Pain  Assessment: Pt seen for PT session today with a focus on functional transfers, endurance, and LE strengthening  Pt is making slow progress towards mobility goals, limited by deconditioning and generalized weakness  Pt continues to require Ax2 for bed mobility and functional transfers OOB  Pt was able to take a few short steps to bedside chair with Ax2, however pt with difficulty advancing LEs without significant assistance from therapists  During LE therex, pt initially requiring AAROM but progressing to AROM with repetition  Pt would benefit from continued acute skilled PT to address above deficits  Continuing to recommend rehab upon d/c   Barriers to Discharge: Inaccessible home environment, Decreased caregiver support     PT Discharge Recommendation: Post acute rehabilitation services    See flowsheet documentation for full assessment

## 2022-11-28 NOTE — PROGRESS NOTES
[facility]  Progress Note hCemo Gram 1940, 80 y o  male MRN: [id number]  Unit/Bed#: [facility] 431-01 Encounter: [id number]  Primary Care Provider: Grover Melvin DO   Date and time admitted to hospital: 11/23/2022  4:04 AM    * Urethral trauma  Assessment & Plan  Urethral trauma status post cystoscopy with suprapubic catheter in place  Urology input noted      Ambulatory dysfunction  Assessment & Plan  Multifactorial with deconditioning contributing  Safe ambulation  Fall precautions  Physical therapy    Hematuria  Assessment & Plan  · Due to urethral injury  · Hematuria resolved    Hyperglycemia  Assessment & Plan  A1c 4 6  Monitor    Transaminitis  Assessment & Plan  · Likely due to shock liver  · Monitor      Closed fracture of trochanter of right femur with routine healing  Assessment & Plan  · Discharged from [address] [place] [address] on 11/06 s/p repair  · DVT prophylaxis  · Physical therapy    UTI (urinary tract infection)  Assessment & Plan  Antibiotics transition to IV cefazolin based on culture sensitivities  Transition to p o  Keflex to complete 7 day course of antibiotics    Septic shock (HCC)  Assessment & Plan  · Present on admission  · Resolved    Acute on chronic anemia  Assessment & Plan  Lab Results   Component Value Date    EGFR 31 11/28/2022    EGFR 30 11/27/2022    EGFR 26 11/25/2022    CREATININE 1 91 (H) 11/28/2022    CREATININE 1 98 (H) 11/27/2022    CREATININE 2 25 (H) 11/25/2022     · Multifactorial  · Iron studies noted  · Iron supplementation  · Monitor hemoglobin    Chronic heart failure with preserved ejection fraction [facility])  Assessment & Plan  Wt Readings from Last 3 Encounters:   11/28/22 71 4 kg (157 lb 6 5 oz)   11/23/22 74 5 kg (164 lb 3 9 oz)   11/22/22 70 8 kg (156 lb)     · Echo 03/01/2022 with EF 45%     · Continue beta-blocker  · Lasix  · Monitor I/O, daily weights  · Less than 2 g salt diet fluid restriction            Chronic atrial fibrillation [facility])  Assessment & Plan  Continue amiodarone, beta-blocker  Eliquis for anticoagulation    Acute-on-chronic kidney injury [facility])  Assessment & Plan  Lab Results   Component Value Date    EGFR 31 2022    EGFR 30 2022    EGFR 26 2022    CREATININE 1 91 (H) 2022    CREATININE 1 98 (H) 2022    CREATININE 2 25 (H) 2022       Acute on chronic kidney disease improving with diuresis  Monitor kidney function closely  Avoid nephrotoxins  Suprapubic catheter in place      Hyperlipidemia, mixed  Assessment & Plan  · Continue Lipitor    CAD (coronary artery disease)  Assessment & Plan  Continue Plavix, beta-blocker, Lipitor          VTE Pharmacologic Prophylaxis: VTE Score: 21 High Risk (Score >/= 5) - Pharmacological DVT Prophylaxis Ordered: apixaban (Eliquis)  Sequential Compression Devices Ordered  Patient Centered Rounds: I performed bedside rounds with nursing staff today  Discussions with Specialists or Other Care Team Provider:     Education and Discussions with Family / Patient: Discussed with the patient, called updated spouse Tatianna Frederick questions answered  Time Spent for Care: 30 minutes  More than 50% of total time spent on counseling and coordination of care as described above      Current Length of Stay: 5 day(s)  Current Patient Status: Inpatient   Certification Statement: The patient will continue to require additional inpatient hospital stay due to as outlined  Discharge Plan: Awaiting clinical and symptomatic improvement, physical therapy recommends rehab at discharge case management following    Code Status: Level 1 - Full Code    Subjective:     Reports feeling tired  Cough Less moist than yesterday  Appetite fair  Reports decreasing swelling in the upper extremities  Encouraged out of bed into chair  Encourage incentive spirometry    Objective:     Vitals:   Temp (24hrs), Av 3 °F (36 8 °C), Min:97 9 °F (36 6 °C), Max:98 7 °F (37 1 °C)    Temp:  [97 9 °F (36 6 °C)-98 7 °F (37 1 °C)] 98 °F (36 7 °C)  HR:  [78-89] 78  Resp:  [12-20] 20  BP: (114-146)/(53-82) 146/82  SpO2:  [90 %-100 %] 94 %  Body mass index is 23 93 kg/m²  Input and Output Summary (last 24 hours): Intake/Output Summary (Last 24 hours) at 11/28/2022 1525  Last data filed at 11/28/2022 1139  Gross per 24 hour   Intake 898 ml   Output 2600 ml   Net -1702 ml       Physical Exam:   Physical Exam       Sitting up in bed  Features of protein calorie malnutrition noted  Neck supple  Lungs diminished breath sounds bilaterally   Rhonchi/crackles appears reduced since yesterday  Heart sounds S1 and S2 noted  Abdomen soft nontender  Improving upper extremity edema  Improving lower extremity edema  No rash      Additional Data:     Labs:  Results from last 7 days   Lab Units 11/25/22  0752   WBC Thousand/uL 12 99*   HEMOGLOBIN g/dL 8 8*   HEMATOCRIT % 28 4*   PLATELETS Thousands/uL 92*   BANDS PCT % 3   LYMPHO PCT % 0*   MONO PCT % 1*   EOS PCT % 1     Results from last 7 days   Lab Units 11/28/22  0613 11/27/22  0549 11/25/22  0752   SODIUM mmol/L 141   < > 143   POTASSIUM mmol/L 3 4*   < > 3 6   CHLORIDE mmol/L 108   < > 113*   CO2 mmol/L 28   < > 22   BUN mg/dL 42*   < > 55*   CREATININE mg/dL 1 91*   < > 2 25*   ANION GAP mmol/L 5   < > 8   CALCIUM mg/dL 7 8*   < > 7 9*   ALBUMIN g/dL  --   --  1 9*   TOTAL BILIRUBIN mg/dL  --   --  1 16*   ALK PHOS U/L  --   --  289*   ALT U/L  --   --  38   AST U/L  --   --  31   GLUCOSE RANDOM mg/dL 87   < > 95    < > = values in this interval not displayed       Results from last 7 days   Lab Units 11/23/22  0714   INR  1 69*     Results from last 7 days   Lab Units 11/24/22  1807 11/24/22  1208 11/24/22  0550 11/24/22  0000 11/23/22  1750 11/23/22  1205 11/23/22  0009 11/22/22  2044 11/22/22  1833 11/22/22  1149 11/22/22  0524 11/22/22  0247   POC GLUCOSE mg/dl 129 122 106 98 112 86 106 128 113 78 85 89     Results from last 7 days   Lab Units 11/21/22  1923   HEMOGLOBIN A1C % 4 6     Results from last 7 days   Lab Units 11/23/22  1301 11/22/22  0527 11/22/22  0240 11/21/22  2334 11/21/22  2142 11/21/22  1923   LACTIC ACID mmol/L 1 5 1 5 2 6* 5 3*   < > 10 0*   PROCALCITONIN ng/ml 47 28* 88 64*  --   --   --  171 74*  150 57*    < > = values in this interval not displayed  Lines/Drains:  Invasive Devices     Peripheral Intravenous Line  Duration           Peripheral IV 11/26/22 Right Antecubital 2 days          Drain  Duration           Suprapubic Catheter 18 Fr  5 days                      Imaging: Reviewed radiology reports from this admission including: chest xray    Recent Cultures (last 7 days):   Results from last 7 days   Lab Units 11/22/22  1407 11/21/22  1923   BLOOD CULTURE   --  No Growth After 5 Days  No Growth After 5 Days     URINE CULTURE  No Growth <1000 cfu/mL  --        Last 24 Hours Medication List:   Current Facility-Administered Medications   Medication Dose Route Frequency Provider Last Rate   • acetaminophen  650 mg Oral Q6H PRN Jassi Lewis MD     • albuterol  2 puff Inhalation Q4H PRN Andrae Lora MD     • amiodarone  200 mg Oral Daily With Breakfast Jassi Lewis MD     • apixaban  2 5 mg Oral BID Theron Stewart DO     • atorvastatin  40 mg Oral Daily With Daysi Mcclendon MD     • benzonatate  200 mg Oral TID Andrae Lora MD     • cephalexin  500 mg Oral [facility] Andrae Lora MD     • clopidogrel  75 mg Oral Daily Theron Stewart DO     • [START ON 11/29/2022] ferrous sulfate  325 mg Oral Daily With Breakfast Andrae Lora MD     • furosemide  40 mg Intravenous BID (diuretic) Andrae Lora MD     • guaiFENesin  600 mg Oral Q12H [facility] Andrae Lora MD     • HYDROmorphone  0 2 mg Intravenous Q4H PRN Theron Stewart DO     • metoprolol tartrate  25 mg Oral Q12H [facility] Andrae Lora MD     • oxyCODONE  2 5 mg Oral Q6H PRN Theron Stewart DO      Or   • oxyCODONE  5 mg Oral Q6H PRN Theron Stewart DO     • potassium chloride  20 mEq Oral Daily Ingrid Samuels MD     • spironolactone  25 mg Oral Daily Ingrid Samuels MD     • tamsulosin  0 4 mg Oral Daily With Dieter Hanna MD          Today, Patient Was Seen By: Ingrid Samuels MD    **Please Note: This note may have been constructed using a voice recognition system  **

## 2022-11-28 NOTE — PHYSICAL THERAPY NOTE
Physical Therapy Treatment Note    Patient's Name: Mayank Carbajal  : 22 1112   PT Last Visit   PT Visit Date 22   Note Type   Note Type Treatment   Pain Assessment   Pain Assessment Tool FLACC   Pain Location/Orientation Orientation: Bilateral;Location: Leg   Pain Rating: FLACC (Rest) - Face 0   Pain Rating: FLACC (Rest) - Legs 0   Pain Rating: FLACC (Rest) - Activity 0   Pain Rating: FLACC (Rest) - Cry 0   Pain Rating: FLACC (Rest) - Consolability 0   Score: FLACC (Rest) 0   Pain Rating: FLACC (Activity) - Face 1   Pain Rating: FLACC (Activity) - Legs 0   Pain Rating: FLACC (Activity) - Activity 0   Pain Rating: FLACC (Activity) - Cry 1   Pain Rating: FLACC (Activity) - Consolability 0   Score: FLACC (Activity) 2   Restrictions/Precautions   Weight Bearing Precautions Per Order Yes   RLE Weight Bearing Per Order WBAT   Other Precautions Cognitive; Chair Alarm; Bed Alarm;Multiple lines;Telemetry; Fall Risk;Pain;O2   General   Chart Reviewed Yes   Family/Caregiver Present No   Cognition   Overall Cognitive Status Impaired   Attention Attends with cues to redirect   Orientation Level Oriented X4   Memory Decreased recall of precautions   Following Commands Follows one step commands with increased time or repetition   Bed Mobility   Supine to Sit 3  Moderate assistance   Additional items Assist x 2;HOB elevated; Bedrails; Increased time required;Verbal cues;LE management   Transfers   Sit to Stand 3  Moderate assistance   Additional items Assist x 2; Increased time required;Verbal cues   Stand to Sit 3  Moderate assistance   Additional items Assist x 2; Increased time required;Verbal cues   Stand pivot 3  Moderate assistance   Additional items Assist x 2; Increased time required;Verbal cues   Additional Comments Transfers with HHA and b/l knee blocking   Ambulation/Elevation   Gait pattern Excessively slow; Short stride;Decreased foot clearance; Improper Weight shift;Knees flexed; Shuffling Gait Assistance 3  Moderate assist   Additional items Assist x 2   Assistive Device   (HHA)   Distance 3ft from bed to chair   Balance   Static Sitting Fair   Dynamic Sitting Fair -   Static Standing Poor   Dynamic Standing Poor -   Ambulatory Poor -   Endurance Deficit   Endurance Deficit Yes   Endurance Deficit Description weakness, fatigue, deconditioning   Activity Tolerance   Activity Tolerance Patient limited by fatigue;Patient limited by pain   Medical Staff Made Aware Seen with OT 2* pt's medical complexity and limited activity tolerance  PT focus on LE strengthening and functional transfers   Nurse Made Aware ok to see per RN   Exercises   Knee AROM Long Arc Quad Sitting;10 reps;AAROM; Bilateral;AROM  (AAROM initially --> AROM with repetition)   Assessment   Prognosis Fair   Problem List Decreased strength;Decreased endurance; Impaired balance;Decreased mobility; Decreased cognition;Pain   Assessment Pt seen for PT session today with a focus on functional transfers, endurance, and LE strengthening  Pt is making slow progress towards mobility goals, limited by deconditioning and generalized weakness  Pt continues to require Ax2 for bed mobility and functional transfers OOB  Pt was able to take a few short steps to bedside chair with Ax2, however pt with difficulty advancing LEs without significant assistance from therapists  During LE therex, pt initially requiring AAROM but progressing to AROM with repetition  Pt would benefit from continued acute skilled PT to address above deficits  Continuing to recommend rehab upon d/c  Goals   Patient Goals to get stronger   STG Expiration Date 12/09/22   PT Treatment Day 1   Plan   Treatment/Interventions Functional transfer training;LE strengthening/ROM; Therapeutic exercise; Endurance training;Equipment eval/education; Bed mobility;Gait training;Spoke to nursing;Spoke to case management;OT   Progress Slow progress, decreased activity tolerance   PT Frequency 2-3x/wk Recommendation   PT Discharge Recommendation Post acute rehabilitation services   AM-PAC Basic Mobility Inpatient   Turning in Bed Without Bedrails 2   Lying on Back to Sitting on Edge of Flat Bed 1   Moving Bed to Chair 1   Standing Up From Chair 1   Walk in Room 1   Climb 3-5 Stairs 1   Basic Mobility Inpatient Raw Score 7   Turning Head Towards Sound 3   Follow Simple Instructions 3   Low Function Basic Mobility Raw Score 13   Low Function Basic Mobility Standardized Score 20 14   Highest Level Of Mobility   -HLM Goal 2: Bed activities/Dependent transfer   -HLM Achieved 4: Move to chair/commode   End of Consult   Patient Position at End of Consult Bedside chair; All needs within reach       Tony Hutchison, PT, DPT

## 2022-11-28 NOTE — ASSESSMENT & PLAN NOTE
· Discharged from [address] [place] [address] on 11/06 s/p repair     · DVT prophylaxis  · Physical therapy

## 2022-11-28 NOTE — ASSESSMENT & PLAN NOTE
Lab Results   Component Value Date    EGFR 31 11/28/2022    EGFR 30 11/27/2022    EGFR 26 11/25/2022    CREATININE 1 91 (H) 11/28/2022    CREATININE 1 98 (H) 11/27/2022    CREATININE 2 25 (H) 11/25/2022     · Multifactorial  · Iron studies noted  · Iron supplementation  · Monitor hemoglobin

## 2022-11-28 NOTE — CASE MANAGEMENT
Case Management Discharge Planning Note    Patient name Gus Cruz  Location [facility]19-01 MRN [id number]  : 1940 Date 2022       Current Admission Date: 2022  Current Admission Diagnosis:Urethral trauma   Patient Active Problem List    Diagnosis Date Noted   • Ambulatory dysfunction 2022   • Urethral trauma 2022   • Septic shock ([address] ) 2022   • UTI (urinary tract infection) 2022   • Closed fracture of trochanter of right femur with routine healing 2022   • Leg edema, right 2022   • Pressure injury sacrum and right heel 2022   • Transaminitis 2022   • Elevated troponin level not due myocardial infarction 2022   • Hyperglycemia 2022   • Hematuria 2022   • Thrombocytopenia ([address] ) 2022   • Acute on chronic anemia 2022   • Hyponatremia 2022   • Postoperative urinary retention    • Chronic heart failure with preserved ejection fraction ([address] ) 10/31/2022   • Paroxysmal atrial fibrillation ([address] ) 10/31/2022   • Closed 2-part intertrochanteric fracture of proximal end of right femur, initial encounter ([facility] ) 10/31/2022   • Lumbar compression deformity 2022   • Postprocedural pneumothorax 2022   • Acute respiratory failure with hypoxia ([address] ) 2022   • Acute on chronic diastolic CHF (congestive heart failure) ([address] ) 2022   • Acute-on-chronic kidney injury ([address] ) 2022   • Chronic atrial fibrillation ([address] ) 2022   • Stage 3 chronic kidney disease ([address] ) 2021   • Multiple lung nodules on CT 2021   • Carotid artery stenosis 2021   • Bacteremia due to Pseudomonas 2021   • CAD (coronary artery disease) 2021   • Mitral regurgitation 2021   • Hypertensive urgency 2021   • Hyperlipidemia, mixed 2021      LOS (days): 5  Geometric Mean LOS (GMLOS) (days): 4 80  Days to GMLOS:-0 6     OBJECTIVE:  Risk of Unplanned Readmission Score: 29         Current admission status: Inpatient   [facility]   [facility]  [facility]  Phone: [phone] Fax: [facility], [address] [place] [address],[address]  [facility]  Phone: [phone] Fax: [phone]    Primary Care Provider: Matt Sales DO    Primary Insurance: MEDICARE  Secondary Insurance: BLUE CROSS    DISCHARGE DETAILS:                                                    Treatment Team Recommendation: SNF  Discharge Destination Plan[de-identified] SNF                                IMM Given (Date):: 11/28/22  IMM Given to[de-identified] Patient     Additional Comments: Pt reviewed in care coordination rounds -- Pt will likely be stable for return to [facility] in 24-48hrs  Facility advised of same via Aidin  IMM reviewed with patient at bedside -- Pt signed form and copy placed in MR bin  CM will continue to follow

## 2022-11-28 NOTE — ASSESSMENT & PLAN NOTE
Lab Results   Component Value Date    EGFR 31 11/28/2022    EGFR 30 11/27/2022    EGFR 26 11/25/2022    CREATININE 1 91 (H) 11/28/2022    CREATININE 1 98 (H) 11/27/2022    CREATININE 2 25 (H) 11/25/2022       Acute on chronic kidney disease improving with diuresis  Monitor kidney function closely  Avoid nephrotoxins  Suprapubic catheter in place

## 2022-11-28 NOTE — OCCUPATIONAL THERAPY NOTE
Occupational Therapy Progress Note     Patient Name: Brian Kyle  PTIFT'I Date: 11/28/2022  Problem List  Principal Problem:    Urethral trauma  Active Problems:    CAD (coronary artery disease)    Hyperlipidemia, mixed    Acute-on-chronic kidney injury ([address] )    Chronic atrial fibrillation (HCC)    Chronic heart failure with preserved ejection fraction (HCC)    Acute on chronic anemia    Septic shock (HCC)    UTI (urinary tract infection)    Closed fracture of trochanter of right femur with routine healing    Transaminitis    Hyperglycemia    Hematuria          11/28/22 1113   OT Last Visit   OT Visit Date 11/28/22   Note Type   Note Type Treatment   Pain Assessment   Pain Assessment Tool FLACC   Pain Location/Orientation Orientation: Left;Orientation: Right;Location: Leg   Pain Rating: FLACC (Rest) - Face 0   Pain Rating: FLACC (Rest) - Legs 0   Pain Rating: FLACC (Rest) - Activity 0   Pain Rating: FLACC (Rest) - Cry 0   Pain Rating: FLACC (Rest) - Consolability 0   Score: FLACC (Rest) 0   Pain Rating: FLACC (Activity) - Face 1   Pain Rating: FLACC (Activity) - Legs 0   Pain Rating: FLACC (Activity) - Activity 0   Pain Rating: FLACC (Activity) - Cry 1   Pain Rating: FLACC (Activity) - Consolability 0   Score: FLACC (Activity) 2   Restrictions/Precautions   Weight Bearing Precautions Per Order Yes   RLE Weight Bearing Per Order WBAT   Other Precautions Cognitive; Chair Alarm; Bed Alarm;Multiple lines;Telemetry; Fall Risk;Pain   Lifestyle   Autonomy Currently requiring assist with ADLS and IADLS   Reciprocal Relationships supportive wife   Service to Others retired   ADL   Where Assessed Edge of bed   [address] 3  Moderate Assistance   UB Bathing Deficit Setup;Supervision/safety; Increased time to complete   LB Dressing Assistance 2  Maximal Assistance   LB Dressing Deficit Setup;Supervision/safety; Increased time to complete; Don/doff R sock; Don/doff L sock   Bed Mobility   Supine to Sit 3  Moderate assistance   Additional items Assist x 2; Increased time required;Verbal cues;LE management   Additional Comments After OT session pt OOB in chair wtih alarm on and all needs within reach  Transfers   Sit to Stand 3  Moderate assistance   Additional items Assist x 2; Increased time required;Verbal cues   Stand to Sit 3  Moderate assistance   Additional items Assist x 2; Increased time required;Verbal cues   Functional Mobility   Functional Mobility 3  Moderate assistance   Additional Comments Pt took a few steps from bed to chair with HHA x2  Additional items Hand hold assistance   Subjective   Subjective "My goal is to go home"   Cognition   Overall Cognitive Status Impaired   Arousal/Participation Alert; Cooperative   Attention Attends with cues to redirect   Orientation Level Oriented X4   Memory Decreased recall of precautions   Following Commands Follows one step commands without difficulty   Comments Pt presents with flat affect and requires some encouragement but is overall pleasant and cooperative  Activity Tolerance   Activity Tolerance Patient limited by fatigue;Patient limited by pain   Medical Staff Made Aware RN confirmed okay to see p   Assessment   Assessment Patient participated in Skilled OT session this date with interventions consisting of ADL re training with the use of correct body mechanics,  therapeutic activities to: increase activity tolerance, increase trunk control and increase OOB/ sitting tolerance   Patient agreeable to OT treatment session, upon arrival patient was found supine in bed  Patient requiring frequent rest periods  Patient continues to be functioning below baseline level, occupational performance remains limited secondary to factors listed above and increased risk for falls and injury  From OT standpoint, recommendation at time of d/c would be Short Term Rehab     Patient to benefit from continued Occupational Therapy treatment while in the hospital to address deficits as defined above and maximize level of functional independence with ADLs and functional mobility  Plan   Treatment Interventions ADL retraining;Functional transfer training;UE strengthening/ROM; Endurance training   Goal Expiration Date 12/09/22   OT Treatment Day 1   OT Frequency Other (comment)  (2-4x)   Recommendation   OT Discharge Recommendation Post acute rehabilitation services   AM-PAC Daily Activity Inpatient   Lower Body Dressing 2   Bathing 2   Toileting 2   Upper Body Dressing 2   Grooming 3   Eating 3   Daily Activity Raw Score 14   Daily Activity Standardized Score (Calc for Raw Score >=11) 33 39   AM-PAC Applied Cognition Inpatient   Following a Speech/Presentation 2   Understanding Ordinary Conversation 3   Taking Medications 2   Remembering Where Things Are Placed or Put Away 2   Remembering List of 4-5 Errands 2   Taking Care of Complicated Tasks 2   Applied Cognition Raw Score 13   Applied Cognition Standardized Score 30 46   Modified [place] Scale   Modified [place] Scale 4       CHRISS Thomas, OTR/L

## 2022-11-29 PROBLEM — R73.9 HYPERGLYCEMIA: Status: RESOLVED | Noted: 2022-01-01 | Resolved: 2022-01-01

## 2022-11-29 PROBLEM — R13.10 DYSPHAGIA: Status: ACTIVE | Noted: 2022-11-29

## 2022-11-29 PROBLEM — I50.22 HEART FAILURE WITH MID-RANGE EJECTION FRACTION (HCC): Status: ACTIVE | Noted: 2022-10-31

## 2022-11-29 NOTE — ASSESSMENT & PLAN NOTE
Lab Results   Component Value Date    EGFR 33 11/29/2022    EGFR 31 11/28/2022    EGFR 30 11/27/2022    CREATININE 1 85 (H) 11/29/2022    CREATININE 1 91 (H) 11/28/2022    CREATININE 1 98 (H) 11/27/2022     · Multifactorial  · Iron studies noted  · Iron supplementation  · Monitor hemoglobin

## 2022-11-29 NOTE — SPEECH THERAPY NOTE
Speech Language/Pathology    Speech-Language Pathology Bedside Swallow Evaluation      Patient Name: Stephanie QUIROGAPRevaP Date: 11/29/2022     Problem List  Principal Problem:    Urethral trauma  Active Problems:    CAD (coronary artery disease)    Hyperlipidemia, mixed    Acute-on-chronic kidney injury ([facility] [address] )    Chronic atrial fibrillation (HCC)    Chronic heart failure with preserved ejection fraction (HCC)    Acute on chronic anemia    Septic shock (HCC)    UTI (urinary tract infection)    Closed fracture of trochanter of right femur with routine healing    Transaminitis    Hyperglycemia    Hematuria    Ambulatory dysfunction      Past Medical History  Past Medical History:   Diagnosis Date   • CHF (congestive heart failure) ([facility] [address] )    • Coronary artery disease    • Hyperlipidemia    • Hypertension    • Paroxysmal atrial fibrillation ([facility] [address] )        Past Surgical History  Past Surgical History:   Procedure Laterality Date   • CARDIAC SURGERY      cabg x 2 2014 [facility]   • CORONARY ANGIOPLASTY WITH STENT PLACEMENT  10/2021    DAPHNEY to left main and ramus   • MITRAL VALVE REPAIR  2014    mitral ring   • KY CYSTOURETHROSCOPY W/IRRIG & EVAC CLOTS N/A 11/23/2022    Procedure: Russell Sweeney;  Surgeon: Cristiane Harris MD;  Location: [facility];  Service: Urology   • KY OPEN RX FEMUR FX+INTRAMED HALIMA Right 11/1/2022    Procedure: INSERTION NAIL IM FEMUR ANTEGRADE (TROCHANTERIC); Surgeon: Chung Loya; Location:  MAIN OR;  Service: Orthopedics   • SUPRAPUBIC TUBE PLACEMENT N/A 11/23/2022    Procedure: INSERTION SUPRAPUBIC CATHETER PERCUTANEOUS;  Surgeon: Cristiane Harris MD;  Location: [facility];  Service: Urology       Summary   Pt presented with  s/s suggestive of mild-moderate oral and suspected at least mild pharyngeal dysphagia  Decreased mastication w/ prolonged/reduced bolus formation and transfer  Oral reside w/ advanced texture solids   Pt w/ moist cough throughout evaluation today, most directly timed w/ trials of thin liquids  Cough x2 w/ nectar thick liquids  Significant cough response to hard solids as well  Education provided on current s/s oropharyngeal dysphagia and concerns for aspiration  Educatio provided on potential dysphagia diet modifications and VFSS to further assess swallow skill/guide tx plan  Pt agreeable to all at this time, though states he wishes for VFSS to be completed tomorrow  Pt aware and accepting of any potential risk w/ PO prior to VFSS completion  Risk/s for Aspiration: Mild-mod      Recommended Diet: puree/level 1 diet and honey thick liquids   Recommended Form of Meds: crushed with puree   Aspiration precautions and swallowing strategies: upright posture, only feed when fully alert and slow rate of feeding  Other Recommendations: Continue frequent oral care, VFSS tentatively 11/30/22 as able         Current Medical Status  Pt is a 80 y o  male who presented to [facility] with  PMHx of chronic atrial fibrillation, hyperlipidemia, chronic anemia, CKD, chronic heart failure with preserved ejection fraction  Came as transfer from [facility] where he stayed for 2 days due to hematuria and septic shock  Reportedly, the patient was lethargic in the nursing facility where he was receiving rehabilitation for recent femoral fracture repair and was noted to be hypotensive  He was just diagnosed with the UTI and was placed on antibiotics  Initially, the patient was admitted in ICU with Levophed started  For the hematuria, the patient was seen by Urology and CBI was started and continued  However within the past few hours prior to transfer, the patient was noted to be having abdominal distension with hypogastric discomfort  Also there was failure of CBI drainage and CBI was held  A CT of the abdomen and pelvis showed at the Schaffer catheter was mispositioned   There was presence of gas within soft tissues in the perineum and penis most pronounced on the left and urethral injury was suspected  The patient was therefore immediately transferred to [facility] for urology opinion       Upon arrival to [facility] he underwent immediate cystoscopy with urology  The urethral lumen was unable to be identified and a suprapupic catheter was placed  The patient later became hypotensive and was requiring re-initiation of Levophed  He was later transferred to the ICU on levophed for further monitoring  Levophed was weaned off overnight  Presently holding home Lasix, Aldactone, and metoprolol at time of transfer  Clear urine coming from suprapubic path  Hgb stable will restart AC  Urology following with no further interventions planned  Patient is stable for transfer out of unit to med/surge  Current Precautions:  Fall     Allergies:  No known food allergies    Past medical history:  Please see H&P for details    Special Studies:  CXR 11/27: Increasing left effusion  Increasing hazy density in the lung suggest worsening congestion    CT abdomen pelvis 11/23: The Schaffer catheter is malpositioned  The catheter balloon is located near the base of the penis, below the level of the prostate  There is gas within the soft tissues of the perineum and penis, most pronounced on the left  Urethral injury is   suspected  Urology consultation and follow-up is recommended      The urinary bladder is distended, extending nearly to the level of the umbilicus  The urinary bladder volume is approximately 970 mL  There is gas layering non-dependently within the anterior aspect of the urinary bladder  There is mild dilatation of   the renal collecting systems and ureters bilaterally, likely related to the degree of urinary bladder distention  There is bilateral perinephric stranding and fluid, left greater than right, increased compared with November 21, 2022  Fluid tracks   caudally bilaterally into the pelvis    Correlation with urinalysis and urine culture and sensitivity is recommended      Persistent nephrograms are present; this raises concern for possible ATN  Clinical and laboratory correlation is recommended  Consider Nephrology consultation      There is a small amount of ascites, increased compared to the prior study  There is body wall edema, worse compared to the prior study  The inferior aspect of the left gluteus john muscle appears asymmetrically enlarged compared with the   contralateral right side and there is overlying subcutaneous edema  This may be related to asymmetric edema, myositis, or hematoma  Clinical correlation and follow-up is recommended      There is a moderate-sized left pleural effusion  There is bibasilar atelectasis containing some air bronchograms, left greater than right  Superimposed pneumonia should be excluded clinically      Other nonemergent findings, as described above  Please see discussion          Social/Education/Vocational Hx:  Pt lives w/ spouse    Swallow Information   Current Risks for Dysphagia & Aspiration: recent surgery  Current Symptoms/Concerns: coughing with po  Current Diet: regular diet and thin liquids   Baseline Diet: regular diet and thin liquids      Baseline Assessment   Behavior/Cognition: alert  Speech/Language Status: able to participate in conversation and able to follow commands  Patient Positioning: upright in bed  Pain Status/Interventions/Response to Interventions:   No report of or nonverbal indications of pain  Swallow Mechanism Exam  Facial: symmetrical  Labial: WFL  Lingual: WFL  Velum: symmetrical  Mandible: adequate ROM  Dentition: adequate  Vocal quality:weak   Volitional Cough: strong/productive   Respiratory Status:  on 2L O2      Consistencies Assessed and Performance   Consistencies Administered: thin liquids, nectar thick, honey thick, puree, soft solids and hard solids    Oral Stage:   Mastication was mild-mod prolonged/reduced with the materials administered today    Bolus formation and transfer were delayed/reduced, oral residue w/ advanced textures  Pharyngeal Stage:   Swallow Mechanics:  Swallowing initiation  W/ ? Delay  Laryngeal rise was palpated and judged to be reduced  Moist cough w/ thin liquids and hard solids, inconsistent cough response w/ nectar thick  Visible increased WOB and pt c/o increased SOB follow swallows today  Esophageal Concerns: none reported    Strategies and Efficacy: cup vs straw without difference, small controlled sip/tsps improved apparent tolerance      Summary and Recommendations (see above)    Results Reviewed with: patient, RN and MD     Treatment Recommended: Yes     Frequency of treatment: As able and appropriate     Patient Stated Goal: "Low and slow"     Dysphagia LTG  -Patient will demonstrate safe and effective oral intake (without overt s/s significant oral/pharyngeal dysphagia including s/s penetration or aspiration) for the highest appropriate diet level       Short Term Goals:  -Pt will tolerate Dysphagia 1/pureed diet and honey thick liquid with no significant s/s oral or pharyngeal dysphagia across 1-3 diagnostic session/s      -Patient will tolerate trials of upgraded food and/or liquid texture with no significant s/s of oral or pharyngeal dysphagia including aspiration across 1-3 diagnostic sessions     -Patient will comply with a Video/Modified Barium Swallow study for more complete assessment of swallowing anatomy/physiology/aspiration risk and to assess efficacy of treatment techniques so as to best guide treatment plan    Speech Therapy Prognosis   Prognosis: fair    Prognosis Considerations: age, medical status, prior medical history and cognitive status

## 2022-11-29 NOTE — ASSESSMENT & PLAN NOTE
Wt Readings from Last 3 Encounters:   11/28/22 71 4 kg (157 lb 6 5 oz)   11/23/22 74 5 kg (164 lb 3 9 oz)   11/22/22 70 8 kg (156 lb)     · Chronic HF  · Echo 03/01/2022 with EF 45%     · Continue beta-blocker and Aldactone  · C/w IV Lasix - when euvolemic will switch to Torsemide  · Monitor I/O, daily weights  · Less than 2 g salt diet fluid restriction

## 2022-11-29 NOTE — PROGRESS NOTES
[address] [facility]  Progress Note Enrique Courtney 1940, 80 y o  male MRN: [id number]  Unit/Bed#: [facility] 431-01 Encounter: [id number]  Primary Care Provider: Carmenza Morris DO   Date and time admitted to hospital: 11/23/2022  4:04 AM    * Urethral trauma  Assessment & Plan  Urethral trauma status post cystoscopy with suprapubic catheter in place  Urology input noted      Heart failure with mid-range ejection fraction [facility])  Assessment & Plan  Wt Readings from Last 3 Encounters:   11/28/22 71 4 kg (157 lb 6 5 oz)   11/23/22 74 5 kg (164 lb 3 9 oz)   11/22/22 70 8 kg (156 lb)     · Chronic HF  · Echo 03/01/2022 with EF 45%  · Continue beta-blocker and Aldactone  · C/w IV Lasix - when euvolemic will switch to Torsemide  · Monitor I/O, daily weights  · Less than 2 g salt diet fluid restriction            Dysphagia  Assessment & Plan  · Puree/HTL  · F/u VBS    Ambulatory dysfunction  Assessment & Plan  Multifactorial with deconditioning contributing  Safe ambulation  Fall precautions  Physical therapy    Hematuria  Assessment & Plan  · Due to urethral injury  · Hematuria resolved    Transaminitis  Assessment & Plan  · Likely due to shock liver  · Improved - chronically high bili      Closed fracture of trochanter of right femur with routine healing  Assessment & Plan  · Discharged from [address] [place] [address] on 11/06 s/p repair  · DVT prophylaxis w/ Eliquis  · Physical therapy    UTI (urinary tract infection)  Assessment & Plan  Antibiotics transition to IV cefazolin based on culture sensitivities  Transition to p o   Keflex to complete 7 day course of antibiotics    Septic shock [facility])  Assessment & Plan  · Present on admission  · Resolved    Acute on chronic anemia  Assessment & Plan  Lab Results   Component Value Date    EGFR 33 11/29/2022    EGFR 31 11/28/2022    EGFR 30 11/27/2022    CREATININE 1 85 (H) 11/29/2022    CREATININE 1 91 (H) 11/28/2022    CREATININE 1 98 (H) 11/27/2022 · Multifactorial  · Iron studies noted  · Iron supplementation  · Monitor hemoglobin    Chronic atrial fibrillation [facility])  Assessment & Plan  Continue amiodarone, beta-blocker  Eliquis for anticoagulation    Acute-on-chronic kidney injury [facility])  Assessment & Plan  Lab Results   Component Value Date    EGFR 33 2022    EGFR 31 2022    EGFR 30 2022    CREATININE 1 85 (H) 2022    CREATININE 1 91 (H) 2022    CREATININE 1 98 (H) 2022     Estimated Creatinine Clearance: 29 8 mL/min (A) (by C-G formula based on SCr of 1 85 mg/dL (H))  Acute on chronic kidney disease improving with diuresis  Monitor kidney function closely  Avoid nephrotoxins  Suprapubic catheter in place      CAD (coronary artery disease)  Assessment & Plan  Continue Plavix, beta-blocker, Lipitor    Hyperglycemia-resolved as of 2022  Assessment & Plan  A1c 4 6  Resolved      VTE Pharmacologic Prophylaxis: VTE Score: 21 High Risk (Score >/= 5) - Pharmacological DVT Prophylaxis Ordered: apixaban (Eliquis)  Sequential Compression Devices Ordered  Patient Centered Rounds: I performed bedside rounds with nursing staff today  Discussions with Specialists or Other Care Team Provider: speech    Education and Discussions with Family / Patient: Updated  (wife) via phone  Time Spent for Care: 30 minutes  More than 50% of total time spent on counseling and coordination of care as described above  Current Length of Stay: 6 day(s)  Current Patient Status: Inpatient   Certification Statement: The patient will continue to require additional inpatient hospital stay due to need for VBS  Discharge Plan: Anticipate discharge in 48-72 hrs to rehab facility  Code Status: Level 1 - Full Code    Subjective:   C/o pain      Objective:     Vitals:   Temp (24hrs), Av 3 °F (36 8 °C), Min:98 °F (36 7 °C), Max:98 4 °F (36 9 °C)    Temp:  [98 °F (36 7 °C)-98 4 °F (36 9 °C)] 98 4 °F (36 9 °C)  HR:  [78-83] 83  Resp:  [17] 17  BP: (119-146)/(59-82) 121/59  SpO2:  [94 %-100 %] 100 %  Body mass index is 23 93 kg/m²  Input and Output Summary (last 24 hours): Intake/Output Summary (Last 24 hours) at 11/29/2022 1431  Last data filed at 11/29/2022 1304  Gross per 24 hour   Intake 300 ml   Output 2175 ml   Net -1875 ml       Physical Exam:   Physical Exam  HENT:      Head: Normocephalic and atraumatic  Nose: Nose normal       Mouth/Throat:      Mouth: Mucous membranes are moist    Eyes:      Extraocular Movements: Extraocular movements intact  Conjunctiva/sclera: Conjunctivae normal    Cardiovascular:      Rate and Rhythm: Normal rate and regular rhythm  Pulmonary:      Effort: Pulmonary effort is normal       Breath sounds: Normal breath sounds  Abdominal:      General: Bowel sounds are normal       Palpations: Abdomen is soft  Musculoskeletal:         General: Normal range of motion  Cervical back: Normal range of motion and neck supple  Right lower leg: No edema  Left lower leg: No edema  Skin:     General: Skin is warm and dry  Neurological:      Mental Status: He is alert and oriented to person, place, and time           Additional Data:     Labs:  Results from last 7 days   Lab Units 11/29/22  0505 11/25/22  0752   WBC Thousand/uL 9 09 12 99*   HEMOGLOBIN g/dL 7 6* 8 8*   HEMATOCRIT % 25 0* 28 4*   PLATELETS Thousands/uL 123* 92*   BANDS PCT %  --  3   NEUTROS PCT % 86*  --    LYMPHS PCT % 6*  --    LYMPHO PCT %  --  0*   MONOS PCT % 5  --    MONO PCT %  --  1*   EOS PCT % 2 1     Results from last 7 days   Lab Units 11/29/22  0505 11/27/22  0549 11/25/22  0752   SODIUM mmol/L 141   < > 143   POTASSIUM mmol/L 3 6   < > 3 6   CHLORIDE mmol/L 105   < > 113*   CO2 mmol/L 30   < > 22   BUN mg/dL 43*   < > 55*   CREATININE mg/dL 1 85*   < > 2 25*   ANION GAP mmol/L 6   < > 8   CALCIUM mg/dL 8 0*   < > 7 9*   ALBUMIN g/dL  --   --  1 9*   TOTAL BILIRUBIN mg/dL  --   --  1 16*   ALK PHOS U/L  --   --  289*   ALT U/L  --   --  38   AST U/L  --   --  31   GLUCOSE RANDOM mg/dL 92   < > 95    < > = values in this interval not displayed  Results from last 7 days   Lab Units 11/23/22  0714   INR  1 69*     Results from last 7 days   Lab Units 11/24/22  1807 11/24/22  1208 11/24/22  0550 11/24/22  0000 11/23/22  1750 11/23/22  1205 11/23/22  0009 11/22/22  2044 11/22/22  1833   POC GLUCOSE mg/dl 129 122 106 98 112 86 106 128 113         Results from last 7 days   Lab Units 11/23/22  1301   LACTIC ACID mmol/L 1 5   PROCALCITONIN ng/ml 47 28*       Lines/Drains:  Invasive Devices     Peripheral Intravenous Line  Duration           Peripheral IV 11/26/22 Right Antecubital 3 days          Drain  Duration           Suprapubic Catheter 18 Fr  6 days                      Imaging: No pertinent imaging reviewed      Recent Cultures (last 7 days):         Last 24 Hours Medication List:   Current Facility-Administered Medications   Medication Dose Route Frequency Provider Last Rate   • acetaminophen  650 mg Oral Q6H PRN Tamra Staley MD     • albuterol  2 puff Inhalation Q4H PRN Parul Gabriel MD     • amiodarone  200 mg Oral Daily With Breakfast Tamra Staley MD     • apixaban  2 5 mg Oral BID Juliocesar Vallejo DO     • atorvastatin  40 mg Oral Daily With Kamryn Carpenter MD     • benzonatate  200 mg Oral TID Parul Gabriel MD     • cephalexin  500 mg Oral [facility] Parul Gabriel MD     • clopidogrel  75 mg Oral Daily Juliocesar Vallejo DO     • ferrous sulfate  325 mg Oral Daily With Breakfast Parul Gabriel MD     • furosemide  40 mg Intravenous BID (diuretic) Parul Gabriel MD     • guaiFENesin  600 mg Oral Q12H [address] Parul Gabriel MD     • HYDROmorphone  0 2 mg Intravenous Q4H PRN Juliocesar Vallejo DO     • Lidocaine Viscous HCl  15 mL Swish & Spit 4x Daily PRN Angeline Alpers, DO     • melatonin  6 mg Oral HS Swathi Davila PA-C     • metoprolol tartrate  25 mg Oral Q12H Kristin Doll MD     • oxyCODONE  2 5 mg Oral Q6H PRN Courtney Plum, DO      Or   • oxyCODONE  5 mg Oral Q6H PRN Courtney Plum, DO     • potassium chloride  20 mEq Oral Daily Luke Sandoval MD     • potassium chloride  20 mEq Oral Daily With Dinner Bhumika Arcos DO     • spironolactone  25 mg Oral Daily Luke Sandoval MD     • tamsulosin  0 4 mg Oral Daily With Sera Cheema MD          Today, Patient Was Seen By: Bhumika Arcos DO    **Please Note: This note may have been constructed using a voice recognition system  **

## 2022-11-29 NOTE — ASSESSMENT & PLAN NOTE
· Discharged from [facility] on 11/06 s/p repair     · DVT prophylaxis w/ Eliquis  · Physical therapy

## 2022-11-30 PROBLEM — T14.8XXA DEEP TISSUE INJURY: Status: ACTIVE | Noted: 2022-01-01

## 2022-11-30 NOTE — ASSESSMENT & PLAN NOTE
Lab Results   Component Value Date    EGFR 33 11/30/2022    EGFR 33 11/29/2022    EGFR 31 11/28/2022    CREATININE 1 85 (H) 11/30/2022    CREATININE 1 85 (H) 11/29/2022    CREATININE 1 91 (H) 11/28/2022     Estimated Creatinine Clearance: 29 8 mL/min (A) (by C-G formula based on SCr of 1 85 mg/dL (H))    Acute on chronic kidney disease improving with diuresis  Monitor kidney function closely  Avoid nephrotoxins  Suprapubic catheter in place

## 2022-11-30 NOTE — PROCEDURES
Video Swallow Study      Patient Name: Zaid Munguia  ZFXPU'E Date: 11/30/2022        Past Medical History  Past Medical History:   Diagnosis Date   • CHF (congestive heart failure) ([facility] [address] )    • Coronary artery disease    • Hyperlipidemia    • Hypertension    • Paroxysmal atrial fibrillation [facility])         Past Surgical History  Past Surgical History:   Procedure Laterality Date   • CARDIAC SURGERY      cabg x 2 2014 [facility]   • CORONARY ANGIOPLASTY WITH STENT PLACEMENT  10/2021    DAPHNEY to left main and ramus   • MITRAL VALVE REPAIR  2014    mitral ring   • KY CYSTOURETHROSCOPY W/IRRIG & EVAC CLOTS N/A 11/23/2022    Procedure: Edwige Amin;  Surgeon: Viry Thompson MD;  Location: [facility];  Service: Urology   • KY OPEN RX FEMUR FX+INTRAMED HALIMA Right 11/1/2022    Procedure: INSERTION NAIL IM FEMUR ANTEGRADE (TROCHANTERIC); Surgeon: Taylor Sin; Location: [facility];  Service: Orthopedics   • SUPRAPUBIC TUBE PLACEMENT N/A 11/23/2022    Procedure: INSERTION SUPRAPUBIC CATHETER PERCUTANEOUS;  Surgeon: Viry Thompson MD;  Location: [facility];  Service: Urology       Modified (Video) Barium Swallow Study    Summary:  Images are on PACS for review  Some images labeled as cup were straw sips nt (se 16 is by straw)  Oral stage/Pharyngeal stage: Moderately impaired keely by reduced retrieval and prolonged/labored manipulation & bolus formation of all material   tTransfers are mod reduced and lingual retraction is reduced w/ post lingual residue w/ all material   Swallows are fairly prompt though his hyolaryngeal movement and pharyngeal constriction are both poor w/ retention in the valleculae & in the PS  Possible osteophyte at the CP impeding the bolus flow  Aspiration of nt & thin after the initial swallows on overflow from the PS w/o response  Cued to cough but his cough is too weak to clear    He was unable to complete physical strategies well  however alternating bites/sips was effective in clearing partial retention  No aspiration w/ ht     ?able vocal cord weakness  Per gross esophageal screen:  WFL       Recommendations:  Diet: puree  Liquids: honey thick   Meds: crushed in puree   Strategies: alternate bites/sips  Frequent oral care  Upright position  F/u ST tx: yes  Therapy Prognosis: guarded  Prognosis considerations:  Full Supervision  Aspiration Precautions  Consider consult with: ENT   Results reviewed with: pt, nursing  Aspiration precautions posted  Repeat VBS as necessary  If a dedicated assessment of the esophagus is desired, consider esophagram/barium swallow or EGD  Goals:  Pt will tolerate least restrictive diet w/out s/s aspiration or oral/pharyngeal difficulties  H&P/pertinent provider notes: (PMH noted above)  The pt is an 81 y/o m transferred emergently from [facility] on 11/23 for urologic surgical management  Underwent a cystoscopy  Pt readmitted to the hospital from [facility] where he was for rehab following a hospital stay  He is lethargic & his vocal quality is weak  Special Studies:  Refer to chart for full info    Previous VBS:  -          Food allergies: -   Current diet: Puree, ht    Premorbid diet: Regular w/ thin    Dentition: Adequate    O2 requirement: NC   Oral mech:  weak movement    Vocal quality/speech: Weak, harsh, breathy, ?able vc impairment    Cognitive status: Awake, lethargic     Precautions:    Consistencies administered: Puree,Thin liquid by: teaspoon (5 cc's), single cup sip,  Nectar thick liquid by: teaspoon (5 cc's), single cup/straw sip  Honey thick liquid by: teaspoon (5 cc's)    Pt was viewed seated laterally at 90 degrees  Oral stage:    Lip closure: fair, mildly weak retrieval   Mastication: prolonged, labored  Bolus formation:  Reduced-required recollection of material mult times, labored breathing during the study noted as well making him stop several times     Bolus control: loss w/ all   Transfer: labored   Residue: mild post  lingual residue w/ all, increased w/ solids    Pharyngeal stage:    Tongue base retraction: mild/mod reduced   Velar function: wfl  Swallow promptness: suprisingly fairly prompt  Epiglottic inversion:reduced full inversion, poor with solids  Laryngeal elevation/hyoid excursion: reduced anterior movement  Pharyngeal constriction: at least mod reduced   Vallecular retention: mild/mod w/ puree/solids  Pyriform retention: mild/mod w/ puree/solids w/ most difficulty clearing solids even w liquid wash & 2* swallow   PPW coating: mild coating throughout the study w/ all   Osteophytes: noted at C5-C6 at the area of the CP, some what impeded the bolus  CP prominence: reduced relaxation for all material w/ a persistent retention above in the PS  Retropulsion from prominence:no gross retropulsion  Transient penetration:   Epiglottic undercoat:  Penetration:  Airway closure: reduced w/ thin/nt at times  Aspiration: after initial swallows w/ thin, nt by tsp/cup (PAS8)  He then had no aspiration w the nt by straw at one point but there continued to be a significant amount of PS retention he was unable to clear  Strategies: attempted cued mult swallows,cough following aspiration events,  head down & effortful swallows  Cough is weak- he cannot clear material when cued      Response to aspiration: no response to thin, delayed w/ nt       Screening of Esophageal stage:    Dysmotility:  Slow motility:  Retropulsion:  Possible reflux:  Retention/Stasis:  Possible stricture:  LES:    8-Point Penetration-Aspiration Scale   1 Material does not enter the airway   2 Material enters the airway, remains above the vocal folds, and is ejected  from the  airway    3 Material enters the airway, remains above the vocal folds, and is not ejected from the airway   4 Material enters the airway, contacts the vocal folds, and is ejected from the airway   5 Material enters the airway, contacts the vocal folds, and is not ejected from the airway    6 Material enters the airway, passes below the vocal folds and is ejected into the larynx or out of the airway    7 Material enters the airway, passes below the vocal folds, and is not ejected from the trachea despite effort    8 Material enters the airway, passes below the vocal folds, and no effort is made to eject

## 2022-11-30 NOTE — ASSESSMENT & PLAN NOTE
· Discharged from [address] [place] [address] on 11/06 s/p repair     · DVT prophylaxis w/ Eliquis  · Physical therapy

## 2022-11-30 NOTE — ASSESSMENT & PLAN NOTE
Lab Results   Component Value Date    EGFR 33 11/30/2022    EGFR 33 11/29/2022    EGFR 31 11/28/2022    CREATININE 1 85 (H) 11/30/2022    CREATININE 1 85 (H) 11/29/2022    CREATININE 1 91 (H) 11/28/2022     · Multifactorial  · Iron studies noted  · Iron supplementation  · Monitor hemoglobin

## 2022-11-30 NOTE — ASSESSMENT & PLAN NOTE
Wt Readings from Last 3 Encounters:   11/30/22 72 1 kg (158 lb 15 2 oz)   11/23/22 74 5 kg (164 lb 3 9 oz)   11/22/22 70 8 kg (156 lb)     · Chronic HF  · Echo 03/01/2022 with EF 45%     · Continue beta-blocker and Aldactone  · C/w IV Lasix - when euvolemic will switch to Torsemide  · Monitor I/O, daily weights  · Less than 2 g salt diet fluid restriction

## 2022-11-30 NOTE — PROGRESS NOTES
[address] [facility]  Progress Note Arturo Saliva 1940, 80 y o  male MRN: [id number]  Unit/Bed#: [facility] 431-01 Encounter: [id number]  Primary Care Provider: Greta Ramsey DO   Date and time admitted to hospital: 11/23/2022  4:04 AM    * Urethral trauma  Assessment & Plan  Urethral trauma status post cystoscopy with suprapubic catheter in place  Urology input noted      Heart failure with mid-range ejection fraction [facility])  Assessment & Plan  Wt Readings from Last 3 Encounters:   11/30/22 72 1 kg (158 lb 15 2 oz)   11/23/22 74 5 kg (164 lb 3 9 oz)   11/22/22 70 8 kg (156 lb)     · Chronic HF  · Echo 03/01/2022 with EF 45%  · Continue beta-blocker and Aldactone  · C/w IV Lasix - when euvolemic will switch to Torsemide  · Monitor I/O, daily weights  · Less than 2 g salt diet fluid restriction            Deep tissue injury  Assessment & Plan  · Wound care following  · Concern about poor healing so will check LEADs    Dysphagia  Assessment & Plan  · Puree/HTL  · VBS cleared pt for puree/HTL    Ambulatory dysfunction  Assessment & Plan  Multifactorial with deconditioning contributing  Safe ambulation  Fall precautions  Physical therapy    Hematuria  Assessment & Plan  · Due to urethral injury  · Hematuria resolved    Transaminitis  Assessment & Plan  · Likely due to shock liver  · Improved - chronically high bili      Closed fracture of trochanter of right femur with routine healing  Assessment & Plan  · Discharged from [address] [place] [address] on 11/06 s/p repair  · DVT prophylaxis w/ Eliquis  · Physical therapy    UTI (urinary tract infection)  Assessment & Plan  Antibiotics transition to IV cefazolin based on culture sensitivities  Transition to p o   Keflex to complete 7 day course of antibiotics    Septic shock [facility])  Assessment & Plan  · Present on admission  · Resolved    Acute on chronic anemia  Assessment & Plan  Lab Results   Component Value Date    EGFR 33 11/30/2022    EGFR 33 11/29/2022    EGFR 31 2022    CREATININE 1 85 (H) 2022    CREATININE 1 85 (H) 2022    CREATININE 1 91 (H) 2022     · Multifactorial  · Iron studies noted  · Iron supplementation  · Monitor hemoglobin    Chronic atrial fibrillation (HCC)  Assessment & Plan  Continue amiodarone, beta-blocker  Eliquis for anticoagulation    Acute-on-chronic kidney injury [facility])  Assessment & Plan  Lab Results   Component Value Date    EGFR 33 2022    EGFR 33 2022    EGFR 31 2022    CREATININE 1 85 (H) 2022    CREATININE 1 85 (H) 2022    CREATININE 1 91 (H) 2022     Estimated Creatinine Clearance: 29 8 mL/min (A) (by C-G formula based on SCr of 1 85 mg/dL (H))  Acute on chronic kidney disease improving with diuresis  Monitor kidney function closely  Avoid nephrotoxins  Suprapubic catheter in place      CAD (coronary artery disease)  Assessment & Plan  Continue Plavix, beta-blocker, Lipitor      VTE Pharmacologic Prophylaxis: VTE Score: 21 High Risk (Score >/= 5) - Pharmacological DVT Prophylaxis Ordered: apixaban (Eliquis)  Sequential Compression Devices Ordered  Patient Centered Rounds: I performed bedside rounds with nursing staff today  Discussions with Specialists or Other Care Team Provider: wound care    Education and Discussions with Family / Patient: Updated  (wife) via phone  Time Spent for Care: 30 minutes  More than 50% of total time spent on counseling and coordination of care as described above  Current Length of Stay: 7 day(s)  Current Patient Status: Inpatient   Certification Statement: The patient will continue to require additional inpatient hospital stay due to need for LEADs  Discharge Plan: Anticipate discharge in 24-48 hrs to rehab facility      Code Status: Level 1 - Full Code    Subjective:   No acute complaints    Objective:     Vitals:   Temp (24hrs), Av 1 °F (37 3 °C), Min:99 1 °F (37 3 °C), Max:99 1 °F (37 3 °C)    Temp:  [99 1 °F (37 3 °C)] 99 1 °F (37 3 °C)  HR:  [85-89] 85  Resp:  [18-20] 18  BP: (130-131)/(62-63) 130/62  SpO2:  [88 %-97 %] 97 %  Body mass index is 24 17 kg/m²  Input and Output Summary (last 24 hours): Intake/Output Summary (Last 24 hours) at 11/30/2022 1624  Last data filed at 11/30/2022 1230  Gross per 24 hour   Intake 440 ml   Output 2525 ml   Net -2085 ml       Physical Exam:   Physical Exam  HENT:      Head: Normocephalic and atraumatic  Nose: Nose normal       Mouth/Throat:      Mouth: Mucous membranes are moist    Eyes:      Extraocular Movements: Extraocular movements intact  Conjunctiva/sclera: Conjunctivae normal    Cardiovascular:      Rate and Rhythm: Normal rate and regular rhythm  Pulmonary:      Effort: Pulmonary effort is normal       Breath sounds: Normal breath sounds  Abdominal:      General: Bowel sounds are normal       Palpations: Abdomen is soft  Musculoskeletal:         General: Normal range of motion  Cervical back: Normal range of motion and neck supple  Right lower leg: Edema present  Left lower leg: Edema present  Skin:     General: Skin is warm and dry  Neurological:      Mental Status: He is alert and oriented to person, place, and time           Additional Data:     Labs:  Results from last 7 days   Lab Units 11/30/22  0453 11/29/22  0505 11/25/22  0752   WBC Thousand/uL 7 99 9 09 12 99*   HEMOGLOBIN g/dL 9 0* 7 6* 8 8*   HEMATOCRIT % 28 9* 25 0* 28 4*   PLATELETS Thousands/uL 122* 123* 92*   BANDS PCT %  --   --  3   NEUTROS PCT %  --  86*  --    LYMPHS PCT %  --  6*  --    LYMPHO PCT %  --   --  0*   MONOS PCT %  --  5  --    MONO PCT %  --   --  1*   EOS PCT %  --  2 1     Results from last 7 days   Lab Units 11/30/22  0453 11/27/22  0549 11/25/22  0752   SODIUM mmol/L 139   < > 143   POTASSIUM mmol/L 4 1   < > 3 6   CHLORIDE mmol/L 104   < > 113*   CO2 mmol/L 29   < > 22   BUN mg/dL 41*   < > 55*   CREATININE mg/dL 1 85*   < > 2 25*   ANION GAP mmol/L 6   < > 8   CALCIUM mg/dL 8 2*   < > 7 9*   ALBUMIN g/dL  --   --  1 9*   TOTAL BILIRUBIN mg/dL  --   --  1 16*   ALK PHOS U/L  --   --  289*   ALT U/L  --   --  38   AST U/L  --   --  31   GLUCOSE RANDOM mg/dL 98   < > 95    < > = values in this interval not displayed  Results from last 7 days   Lab Units 11/24/22  1807 11/24/22  1208 11/24/22  0550 11/24/22  0000 11/23/22  1750   POC GLUCOSE mg/dl 129 122 106 98 112               Lines/Drains:  Invasive Devices     Peripheral Intravenous Line  Duration           Peripheral IV 11/30/22 Dorsal (posterior); Left Forearm <1 day          Drain  Duration           Suprapubic Catheter 18 Fr  7 days                      Imaging: Reviewed radiology reports from this admission including: procedure reports    Recent Cultures (last 7 days):         Last 24 Hours Medication List:   Current Facility-Administered Medications   Medication Dose Route Frequency Provider Last Rate   • acetaminophen  650 mg Oral Q6H PRN Tamra Staley MD     • albuterol  2 puff Inhalation Q4H PRN Parul Gabriel MD     • amiodarone  200 mg Oral Daily With Breakfast Tamra Staley MD     • apixaban  2 5 mg Oral BID Juliocesar Vallejo DO     • atorvastatin  40 mg Oral Daily With Kamryn Carpenter MD     • clopidogrel  75 mg Oral Daily Juliocesar Vallejo DO     • ferrous sulfate  325 mg Oral Daily With Breakfast Parul Gabriel MD     • furosemide  40 mg Intravenous BID (diuretic) Parul Gabriel MD     • HYDROmorphone  0 2 mg Intravenous Q4H PRN Juliocesar Vallejo DO     • Lidocaine Viscous HCl  15 mL Swish & Spit 4x Daily PRN Angeline Alpers, DO     • melatonin  6 mg Oral HS Swathi Davila PA-C     • metoprolol tartrate  25 mg Oral Q12H [address] Parul Gabriel MD     • oxyCODONE  2 5 mg Oral Q6H PRN Juliocesar Vallejo DO      Or   • oxyCODONE  5 mg Oral Q6H PRN Juliocesar Vallejo DO     • spironolactone  25 mg Oral Daily Parul Gabriel MD     • tamsulosin  0 4 mg Oral Daily With Mitch Bautista MD          Today, Patient Was Seen By: Thai Shabazz DO    **Please Note: This note may have been constructed using a voice recognition system  **

## 2022-11-30 NOTE — WOUND OSTOMY CARE
Progress Note - Wound   Stephanie Orlando 80 y o  male MRN: [id number]  Unit/Bed#: [facility] 431-01 Encounter: [id number]        Assessment:   Patient seen today for wound care follow up visit  Patient now on medsurg floor so ordered P-500 specialty mattress  Patient is completely dependent for all care, has supra pubic catheter and is incontinent of bowel  Patient has poor appetite  All of patient's wounds have significantly declined and patient now presents with new wounds  Concerned that patient may suffer from some vascular component  WOC nursing reached out to primary MD who is going to order LEADs and possibly consult vascular for further intervention       1  POA Right anterior foot/ankle- unclear as to etiology of wound as wound presents as pressure, however is not in typical location of pressure injury  Purple, intact skin does not navdeep, no drainage present  Wound is larger in size this assessment       2  POA evolving DTI sacral/buttocks- purple, nonblanchable erythema with some partial thickness skin loss with yellow wound bed and small amount of drainage  DTI's have the potential to evolve into full thickness unstageable, stage III, or stage IV wounds       3  POA DTI right heel- black and purple intact nonblanchable erythema and fragile, no drainage  DTI's have the potential to evolve into full thickness unstageable, stage III, or stage IV wounds       4  POA DTI right lateral ankle- intact, purple nonblanchable erythema significantly larger in size than previous assessment, no drainage  DTI's have the potential to evolve into full thickness unstageable, stage III, or stage IV wounds  5  Right lateral foot- circular, nonblanchable erythema, intact, no drainage present, new this assessment  Currently trying to rule out vascular component  6  Left lateral ankle/heel- multiple small circular nonblanchable erythema, intact with no drainage   Currently ruling out vascular over pressure as wound is new this assessment       No induration, fluctuance, odor, warmth/temperature differences, redness, or purulence noted to the above noted wounds and skin areas assessed  New dressings applied per orders listed below  Patient tolerated well- no s/s of non-verbal pain or discomfort observed during the encounter  Bedside nurse aware of plan of care  See flow sheets for more detailed assessment findings  Wound care will continue to follow       Skin care Plan:  1-Cleanse B/L heels and feet with soap and water  Apply Allevyn foams to wounds  Steve with T for treatment, Change every other day and PRN  2-Turn/reposition q2h or when medically stable for pressure re-distribution on skin   3-Elevate heels to offload pressure  4-Moisturize skin daily with skin nourishing cream  5-Ehob cushion in chair when out of bed  6-Cleanse sacro-buttocks  with soap and water  Apply Xeroform gauze to wound bed and cover Allevyn foam  Steve with T for treatment  Change every other day and PRN   7-P-500 specialty mattress     Wound 11/21/22 Sacrum (Active)   Wound Image   11/30/22 1139   Wound Description Granulation tissue; Non-blanchable erythema;Slough 11/30/22 1139   Pressure Injury Stage DTPI 11/26/22 2000   Rosaura-wound Assessment Clean; Intact;Dry 11/30/22 1139   Wound Length (cm) 5 cm 11/30/22 1139   Wound Width (cm) 8 cm 11/30/22 1139   Wound Depth (cm) 0 1 cm 11/30/22 1139   Wound Surface Area (cm^2) 40 cm^2 11/30/22 1139   Wound Volume (cm^3) 4 cm^3 11/30/22 1139   Calculated Wound Volume (cm^3) 4 cm^3 11/30/22 1139   Tunneling 0 cm 11/30/22 1139   Tunneling in depth located at 0 11/30/22 1139   Undermining 0 11/30/22 1139   Undermining is depth extending from 0 11/30/22 1139   Wound Site Closure CHANDAN 11/30/22 1139   Drainage Amount None 11/30/22 1139   Non-staged Wound Description Not applicable 09/93/28 9949   Treatments Cleansed 11/30/22 1139   Dressing Foam, Silicon (eg  Allevyn, etc) 11/30/22 1139   Wound packed?  No 11/30/22 1139 Packing- # removed 0 11/30/22 1139   Packing- # inserted 0 11/30/22 1139   Dressing Changed New 11/30/22 1139   Patient Tolerance Tolerated well 11/30/22 1139   Dressing Status Clean;Dry; Intact 11/30/22 1139       Wound 11/21/22 Heel Right (Active)   Wound Image   11/30/22 1130   Wound Description Fragile; Non-blanchable erythema 11/30/22 1130   Pressure Injury Stage DTPI 11/23/22 1349   Rosaura-wound Assessment Clean;Dry; Intact 11/30/22 1130   Wound Length (cm) 6 cm 11/30/22 1130   Wound Width (cm) 9 cm 11/30/22 1130   Wound Depth (cm) 0 cm 11/30/22 1130   Wound Surface Area (cm^2) 54 cm^2 11/30/22 1130   Wound Volume (cm^3) 0 cm^3 11/30/22 1130   Calculated Wound Volume (cm^3) 0 cm^3 11/30/22 1130   Tunneling 0 cm 11/30/22 1130   Tunneling in depth located at 0 11/30/22 1130   Undermining 0 11/30/22 1130   Undermining is depth extending from 0 11/30/22 1130   Wound Site Closure CHANDAN 11/30/22 1130   Drainage Amount None 11/30/22 1130   Non-staged Wound Description Not applicable 60/40/90 9172   Treatments Cleansed 11/30/22 1130   Dressing Foam, Silicon (eg  Allevyn, etc) 11/30/22 1130   Wound packed? No 11/30/22 1130   Packing- # removed 0 11/30/22 1130   Packing- # inserted 0 11/30/22 1130   Dressing Changed New 11/30/22 1130   Patient Tolerance Tolerated well 11/30/22 1130   Dressing Status Clean;Dry; Intact 11/30/22 1130       Wound 11/21/22 Pressure Injury Right;Lateral (Active)   Wound Image   11/23/22 1350   Wound Description CHANDAN 11/30/22 0731   Pressure Injury Stage DTPI 11/30/22 0355   Rosaura-wound Assessment Dry;Clean 11/30/22 0731   Wound Length (cm) 5 cm 11/23/22 1350   Wound Width (cm) 3 5 cm 11/23/22 1350   Wound Depth (cm) 0 cm 11/23/22 1350   Wound Surface Area (cm^2) 17 5 cm^2 11/23/22 1350   Wound Volume (cm^3) 0 cm^3 11/23/22 1350   Calculated Wound Volume (cm^3) 0 cm^3 11/23/22 1350   Drainage Amount None 11/30/22 0731   Treatments Site care 11/27/22 2000   Dressing Foam, Silicon (eg   Allevyn, etc) 11/30/22 0731   Dressing Changed Changed 11/26/22 2000   Patient Tolerance Tolerated well 11/27/22 2000   Dressing Status Clean;Dry; Intact 11/30/22 0731       Wound 11/23/22 Pretibial Distal;Right (Active)   Wound Image   11/30/22 1128   Wound Description Epithelialization;Eschar;Fragile 11/30/22 1128   Rosaura-wound Assessment Clean;Dry; Intact;Edema 11/30/22 1128   Wound Length (cm) 1 5 cm 11/30/22 1128   Wound Width (cm) 4 5 cm 11/30/22 1128   Wound Depth (cm) 0 cm 11/30/22 1128   Wound Surface Area (cm^2) 6 75 cm^2 11/30/22 1128   Wound Volume (cm^3) 0 cm^3 11/30/22 1128   Calculated Wound Volume (cm^3) 0 cm^3 11/30/22 1128   Tunneling 0 cm 11/30/22 1128   Tunneling in depth located at 0 11/30/22 1128   Undermining 0 11/30/22 1128   Undermining is depth extending from 0 11/30/22 1128   Wound Site Closure CHANDAN 11/30/22 1128   Drainage Amount None 11/30/22 1128   Non-staged Wound Description Not applicable 51/02/63 8145   Treatments Cleansed 11/30/22 1128   Dressing Foam, Silicon (eg  Allevyn, etc) 11/30/22 1128   Wound packed? No 11/30/22 1128   Packing- # removed 0 11/30/22 1128   Packing- # inserted 0 11/30/22 1128   Dressing Changed New 11/30/22 1128   Patient Tolerance Tolerated well 11/30/22 1128   Dressing Status Clean;Dry; Intact 11/30/22 1128       Wound 11/30/22 Pressure Injury Ankle Right;Medial (Active)   Wound Image   11/30/22 1129   Wound Description Fragile; Non-blanchable erythema 11/30/22 1129   Rosaura-wound Assessment Edema 11/30/22 1129   Wound Length (cm) 3 cm 11/30/22 1129   Wound Width (cm) 3 5 cm 11/30/22 1129   Wound Depth (cm) 0 cm 11/30/22 1129   Wound Surface Area (cm^2) 10 5 cm^2 11/30/22 1129   Wound Volume (cm^3) 0 cm^3 11/30/22 1129   Calculated Wound Volume (cm^3) 0 cm^3 11/30/22 1129   Tunneling 0 cm 11/30/22 1129   Tunneling in depth located at 0 11/30/22 1129   Undermining 0 11/30/22 1129   Undermining is depth extending from 0 11/30/22 1129   Wound Site Closure CHANDAN 11/30/22 1129 Drainage Amount None 11/30/22 1129   Non-staged Wound Description Not applicable 23/40/22 1892   Treatments Cleansed 11/30/22 1129   Dressing Foam, Silicon (eg  Allevyn, etc) 11/30/22 1129   Wound packed? No 11/30/22 1129   Packing- # removed 0 11/30/22 1129   Packing- # inserted 0 11/30/22 1129   Dressing Changed New 11/30/22 1129   Patient Tolerance Tolerated well 11/30/22 1129   Dressing Status Clean;Dry; Intact 11/30/22 1129       Wound 11/30/22 Foot Right;Lateral (Active)   Wound Image   11/30/22 1131   Wound Description Non-blanchable erythema 11/30/22 1131   Rosaura-wound Assessment Pink;Edema 11/30/22 1131   Wound Length (cm) 2 5 cm 11/30/22 1132   Wound Width (cm) 2 5 cm 11/30/22 1132   Wound Depth (cm) 0 cm 11/30/22 1132   Wound Surface Area (cm^2) 6 25 cm^2 11/30/22 1132   Wound Volume (cm^3) 0 cm^3 11/30/22 1132   Calculated Wound Volume (cm^3) 0 cm^3 11/30/22 1132   Tunneling 0 cm 11/30/22 1131   Tunneling in depth located at 0 11/30/22 1131   Undermining 0 11/30/22 1131   Undermining is depth extending from 0 11/30/22 1131   Wound Site Closure CHANDAN 11/30/22 1131   Drainage Amount None 11/30/22 1131   Non-staged Wound Description Not applicable 73/36/70 7181   Treatments Cleansed 11/30/22 1131   Dressing Foam, Silicon (eg  Allevyn, etc) 11/30/22 1131   Wound packed? No 11/30/22 1131   Packing- # removed 0 11/30/22 1131   Packing- # inserted 0 11/30/22 2556   Dressing Changed New 11/30/22 1131   Patient Tolerance Tolerated well 11/30/22 1131   Dressing Status Dry;Clean; Intact 11/30/22 1131       Wound 11/30/22 Tibial Distal;Posterior;Right (Active)   Wound Image   11/30/22 1133   Wound Description Non-blanchable erythema;Light purple 11/30/22 1133   Rosaura-wound Assessment Clean;Dry; Intact 11/30/22 1133   Wound Length (cm) 11 cm 11/30/22 1133   Wound Width (cm) 3 cm 11/30/22 1133   Wound Depth (cm) 0 cm 11/30/22 1133   Wound Surface Area (cm^2) 33 cm^2 11/30/22 1133   Wound Volume (cm^3) 0 cm^3 11/30/22 1133 Calculated Wound Volume (cm^3) 0 cm^3 11/30/22 1133   Tunneling 0 cm 11/30/22 1133   Tunneling in depth located at 0 11/30/22 1133   Undermining 0 11/30/22 1133   Undermining is depth extending from 0 11/30/22 1133   Wound Site Closure CHANDAN 11/30/22 1133   Drainage Amount None 11/30/22 1133   Non-staged Wound Description Not applicable 83/81/72 3487   Treatments Cleansed 11/30/22 1133   Dressing Foam, Silicon (eg  Allevyn, etc) 11/30/22 1133   Wound packed? No 11/30/22 1133   Packing- # removed 0 11/30/22 1133   Packing- # inserted 0 11/30/22 1133   Dressing Changed New 11/30/22 1133   Patient Tolerance Tolerated well 11/30/22 1133   Dressing Status Clean;Dry; Intact 11/30/22 1133       Wound 11/30/22 Foot Left;Lateral (Active)   Wound Image   11/30/22 1134   Wound Description Non-blanchable erythema 11/30/22 1134   Rosaura-wound Assessment Clean;Dry; Intact 11/30/22 1134   Wound Length (cm) 1 cm 11/30/22 1134   Wound Width (cm) 1 cm 11/30/22 1134   Wound Depth (cm) 0 cm 11/30/22 1134   Wound Surface Area (cm^2) 1 cm^2 11/30/22 1134   Wound Volume (cm^3) 0 cm^3 11/30/22 1134   Calculated Wound Volume (cm^3) 0 cm^3 11/30/22 1134   Tunneling 0 cm 11/30/22 1134   Tunneling in depth located at 0 11/30/22 1134   Undermining 0 11/30/22 1134   Undermining is depth extending from 0 11/30/22 1134   Wound Site Closure CHANDAN 11/30/22 1134   Drainage Amount None 11/30/22 1134   Non-staged Wound Description Not applicable 90/23/23 8945   Treatments Cleansed 11/30/22 1134   Dressing Foam, Silicon (eg  Allevyn, etc) 11/30/22 1134   Wound packed? No 11/30/22 1134   Packing- # removed 0 11/30/22 1134   Packing- # inserted 0 11/30/22 1134   Dressing Changed New 11/30/22 1134   Patient Tolerance Tolerated well 11/30/22 1134   Dressing Status Clean; Intact;Dry 11/30/22 1134       Wound 11/30/22 Heel Left (Active)   Wound Image   11/30/22 1135   Wound Description Non-blanchable erythema 11/30/22 1135   Rosaura-wound Assessment Dry;Clean; Intact 11/30/22 1135   Wound Length (cm) 3 cm 11/30/22 1135   Wound Width (cm) 2 cm 11/30/22 1135   Wound Depth (cm) 0 cm 11/30/22 1135   Wound Surface Area (cm^2) 6 cm^2 11/30/22 1135   Wound Volume (cm^3) 0 cm^3 11/30/22 1135   Calculated Wound Volume (cm^3) 0 cm^3 11/30/22 1135   Tunneling 0 cm 11/30/22 1135   Tunneling in depth located at 0 11/30/22 1135   Undermining 0 11/30/22 1135   Undermining is depth extending from 0 11/30/22 1135   Wound Site Closure CHANDAN 11/30/22 1135   Drainage Amount None 11/30/22 1135   Non-staged Wound Description Not applicable 90/76/65 2974   Treatments Cleansed 11/30/22 1135   Dressing Foam, Silicon (eg  Allevyn, etc) 11/30/22 1135   Wound packed? No 11/30/22 1135   Packing- # removed 0 11/30/22 1135   Packing- # inserted 0 11/30/22 1135   Dressing Changed New 11/30/22 1135   Patient Tolerance Tolerated well 11/30/22 1135   Dressing Status Clean;Dry; Intact 11/30/22 1135         Brigette MCCLAINN, RN, [employer]

## 2022-12-01 PROBLEM — I73.9 PAD (PERIPHERAL ARTERY DISEASE) (HCC): Status: ACTIVE | Noted: 2022-12-01

## 2022-12-01 NOTE — PROGRESS NOTES
[facility]  Progress Note John Michaud 1940, 80 y o  male MRN: [id number]  Unit/Bed#: [facility] 431-01 Encounter: [id number]  Primary Care Provider: Brigid Gonzalez DO   Date and time admitted to hospital: 11/23/2022  4:04 AM    * Urethral trauma  Assessment & Plan  Urethral trauma status post cystoscopy with suprapubic catheter in place  Urology input noted      Heart failure with mid-range ejection fraction [facility])  Assessment & Plan  Wt Readings from Last 3 Encounters:   12/01/22 71 kg (156 lb 8 4 oz)   11/23/22 74 5 kg (164 lb 3 9 oz)   11/22/22 70 8 kg (156 lb)     · Chronic HF  · Echo 03/01/2022 with EF 45%  · Continue beta-blocker and Aldactone  · Now appears euvolemic so IV Lasix -> Torsemide  · Monitor I/O, daily weights  · Less than 2 g salt diet fluid restriction            PAD (peripheral artery disease) ([facility])  Assessment & Plan  · Seen on LEADS  · Vascular appreciated  · C/w med mgmt w/ Eliquis, Plavix, and statin  · OP f/u w/ vascular to assess R foot healing    Deep tissue injury  Assessment & Plan  · Wound care appreciated  · Podiatry appreciated  · Right foot Xray no osteo - ok to WBAT    Ambulatory dysfunction  Assessment & Plan  Multifactorial with deconditioning contributing  Safe ambulation  Fall precautions  Physical therapy    Transaminitis  Assessment & Plan  · Likely due to shock liver  · Improved - chronically high bili      Closed fracture of trochanter of right femur with routine healing  Assessment & Plan  · Discharged from [address] [place] [address] on 11/06 s/p repair  · DVT prophylaxis w/ Eliquis  · Physical therapy    UTI (urinary tract infection)  Assessment & Plan  Antibiotics transition to IV cefazolin based on culture sensitivities  Transitioned to p o   Keflex completed 7 day course of antibiotics    Septic shock ([address] )  Assessment & Plan  · Present on admission  · Resolved    Acute on chronic anemia  Assessment & Plan  Lab Results   Component Value Date    EGFR 32 2022    EGFR 33 2022    EGFR 33 2022    CREATININE 1 90 (H) 2022    CREATININE 1 85 (H) 2022    CREATININE 1 85 (H) 2022     · Multifactorial  · Iron studies noted  · Iron supplementation  · Monitor hemoglobin    Chronic atrial fibrillation (HCC)  Assessment & Plan  Continue amiodarone, beta-blocker  Eliquis for anticoagulation    Acute-on-chronic kidney injury [facility])  Assessment & Plan  Lab Results   Component Value Date    EGFR 32 2022    EGFR 33 2022    EGFR 33 2022    CREATININE 1 90 (H) 2022    CREATININE 1 85 (H) 2022    CREATININE 1 85 (H) 2022     Estimated Creatinine Clearance: 29 mL/min (A) (by C-G formula based on SCr of 1 9 mg/dL (H))  Acute on chronic kidney disease improving with diuresis  Monitor kidney function closely  Avoid nephrotoxins  Suprapubic catheter in place      CAD (coronary artery disease)  Assessment & Plan  Continue Plavix, beta-blocker, Lipitor      VTE Pharmacologic Prophylaxis: VTE Score: 21 High Risk (Score >/= 5) - Pharmacological DVT Prophylaxis Ordered: apixaban (Eliquis)  Sequential Compression Devices Ordered  Patient Centered Rounds: I performed bedside rounds with nursing staff today  Discussions with Specialists or Other Care Team Provider: podiatry and vascular    Education and Discussions with Family / Patient: Updated  (wife) via phone  Time Spent for Care: 30 minutes  More than 50% of total time spent on counseling and coordination of care as described above  Current Length of Stay: 8 day(s)  Current Patient Status: Inpatient   Certification Statement: The patient will continue to require additional inpatient hospital stay due to need to monitor Cr  Discharge Plan: Anticipate discharge tomorrow to rehab facility      Code Status: Level 1 - Full Code    Subjective:   C/o right foot pain    Objective:     Vitals:   Temp (24hrs), Av 5 °F (36 9 °C), Min:98 4 °F (36 9 °C), Max:98 6 °F (37 °C)    Temp:  [98 4 °F (36 9 °C)-98 6 °F (37 °C)] 98 4 °F (36 9 °C)  HR:  [75-97] 75  Resp:  [18] 18  BP: (121-136)/(59-78) 136/64  SpO2:  [91 %-99 %] 99 %  Body mass index is 23 8 kg/m²  Input and Output Summary (last 24 hours): Intake/Output Summary (Last 24 hours) at 12/1/2022 1352  Last data filed at 12/1/2022 0545  Gross per 24 hour   Intake --   Output 1050 ml   Net -1050 ml       Physical Exam:   Physical Exam  HENT:      Head: Normocephalic and atraumatic  Nose: Nose normal       Mouth/Throat:      Mouth: Mucous membranes are moist    Eyes:      Extraocular Movements: Extraocular movements intact  Conjunctiva/sclera: Conjunctivae normal    Cardiovascular:      Rate and Rhythm: Normal rate and regular rhythm  Pulmonary:      Effort: Pulmonary effort is normal       Breath sounds: Normal breath sounds  Abdominal:      General: Bowel sounds are normal       Palpations: Abdomen is soft  Musculoskeletal:         General: Normal range of motion  Cervical back: Normal range of motion and neck supple  Right lower leg: No edema  Left lower leg: No edema  Skin:     General: Skin is warm and dry  Neurological:      Mental Status: He is alert and oriented to person, place, and time           Additional Data:     Labs:  Results from last 7 days   Lab Units 11/30/22  0453 11/29/22  0505 11/25/22  0752   WBC Thousand/uL 7 99 9 09 12 99*   HEMOGLOBIN g/dL 9 0* 7 6* 8 8*   HEMATOCRIT % 28 9* 25 0* 28 4*   PLATELETS Thousands/uL 122* 123* 92*   BANDS PCT %  --   --  3   NEUTROS PCT %  --  86*  --    LYMPHS PCT %  --  6*  --    LYMPHO PCT %  --   --  0*   MONOS PCT %  --  5  --    MONO PCT %  --   --  1*   EOS PCT %  --  2 1     Results from last 7 days   Lab Units 12/01/22  0522 11/27/22  0549 11/25/22  0752   SODIUM mmol/L 142   < > 143   POTASSIUM mmol/L 4 0   < > 3 6   CHLORIDE mmol/L 104   < > 113*   CO2 mmol/L 30   < > 22   BUN mg/dL 47*   < > 55*   CREATININE mg/dL 1 90*   < > 2 25*   ANION GAP mmol/L 8   < > 8   CALCIUM mg/dL 8 4   < > 7 9*   ALBUMIN g/dL  --   --  1 9*   TOTAL BILIRUBIN mg/dL  --   --  1 16*   ALK PHOS U/L  --   --  289*   ALT U/L  --   --  38   AST U/L  --   --  31   GLUCOSE RANDOM mg/dL 102   < > 95    < > = values in this interval not displayed  Results from last 7 days   Lab Units 11/24/22  1807   POC GLUCOSE mg/dl 129               Lines/Drains:  Invasive Devices     Peripheral Intravenous Line  Duration           Peripheral IV 11/30/22 Dorsal (posterior); Left Forearm 1 day          Drain  Duration           Suprapubic Catheter 18 Fr  8 days                      Imaging: Reviewed radiology reports from this admission including: xray(s)    Recent Cultures (last 7 days):         Last 24 Hours Medication List:   Current Facility-Administered Medications   Medication Dose Route Frequency Provider Last Rate   • acetaminophen  650 mg Oral Q6H PRN Daija Lynne MD     • albuterol  2 puff Inhalation Q4H PRN Nannette Patricia MD     • amiodarone  200 mg Oral Daily With Breakfast Daija Lynne MD     • apixaban  2 5 mg Oral BID Paulette Rounds, DO     • atorvastatin  40 mg Oral Daily With Lisa Reece MD     • clopidogrel  75 mg Oral Daily Paulette Rounds, DO     • ferrous sulfate  325 mg Oral Daily With Breakfast Nannette Patricia MD     • HYDROmorphone  0 2 mg Intravenous Q4H PRN Paulette Rounds, DO     • Lidocaine Viscous HCl  15 mL Swish & Spit 4x Daily PRN Sally Herb, DO     • melatonin  6 mg Oral HS Swathi Martinez PA-C     • metoprolol tartrate  25 mg Oral Q12H [address] Nannette Patricia MD     • oxyCODONE  2 5 mg Oral Q6H PRN Paulette Rounds, DO      Or   • oxyCODONE  5 mg Oral Q6H PRN Paulette Rounds, DO     • spironolactone  25 mg Oral Daily Nannette Patricia MD     • tamsulosin  0 4 mg Oral Daily With Monica Sotelo MD     • [START ON 12/2/2022] torsemide  40 mg Oral Daily Sally Herb, DO          Today, Patient Was Seen By: Cristy Lynch DO    **Please Note: This note may have been constructed using a voice recognition system  **

## 2022-12-01 NOTE — CONSULTS
Podiatry - Consultation    Patient Information:   Elisabeth Helm 80 y o  male MRN: [id number]  Unit/Bed#: [facility] 431-01 Encounter: [id number]  PCP: Chris Collins DO  Date of Admission:  11/23/2022  Date of Consultation: 12/01/22  Requesting Physician: Randall Park DO      ASSESSMENT:    Elisabeth Helm is a 80 y o  male with:    1  Right Heel Eschar   2  Deep tissue injury Right lateral ankle  3  Chronic Kidney Disease Stage 3  4  CAD  5  Ambulatory Dysfunction    PLAN:    · Plan for local wound care and strict offloading with pressure and friction reduction to bilateral lower extremities  Right heel stable eschar, with no open wound or drainage present  No acute clinical signs of infection present at this time  Multiple DTI's to right lower extremity, will protect with Allevyn foam borders and strict offloading  · X-rays of right foot reviewed: No evidence of osteomyelitis or soft tissue emphysema  Arthritic changes noted throughout dorsal foot  Calcified vessels noted as well  · Local wound care consisting of Betadine DSD to right heel, and 4x4 Allevyn pads to lateral right ankle  Allevyn heel applied to left heel as preventative measure  Wound care instructions placed  · Elevation and offloading on green foam wedges or pillows when non-ambulatory  · Prevalon boot applied to right lower extremity  · Rest of care per primary team   · Will discuss this plan with my attending and update as needed  Weightbearing status: Weightbearing as tolerated    SUBJECTIVE:    History of Present Illness:    Elisabeth Helm is a 80 y o  male who is originally admitted 11/23/2022 due to urethral trauma  Patient has a past medical history of coronary artery disease, acute on chronic kidney injury, chronic AFib, heart failure, septic shock, UTI, and hematuria  We are consulted for painful right heel deep tissue injury   Patient states that he has had these wounds since being admitted to the hospital  He describes them as painful with any pressure or movement of the extremity, described as a sharp shooting pain  Denies any numbness or tingling to the bilateral lower extremities  Denies fever, chills, nausea, vomiting, chest pain, or shortness of breath  Review of Systems:    Constitutional: Negative  HENT: Negative  Eyes: Negative  Respiratory: Negative  Cardiovascular: Negative  Gastrointestinal: Negative  Musculoskeletal:  Bilateral lower extremity pain   Skin:  Wounds on bilateral feet   Neurological:  Negative   Psych: Negative  Past Medical and Surgical History:     Past Medical History:   Diagnosis Date   • CHF (congestive heart failure) ([address] )    • Coronary artery disease    • Hyperlipidemia    • Hypertension    • Paroxysmal atrial fibrillation [facility])        Past Surgical History:   Procedure Laterality Date   • CARDIAC SURGERY      cabg x 2  [facility]   • CORONARY ANGIOPLASTY WITH STENT PLACEMENT  10/2021    DAPHNEY to left main and ramus   • MITRAL VALVE REPAIR      mitral ring   • [place] CYSTOURETHROSCOPY W/IRRIG & EVAC CLOTS N/A 2022    Procedure: Layman Ahumada;  Surgeon: Juan Carlos Amado MD;  Location: [facility];  Service: Urology   • [place] OPEN RX FEMUR FX+INTRAMED HALIMA Right 2022    Procedure: INSERTION NAIL IM FEMUR ANTEGRADE (TROCHANTERIC); Surgeon: Aspen Mckenna;   Location: [facility];  Service: Orthopedics   • SUPRAPUBIC TUBE PLACEMENT N/A 2022    Procedure: INSERTION SUPRAPUBIC CATHETER PERCUTANEOUS;  Surgeon: Juan Carlos Amado MD;  Location: [facility];  Service: Urology       Meds/Allergies:    Medications Prior to Admission   Medication   • acetaminophen (TYLENOL) 325 mg tablet   • albuterol (ProAir HFA) 90 mcg/act inhaler   • amiodarone 200 mg tablet   • [] amoxicillin-clavulanate (AUGMENTIN) 875-125 mg per tablet   • apixaban (ELIQUIS) 2 5 mg   • atorvastatin (LIPITOR) 40 mg tablet   • clopidogrel (PLAVIX) 75 mg tablet   • ferrous gluconate (FERGON) 324 mg tablet   • furosemide (LASIX) 20 mg tablet   • lidocaine (LIDODERM) 5 %   • metoprolol tartrate (LOPRESSOR) 100 mg tablet   • nitroglycerin (NITROSTAT) 0 4 mg SL tablet   • oxyCODONE (OXY-IR) 5 MG capsule   • potassium chloride (K-DUR,KLOR-CON) 20 mEq tablet   • spironolactone (ALDACTONE) 25 mg tablet   • tamsulosin (FLOMAX) 0 4 mg       No Known Allergies    Social History:     Marital Status: /Civil Union    Substance Use History:   Social History     Substance and Sexual Activity   Alcohol Use Not Currently     Social History     Tobacco Use   Smoking Status Never   Smokeless Tobacco Never     Social History     Substance and Sexual Activity   Drug Use Never       Family History:    Family History   Problem Relation Age of Onset   • Hypertension Father          OBJECTIVE:    Vitals:   Blood Pressure: 136/64 (12/01/22 0825)  Pulse: 75 (12/01/22 0825)  Temperature: 98 4 °F (36 9 °C) (12/01/22 0706)  Temp Source: Oral (11/30/22 0813)  Respirations: 18 (11/30/22 2158)  Height: 5' 8" (172 7 cm) (11/23/22 0432)  Weight - Scale: 71 kg (156 lb 8 4 oz) (12/01/22 0515)  SpO2: 99 % (12/01/22 0825)    Physical Exam:    General Appearance: Alert, cooperative, no distress  HEENT: Head normocephalic, atraumatic, without obvious abnormality  Heart: Normal rate and rhythm  Lungs: Non-labored breathing  No respiratory distress  Abdomen: Without distension  Psychiatric: AAOx3  Lower Extremity:    Vascular:   DP: Weakly palpable, audible on doppler bilaterally  PT: Weakly palpable, audible on doppler bilaterally  CRT < 3 seconds at the digits  +0/4 edema noted at bilateral lower extremities  Pedal hair is absent  Skin temperature is cool bilaterally  Musculoskeletal:  MMT is 4/5 in all muscle compartments bilaterally  ROM at the 1st MPJ and ankle joint are decreased bilaterally  Pain on palpation of bilateral lower extremities  No gross deformities noted  No calf tenderness on palpation       Dermatological:  - DTI to lateral right ankle, purple and non-blanchable in appearance  Surrounding erythema, tender on palpation    - Right heel eschar, stable without any open wound or drainage present  - Dorsal right foot and plantar lateral forefoot DTI's, with purple discoloration and non-blanchable  No open wounds or drainage present  Neurological:  Gross sensation is intact  Light touch is intact  Protective sensation is intact  Clinical Images 12/01/22:    Right plantar-lateral forefoot:       Right lateral ankle:       Right dorsal foot:       Left Heel:      Right heel:       Additional data:     Lab Results: I have personally reviewed pertinent labs including:    Results from last 7 days   Lab Units 11/30/22  0453 11/29/22  0505   WBC Thousand/uL 7 99 9 09   HEMOGLOBIN g/dL 9 0* 7 6*   HEMATOCRIT % 28 9* 25 0*   PLATELETS Thousands/uL 122* 123*   NEUTROS PCT %  --  86*   LYMPHS PCT %  --  6*   MONOS PCT %  --  5   EOS PCT %  --  2     Results from last 7 days   Lab Units 12/01/22  0522 11/27/22  0549 11/25/22  0752   POTASSIUM mmol/L 4 0   < > 3 6   CHLORIDE mmol/L 104   < > 113*   CO2 mmol/L 30   < > 22   BUN mg/dL 47*   < > 55*   CREATININE mg/dL 1 90*   < > 2 25*   CALCIUM mg/dL 8 4   < > 7 9*   ALK PHOS U/L  --   --  289*   ALT U/L  --   --  38   AST U/L  --   --  31    < > = values in this interval not displayed  Cultures: I have personally reviewed pertinent cultures including:              Imaging: I have personally reviewed pertinent reports in PACS  EKG, Pathology, and Other Studies: I have personally reviewed pertinent reports  Time Spent for Care: 30 minutes  More than 50% of total time spent on counseling and coordination of care as described above  ** Please Note: Portions of the record may have been created with voice recognition software  Occasional wrong word or "sound a like" substitutions may have occurred due to the inherent limitations of voice recognition software   Read the chart carefully and recognize, using context, where substitutions have occurred   **

## 2022-12-01 NOTE — ASSESSMENT & PLAN NOTE
Lab Results   Component Value Date    EGFR 32 12/01/2022    EGFR 33 11/30/2022    EGFR 33 11/29/2022    CREATININE 1 90 (H) 12/01/2022    CREATININE 1 85 (H) 11/30/2022    CREATININE 1 85 (H) 11/29/2022     Estimated Creatinine Clearance: 29 mL/min (A) (by C-G formula based on SCr of 1 9 mg/dL (H))    Acute on chronic kidney disease improving with diuresis  Monitor kidney function closely  Avoid nephrotoxins  Suprapubic catheter in place

## 2022-12-01 NOTE — CONSULTS
Consultation - Vascular Surgery   Macy Brewer 80 y o  male MRN: [id number]  Unit/Bed#: [facility] 431-01 Encounter: [id number]      Assessment/ Plan:    BLE (R>L) Pressure wounds  PAD  --Pt w/ palpable pulses confirmed by doppler  --PAD by duplex imaging  --Recommend strict off-loading & repositioning  --Podiatry for wound care  --Outpt f/u to assess healing after recovery from current hospitalization-- appt w/ Dr Kehinde Matta on 12/22/22 at 9:45am, \[facility] [place] [facility]\"" office  --Cont Plavix (DAPHNEY 2021) & Eliquis (Afib)  --Cont statin  --will see prn  Please call if we can be of further assistance        Traumatic zavala/ Hematuria  · Cysto, Suprapubic tube placement 11/23 Yasir Brady)  --Urology following    Sepsis/ Septic shock- Resolved  Acute Resp Failure w/ Hypoxia  -- currently O2 requiring  Debility  Ambulatory Dysfunction  --recommended post acute rehab  Pharyngeal dysphagia  --Dysphagia diet    JOEL on CKD3  CHF-- EF 45%  --Spironolactone, Demadex  Anemia  PAF  --Eliquis  --Amio, Bblocker  CAD/ CABG/ MVRepair (Ring)/ DAPHNEY ([place]) 10/2021  --Plavix, Statin, Bblocker  HTN  HLD  --Lipitor  R femur fracture s/p IM nailing 11/6/2022        *seen w/ Dr Kehinde Matta  _______________________________________________________________  Physician Requesting Consult: Danyelle Martinez DO    Additional consultants:   · Urology  · Critical care medicine    Reason for Consult / Principal Problem: PAD--Poorly healing wounds including right heel DTI - LEADs show diffuse PAD     HPI: Macy Brewer is a 80y o  year old male with CHF (EF 45%), CAD/ MR s/p CABG x2/  MV Repair (ring) 2014 and DAPHNEY ([place]) maintained on Plavix and statin, PAF on Eliquis, HTN, HLD, Anemia, CKD who was s/p recent hospitalization for right femur fracture s/p IM nail repair 11/6/2022 c/b postop urinary retention requiring Zavala which was subsequently removed who had been recovering in a nursing home for rehab    He initially presented to the [facility] Emergency Department on 11/20/22 with complaint of abdominal pain, found to have UTI,  treated with antibiotics and was discharged  He re-presented on 11/21/2022, with hypotension and altered mental status ruling in for septic shock requiring pressor support with a hospitalization complicated by the following:  · Encephalopathy  · JOEL on CKD  · Elevated troponin  · Lactic acidosis  · Transaminitis  · Recurrent urinary retention with traumatic Schaffer placement resulting in gross hematuria requiring CBI  He failed clamp trial with obstruction and clot formation for which transfer was initiated for escalated urologic care  He underwent cysto and suprapubic catheter placement on 11/23/2022 by Dr Shant Thompson     Since hospitalized, Darrius Sandoval has now been noted to be with BLE pressure wounds  Arterial duplex was performed with evidence of PAD  Consultation has been placed to vascular surgery for evaluation  Darrius Sandoval affirms history of wounds to the lower extremities since he has been hospitalized  He thinks these wounds started during his recent hospitalization for his femur fracture  He has not walked during this hospitalization  Denies prior history of vascular disease  He denies claudication or rest pain  He is a lifelong nonsmoker  He is a nondiabetic        Historical Information   Past Medical History:   Diagnosis Date   • CHF (congestive heart failure) ([facility] [address] )    • CKD (chronic kidney disease) stage 3, GFR 30-59 ml/min ([facility])    • Coronary artery disease    • Hyperlipidemia    • Hypertension    • Mitral regurgitation    • Paroxysmal atrial fibrillation [facility])    • Urinary retention      Past Surgical History:   Procedure Laterality Date   • CARDIAC SURGERY      cabg x 2 2014 [facility]   • CORONARY ANGIOPLASTY WITH STENT PLACEMENT  10/2021    DAPHNEY to left main and ramus   • MITRAL VALVE REPAIR  2014    mitral ring   • [place] CYSTOURETHROSCOPY W/IRRIG & EVAC CLOTS N/A 11/23/2022    Procedure: CYSTOSCOPY;  Surgeon: Dhara Lee MD; Location: [facility];  Service: Urology   • [place] OPEN RX FEMUR FX+INTRAMED HALIMA Right 11/1/2022    Procedure: INSERTION NAIL IM FEMUR ANTEGRADE (TROCHANTERIC); Surgeon: Arthur Camacho; Location: [facility];  Service: Orthopedics   • SUPRAPUBIC TUBE PLACEMENT N/A 11/23/2022    Procedure: INSERTION SUPRAPUBIC CATHETER PERCUTANEOUS;  Surgeon: Mahamed Burnham MD;  Location: [facility];  Service: Urology     Social History   Social History     Substance and Sexual Activity   Alcohol Use Not Currently     Social History     Substance and Sexual Activity   Drug Use Never     Social History     Tobacco Use   Smoking Status Never   Smokeless Tobacco Never     Family History:   Family History   Problem Relation Age of Onset   • Hypertension Father    }    Meds/Allergies   Home meds:   Prior to Admission medications    Medication Sig Start Date End Date Taking?  Authorizing Provider   acetaminophen (TYLENOL) 325 mg tablet Take 2 tablets (650 mg total) by mouth every 6 (six) hours as needed for mild pain or moderate pain 11/6/22   Vu Taylor PA-C   albuterol (ProAir HFA) 90 mcg/act inhaler Inhale 2 puffs every 6 (six) hours as needed for wheezing 3/5/22   Leartis Collet, MD   amiodarone 200 mg tablet Take 200 mg by mouth daily    Historical Provider, MD   apixaban (ELIQUIS) 2 5 mg Take 2 5 mg daily through 11/8, then resume taking 2 5 mg twice daily 11/6/22   Vu Taylor PA-C   atorvastatin (LIPITOR) 40 mg tablet Take 40 mg by mouth daily    Historical Provider, MD   clopidogrel (PLAVIX) 75 mg tablet Take 1 tablet (75 mg total) by mouth daily Do not start before November 8, 2022 11/8/22   Vu Taylor PA-C   ferrous gluconate (FERGON) 324 mg tablet Take 1 tablet (324 mg total) by mouth daily before breakfast Do not start before November 7, 2022 11/7/22   Vu Taylor PA-C   furosemide (LASIX) 20 mg tablet Take 20 mg by mouth 2 (two) times a day    Historical Provider, MD   lidocaine (LIDODERM) 5 % Apply 1 patch topically daily Remove & Discard patch within 12 hours or as directed by MD 11/6/22   Naila Velez PA-C   metoprolol tartrate (LOPRESSOR) 100 mg tablet Take 100 mg by mouth every 12 (twelve) hours    Historical Provider, MD   nitroglycerin (NITROSTAT) 0 4 mg SL tablet  10/28/21   Historical Provider, MD   oxyCODONE (OXY-IR) 5 MG capsule Take 5 mg by mouth every 4 (four) hours as needed for moderate pain or severe pain    Historical Provider, MD   potassium chloride (K-DUR,KLOR-CON) 20 mEq tablet Take 1 tablet (20 mEq total) by mouth 2 (two) times a day for 3 days 11/20/22 11/23/22  Lizett Valera PA-C   spironolactone (ALDACTONE) 25 mg tablet Take 1 tablet (25 mg total) by mouth daily 3/6/22 6/4/22  Brigid Gill MD   tamsulosin (FLOMAX) 0 4 mg Take 1 capsule (0 4 mg total) by mouth daily with dinner 3/5/22 11/22/22  Brigid Gill MD     Scheduled Meds:  Current Facility-Administered Medications   Medication Dose Route Frequency Provider Last Rate   • acetaminophen  650 mg Oral Q6H PRN Jassi Lewis MD     • albuterol  2 puff Inhalation Q4H PRN Andrae Lora MD     • amiodarone  200 mg Oral Daily With Breakfast Jassi Lewis MD     • apixaban  2 5 mg Oral BID Theron Stewart DO     • atorvastatin  40 mg Oral Daily With Daysi Mcclendon MD     • clopidogrel  75 mg Oral Daily Theron Stewart DO     • ferrous sulfate  325 mg Oral Daily With Breakfast Andrae Lora MD     • HYDROmorphone  0 2 mg Intravenous Q4H PRN Theron Stewart DO     • Lidocaine Viscous HCl  15 mL Swish & Spit 4x Daily PRN Deedra Brittle, DO     • melatonin  6 mg Oral HS Swathi Zapata PA-C     • metoprolol tartrate  25 mg Oral Q12H [address] Andrae Lora MD     • oxyCODONE  2 5 mg Oral Q6H PRN Theron Stewart DO      Or   • oxyCODONE  5 mg Oral Q6H PRN Jamesetta Terry, DO     • spironolactone  25 mg Oral Daily Andrae Lora MD     • tamsulosin  0 4 mg Oral Daily With Sami Moore MD     • [START ON 12/2/2022] torsemide  40 mg Oral Daily Alfredo Tolentino DO       Continuous Infusions:   PRN Meds:  •  acetaminophen  •  albuterol  •  HYDROmorphone  •  Lidocaine Viscous HCl  •  oxyCODONE **OR** oxyCODONE    ALLERGIES: No Known Allergies    Review of Systems:  General: positive for  - as noted in HPI  Cardiovascular: no chest pain or dyspnea on exertion  Prior CABG w/ LLE GSV harvest   Respiratory: positive for - O2 requiring-- "breathing better w/ oxygen on"  Gastrointestinal: no abdominal pain, change in bowel habits, or black or bloody stools  Genitourinary: positive for - SP catheter  Musculoskeletal: positive for - as noted in HPI  Neurological: no TIA or stroke symptoms  Hematological and Lymphatic: negative  Dermatological : positive for as noted in HPI  +Sacral wound  Psychological: negative  Ophthalmic: negative  ENT: negative      Objective   Vitals:  Blood pressure 136/64, pulse 75, temperature 98 4 °F (36 9 °C), resp  rate 18, height 5' 8" (1 727 m), weight 71 kg (156 lb 8 4 oz), SpO2 99 %  Body mass index is 23 8 kg/m²  I/Os:  I/O       11/29 0701  11/30 0700 11/30 0701  12/01 0700 12/01 0701  12/02 0700    P  O   300     I V  (mL/kg)  40 (0 6)     IV Piggyback  100     Total Intake(mL/kg)  440 (6 2)     Urine (mL/kg/hr) 2700 (1 6) 1625 (1)     Total Output 2700 1625     Net -2700 -1185                  Invasive Lines/Tubes:  Invasive Devices     Peripheral Intravenous Line  Duration           Peripheral IV 11/30/22 Dorsal (posterior); Left Forearm 1 day          Drain  Duration           Suprapubic Catheter 18 Fr  8 days                Physical Exam  General appearance: alert, appears stated age and cooperative  Debilitated/ weak in appearance  Weak voice  Weak cough w/ use of hand-held suction to manage secretions  Head: Normocephalic, without obvious abnormality, atraumatic  Eyes: conjunctivae/corneas clear  PERRL, EOM's intact    Throat: lips, mucosa, and tongue normal; teeth and gums normal  Neck: no adenopathy, no carotid bruit, no JVD, supple, symmetrical, trachea midline symmetric, no tenderness/mass/nodules  Lungs: Clear to auscultation b/l (ant-lat) but w/ weak effort  Decreased throughout  Chest wall: no tenderness  Sternotomy well healed  Heart[de-identified] S1, S2 normal   Systolic murmur  Abdomen: soft, non-tender; bowel sounds normal; no masses,  no organomegaly  SP catheter  Genitalia: deferred  Rectal: deferred  Extremities:  R hip incisional scar  L thigh incisional scar from GSV harvest   extremities normal, atraumatic, no cyanosis or edema  Nonthreatened  +M/S  B/L "pink pads" to heels  LLE w/ Venodyne  RLE w/ Prevalon  Wounds as previously documented in media:    RLE:                 LLE:           Skin: Poor skin turgor  Dependent anasarca    Sacral wound as noted in media:       Neurologic: Grossly normal        Pulse exam:  Radial: Right: 2+               Left: 2+  Femoral: Right: 2+                   Left: 2+  Popliteal: Right: 1+                    Left: 1+  DP: Right: 1+ and doppler signal         Left: 1+ and doppler signal  PT: Right: 1+ and doppler signal         Left: 1+ and doppler signal    Lab Results and Cultures:   COVID:   Last COVID19 Screening Values     None         CBC:   Results from last 7 days   Lab Units 11/30/22  0453 11/29/22  0505 11/25/22  0752   WBC Thousand/uL 7 99 9 09 12 99*   HEMOGLOBIN g/dL 9 0* 7 6* 8 8*   HEMATOCRIT % 28 9* 25 0* 28 4*   PLATELETS Thousands/uL 122* 123* 92*     BMP/CMP:  Results from last 7 days   Lab Units 12/01/22  0522 11/30/22  0453 11/29/22  0505 11/28/22  0613 11/27/22  0549   POTASSIUM mmol/L 4 0 4 1 3 6 3 4* 3 5   CHLORIDE mmol/L 104 104 105 108 110*   CO2 mmol/L 30 29 30 28 26   BUN mg/dL 47* 41* 43* 42* 46*   CREATININE mg/dL 1 90* 1 85* 1 85* 1 91* 1 98*   CALCIUM mg/dL 8 4 8 2* 8 0* 7 8* 8 3     Coags:     Lipid panel:     HgbA1c:   Lab Results   Component Value Date    HGBA1C 4 6 11/21/2022       Urinalysis:   Lab Results Component Value Date    COLORU Luz 11/22/2022    CLARITYU Cloudy 11/22/2022    SPECGRAV 1 015 11/22/2022    PHUR 5 0 11/22/2022    LEUKOCYTESUR Trace (A) 11/22/2022    NITRITE Negative 11/22/2022    GLUCOSEU Negative 11/22/2022    KETONESU Negative 11/22/2022    BILIRUBINUR Small (A) 11/22/2022    BLOODU Large (A) 11/22/2022   ,   Urine Culture:   Lab Results   Component Value Date    URINECX No Growth <1000 cfu/mL 11/22/2022     Wound Culure: No results found for: WOUNDCULT  Blood Culture:   Lab Results   Component Value Date    BLOODCX No Growth After 5 Days  11/21/2022    BLOODCX No Growth After 5 Days  11/21/2022       Imaging Studies: I have personally reviewed pertinent reports  11/30 LEAD  --R: 50-75% mid and distal SFA  Diffuse fem-popl dz  TP dz  AZ/ MTP UO-- pt intolerant  GTP 36  --L: Diffuse fem-popl dz  TP dz  AZ / MTP UO-- pt intolerant       EKG, Pathology, and Other Studies:    VTE Prophylaxis: Eliquis, LLE Venodyne     Code Status: Level 1 - Full Code  Advance Directive and Living Will:      Power of :    POLST:        Celso Segovia PA-C  12/1/2022

## 2022-12-01 NOTE — PLAN OF CARE
Problem: PHYSICAL THERAPY ADULT  Goal: Performs mobility at highest level of function for planned discharge setting  See evaluation for individualized goals  Description: Treatment/Interventions: Functional transfer training, LE strengthening/ROM, Therapeutic exercise, Cognitive reorientation, Endurance training, Patient/family training, Bed mobility, Equipment eval/education, Gait training, Spoke to nursing          See flowsheet documentation for full assessment, interventions and recommendations  Outcome: Progressing  Note: Prognosis: Fair  Problem List: Decreased strength, Decreased endurance, Impaired balance, Decreased mobility, Decreased cognition, Decreased safety awareness, Pain  Assessment: Pt seen for PT session today with a focus on bed mobility, sitting balance, and LE strengthening  Pt is making slow progress towards mobility goals, limited by generalized weakness, fatigue and deconditioning  Pt continues to require Ax2 for bed mobility, having increased difficulty moving his legs 2* RLE pain  Once seated EOB, pt initially required BUE support, but with time was able to maintain trunk control without UE support  Pt desatting to mid [de-identified] while sitting EOB, placed on 2L O2 after which pt remained high 80s, low 90s  During seated therex, pt able to perform all exercises independently on LLE, but required AAROM for RLE  OOB mobility deferred today 2* pt's pending podiatry consult and R foot xray  Pt would benefit from continued acute skilled PT to address above deficits  Continuing to recommend rehab upon d/c   Barriers to Discharge: Inaccessible home environment, Decreased caregiver support     PT Discharge Recommendation: Post acute rehabilitation services    See flowsheet documentation for full assessment

## 2022-12-01 NOTE — ASSESSMENT & PLAN NOTE
Antibiotics transition to IV cefazolin based on culture sensitivities  Transitioned to p o   Keflex completed 7 day course of antibiotics

## 2022-12-01 NOTE — ASSESSMENT & PLAN NOTE
Wt Readings from Last 3 Encounters:   12/01/22 71 kg (156 lb 8 4 oz)   11/23/22 74 5 kg (164 lb 3 9 oz)   11/22/22 70 8 kg (156 lb)     · Chronic HF  · Echo 03/01/2022 with EF 45%     · Continue beta-blocker and Aldactone  · Now appears euvolemic so IV Lasix -> Torsemide  · Monitor I/O, daily weights  · Less than 2 g salt diet fluid restriction

## 2022-12-01 NOTE — ASSESSMENT & PLAN NOTE
· Seen on LEADS  · Vascular appreciated  · C/w med mgmt w/ Eliquis, Plavix, and statin  · OP f/u w/ vascular to assess R foot healing

## 2022-12-01 NOTE — ASSESSMENT & PLAN NOTE
Lab Results   Component Value Date    EGFR 32 12/01/2022    EGFR 33 11/30/2022    EGFR 33 11/29/2022    CREATININE 1 90 (H) 12/01/2022    CREATININE 1 85 (H) 11/30/2022    CREATININE 1 85 (H) 11/29/2022     · Multifactorial  · Iron studies noted  · Iron supplementation  · Monitor hemoglobin

## 2022-12-01 NOTE — ASSESSMENT & PLAN NOTE
· Discharged from [address] [place] [address] on 11/06 s/p repair     · DVT prophylaxis w/ Eliquis  · Physical therapy

## 2022-12-01 NOTE — PHYSICAL THERAPY NOTE
Physical Therapy Treatment Note    Patient's Name: Brian Cason  : 22 1009   PT Last Visit   PT Visit Date 22   Note Type   Note Type Treatment   Pain Assessment   Pain Assessment Tool FLACC   Pain Location/Orientation Orientation: Right;Location: Leg;Location: Foot   Effect of Pain on Daily Activities limits OOB mobility   Hospital Pain Intervention(s) Repositioned; Ambulation/increased activity   Pain Rating: FLACC (Rest) - Face 0   Pain Rating: FLACC (Rest) - Legs 0   Pain Rating: FLACC (Rest) - Activity 0   Pain Rating: FLACC (Rest) - Cry 0   Pain Rating: FLACC (Rest) - Consolability 0   Score: FLACC (Rest) 0   Pain Rating: FLACC (Activity) - Face 1   Pain Rating: FLACC (Activity) - Legs 0   Pain Rating: FLACC (Activity) - Activity 1   Pain Rating: FLACC (Activity) - Cry 1   Pain Rating: FLACC (Activity) - Consolability 1   Score: FLACC (Activity) 4   Restrictions/Precautions   Weight Bearing Precautions Per Order Yes   RLE Weight Bearing Per Order WBAT   Braces or Orthoses   (RLE prevalon boot)   Other Precautions Cognitive; Chair Alarm; Bed Alarm;Multiple lines;O2;Fall Risk;Pain   General   Chart Reviewed Yes   Additional Pertinent History Spoke with MD at start of session regarding pain in pt's R foot  MD reporting pt was going to get an x-ray and podiatry consult later today- deferred OOB mobility or standing trials pending x/ray and podiatry to confirm [address] status   Cognition   Overall Cognitive Status Impaired   Attention Attends with cues to redirect   Orientation Level Oriented X4   Following Commands Follows one step commands with increased time or repetition   Comments pt presents with flat affect; requires encouragement to participate with therapy initially but pt engagement improved throoughout session   Bed Mobility   Supine to Sit 2  Maximal assistance   Additional items Assist x 2;HOB elevated; Bedrails; Increased time required;Verbal cues;LE management   Sit to Supine 2  Maximal assistance   Additional items Assist x 2; Increased time required;Verbal cues;LE management   Additional Comments Pt desatting to mid 80s on room air once seated EOB  Placed pt on 2L O2 (RN aware) and pt remained high 80s/low 90s throughout the rest of the session  Transfers   Additional Comments deferred 2* pending R foot x-ray and podiatry consult   Ambulation/Elevation   Gait pattern Not appropriate   Balance   Static Sitting Fair   Dynamic Sitting Fair   Endurance Deficit   Endurance Deficit Yes   Endurance Deficit Description weakness, fatigue, deconditioning   Activity Tolerance   Activity Tolerance Patient limited by fatigue;Patient limited by pain   Nurse Made Aware ok to see per RN   Exercises   Knee AROM Long Arc Quad Sitting;Bilateral  (3x10, AAROM for RLE)   Ankle Pumps Sitting;15 reps;AROM; Left   Marching Sitting;10 reps;Bilateral  (AAROM RLE, AROM LLE)   Balance training  Pt sat at EOB ~20 minutes while performing LE therex  Pt initially requiring BUE support to maintain balance, but progressing to single UE and then no UE with supervision assist   Assessment   Prognosis Fair   Problem List Decreased strength;Decreased endurance; Impaired balance;Decreased mobility; Decreased cognition;Decreased safety awareness;Pain   Assessment Pt seen for PT session today with a focus on bed mobility, sitting balance, and LE strengthening  Pt is making slow progress towards mobility goals, limited by generalized weakness, fatigue and deconditioning  Pt continues to require Ax2 for bed mobility, having increased difficulty moving his legs 2* RLE pain  Once seated EOB, pt initially required BUE support, but with time was able to maintain trunk control without UE support  Pt desatting to mid [de-identified] while sitting EOB, placed on 2L O2 after which pt remained high 80s, low 90s  During seated therex, pt able to perform all exercises independently on LLE, but required AAROM for RLE   OOB mobility deferred today 2* pt's pending podiatry consult and R foot xray  Pt would benefit from continued acute skilled PT to address above deficits  Continuing to recommend rehab upon d/c  Goals   Patient Goals to feel better   [facility] Expiration Date 12/09/22   PT Treatment Day 2   Plan   Treatment/Interventions Functional transfer training;LE strengthening/ROM; Therapeutic exercise; Endurance training;Equipment eval/education; Bed mobility;Spoke to nursing;Spoke to case management;OT   Progress Slow progress, decreased activity tolerance   PT Frequency 2-3x/wk   Recommendation   PT Discharge Recommendation Post acute rehabilitation services   AM-PAC Basic Mobility Inpatient   Turning in Bed Without Bedrails 2   Lying on Back to Sitting on Edge of Flat Bed 1   Moving Bed to Chair 1   Standing Up From Chair 1   Walk in Room 1   Climb 3-5 Stairs 1   Basic Mobility Inpatient Raw Score 7   Turning Head Towards Sound 3   Follow Simple Instructions 3   Low Function Basic Mobility Raw Score 13   Low Function Basic Mobility Standardized Score 20 14   Highest Level Of Mobility   JH-HLM Goal 2: Bed activities/Dependent transfer   JH-HLM Achieved 3: Sit at edge of bed   Education   Education Provided Home exercise program   Patient Demonstrates acceptance/verbal understanding   End of Consult   Patient Position at End of Consult Supine; All needs within reach       Titi Mike PT, DPT

## 2022-12-02 PROBLEM — Z71.89 GOALS OF CARE, COUNSELING/DISCUSSION: Status: ACTIVE | Noted: 2022-01-01

## 2022-12-02 PROBLEM — J21.0 RSV (ACUTE BRONCHIOLITIS DUE TO RESPIRATORY SYNCYTIAL VIRUS): Status: ACTIVE | Noted: 2022-01-01

## 2022-12-02 NOTE — ASSESSMENT & PLAN NOTE
· Seen on LEADS  · Vascular appreciated  · C/w med mgmt w/ Eliquis, Plavix, and statin  · LDL 33  · OP f/u w/ vascular to assess R foot healing

## 2022-12-02 NOTE — OCCUPATIONAL THERAPY NOTE
Occupational Therapy Progress Note     Patient Name: Tacos Delgado  TKZPH'A Date: 12/2/2022  Problem List  Principal Problem:    Urethral trauma  Active Problems:    CAD (coronary artery disease)    Acute-on-chronic kidney injury (RUST 75 )    Chronic atrial fibrillation (HCC)    Heart failure with mid-range ejection fraction (HCC)    Acute on chronic anemia    Septic shock (HCC)    UTI (urinary tract infection)    Closed fracture of trochanter of right femur with routine healing    Transaminitis    Hematuria    Ambulatory dysfunction    Dysphagia    Deep tissue injury    PAD (peripheral artery disease) (RUST 75 )            12/02/22 1045   OT Last Visit   OT Visit Date 12/02/22   Note Type   Note Type Treatment   Pain Assessment   Pain Score No Pain   Restrictions/Precautions   Weight Bearing Precautions Per Order Yes   RLE Weight Bearing Per Order WBAT   Other Precautions Cognitive; Chair Alarm; Bed Alarm;Multiple lines; Fall Risk;O2   ADL   Where Assessed Edge of bed   Grooming Assistance 4  Minimal Assistance   Grooming Deficit Teeth care   UB Dressing Assistance 3  Moderate Assistance   UB Dressing Deficit Thread RUE; Thread LUE   LB Dressing Assistance 2  Maximal Assistance   LB Dressing Deficit Don/doff R sock; Don/doff L sock   Bed Mobility   Supine to Sit 3  Moderate assistance   Additional items Assist x 2; Increased time required;LE management   Additional Comments OOB at end of session   Transfers   Stand pivot 2  Maximal assistance   Additional items Assist x 2; Increased time required   Additional Comments HHA   Functional Mobility   Additional Comments unable   Subjective   Subjective "My mouth is so dry"   Cognition   Overall Cognitive Status Impaired   Arousal/Participation Alert; Cooperative   Attention Attends with cues to redirect   Orientation Level Oriented X4   Memory Decreased recall of precautions   Following Commands Follows one step commands with increased time or repetition   Comments Flat affect Activity Tolerance   Activity Tolerance Patient limited by fatigue   Medical Staff Made Aware NSG aware   Assessment   Assessment Pt was seen this date for OT tx session focusing on self care tasks, bed mobility, sit to stand progressions, standing tolerance, tranfers, functional mobility, safety awareness, compensatory techniques, energy conservation, and overall activity tolerance  Pt presents supine and is agreeable to OT tx session  Pt is on O2 with O2 sats WNL at rest and with activity, completes previously mentioned tasks at documented assist levels please see above in flow sheet  Pt continues to require overall max A x 2 for transfers and mod- max A for self care tasks  Pt is overall limited by decreased balance, increased fatigue, decreased strength, endurance, and activity tolerance and continues to function below baseline level  Pt resting OOB in chair at end of session with all needs in reach and alarm on  Pt would benefit from continued OT Tx to improve overall functional abilities and increase independence  Will continue to follow with current POC  Plan   Treatment Interventions ADL retraining;Functional transfer training;UE strengthening/ROM; Endurance training;Cognitive reorientation;Patient/family training; Compensatory technique education;Continued evaluation; Energy conservation; Activityengagement   Goal Expiration Date 12/09/22   OT Treatment Day 2   OT Frequency Other (comment)  (2-4x/wk)   Recommendation   OT Discharge Recommendation Post acute rehabilitation services   AM-PAC Daily Activity Inpatient   Lower Body Dressing 2   Bathing 2   Toileting 2   Upper Body Dressing 2   Grooming 3   Eating 3   Daily Activity Raw Score 14   Daily Activity Standardized Score (Calc for Raw Score >=11) 33 39   AM-PAC Applied Cognition Inpatient   Following a Speech/Presentation 2   Understanding Ordinary Conversation 3   Taking Medications 3   Remembering Where Things Are Placed or Put Away 3   Remembering List of 4-5 Errands 2   Taking Care of Complicated Tasks 2   Applied Cognition Raw Score 15   Applied Cognition Standardized Score 33 54

## 2022-12-02 NOTE — PLAN OF CARE
Problem: PHYSICAL THERAPY ADULT  Goal: Performs mobility at highest level of function for planned discharge setting  See evaluation for individualized goals  Description: Treatment/Interventions: Functional transfer training, LE strengthening/ROM, Therapeutic exercise, Cognitive reorientation, Endurance training, Patient/family training, Bed mobility, Equipment eval/education, Gait training, Spoke to nursing          See flowsheet documentation for full assessment, interventions and recommendations  Outcome: Progressing  Note: Prognosis: Fair  Problem List: Decreased strength, Decreased endurance, Decreased mobility, Impaired balance, Pain, Orthopedic restrictions  Assessment: Pt seen for PT session today with a focus on functional transfers, sitting tolerance, and LE strengthening  Pt is making slow, but steady progress towards mobility goals  Pt continues to require Ax2 for spine <> sit transfers, however pt requiring less assistance with LEs this session  Pt able to transfer OOB to recliner with Ax2 via stand pivot, unable to take full step with either leg at this time 2* pain and weakness  Pt able to complete LE therex independently on BLEs today, taking rest breaks between each set  Pt would benefit from continued acute skilled PT to address above deficits  Continuing to recommend rehab upon d/c   Barriers to Discharge: Inaccessible home environment, Decreased caregiver support     PT Discharge Recommendation: Post acute rehabilitation services    See flowsheet documentation for full assessment

## 2022-12-02 NOTE — PLAN OF CARE
Problem: MOBILITY - ADULT  Goal: Maintain or return to baseline ADL function  Description: INTERVENTIONS:  -  Assess patient's ability to carry out ADLs; assess patient's baseline for ADL function and identify physical deficits which impact ability to perform ADLs (bathing, care of mouth/teeth, toileting, grooming, dressing, etc )  - Assess/evaluate cause of self-care deficits   - Assess range of motion  - Assess patient's mobility; develop plan if impaired  - Assess patient's need for assistive devices and provide as appropriate  - Encourage maximum independence but intervene and supervise when necessary  - Involve family in performance of ADLs  - Assess for home care needs following discharge   - Consider OT consult to assist with ADL evaluation and planning for discharge  - Provide patient education as appropriate  Outcome: Progressing  Goal: Maintains/Returns to pre admission functional level  Description: INTERVENTIONS:  - Perform BMAT or MOVE assessment daily    - Set and communicate daily mobility goal to care team and patient/family/caregiver  - Collaborate with rehabilitation services on mobility goals if consulted  - Perform Range of Motion 4 times a day  - Reposition patient every 2 hours    - Dangle patient 4 times a day  - Stand patient 4 times a day  - Ambulate patient 4 times a day  - Out of bed to chair 3 times a day   - Out of bed for meals 3 times a day  - Out of bed for toileting  - Record patient progress and toleration of activity level   Outcome: Progressing     Problem: Potential for Falls  Goal: Patient will remain free of falls  Description: INTERVENTIONS:  - Educate patient/family on patient safety including physical limitations  - Instruct patient to call for assistance with activity   - Consult OT/PT to assist with strengthening/mobility   - Keep Call bell within reach  - Keep bed low and locked with side rails adjusted as appropriate  - Keep care items and personal belongings within reach  - Initiate and maintain comfort rounds  - Make Fall Risk Sign visible to staff  - Offer Toileting every 2 Hours, in advance of need  - Initiate/Maintain alarm  - Obtain necessary fall risk management equipment:   - Apply yellow socks and bracelet for high fall risk patients  - Consider moving patient to room near nurses station  Outcome: Progressing     Problem: Prexisting or High Potential for Compromised Skin Integrity  Goal: Skin integrity is maintained or improved  Description: INTERVENTIONS:  - Identify patients at risk for skin breakdown  - Assess and monitor skin integrity  - Assess and monitor nutrition and hydration status  - Monitor labs   - Assess for incontinence   - Turn and reposition patient  - Assist with mobility/ambulation  - Relieve pressure over bony prominences  - Avoid friction and shearing  - Provide appropriate hygiene as needed including keeping skin clean and dry  - Evaluate need for skin moisturizer/barrier cream  - Collaborate with interdisciplinary team   - Patient/family teaching  - Consider wound care consult   Outcome: Progressing     Problem: Nutrition/Hydration-ADULT  Goal: Nutrient/Hydration intake appropriate for improving, restoring or maintaining nutritional needs  Description: Monitor and assess patient's nutrition/hydration status for malnutrition  Collaborate with interdisciplinary team and initiate plan and interventions as ordered  Monitor patient's weight and dietary intake as ordered or per policy  Utilize nutrition screening tool and intervene as necessary  Determine patient's food preferences and provide high-protein, high-caloric foods as appropriate       INTERVENTIONS:  - Monitor oral intake, urinary output, labs, and treatment plans  - Assess nutrition and hydration status and recommend course of action  - Evaluate amount of meals eaten  - Assist patient with eating if necessary   - Allow adequate time for meals  - Recommend/ encourage appropriate diets, oral nutritional supplements, and vitamin/mineral supplements  - Order, calculate, and assess calorie counts as needed  - Recommend, monitor, and adjust tube feedings and TPN/PPN based on assessed needs  - Assess need for intravenous fluids  - Provide specific nutrition/hydration education as appropriate  - Include patient/family/caregiver in decisions related to nutrition  Outcome: Progressing     Problem: PAIN - ADULT  Goal: Verbalizes/displays adequate comfort level or baseline comfort level  Description: Interventions:  - Encourage patient to monitor pain and request assistance  - Assess pain using appropriate pain scale  - Administer analgesics based on type and severity of pain and evaluate response  - Implement non-pharmacological measures as appropriate and evaluate response  - Consider cultural and social influences on pain and pain management  - Notify physician/advanced practitioner if interventions unsuccessful or patient reports new pain  Outcome: Progressing

## 2022-12-02 NOTE — PROGRESS NOTES
[facility]  Progress Note Susie Hu 1940, 80 y o  male MRN: [id number]  Unit/Bed#: [facility] 431-01 Encounter: [id number]  Primary Care Provider: Merrill Salazar,    Date and time admitted to hospital: 11/23/2022  4:04 AM    * Urethral trauma  Assessment & Plan  Urethral trauma status post cystoscopy with suprapubic catheter in place  Urology input noted      Heart failure with mid-range ejection fraction [facility])  Assessment & Plan  Wt Readings from Last 3 Encounters:   12/01/22 71 kg (156 lb 8 4 oz)   11/23/22 74 5 kg (164 lb 3 9 oz)   11/22/22 70 8 kg (156 lb)     · Chronic HF  · Echo 03/01/2022 with EF 45%  · Continue beta-blocker and Aldactone  · IV Lasix -> Torsemide  · Monitor I/O, daily weights  · Less than 2 g salt diet fluid restriction            Goals of care, counseling/discussion  Assessment & Plan  · Pt today frustrated by HTL and expressed interest in more comfort focused care, although he said his wife would want full cares  · Palliative appreciated- full code    Hopefully at rehab can advance to thin liquids    RSV (acute bronchiolitis due to respiratory syncytial virus)  Assessment & Plan  · Resp protocol  · Overall stable    PAD (peripheral artery disease) ([facility])  Assessment & Plan  · Seen on LEADS  · Vascular appreciated  · C/w med mgmt w/ Eliquis, Plavix, and statin  · LDL 33  · OP f/u w/ vascular to assess R foot healing    Deep tissue injury  Assessment & Plan  · Wound care appreciated  · Podiatry appreciated  · Right foot Xray no osteo - ok to WBAT    Dysphagia  Assessment & Plan  · Puree/HTL  · VBS cleared pt for puree/HTL  · Pt upset about HTL but tx focused    Ambulatory dysfunction  Assessment & Plan  Multifactorial with deconditioning contributing  Safe ambulation  Fall precautions  Physical therapy    Hematuria  Assessment & Plan  · Due to urethral injury  · Hematuria resolved    Transaminitis  Assessment & Plan  · Likely due to shock liver  · Improved - chronically high bili      Closed fracture of trochanter of right femur with routine healing  Assessment & Plan  · Discharged from [address] [place] [address] on 11/06 s/p repair  · DVT prophylaxis w/ Eliquis  · Physical therapy    UTI (urinary tract infection)  Assessment & Plan  Antibiotics transition to IV cefazolin based on culture sensitivities  Transitioned to p o  Keflex completed 7 day course of antibiotics    Septic shock ([address] )  Assessment & Plan  · Present on admission  · Resolved    Acute on chronic anemia  Assessment & Plan  Lab Results   Component Value Date    EGFR 34 12/02/2022    EGFR 32 12/01/2022    EGFR 33 11/30/2022    CREATININE 1 77 (H) 12/02/2022    CREATININE 1 90 (H) 12/01/2022    CREATININE 1 85 (H) 11/30/2022     · Multifactorial  · Iron studies noted  · Iron supplementation  · Monitor hemoglobin    Chronic atrial fibrillation (HCC)  Assessment & Plan  Continue amiodarone, beta-blocker  Eliquis for anticoagulation    Acute-on-chronic kidney injury [facility])  Assessment & Plan  Lab Results   Component Value Date    EGFR 34 12/02/2022    EGFR 32 12/01/2022    EGFR 33 11/30/2022    CREATININE 1 77 (H) 12/02/2022    CREATININE 1 90 (H) 12/01/2022    CREATININE 1 85 (H) 11/30/2022     Estimated Creatinine Clearance: 31 1 mL/min (A) (by C-G formula based on SCr of 1 77 mg/dL (H))  Acute on chronic kidney disease improving with diuresis  Monitor kidney function closely  Avoid nephrotoxins  Suprapubic catheter in place      CAD (coronary artery disease)  Assessment & Plan  Continue Plavix, beta-blocker, Lipitor      VTE Pharmacologic Prophylaxis: VTE Score: 21 High Risk (Score >/= 5) - Pharmacological DVT Prophylaxis Ordered: apixaban (Eliquis)  Sequential Compression Devices Ordered  Patient Centered Rounds: I performed bedside rounds with nursing staff today    Discussions with Specialists or Other Care Team Provider: palliative    Education and Discussions with Family / Patient: palliative will speak to family  Time Spent for Care: 30 minutes  More than 50% of total time spent on counseling and coordination of care as described above  Current Length of Stay: 9 day(s)  Current Patient Status: Inpatient   Certification Statement: The patient will continue to require additional inpatient hospital stay due to need to clarify goals of care  Discharge Plan: Anticipate discharge tomorrow to rehab facility  Code Status: Level 1 - Full Code    Subjective:   Pt frustrated by HTL diet    Objective:     Vitals:   Temp (24hrs), Av 2 °F (36 8 °C), Min:98 °F (36 7 °C), Max:98 6 °F (37 °C)    Temp:  [98 °F (36 7 °C)-98 6 °F (37 °C)] 98 6 °F (37 °C)  HR:  [72-84] 77  Resp:  [17-20] 17  BP: (110-127)/(54-58) 127/55  SpO2:  [95 %-98 %] 96 %  Body mass index is 23 8 kg/m²  Input and Output Summary (last 24 hours): Intake/Output Summary (Last 24 hours) at 2022 1629  Last data filed at 2022 1452  Gross per 24 hour   Intake 118 ml   Output 1525 ml   Net -1407 ml       Physical Exam:   Physical Exam  HENT:      Head: Normocephalic and atraumatic  Nose: Nose normal       Mouth/Throat:      Mouth: Mucous membranes are moist    Eyes:      Extraocular Movements: Extraocular movements intact  Conjunctiva/sclera: Conjunctivae normal    Cardiovascular:      Rate and Rhythm: Normal rate and regular rhythm  Pulmonary:      Effort: Pulmonary effort is normal       Breath sounds: Normal breath sounds  Abdominal:      General: Bowel sounds are normal       Palpations: Abdomen is soft  Musculoskeletal:         General: Normal range of motion  Cervical back: Normal range of motion and neck supple  Right lower leg: No edema  Left lower leg: No edema  Skin:     General: Skin is warm and dry  Neurological:      Mental Status: He is alert and oriented to person, place, and time           Additional Data:     Labs:  Results from last 7 days   Lab Units 22  4955 22  2330 WBC Thousand/uL 7 99 9 09   HEMOGLOBIN g/dL 9 0* 7 6*   HEMATOCRIT % 28 9* 25 0*   PLATELETS Thousands/uL 122* 123*   NEUTROS PCT %  --  86*   LYMPHS PCT %  --  6*   MONOS PCT %  --  5   EOS PCT %  --  2     Results from last 7 days   Lab Units 12/02/22  0458   SODIUM mmol/L 142   POTASSIUM mmol/L 3 7   CHLORIDE mmol/L 105   CO2 mmol/L 33*   BUN mg/dL 47*   CREATININE mg/dL 1 77*   ANION GAP mmol/L 4   CALCIUM mg/dL 8 3   GLUCOSE RANDOM mg/dL 113                       Lines/Drains:  Invasive Devices     Peripheral Intravenous Line  Duration           Peripheral IV 11/30/22 Dorsal (posterior); Left Forearm 2 days          Drain  Duration           Suprapubic Catheter 18 Fr  9 days                      Imaging: No pertinent imaging reviewed      Recent Cultures (last 7 days):         Last 24 Hours Medication List:   Current Facility-Administered Medications   Medication Dose Route Frequency Provider Last Rate   • acetaminophen  650 mg Oral Q6H PRN Oracio Arias MD     • albuterol  2 5 mg Nebulization Q6H PRN Kamilah Colon PA-C     • amiodarone  200 mg Oral Daily With Breakfast Oracio Arias MD     • apixaban  2 5 mg Oral BID Radha Figueroa DO     • atorvastatin  40 mg Oral Daily With Iva Jackson MD     • clopidogrel  75 mg Oral Daily Radha Figueroa DO     • ferrous sulfate  325 mg Oral Daily With Breakfast Sadie Snow MD     • HYDROmorphone  0 2 mg Intravenous Q4H PRN Radha Figueroa DO     • ipratropium  0 5 mg Nebulization Q6H PRN Kamilah Colon PA-C     • Lidocaine Viscous HCl  15 mL Swish & Spit 4x Daily PRN Adolfo Rahman DO     • melatonin  6 mg Oral HS Swathi Mazariegos PA-C     • metoprolol tartrate  25 mg Oral Q12H [address] Sadie Snow MD     • oxyCODONE  2 5 mg Oral Q6H PRN Radha Figueroa DO      Or   • oxyCODONE  5 mg Oral Q6H PRN Radha Figueroa DO     • potassium chloride  10 mEq Oral Daily Adolfo Rahman DO     • spironolactone  25 mg Oral Daily Lesly Janelle Godinez MD     • tamsulosin  0 4 mg Oral Daily With Kehinde Fuller MD     • torsemide  40 mg Oral Daily Sheryle Boyer, DO          Today, Patient Was Seen By: Sheryle Boyer, DO    **Please Note: This note may have been constructed using a voice recognition system  **

## 2022-12-02 NOTE — ASSESSMENT & PLAN NOTE
Lab Results   Component Value Date    EGFR 34 12/02/2022    EGFR 32 12/01/2022    EGFR 33 11/30/2022    CREATININE 1 77 (H) 12/02/2022    CREATININE 1 90 (H) 12/01/2022    CREATININE 1 85 (H) 11/30/2022     · Multifactorial  · Iron studies noted  · Iron supplementation  · Monitor hemoglobin

## 2022-12-02 NOTE — ASSESSMENT & PLAN NOTE
· Pt today frustrated by HTL and expressed interest in more comfort focused care, although he said his wife would want full cares  · Palliative appreciated- full code    Hopefully at rehab can advance to thin liquids

## 2022-12-02 NOTE — PHYSICAL THERAPY NOTE
Physical Therapy Treatment Note    Patient's Name: Zeny Cantu  : 22 1043   PT Last Visit   PT Visit Date 22   Note Type   Note Type Treatment   Pain Assessment   Pain Assessment Tool FLACC   Pain Location/Orientation Orientation: Right;Location: Leg;Location: Foot   Hospital Pain Intervention(s) Repositioned; Ambulation/increased activity; Emotional support   Pain Rating: FLACC (Rest) - Face 0   Pain Rating: FLACC (Rest) - Legs 0   Pain Rating: FLACC (Rest) - Activity 0   Pain Rating: FLACC (Rest) - Cry 0   Pain Rating: FLACC (Rest) - Consolability 0   Score: FLACC (Rest) 0   Pain Rating: FLACC (Activity) - Face 1   Pain Rating: FLACC (Activity) - Legs 0   Pain Rating: FLACC (Activity) - Activity 0   Pain Rating: FLACC (Activity) - Cry 1   Pain Rating: FLACC (Activity) - Consolability 1   Score: FLACC (Activity) 3   Restrictions/Precautions   Weight Bearing Precautions Per Order Yes   RLE Weight Bearing Per Order WBAT  (per podiatry and ortho)   Braces or Orthoses Other (Comment)  (RLE prevalon boot)   Other Precautions Cognitive; Chair Alarm; Bed Alarm; Fall Risk;Pain;O2   General   Chart Reviewed Yes   Family/Caregiver Present No   Cognition   Overall Cognitive Status Impaired   Attention Attends with cues to redirect   Orientation Level Oriented X4   Following Commands Follows one step commands with increased time or repetition   Comments pt continues to present with flat affect, but overall pleasant and cooperative   Bed Mobility   Supine to Sit 3  Moderate assistance   Additional items Assist x 2;HOB elevated; Bedrails; Increased time required;Verbal cues;LE management   Additional Comments pt able to maintain sitting balance at EOB with supervision assist   Transfers   Sit to Stand 2  Maximal assistance   Additional items Assist x 2; Increased time required;Verbal cues   Stand to Sit 2  Maximal assistance   Additional items Assist x 2; Increased time required;Verbal cues Stand pivot 2  Maximal assistance   Additional items Assist x 2; Increased time required;Verbal cues   Additional Comments Transfers with HHA and b/l knee blocking  Ambulation/Elevation   Ambulation/Elevation Additional Comments Pt only able to pivot at this time, unable to take full steps   Balance   Static Sitting Fair   Dynamic Sitting Fair   Static Standing Poor -   Dynamic Standing Poor -   Ambulatory Zero   Endurance Deficit   Endurance Deficit Yes   Endurance Deficit Description waekness, fatigue, deconditioning   Activity Tolerance   Activity Tolerance Patient limited by fatigue;Patient limited by pain   Medical Staff Made Aware Co-tx with OT 2* pt's medical complexity and multiple comorbidities  PT focus on functional transfers and LE strengthening  Nurse Made Aware ok to see per RN   Exercises   Knee AROM Long Arc Quad Sitting;20 reps;AROM; Bilateral  (4x5)   Assessment   Prognosis Fair   Problem List Decreased strength;Decreased endurance;Decreased mobility; Impaired balance;Pain;Orthopedic restrictions   Assessment Pt seen for PT session today with a focus on functional transfers, sitting tolerance, and LE strengthening  Pt is making slow, but steady progress towards mobility goals  Pt continues to require Ax2 for spine <> sit transfers, however pt requiring less assistance with LEs this session  Pt able to transfer OOB to recliner with Ax2 via stand pivot, unable to take full step with either leg at this time 2* pain and weakness  Pt able to complete LE therex independently on BLEs today, taking rest breaks between each set  Pt would benefit from continued acute skilled PT to address above deficits  Continuing to recommend rehab upon d/c  Goals   Patient Goals to feel better   [facility] Expiration Date 12/09/22   PT Treatment Day 3   Plan   Treatment/Interventions Functional transfer training;LE strengthening/ROM; Therapeutic exercise;Equipment eval/education; Bed mobility; Endurance training;Spoke to case management;Spoke to nursing;OT   Progress Progressing toward goals   PT Frequency 2-3x/wk   Recommendation   PT Discharge Recommendation Post acute rehabilitation services   AM-PAC Basic Mobility Inpatient   Turning in Bed Without Bedrails 2   Lying on Back to Sitting on Edge of Flat Bed 1   Moving Bed to Chair 1   Standing Up From Chair 1   Walk in Room 1   Climb 3-5 Stairs 1   Basic Mobility Inpatient Raw Score 7   Turning Head Towards Sound 4   Follow Simple Instructions 3   Low Function Basic Mobility Raw Score 14   Low Function Basic Mobility Standardized Score 22 01   Highest Level Of Mobility   JH-HLM Goal 2: Bed activities/Dependent transfer   JH-HLM Achieved 4: Move to chair/commode   End of Consult   Patient Position at End of Consult Bedside chair; All needs within reach       Jaswant Fung, PT, DPT

## 2022-12-02 NOTE — ASSESSMENT & PLAN NOTE
Lab Results   Component Value Date    EGFR 34 12/02/2022    EGFR 32 12/01/2022    EGFR 33 11/30/2022    CREATININE 1 77 (H) 12/02/2022    CREATININE 1 90 (H) 12/01/2022    CREATININE 1 85 (H) 11/30/2022     Estimated Creatinine Clearance: 31 1 mL/min (A) (by C-G formula based on SCr of 1 77 mg/dL (H))    Acute on chronic kidney disease improving with diuresis  Monitor kidney function closely  Avoid nephrotoxins  Suprapubic catheter in place

## 2022-12-02 NOTE — PLAN OF CARE
Problem: OCCUPATIONAL THERAPY ADULT  Goal: Performs self-care activities at highest level of function for planned discharge setting  See evaluation for individualized goals  Description: Treatment Interventions: ADL retraining, Functional transfer training, UE strengthening/ROM, Endurance training, Cognitive reorientation, Patient/family training, Compensatory technique education, Continued evaluation, Energy conservation, Activityengagement          See flowsheet documentation for full assessment, interventions and recommendations  Outcome: Progressing  Note: Limitation: Decreased ADL status, Decreased UE ROM, Decreased UE strength, Decreased Safe judgement during ADL, Decreased cognition, Decreased endurance, Decreased self-care trans, Decreased high-level ADLs  Prognosis: Fair  Assessment: Pt was seen this date for OT tx session focusing on self care tasks, bed mobility, sit to stand progressions, standing tolerance, tranfers, functional mobility, safety awareness, compensatory techniques, energy conservation, and overall activity tolerance  Pt presents supine and is agreeable to OT tx session  Pt is on O2 with O2 sats WNL at rest and with activity, completes previously mentioned tasks at documented assist levels please see above in flow sheet  Pt continues to require overall max A x 2 for transfers and mod- max A for self care tasks  Pt is overall limited by decreased balance, increased fatigue, decreased strength, endurance, and activity tolerance and continues to function below baseline level  Pt resting OOB in chair at end of session with all needs in reach and alarm on  Pt would benefit from continued OT Tx to improve overall functional abilities and increase independence  Will continue to follow with current POC       OT Discharge Recommendation: Post acute rehabilitation services

## 2022-12-02 NOTE — ASSESSMENT & PLAN NOTE
Wt Readings from Last 3 Encounters:   12/01/22 71 kg (156 lb 8 4 oz)   11/23/22 74 5 kg (164 lb 3 9 oz)   11/22/22 70 8 kg (156 lb)     · Chronic HF  · Echo 03/01/2022 with EF 45%     · Continue beta-blocker and Aldactone  · IV Lasix -> Torsemide  · Monitor I/O, daily weights  · Less than 2 g salt diet fluid restriction

## 2022-12-02 NOTE — ASSESSMENT & PLAN NOTE
· Discharged from [address] [place] [address] on 11/06 s/p repair     · DVT prophylaxis w/ Eliquis  · Physical therapy

## 2022-12-03 NOTE — ASSESSMENT & PLAN NOTE
Lab Results   Component Value Date    EGFR 34 12/02/2022    EGFR 32 12/01/2022    EGFR 33 11/30/2022    CREATININE 1 77 (H) 12/02/2022    CREATININE 1 90 (H) 12/01/2022    CREATININE 1 85 (H) 11/30/2022     Estimated Creatinine Clearance: 30 9 mL/min (A) (by C-G formula based on SCr of 1 77 mg/dL (H))    Acute on chronic kidney disease improving with diuresis  Monitor kidney function closely  Avoid nephrotoxins  Suprapubic catheter in place

## 2022-12-03 NOTE — ASSESSMENT & PLAN NOTE
Wt Readings from Last 3 Encounters:   12/03/22 68 kg (149 lb 14 6 oz)   11/23/22 74 5 kg (164 lb 3 9 oz)   11/22/22 70 8 kg (156 lb)     · Chronic HF  · Echo 03/01/2022 with EF 45%     · Continue beta-blocker and Aldactone  · IV Lasix -> Torsemide  · Monitor I/O, daily weights  · Less than 2 g salt diet fluid restriction

## 2022-12-03 NOTE — PLAN OF CARE
Problem: MOBILITY - ADULT  Goal: Maintain or return to baseline ADL function  Description: INTERVENTIONS:  -  Assess patient's ability to carry out ADLs; assess patient's baseline for ADL function and identify physical deficits which impact ability to perform ADLs (bathing, care of mouth/teeth, toileting, grooming, dressing, etc )  - Assess/evaluate cause of self-care deficits   - Assess range of motion  - Assess patient's mobility; develop plan if impaired  - Assess patient's need for assistive devices and provide as appropriate  - Encourage maximum independence but intervene and supervise when necessary  - Involve family in performance of ADLs  - Assess for home care needs following discharge   - Consider OT consult to assist with ADL evaluation and planning for discharge  - Provide patient education as appropriate  Outcome: Progressing  Goal: Maintains/Returns to pre admission functional level  Description: INTERVENTIONS:  - Perform BMAT or MOVE assessment daily    - Set and communicate daily mobility goal to care team and patient/family/caregiver     - Collaborate with rehabilitation services on mobility goals if consulted  - Record patient progress and toleration of activity level   Outcome: Progressing     Problem: Potential for Falls  Goal: Patient will remain free of falls  Description: INTERVENTIONS:  - Educate patient/family on patient safety including physical limitations  - Instruct patient to call for assistance with activity   - Consult OT/PT to assist with strengthening/mobility   - Keep Call bell within reach  - Keep bed low and locked with side rails adjusted as appropriate  - Keep care items and personal belongings within reach  - Initiate and maintain comfort rounds  - Make Fall Risk Sign visible to staff  - Apply yellow socks and bracelet for high fall risk patients  - Consider moving patient to room near nurses station  Outcome: Progressing     Problem: Prexisting or High Potential for Compromised Skin Integrity  Goal: Skin integrity is maintained or improved  Description: INTERVENTIONS:  - Identify patients at risk for skin breakdown  - Assess and monitor skin integrity  - Assess and monitor nutrition and hydration status  - Monitor labs   - Assess for incontinence   - Turn and reposition patient  - Assist with mobility/ambulation  - Relieve pressure over bony prominences  - Avoid friction and shearing  - Provide appropriate hygiene as needed including keeping skin clean and dry  - Evaluate need for skin moisturizer/barrier cream  - Collaborate with interdisciplinary team   - Patient/family teaching  - Consider wound care consult   Outcome: Progressing     Problem: Nutrition/Hydration-ADULT  Goal: Nutrient/Hydration intake appropriate for improving, restoring or maintaining nutritional needs  Description: Monitor and assess patient's nutrition/hydration status for malnutrition  Collaborate with interdisciplinary team and initiate plan and interventions as ordered  Monitor patient's weight and dietary intake as ordered or per policy  Utilize nutrition screening tool and intervene as necessary  Determine patient's food preferences and provide high-protein, high-caloric foods as appropriate       INTERVENTIONS:  - Monitor oral intake, urinary output, labs, and treatment plans  - Assess nutrition and hydration status and recommend course of action  - Evaluate amount of meals eaten  - Assist patient with eating if necessary   - Allow adequate time for meals  - Recommend/ encourage appropriate diets, oral nutritional supplements, and vitamin/mineral supplements  - Order, calculate, and assess calorie counts as needed  - Recommend, monitor, and adjust tube feedings and TPN/PPN based on assessed needs  - Assess need for intravenous fluids  - Provide specific nutrition/hydration education as appropriate  - Include patient/family/caregiver in decisions related to nutrition  Outcome: Progressing Problem: PAIN - ADULT  Goal: Verbalizes/displays adequate comfort level or baseline comfort level  Description: Interventions:  - Encourage patient to monitor pain and request assistance  - Assess pain using appropriate pain scale  - Administer analgesics based on type and severity of pain and evaluate response  - Implement non-pharmacological measures as appropriate and evaluate response  - Consider cultural and social influences on pain and pain management  - Notify physician/advanced practitioner if interventions unsuccessful or patient reports new pain  Outcome: Progressing

## 2022-12-03 NOTE — CONSULTS
Late entry - pt seen on DOS      Consultation - Palliative and Supportive Care   Ya Fuller 80 y o  male [id number]    Patient Active Problem List   Diagnosis   • Bacteremia due to Pseudomonas   • CAD (coronary artery disease)   • Mitral regurgitation   • Hypertensive urgency   • Hyperlipidemia, mixed   • Carotid artery stenosis   • Stage 3 chronic kidney disease (HCC)   • Multiple lung nodules on CT   • Acute respiratory failure with hypoxia (HCC)   • Acute on chronic diastolic CHF (congestive heart failure) (HCC)   • Acute-on-chronic kidney injury (HCC)   • Chronic atrial fibrillation (HCC)   • Postprocedural pneumothorax   • Lumbar compression deformity   • Heart failure with mid-range ejection fraction (HCC)   • Paroxysmal atrial fibrillation (HCC)   • Closed 2-part intertrochanteric fracture of proximal end of right femur, initial encounter ([facility] [address] )   • Acute on chronic anemia   • Hyponatremia   • Postoperative urinary retention   • Thrombocytopenia (HCC)   • Septic shock (HCC)   • UTI (urinary tract infection)   • Closed fracture of trochanter of right femur with routine healing   • Leg edema, right   • Pressure injury sacrum and right heel   • Transaminitis   • Elevated troponin level not due myocardial infarction   • Hematuria   • Urethral trauma   • Ambulatory dysfunction   • Dysphagia   • Deep tissue injury   • PAD (peripheral artery disease) (HCC)   • RSV (acute bronchiolitis due to respiratory syncytial virus)   • Goals of care, counseling/discussion     Active issues specifically addressed today include:   - anxiety about health  - Existential distress  - consideration of goals: hospice vs usual cares  - aspn pneumonitiis  - RSV  - oropharyngeal dysphagia    Plan:  1  Symptom management - defer to primary team    - Ordered biotene mouth spray for dryness   - Pt will keep strict dysphagia guidelines for now  2  Goals - full cares, no limits, rehab-focussed cares   - Pt appears competent, but tired    He requests care designed to get him better and improve his function  "I dont' wanna die"   - consider GeriMed consult     Code Status: FULL - Level 1   Decisional apparatus:  Patient is competent on my exam today  If competence is lost, patient's substitute decision maker would default to wife by [place]  Advance Directive / Living Will / POLST:  None on file     I have reviewed the patient's controlled substance dispensing history in the Prescription Drug Monitoring Program in compliance with the [facility] regulations before prescribing any controlled substances  We appreciate the invitation to be involved in this patient's care  We will sign off at this time  Please do not hesitate to reach our on call provider through our clinic answering service at  should you have acute symptom control concerns  Alek Wolff MD  Palliative and Supportive Care  Clinic/Answering Service: [phone]  You can find me on [employer]! IDENTIFICATION:  Inpatient consult to Palliative Care  Consult performed by: Alek Wolff MD  Consult ordered by: Sally Oneil DO        Physician Requesting Consult: Sally Oneil DO  Reason for Consult / Principal Problem: goals  Hx and PE limited by: dysphagia, cough    HISTORY OF PRESENT ILLNESS:       Marco Smyth is a 80 y o  male who presents with sepsis and shock from SNF  Pt recently underwent femoral fracture repair and was rehabbing at a facility near [facility]  He was found to be altered and having abd pain, and hospital stay at [facility] revealed hematuria and pt met criteria for sepsis  He was Xferred to [facility] for critical cares  On our consult, the pt is improve from a shock stanpoint, though he has marked dysphagia  This is very frustrating to him, as he wishes to eat better and get stronger and back up on his feet after femoral instrumentation    He finds the thickened liquids quite unpalatable, and his biggest concern for me is dry mouth       When offered the option of allowing full diet without restrictions, given the risk of aspn pna or pneumonitis, he deflects  "I don't wanna die"  We reviewed that the restriction in his diet is temporary, and that it will likely improve with therapy  He is disappointed in this, but commits to cares  Later, in brief phone consultation with pt's wife, she agrees that he needs to participate in full cares without limits  Review of Systems   Constitutional: Positive for decreased appetite and weight loss  Negative for weight gain  HENT: Positive for odynophagia and sore throat  Negative for hoarse voice and nosebleeds  Eyes: Negative for vision loss in left eye and vision loss in right eye  Cardiovascular: Negative for chest pain and dyspnea on exertion  Respiratory: Negative for cough and shortness of breath  Endocrine: Negative for polydipsia, polyphagia and polyuria  Skin: Negative for flushing and itching  Musculoskeletal: Negative for falls  Gastrointestinal: Positive for dysphagia  Negative for anorexia, jaundice, nausea and vomiting  Genitourinary: Negative for frequency  Neurological: Negative for dizziness  Psychiatric/Behavioral: Positive for depression  Negative for altered mental status and memory loss  The patient is not nervous/anxious          Past Medical History:   Diagnosis Date   • CHF (congestive heart failure) ([facility])    • CKD (chronic kidney disease) stage 3, GFR 30-59 ml/min ([facility])    • Coronary artery disease    • Hyperlipidemia    • Hypertension    • Mitral regurgitation    • Paroxysmal atrial fibrillation [facility])    • Urinary retention      Past Surgical History:   Procedure Laterality Date   • CARDIAC SURGERY      cabg x 2 2014 [facility]   • CORONARY ANGIOPLASTY WITH STENT PLACEMENT  10/2021    DAPHNEY to left main and ramus   • MITRAL VALVE REPAIR  2014    mitral ring   • WI CYSTOURETHROSCOPY W/IRRIG & EVAC CLOTS N/A 11/23/2022    Procedure: CYSTOSCOPY;  Surgeon: Cele Olvera MD;  Location:  MAIN OR;  Service: Urology   • OH OPEN RX FEMUR FX+INTRAMED HALIMA Right 11/1/2022    Procedure: INSERTION NAIL IM FEMUR ANTEGRADE (TROCHANTERIC); Surgeon: Jigna Franks; Location:  MAIN OR;  Service: Orthopedics   • SUPRAPUBIC TUBE PLACEMENT N/A 11/23/2022    Procedure: INSERTION SUPRAPUBIC CATHETER PERCUTANEOUS;  Surgeon: Cele Olvera MD;  Location: [facility] MAIN [facility];  Service: Urology     Social History     Socioeconomic History   • Marital status: /Civil Union     Spouse name: Not on file   • Number of children: Not on file   • Years of education: Not on file   • Highest education level: Not on file   Occupational History   • Not on file   Tobacco Use   • Smoking status: Never   • Smokeless tobacco: Never   Vaping Use   • Vaping Use: Never used   Substance and Sexual Activity   • Alcohol use: Not Currently   • Drug use: Never   • Sexual activity: Not Currently     Comment: defer   Other Topics Concern   • Not on file   Social History Narrative   • Not on file     Social Determinants of Health     Financial Resource Strain: Not on file   Food Insecurity: No Food Insecurity   • Worried About Running Out of Food in the Last Year: Never true   • Ran Out of Food in the Last Year: Never true   Transportation Needs: No Transportation Needs   • Lack of Transportation (Medical): No   • Lack of Transportation (Non-Medical):  No   Physical Activity: Not on file   Stress: Not on file   Social Connections: Not on file   Intimate Partner Violence: Not on file   Housing Stability: Low Risk    • Unable to Pay for Housing in the Last Year: No   • Number of Places Lived in the Last Year: 1   • Unstable Housing in the Last Year: No     Family History   Problem Relation Age of Onset   • Hypertension Father        MEDICATIONS / ALLERGIES:    all current active meds have been reviewed and current meds:   Current Facility-Administered Medications   Medication Dose Route Frequency   • acetaminophen (TYLENOL) tablet 650 mg  650 mg Oral Q6H PRN   • albuterol inhalation solution 2 5 mg  2 5 mg Nebulization Q6H PRN   • amiodarone tablet 200 mg  200 mg Oral Daily With Breakfast   • apixaban (ELIQUIS) tablet 2 5 mg  2 5 mg Oral BID   • atorvastatin (LIPITOR) tablet 40 mg  40 mg Oral Daily With Dinner   • clopidogrel (PLAVIX) tablet 75 mg  75 mg Oral Daily   • doxylamine (UNISOM) tablet 12 5 mg  12 5 mg Oral HS PRN   • ferrous sulfate tablet 325 mg  325 mg Oral Daily With Breakfast   • HYDROmorphone HCl (DILAUDID) injection 0 2 mg  0 2 mg Intravenous Q4H PRN   • ipratropium (ATROVENT) 0 02 % inhalation solution 0 5 mg  0 5 mg Nebulization Q6H PRN   • Lidocaine Viscous HCl (XYLOCAINE) 2 % mucosal solution 15 mL  15 mL Swish & Spit 4x Daily PRN   • melatonin tablet 6 mg  6 mg Oral HS   • metoprolol tartrate (LOPRESSOR) tablet 25 mg  25 mg Oral Q12H YOLANDA   • oxyCODONE (ROXICODONE) IR tablet 2 5 mg  2 5 mg Oral Q6H PRN    Or   • oxyCODONE (ROXICODONE) IR tablet 5 mg  5 mg Oral Q6H PRN   • potassium chloride (K-DUR,KLOR-CON) CR tablet 10 mEq  10 mEq Oral Daily   • spironolactone (ALDACTONE) tablet 25 mg  25 mg Oral Daily   • tamsulosin (FLOMAX) capsule 0 4 mg  0 4 mg Oral Daily With Dinner   • torsemide (DEMADEX) tablet 40 mg  40 mg Oral Daily       No Known Allergies    OBJECTIVE:    Physical Exam  Physical Exam  Constitutional:       General: He is not in acute distress  Appearance: He is ill-appearing  He is not toxic-appearing or diaphoretic  Comments: Frail   HENT:      Head: Normocephalic and atraumatic  Right Ear: External ear normal       Left Ear: External ear normal    Eyes:      General:         Right eye: No discharge  Left eye: No discharge  Conjunctiva/sclera: Conjunctivae normal       Pupils: Pupils are equal, round, and reactive to light  Neck:      Trachea: No tracheal deviation  Cardiovascular:      Rate and Rhythm: Normal rate and regular rhythm  Pulmonary:      Effort: Pulmonary effort is normal  No respiratory distress  Breath sounds: No stridor  Rhonchi present  Comments: Thick cough, not productive  Abdominal:      General: There is no distension  Palpations: Abdomen is soft  Comments: scaphoid   Skin:     General: Skin is warm and dry  Coloration: Skin is pale  Findings: No erythema or rash  Neurological:      Mental Status: He is alert and oriented to person, place, and time  Mental status is at baseline  Cranial Nerves: Cranial nerve deficit (dysphagia and dysarthria) present  Psychiatric:         Behavior: Behavior normal          Thought Content: Thought content normal          Judgment: Judgment normal          Lab Results: I have personally reviewed pertinent labs  Imaging Studies: none pertinent  EKG, Pathology, and Other Studies: none new    Counseling / Coordination of Care    Total floor / unit time spent today 40+ minutes  Greater than 50% of total time was spent with the patient and / or family counseling and / or coordination of care  A description of the counseling / coordination of care: chart review; symptom pursuit; supportive listening; anticipatory guidance   review and assessment of decisional apparatus and capacity, applicable legal codes and extant documents; consideration of goals

## 2022-12-03 NOTE — PROGRESS NOTES
[address] [facility]  Progress Note Luz Vargas 1940, 80 y o  male MRN: [id number]  Unit/Bed#: [facility] 431-01 Encounter: [id number]  Primary Care Provider: Jennyfer Bey DO   Date and time admitted to hospital: 11/23/2022  4:04 AM    * Urethral trauma  Assessment & Plan  Urethral trauma status post cystoscopy with suprapubic catheter in place  Urology input noted      Heart failure with mid-range ejection fraction [facility])  Assessment & Plan  Wt Readings from Last 3 Encounters:   12/03/22 68 kg (149 lb 14 6 oz)   11/23/22 74 5 kg (164 lb 3 9 oz)   11/22/22 70 8 kg (156 lb)     · Chronic HF  · Echo 03/01/2022 with EF 45%  · Continue beta-blocker and Aldactone  · IV Lasix -> Torsemide  · Monitor I/O, daily weights  · Less than 2 g salt diet fluid restriction            Goals of care, counseling/discussion  Assessment & Plan  · Pt today frustrated by HTL and expressed interest in more comfort focused care, although he said his wife would want full cares  · Palliative appreciated- full code    Hopefully at rehab can advance to thin liquids    RSV (acute bronchiolitis due to respiratory syncytial virus)  Assessment & Plan  · Resp protocol  · Overall stable    PAD (peripheral artery disease) ([facility])  Assessment & Plan  · Seen on LEADS  · Vascular appreciated  · C/w med mgmt w/ Eliquis, Plavix, and statin  · LDL 33  · OP f/u w/ vascular to assess R foot healing    Deep tissue injury  Assessment & Plan  · Wound care appreciated  · Podiatry appreciated  · Right foot Xray no osteo - ok to WBAT    Dysphagia  Assessment & Plan  · Puree/HTL  · VBS cleared pt for puree/HTL  · Pt upset about HTL but tx focused    Ambulatory dysfunction  Assessment & Plan  Multifactorial with deconditioning contributing  Safe ambulation  Fall precautions  Physical therapy    Hematuria  Assessment & Plan  · Due to urethral injury  · Hematuria resolved    Transaminitis  Assessment & Plan  · Likely due to shock liver  · Improved - chronically high bili      Closed fracture of trochanter of right femur with routine healing  Assessment & Plan  · Discharged from [address] [place] [address] on 11/06 s/p repair  · DVT prophylaxis w/ Eliquis  · Physical therapy    UTI (urinary tract infection)  Assessment & Plan  Antibiotics transition to IV cefazolin based on culture sensitivities  Transitioned to p o  Keflex completed 7 day course of antibiotics    Septic shock ([address] )  Assessment & Plan  · Present on admission  · Resolved    Acute on chronic anemia  Assessment & Plan  Lab Results   Component Value Date    EGFR 34 12/02/2022    EGFR 32 12/01/2022    EGFR 33 11/30/2022    CREATININE 1 77 (H) 12/02/2022    CREATININE 1 90 (H) 12/01/2022    CREATININE 1 85 (H) 11/30/2022     · Multifactorial  · Iron studies noted  · Iron supplementation  · Monitor hemoglobin    Chronic atrial fibrillation (HCC)  Assessment & Plan  Continue amiodarone, beta-blocker  Eliquis for anticoagulation    Acute-on-chronic kidney injury [facility])  Assessment & Plan  Lab Results   Component Value Date    EGFR 34 12/02/2022    EGFR 32 12/01/2022    EGFR 33 11/30/2022    CREATININE 1 77 (H) 12/02/2022    CREATININE 1 90 (H) 12/01/2022    CREATININE 1 85 (H) 11/30/2022     Estimated Creatinine Clearance: 30 9 mL/min (A) (by C-G formula based on SCr of 1 77 mg/dL (H))  Acute on chronic kidney disease improving with diuresis  Monitor kidney function closely  Avoid nephrotoxins  Suprapubic catheter in place      CAD (coronary artery disease)  Assessment & Plan  Continue Plavix, beta-blocker, Lipitor      VTE Pharmacologic Prophylaxis: VTE Score: 21 High Risk (Score >/= 5) - Pharmacological DVT Prophylaxis Ordered: apixaban (Eliquis)  Sequential Compression Devices Ordered  Patient Centered Rounds: I performed bedside rounds with nursing staff today    Discussions with Specialists or Other Care Team Provider: palliative    Education and Discussions with Family / Patient: Updated contact person (wife) via phone  Time Spent for Care: 30 minutes  More than 50% of total time spent on counseling and coordination of care as described above  Current Length of Stay: 10 day(s)  Current Patient Status: Inpatient   Certification Statement: The patient will continue to require additional inpatient hospital stay due to need for transport  Discharge Plan: Anticipate discharge tomorrow to rehab facility  Code Status: Level 1 - Full Code    Subjective:   Pt upset w/ HTL    Objective:     Vitals:   Temp (24hrs), Av 6 °F (37 °C), Min:98 3 °F (36 8 °C), Max:99 1 °F (37 3 °C)    Temp:  [98 3 °F (36 8 °C)-99 1 °F (37 3 °C)] 99 1 °F (37 3 °C)  HR:  [74-92] 92  Resp:  [16-18] 18  BP: (113-130)/(54-63) 130/63  SpO2:  [97 %-99 %] 98 %  Body mass index is 22 79 kg/m²  Input and Output Summary (last 24 hours): Intake/Output Summary (Last 24 hours) at 12/3/2022 1619  Last data filed at 12/3/2022 5495  Gross per 24 hour   Intake --   Output 1000 ml   Net -1000 ml       Physical Exam:   Physical Exam  HENT:      Head: Normocephalic and atraumatic  Nose: Nose normal       Mouth/Throat:      Mouth: Mucous membranes are moist    Eyes:      Extraocular Movements: Extraocular movements intact  Conjunctiva/sclera: Conjunctivae normal    Cardiovascular:      Rate and Rhythm: Normal rate and regular rhythm  Pulmonary:      Effort: Pulmonary effort is normal       Breath sounds: Normal breath sounds  Abdominal:      General: Bowel sounds are normal       Palpations: Abdomen is soft  Musculoskeletal:         General: Normal range of motion  Cervical back: Normal range of motion and neck supple  Right lower leg: No edema  Left lower leg: No edema  Skin:     General: Skin is warm and dry  Neurological:      Mental Status: He is alert and oriented to person, place, and time           Additional Data:     Labs:  Results from last 7 days   Lab Units 22  6493 22  0505   WBC Thousand/uL 7 99 9 09   HEMOGLOBIN g/dL 9 0* 7 6*   HEMATOCRIT % 28 9* 25 0*   PLATELETS Thousands/uL 122* 123*   NEUTROS PCT %  --  86*   LYMPHS PCT %  --  6*   MONOS PCT %  --  5   EOS PCT %  --  2     Results from last 7 days   Lab Units 12/02/22  0458   SODIUM mmol/L 142   POTASSIUM mmol/L 3 7   CHLORIDE mmol/L 105   CO2 mmol/L 33*   BUN mg/dL 47*   CREATININE mg/dL 1 77*   ANION GAP mmol/L 4   CALCIUM mg/dL 8 3   GLUCOSE RANDOM mg/dL 113                       Lines/Drains:  Invasive Devices     Peripheral Intravenous Line  Duration           Peripheral IV 11/30/22 Dorsal (posterior); Left Forearm 3 days          Drain  Duration           Suprapubic Catheter 18 Fr  10 days                      Imaging: No pertinent imaging reviewed      Recent Cultures (last 7 days):         Last 24 Hours Medication List:   Current Facility-Administered Medications   Medication Dose Route Frequency Provider Last Rate   • acetaminophen  650 mg Oral Q6H PRN Barbie Verde MD     • albuterol  2 5 mg Nebulization Q6H PRN Eduard Silveira PA-C     • amiodarone  200 mg Oral Daily With Breakfast Barbie Verde MD     • apixaban  2 5 mg Oral BID Courtney Plum, DO     • atorvastatin  40 mg Oral Daily With Billie Flores MD     • clopidogrel  75 mg Oral Daily Courtney Plum, DO     • doxylamine  12 5 mg Oral HS PRN Eduard Silveira PA-C     • ferrous sulfate  325 mg Oral Daily With Breakfast Luke Sandoval MD     • HYDROmorphone  0 2 mg Intravenous Q4H PRN Courtney Plum, DO     • ipratropium  0 5 mg Nebulization Q6H PRN Eduard Silveira PA-C     • Lidocaine Viscous HCl  15 mL Swish & Spit 4x Daily PRN Bhumika Josselyn, DO     • melatonin  6 mg Oral HS Swathi Sher PA-C     • metoprolol tartrate  25 mg Oral Q12H [facility] [place] Luke Sandoval MD     • oxyCODONE  2 5 mg Oral Q6H PRN Courtney Plum, DO      Or   • oxyCODONE  5 mg Oral Q6H PRN Courtney Plum, DO     • potassium chloride  10 mEq Oral Daily Bhumika Josselyn, DO     • saliva substitute  5 spray Mouth/Throat 4x Daily PRN Maday Michaud MD     • spironolactone  25 mg Oral Daily Nadine Bonilla MD     • tamsulosin  0 4 mg Oral Daily With Keon Lozano MD     • torsemide  40 mg Oral Daily Danyelle Martinez DO          Today, Patient Was Seen By: Danyelle Martinez DO    **Please Note: This note may have been constructed using a voice recognition system  **

## 2022-12-03 NOTE — ASSESSMENT & PLAN NOTE
· Discharged from [address] [place] [address] on 11/06 s/p repair     · DVT prophylaxis w/ Eliquis  · Physical therapy

## 2022-12-03 NOTE — CASE MANAGEMENT
Case Management Discharge Planning Note    Patient name Tacos Delgado  Location [facility]-89 MRN [id number]  : 1940 Date 12/3/2022       Current Admission Date: 2022  Current Admission Diagnosis:Urethral trauma   Patient Active Problem List    Diagnosis Date Noted   • RSV (acute bronchiolitis due to respiratory syncytial virus) 2022   • Goals of care, counseling/discussion 2022   • PAD (peripheral artery disease) ([facility] [address] ) 2022   • Deep tissue injury 2022   • Dysphagia 2022   • Ambulatory dysfunction 2022   • Urethral trauma 2022   • Septic shock ([address] ) 2022   • UTI (urinary tract infection) 2022   • Closed fracture of trochanter of right femur with routine healing 2022   • Leg edema, right 2022   • Pressure injury sacrum and right heel 2022   • Transaminitis 2022   • Elevated troponin level not due myocardial infarction 2022   • Hematuria 2022   • Thrombocytopenia ([address] ) 2022   • Acute on chronic anemia 2022   • Hyponatremia 2022   • Postoperative urinary retention    • Heart failure with mid-range ejection fraction ([address] ) 10/31/2022   • Paroxysmal atrial fibrillation ([address] ) 10/31/2022   • Closed 2-part intertrochanteric fracture of proximal end of right femur, initial encounter ([facility] [address] ) 10/31/2022   • Lumbar compression deformity 2022   • Postprocedural pneumothorax 2022   • Acute respiratory failure with hypoxia ([address] ) 2022   • Acute on chronic diastolic CHF (congestive heart failure) ([address] ) 2022   • Acute-on-chronic kidney injury ([address] ) 2022   • Chronic atrial fibrillation ([address] ) 2022   • Stage 3 chronic kidney disease ([address] ) 2021   • Multiple lung nodules on CT 2021   • Carotid artery stenosis 2021   • Bacteremia due to Pseudomonas 2021   • CAD (coronary artery disease) 2021   • Mitral regurgitation 2021   • Hypertensive urgency 04/21/2021   • Hyperlipidemia, mixed 04/21/2021      LOS (days): 10  Geometric Mean LOS (GMLOS) (days): 4 80  Days to GMLOS:-5 6     OBJECTIVE:  Risk of Unplanned Readmission Score: 28 59         Current admission status: Inpatient   Preferred Pharmacy:   [address] [facility], [address]  [facility]  Phone: [phone] Fax: [facility], [address]  [facility] [address]  Phone: [phone] Fax: [phone]    Primary Care Provider: Grover Melvin DO    Primary Insurance: MEDICARE  Secondary Insurance: BLUE CROSS    DISCHARGE DETAILS:    Discharge planning discussed with[de-identified] Spouse                                     Other Referral/Resources/Interventions Provided:  Interventions: Short Term Rehab  Referral Comments: Per provider, pt is medically stable for discharge today  CM contacted the nursing supervisor at [facility] ([phone]) to confirm pt may return -- Nursing supervisor states pt may return at "any time" today  CM requested a 2pm p/u via RoundTrip -- Awaiting response  Facility aware pt is +RSV (but negative for covid) and they will accomodate a private room for his droplet precautions  Bedside RN made aware  CM will follow for finalized p/u time  Treatment Team Recommendation: SNF  Discharge Destination Plan[de-identified] SNF                                IMM Given (Date):: 12/03/22  IMM Given to[de-identified] Family         Addendum 4:13pm:    CM advised by RUDOLPH that they do not have a transport company available this evening due to distance -- CM therefore rescheduled transport request for tomorrow, 12/4/2022, at 11am  Awaiting response  Bedside RN advised of same  CM will monitor Roundtrip updates and update the team accordingly  Facility updated via VM (no answer on their main line)

## 2022-12-04 PROBLEM — N39.0 UTI (URINARY TRACT INFECTION): Status: RESOLVED | Noted: 2022-01-01 | Resolved: 2022-01-01

## 2022-12-04 PROBLEM — R65.21 SEPTIC SHOCK (HCC): Status: RESOLVED | Noted: 2022-11-21 | Resolved: 2022-12-04

## 2022-12-04 PROBLEM — A41.9 SEPTIC SHOCK (HCC): Status: RESOLVED | Noted: 2022-11-21 | Resolved: 2022-12-04

## 2022-12-04 NOTE — ASSESSMENT & PLAN NOTE
· Pt frustrated by HTL and expressed interest in more comfort focused care, although he said his wife would want full cares  · Palliative appreciated- full code    Hopefully at rehab can advance to thin liquids

## 2022-12-04 NOTE — CASE MANAGEMENT
Case Management Discharge Planning Note    Patient name Morton Beams  Location [facility] 431/[facility] 557-62 MRN [id number]  : 1940 Date 2022       Current Admission Date: 2022  Current Admission Diagnosis:Urethral trauma   Patient Active Problem List    Diagnosis Date Noted   • RSV (acute bronchiolitis due to respiratory syncytial virus) 2022   • Goals of care, counseling/discussion 2022   • PAD (peripheral artery disease) ([address] ) 2022   • Deep tissue injury 2022   • Dysphagia 2022   • Ambulatory dysfunction 2022   • Urethral trauma 2022   • Septic shock ([address] ) 2022   • UTI (urinary tract infection) 2022   • Closed fracture of trochanter of right femur with routine healing 2022   • Leg edema, right 2022   • Pressure injury sacrum and right heel 2022   • Transaminitis 2022   • Elevated troponin level not due myocardial infarction 2022   • Hematuria 2022   • Thrombocytopenia ([address] ) 2022   • Acute on chronic anemia 2022   • Hyponatremia 2022   • Postoperative urinary retention    • Heart failure with mid-range ejection fraction ([address] ) 10/31/2022   • Paroxysmal atrial fibrillation ([address] ) 10/31/2022   • Closed 2-part intertrochanteric fracture of proximal end of right femur, initial encounter ([address] ) 10/31/2022   • Lumbar compression deformity 2022   • Postprocedural pneumothorax 2022   • Acute respiratory failure with hypoxia ([address] ) 2022   • Acute on chronic diastolic CHF (congestive heart failure) ([address] ) 2022   • Acute-on-chronic kidney injury ([address] ) 2022   • Chronic atrial fibrillation ([address] ) 2022   • Stage 3 chronic kidney disease ([address] ) 2021   • Multiple lung nodules on CT 2021   • Carotid artery stenosis 2021   • Bacteremia due to Pseudomonas 2021   • CAD (coronary artery disease) 2021   • Mitral regurgitation 2021   • Hypertensive urgency 04/21/2021   • Hyperlipidemia, mixed 04/21/2021      LOS (days): 11  Geometric Mean LOS (GMLOS) (days): 4 80  Days to GMLOS:-6 5     OBJECTIVE:  Risk of Unplanned Readmission Score: 28 94         Current admission status: Inpatient   Preferred Pharmacy:   [facility]  [address]  Phone: [phone] Fax: [facility], [address]  [address] [facility] [address]  Phone: [phone] Fax: [phone]    Primary Care Provider: Chet Arora DO    Primary Insurance: MEDICARE  Secondary Insurance: BLUE CROSS    DISCHARGE DETAILS:    Discharge planning discussed with[de-identified] spouse   Contacts  Patient Contacts: Brodie Chavarria  Relationship to Patient[de-identified] Family  Contact Method: Phone  Reason/Outcome: Continuity of Care, Discharge Planning        Additional Comments: HOWARD reached back out to [facility] and spoke to Pauline, supervisor  CM updated Val to pt's transport for 830 tomorrow morning  Val requested report be called to [phone] tomorrow before transport  CM spoke to pt's wife, Brodie Chavarria, who was updated to transport  CM spoke to pt's RN and updated to transport for tomorrow  RN is aware of need for report to be called tomorrow morning

## 2022-12-04 NOTE — ASSESSMENT & PLAN NOTE
Wt Readings from Last 3 Encounters:   12/04/22 68 3 kg (150 lb 9 2 oz)   11/23/22 74 5 kg (164 lb 3 9 oz)   11/22/22 70 8 kg (156 lb)     · Chronic HF  · Echo 03/01/2022 with EF 45%     · Continue beta-blocker and Aldactone  · IV Lasix -> Torsemide  · Monitor I/O, daily weights  · Less than 2 g salt diet fluid restriction

## 2022-12-04 NOTE — CASE MANAGEMENT
Case Management Discharge Planning Note    Patient name Eulogio Benavides  Location [facility] 431/[facility] 806-94 MRN [id number]  : 1940 Date 2022       Current Admission Date: 2022  Current Admission Diagnosis:Urethral trauma   Patient Active Problem List    Diagnosis Date Noted   • RSV (acute bronchiolitis due to respiratory syncytial virus) 2022   • Goals of care, counseling/discussion 2022   • PAD (peripheral artery disease) ([facility] ) 2022   • Deep tissue injury 2022   • Dysphagia 2022   • Ambulatory dysfunction 2022   • Urethral trauma 2022   • Septic shock ([facility] ) 2022   • UTI (urinary tract infection) 2022   • Closed fracture of trochanter of right femur with routine healing 2022   • Leg edema, right 2022   • Pressure injury sacrum and right heel 2022   • Transaminitis 2022   • Elevated troponin level not due myocardial infarction 2022   • Hematuria 2022   • Thrombocytopenia ([address] ) 2022   • Acute on chronic anemia 2022   • Hyponatremia 2022   • Postoperative urinary retention    • Heart failure with mid-range ejection fraction ([address] ) 10/31/2022   • Paroxysmal atrial fibrillation ([address] ) 10/31/2022   • Closed 2-part intertrochanteric fracture of proximal end of right femur, initial encounter ([facility] ) 10/31/2022   • Lumbar compression deformity 2022   • Postprocedural pneumothorax 2022   • Acute respiratory failure with hypoxia ([address] ) 2022   • Acute on chronic diastolic CHF (congestive heart failure) ([facility] ) 2022   • Acute-on-chronic kidney injury ([facility] ) 2022   • Chronic atrial fibrillation ([address] ) 2022   • Stage 3 chronic kidney disease ([address] ) 2021   • Multiple lung nodules on CT 2021   • Carotid artery stenosis 2021   • Bacteremia due to Pseudomonas 2021   • CAD (coronary artery disease) 2021   • Mitral regurgitation 2021   • Hypertensive urgency 04/21/2021   • Hyperlipidemia, mixed 04/21/2021      LOS (days): 11  Geometric Mean LOS (GMLOS) (days): 4 80  Days to GMLOS:-6 4     OBJECTIVE:  Risk of Unplanned Readmission Score: 28 88         Current admission status: Inpatient   Preferred Pharmacy:   [facility], [facility]  [facility]  Phone: [phone] Fax: [facility], [address] [place] [address],[address]  [address] [facility] [address]  Phone: [phone] Fax: [phone]    Primary Care Provider: Teddy Ryan DO    Primary Insurance: MEDICARE  Secondary Insurance: BLUE CROSS    DISCHARGE DETAILS:     Other Referral/Resources/Interventions Provided:  Interventions: Transportation  Referral Comments: CM checked [employer] and noted that pt was assigned a  on 12/5 at 0830  CM reached out to [facility] via phone and discussed the transport as the request was placed on 12/3  CM strongly advocated for transport to be secured for today  Per [facility], they will continue to reach out to community transport services to see if anyone can  the trip however [facility] transport is unable to accomodate for today  CM reached out to [facility] to advise them of the delay in return  CM left a VM for the supervisor requesting a call back           Treatment Team Recommendation: SNF  Discharge Destination Plan[de-identified] SNF  Transport at Discharge : [facility]

## 2022-12-04 NOTE — PLAN OF CARE
Problem: MOBILITY - ADULT  Goal: Maintain or return to baseline ADL function  Description: INTERVENTIONS:  -  Assess patient's ability to carry out ADLs; assess patient's baseline for ADL function and identify physical deficits which impact ability to perform ADLs (bathing, care of mouth/teeth, toileting, grooming, dressing, etc )  - Assess/evaluate cause of self-care deficits   - Assess range of motion  - Assess patient's mobility; develop plan if impaired  - Assess patient's need for assistive devices and provide as appropriate  - Encourage maximum independence but intervene and supervise when necessary  - Involve family in performance of ADLs  - Assess for home care needs following discharge   - Consider OT consult to assist with ADL evaluation and planning for discharge  - Provide patient education as appropriate  Outcome: Progressing  Goal: Maintains/Returns to pre admission functional level  Description: INTERVENTIONS:  - Perform BMAT or MOVE assessment daily    - Set and communicate daily mobility goal to care team and patient/family/caregiver  - Collaborate with rehabilitation services on mobility goals if consulted  - Perform Range of Motion 3 times a day  - Reposition patient every 3 hours    - Dangle patient 3 times a day  - Stand patient 3 times a day  - Ambulate patient 3 times a day  - Out of bed to chair 3 times a day   - Out of bed for meals 3 times a day  - Out of bed for toileting  - Record patient progress and toleration of activity level   Outcome: Progressing     Problem: Potential for Falls  Goal: Patient will remain free of falls  Description: INTERVENTIONS:  - Educate patient/family on patient safety including physical limitations  - Instruct patient to call for assistance with activity   - Consult OT/PT to assist with strengthening/mobility   - Keep Call bell within reach  - Keep bed low and locked with side rails adjusted as appropriate  - Keep care items and personal belongings within reach  - Initiate and maintain comfort rounds  - Make Fall Risk Sign visible to staff  - Offer Toileting every 2 Hours, in advance of need  - Initiate/Maintain bed alarm  - Obtain necessary fall risk management equipment  - Apply yellow socks and bracelet for high fall risk patients  - Consider moving patient to room near nurses station  Outcome: Progressing     Problem: Prexisting or High Potential for Compromised Skin Integrity  Goal: Skin integrity is maintained or improved  Description: INTERVENTIONS:  - Identify patients at risk for skin breakdown  - Assess and monitor skin integrity  - Assess and monitor nutrition and hydration status  - Monitor labs   - Assess for incontinence   - Turn and reposition patient  - Assist with mobility/ambulation  - Relieve pressure over bony prominences  - Avoid friction and shearing  - Provide appropriate hygiene as needed including keeping skin clean and dry  - Evaluate need for skin moisturizer/barrier cream  - Collaborate with interdisciplinary team   - Patient/family teaching  - Consider wound care consult   Outcome: Progressing     Problem: Nutrition/Hydration-ADULT  Goal: Nutrient/Hydration intake appropriate for improving, restoring or maintaining nutritional needs  Description: Monitor and assess patient's nutrition/hydration status for malnutrition  Collaborate with interdisciplinary team and initiate plan and interventions as ordered  Monitor patient's weight and dietary intake as ordered or per policy  Utilize nutrition screening tool and intervene as necessary  Determine patient's food preferences and provide high-protein, high-caloric foods as appropriate       INTERVENTIONS:  - Monitor oral intake, urinary output, labs, and treatment plans  - Assess nutrition and hydration status and recommend course of action  - Evaluate amount of meals eaten  - Assist patient with eating if necessary   - Allow adequate time for meals  - Recommend/ encourage appropriate diets, oral nutritional supplements, and vitamin/mineral supplements  - Order, calculate, and assess calorie counts as needed  - Recommend, monitor, and adjust tube feedings and TPN/PPN based on assessed needs  - Assess need for intravenous fluids  - Provide specific nutrition/hydration education as appropriate  - Include patient/family/caregiver in decisions related to nutrition  Outcome: Progressing     Problem: PAIN - ADULT  Goal: Verbalizes/displays adequate comfort level or baseline comfort level  Description: Interventions:  - Encourage patient to monitor pain and request assistance  - Assess pain using appropriate pain scale  - Administer analgesics based on type and severity of pain and evaluate response  - Implement non-pharmacological measures as appropriate and evaluate response  - Consider cultural and social influences on pain and pain management  - Notify physician/advanced practitioner if interventions unsuccessful or patient reports new pain  Outcome: Progressing

## 2022-12-04 NOTE — ASSESSMENT & PLAN NOTE
· Discharged from [address] [place] [address] on 11/06 s/p repair     · DVT prophylaxis w/ Eliquis  · Physical therapy

## 2022-12-04 NOTE — PROGRESS NOTES
[facility]  Progress Note Joanna Hogan 1940, 80 y o  male MRN: [id number]  Unit/Bed#: [facility] 431-01 Encounter: [id number]  Primary Care Provider: Barbara España DO   Date and time admitted to hospital: 11/23/2022  4:04 AM    * Urethral trauma  Assessment & Plan  Urethral trauma status post cystoscopy with suprapubic catheter in place  Urology input noted      Heart failure with mid-range ejection fraction [facility])  Assessment & Plan  Wt Readings from Last 3 Encounters:   12/04/22 68 3 kg (150 lb 9 2 oz)   11/23/22 74 5 kg (164 lb 3 9 oz)   11/22/22 70 8 kg (156 lb)     · Chronic HF  · Echo 03/01/2022 with EF 45%  · Continue beta-blocker and Aldactone  · IV Lasix -> Torsemide  · Monitor I/O, daily weights  · Less than 2 g salt diet fluid restriction            Goals of care, counseling/discussion  Assessment & Plan  · Pt frustrated by HTL and expressed interest in more comfort focused care, although he said his wife would want full cares  · Palliative appreciated- full code    Hopefully at rehab can advance to thin liquids    RSV (acute bronchiolitis due to respiratory syncytial virus)  Assessment & Plan  · Resp protocol  · Overall stable    PAD (peripheral artery disease) (HCC)  Assessment & Plan  · Seen on LEADS  · Vascular appreciated  · C/w med mgmt w/ Eliquis, Plavix, and statin  · LDL 33  · OP f/u w/ vascular to assess R foot healing    Deep tissue injury  Assessment & Plan  · Wound care appreciated  · Podiatry appreciated  · Right foot Xray no osteo - ok to WBAT    Dysphagia  Assessment & Plan  · Puree/HTL  · VBS cleared pt for puree/HTL  · Pt upset about HTL but tx focused    Ambulatory dysfunction  Assessment & Plan  Multifactorial with deconditioning contributing  Safe ambulation  Fall precautions  Physical therapy    Hematuria  Assessment & Plan  · Due to urethral injury  · Hematuria resolved    Transaminitis  Assessment & Plan  · Likely due to shock liver  · Improved - chronically high bili      Closed fracture of trochanter of right femur with routine healing  Assessment & Plan  · Discharged from [facility] on 11/06 s/p repair  · DVT prophylaxis w/ Eliquis  · Physical therapy    Acute on chronic anemia  Assessment & Plan  Lab Results   Component Value Date    EGFR 34 12/02/2022    EGFR 32 12/01/2022    EGFR 33 11/30/2022    CREATININE 1 77 (H) 12/02/2022    CREATININE 1 90 (H) 12/01/2022    CREATININE 1 85 (H) 11/30/2022     · Multifactorial  · Iron studies noted  · Iron supplementation  · Monitor hemoglobin    Chronic atrial fibrillation (HCC)  Assessment & Plan  Continue amiodarone, beta-blocker  Eliquis for anticoagulation    Acute-on-chronic kidney injury [facility])  Assessment & Plan  Lab Results   Component Value Date    EGFR 34 12/02/2022    EGFR 32 12/01/2022    EGFR 33 11/30/2022    CREATININE 1 77 (H) 12/02/2022    CREATININE 1 90 (H) 12/01/2022    CREATININE 1 85 (H) 11/30/2022     Estimated Creatinine Clearance: 31 1 mL/min (A) (by C-G formula based on SCr of 1 77 mg/dL (H))  Acute on chronic kidney disease improving with diuresis  Monitor kidney function closely  Avoid nephrotoxins  Suprapubic catheter in place      CAD (coronary artery disease)  Assessment & Plan  Continue Plavix, beta-blocker, Lipitor    UTI (urinary tract infection)-resolved as of 12/4/2022  Assessment & Plan  Antibiotics transition to IV cefazolin based on culture sensitivities  Transitioned to p o  Keflex completed 7 day course of antibiotics    Septic shock (HCC)-resolved as of 12/4/2022  Assessment & Plan  · Present on admission  · Resolved        VTE Pharmacologic Prophylaxis: VTE Score: 21 High Risk (Score >/= 5) - Pharmacological DVT Prophylaxis Ordered: apixaban (Eliquis)  Sequential Compression Devices Ordered  Patient Centered Rounds: I performed bedside rounds with nursing staff today    Discussions with Specialists or Other Care Team Provider: no    Education and Discussions with Family / Patient: Updated  (wife) via phone  Time Spent for Care: 30 minutes  More than 50% of total time spent on counseling and coordination of care as described above  Current Length of Stay: 11 day(s)  Current Patient Status: Inpatient   Certification Statement: The patient will continue to require additional inpatient hospital stay due to need for transport to rehab  Discharge Plan: Anticipate discharge tomorrow to rehab facility  Code Status: Level 1 - Full Code    Subjective:   No acute complaints    Objective:     Vitals:   Temp (24hrs), Av 1 °F (37 3 °C), Min:98 6 °F (37 °C), Max:99 5 °F (37 5 °C)    Temp:  [98 6 °F (37 °C)-99 5 °F (37 5 °C)] 99 5 °F (37 5 °C)  HR:  [] 91  Resp:  [16-18] 16  BP: (111-130)/(54-65) 116/55  SpO2:  [88 %-98 %] 93 %  Body mass index is 22 89 kg/m²  Input and Output Summary (last 24 hours): Intake/Output Summary (Last 24 hours) at 2022 1359  Last data filed at 12/3/2022 2000  Gross per 24 hour   Intake --   Output 1050 ml   Net -1050 ml       Physical Exam:   Physical Exam  HENT:      Head: Normocephalic and atraumatic  Nose: Nose normal       Mouth/Throat:      Mouth: Mucous membranes are moist    Eyes:      Extraocular Movements: Extraocular movements intact  Conjunctiva/sclera: Conjunctivae normal    Cardiovascular:      Rate and Rhythm: Normal rate and regular rhythm  Pulmonary:      Effort: Pulmonary effort is normal    Abdominal:      General: Bowel sounds are normal       Palpations: Abdomen is soft  Musculoskeletal:         General: Normal range of motion  Cervical back: Normal range of motion and neck supple  Right lower leg: No edema  Left lower leg: No edema  Skin:     General: Skin is warm and dry  Neurological:      Mental Status: He is alert and oriented to person, place, and time           Additional Data:     Labs:  Results from last 7 days   Lab Units 22  9816 11/29/22  0505   WBC Thousand/uL 7 99 9 09   HEMOGLOBIN g/dL 9 0* 7 6*   HEMATOCRIT % 28 9* 25 0*   PLATELETS Thousands/uL 122* 123*   NEUTROS PCT %  --  86*   LYMPHS PCT %  --  6*   MONOS PCT %  --  5   EOS PCT %  --  2     Results from last 7 days   Lab Units 12/02/22  0458   SODIUM mmol/L 142   POTASSIUM mmol/L 3 7   CHLORIDE mmol/L 105   CO2 mmol/L 33*   BUN mg/dL 47*   CREATININE mg/dL 1 77*   ANION GAP mmol/L 4   CALCIUM mg/dL 8 3   GLUCOSE RANDOM mg/dL 113                       Lines/Drains:  Invasive Devices     Peripheral Intravenous Line  Duration           Peripheral IV 12/04/22 Proximal;Right;Ventral (anterior) Forearm <1 day          Drain  Duration           Suprapubic Catheter 18 Fr  11 days                      Imaging: No pertinent imaging reviewed      Recent Cultures (last 7 days):         Last 24 Hours Medication List:   Current Facility-Administered Medications   Medication Dose Route Frequency Provider Last Rate   • acetaminophen  650 mg Oral Q6H PRN Jassi Lewis MD     • albuterol  2 5 mg Nebulization Q6H PRN Prosper Steen PA-C     • amiodarone  200 mg Oral Daily With Breakfast Jassi Lewis MD     • apixaban  2 5 mg Oral BID Theron Stewart DO     • atorvastatin  40 mg Oral Daily With Daysi Mcclendon MD     • clopidogrel  75 mg Oral Daily Theron Stewart DO     • doxylamine  12 5 mg Oral HS PRN Prosper Steen PA-C     • ferrous sulfate  325 mg Oral Daily With Breakfast Andrae Lora MD     • HYDROmorphone  0 2 mg Intravenous Q4H PRN Theron Stewart DO     • ipratropium  0 5 mg Nebulization Q6H PRN Prosper Steen PA-C     • Lidocaine Viscous HCl  15 mL Swish & Spit 4x Daily PRN Deedra Brittle, DO     • melatonin  6 mg Oral HS Swathi Zapata PA-C     • metoprolol tartrate  25 mg Oral Q12H [address] Andrae Lora MD     • oxyCODONE  2 5 mg Oral Q6H PRN Theron Stewart DO      Or   • oxyCODONE  5 mg Oral Q6H PRN Theron Stewart DO     • potassium chloride  10 mEq Oral Daily Natalya Abraham DO     • saliva substitute  5 spray Mouth/Throat 4x Daily PRN Guanaco Rodriguez MD     • spironolactone  25 mg Oral Daily Carla Mckeon MD     • tamsulosin  0 4 mg Oral Daily With Dwight Sloan MD     • torsemide  40 mg Oral Daily Natalya Abraham DO          Today, Patient Was Seen By: Natalya Abraham DO    **Please Note: This note may have been constructed using a voice recognition system  **

## 2022-12-04 NOTE — DISCHARGE INSTRUCTIONS
Rehab should check CMP in 2-3 daysThoracentesis   WHAT YOU NEED TO KNOW:   A thoracentesis is a procedure to remove extra fluid or air from between your lungs and your inner chest wall  Air or fluid buildup may make it hard for you to breathe  A thoracentesis allows your lungs to expand fully so you can breathe more easily  DISCHARGE INSTRUCTIONS:     Small amount of shoulder pain and bloody sputum is normal after a Thoracentesis  Rest:  Rest when you feel it is needed  Slowly start to do more each day  Return to your daily activities as directed  Resume your normal diet  Small sips of flat soda will help mild nausea  Do not smoke: If you smoke, it is never too late to quit  Ask for information about how to stop smoking if you need help  Contact Interventional Radiology at [phone] [place] PATIENTS: Contact Interventional Radiology at [phone]) [place] PATIENTS: Contact Interventional Radiology at [phone]) if:   You have a fever  Your puncture site is red, warm, swollen, or draining pus  You have questions or concerns about your procedure, medicine, or care  Seek care immediately or call 911 if:   Severe chest pain with inspiration and shortness of breath    Large amounts of blood in your sputum    Follow up with your healthcare provider as directed
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
## 2022-12-05 PROBLEM — R65.20 SEVERE SEPSIS (HCC): Status: ACTIVE | Noted: 2021-04-26

## 2022-12-05 PROBLEM — E87.0 HYPERNATREMIA: Status: ACTIVE | Noted: 2022-01-01

## 2022-12-05 PROBLEM — J18.9 HAP (HOSPITAL-ACQUIRED PNEUMONIA): Status: ACTIVE | Noted: 2022-12-05

## 2022-12-05 NOTE — ASSESSMENT & PLAN NOTE
· Noted last night  · Cefepime/Vanco  · CXR WNL but clinically has HAP  · F/u U leg and U Strep and sp Cx and MRSA swab

## 2022-12-05 NOTE — OCCUPATIONAL THERAPY NOTE
Occupational Therapy Cancellation Note        Patient Name: Kylah Canela  [id number] Date: 12/5/2022 12/05/22 0800   OT Last Visit   OT Visit Date 12/05/22   Note Type   Note Type Cancelled Session   Cancel Reasons Medical status       Chart reviewed, OT treatment attempted  Noted that pt is febrile with bipap on  OT will continue to follow and see as medically appropriate and able       Rafia Milton, CHRISS, OTR/L

## 2022-12-05 NOTE — PROGRESS NOTES
Torres Mccrary is a 80 y o  male who is currently ordered Vancomycin IV with management by the Pharmacy Consult service  Relevant clinical data and objective / subjective history reviewed  Vancomycin Assessment:  Indication and Goal AUC/Trough: Pneumonia (goal -600, trough >10), -600, trough >10  Clinical Status: worsening  Micro:   pending  Renal Function:  SCr: 2 43 mg/dL  CrCl: 22 4 mL/min  Renal replacement: not on dialysis  Days of Therapy: 1  Current Dose: 1750mg loading dose followed by pulse dosing of 1000mg daily prn when random vanco less than 15  Vancomycin Plan:  New Dosin mg IV PRN when random level < 15  Estimated AUC: 414 mcg*hr/mL  Estimated Trough: 14 4 mcg/mL  Next Level: random  am labs   Renal Function Monitoring: Daily BMP and [place] will continue to follow closely for s/sx of nephrotoxicity, infusion reactions and appropriateness of therapy  BMP and CBC will be ordered per protocol  We will continue to follow the patient’s culture results and clinical progress daily      Aureliano Joseph, Pharmacist

## 2022-12-05 NOTE — SEPSIS NOTE
Sepsis Note   Sridevi Valdivia 80 y o  male MRN: [id number]  Unit/Bed#: [facility] 431-01 Encounter: [id number]       qSOFA     Row Name 12/05/22 0100 12/04/22 22:48:46 12/04/22 2000 12/04/22 15:31:18 12/04/22 08:09:04    Altered mental status GCS < 15 -- -- 0 -- --    Respiratory Rate > / =22 -- -- -- 0 --    Systolic BP < / =868 0 0 -- 0 0    Q Sofa Score -- -- -- 0 --    Row Name 12/04/22 0807 12/04/22 0800 12/04/22 06:53:45 12/03/22 22:24:56 12/03/22 21:25:25    Altered mental status GCS < 15 -- 0 -- -- --    Respiratory Rate > / =22 -- -- -- 0 --    Systolic BP < / =054 0 -- 0 0 0    Q Sofa Score -- -- -- 0 --    [facility] Name 12/03/22 2119 12/03/22 2000 12/03/22 15:45:53 12/03/22 0800 12/03/22 07:00:02    Altered mental status GCS < 15 -- 0 -- 0 --    Respiratory Rate > / =22 -- -- 0 -- 0    Systolic BP < / =547 0 -- 0 -- 0    Q Sofa Score -- -- 0 0 0    Row Name 12/03/22 0500                Altered mental status GCS < 15 0        Respiratory Rate > / =60 --        Systolic BP < / =407 --        Q Sofa Score --                   Initial Sepsis Screening     Row Name 12/05/22 0305 12/05/22 0303 12/04/22 2324 12/04/22 2322       Is the patient's history suggestive of a new or worsening infection? Yes (Proceed)  -JV Yes (Proceed)  -JV Yes (Proceed)  +RSV  -JV Yes (Proceed)  -JV     Suspected source of infection pneumonia  -JV pneumonia  -JV pneumonia;suspect infection, source unknown  -JV pneumonia;suspect infection, source unknown  -JV     Are two or more of the following signs & symptoms of infection both present and new to the patient? Yes (Proceed)  -JV Yes (Proceed)  -JV -- Yes (Proceed)  -JV     Indicate SIRS criteria Hyperthemia > 38 3C (100 9F); Tachycardia > 90 bpm;Tachypnea > 20 resp per min;Leukocytosis (WBC > 31238 IJL)  -JV Hyperthemia > 38 3C (100 9F); Tachycardia > 90 bpm;Tachypnea > 20 resp per min;Leukocytosis (WBC > 70600 IJL)  -JV Hyperthemia > 38 3C (100 9F); Tachycardia > 90 bpm  -JV Hyperthemia > 38 3C (100  9F); Tachycardia > 90 bpm;Tachypnea > 20 resp per min  -JV     If the answer is yes to both questions, suspicion of sepsis is present -- -- -- --     If severe sepsis is present AND tissue hypoperfusion perists in the hour after fluid resuscitation or lactate > 4, the patient meets criteria for SEPTIC SHOCK -- -- -- --     Are any of the following organ dysfunction criteria present within 6 hours of suspected infection and SIRS criteria that are NOT considered to be chronic conditions? Yes  -JV Yes  -JV -- --     Organ dysfunction Creatinine > 2 0 mg/dL; Creatinine > 2 0 mg/dl AND > 0 5 mg/dl above baseline;Urine output < 0 5 mL/kg/hour for 2 hours  -JV Creatinine > 2 0 mg/dL  -JV -- --     Date of presentation of severe sepsis 12/04/22  -JV 12/04/22  -JV 12/04/22  -JV --     Time of presentation of severe sepsis 2343  -JV -- 2250  -JV --     Tissue hypoperfusion persists in the hour after crystalloid fluid administration, evidenced, by either: -- -- -- --     Was hypotension present within one hour of the conclusion of crystalloid fluid administration?  No  -JV -- -- --     Date of presentation of septic shock -- -- -- --     Time of presentation of septic shock -- -- -- --           User Key  (r) = Recorded By, (t) = Taken By, (c) = Cosigned By    Initials Name Provider Type    [address] [place] [address], PA-C Physician Assistant                  Default Flowsheet Data (last 720 hours)     Sepsis Reassess     Row Name 12/05/22 0307                   Repeat Volume Status and Tissue Perfusion Assessment Performed    Repeat Volume Status and Tissue Perfusion Assessment Performed Yes  -JV           Volume Status and Tissue Perfusion Post Fluid Resuscitation * Must Document All *    Vital Signs Reviewed (HR, RR, BP, T) Yes  -JV        Shock Index Reviewed Yes  -JV        Arterial Oxygen Saturation Reviewed (POx, SaO2 or SpO2) Yes (comment %)  96% on BiPAP  -JV        Cardio Tachycardia  -JV        Pulmonary Respiratory distress; Rhonchi  -JV        Capillary Refill Brisk  -JV        Peripheral Pulses Radial  -JV        Peripheral Pulse +2  -JV        Skin Warm  -JV        Urine output assessed Decreased  -JV           *OR*   Intensive Monitoring- Must Document One of the Following Four *:    Vital Signs Reviewed --        * Central Venous Pressure (CVP or RAP) --        * Central Venous Oxygen (SVO2, ScvO2 or Oxygen saturation via central catheter) --        * Bedside Cardiovascular US in IVC diameter and % collapse --        * Passive Leg Raise OR Crystalloid Challenge --              User Key  (r) = Recorded By, (t) = Taken By, (c) = Cosigned By    Initials Name Provider Type    [address] [facility], PA-C Physician Assistant

## 2022-12-05 NOTE — QUICK NOTE
Patient newly febrile 102 8F, tachycardic 110-120s, tachypneic, and worsening hypoxia  Patient still hypoxic on maximum midflow and with significant increased work of breathing requiring BiPAP to maintain O2 saturation >92% however patient still appears in respiratory distress despite BiPAP  Lungs with bilateral coarse breath sounds  Patient also with decreased UO  Hemodynamically stable  EF 45%  Patient has not been eating/drinking per RN  Suspect likely aspiration pneumonia vs  Pneumonitis complicated by RSV infection    -Will ask critical care to assess patient as patient is currently Full Code  Discussed GOC with patient's wife who states that she needs some time to think about everything that is going on but for now to keep as Full Code and to notify if he deteriorates   I did discuss with patient's daughter as well who states Katelynn Hooper (wife) as decision maker   -Continuous BiPAP for now    -NPO  -Obtain blood cx x2, lactic acid, CBC, CMP, ABG, procal, MRSA swab  -STAT CXR  -500cc IVF bolus as patient appears dry   -empiric IV abx Cefepime + Vanco, MRSA swab   -upgrade SD2 pending CC evaluation

## 2022-12-05 NOTE — CASE MANAGEMENT
Case Management Discharge Planning Note    Patient name Jose Meza  Location [facility] 949-78 MRN [id number]  : 1940 Date 2022       Current Admission Date: 2022  Current Admission Diagnosis:Urethral trauma   Patient Active Problem List    Diagnosis Date Noted   • HAP (hospital-acquired pneumonia) 2022   • RSV (acute bronchiolitis due to respiratory syncytial virus) 2022   • Goals of care, counseling/discussion 2022   • PAD (peripheral artery disease) ([address] ) 2022   • Deep tissue injury 2022   • Dysphagia 2022   • Ambulatory dysfunction 2022   • Urethral trauma 2022   • Closed fracture of trochanter of right femur with routine healing 2022   • Leg edema, right 2022   • Pressure injury sacrum and right heel 2022   • Transaminitis 2022   • Elevated troponin level not due myocardial infarction 2022   • Hematuria 2022   • Thrombocytopenia ([address] ) 2022   • Acute on chronic anemia 2022   • Hyponatremia 2022   • Postoperative urinary retention    • Heart failure with mid-range ejection fraction ([address] ) 10/31/2022   • Paroxysmal atrial fibrillation ([address] ) 10/31/2022   • Closed 2-part intertrochanteric fracture of proximal end of right femur, initial encounter ([address] ) 10/31/2022   • Lumbar compression deformity 2022   • Postprocedural pneumothorax 2022   • Acute respiratory failure with hypoxia ([address] ) 2022   • Acute on chronic diastolic CHF (congestive heart failure) ([address] ) 2022   • Acute-on-chronic kidney injury ([address] ) 2022   • Chronic atrial fibrillation ([address] ) 2022   • Stage 3 chronic kidney disease ([address] ) 2021   • Multiple lung nodules on CT 2021   • Carotid artery stenosis 2021   • Bacteremia due to Pseudomonas 2021   • CAD (coronary artery disease) 2021   • Mitral regurgitation 2021   • Hypertensive urgency 2021   • Hyperlipidemia, mixed 04/21/2021      LOS (days): 12  Geometric Mean LOS (GMLOS) (days): 4 80  Days to GMLOS:-7 4     OBJECTIVE:  Risk of Unplanned Readmission Score: 37 51         Current admission status: Inpatient   Preferred Pharmacy:   [facility]  [address]  [address] [place] [address]  Phone: [phone] Fax: [facility], [address] [place] [address],[address]  [address]  Phone: [phone] Fax: [phone]    Primary Care Provider: Shaina Garcia DO    Primary Insurance: MEDICARE  Secondary Insurance: BLUE CROSS    DISCHARGE DETAILS:                                                             Per provider, pt is no longer medically stable for discharge today  Pt is currently febrile and was placed in BiPap  Per CC provider, if pt does not improve with BiPAP and subsequently progresses to respiratory failure, comfort care would be most appropriate for him  CM will monitor pt's further clinical course and plan for discharge accordingly  [facility] advised of pt's cancelled discharge today  Transportation cancelled at bedside this morning by the nursing team (CM not present)  CM will remain available

## 2022-12-05 NOTE — ASSESSMENT & PLAN NOTE
Wt Readings from Last 3 Encounters:   12/04/22 68 3 kg (150 lb 9 2 oz)   11/23/22 74 5 kg (164 lb 3 9 oz)   11/22/22 70 8 kg (156 lb)     · Chronic HF  · Echo 03/01/2022 with EF 45%     · Continue beta-blocker and Aldactone  · IV Lasix -> Torsemide  · Now off diuretics due to JOEL  · Monitor I/O, daily weights  · Less than 2 g salt diet fluid restriction

## 2022-12-05 NOTE — PROGRESS NOTES
PULMONOLOGY PROGRESS NOTE     Name: Marcelo Bains   Age & Sex: 80 y o  male   MRN: [id number]  Unit/Bed#: [facility] 431-01   Encounter: [id number]    PATIENT INFORMATION     Name: Marcelo Bains   Age & Sex: 80 y o  male   MRN: [id number]  Hospital Stay Days: 12    ASSESSMENT/PLAN     Patient is a 80year old male with a PMHx of HFrEF, CAD, HLD, HTN, A-fib on Eliquis, originally admitted to the medical intensive care unit at [address] [place] [address] on 11/21 with septic shock secondary to urosepsis secondary to E coli for which he was treated with broad spectrum antibiotics  Patient underwent placement of zavala catheter on 11/22  Due to urinary retention which was likely traumatic leading to hematuria treated with CBI  Due to persistent urinary retention and significant pain, CT A/P was ordered on 11/23 with evidence of zavala catheter with balloon inflated in the urethra and with gas within the soft tissues in the perineum and penis most pronounced on the left concerning for urethral injury  Patient was transferred to [facility] for urology consult  He underwent cystoscopy that day with placement of suprapubic catheter  Patient was then monitored in the MICU  Per notes, no respiratory problems present at the time  He was transferred to [place] [address] on 11/24  Patient was started on lasix for volume overload and transition to Torsemide on 12/01  Overnight on 12/04, patient became febrile, tachycardic and tachypneic with worsening hypoxia and increased work of breathing  He was placed on BiPAP with SpO2 92%  He was started on Cefepime and Vancomycin empirically due to meeting sepsis criteria  He tested positive for RSV as well  Primary team reached out to pulmonology for follow up for management and recommendations regarding acute respiratory failure with hypoxia  Patient is lifelong nonsmoker  Denies previous history of pulmonary disease  Assessment:   1  Acute respiratory failure with hypoxia  2   RSV with possible superimposed bacterial pneumonia  3  Sepsis   4  HFrEF  5  Dysphagia  6  Atrial fibrillation  7  JOEL on CKD  8  Urosepsis - resolved  9  Urethral trauma    Plan:  • Patient currently on HFNC 55L/55% with SpO2 98%  • Can wean FiO2 as tolerated to maintain SpO2 > 90%  • Continue broad spectrum antibiotics  • Follow up streptococcus and legionella antigen as well as sputum culture results  • Start Atrovent/Xopenex TID  • Decreased air movement bilaterally  Will give SoluMedrol 40 mg IV x1  • Start Mucinex  • Incentive spirometry if able  • Chest PT  • Pulmonology will continue to follow  • Remaining of care per primary team       SUBJECTIVE     Patient seen and examined  Overnight events reviewed  Patient reports his breathing is somewhat better but he still feels short of breath  He denies chest pain  OBJECTIVE     Vitals:    22 0653 22 0736 22 0913 22 1017   BP: 99/51      BP Location: Right arm      Pulse:       Resp: 16      Temp: (!) 101 4 °F (38 6 °C)   (!) 100 9 °F (38 3 °C)   TempSrc: Axillary      SpO2: 99% 100% 100%    Weight:       Height:          Temperature:   Temp (24hrs), Av 3 °F (38 5 °C), Min:99 7 °F (37 6 °C), Max:102 8 °F (39 3 °C)    Temperature: (!) 100 9 °F (38 3 °C)  Intake & Output:  I/O        07 07 07 07 07    P  O  Total Intake(mL/kg)       Urine (mL/kg/hr) 1350 (0 8) 800 (0 5)     Stool  0     Total Output 1350 800     Net -1350 -800            Unmeasured Stool Occurrence  1 x         Weights:   IBW (Ideal Body Weight): 68 4 kg    Body mass index is 22 89 kg/m²  Weight (last 2 days)     Date/Time Weight    22 --     Weight: Unable to weigh  Too many object on bed for comfort  at 22 0600    22 06 68 3 (150 57)    22 06 68 (149 91)        Physical Exam  Vitals and nursing note reviewed  Constitutional:       Appearance: He is ill-appearing  He is not toxic-appearing     HENT:      Head: Normocephalic  Eyes:      General: No scleral icterus  Pupils: Pupils are equal, round, and reactive to light  Cardiovascular:      Rate and Rhythm: Normal rate and regular rhythm  Heart sounds: No murmur heard  Pulmonary:      Effort: Tachypnea present  Breath sounds: Decreased air movement present  No stridor  Decreased breath sounds present  No wheezing, rhonchi or rales  Abdominal:      General: There is no distension  Palpations: Abdomen is soft  Tenderness: There is no abdominal tenderness  There is no guarding  Musculoskeletal:      Right lower leg: No edema  Left lower leg: No edema  Skin:     General: Skin is warm  Capillary Refill: Capillary refill takes less than 2 seconds  Neurological:      Mental Status: He is alert  Mental status is at baseline  Cranial Nerves: No cranial nerve deficit  Psychiatric:         Mood and Affect: Mood normal            LABORATORY DATA     Labs: I have personally reviewed pertinent reports    Results from last 7 days   Lab Units 12/05/22 0505 12/04/22 2325 11/30/22 0453 11/29/22  0505   WBC Thousand/uL 13 72* 15 43* 7 99 9 09   HEMOGLOBIN g/dL 7 6* 8 8* 9 0* 7 6*   HEMATOCRIT % 26 3* 30 0* 28 9* 25 0*   PLATELETS Thousands/uL 158 212 122* 123*   NEUTROS PCT % 91* 93*  --  86*   MONOS PCT % 6 4  --  5      Results from last 7 days   Lab Units 12/05/22  0505 12/04/22 2325 12/02/22  0458   POTASSIUM mmol/L 4 2 4 5 3 7   CHLORIDE mmol/L 114* 109* 105   CO2 mmol/L 28 31 33*   BUN mg/dL 56* 55* 47*   CREATININE mg/dL 2 41* 2 43* 1 77*   CALCIUM mg/dL 8 1* 8 6 8 3   ALK PHOS U/L 120* 157*  --    ALT U/L 13 12  --    AST U/L 20 18  --      Results from last 7 days   Lab Units 12/01/22 0522 11/30/22  0453   MAGNESIUM mg/dL 2 5 1 9     Results from last 7 days   Lab Units 11/30/22  0453   PHOSPHORUS mg/dL 3 6          Results from last 7 days   Lab Units 12/05/22  0505   LACTIC ACID mmol/L 1 6             ABG:   Results from last 7 days   Lab Units 12/04/22  2352   PH ART  7 497*   PCO2 ART mm Hg 34 2*   PO2 ART mm Hg 110 2   HCO3 ART mmol/L 25 9   BASE EXC ART mmol/L 2 7   ABG SOURCE  Radial, Left       Micro:   Results from last 7 days   Lab Units 12/04/22  2324   BLOOD CULTURE  Received in Microbiology Lab  Culture in Progress  Received in Microbiology Lab  Culture in Progress  IMAGING & DIAGNOSTIC TESTING     Radiology Results: I have personally reviewed pertinent reports  CT abdomen pelvis wo contrast    Result Date: 11/23/2022  Impression: The Schaffer catheter is malpositioned  The catheter balloon is located near the base of the penis, below the level of the prostate  There is gas within the soft tissues of the perineum and penis, most pronounced on the left  Urethral injury is suspected  Urology consultation and follow-up is recommended  The urinary bladder is distended, extending nearly to the level of the umbilicus  The urinary bladder volume is approximately 970 mL  There is gas layering non-dependently within the anterior aspect of the urinary bladder  There is mild dilatation of the renal collecting systems and ureters bilaterally, likely related to the degree of urinary bladder distention  There is bilateral perinephric stranding and fluid, left greater than right, increased compared with November 21, 2022  Fluid tracks caudally bilaterally into the pelvis  Correlation with urinalysis and urine culture and sensitivity is recommended  Persistent nephrograms are present; this raises concern for possible ATN  Clinical and laboratory correlation is recommended  Consider Nephrology consultation  There is a small amount of ascites, increased compared to the prior study  There is body wall edema, worse compared to the prior study  The inferior aspect of the left gluteus john muscle appears asymmetrically enlarged compared with the contralateral right side and there is overlying subcutaneous edema    This may be related to asymmetric edema, myositis, or hematoma  Clinical correlation and follow-up is recommended  There is a moderate-sized left pleural effusion  There is bibasilar atelectasis containing some air bronchograms, left greater than right  Superimposed pneumonia should be excluded clinically  Other nonemergent findings, as described above  Please see discussion  I personally discussed this study with Aileen Carmichael on 11/23/2022 at 1:40 AM  Workstation performed: [id number]     XR chest pa & lateral    Result Date: 11/28/2022  Impression: Increasing left effusion Increasing hazy density in the lung suggest worsening congestion Workstation performed: [id number]     Other Diagnostic Testing: I have personally reviewed pertinent reports      ACTIVE MEDICATIONS     Current Facility-Administered Medications   Medication Dose Route Frequency   • acetaminophen (TYLENOL) rectal suppository 650 mg  650 mg Rectal Q6H PRN   • albuterol inhalation solution 2 5 mg  2 5 mg Nebulization Q6H PRN   • amiodarone tablet 200 mg  200 mg Oral Daily With Breakfast   • apixaban (ELIQUIS) tablet 2 5 mg  2 5 mg Oral BID   • atorvastatin (LIPITOR) tablet 40 mg  40 mg Oral Daily With Dinner   • cefepime (MAXIPIME) 2,000 mg in dextrose 5 % 50 mL IVPB  2,000 mg Intravenous Q24H   • clopidogrel (PLAVIX) tablet 75 mg  75 mg Oral Daily   • dextrose 5 % infusion  35 mL/hr Intravenous Continuous   • doxylamine (UNISOM) tablet 12 5 mg  12 5 mg Oral HS PRN   • ferrous sulfate tablet 325 mg  325 mg Oral Daily With Breakfast   • HYDROmorphone HCl (DILAUDID) injection 0 2 mg  0 2 mg Intravenous Q4H PRN   • ipratropium (ATROVENT) 0 02 % inhalation solution 0 5 mg  0 5 mg Nebulization Q6H PRN   • Lidocaine Viscous HCl (XYLOCAINE) 2 % mucosal solution 15 mL  15 mL Swish & Spit 4x Daily PRN   • melatonin tablet 6 mg  6 mg Oral HS   • metoprolol tartrate (LOPRESSOR) tablet 25 mg  25 mg Oral Q12H YOLANDA   • oxyCODONE (ROXICODONE) IR tablet 2 5 mg  2 5 mg Oral Q6H PRN    Or   • oxyCODONE (ROXICODONE) IR tablet 5 mg  5 mg Oral Q6H PRN   • saliva substitute (MOUTH KOTE) mucosal solution 5 spray  5 spray Mouth/Throat 4x Daily PRN   • tamsulosin (FLOMAX) capsule 0 4 mg  0 4 mg Oral Daily With Dinner   • [START ON 12/6/2022] vancomycin (VANCOCIN) IVPB (premix in dextrose) 1,000 mg 200 mL  15 mg/kg Intravenous Daily PRN       VTE Pharmacologic Prophylaxis: Eliquis  VTE Mechanical Prophylaxis: sequential compression device      Disclaimer: Portions of the record may have been created with voice recognition software  Occasional wrong word or "sound a like" substitutions may have occurred due to the inherent limitations of voice recognition software  Careful consideration should be taken to recognize, using context, where substitutions have occurred      Kelsie Meigs, MD   Pulmonary and Critical Care Fellow, PGY-4  [facility]

## 2022-12-05 NOTE — ACP (ADVANCE CARE PLANNING)
I was called to evaluate patient for BiPAP placement for respiratory failure  I examined patient  On exam obtunded and in severe respiratory distress/impending respiratory failure on BiPAP  -400 RR 40-50, I increased BIPAP to 18/6 however had minimal effect on -450  I reviewed chart and labs  Patient had severe illness in 2021 but did recover and was living with wife independently at home until 10/31 when he had intertroch fracture  He had ORIF but unfortunately presented to [address] [place] [address] with severe sepsis mixed cardiogenic and septic shock  Was transferred for urology for cystoscopy for ureteral trauma  He developed progressive weakness during his stay including dysphagia  Unfortunately he tested positive for RSV 12/2 for discharge planning  12/4 he had progressive respiratory distress and hypoxemia which rapidly progressed  I called Keisha Wang, wife and notified her of drastic change in clinical status  We discussed his goals of care  Briana Cantu really wanted to live at home independently again and was willing to accept full level of care: intubation and CPR if there was a reasonable chance he would regain independence and have a chance to rehab and live back at home  I explained to Keisha Wang that given his clinical course, prolonged hospitalization, severe deconditioning and dysphagia that he is very high risk of death and prolonged vent dependence in his current status even with intubation and CPR  Furthermore even in the best case scenario he would liked need several days on a ventilator which would cause further deconditioning and dysphagia and that at this point he has very little reserve and most likely would no longer have the capacity for successful rehabilitation to regain independence to live at home again   Keisha Wang stated that Briana Cantu would not want to be intubated in these circumstances given there was not a realistic pathway back home and she did not want to see him undergo any additional suffering in these circumstances  I explained that unfortunately the other alternative to intubation for respiratory failure would be comfort care, in which we would give him medications for comfort to take away the discomfort of respiratory failure and make his breathing more comfortable  Aster Foreman stated that escalation to intubation is not appropriate for Debby Thompson  Furthermore if he does not improve with BiPAP and progress to respiratory failure that comfort care would be most appropriate for him  I recommended that she and family come in because currently Debby Thompson is high risk of progressing towards respiratory failure that is not responsive to BiPAP  We upgraded level of care to DNR/DNI  We will attempt current medical treatment and BIPAP to treat respiratory failure  I told Aster Foreman if his clinical status further deteriorated we would call her immediately  I spent 15 minutes discussing GOC

## 2022-12-05 NOTE — ASSESSMENT & PLAN NOTE
· VBS cleared pt for puree/HTL  · Pt was upset about HTL  · Suspect HAP related to aspiration so pt now NPO  RN will try to give pills crushed w/ puree      · Speech will re-eval tomorrow  · Family would decline PEG if pt cannot tolerate PO

## 2022-12-05 NOTE — ASSESSMENT & PLAN NOTE
Urethral trauma status post cystoscopy with suprapubic catheter in place  Urology input noted  Stop Flomax as pt has SPC and cannot take capsules

## 2022-12-05 NOTE — CONSULTS
Consultation - Palliative & Supportive Care   Brian Cason 80 y o  male MRN: [id number]  Unit/Bed#: [facility] 431-01 Encounter: [id number]      Physician Requesting Consult: Susanne Come, DO  Reason for Consult / Principal Problem: goals of care      Assessment/Plan:  1  Acute hypoxic respiratory failure on HFNC 50lpm/50% FiO2  2  RSV  3  Aspiration pneumonia/pneumonitis  4  Hospital-acquired pneumonia  5  Dysphagia  6  Protein calorie malnutrition  7  Weight loss  8  Frailty  9  Debility, deconditioning, ambulatory dysfuntion  10  JOEL on CKD  11  S/p R femur fx/repair/rehab  12  Urethral trauma s/p cystoscopy w/ suprapubic catheter  13  Goals of care  -continue current medical care  -confirmed level 3, DNAR/DNI, no additional limits on care at this time  Wife/family will consider transition to comfort for further decline or lack of improvement  They do not want patient to suffer  They are agreeable to retry BiPAP for respiratory decline, but if it does not look like patient wants it or improves on it, will consider transition to comfort  Agreeable to low dose opioids and benzodiazepines for labored breathing or respiratory distress  They do not feel patient would want a feeding tube if respiratory status improves  Emotional support and validation provided  -if patient has respiratory deterioration overnight, plan is to try BiPAP, if no improvement - d/w wife and will consider transition to comfort  -will d/c po oxyIR, dry mouth/difficulty with po  -change hydromorphone 0 2mg IV q4h prn mod-severe pain, labored breathing, respiratory distress  -add lorazepam 0 5mg IV q6h prn anxiety, agitation, labored breathing, respiratory distress  -d/w primary team  -will follow      Met with patient, wife/Marielle and daughter/Lou in room  Patient looks exhausted and scared  Somewhat disoriented  Nods to questions  Unable to speak due to dryness of mouth/tongue  Introduced palliative care and role in care   Patient agreeable for me to discuss goals with family outside of room so that he can rest  Met with wife and daughter, SNEHA joined at end  Discussed overall condition and meaningful recovery to patient  Patient was hoping to regain strength and return home  Family does not see how that can happen  Daughter does not want to see patient suffer  Wife wants to continue current care and give patient a chance for improvement as they process his condition, recent decline, and abrupt deterioration overnight  They confirm DNAR/DNI and state that patient would not want a feeding tube even if improves from respiratory status  They are not ready to transition to comfort at this time  We briefly discussed option of hospice if patient's respiratory status improves somewhat and they do not desire ongoing hospital treatments  If patient has respiratory distress overnight, they are agreeable to BiPAP again unless patient does not tolerate or does not improve his condition, then they will consider transition to comfort at that time  They are agreeable to low dose opioid and benzodiazepine for labored breathing and respiratory distress  They do not want  support at this time for patient, as they feel it might be more distressing to patient  Emotional support and validation provided  Will f/u with patient/family tomorrow  Office contact provided  Code Status: Level 3 - DNAR and DNI  Advance Directive and Living Will:    Not in chart  Power of :  No  POLST:  No  Wife/Marielle of 58years  Daughter/Lou and 2 sons  +spiritual/Hoahaoism -  visited when at other hospital  Santos Halsted not want  support at this time        History of Present Illness   HPI: Marcelo Bains is a 80y o  year old male admitted 11/23/2022 with sepsis and shock from SNF  Patient developed progressive weakness during hospitalization including dysphagia  Patient tested positive for RSV on 12/2/22 for discharge planning   Patient with recent femoral fx s/p repair and was in rehab  Patient found to have mental status change with abdominal pain and hematuria, transferred to [facility] for care  Palliative care consulted on 12/2/2022 for goals of care  Patient with marked dysphagia, but goals were to eat better, get stronger, and return home  Patient was offered full diet without restrictions at that time but explained risk of aspiration pna or pneumonitis, but stated "I don't wanna die"  Patient's goal remained clear with ongoing medical treatment and hopes for improvement  Palliative care signed off  Overnight, patient developed respiratory failure  Critical care evaluated patient  Patient obtunded and in severe respiratory distress/impending respiratory failure on BiPAP  Critical care had long discussion with wife/Marielle  Per note, "Jennifer Zamora really wanted to live at home independently again and was willing to accept full level of care   if there was a reasonable chance he would regain independence and have a chance to rehab and live back at home"  Comfort care discussed  Patient changed to level 3, DNAR/DNI  Palliative care re-consulted for ongoing goals of care discussion and decline in clinical status       Review of Systems   Unable to perform ROS: Acuity of condition       Historical Information   Past Medical History:   Diagnosis Date   • CHF (congestive heart failure) ([facility])    • CKD (chronic kidney disease) stage 3, GFR 30-59 ml/min ([facility])    • Coronary artery disease    • Hyperlipidemia    • Hypertension    • Mitral regurgitation    • Paroxysmal atrial fibrillation ([facility])    • Urinary retention      Patient Active Problem List   Diagnosis   • Bacteremia due to Pseudomonas   • CAD (coronary artery disease)   • Mitral regurgitation   • Hypertensive urgency   • Hyperlipidemia, mixed   • Carotid artery stenosis   • Stage 3 chronic kidney disease ([facility])   • Multiple lung nodules on CT   • Acute respiratory failure with hypoxia ([facility])   • Acute on chronic diastolic CHF (congestive heart failure) [facility])   • Acute-on-chronic kidney injury [facility])   • Chronic atrial fibrillation ([facility] )   • Postprocedural pneumothorax   • Lumbar compression deformity   • Heart failure with mid-range ejection fraction (H[facility])   • Paroxysmal atrial fibrillation (HCC)   • Closed 2-part intertrochanteric fracture of proximal end of right femur, initial encounter ([facility] [address] )   • Acute on chronic anemia   • Hyponatremia   • Postoperative urinary retention   • Thrombocytopenia (HCC)   • Closed fracture of trochanter of right femur with routine healing   • Leg edema, right   • Pressure injury sacrum and right heel   • Transaminitis   • Elevated troponin level not due myocardial infarction   • Hematuria   • Urethral trauma   • Ambulatory dysfunction   • Dysphagia   • Deep tissue injury   • PAD (peripheral artery disease) ([email or web address]   • RSV (acute bronchiolitis due to respiratory syncytial virus)   • Goals of care, counseling/discussion   • HAP (hospital-acquired pneumonia)     Past Surgical History:   Procedure Laterality Date   • CARDIAC SURGERY      cabg x 2 2014 [facility]   • CORONARY ANGIOPLASTY WITH STENT PLACEMENT  10/2021    DAPHNEY to left main and ramus   • MITRAL VALVE REPAIR  2014    mitral ring   • HI CYSTOURETHROSCOPY W/IRRIG & EVAC CLOTS N/A 11/23/2022    Procedure: CYSTOSCOPY;  Surgeon: Letty Estrada MD;  Location: [facility];  Service: Urology   • HI OPEN RX FEMUR FX+INTRAMED HALIMA Right 11/1/2022    Procedure: INSERTION NAIL IM FEMUR ANTEGRADE (TROCHANTERIC); Surgeon: Joe Rene;   Location:  MAIN OR;  Service: Orthopedics   • SUPRAPUBIC TUBE PLACEMENT N/A 11/23/2022    Procedure: INSERTION SUPRAPUBIC CATHETER PERCUTANEOUS;  Surgeon: Letty Estrada MD;  Location:  MAIN OR;  Service: Urology     Social History     Socioeconomic History   • Marital status: /Civil Union     Spouse name: None   • Number of children: None   • Years of education: None   • Highest education level: None   Occupational History   • None Tobacco Use   • Smoking status: Never   • Smokeless tobacco: Never   Vaping Use   • Vaping Use: Never used   Substance and Sexual Activity   • Alcohol use: Not Currently   • Drug use: Never   • Sexual activity: Not Currently     Comment: defer   Other Topics Concern   • None   Social History Narrative   • None     Social Determinants of Health     Financial Resource Strain: Not on file   Food Insecurity: No Food Insecurity   • Worried About Running Out of Food in the Last Year: Never true   • Ran Out of Food in the Last Year: Never true   Transportation Needs: No Transportation Needs   • Lack of Transportation (Medical): No   • Lack of Transportation (Non-Medical):  No   Physical Activity: Not on file   Stress: Not on file   Social Connections: Not on file   Intimate Partner Violence: Not on file   Housing Stability: Low Risk    • Unable to Pay for Housing in the Last Year: No   • Number of Places Lived in the Last Year: 1   • Unstable Housing in the Last Year: No     Family History   Problem Relation Age of Onset   • Hypertension Father        Meds/Allergies   all current active meds have been reviewed and current meds:   Current Facility-Administered Medications   Medication Dose Route Frequency   • acetaminophen (TYLENOL) rectal suppository 650 mg  650 mg Rectal Q6H PRN   • albuterol inhalation solution 2 5 mg  2 5 mg Nebulization Q6H PRN   • amiodarone tablet 200 mg  200 mg Oral Daily With Breakfast   • apixaban (ELIQUIS) tablet 2 5 mg  2 5 mg Oral BID   • atorvastatin (LIPITOR) tablet 40 mg  40 mg Oral Daily With Dinner   • cefepime (MAXIPIME) 2,000 mg in dextrose 5 % 50 mL IVPB  2,000 mg Intravenous Q24H   • clopidogrel (PLAVIX) tablet 75 mg  75 mg Oral Daily   • dextrose 5 % infusion  35 mL/hr Intravenous Continuous   • doxylamine (UNISOM) tablet 12 5 mg  12 5 mg Oral HS PRN   • ferrous sulfate tablet 325 mg  325 mg Oral Daily With Breakfast   • HYDROmorphone HCl (DILAUDID) injection 0 2 mg  0 2 mg Intravenous Q4H PRN   • ipratropium (ATROVENT) 0 02 % inhalation solution 0 5 mg  0 5 mg Nebulization Q6H PRN   • Lidocaine Viscous HCl (XYLOCAINE) 2 % mucosal solution 15 mL  15 mL Swish & Spit 4x Daily PRN   • melatonin tablet 6 mg  6 mg Oral HS   • metoprolol tartrate (LOPRESSOR) tablet 25 mg  25 mg Oral Q12H [address]   • oxyCODONE (ROXICODONE) IR tablet 2 5 mg  2 5 mg Oral Q6H PRN    Or   • oxyCODONE (ROXICODONE) IR tablet 5 mg  5 mg Oral Q6H PRN   • saliva substitute (MOUTH KOTE) mucosal solution 5 spray  5 spray Mouth/Throat 4x Daily PRN   • tamsulosin (FLOMAX) capsule 0 4 mg  0 4 mg Oral Daily With Dinner   • [START ON 12/6/2022] vancomycin (VANCOCIN) IVPB (premix in dextrose) 1,000 mg 200 mL  15 mg/kg Intravenous Daily PRN       No Known Allergies    I have reviewed the patient's controlled substance dispensing history in the Prescription Drug Monitoring Program in compliance with the [facility] regulations before prescribing any controlled substances  Last fill:  11/6/2022: oxyIR 5mg, #58    Objective   BP 99/51 (BP Location: Right arm)   Pulse (!) 110   Temp (!) 101 4 °F (38 6 °C) (Axillary)   Resp 16   Ht 5' 8" (1 727 m)   Wt 68 3 kg (150 lb 9 2 oz)   SpO2 100%   BMI 22 89 kg/m²   Physical Exam  Vitals and nursing note reviewed  Constitutional:       General: He is not in acute distress  Appearance: He is ill-appearing  He is not toxic-appearing or diaphoretic  Comments: Taken off BiPAP, now on HFNC, frail   HENT:      Head: Normocephalic and atraumatic  Mouth/Throat:      Mouth: Mucous membranes are dry  Eyes:      General: No scleral icterus  Extraocular Movements: Extraocular movements intact  Cardiovascular:      Rate and Rhythm: Tachycardia present  Pulmonary:      Effort: No respiratory distress  Breath sounds: No stridor  Comments: On HFNC, family/patient report breathing improved  Abdominal:      General: There is no distension  Palpations: Abdomen is soft  Tenderness: There is no abdominal tenderness  Musculoskeletal:         General: Tenderness present  No swelling  Normal range of motion  Cervical back: Neck supple  Skin:     General: Skin is warm and dry  Neurological:      Mental Status: He is alert  He is disoriented  Psychiatric:      Comments: Unable to assess due to acuity of condition         Lab Results:   I have personally reviewed pertinent labs  , CBC:   Lab Results   Component Value Date    WBC 13 72 (H) 12/05/2022    HGB 7 6 (L) 12/05/2022    HCT 26 3 (L) 12/05/2022    MCV 99 (H) 12/05/2022     12/05/2022    MCH 28 6 12/05/2022    MCHC 28 9 (L) 12/05/2022    RDW 17 9 (H) 12/05/2022    MPV 10 8 12/05/2022    NRBC 0 12/05/2022   , CMP:   Lab Results   Component Value Date    SODIUM 148 (H) 12/05/2022    K 4 2 12/05/2022     (H) 12/05/2022    CO2 28 12/05/2022    BUN 56 (H) 12/05/2022    CREATININE 2 41 (H) 12/05/2022    CALCIUM 8 1 (L) 12/05/2022    AST 20 12/05/2022    ALT 13 12/05/2022    ALKPHOS 120 (H) 12/05/2022    EGFR 24 12/05/2022   , PT/PTT:No results found for: PT, PTT  Imaging Studies: I have personally reviewed pertinent reports  EKG, Pathology, and Other Studies: I have personally reviewed pertinent reports  Counseling / Coordination of Care  Total floor / unit time spent today 75 minutes  Greater than 50% of total time was spent with the patient and / or family counseling and / or coordination of care  A description of the counseling / coordination of care: current condition, goals of care, transition to comfort discussion, brief hospice discussion, emotional support       Rober Enciso DO

## 2022-12-05 NOTE — PHYSICAL THERAPY NOTE
PHYSICAL THERAPY NOTE          Patient Name: Sunshine Calderon  LIRCT'V Date: 12/5/2022 12/05/22 9026   Note Type   Note Type Cancelled Session   Cancel Reasons Medical status     Chart reviewed; noted pt is currently on Bipap and febrile; will hold PT Tx at this time; will follow as clinical course allows      Arden Lima

## 2022-12-05 NOTE — ASSESSMENT & PLAN NOTE
Lab Results   Component Value Date    EGFR 24 12/05/2022    EGFR 23 12/04/2022    EGFR 34 12/02/2022    CREATININE 2 41 (H) 12/05/2022    CREATININE 2 43 (H) 12/04/2022    CREATININE 1 77 (H) 12/02/2022     Estimated Creatinine Clearance: 22 8 mL/min (A) (by C-G formula based on SCr of 2 41 mg/dL (H))    Acute on chronic kidney disease improving with diuresis  Monitor kidney function closely  Avoid nephrotoxins  Suprapubic catheter in place  Today pt again noted to have JOEL due to sepsis  Check UA  Stop diuretics  Monitor for retention  Gentle D5W

## 2022-12-05 NOTE — PROGRESS NOTES
Progress note - Palliative and Supportive Care   Thang iMna 80 y o  male [id number]    Patient Active Problem List   Diagnosis   • Bacteremia due to Pseudomonas   • CAD (coronary artery disease)   • Mitral regurgitation   • Hypertensive urgency   • Hyperlipidemia, mixed   • Carotid artery stenosis   • Stage 3 chronic kidney disease ([facility])   • Multiple lung nodules on CT   • Acute respiratory failure with hypoxia ([facility])   • Acute on chronic diastolic CHF (congestive heart failure) ([facility])   • Acute-on-chronic kidney injury ([facility])   • Chronic atrial fibrillation ([facility])   • Postprocedural pneumothorax   • Lumbar compression deformity   • Heart failure with mid-range ejection fraction ([facility])   • Paroxysmal atrial fibrillation ([facility])   • Closed 2-part intertrochanteric fracture of proximal end of right femur, initial encounter ([facility] [address] )   • Acute on chronic anemia   • Hyponatremia   • Postoperative urinary retention   • Thrombocytopenia ([facility])   • Closed fracture of trochanter of right femur with routine healing   • Leg edema, right   • Pressure injury sacrum and right heel   • Transaminitis   • Elevated troponin level not due myocardial infarction   • Hematuria   • Urethral trauma   • Ambulatory dysfunction   • Dysphagia   • Deep tissue injury   • PAD (peripheral artery disease) ([facility])   • RSV (acute bronchiolitis due to respiratory syncytial virus)   • Goals of care, counseling/discussion   • HAP (hospital-acquired pneumonia)     Active issues specifically addressed today include:   Aspiration pneumonitis  RSV  Goals of care, counseling/discussion  Hospital-acquired pneumonia  Respiratory distress    Plan:  1  Symptom management - per primary team    2  Goals   · Level 3 DNR/DNI  · Trial period of BiPAP   · If patient continues to decline, family would elect comfort care measures      Code Status:  DNR/DNI - Level 3   Decisional apparatus:  Patient is not competent on my exam today    If competence is lost, patient's substitute decision maker would default to spouse by [place]  Advance Directive / Living Will / POLST:  None on file  Interval history:  Yesterday evening, patient became febrile, tachypneic, tachycardic, and hypoxic, requiring BiPAP  Primary team suspected aspiration pneumonia versus pneumonitis complicated by RSV infection  Patient was started on empiric IV antibiotic therapy  Overnight, Dr Regan Gutierrez contacted patient's wife, Jim Prescott and notified her of drastic change in clinical status and requirement for BiPAP  However, this conversation was to change patient's code status to a level 3 DNR DNI, Jim Brarmitt stated that escalation to intubation is not appropriate for Ulises Sis  Conversation also included discussion of comfort care if patient does not improve with BiPAP and progresses to respiratory failure  MEDICATIONS / ALLERGIES:     all current active meds have been reviewed    No Known Allergies    OBJECTIVE:    Physical Exam  Physical Exam    Lab Results:   I have personally reviewed pertinent labs  , CBC:   Lab Results   Component Value Date    WBC 13 72 (H) 12/05/2022    HGB 7 6 (L) 12/05/2022    HCT 26 3 (L) 12/05/2022    MCV 99 (H) 12/05/2022     12/05/2022    MCH 28 6 12/05/2022    MCHC 28 9 (L) 12/05/2022    RDW 17 9 (H) 12/05/2022    MPV 10 8 12/05/2022    NRBC 0 12/05/2022   , CMP:   Lab Results   Component Value Date    SODIUM 148 (H) 12/05/2022    K 4 2 12/05/2022     (H) 12/05/2022    CO2 28 12/05/2022    BUN 56 (H) 12/05/2022    CREATININE 2 41 (H) 12/05/2022    CALCIUM 8 1 (L) 12/05/2022    AST 20 12/05/2022    ALT 13 12/05/2022    ALKPHOS 120 (H) 12/05/2022    EGFR 24 12/05/2022     Imaging Studies: n/a  EKG, Pathology, and Other Studies: n/a    Counseling / Coordination of Care    Total floor / unit time spent today *** minutes  Greater than 50% of total time was spent with the patient and / or family counseling and / or coordination of care   A description of the counseling / coordination of care: ***

## 2022-12-05 NOTE — ASSESSMENT & PLAN NOTE
Lab Results   Component Value Date    EGFR 24 12/05/2022    EGFR 23 12/04/2022    EGFR 34 12/02/2022    CREATININE 2 41 (H) 12/05/2022    CREATININE 2 43 (H) 12/04/2022    CREATININE 1 77 (H) 12/02/2022     · Multifactorial  · Iron studies noted  · Iron supplementation  · Monitor hemoglobin

## 2022-12-05 NOTE — SPEECH THERAPY NOTE
Speech Language/Pathology    S/w SLIM, pt currently NPO, on BiPAP w/ suspected aspiration event  Plan  to hold ST intervention for today, attempt tomorrow as able and appropriate

## 2022-12-05 NOTE — SEPSIS NOTE
Sepsis Note   [facility] 80 y o  male MRN: [id number]  Unit/Bed#: [facility] 431-01 Encounter: [id number]       qSOFA     Row Name 12/05/22 0100 12/04/22 22:48:46 12/04/22 2000 12/04/22 15:31:18 12/04/22 08:09:04    Altered mental status GCS < 15 -- -- 0 -- --    Respiratory Rate > / =22 -- -- -- 0 --    Systolic BP < / =221 0 0 -- 0 0    Q Sofa Score -- -- -- 0 --    Row Name 12/04/22 0807 12/04/22 0800 12/04/22 06:53:45 12/03/22 22:24:56 12/03/22 21:25:25    Altered mental status GCS < 15 -- 0 -- -- --    Respiratory Rate > / =22 -- -- -- 0 --    Systolic BP < / =255 0 -- 0 0 0    Q Sofa Score -- -- -- 0 --    Row Name 12/03/22 2119 12/03/22 2000 12/03/22 15:45:53 12/03/22 0800 12/03/22 07:00:02    Altered mental status GCS < 15 -- 0 -- 0 --    Respiratory Rate > / =22 -- -- 0 -- 0    Systolic BP < / =968 0 -- 0 -- 0    Q Sofa Score -- -- 0 0 0    Row Name 12/03/22 0500                Altered mental status GCS < 15 0        Respiratory Rate > / =82 --        Systolic BP < / =872 --        Q Sofa Score --                   Initial Sepsis Screening     [facility] Name 12/05/22 0305 12/05/22 0303 12/04/22 2324 12/04/22 2322       Is the patient's history suggestive of a new or worsening infection? Yes (Proceed) (P)   -JV Yes (Proceed)  -JV Yes (Proceed)  +RSV  -JV Yes (Proceed)  -JV     Suspected source of infection pneumonia (P)   -JV pneumonia  -JV pneumonia;suspect infection, source unknown  -JV pneumonia;suspect infection, source unknown  -JV     Are two or more of the following signs & symptoms of infection both present and new to the patient? Yes (Proceed) (P)   -JV Yes (Proceed)  -JV -- Yes (Proceed)  -JV     Indicate SIRS criteria Hyperthemia > 38 3C (100 9F); Tachycardia > 90 bpm;Tachypnea > 20 resp per min;Leukocytosis (WBC > 55477 IJL) (P)   -JV Hyperthemia > 38 3C (100 9F); Tachycardia > 90 bpm;Tachypnea > 20 resp per min;Leukocytosis (WBC > 47488 IJL)  -JV Hyperthemia > 38 3C (100 9F); Tachycardia > 90 bpm  -JV Hyperthemia > 38 3C (100 9F); Tachycardia > 90 bpm;Tachypnea > 20 resp per min  -JV     If the answer is yes to both questions, suspicion of sepsis is present -- -- -- --     If severe sepsis is present AND tissue hypoperfusion perists in the hour after fluid resuscitation or lactate > 4, the patient meets criteria for SEPTIC SHOCK -- -- -- --     Are any of the following organ dysfunction criteria present within 6 hours of suspected infection and SIRS criteria that are NOT considered to be chronic conditions? Yes (P)   -JV Yes  -JV -- --     Organ dysfunction Creatinine > 2 0 mg/dL; Creatinine > 2 0 mg/dl AND > 0 5 mg/dl above baseline;Urine output < 0 5 mL/kg/hour for 2 hours (P)   -JV Creatinine > 2 0 mg/dL  -JV -- --     Date of presentation of severe sepsis 12/04/22 (P)   -JV 12/04/22  -JV 12/04/22  -JV --     Time of presentation of severe sepsis 2343 (P)   -JV -- 2250  -JV --     Tissue hypoperfusion persists in the hour after crystalloid fluid administration, evidenced, by either: -- -- -- --     Was hypotension present within one hour of the conclusion of crystalloid fluid administration?  No (P)   -JV -- -- --     Date of presentation of septic shock -- -- -- --     Time of presentation of septic shock -- -- -- --           User Key  (r) = Recorded By, (t) = Taken By, (c) = Cosigned By    Initials Name Provider Type    [address] [place], [place] [address]OLEGARIO Physician Assistant

## 2022-12-05 NOTE — PROGRESS NOTES
Family encouraged to wear the necessary PPE precautions due to patient positive RSV status  Family expressed understanding of importance, but did not want to wear the proper PPE while in the room  Will continue to monitor

## 2022-12-05 NOTE — ASSESSMENT & PLAN NOTE
· Discharged from [address] [place] [address] on 11/06 s/p repair     · DVT prophylaxis w/ Eliquis  · Physical therapy

## 2022-12-05 NOTE — ASSESSMENT & PLAN NOTE
· Noted last night  · E/b fever, leukocytosis, and LA  · LA resolved /w IVF  · F/u B Cx  · Procal high - trend  · Cefepime/Vanco

## 2022-12-05 NOTE — ASSESSMENT & PLAN NOTE
· 12/2:  · Pt frustrated by HTL and expressed interest in more comfort focused care, although he said his wife would want full cares  · Palliative appreciated- full code  Hopefully at rehab can advance to thin liquids  · 12/5:  · Pt w/ resp fail needing Bipap  · Now DNR/DNI  · Palliative appreciated  Tx focused and Bipap ok    But if was to fail Bipap make comfort care  · No PEG

## 2022-12-06 PROBLEM — I48.91 ATRIAL FIBRILLATION WITH RVR (HCC): Status: ACTIVE | Noted: 2022-01-01

## 2022-12-06 PROBLEM — I50.32 CHRONIC DIASTOLIC HEART FAILURE (HCC): Chronic | Status: ACTIVE | Noted: 2022-01-01

## 2022-12-06 NOTE — PROGRESS NOTES
Bryan Maldonado is a 80 y o  male who is currently ordered Vancomycin IV with management by the Pharmacy Consult service  Relevant clinical data and objective / subjective history reviewed  Vancomycin Assessment:  Indication and Goal AUC/Trough: Pneumonia (goal -600, trough >10), -600, trough >10  Clinical Status: worsening  Micro:   pending  Renal Function:  SCr: 2 43 mg/dL  CrCl: 19 2 mL/min (decreased from 22  4)  Renal replacement: not on dialysis  Days of Therapy: 2  Current Dose:  vancomycin 750 mg IV PRN when random level < 15                            Vancomycin Plan: no new plan  New Dosing: none                                  Estimated AUC: 414 mcg*hr/mL  Estimated Trough: 14 4 mcg/mL  Next Level: random  am labs tues 12/7  Renal Function Monitoring: Daily BMP and UOP  Pharmacy will continue to follow closely for s/sx of nephrotoxicity, infusion reactions and appropriateness of therapy  BMP and CBC will be ordered per protocol  We will continue to follow the patient’s culture results and clinical progress daily  Iman OMER    Pharmacist

## 2022-12-06 NOTE — OCCUPATIONAL THERAPY NOTE
Occupational Therapy Cancellation Note        Patient Name: Macy Brewer  FXBCH'M Date: 12/6/2022 12/06/22 1054   OT Last Visit   OT Visit Date 12/06/22   Note Type   Note Type Cancelled Session   Cancel Reasons Other       Chart reviewed, OT treatment attempted  Pt adamantly declining to participate in therapy at this time  Pt also undergoing transfusion and is on HFNC  OT will continue to follow and see as medically appropriate and able       CHRISS Sharma, OTR/L

## 2022-12-06 NOTE — PHYSICAL THERAPY NOTE
Physical Therapy Cancellation Note           12/06/22 1040   Note Type   Note Type Cancelled Session   Cancel Reasons Other     Chart reviewed; attempted to see pt in AM; pt is observed in bed; currently on HFNC and undergoing blood transfusion; pt declines mobilization or therex at this time; family is present and aware; will follow as clinical course allows      Dayana Odonnell

## 2022-12-06 NOTE — CONSULTS
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)    Acute on chronic anemia    Closed fracture of trochanter of right femur with routine healing    Transaminitis    Hematuria    Ambulatory dysfunction    Dysphagia    Deep tissue injury    PAD (peripheral artery disease) ([facility])    RSV (acute bronchiolitis due to respiratory syncytial virus)    Goals of care, counseling/discussion    HAP (hospital-acquired pneumonia)    Hypernatremia      Assessment    1  Paroxysmal atrial fibrillation with RVR  On rhythm management as outpatient with amiodarone  Presented NSR 11/21- profound ST abnormality- inferior , anterolateral ST depression, improved on f/u EKG  No EKG since 11/22  Telemetry today -rapid atrial fibrillation, earlier controlled  Unclear how long he has been back in atrial fibrillation  Ordered for  amiodarone 200 mg daily, IV Lopressor 2 5 mg every 8 hours- just started   AC-apixaban 2 5 mg twice daily   Not receiving oral medications as he is NPO awaiting swallow evaluation  BP limiting AV sara blockers    2  Acute hypoxic respiratory failure  Per RN improving/ oxygen requirements lessening  Recently on bipap  Multifactorial-RSV/ PNA  Remains off diuretics    3   RSV with possible superimposed aspiration pneumonia  On cefepime and Vanco  Supportive care    4  Urosepsis-antibiotics per primary team  Off pressors    5  Chronic diastolic heart failure  LVEF 45%- assessed 3/2022  Off diuretics due to JOEL-takes Lasix 20 mg p o  twice daily along with Aldactone 25 mg daily  CXR 12/5- small left effusion    6    JOEL on CKD  BUN/creat-78/2  85-escalating  7  CAD s/p CABG 2014-subsequent DAPHNEY to left main and ramus 10/2021  AP-Plavix 75 mg    8  HLD  Atorvastatin 40 mg    9  Acute on chronic anemia  H&H-6 7/22 6  Receiving 1 unit PRBCs    10  CAD s/p CABG 2014 with subsequent DAPHNEY to left main and ramus 10/2021  Plavix/ statin/ beta-blocker/    11  History of mitral valve repair 2014   Mild MR, severe MAC    Plan:  1  While NPO- IV lopressor 2 5mg every 4 hours with hold parameters  2  Resume eliquis , amiodarone, plavix once taking PO  3  Monitor for worsening hypoxia while off diuretics  4  Continue telemetry    History of Present Illness   Physician Requesting Consult: Maxwell Lozano MD  Reason for Consult / Principal Problem: Atrial fibrillation with RVR    HPI: Bryan Maldonado is a 80y o  year old male with PAD, CAD s/p CABG x2 2014 and DAPHNEY to Lt main and ramus 10/2021, PAF-appears to be on  rhythm management with amiodarone, MV repair, hyperlipidemia, chronic anemia, CKD, chronic diastolic heart failure who was transferred from [facility] for septic shock secondary to urosepsis  Off  Pressors at this point  Patient has tested positive for RSV  Cardiology consulted today for rapid atrial fibrillation  Heart rate in the 120s  Low normal blood pressure  Currently on 45 L high flow nasal cannula  Early this morning febrile with a temp of 100 8 °F   Patient is on amiodarone, apixaban, Lopressor however has not received these the past 48 hours due to NPO status  Patient received 2 5 mg IV Lopressor this morning  Patient seen with family at bedside  Appears comfortable  No chest pain, shortness of breath or palpitations  He has a lot of secretions which he uses the [employer] to suction  Currently receiving 1 unit PRBCs  Primary care team's notes indicate patient interested in more comfort focused care  Currently DNR/DNI  TTE 3/2022-LVEF 45%    Diastolic function normal   Hypokinetic mid anteroseptal, apical septal and apical inferior walls  Mild MR  Severe MAC  The valve has been repaired with an annular ring  Mild TR  Pt is followed by Dat Valle at [facility]  I confirmed outpatient medications with patients wife-he is on Lopressor 100 mg twice a day, amiodarone 200 mg daily, Eliquis 2 5 mg daily, Lasix 20 mg twice daily, spironolactone 25 mg daily  Inpatient consult to Cardiology  Consult performed by: SANDRINE Shepard  Consult ordered by: Jane Vivar MD          Review of Systems   Constitutional: Positive for activity change, fatigue and fever  HENT: Positive for congestion  Eyes: Negative  Respiratory: Positive for cough  Negative for shortness of breath  Cardiovascular: Negative for chest pain, palpitations and leg swelling  Gastrointestinal:        Npo   Endocrine: Negative  Genitourinary: Negative  Musculoskeletal: Positive for gait problem  Skin: Negative  Allergic/Immunologic: Negative  Hematological: Negative  Psychiatric/Behavioral: Negative  All other systems reviewed and are negative  Historical Information   Past Medical History:   Diagnosis Date   • CHF (congestive heart failure) ([facility] [address] )    • CKD (chronic kidney disease) stage 3, GFR 30-59 ml/min ([facility])    • Coronary artery disease    • Hyperlipidemia    • Hypertension    • Mitral regurgitation    • Paroxysmal atrial fibrillation ([facility])    • Urinary retention      Past Surgical History:   Procedure Laterality Date   • CARDIAC SURGERY      cabg x 2 2014 [facility]   • CORONARY ANGIOPLASTY WITH STENT PLACEMENT  10/2021    DAPHNEY to left main and ramus   • MITRAL VALVE REPAIR  2014    mitral ring   • ID CYSTOURETHROSCOPY W/IRRIG & EVAC CLOTS N/A 11/23/2022    Procedure: Alfonso Hall;  Surgeon: Alex Lima MD;  Location: [facility];  Service: Urology   • ID OPEN RX FEMUR FX+INTRAMED HALIMA Right 11/1/2022    Procedure: INSERTION NAIL IM FEMUR ANTEGRADE (TROCHANTERIC);   Surgeon: Scaruzair Come;   Location:  MAIN OR;  Service: Orthopedics   • SUPRAPUBIC TUBE PLACEMENT N/A 11/23/2022    Procedure: INSERTION SUPRAPUBIC CATHETER PERCUTANEOUS;  Surgeon: Manuelito Prado MD;  Location: [facility];  Service: Urology     Social History     Substance and Sexual Activity   Alcohol Use Not Currently     Social History     Substance and Sexual Activity   Drug Use Never     Social History     Tobacco Use   Smoking Status Never   Smokeless Tobacco Never     Family History:   Family History   Problem Relation Age of Onset   • Hypertension Father        Meds/Allergies   current meds:   Current Facility-Administered Medications   Medication Dose Route Frequency   • acetaminophen (TYLENOL) rectal suppository 650 mg  650 mg Rectal Q6H PRN   • albuterol inhalation solution 2 5 mg  2 5 mg Nebulization Q6H PRN   • amiodarone tablet 200 mg  200 mg Oral Daily With Breakfast   • apixaban (ELIQUIS) tablet 2 5 mg  2 5 mg Oral BID   • atorvastatin (LIPITOR) tablet 40 mg  40 mg Oral Daily With Dinner   • cefepime (MAXIPIME) 2,000 mg in dextrose 5 % 50 mL IVPB  2,000 mg Intravenous Q24H   • clopidogrel (PLAVIX) tablet 75 mg  75 mg Oral Daily   • dextrose 5 % infusion  60 mL/hr Intravenous Continuous   • doxylamine (UNISOM) tablet 12 5 mg  12 5 mg Oral HS PRN   • ferrous sulfate tablet 325 mg  325 mg Oral Daily With Breakfast   • guaiFENesin LIQD 400 mg  400 mg Oral BID   • HYDROmorphone HCl (DILAUDID) injection 0 2 mg  0 2 mg Intravenous Q4H PRN   • ipratropium (ATROVENT) 0 02 % inhalation solution 0 5 mg  0 5 mg Nebulization TID   • levalbuterol (XOPENEX) inhalation solution 1 25 mg  1 25 mg Nebulization TID   • Lidocaine Viscous HCl (XYLOCAINE) 2 % mucosal solution 15 mL  15 mL Swish & Spit 4x Daily PRN   • LORazepam (ATIVAN) injection 0 5 mg  0 5 mg Intravenous Q6H PRN   • melatonin tablet 6 mg  6 mg Oral HS   • metoprolol (LOPRESSOR) injection 2 5 mg  2 5 mg Intravenous Q8H   • saliva substitute (MOUTH KOTE) mucosal solution 5 spray  5 spray Mouth/Throat 4x Daily PRN   • vancomycin (VANCOCIN) IVPB (premix in dextrose) 750 mg 150 mL  750 mg Intravenous Daily PRN    and PTA meds:    Medications Prior to Admission   Medication   • acetaminophen (TYLENOL) 325 mg tablet   • albuterol (ProAir HFA) 90 mcg/act inhaler   • amiodarone 200 mg tablet   • [] amoxicillin-clavulanate (AUGMENTIN) 875-125 mg per tablet   • apixaban (ELIQUIS) 2 5 mg   • atorvastatin (LIPITOR) 40 mg tablet   • clopidogrel (PLAVIX) 75 mg tablet   • ferrous gluconate (FERGON) 324 mg tablet   • furosemide (LASIX) 20 mg tablet   • lidocaine (LIDODERM) 5 %   • metoprolol tartrate (LOPRESSOR) 100 mg tablet   • nitroglycerin (NITROSTAT) 0 4 mg SL tablet   • oxyCODONE (OXY-IR) 5 MG capsule   • potassium chloride (K-DUR,KLOR-CON) 20 mEq tablet   • spironolactone (ALDACTONE) 25 mg tablet   • tamsulosin (FLOMAX) 0 4 mg     No Known Allergies    Objective   Vitals: Blood pressure 104/60, pulse (!) 128, temperature 99 4 °F (37 4 °C), resp  rate 16, height 5' 8" (1 727 m), weight 68 3 kg (150 lb 9 2 oz), SpO2 96 %  Orthostatic Blood Pressures    Flowsheet Row Most Recent Value   Blood Pressure 104/60 filed at 2022 1145   Patient Position - Orthostatic VS Lying filed at 2022 1145            Intake/Output Summary (Last 24 hours) at 2022 1152  Last data filed at 2022 1101  Gross per 24 hour   Intake 857 5 ml   Output 550 ml   Net 307 5 ml       Invasive Devices     Peripheral Intravenous Line  Duration           Peripheral IV 22 Proximal;Right;Ventral (anterior) Forearm 2 days          Drain  Duration           Suprapubic Catheter 18 Fr   13 days                Physical Exam: /60 (BP Location: Right arm)   Pulse (!) 128   Temp 99 4 °F (37 4 °C)   Resp 16   Ht 5' 8" (1 727 m)   Wt 68 3 kg (150 lb 9 2 oz)   SpO2 96%   BMI 22 89 kg/m²   General Appearance:    Alert, cooperative, no distress, appears stated age   Head: Normocephalic, no scleral icterus   Eyes:    PERRL   Nose:   Nares normal, septum midline, mucosa normal, no drainage    Throat:   Lips, mucosa, and tongue normal   Neck:   Supple, symmetrical, trachea midline     no JVD       Lungs:     Rhonchi anteriorally to auscultation bilaterally, respirations unlabored        Heart:    Irregular rate and rhythm, S1 and S2 normal, no murmur, rub   or gallop       Extremities:   Extremities no significant edema       Skin:   Skin warm   Neurologic:   Alert and oriented to person place and time  Lab Results:   Recent Results (from the past 72 hour(s))   Blood culture    Collection Time: 12/04/22 11:24 PM    Specimen: Arm, Left; Blood   Result Value Ref Range    Blood Culture No Growth at 24 hrs  Blood culture    Collection Time: 12/04/22 11:24 PM    Specimen: Hand, Left; Blood   Result Value Ref Range    Blood Culture No Growth at 24 hrs      CBC and differential    Collection Time: 12/04/22 11:25 PM   Result Value Ref Range    WBC 15 43 (H) 4 31 - 10 16 Thousand/uL    RBC 3 06 (L) 3 88 - 5 62 Million/uL    Hemoglobin 8 8 (L) 12 0 - 17 0 g/dL    Hematocrit 30 0 (L) 36 5 - 49 3 %    MCV 98 82 - 98 fL    MCH 28 8 26 8 - 34 3 pg    MCHC 29 3 (L) 31 4 - 37 4 g/dL    RDW 17 9 (H) 11 6 - 15 1 %    MPV 11 0 8 9 - 12 7 fL    Platelets 983 616 - 920 Thousands/uL    nRBC 0 /100 WBCs    Neutrophils Relative 93 (H) 43 - 75 %    Immat GRANS % 1 0 - 2 %    Lymphocytes Relative 2 (L) 14 - 44 %    Monocytes Relative 4 4 - 12 %    Eosinophils Relative 0 0 - 6 %    Basophils Relative 0 0 - 1 %    Neutrophils Absolute 14 32 (H) 1 85 - 7 62 Thousands/µL    Immature Grans Absolute 0 11 0 00 - 0 20 Thousand/uL    Lymphocytes Absolute 0 29 (L) 0 60 - 4 47 Thousands/µL    Monocytes Absolute 0 68 0 17 - 1 22 Thousand/µL    Eosinophils Absolute 0 01 0 00 - 0 61 Thousand/µL    Basophils Absolute 0 02 0 00 - 0 10 Thousands/µL   Comprehensive metabolic panel    Collection Time: 12/04/22 11:25 PM Result Value Ref Range    Sodium 147 135 - 147 mmol/L    Potassium 4 5 3 5 - 5 3 mmol/L    Chloride 109 (H) 96 - 108 mmol/L    CO2 31 21 - 32 mmol/L    ANION GAP 7 4 - 13 mmol/L    BUN 55 (H) 5 - 25 mg/dL    Creatinine 2 43 (H) 0 60 - 1 30 mg/dL    Glucose 114 65 - 140 mg/dL    Calcium 8 6 8 3 - 10 1 mg/dL    Corrected Calcium 10 0 8 3 - 10 1 mg/dL    AST 18 5 - 45 U/L    ALT 12 12 - 78 U/L    Alkaline Phosphatase 157 (H) 46 - 116 U/L    Total Protein 6 9 6 4 - 8 4 g/dL    Albumin 2 2 (L) 3 5 - 5 0 g/dL    Total Bilirubin 1 53 (H) 0 20 - 1 00 mg/dL    eGFR 23 ml/min/1 73sq m   Lactic acid, plasma    Collection Time: 12/04/22 11:25 PM   Result Value Ref Range    LACTIC ACID 2 2 (HH) 0 5 - 2 0 mmol/L   Procalcitonin    Collection Time: 12/04/22 11:25 PM   Result Value Ref Range    Procalcitonin 1 09 (H) <=0 25 ng/ml   Blood gas, arterial    Collection Time: 12/04/22 11:52 PM   Result Value Ref Range    pH, Arterial 7 497 (H) 7 350 - 7 450    pCO2, Arterial 34 2 (L) 36 0 - 44 0 mm Hg    pO2, Arterial 110 2 75 0 - 129 0 mm Hg    HCO3, Arterial 25 9 22 0 - 28 0 mmol/L    Base Excess, Arterial 2 7 mmol/L    O2 Content, Arterial 13 0 (L) 16 0 - 23 0 mL/dL    O2 HGB,Arterial  96 5 94 0 - 97 0 %    SOURCE Radial, Left     ANNA TEST Yes     Non Vent type BIPAP BIPAP     IPAP 12     EPAP 6     BIPAP fio2 70 %   Lactic acid 2 Hours    Collection Time: 12/05/22  5:05 AM   Result Value Ref Range    LACTIC ACID 1 6 0 5 - 2 0 mmol/L   Procalcitonin, Next Day AM Collection    Collection Time: 12/05/22  5:05 AM   Result Value Ref Range    Procalcitonin 2 00 (H) <=0 25 ng/ml   CBC and differential    Collection Time: 12/05/22  5:05 AM   Result Value Ref Range    WBC 13 72 (H) 4 31 - 10 16 Thousand/uL    RBC 2 66 (L) 3 88 - 5 62 Million/uL    Hemoglobin 7 6 (L) 12 0 - 17 0 g/dL    Hematocrit 26 3 (L) 36 5 - 49 3 %    MCV 99 (H) 82 - 98 fL    MCH 28 6 26 8 - 34 3 pg    MCHC 28 9 (L) 31 4 - 37 4 g/dL    RDW 17 9 (H) 11 6 - 15 1 % MPV 10 8 8 9 - 12 7 fL    Platelets 962 549 - 818 Thousands/uL    nRBC 0 /100 WBCs    Neutrophils Relative 91 (H) 43 - 75 %    Immat GRANS % 1 0 - 2 %    Lymphocytes Relative 2 (L) 14 - 44 %    Monocytes Relative 6 4 - 12 %    Eosinophils Relative 0 0 - 6 %    Basophils Relative 0 0 - 1 %    Neutrophils Absolute 12 49 (H) 1 85 - 7 62 Thousands/µL    Immature Grans Absolute 0 14 0 00 - 0 20 Thousand/uL    Lymphocytes Absolute 0 31 (L) 0 60 - 4 47 Thousands/µL    Monocytes Absolute 0 77 0 17 - 1 22 Thousand/µL    Eosinophils Absolute 0 00 0 00 - 0 61 Thousand/µL    Basophils Absolute 0 01 0 00 - 0 10 Thousands/µL   Comprehensive metabolic panel    Collection Time: 12/05/22  5:05 AM   Result Value Ref Range    Sodium 148 (H) 135 - 147 mmol/L    Potassium 4 2 3 5 - 5 3 mmol/L    Chloride 114 (H) 96 - 108 mmol/L    CO2 28 21 - 32 mmol/L    ANION GAP 6 4 - 13 mmol/L    BUN 56 (H) 5 - 25 mg/dL    Creatinine 2 41 (H) 0 60 - 1 30 mg/dL    Glucose 101 65 - 140 mg/dL    Calcium 8 1 (L) 8 3 - 10 1 mg/dL    Corrected Calcium 9 9 8 3 - 10 1 mg/dL    AST 20 5 - 45 U/L    ALT 13 12 - 78 U/L    Alkaline Phosphatase 120 (H) 46 - 116 U/L    Total Protein 6 0 (L) 6 4 - 8 4 g/dL    Albumin 1 7 (L) 3 5 - 5 0 g/dL    Total Bilirubin 1 44 (H) 0 20 - 1 00 mg/dL    eGFR 24 ml/min/1 73sq m   Legionella antigen, urine    Collection Time: 12/05/22  1:45 PM    Specimen: Urine, Catheter   Result Value Ref Range    Legionella Urinary Antigen Negative Negative   Strep Pneumoniae, Urine    Collection Time: 12/05/22  1:45 PM    Specimen: Urine, Catheter   Result Value Ref Range    Strep pneumoniae antigen, urine Negative Negative   Urinalysis with microscopic    Collection Time: 12/05/22  6:49 PM   Result Value Ref Range    Color, UA Yellow     Clarity, UA Extra Turbid     Specific [place], UA 1 017 1 003 - 1 030    pH, UA 5 0 4 5, 5 0, 5 5, 6 0, 6 5, 7 0, 7 5, 8 0    Leukocytes, UA Negative Negative    Nitrite, UA Negative Negative    Protein, UA 50 (1+) (A) Negative mg/dl    Glucose, UA Negative Negative mg/dl    Ketones, UA Trace (A) Negative mg/dl    Urobilinogen, UA <2 0 <2 0 mg/dl mg/dl    Bilirubin, UA Negative Negative    Occult Blood, UA Large (A) Negative    RBC, UA 1-2 None Seen, 1-2 /hpf    WBC, UA 2-4 (A) None Seen, 1-2 /hpf    Epithelial Cells Occasional None Seen, Occasional /hpf    Bacteria, UA Occasional None Seen, Occasional /hpf    MUCUS THREADS Occasional (A) None Seen    Hyaline Casts, UA 5-10 (A) None Seen /lpf    Amorphous Crystals, UA Moderate    Fingerstick Glucose (POCT)    Collection Time: 12/06/22 12:12 AM   Result Value Ref Range    POC Glucose 158 (H) 65 - 140 mg/dl   Vancomycin, random    Collection Time: 12/06/22  5:19 AM   Result Value Ref Range    Vancomycin Rm 22 2 (H) 10 0 - 20 0 ug/mL   AM Magnesium    Collection Time: 12/06/22  5:19 AM   Result Value Ref Range    Magnesium 2 5 1 6 - 2 6 mg/dL   AM CMP    Collection Time: 12/06/22  5:19 AM   Result Value Ref Range    Sodium 149 (H) 135 - 147 mmol/L    Potassium 4 1 3 5 - 5 3 mmol/L    Chloride 114 (H) 96 - 108 mmol/L    CO2 28 21 - 32 mmol/L    ANION GAP 7 4 - 13 mmol/L    BUN 78 (H) 5 - 25 mg/dL    Creatinine 2 85 (H) 0 60 - 1 30 mg/dL    Glucose 167 (H) 65 - 140 mg/dL    Calcium 8 4 8 3 - 10 1 mg/dL    Corrected Calcium 10 2 (H) 8 3 - 10 1 mg/dL    AST 47 (H) 5 - 45 U/L    ALT 18 12 - 78 U/L    Alkaline Phosphatase 110 46 - 116 U/L    Total Protein 6 2 (L) 6 4 - 8 4 g/dL    Albumin 1 7 (L) 3 5 - 5 0 g/dL    Total Bilirubin 0 99 0 20 - 1 00 mg/dL    eGFR 19 ml/min/1 73sq m   AM CBC and Diff    Collection Time: 12/06/22  5:19 AM   Result Value Ref Range    WBC 5 83 4 31 - 10 16 Thousand/uL    RBC 2 34 (L) 3 88 - 5 62 Million/uL    Hemoglobin 6 7 (LL) 12 0 - 17 0 g/dL    Hematocrit 22 6 (L) 36 5 - 49 3 %    MCV 97 82 - 98 fL    MCH 28 6 26 8 - 34 3 pg    MCHC 29 6 (L) 31 4 - 37 4 g/dL    RDW 17 7 (H) 11 6 - 15 1 %    MPV 11 2 8 9 - 12 7 fL    Platelets 346 866 - 819 Thousands/uL   AM Phosphorus    Collection Time: 12/06/22  5:19 AM   Result Value Ref Range    Phosphorus 4 7 (H) 2 3 - 4 1 mg/dL   Manual Differential(PHLEBS Do Not Order)    Collection Time: 12/06/22  5:19 AM   Result Value Ref Range    Segmented % 93 (H) 43 - 75 %    Bands % 2 0 - 8 %    Lymphocytes % 2 (L) 14 - 44 %    Monocytes % 1 (L) 4 - 12 %    Eosinophils, % 0 0 - 6 %    Basophils % 1 0 - 1 %    Atypical Lymphocytes % 1 (H) <=0 %    Absolute Neutrophils 5 54 1 85 - 7 62 Thousand/uL    Lymphocytes Absolute 0 12 (L) 0 60 - 4 47 Thousand/uL    Monocytes Absolute 0 06 0 00 - 1 22 Thousand/uL    Eosinophils Absolute 0 00 0 00 - 0 40 Thousand/uL    Basophils Absolute 0 06 0 00 - 0 10 Thousand/uL    Total Counted      RBC Morphology Present     Anisocytosis Present     Polychromasia Present     Platelet Estimate Adequate Adequate    Giant PLTs Present    Fingerstick Glucose (POCT)    Collection Time: 12/06/22  5:42 AM   Result Value Ref Range    POC Glucose 159 (H) 65 - 140 mg/dl   Hemoglobin and hematocrit, blood    Collection Time: 12/06/22  7:01 AM   Result Value Ref Range    Hemoglobin 6 8 (LL) 12 0 - 17 0 g/dL    Hematocrit 23 7 (L) 36 5 - 49 3 %   Type and screen    Collection Time: 12/06/22  7:01 AM   Result Value Ref Range    ABO Grouping O     Rh Factor Negative     Antibody Screen Negative     Specimen Expiration Date 20221209    Prepare Leukoreduced RBC: 1 Units    Collection Time: 12/06/22  9:40 AM   Result Value Ref Range    Unit Product Code C3401N88     Unit Number [id number]     Unit ABO O     Unit RH NEG     Crossmatch Compatible     Unit Dispense Status Issued     Unit Product Volume 250 mL     Left Ventricle Left ventricular cavity size is normal  Wall thickness is normal  The left ventricular ejection fraction is 45%   Systolic function is normal   Diastolic function is normal    Wall Scoring Baseline     The following segments are hypokinetic: mid anteroseptal, apical septal and apical inferior  All other segments are normal             Right Ventricle Right ventricular cavity size is normal  Systolic function is normal  Wall thickness is normal    Left Atrium The atrium is normal in size  Right Atrium The atrium is normal in size  Aortic Valve The aortic valve is trileaflet  The leaflets are mildly thickened  The leaflets are mildly calcified  The leaflets exhibit normal mobility  There is mild regurgitation  There is no evidence of stenosis  Mitral Valve There is mild thickening  There is severe annular calcification  The valve has been repaired with an annular ring  There is mild regurgitation  There is no evidence of stenosis  Tricuspid Valve Tricuspid valve structure is normal  There is mild regurgitation  There is no evidence of stenosis  Pulmonic Valve Pulmonic valve structure is normal  There is no evidence of regurgitation  There is no evidence of stenosis  Ascending Aorta The aortic root is normal in size  IVC/SVC The inferior vena cava is normal in size  Pericardium There is no pericardial effusion  The pericardium is normal in appearance  Imaging: I have personally reviewed pertinent reports  EKG: Normal sinus rhythm with profound ST depression-inferior and anterolateral  VTE Prophylaxis: eliquis    Code Status: Level 3 - DNAR and DNI  Advance Directive and Living Will:      Power of :    POLST:      Counseling / Coordination of Care  Total floor / unit time spent today 60 minutes  Greater than 50% of total time was spent with the patient and / or family counseling and / or coordination of care

## 2022-12-06 NOTE — SPEECH THERAPY NOTE
Speech Language/Pathology    Speech/Language Pathology Progress Note    Patient Name: Akbar Troy  ESLNW'D Date: 12/6/2022     Problem List  Principal Problem:    Urethral trauma  Active Problems:    CAD (coronary artery disease)    Severe sepsis ([facility])    Acute respiratory failure with hypoxia ([facility] [address] )    Acute-on-chronic kidney injury ([facility] [address] )    Chronic atrial fibrillation ([facility])    Heart failure with mid-range ejection fraction ([facility])    Acute on chronic anemia    Closed fracture of trochanter of right femur with routine healing    Transaminitis    Hematuria    Ambulatory dysfunction    Dysphagia    Deep tissue injury    PAD (peripheral artery disease) ([facility])    RSV (acute bronchiolitis due to respiratory syncytial virus)    Goals of care, counseling/discussion    HAP (hospital-acquired pneumonia)    Hypernatremia       Past Medical History  Past Medical History:   Diagnosis Date   • CHF (congestive heart failure) ([facility])    • CKD (chronic kidney disease) stage 3, GFR 30-59 ml/min ([facility])    • Coronary artery disease    • Hyperlipidemia    • Hypertension    • Mitral regurgitation    • Paroxysmal atrial fibrillation ([facility] [address] )    • Urinary retention         Past Surgical History  Past Surgical History:   Procedure Laterality Date   • CARDIAC SURGERY      cabg x 2 2014 [facility]   • CORONARY ANGIOPLASTY WITH STENT PLACEMENT  10/2021    DAPHNEY to left main and ramus   • MITRAL VALVE REPAIR  2014    mitral ring   • VA CYSTOURETHROSCOPY W/IRRIG & EVAC CLOTS N/A 11/23/2022    Procedure: CYSTOSCOPY;  Surgeon: Lucia Remy MD;  Location: [facility];  Service: Urology   • VA OPEN RX FEMUR FX+INTRAMED HALIMA Right 11/1/2022    Procedure: INSERTION NAIL IM FEMUR ANTEGRADE (TROCHANTERIC); Surgeon: Melvi Good;   Location:  MAIN OR;  Service: Orthopedics   • SUPRAPUBIC TUBE PLACEMENT N/A 11/23/2022    Procedure: INSERTION SUPRAPUBIC CATHETER PERCUTANEOUS;  Surgeon: Lucia Remy MD;  Location: [facility];  Service: Urology Subjective:  Pt now on HFNC, physician requesting if pt can be seen for potential diet initiation  "More ice"    Current Diet:  NPO    Objective:  Pt seen for dx dysphagia tx f/u w/ trials of puree and honey thick liquids  Labial seal is weak for retrieval, though adequate for oral containment  Oral manipulation is labored/slowed, ultimately able to transfer and clear  Swallows suspected delayed  NO overt s/s aspiration immediately w/ swallows, though pt w/ known h/o silent aspiration  VBS findings thoroughly reviewed w/ pt and pt's family  Education provided on risks of/identified aspiration w/ each administered consistency and potential medical complications associated w/ aspiration  Education provided on options for diet modifications to potentially reduce aspiration (explained at least some degree of risk still remains)  Pt states he does not enjoy the dysphagia diet modifications, requesting regular texture PO/liquids  Education provided that it is an option to accept the risk of doing so, so long as he felt well-educated on the risks and is accepting of all potential complications  Extensive discussion w/ pt and pt's family to answer relevant questions and provide further education  Pt ultimately wishes to think on decision at this time, will notify RN when decision made  Assessment:  Pt and pt's family w/ good understanding of VBS findings, current aspiration risk, and potential options for re-initiating diet w/ acceptance of risk  Pt appears able to tolerate small amounts puree and honey thick today, though appreciate h/o SILENT aspiration       Plan/Recommendations:  Continue NPO vs puree w/ honey thick pending pt decision   Frequent and thorough oral care  ST to f/u as able and appropriate    *RN notified ST/Physician that pt wishes to resume modified diet at this time, accepting of any risk

## 2022-12-06 NOTE — PROGRESS NOTES
PULMONOLOGY PROGRESS NOTE     Name: Macy Brewer   Age & Sex: 80 y o  male   MRN: [id number]  Unit/Bed#: [facility] 431-01   Encounter: [id number]    PATIENT INFORMATION     Name: Macy Brewer   Age & Sex: 80 y o  male   MRN: [id number]  Hospital Stay Days: 13    ASSESSMENT/PLAN     Assessment:   1  Acute respiratory failure with hypoxia  2  RSV with possible superimposed bacterial pneumonia  3  Sepsis   4  Anemia  5  HFrEF  6  Dysphagia  7  Atrial fibrillation  8  JOEL on CKD  9  Urosepsis - resolved  10  Urethral trauma    Plan:  • Patient with significant improvement overnight  • Currently on HFNC 40 L / 50% with SpO2 96-98%  • Wean FiO2 as tolerated to maintain SpO2 > 90%  • Continue Xopenex/Atrovent TID  • Instructed patient on using incentive spirometry and he has been using it during commercials  • Continue IV antibiotics  • Will add SoluMedrol 40 mg IV X1 today  • F/U culture results and adjust antibiotics as needed  • PT/OT  • OOB to chair as tolerated  • Primary team ordering 1 unit PRBC  • Pulmonology will continue to follow  • Rest of care per primary team       SUBJECTIVE     Patient seen and examined  He is doing overall better this morning  He is sitting on the bed comfortably and watching TV  He does not appear to be in respiratory distress at this time  Patient denies worsening symptoms       OBJECTIVE     Vitals:    22 2302 22 0237 22 0310 22 0735   BP: 121/57 124/63  123/61   BP Location:       Pulse: 103 99  100   Resp:    16   Temp: (!) 100 8 °F (38 2 °C) (!) 100 7 °F (38 2 °C)  99 4 °F (37 4 °C)   TempSrc:       SpO2: 96% 97% 96% 96%   Weight:       Height:          Temperature:   Temp (24hrs), Av 8 °F (37 7 °C), Min:97 2 °F (36 2 °C), Max:100 9 °F (38 3 °C)    Temperature: 99 4 °F (37 4 °C)  Intake & Output:  I/O        07 0700  0701   0700  0701   0700    Urine (mL/kg/hr) 800 (0 5) 150 (0 1)     Stool 0      Total Output 800 150 Net -800 -150            Unmeasured Stool Occurrence 1 x          Weights:   IBW (Ideal Body Weight): 68 4 kg    Body mass index is 22 89 kg/m²  Weight (last 2 days)     Date/Time Weight    12/05/22 0600 --     Weight: Unable to weigh  Too many object on bed for comfort  at 12/05/22 0600    12/04/22 0600 68 3 (150 57)        Physical Exam  Vitals and nursing note reviewed  Constitutional:       General: He is not in acute distress  Appearance: He is ill-appearing  He is not toxic-appearing  HENT:      Head: Normocephalic  Eyes:      General: No scleral icterus  Pupils: Pupils are equal, round, and reactive to light  Cardiovascular:      Rate and Rhythm: Normal rate and regular rhythm  Heart sounds: No murmur heard  Pulmonary:      Breath sounds: Decreased air movement present  No stridor  Decreased breath sounds present  No wheezing, rhonchi or rales  Abdominal:      General: There is no distension  Palpations: Abdomen is soft  Tenderness: There is no abdominal tenderness  There is no guarding  Musculoskeletal:      Right lower leg: No edema  Left lower leg: No edema  Skin:     General: Skin is warm  Capillary Refill: Capillary refill takes less than 2 seconds  Coloration: Skin is pale  Neurological:      Mental Status: He is alert  Mental status is at baseline  Cranial Nerves: No cranial nerve deficit  Psychiatric:         Mood and Affect: Mood normal            LABORATORY DATA     Labs: I have personally reviewed pertinent reports    Results from last 7 days   Lab Units 12/06/22  0701 12/06/22 0519 12/05/22 0505 12/04/22 2325   WBC Thousand/uL  --  5 83 13 72* 15 43*   HEMOGLOBIN g/dL 6 8* 6 7* 7 6* 8 8*   HEMATOCRIT % 23 7* 22 6* 26 3* 30 0*   PLATELETS Thousands/uL  --  156 158 212   NEUTROS PCT %  --   --  91* 93*   MONOS PCT %  --   --  6 4   MONO PCT %  --  1*  --   --       Results from last 7 days   Lab Units 12/05/22  0505 12/04/22 2325 12/02/22  0458   POTASSIUM mmol/L 4 2 4 5 3 7   CHLORIDE mmol/L 114* 109* 105   CO2 mmol/L 28 31 33*   BUN mg/dL 56* 55* 47*   CREATININE mg/dL 2 41* 2 43* 1 77*   CALCIUM mg/dL 8 1* 8 6 8 3   ALK PHOS U/L 120* 157*  --    ALT U/L 13 12  --    AST U/L 20 18  --      Results from last 7 days   Lab Units 12/01/22  0522 11/30/22  0453   MAGNESIUM mg/dL 2 5 1 9     Results from last 7 days   Lab Units 11/30/22  0453   PHOSPHORUS mg/dL 3 6          Results from last 7 days   Lab Units 12/05/22  0505   LACTIC ACID mmol/L 1 6             ABG:   Results from last 7 days   Lab Units 12/04/22  2352   PH ART  7 497*   PCO2 ART mm Hg 34 2*   PO2 ART mm Hg 110 2   HCO3 ART mmol/L 25 9   BASE EXC ART mmol/L 2 7   ABG SOURCE  Radial, Left       Micro:   Results from last 7 days   Lab Units 12/05/22  1345 12/04/22  2324   BLOOD CULTURE   --  No Growth at 24 hrs  No Growth at 24 hrs  LEGIONELLA URINARY ANTIGEN  Negative  --    STREP PNEUMONIAE ANTIGEN, URINE  Negative  --          IMAGING & DIAGNOSTIC TESTING     Radiology Results: I have personally reviewed pertinent reports  CT abdomen pelvis wo contrast    Result Date: 11/23/2022  Impression: The Schaffer catheter is malpositioned  The catheter balloon is located near the base of the penis, below the level of the prostate  There is gas within the soft tissues of the perineum and penis, most pronounced on the left  Urethral injury is suspected  Urology consultation and follow-up is recommended  The urinary bladder is distended, extending nearly to the level of the umbilicus  The urinary bladder volume is approximately 970 mL  There is gas layering non-dependently within the anterior aspect of the urinary bladder  There is mild dilatation of the renal collecting systems and ureters bilaterally, likely related to the degree of urinary bladder distention  There is bilateral perinephric stranding and fluid, left greater than right, increased compared with November 21, 2022  Fluid tracks caudally bilaterally into the pelvis  Correlation with urinalysis and urine culture and sensitivity is recommended  Persistent nephrograms are present; this raises concern for possible ATN  Clinical and laboratory correlation is recommended  Consider Nephrology consultation  There is a small amount of ascites, increased compared to the prior study  There is body wall edema, worse compared to the prior study  The inferior aspect of the left gluteus john muscle appears asymmetrically enlarged compared with the contralateral right side and there is overlying subcutaneous edema  This may be related to asymmetric edema, myositis, or hematoma  Clinical correlation and follow-up is recommended  There is a moderate-sized left pleural effusion  There is bibasilar atelectasis containing some air bronchograms, left greater than right  Superimposed pneumonia should be excluded clinically  Other nonemergent findings, as described above  Please see discussion  I personally discussed this study with Kayla Samuels on 11/23/2022 at 1:40 AM  Workstation performed: [id number]     XR chest pa & lateral    Result Date: 11/28/2022  Impression: Increasing left effusion Increasing hazy density in the lung suggest worsening congestion Workstation performed: [id number]     Other Diagnostic Testing: I have personally reviewed pertinent reports      ACTIVE MEDICATIONS     Current Facility-Administered Medications   Medication Dose Route Frequency   • acetaminophen (TYLENOL) rectal suppository 650 mg  650 mg Rectal Q6H PRN   • albuterol inhalation solution 2 5 mg  2 5 mg Nebulization Q6H PRN   • amiodarone tablet 200 mg  200 mg Oral Daily With Breakfast   • apixaban (ELIQUIS) tablet 2 5 mg  2 5 mg Oral BID   • atorvastatin (LIPITOR) tablet 40 mg  40 mg Oral Daily With Dinner   • cefepime (MAXIPIME) 2,000 mg in dextrose 5 % 50 mL IVPB  2,000 mg Intravenous Q24H   • clopidogrel (PLAVIX) tablet 75 mg  75 mg Oral Daily   • dextrose 5 % infusion  35 mL/hr Intravenous Continuous   • doxylamine (UNISOM) tablet 12 5 mg  12 5 mg Oral HS PRN   • ferrous sulfate tablet 325 mg  325 mg Oral Daily With Breakfast   • guaiFENesin LIQD 400 mg  400 mg Oral BID   • HYDROmorphone HCl (DILAUDID) injection 0 2 mg  0 2 mg Intravenous Q4H PRN   • ipratropium (ATROVENT) 0 02 % inhalation solution 0 5 mg  0 5 mg Nebulization TID   • levalbuterol (XOPENEX) inhalation solution 1 25 mg  1 25 mg Nebulization TID   • Lidocaine Viscous HCl (XYLOCAINE) 2 % mucosal solution 15 mL  15 mL Swish & Spit 4x Daily PRN   • LORazepam (ATIVAN) injection 0 5 mg  0 5 mg Intravenous Q6H PRN   • melatonin tablet 6 mg  6 mg Oral HS   • metoprolol tartrate (LOPRESSOR) tablet 25 mg  25 mg Oral Q12H YOLANDA   • saliva substitute (MOUTH KOTE) mucosal solution 5 spray  5 spray Mouth/Throat 4x Daily PRN   • vancomycin (VANCOCIN) IVPB (premix in dextrose) 1,000 mg 200 mL  15 mg/kg Intravenous Daily PRN       VTE Pharmacologic Prophylaxis: Apixaban  VTE Mechanical Prophylaxis: sequential compression device      Disclaimer: Portions of the record may have been created with voice recognition software  Occasional wrong word or "sound a like" substitutions may have occurred due to the inherent limitations of voice recognition software  Careful consideration should be taken to recognize, using context, where substitutions have occurred      Anil Mccray MD   Pulmonary and Critical Care Fellow, PGY-4  [facility]

## 2022-12-06 NOTE — PROGRESS NOTES
Consultation - Cardiology Team 1  Brian Kyle 80 y o  male MRN: [id number]  Unit/Bed#: [facility] 431-01 Encounter: [id number]    Assessment/Plan     Principal Problem:    Urethral trauma  Active Problems:    CAD (coronary artery disease)    Severe sepsis ([facility] [address] )    Acute respiratory failure with hypoxia ([facility] [address] )    Acute-on-chronic kidney injury ([facility] [address] )    Chronic atrial fibrillation (HCC)    Heart failure with mid-range ejection fraction ([facility])    Acute on chronic anemia    Closed fracture of trochanter of right femur with routine healing    Transaminitis    Hematuria    Ambulatory dysfunction    Dysphagia    Deep tissue injury    PAD (peripheral artery disease) ([facility])    RSV (acute bronchiolitis due to respiratory syncytial virus)    Goals of care, counseling/discussion    HAP (hospital-acquired pneumonia)    Hypernatremia      Assessment    1  Paroxysmal atrial fibrillation with RVR  On rhythm management as outpatient with amiodarone  Presented NSR 11/21- profound ST abnormality- inferior , anterolateral ST depression, improved on f/u EKG  No EKG since 11/22  Telemetry today -rapid atrial fibrillation, earlier controlled  Unclear how long he has been back in atrial fibrillation  Ordered for  amiodarone 200 mg daily, IV Lopressor 2 5 mg every 8 hours- just started   AC-apixaban 2 5 mg twice daily   Not receiving oral medications as he is NPO awaiting swallow evaluation  BP limiting AV sara blockers    2  Acute hypoxic respiratory failure  Per RN improving/ oxygen requirements lessening  Recently on bipap  Multifactorial-RSV/ PNA  Remains off diuretics    3   RSV with possible superimposed aspiration pneumonia  On cefepime and Vanco  Supportive care    4  Urosepsis-antibiotics per primary team  Off pressors    5  Chronic diastolic heart failure  LVEF 45%- assessed 3/2022  Off diuretics due to JOEL-takes Lasix 20 mg p o  twice daily along with Aldactone 25 mg daily  CXR 12/5- small left effusion    6    JOEL on CKD  BUN/creat-78/2  85-escalating  7  CAD s/p CABG 2014-subsequent DPAHNEY to left main and ramus 10/2021  AP-Plavix 75 mg    8  HLD  Atorvastatin 40 mg    9  Acute on chronic anemia  H&H-6 7/22 6  Receiving 1 unit PRBCs    10  CAD s/p CABG 2014 with subsequent DAPHNEY to left main and ramus 10/2021  Plavix/ statin/ beta-blocker/    11  History of mitral valve repair 2014   Mild MR, severe MAC    Plan:  1  While NPO- IV lopressor 2 5mg every 4 hours with hold parameters  2  Resume eliquis , amiodarone, plavix once taking PO  3  Monitor for worsening hypoxia while off diuretics  4  Continue telemetry    History of Present Illness   Physician Requesting Consult: Maxwell Lozano MD  Reason for Consult / Principal Problem: Atrial fibrillation with RVR    HPI: Bryan Maldonado is a 80y o  year old male with PAD, CAD s/p CABG x2 2014 and DAPHNEY to Lt main and ramus 10/2021, PAF-appears to be on  rhythm management with amiodarone, MV repair, hyperlipidemia, chronic anemia, CKD, chronic diastolic heart failure who was transferred from [facility] for septic shock secondary to urosepsis  Off  Pressors at this point  Patient has tested positive for RSV  Cardiology consulted today for rapid atrial fibrillation  Heart rate in the 120s  Low normal blood pressure  Currently on 45 L high flow nasal cannula  Early this morning febrile with a temp of 100 8 °F   Patient is on amiodarone, apixaban, Lopressor however has not received these the past 48 hours due to NPO status  Patient received 2 5 mg IV Lopressor this morning  Patient seen with family at bedside  Appears comfortable  No chest pain, shortness of breath or palpitations  He has a lot of secretions which he uses the [employer] to suction  Currently receiving 1 unit PRBCs  Primary care team's notes indicate patient interested in more comfort focused care  Currently DNR/DNI  TTE 3/2022-LVEF 45%    Diastolic function normal   Hypokinetic mid anteroseptal, apical septal and apical inferior walls  Mild MR  Severe MAC  The valve has been repaired with an annular ring  Mild TR  Pt is followed by Srikanth Cain at [facility]  I confirmed outpatient medications with patients wife-he is on Lopressor 100 mg twice a day, amiodarone 200 mg daily, Eliquis 2 5 mg daily, Lasix 20 mg twice daily, spironolactone 25 mg daily  Inpatient consult to Cardiology  Consult performed by: SANDRINE Jacques  Consult ordered by: Brando Jimenez MD          Review of Systems   Constitutional: Positive for activity change, fatigue and fever  HENT: Positive for congestion  Eyes: Negative  Respiratory: Positive for cough  Negative for shortness of breath  Cardiovascular: Negative for chest pain, palpitations and leg swelling  Gastrointestinal:        Npo   Endocrine: Negative  Genitourinary: Negative  Musculoskeletal: Positive for gait problem  Skin: Negative  Allergic/Immunologic: Negative  Hematological: Negative  Psychiatric/Behavioral: Negative  All other systems reviewed and are negative  Historical Information   Past Medical History:   Diagnosis Date   • CHF (congestive heart failure) ([facility] [address] )    • CKD (chronic kidney disease) stage 3, GFR 30-59 ml/min ([facility])    • Coronary artery disease    • Hyperlipidemia    • Hypertension    • Mitral regurgitation    • Paroxysmal atrial fibrillation ([facility])    • Urinary retention      Past Surgical History:   Procedure Laterality Date   • CARDIAC SURGERY      cabg x 2 2014 [facility]   • CORONARY ANGIOPLASTY WITH STENT PLACEMENT  10/2021    DAPHNEY to left main and ramus   • MITRAL VALVE REPAIR  2014    mitral ring   • [place] CYSTOURETHROSCOPY W/IRRIG & EVAC CLOTS N/A 11/23/2022    Procedure: Carola Vail;  Surgeon: Roni Garcia MD;  Location: [facility];  Service: Urology   • [place] OPEN RX FEMUR FX+INTRAMED HALIMA Right 11/1/2022    Procedure: INSERTION NAIL IM FEMUR ANTEGRADE (TROCHANTERIC);   Surgeon: Kassy Mary;   Location:  MAIN OR;  Service: Orthopedics   • SUPRAPUBIC TUBE PLACEMENT N/A 11/23/2022    Procedure: INSERTION SUPRAPUBIC CATHETER PERCUTANEOUS;  Surgeon: Alex Lima MD;  Location:  MAIN OR;  Service: Urology     Social History     Substance and Sexual Activity   Alcohol Use Not Currently     Social History     Substance and Sexual Activity   Drug Use Never     Social History     Tobacco Use   Smoking Status Never   Smokeless Tobacco Never     Family History:   Family History   Problem Relation Age of Onset   • Hypertension Father        Meds/Allergies   current meds:   Current Facility-Administered Medications   Medication Dose Route Frequency   • acetaminophen (TYLENOL) rectal suppository 650 mg  650 mg Rectal Q6H PRN   • albuterol inhalation solution 2 5 mg  2 5 mg Nebulization Q6H PRN   • amiodarone tablet 200 mg  200 mg Oral Daily With Breakfast   • apixaban (ELIQUIS) tablet 2 5 mg  2 5 mg Oral BID   • atorvastatin (LIPITOR) tablet 40 mg  40 mg Oral Daily With Dinner   • cefepime (MAXIPIME) 2,000 mg in dextrose 5 % 50 mL IVPB  2,000 mg Intravenous Q24H   • clopidogrel (PLAVIX) tablet 75 mg  75 mg Oral Daily   • dextrose 5 % infusion  60 mL/hr Intravenous Continuous   • doxylamine (UNISOM) tablet 12 5 mg  12 5 mg Oral HS PRN   • ferrous sulfate tablet 325 mg  325 mg Oral Daily With Breakfast   • guaiFENesin LIQD 400 mg  400 mg Oral BID   • HYDROmorphone HCl (DILAUDID) injection 0 2 mg  0 2 mg Intravenous Q4H PRN   • ipratropium (ATROVENT) 0 02 % inhalation solution 0 5 mg  0 5 mg Nebulization TID   • levalbuterol (XOPENEX) inhalation solution 1 25 mg  1 25 mg Nebulization TID   • Lidocaine Viscous HCl (XYLOCAINE) 2 % mucosal solution 15 mL  15 mL Swish & Spit 4x Daily PRN   • LORazepam (ATIVAN) injection 0 5 mg  0 5 mg Intravenous Q6H PRN   • melatonin tablet 6 mg  6 mg Oral HS   • metoprolol (LOPRESSOR) injection 2 5 mg  2 5 mg Intravenous Q8H   • saliva substitute (MOUTH KOTE) mucosal solution 5 spray  5 spray Mouth/Throat 4x Daily PRN   • vancomycin (VANCOCIN) IVPB (premix in dextrose) 750 mg 150 mL  750 mg Intravenous Daily PRN    and PTA meds:    Medications Prior to Admission   Medication   • acetaminophen (TYLENOL) 325 mg tablet   • albuterol (ProAir HFA) 90 mcg/act inhaler   • amiodarone 200 mg tablet   • [] amoxicillin-clavulanate (AUGMENTIN) 875-125 mg per tablet   • apixaban (ELIQUIS) 2 5 mg   • atorvastatin (LIPITOR) 40 mg tablet   • clopidogrel (PLAVIX) 75 mg tablet   • ferrous gluconate (FERGON) 324 mg tablet   • furosemide (LASIX) 20 mg tablet   • lidocaine (LIDODERM) 5 %   • metoprolol tartrate (LOPRESSOR) 100 mg tablet   • nitroglycerin (NITROSTAT) 0 4 mg SL tablet   • oxyCODONE (OXY-IR) 5 MG capsule   • potassium chloride (K-DUR,KLOR-CON) 20 mEq tablet   • spironolactone (ALDACTONE) 25 mg tablet   • tamsulosin (FLOMAX) 0 4 mg     No Known Allergies    Objective   Vitals: Blood pressure 104/60, pulse (!) 128, temperature 99 4 °F (37 4 °C), resp  rate 16, height 5' 8" (1 727 m), weight 68 3 kg (150 lb 9 2 oz), SpO2 96 %  Orthostatic Blood Pressures    Flowsheet Row Most Recent Value   Blood Pressure 104/60 filed at 2022 1145   Patient Position - Orthostatic VS Lying filed at 2022 1145            Intake/Output Summary (Last 24 hours) at 2022 1152  Last data filed at 2022 1101  Gross per 24 hour   Intake 857 5 ml   Output 550 ml   Net 307 5 ml       Invasive Devices     Peripheral Intravenous Line  Duration           Peripheral IV 22 Proximal;Right;Ventral (anterior) Forearm 2 days          Drain  Duration           Suprapubic Catheter 18 Fr   13 days                Physical Exam: /60 (BP Location: Right arm)   Pulse (!) 128   Temp 99 4 °F (37 4 °C)   Resp 16   Ht 5' 8" (1 727 m)   Wt 68 3 kg (150 lb 9 2 oz)   SpO2 96%   BMI 22 89 kg/m²   General Appearance:    Alert, cooperative, no distress, appears stated age   Head: Normocephalic, no scleral icterus   Eyes:    PERRL   Nose:   Nares normal, septum midline, mucosa normal, no drainage    Throat:   Lips, mucosa, and tongue normal   Neck:   Supple, symmetrical, trachea midline     no JVD       Lungs:     Rhonchi anteriorally to auscultation bilaterally, respirations unlabored        Heart:    Irregular rate and rhythm, S1 and S2 normal, no murmur, rub   or gallop       Extremities:   Extremities no significant edema       Skin:   Skin warm   Neurologic:   Alert and oriented to person place and time  Lab Results:   Recent Results (from the past 72 hour(s))   Blood culture    Collection Time: 12/04/22 11:24 PM    Specimen: Arm, Left; Blood   Result Value Ref Range    Blood Culture No Growth at 24 hrs  Blood culture    Collection Time: 12/04/22 11:24 PM    Specimen: Hand, Left; Blood   Result Value Ref Range    Blood Culture No Growth at 24 hrs      CBC and differential    Collection Time: 12/04/22 11:25 PM   Result Value Ref Range    WBC 15 43 (H) 4 31 - 10 16 Thousand/uL    RBC 3 06 (L) 3 88 - 5 62 Million/uL    Hemoglobin 8 8 (L) 12 0 - 17 0 g/dL    Hematocrit 30 0 (L) 36 5 - 49 3 %    MCV 98 82 - 98 fL    MCH 28 8 26 8 - 34 3 pg    MCHC 29 3 (L) 31 4 - 37 4 g/dL    RDW 17 9 (H) 11 6 - 15 1 %    MPV 11 0 8 9 - 12 7 fL    Platelets 474 317 - 853 Thousands/uL    nRBC 0 /100 WBCs    Neutrophils Relative 93 (H) 43 - 75 %    Immat GRANS % 1 0 - 2 %    Lymphocytes Relative 2 (L) 14 - 44 %    Monocytes Relative 4 4 - 12 %    Eosinophils Relative 0 0 - 6 %    Basophils Relative 0 0 - 1 %    Neutrophils Absolute 14 32 (H) 1 85 - 7 62 Thousands/µL    Immature Grans Absolute 0 11 0 00 - 0 20 Thousand/uL    Lymphocytes Absolute 0 29 (L) 0 60 - 4 47 Thousands/µL    Monocytes Absolute 0 68 0 17 - 1 22 Thousand/µL    Eosinophils Absolute 0 01 0 00 - 0 61 Thousand/µL    Basophils Absolute 0 02 0 00 - 0 10 Thousands/µL   Comprehensive metabolic panel    Collection Time: 12/04/22 11:25 PM Result Value Ref Range    Sodium 147 135 - 147 mmol/L    Potassium 4 5 3 5 - 5 3 mmol/L    Chloride 109 (H) 96 - 108 mmol/L    CO2 31 21 - 32 mmol/L    ANION GAP 7 4 - 13 mmol/L    BUN 55 (H) 5 - 25 mg/dL    Creatinine 2 43 (H) 0 60 - 1 30 mg/dL    Glucose 114 65 - 140 mg/dL    Calcium 8 6 8 3 - 10 1 mg/dL    Corrected Calcium 10 0 8 3 - 10 1 mg/dL    AST 18 5 - 45 U/L    ALT 12 12 - 78 U/L    Alkaline Phosphatase 157 (H) 46 - 116 U/L    Total Protein 6 9 6 4 - 8 4 g/dL    Albumin 2 2 (L) 3 5 - 5 0 g/dL    Total Bilirubin 1 53 (H) 0 20 - 1 00 mg/dL    eGFR 23 ml/min/1 73sq m   Lactic acid, plasma    Collection Time: 12/04/22 11:25 PM   Result Value Ref Range    LACTIC ACID 2 2 (HH) 0 5 - 2 0 mmol/L   Procalcitonin    Collection Time: 12/04/22 11:25 PM   Result Value Ref Range    Procalcitonin 1 09 (H) <=0 25 ng/ml   Blood gas, arterial    Collection Time: 12/04/22 11:52 PM   Result Value Ref Range    pH, Arterial 7 497 (H) 7 350 - 7 450    pCO2, Arterial 34 2 (L) 36 0 - 44 0 mm Hg    pO2, Arterial 110 2 75 0 - 129 0 mm Hg    HCO3, Arterial 25 9 22 0 - 28 0 mmol/L    Base Excess, Arterial 2 7 mmol/L    O2 Content, Arterial 13 0 (L) 16 0 - 23 0 mL/dL    O2 HGB,Arterial  96 5 94 0 - 97 0 %    SOURCE Radial, Left     ANNA TEST Yes     Non Vent type BIPAP BIPAP     IPAP 12     EPAP 6     BIPAP fio2 70 %   Lactic acid 2 Hours    Collection Time: 12/05/22  5:05 AM   Result Value Ref Range    LACTIC ACID 1 6 0 5 - 2 0 mmol/L   Procalcitonin, Next Day AM Collection    Collection Time: 12/05/22  5:05 AM   Result Value Ref Range    Procalcitonin 2 00 (H) <=0 25 ng/ml   CBC and differential    Collection Time: 12/05/22  5:05 AM   Result Value Ref Range    WBC 13 72 (H) 4 31 - 10 16 Thousand/uL    RBC 2 66 (L) 3 88 - 5 62 Million/uL    Hemoglobin 7 6 (L) 12 0 - 17 0 g/dL    Hematocrit 26 3 (L) 36 5 - 49 3 %    MCV 99 (H) 82 - 98 fL    MCH 28 6 26 8 - 34 3 pg    MCHC 28 9 (L) 31 4 - 37 4 g/dL    RDW 17 9 (H) 11 6 - 15 1 % MPV 10 8 8 9 - 12 7 fL    Platelets 110 757 - 519 Thousands/uL    nRBC 0 /100 WBCs    Neutrophils Relative 91 (H) 43 - 75 %    Immat GRANS % 1 0 - 2 %    Lymphocytes Relative 2 (L) 14 - 44 %    Monocytes Relative 6 4 - 12 %    Eosinophils Relative 0 0 - 6 %    Basophils Relative 0 0 - 1 %    Neutrophils Absolute 12 49 (H) 1 85 - 7 62 Thousands/µL    Immature Grans Absolute 0 14 0 00 - 0 20 Thousand/uL    Lymphocytes Absolute 0 31 (L) 0 60 - 4 47 Thousands/µL    Monocytes Absolute 0 77 0 17 - 1 22 Thousand/µL    Eosinophils Absolute 0 00 0 00 - 0 61 Thousand/µL    Basophils Absolute 0 01 0 00 - 0 10 Thousands/µL   Comprehensive metabolic panel    Collection Time: 12/05/22  5:05 AM   Result Value Ref Range    Sodium 148 (H) 135 - 147 mmol/L    Potassium 4 2 3 5 - 5 3 mmol/L    Chloride 114 (H) 96 - 108 mmol/L    CO2 28 21 - 32 mmol/L    ANION GAP 6 4 - 13 mmol/L    BUN 56 (H) 5 - 25 mg/dL    Creatinine 2 41 (H) 0 60 - 1 30 mg/dL    Glucose 101 65 - 140 mg/dL    Calcium 8 1 (L) 8 3 - 10 1 mg/dL    Corrected Calcium 9 9 8 3 - 10 1 mg/dL    AST 20 5 - 45 U/L    ALT 13 12 - 78 U/L    Alkaline Phosphatase 120 (H) 46 - 116 U/L    Total Protein 6 0 (L) 6 4 - 8 4 g/dL    Albumin 1 7 (L) 3 5 - 5 0 g/dL    Total Bilirubin 1 44 (H) 0 20 - 1 00 mg/dL    eGFR 24 ml/min/1 73sq m   Legionella antigen, urine    Collection Time: 12/05/22  1:45 PM    Specimen: Urine, Catheter   Result Value Ref Range    Legionella Urinary Antigen Negative Negative   Strep Pneumoniae, Urine    Collection Time: 12/05/22  1:45 PM    Specimen: Urine, Catheter   Result Value Ref Range    Strep pneumoniae antigen, urine Negative Negative   Urinalysis with microscopic    Collection Time: 12/05/22  6:49 PM   Result Value Ref Range    Color, UA Yellow     Clarity, UA Extra Turbid     Specific [place], UA 1 017 1 003 - 1 030    pH, UA 5 0 4 5, 5 0, 5 5, 6 0, 6 5, 7 0, 7 5, 8 0    Leukocytes, UA Negative Negative    Nitrite, UA Negative Negative    Protein, UA 50 (1+) (A) Negative mg/dl    Glucose, UA Negative Negative mg/dl    Ketones, UA Trace (A) Negative mg/dl    Urobilinogen, UA <2 0 <2 0 mg/dl mg/dl    Bilirubin, UA Negative Negative    Occult Blood, UA Large (A) Negative    RBC, UA 1-2 None Seen, 1-2 /hpf    WBC, UA 2-4 (A) None Seen, 1-2 /hpf    Epithelial Cells Occasional None Seen, Occasional /hpf    Bacteria, UA Occasional None Seen, Occasional /hpf    MUCUS THREADS Occasional (A) None Seen    Hyaline Casts, UA 5-10 (A) None Seen /lpf    Amorphous Crystals, UA Moderate    Fingerstick Glucose (POCT)    Collection Time: 12/06/22 12:12 AM   Result Value Ref Range    POC Glucose 158 (H) 65 - 140 mg/dl   Vancomycin, random    Collection Time: 12/06/22  5:19 AM   Result Value Ref Range    Vancomycin Rm 22 2 (H) 10 0 - 20 0 ug/mL   AM Magnesium    Collection Time: 12/06/22  5:19 AM   Result Value Ref Range    Magnesium 2 5 1 6 - 2 6 mg/dL   AM CMP    Collection Time: 12/06/22  5:19 AM   Result Value Ref Range    Sodium 149 (H) 135 - 147 mmol/L    Potassium 4 1 3 5 - 5 3 mmol/L    Chloride 114 (H) 96 - 108 mmol/L    CO2 28 21 - 32 mmol/L    ANION GAP 7 4 - 13 mmol/L    BUN 78 (H) 5 - 25 mg/dL    Creatinine 2 85 (H) 0 60 - 1 30 mg/dL    Glucose 167 (H) 65 - 140 mg/dL    Calcium 8 4 8 3 - 10 1 mg/dL    Corrected Calcium 10 2 (H) 8 3 - 10 1 mg/dL    AST 47 (H) 5 - 45 U/L    ALT 18 12 - 78 U/L    Alkaline Phosphatase 110 46 - 116 U/L    Total Protein 6 2 (L) 6 4 - 8 4 g/dL    Albumin 1 7 (L) 3 5 - 5 0 g/dL    Total Bilirubin 0 99 0 20 - 1 00 mg/dL    eGFR 19 ml/min/1 73sq m   AM CBC and Diff    Collection Time: 12/06/22  5:19 AM   Result Value Ref Range    WBC 5 83 4 31 - 10 16 Thousand/uL    RBC 2 34 (L) 3 88 - 5 62 Million/uL    Hemoglobin 6 7 (LL) 12 0 - 17 0 g/dL    Hematocrit 22 6 (L) 36 5 - 49 3 %    MCV 97 82 - 98 fL    MCH 28 6 26 8 - 34 3 pg    MCHC 29 6 (L) 31 4 - 37 4 g/dL    RDW 17 7 (H) 11 6 - 15 1 %    MPV 11 2 8 9 - 12 7 fL    Platelets 706 973 - 669 Thousands/uL   AM Phosphorus    Collection Time: 12/06/22  5:19 AM   Result Value Ref Range    Phosphorus 4 7 (H) 2 3 - 4 1 mg/dL   Manual Differential(PHLEBS Do Not Order)    Collection Time: 12/06/22  5:19 AM   Result Value Ref Range    Segmented % 93 (H) 43 - 75 %    Bands % 2 0 - 8 %    Lymphocytes % 2 (L) 14 - 44 %    Monocytes % 1 (L) 4 - 12 %    Eosinophils, % 0 0 - 6 %    Basophils % 1 0 - 1 %    Atypical Lymphocytes % 1 (H) <=0 %    Absolute Neutrophils 5 54 1 85 - 7 62 Thousand/uL    Lymphocytes Absolute 0 12 (L) 0 60 - 4 47 Thousand/uL    Monocytes Absolute 0 06 0 00 - 1 22 Thousand/uL    Eosinophils Absolute 0 00 0 00 - 0 40 Thousand/uL    Basophils Absolute 0 06 0 00 - 0 10 Thousand/uL    Total Counted      RBC Morphology Present     Anisocytosis Present     Polychromasia Present     Platelet Estimate Adequate Adequate    Giant PLTs Present    Fingerstick Glucose (POCT)    Collection Time: 12/06/22  5:42 AM   Result Value Ref Range    POC Glucose 159 (H) 65 - 140 mg/dl   Hemoglobin and hematocrit, blood    Collection Time: 12/06/22  7:01 AM   Result Value Ref Range    Hemoglobin 6 8 (LL) 12 0 - 17 0 g/dL    Hematocrit 23 7 (L) 36 5 - 49 3 %   Type and screen    Collection Time: 12/06/22  7:01 AM   Result Value Ref Range    ABO Grouping O     Rh Factor Negative     Antibody Screen Negative     Specimen Expiration Date 20221209    Prepare Leukoreduced RBC: 1 Units    Collection Time: 12/06/22  9:40 AM   Result Value Ref Range    Unit Product Code T6832C71     Unit Number [id number]     Unit ABO O     Unit RH NEG     Crossmatch Compatible     Unit Dispense Status Issued     Unit Product Volume 250 mL     Left Ventricle Left ventricular cavity size is normal  Wall thickness is normal  The left ventricular ejection fraction is 45%   Systolic function is normal   Diastolic function is normal    Wall Scoring Baseline     The following segments are hypokinetic: mid anteroseptal, apical septal and apical inferior  All other segments are normal             Right Ventricle Right ventricular cavity size is normal  Systolic function is normal  Wall thickness is normal    Left Atrium The atrium is normal in size  Right Atrium The atrium is normal in size  Aortic Valve The aortic valve is trileaflet  The leaflets are mildly thickened  The leaflets are mildly calcified  The leaflets exhibit normal mobility  There is mild regurgitation  There is no evidence of stenosis  Mitral Valve There is mild thickening  There is severe annular calcification  The valve has been repaired with an annular ring  There is mild regurgitation  There is no evidence of stenosis  Tricuspid Valve Tricuspid valve structure is normal  There is mild regurgitation  There is no evidence of stenosis  Pulmonic Valve Pulmonic valve structure is normal  There is no evidence of regurgitation  There is no evidence of stenosis  Ascending Aorta The aortic root is normal in size  IVC/SVC The inferior vena cava is normal in size  Pericardium There is no pericardial effusion  The pericardium is normal in appearance  Imaging: I have personally reviewed pertinent reports  EKG: Normal sinus rhythm with profound ST depression-inferior and anterolateral  VTE Prophylaxis: eliquis    Code Status: Level 3 - DNAR and DNI  Advance Directive and Living Will:      Power of :    POLST:      Counseling / Coordination of Care  Total floor / unit time spent today 60 minutes  Greater than 50% of total time was spent with the patient and / or family counseling and / or coordination of care

## 2022-12-06 NOTE — PROGRESS NOTES
Progress Note - Palliative & Supportive Care  [facility]  80 y o   male  PPHP 431/PPHP 431-01   MRN: [id number]  Encounter: [id number]     Assessment/Plan:  1  Acute hypoxic respiratory failure on HFNC 40 lpm/40% FiO2  2  RSV  3  Aspiration pneumonia/pneumonitis  4  Hospital-acquired pneumonia  5  Dysphagia  6  Protein calorie malnutrition  7  Weight loss  8  Frailty  9  Debility, deconditioning, ambulatory dysfuntion  10  JOEL on CKD  11  S/p R femur fx/repair/rehab  12  Urethral trauma s/p cystoscopy w/ suprapubic catheter  13  Goals of care  -continue current medical care  -family does not want to transition to comfort with improvement in respiratory status, alertness, responsiveness  -will continue level 3, DNAR/DNI, but no additional limits on care  -will continue prn IV dilaudid 0 2mg q4h for pain and labored breathing, patient utilized 1 dose overnight; and will continue lorazepam 0 5mg IV q6h prn anxiety, agitation, labored breathing, respiratory distress; family agreeable to having these medications available to patient, they do not want patient to suffer if he has decline or respiratory distress; will consider transition to comfort if patient has abrupt deterioration  -family wants speech to eval again today  -d/w primary via [facility]  -palliative care will f/u 1-2x/week depending on patient's clinical status and for additional support to family; family has contact information; please [facility] palliative care with any questions or concerns    Entered patient's room  Wife and daughter at bedside  Patient resting comfortably  Both report patient much improved today  Wiped now as daughter was encouraging use of incentive spirometer  They feel he is more alert, more himself  His respiratory status has improved  They request that I do not awaken patient  We went out to [place] to speak more  Wife and daughter want to continue with current medical care as long as he improves   They do not want to transition to comfort at this time  They want to give him a chance if he can improve  They are asking for speech to re-eval with hopes for diet  We discussed concern with ongoing dysphagia and nutrition  Wife reiterates that they would not want a feeding tube, but wants to try to get his nutrition up so he can regain his strength  They are agreeable to keeping prn IV opioid and IV benzodiazepine in case patient has respiratory distress, pain, or anxiety  They do not want his to suffer  I informed them that he did utilize one dose of the dilaudid last pm  Family feels hope and positive with his improvement overnight  They understand that he could decline or have deterioration at anytime  They will consider transition to comfort at that time, but want to remain hopeful that patient will continue to improve and eventually return home  Emotional support and validation provided  Code status: Level 3, DNAR/DNI    Subjective:  Patient seen and examined  Resting comfortably  Family did not want me to awaken  Doing better today      Received 1 dose x hydromorphone 0 2mg IV overnight    Medications    Current Facility-Administered Medications:   •  acetaminophen (TYLENOL) rectal suppository 650 mg, 650 mg, Rectal, Q6H PRN, Isaiah Bains PA-C, 650 mg at 12/05/22 6020  •  albuterol inhalation solution 2 5 mg, 2 5 mg, Nebulization, Q6H PRN, Isaiah Bains PA-C  •  amiodarone tablet 200 mg, 200 mg, Oral, Daily With Breakfast, Becca Yuen MD, 200 mg at 12/04/22 3713  •  apixaban (ELIQUIS) tablet 2 5 mg, 2 5 mg, Oral, BID, Benita Desai DO, 2 5 mg at 12/04/22 1710  •  atorvastatin (LIPITOR) tablet 40 mg, 40 mg, Oral, Daily With Mariia Orozco MD, 40 mg at 12/04/22 1710  •  cefepime (MAXIPIME) 2,000 mg in dextrose 5 % 50 mL IVPB, 2,000 mg, Intravenous, Q24H, Isaiah Bains PA-C, Last Rate: 100 mL/hr at 12/06/22 0358, 2,000 mg at 12/06/22 0358  •  clopidogrel (PLAVIX) tablet 75 mg, 75 mg, Oral, Daily, Kendell Klein DO, 75 mg at 12/04/22 5949  •  dextrose 5 % infusion, 60 mL/hr, Intravenous, Continuous, Osmany Collazo MD, Last Rate: 60 mL/hr at 12/06/22 1028, 60 mL/hr at 12/06/22 1028  •  doxylamine (UNISOM) tablet 12 5 mg, 12 5 mg, Oral, HS PRN, Kaycee Quintana PA-C  •  ferrous sulfate tablet 325 mg, 325 mg, Oral, Daily With Breakfast, Citlaly Mcleod MD, 325 mg at 12/04/22 0807  •  guaiFENesin LIQD 400 mg, 400 mg, Oral, BID, Ghislaine Herbert PA-C  •  HYDROmorphone HCl (DILAUDID) injection 0 2 mg, 0 2 mg, Intravenous, Q4H PRN, Nicole Enciso DO, 0 2 mg at 12/05/22 2329  •  ipratropium (ATROVENT) 0 02 % inhalation solution 0 5 mg, 0 5 mg, Nebulization, TID, Anthony Koehler MD, 0 5 mg at 12/06/22 0720  •  levalbuterol [facility]) inhalation solution 1 25 mg, 1 25 mg, Nebulization, TID, Anthony Koehler MD, 1 25 mg at 12/06/22 0720  •  Lidocaine Viscous HCl (XYLOCAINE) 2 % mucosal solution 15 mL, 15 mL, Swish & Spit, 4x Daily PRN, Kamala Paul DO  •  LORazepam (ATIVAN) injection 0 5 mg, 0 5 mg, Intravenous, Q6H PRN, Nicole Enciso DO  •  melatonin tablet 6 mg, 6 mg, Oral, HS, Swathi Donahue PA-C, 6 mg at 12/04/22 2124  •  metoprolol tartrate (LOPRESSOR) tablet 25 mg, 25 mg, Oral, Q12H [address], Citlaly Mcleod MD, 25 mg at 12/04/22 2124  •  saliva substitute (MOUTH KOTE) mucosal solution 5 spray, 5 spray, Mouth/Throat, 4x Daily PRN, Lulu Ngo MD  •  vancomycin (VANCOCIN) IVPB (premix in dextrose) 750 mg 150 mL, 750 mg, Intravenous, Daily PRN, Kaycee Quintana PA-C    Objective:  /58   Pulse 93   Temp 98 9 °F (37 2 °C)   Resp (!) 23   Ht 5' 8" (1 727 m)   Wt 68 3 kg (150 lb 9 2 oz)   SpO2 100%   BMI 22 89 kg/m²   Physical Exam:  General: chronically ill, resting comfortably on HFNC  Neurological: sleeping, family reports more alert today  Cardiovascular: reg  Respiratory: normal effort, no distress on HFNC  Gastrointestinal: no distension  Musculoskeletal: no swelling  Skin: dry  Psychiatric: unable to assess    Lab Results:   I have personally reviewed pertinent labs  , CBC:   Lab Results   Component Value Date    WBC 5 83 12/06/2022    HGB 6 8 (LL) 12/06/2022    HCT 23 7 (L) 12/06/2022    MCV 97 12/06/2022     12/06/2022    MCH 28 6 12/06/2022    MCHC 29 6 (L) 12/06/2022    RDW 17 7 (H) 12/06/2022    MPV 11 2 12/06/2022   , CMP:   Lab Results   Component Value Date    SODIUM 149 (H) 12/06/2022    K 4 1 12/06/2022     (H) 12/06/2022    CO2 28 12/06/2022    BUN 78 (H) 12/06/2022    CREATININE 2 85 (H) 12/06/2022    CALCIUM 8 4 12/06/2022    AST 47 (H) 12/06/2022    ALT 18 12/06/2022    ALKPHOS 110 12/06/2022    EGFR 19 12/06/2022   , PT/PTT:No results found for: PT, PTT    Counseling / Coordination of Care  Total floor / unit time spent today 55 minutes  Greater than 50% of total time was spent with the patient and / or family counseling and / or coordinating of care  A description of the counseling / coordination of care: current condition, symptom management, goals of care, comfort discussion, emotional support       Sophia Enciso, DO  Palliative & Supportive Care

## 2022-12-07 NOTE — OCCUPATIONAL THERAPY NOTE
Occupational Therapy Cancellation Note        Patient Name: Thang OLIVEIRAI Date: 12/7/2022 12/07/22 1015   OT Last Visit   OT Visit Date 12/07/22   Note Type   Note Type Cancelled Session   Cancel Reasons Medical status       Chart reviewed, pt continues to not be appropriate for therapy  Noted that pt has hemoglobin of 7 and pending another blood transfusion  OT will sign off at this time, please re-consult as appropriate  Thank you       CHRISS Ingram, OTR/L

## 2022-12-07 NOTE — CONSULTS
CONSULT: GASTROENTEROLOGY          Inpatient consult to gastroenterology     Performed by  Alan Heath MD     Authorized by Laura Baker MD            PATIENT INFORMATION      Jose Meza 80 y o  male MRN: [id number]  Unit/Bed#: [facility] 431-01 Encounter: [id number]  PCP: Merrill Salazar DO  Date of Admission:  11/23/2022  Date of Consultation: 12/07/22  Requesting Physician: Laura Baker MD    ASSESSMENTS & PLAN   Jose Meza is a 80 y o  old male with PMH including but not limited to CHF with EF of 45%, CAD s/p CABG, PCI in 2021 on plavix, mitral valve repair, A  fib on Eliquis recently admitted for altered mental status and hypoxic respiratory failure  Gastroenterology team has been consulted for assistance with management of acute on chronic anemia  1  Acute on chronic anemia  Hemoglobin over the last few months in the range of 7-10, has required multiple transfusions  Does not recall having any prior EGD or colonoscopy  No mention of lower GI bleeding  As per chart review, last bowel movement documented as brown and soft  Anticoagulation has been on hold    --Iron panel consistent with iron deficiency plus anemia of chronic disease as of November 2022  --Continue to trend hemoglobin, transfuse as needed  --I attempted to do rectal exam however patient could not comply with instructions and ultimately refused given his hip pain, but no bleeding reported  -- We will recommend to continue ongoing therapy, okay for anticoagulation and antiplatelet therapy from GI standpoint as low suspicion of massive GI bleeding at this time  --Please inform GI provider if there is any worsening transfusion requirement, hemodynamic instability or overt signs of GI bleeding  --Patient at this time remains at a high risk for undergoing endoscopic evaluation given his hypoxic respiratory failure, because of this and in light of no overt signs of GI bleeding will hold off on any endoscopic intervention  --recommend stool record and charting, d/w nursing team    Disposition: GI team will follow  D/w primary team      HISTORY OF PRESENT ILLNESS      Jose Meza is a 80 y o  male who is originally admitted for urethral trauma on 11/23/2022  GI team is consulted for acute on chronic anemia  Patient with complex past medical history including but not limited to chronic atrial fibrillation on anticoagulation, chronic anemia, CKD, chronic heart failure with mixed ejection fraction of 45%, CAD s/p CABG and PCI, mitral valve repair who was initially admitted at [address] [place] [address] on 11/22 with cardiogenic shock  He was transferred to [facility] on 11/23 due to urethral injury and malposition of the Schaffer catheter  Currently has a suprapubic catheter in place  Course complicated by RSV infection complicated by suspected aspiration pneumonitis  Hypoxic respiratory failure requiring high flow nasal cannula which is now improving  Patient blood work showing fluctuating hemoglobin,    Over the last 1 month ranging between 7-10, has required blood transfusion roughly 4-5 times  During recent course hemoglobin 6 7 on 12/6, received 1 unit blood transfusion and improved to 7 9  This morning down to 7 0 again  There is no mention of any blood in stool, melena, abdominal pain, nausea, vomiting, hematemesis  During my encounter patient denies any complaints except for generalized weakness and hip pain  Also continues to cough during my encounter  Denies any diarrhea, constipation, nausea, vomiting  He does not recall having EGD and colonoscopy in the past however chart review shows that he had EGD in 2021, unclear indication or results  REVIEW OF SYSTEMS     A thorough 12-point review of systems has been conducted  Pertinent positives and negatives are mentioned in the history of present illness       PAST MEDICAL & SURGICAL HISTORY      Past Medical History:   Diagnosis Date   • CHF (congestive heart failure) ([facility]ly [facility])    • CKD (chronic kidney disease) stage 3, GFR 30-59 ml/min (Formerly [facility])    • Coronary artery disease    • Hyperlipidemia    • Hypertension    • Mitral regurgitation    • Paroxysmal atrial fibrillation [facility])    • Urinary retention        Past Surgical History:   Procedure Laterality Date   • CARDIAC SURGERY      cabg x 2 2014 [facility]   • CORONARY ANGIOPLASTY WITH STENT PLACEMENT  10/2021    DAPHNEY to left main and ramus   • MITRAL VALVE REPAIR  2014    mitral ring   • WI CYSTOURETHROSCOPY W/IRRIG & EVAC CLOTS N/A 11/23/2022    Procedure: Jena Nye;  Surgeon: Meg Kay MD;  Location: [facility];  Service: Urology   • WI OPEN RX FEMUR FX+INTRAMED HALIMA Right 11/1/2022    Procedure: INSERTION NAIL IM FEMUR ANTEGRADE (TROCHANTERIC); Surgeon: Rosy Covarrubias; Location: [facility];  Service: Orthopedics   • SUPRAPUBIC TUBE PLACEMENT N/A 11/23/2022    Procedure: INSERTION SUPRAPUBIC CATHETER PERCUTANEOUS;  Surgeon: Meg Kay MD;  Location: [facility];  Service: Urology       MEDICATIONS & ALLERGIES       Medications:   Prior to Admission medications    Medication Sig Start Date End Date Taking?  Authorizing Provider   acetaminophen (TYLENOL) 325 mg tablet Take 2 tablets (650 mg total) by mouth every 6 (six) hours as needed for mild pain or moderate pain 11/6/22   Mitali Miranda PA-C   albuterol (ProAir HFA) 90 mcg/act inhaler Inhale 2 puffs every 6 (six) hours as needed for wheezing 3/5/22   Maude Hopper MD   amiodarone 200 mg tablet Take 200 mg by mouth daily    Historical Provider, MD   apixaban (ELIQUIS) 2 5 mg Take 2 5 mg daily through 11/8, then resume taking 2 5 mg twice daily 11/6/22   Mitali Miranda PA-C   atorvastatin (LIPITOR) 40 mg tablet Take 40 mg by mouth daily    Historical Provider, MD   clopidogrel (PLAVIX) 75 mg tablet Take 1 tablet (75 mg total) by mouth daily Do not start before November 8, 2022 11/8/22   Mitali Miranda PA-C   ferrous gluconate (FERGON) 324 mg tablet Take 1 tablet (324 mg total) by mouth daily before breakfast Do not start before November 7, 2022 11/7/22   Samantha Faustin PA-C   furosemide (LASIX) 20 mg tablet Take 20 mg by mouth 2 (two) times a day    Historical Provider, MD   lidocaine (LIDODERM) 5 % Apply 1 patch topically daily Remove & Discard patch within 12 hours or as directed by MD 11/6/22   Samantha Faustin PA-C   metoprolol tartrate (LOPRESSOR) 100 mg tablet Take 100 mg by mouth every 12 (twelve) hours    Historical Provider, MD   nitroglycerin (NITROSTAT) 0 4 mg SL tablet  10/28/21   Historical Provider, MD   oxyCODONE (OXY-IR) 5 MG capsule Take 5 mg by mouth every 4 (four) hours as needed for moderate pain or severe pain    Historical Provider, MD   potassium chloride (K-DUR,KLOR-CON) 20 mEq tablet Take 1 tablet (20 mEq total) by mouth 2 (two) times a day for 3 days 11/20/22 11/23/22  Jennifer Goetz PA-C   spironolactone (ALDACTONE) 25 mg tablet Take 1 tablet (25 mg total) by mouth daily 3/6/22 6/4/22  Mike Ordonez MD   tamsulosin (FLOMAX) 0 4 mg Take 1 capsule (0 4 mg total) by mouth daily with dinner 3/5/22 11/22/22  Mike Ordonez MD       Allergies: No Known Allergies    SOCIAL HISTORY      Substance Use History:   Social History     Substance and Sexual Activity   Alcohol Use Not Currently     Social History     Tobacco Use   Smoking Status Never   Smokeless Tobacco Never     Social History     Substance and Sexual Activity   Drug Use Never       FAMILY HISTORY      As in the HPI       PHYSICAL EXAM     Vitals:   Blood Pressure: 123/63 (12/07/22 1138)  Pulse: 90 (12/07/22 1138)  Temperature: 98 °F (36 7 °C) (12/07/22 1115)  Temp Source: Oral (12/07/22 0815)  Respirations: 16 (12/07/22 1115)  Height: 5' 8" (172 7 cm) (11/23/22 0432)  Weight - Scale: 68 3 kg (150 lb 9 2 oz) (12/04/22 0600)  SpO2: 100 % (12/07/22 1138)    Physical Exam:   GENERAL: NAD  HEENT:  NC/AT, MMM  CARDIAC:  RRR, +S1/S2, no S3/S4 heard  PULMONARY:  CTA B/L, no wheezing/rales/rhonci, non-labored breathing  ABDOMEN: non tender  RECTAL:  Patient refused  NEUROLOGIC:  Alert/oriented x3  EXTREMITIES: No swelling  SKIN:  No rashes or erythema     ADDITIONAL DATA     Lab Results:     Results from last 7 days   Lab Units 12/07/22  0527 12/06/22  0701 12/06/22  0519 12/05/22  0505   WBC Thousand/uL 5 25  --  5 83 13 72*   HEMOGLOBIN g/dL 7 0*   < > 6 7* 7 6*   HEMATOCRIT % 22 8*   < > 22 6* 26 3*   PLATELETS Thousands/uL 144*  --  156 158   NEUTROS PCT %  --   --   --  91*   LYMPHS PCT %  --   --   --  2*   LYMPHO PCT %  --   --  2*  --    MONOS PCT %  --   --   --  6   MONO PCT %  --   --  1*  --    EOS PCT %  --   --  0 0    < > = values in this interval not displayed  Results from last 7 days   Lab Units 12/07/22  0527   POTASSIUM mmol/L 3 9   CHLORIDE mmol/L 113*   CO2 mmol/L 30   BUN mg/dL 89*   CREATININE mg/dL 2 61*   CALCIUM mg/dL 8 1*   ALK PHOS U/L 92   ALT U/L 22   AST U/L 40           Imaging:    CT abdomen pelvis wo contrast    Result Date: 11/23/2022  Narrative: CT ABDOMEN AND PELVIS WITHOUT IV CONTRAST INDICATION:   Increased lethargy, scrotal edema  Abdominal pain, flank pain, decreased urine volume, difficulty urinating, urinary frequency and urgency  Mild lower abdominal tenderness  Leukocytosis  Recently diagnosed with urinary tract infection  COMPARISON:  November 21, 2022  TECHNIQUE:  CT examination of the abdomen and pelvis was performed without intravenous contrast  Axial, sagittal, and coronal 2D reformatted images were created from the source data and submitted for interpretation  Radiation dose length product (DLP) for this visit:  668 mGy-cm   This examination, like all CT scans performed in the [facility], was performed utilizing techniques to minimize radiation dose exposure, including the use of iterative reconstruction and automated exposure control  Enteric contrast was not administered   FINDINGS: ABDOMEN LOWER CHEST:  There is a a moderate-sized left pleural effusion  There is bibasilar atelectasis containing some air bronchograms, left greater than right  There is coronary artery disease  There has been prior CABG and valve replacement surgery  The visualized ascending thoracic aorta measures 4 3 cm diameter  LIVER/BILIARY TREE:  Unremarkable  GALLBLADDER:  There are gallstone(s) within the gallbladder, without pericholecystic inflammatory changes  SPLEEN:  Unremarkable  PANCREAS:  Unremarkable  ADRENAL GLANDS:  Unremarkable  KIDNEYS/URETERS:  Persistent nephrograms are present; this raises concern for possible ATN  There is mild dilatation of the renal collecting systems with slight caliectasis bilaterally  The ureters are mildly dilated bilaterally to the level of the urinary bladder, likely related to the degree of urinary bladder distention  There is bilateral perinephric stranding and fluid, left greater than right, increased compared to the prior study  Fluid tracks caudally bilaterally into the pelvis  There is a 3 mm nonobstructing calculus in the lower pole right kidney  STOMACH AND BOWEL:  No bowel obstruction  There is colonic diverticulosis without evidence of acute diverticulitis  APPENDIX:  No findings to suggest appendicitis  ABDOMINOPELVIC CAVITY:  As described above  There is a small amount of ascites, increased compared to the prior study  No evidence of pneumoperitoneum  VESSELS:  Atherosclerosis  No abdominal aortic aneurysm  PELVIS REPRODUCTIVE ORGANS:  The prostate is enlarged  Please see below  URINARY BLADDER:  The urinary bladder is distended, extending nearly to the level of the umbilicus  The urinary bladder volume is approximately 970 mL  There is gas layering non-dependently within the anterior aspect of the urinary bladder lumen  Since the prior study a Schaffer catheter has been inserted, however, the catheter is malpositioned    The catheter balloon is located near the base of the penis, below the level of the prostate  There is gas within the soft tissues of the perineum and penis, most pronounced on the left  ABDOMINAL WALL/INGUINAL REGIONS:  The inferior aspect of the left gluteus john muscle is asymmetrically enlarged compared with the right and there is overlying subcutaneous edema; these findings are new compared with November 21, 2022  There is body wall edema, worse compared to the prior study  There are small to moderate bilateral fat-containing inguinal hernias  OSSEOUS STRUCTURES:  No new fractures  Age indeterminant fracture posterior right 12th rib redemonstrated  Old healed left rib fracture deformities  There is multilevel degenerative change of the spine  Redemonstrated prior ORIF right hip, transfixing intertrochanteric fracture, without significant healing (fracture diagnosed October 31, 2022)  Prior sternotomy  Impression: The Schaffer catheter is malpositioned  The catheter balloon is located near the base of the penis, below the level of the prostate  There is gas within the soft tissues of the perineum and penis, most pronounced on the left  Urethral injury is suspected  Urology consultation and follow-up is recommended  The urinary bladder is distended, extending nearly to the level of the umbilicus  The urinary bladder volume is approximately 970 mL  There is gas layering non-dependently within the anterior aspect of the urinary bladder  There is mild dilatation of the renal collecting systems and ureters bilaterally, likely related to the degree of urinary bladder distention  There is bilateral perinephric stranding and fluid, left greater than right, increased compared with November 21, 2022  Fluid tracks caudally bilaterally into the pelvis  Correlation with urinalysis and urine culture and sensitivity is recommended  Persistent nephrograms are present; this raises concern for possible ATN    Clinical and laboratory correlation is recommended  Consider Nephrology consultation  There is a small amount of ascites, increased compared to the prior study  There is body wall edema, worse compared to the prior study  The inferior aspect of the left gluteus john muscle appears asymmetrically enlarged compared with the contralateral right side and there is overlying subcutaneous edema  This may be related to asymmetric edema, myositis, or hematoma  Clinical correlation and follow-up is recommended  There is a moderate-sized left pleural effusion  There is bibasilar atelectasis containing some air bronchograms, left greater than right  Superimposed pneumonia should be excluded clinically  Other nonemergent findings, as described above  Please see discussion  I personally discussed this study with Ozzy Leyva on 11/23/2022 at 1:40 AM  Workstation performed: [id number]     CT chest abdomen pelvis wo contrast    Result Date: 12/7/2022  Narrative: CT CHEST, ABDOMEN AND PELVIS WITHOUT IV CONTRAST INDICATION:   Unexplained drop in Hb  Hypoxia  Recent urethral trauma with hematuria  COMPARISON:  11/23/2022; 11/21/2022 TECHNIQUE: CT examination of the chest, abdomen and pelvis was performed without intravenous contrast  Axial, sagittal, and coronal 2D reformatted images were created from the source data and submitted for interpretation  Radiation dose length product (DLP) for this visit:  635 mGy-cm   This examination, like all CT scans performed in the [facility], was performed utilizing techniques to minimize radiation dose exposure, including the use of iterative reconstruction and automated exposure control  Enteric contrast was administered  FINDINGS: CHEST LUNGS:  Patchy opacity in the right lower lobe with air bronchograms of hyperdense elements  Additionally there are small punctate calcific radiodensities in this region  Moderate left basilar effusion with associated left basilar compressive atelectasis noted  Elsewhere there are multifocal tree-in-bud and opacities, especially in the right upper lobe, exemplified on series 602, image 137, correlating to finding on series 2, image 25  PLEURA:  Moderate left basilar effusion remains  HEART/GREAT VESSELS: Sternotomy wires and mediastinal clips are noted, compatible with prior CABG  Aortic valvuloplasty noted  The blood pool is diminished in attenuation compared to the adjacent myocardium suggesting anemia  Moderate to advanced atherosclerotic aortic calcifications noted  MEDIASTINUM AND JENNIFER:  A few top normal mediastinal lymph nodes are noted, all with short axis diameters of less than 1 cm, similar to the prior study  CHEST WALL AND LOWER NECK:  Sternotomy wires noted  ABDOMEN LIVER/BILIARY TREE:  Unremarkable  GALLBLADDER:  There are gallstone(s) within the gallbladder, without pericholecystic inflammatory changes  SPLEEN:  Unremarkable  PANCREAS:  Unremarkable  ADRENAL GLANDS:  Unremarkable  KIDNEYS/URETERS:  Unremarkable  No hydronephrosis  STOMACH AND BOWEL:  Oral contrast is noted in the rectum  Sigmoid diverticulosis noted  No abnormally dilated loops of bowel  No pneumatosis  APPENDIX:  No findings to suggest appendicitis  ABDOMINOPELVIC CAVITY:  No ascites  No pneumoperitoneum  No lymphadenopathy  VESSELS:  Atherosclerotic calcifications noted  PELVIS REPRODUCTIVE ORGANS:  The prostate is enlarged and slightly hyperdense compared to the prior study  The prostate appears slightly larger than on the prior study as well, stable and series 2, image 109  URINARY BLADDER:  There is been interval placement of a percutaneous cystostomy tube with a small amount of air in the bladder lumen  ABDOMINAL WALL/INGUINAL REGIONS:  Unremarkable  OSSEOUS STRUCTURES:  Partially imaged right femoral aldo  Scattered spondylotic changes noted  Impression: 1    Interval development of right lower lobe airspace disease possibly atelectasis or developing pneumonia, potentially aspiration pneumonia  2   Moderate left basilar effusion and compressive atelectasis redemonstrated  3   New tree-in-bud opacities in the right upper lobe worrisome for pneumonitis/pneumonia  4   Enlarged prostate which appears slightly larger than on the recent prior studies and now is more hyperdense  Hemorrhagic prostatitis not excluded  No large hematoma in the pelvis  5   Cholelithiasis redemonstrated 6  Sigmoid diverticulosis without bowel obstruction  7   Diminished attenuation of the blood pool compared to the myocardium suggestive of anemia  Workstation performed: [id number]     CT chest abdomen pelvis wo contrast    Result Date: 11/21/2022  Narrative: CT CHEST, ABDOMEN AND PELVIS WITHOUT IV CONTRAST INDICATION:   Sepsis  COMPARISON:  Multiple priors most recently same day chest radiograph TECHNIQUE: CT examination of the chest, abdomen and pelvis was performed without intravenous contrast  Axial, sagittal, and coronal 2D reformatted images were created from the source data and submitted for interpretation  Radiation dose length product (DLP) for this visit:  867 mGy-cm   This examination, like all CT scans performed in the [facility], was performed utilizing techniques to minimize radiation dose exposure, including the use of iterative reconstruction and automated exposure control  Enteric contrast was not administered  FINDINGS: CHEST LUNGS:  Few scattered reticular and groundglass opacities throughout the lungs are nonspecific  Patchy bibasilar opacities probably representing dependent atelectasis  PLEURA:  Moderate left pleural effusion  HEART/GREAT VESSELS: Thoracic aortic and coronary artery atherosclerosis  No thoracic aortic aneurysm  MEDIASTINUM AND JENNIFER:  Unremarkable  CHEST WALL AND LOWER NECK:  Unremarkable  ABDOMEN LIVER/BILIARY TREE:  Unremarkable  GALLBLADDER:  There are gallstone(s) within the gallbladder, without pericholecystic inflammatory changes   SPLEEN: Unremarkable  PANCREAS:  Unremarkable  ADRENAL GLANDS:  Unremarkable  KIDNEYS/URETERS:  Retained contrast related to recent administration  No hydronephrosis  STOMACH AND BOWEL:  There is colonic diverticulosis without evidence of acute diverticulitis  APPENDIX:  A normal appendix was visualized  ABDOMINOPELVIC CAVITY:  No ascites  No pneumoperitoneum  No lymphadenopathy  VESSELS:  Atherosclerotic changes are present  No evidence of aneurysm  PELVIS REPRODUCTIVE ORGANS:  The prostate is enlarged  URINARY BLADDER:  Excreted contrast material within the urinary bladder  No acute abnormality or suspicious mass identified  ABDOMINAL WALL/INGUINAL REGIONS:  Unremarkable  OSSEOUS STRUCTURES:  Nondisplaced age-indeterminate right posterior 12th rib fracture as seen on recent CT of 11/20/2022  Bilateral chronic healed rib fractures  [place]ra Eaves Spinal degenerative changes are noted  Left humeral head suture anchors  Right femoral cephalomedullary nail  Sternotomy wires  Impression: No definite acute inflammatory process identified  Cholelithiasis without CT evidence of acute cholecystitis  Moderate left pleural effusion  Mild scattered groundglass opacities in the lungs, favored to represent areas of subsegmental atelectasis, however, superimposed pneumonia cannot be entirely excluded  Additional findings, as described  Workstation performed: [id number]     XR chest portable    Result Date: 12/5/2022  Narrative: CHEST INDICATION:   Hypoxia, respiratory distress, +RSV, concern for aspiration PNA  COMPARISON:  Chest radiograph 11/27/2022  EXAM PERFORMED/VIEWS:  XR CHEST PORTABLE  AP semierect FINDINGS:  Median sternotomy wires are intact  Prosthetic mitral valve  Cardiomediastinal silhouette is stable  Prior CABG  Small left pleural effusion, improved since prior study  Bibasilar atelectasis  No pneumothorax  Degenerative changes of the right acromioclavicular joint  Impression: Improved small left pleural effusion  Bibasilar atelectasis  No new focal airspace consolidation identified  Workstation performed: [id number]     XR chest 1 view portable    Result Date: 11/22/2022  Narrative: CHEST INDICATION:   Shortness of breath  COMPARISON:  3/4/2022 EXAM PERFORMED/VIEWS:  XR CHEST PORTABLE FINDINGS:  Low lung volumes  Basilar atelectasis  Chronic small left pleural effusion, possible small right effusion  No pneumothorax  Heart, mediastinal and hilar structures are within normal limits  Surgical changes  Mitral valve repair  No acute osseous or soft tissue pathology  Left rotator cuff repair and sternotomy  Impression: Low lung volumes with basilar atelectasis and small, chronic left, possible right pleural effusion  Workstation performed: [id number]     XR chest pa & lateral    Result Date: 11/28/2022  Narrative: CHEST INDICATION:   cough  COMPARISON:  None EXAM PERFORMED/VIEWS:  XR CHEST PA & LATERAL Images: 2 FINDINGS: Cardiomediastinal silhouette appears unremarkable  Left effusion seen Increased lung markings seen with hazy groundglass density Postsurgical changes from prior median sternotomy Mitral valvular ring     Impression: Increasing left effusion Increasing hazy density in the lung suggest worsening congestion Workstation performed: [id number]     XR foot 2 vw right    Result Date: 12/1/2022  Narrative: RIGHT FOOT INDICATION:   heel injury rule out osteomyellitis  COMPARISON:  None VIEWS:  XR FOOT 2 VW RIGHT Images: 2 FINDINGS: There is no acute fracture or dislocation  Calcaneal spur(s) noted  No lytic or blastic osseous lesion  There are atherosclerotic calcifications  Soft tissues are otherwise unremarkable  Impression: No radiographic evidence of osteomyelitis  Workstation performed: [id number]     FL barium swallow video w speech    Result Date: 11/30/2022  Narrative: A video barium swallow study was performed by the Department of Speech Pathology   Please refer to the report for the official interpretation  The images are stored for archival purposes only  Study images were not formally reviewed by the Radiology Department  VAS lower limb arterial duplex, complete bilateral    Result Date: 11/30/2022  Narrative:  [facility] REPORT CLINICAL: Indications: Patient presents with bilateral non-healing deep tissue injuries  Operative History: 2021-10-01 Coronary angioplasty w/ stent placement 2014-01-01 CABG x 2 [facility] cardiology) 2014-01-01 Mitral valve repair Risk Factors The patient has history of Hyperlipidemia, CHF, A-fib, JOEL, CKD and CAD  Clinical Right Pressure:  130/62 mm Hg, Left Pressure:  / mm Hg  FINDINGS:  Segment                Right                   Left                                          Impression  PSV (cm/s)  PSV (cm/s)  Common Femoral Artery                      78          43  Prox Profunda                              52          66  Prox SFA                                   68          59  Mid SFA                50-75%             122          68  Dist SFA               50-75%             142          85  Proximal Pop                               34          60  Distal Pop                                 55          47  Dist Post Tibial                           51          60  Dist  Ant  Tibial                          25          19     CONCLUSION: Impression:  RIGHT LOWER LIMB: There is evidence of 50-75% stenosis in the mid and distal superficial femoral artery with diffuse disease noted throughout the remaining femoral-popliteal arteries  Evidence suggestive of tib/peroneal disease  Ankle/Brachial index and metatarsal pressure unable to be obtained due to patients inability to tolerate cuff pressure  PVR/ PPG tracings are dampened  Great toe pressure of 36 mm Hg, within the healing range of a non-diabetic  LEFT LOWER LIMB: Diffuse disease noted throughout the femoral-popliteal arteries without significant focal stenosis   Evidence suggestive of tib/peroneal disease  Ankle/Brachial index and metatarsal pressure unable to be obtained due to patients inability to tolerate cuff pressure  PVR/ PPG tracings are dampened  Great toe pressure of 105 mm Hg, within the healing range of a non-diabetic  Technically difficult study due to patients pain and limited movement  SIGNATURE: Electronically Signed by: Kike John on 2022-11-30 10:47:47 PM    CT abdomen pelvis with contrast    Result Date: 11/20/2022  Narrative: CT ABDOMEN AND PELVIS WITH IV CONTRAST INDICATION:   LLQ pain  COMPARISON:  4/23/2021 TECHNIQUE:  CT examination of the abdomen and pelvis was performed  Axial, sagittal, and coronal 2D reformatted images were created from the source data and submitted for interpretation  Radiation dose length product (DLP) for this visit:  726 mGy-cm   This examination, like all CT scans performed in the [facility], was performed utilizing techniques to minimize radiation dose exposure, including the use of iterative reconstruction and automated exposure control  IV Contrast:  98 mL of iodixanol (VISIPAQUE) Enteric Contrast:  Enteric contrast was not administered  FINDINGS: ABDOMEN LOWER CHEST:  There is a chronic left pleural effusion which appears similar to the prior scan with atelectasis in the posterior left lower lobe  Postop changes at the heart are noted  LIVER/BILIARY TREE:  There appears to be some mild periportal edema  No liver masses are seen  GALLBLADDER:  The gallbladder is distended  Gallstones are present  The wall does not appear thick or inflamed  SPLEEN:  Unremarkable  PANCREAS:  Unremarkable  ADRENAL GLANDS:  Unremarkable  KIDNEYS/URETERS:  Tiny nonobstructive stone at the lower pole of the right kidney  Kidneys are otherwise unremarkable  STOMACH AND BOWEL:  There is diverticulosis of the sigmoid colon  No acute inflammation seen  No bowel obstruction  APPENDIX:  No findings to suggest appendicitis   ABDOMINOPELVIC CAVITY:  No ascites  No pneumoperitoneum  No lymphadenopathy  VESSELS:  Atherosclerotic changes are present  No evidence of aneurysm  PELVIS REPRODUCTIVE ORGANS:  Prostate measures 4 2 x 5 5 cm  URINARY BLADDER:  Unremarkable  ABDOMINAL WALL/INGUINAL REGIONS:  Small fat-containing left inguinal hernia without complications  There seems to be slight edema along the flanks  OSSEOUS STRUCTURES:  Orthopedic fixation of the right femoral neck  Small amount of heterotopic bone in the adjacent soft tissues  Chronic degenerative changes of lumbar spine  There is a fracture of the right 12th rib which is nondisplaced but could be acute based on its morphology  Correlate for any trauma to the region  There is a chronic appearing injury of the right 9th rib laterally  There is evidence of old healed left-sided rib trauma bilaterally at the axillary line involving several ribs  Impression: No specific findings to account for left lower quadrant pain  There is colonic diverticulosis, but no acute inflammation is seen  Gallbladder is distended and contains multiple gallstones  No obvious inflammatory changes in that location although correlation for any tenderness or right upper quadrant pain or fever is advised  Chronic left pleural effusion and postop changes of the heart similar to the prior study  Evidence of old rib injuries and perhaps a more acute right 12th rib injury  Correlate for any recent trauma or pain or tenderness in the region  The study was marked in [facility] for immediate notification  Workstation performed: [id number]       EKG, Pathology, and Other Studies Reviewed on Admission:   · EKG: Reviewed    Counseling / Coordination of Care Time: 30 total mins spent n consult  Greater than 50% of total time spent on patient counseling and coordination of care      Johanne Allan MD   PGY-4, Department of Gastroenterology     ** Please Note: This note is constructed using a voice recognition dictation system   **

## 2022-12-07 NOTE — ASSESSMENT & PLAN NOTE
Lab Results   Component Value Date    EGFR 20 12/06/2022    EGFR 19 12/06/2022    EGFR 24 12/05/2022    CREATININE 2 81 (H) 12/06/2022    CREATININE 2 85 (H) 12/06/2022    CREATININE 2 41 (H) 12/05/2022     Estimated Creatinine Clearance: 19 6 mL/min (A) (by C-G formula based on SCr of 2 81 mg/dL (H))    Initially had JOEL which improved with diuresis  Now with increasing creatinine in the setting of infection, no oral intake on 12/5  D5W increased to 60/hr  Received 1 unit PRBC  Albumin x 1  Cleared by speech for oral intake  Monitor creatinine  Avoid nephrotoxic medications and hypotension

## 2022-12-07 NOTE — PROGRESS NOTES
Kurt Wilhelm is a 80 y o  male who is currently ordered Vancomycin IV with management by the Pharmacy Consult service  Relevant clinical data and objective / subjective history reviewed  Vancomycin Assessment:  Indication and Goal AUC/Trough: Pneumonia (goal -600, trough >10), -600, trough >10  Clinical Status: worsening  Micro:   pending  Renal Function:  SCr: 2 81 (was 2 43) mg/dL  CrCl: 21 1 mL/min                                             Renal replacement: not on dialysis  Days of Therapy: 3  Current Dose:  vancomycin 750 mg IV PRN when random level < 15                                                          Vancomycin Plan: give dose today of 750 mg due to random level of 14 7  Recheck level 12/8 at 0600  New Dosing: none                                  Estimated AUC: 414 mcg*hr/mL  Estimated Trough: 14 4 mcg/mL  Next Level: random  am labs wed 12/8 0600  Renal Function Monitoring: Daily BMP and [place] will continue to follow closely for s/sx of nephrotoxicity, infusion reactions and appropriateness of therapy  BMP and CBC will be ordered per protocol  We will continue to follow the patient’s culture results and clinical progress daily  Driss OMER  Ph  Pharmacist

## 2022-12-07 NOTE — ASSESSMENT & PLAN NOTE
· High risk for aspiration  · Cleared by speech for puréed with honey thick liquids with strict aspiration precautions  · Patient and family do not want PEG tube

## 2022-12-07 NOTE — ASSESSMENT & PLAN NOTE
· Noted to be in RVR today  · Was unable to receive oral medications on 12/5  · On metoprolol 2 5 mg IV every 4 hours  · Once oral intake improves metoprolol to be changed to oral  · Cardiology following -input appreciated  · Eliquis held due to drop in Hb today requiring transfusion

## 2022-12-07 NOTE — ASSESSMENT & PLAN NOTE
· Palliative following  · Family feels he has improved and would like to continue current treatment measures

## 2022-12-07 NOTE — PROGRESS NOTES
Vascular Surgery   Progress Note    Contacted by Mina Schofield, Dr Johnny Boateng, regarding c/f worsening of wounds w/ Palliative care concern for vascular component and Podiatry wondering if candidate for intervention  Please see Vascular Surgery consult as completed by Dr To Watkins and I on 12/1/2022  Since our visit, Mr Jennifer Douglass hospitalization has been complicated by the following:  · Acute resp failure w/ hypoxia   · Recurrent/ cont'd sepsis w/ RSV + on 12/2/2022  · Aspiration pneumonitis/ PNA, HAP  · PAF w/ RVR  · Profound anemia (hgb 6 7) requiring transfusion  -- Eliquis for PAF held  --Current H/ H 7/ 22 8 w/ plan for transfusion today  --CT c/a/p (p)  --GI consulted:  Rectal exam aborted  Considered high risk for endoscopy d/t resp status  Will cont to monitor  · JOEL on CKD (Cr peak 2 85, current Cr 2 61)  -- Nephrology consult (p)  · Cont'd protein calorie malnutrition  · Cont'd fraility, debility, deconditioning, and ambulatory dysfunction  · Cont'd dysphagia w/ Dysphagia 1, pureed (honey thick liquids) diet  · Palliative care discussions regarding GOC w/ progression to DNAR/ DNI status and consideration for comfort care should patient continue to decline      /63   Pulse 90   Temp 98 °F (36 7 °C)   Resp 16   Ht 5' 8" (1 727 m)   Wt 68 3 kg (150 lb 9 2 oz)   SpO2 100%   BMI 22 89 kg/m²    O2: 7 lpm (midflow nc)    Pt seen and examined  Wife present at bedside  Gen:  Lying in bed  Weak cough  Voice quality mildly improved and remains weak  Heart: S1/S2  Irreg Irreg c/w Afib  Lungs: breathing non-labored  Extr:  BLE "pink pads" to pressure areas  +M/S   (-) cyanosis  --R: palp popl, doppl AT/ DP (betw 1-2 interspace of toes)/ PT (faintly palp)  --L: palp popl, faintly palp/ doppl DP/ PT  --Wounds as previously captured in media:    RIGHT:                    LEFT:              Neuro: Grossly intact     Latest Reference Range & Units 12/07/22 05:27   Sodium 135 - 147 mmol/L 148 (H) Potassium 3 5 - 5 3 mmol/L 3 9   Chloride 96 - 108 mmol/L 113 (H)   CO2 21 - 32 mmol/L 30   Anion Gap 4 - 13 mmol/L 5   BUN 5 - 25 mg/dL 89 (H)   Creatinine 0 60 - 1 30 mg/dL 2 61 (H)   Glucose, Random 65 - 140 mg/dL 232 (H)   Calcium 8 3 - 10 1 mg/dL 8 1 (L)   CORRECTED CALCIUM 8 3 - 10 1 mg/dL 9 7   AST 5 - 45 U/L 40   ALT 12 - 78 U/L 22   Alkaline Phosphatase 46 - 116 U/L 92   Total Protein 6 4 - 8 4 g/dL 5 6 (L)   Albumin 3 5 - 5 0 g/dL 2 0 (L)   TOTAL BILIRUBIN 0 20 - 1 00 mg/dL 0 93   eGFR ml/min/1 73sq m 21      Latest Reference Range & Units 12/07/22 05:27   WBC 4 31 - 10 16 Thousand/uL 5 25   Red Blood Cell Count 3 88 - 5 62 Million/uL 2 36 (L)   Hemoglobin 12 0 - 17 0 g/dL 7 0 (L)   HCT 36 5 - 49 3 % 22 8 (L)   MCV 82 - 98 fL 97   MCH 26 8 - 34 3 pg 29 2   MCHC 31 4 - 37 4 g/dL 30 3 (L)   RDW 11 6 - 15 1 % 17 5 (H)   Platelet Count 122 - 390 Thousands/uL 144 (L)   MPV 8 9 - 12 7 fL 11 1         Imp/ Plan:    BLE (R>L) Pressure wounds  PAD  --Given pt's global medical condition, currently not a candidate for vascular intervention  To pursue arteriogram at present, he would be at increased risk for complication to include infection (including stent infection), bleeding risk w/ potential for worsened anemia, and JOEL/ ATN/ TAMMY w/ progression to ARF, to name a few  --His continued malnutrition, immobility and some known PAD creates a trifecta for worsening/ non-healing of wounds  --Recommend cont'd LWC, strict off-loading, repositioning  --Outpt f/u previously arranged: appt w/ Dr Pamela Sims on 12/22/22 at 9:45am, [place] office  --Discussed w/ patient and his wife, present at bedside, all of the above  She is in understanding and agreement  --no role for Rpt LEAD/ [place]  --Cont Plavix (DAPHNEY 2021)  --Resume Eliquis (Afib) when able  --Cont statin  --will see prn   Please call if we can be of further assistance     Sepsis  Aspiration PNA/ Pneumonitis  HAP  Acute Resp Failure w/ Hypoxia  --cont'd O2 requirements  --Pulm following  --Solumedrol  --Cefepime, Vanco  --Abx completed for UTI    JOEL on CKD 3  --Nephrology following    Anemia-- Acute on Chronic/ Fe Def-- requiring txn  --GI following  --CT c/a/p (p)    Pharyngeal dysphagia  --Dysphagia diet     Debility/ Fraility/ Deconditioning/ Ambulatory dysfunction    Traumatic zavala/ Hematuria  • Cysto, Suprapubic tube placement 11/23 Reese Lee)  --Urology following  CHF-- EF 45%  --Spironolactone, Demadex  PAF w/ RVR  --Eliquis held 2* anemia  --Amio, Bblocker  CAD/ CABG/ MVRepair ([place])/ DAPHNEY ([place], [place]) 10/2021  --Plavix, Statin, Bblocker  HTN  HLD  --Lipitor  R femur fracture s/p IM nailing 11/6/2022        Christian Snellen  12/7/2022  --*d/w Dr Concepcion Banks

## 2022-12-07 NOTE — ASSESSMENT & PLAN NOTE
· Noted overnight on 12/4 when he had fever, tachycardia, tachypnea, worsening hypoxia and was noted to have RSV infection  · Procalcitonin elevated  · Possible superadded bacterial pneumonia  · Continue Cefepime  · Pulm following - input appreciated

## 2022-12-07 NOTE — PROGRESS NOTES
Carrie Arm is a 80 y o  male who is currently ordered Vancomycin IV with management by the Pharmacy Consult service  Relevant clinical data and objective / subjective history reviewed  Vancomycin Assessment:  Indication and Goal AUC/Trough: Pneumonia (goal -600, trough >10), -600, trough >10  Clinical Status: worsening  Micro:   pending  Renal Function:  SCr: 2 81 (was 2 43) mg/dL  CrCl: 21 1 mL/min                                             Renal replacement: not on dialysis  Days of Therapy: 3  Current Dose:  vancomycin 750 mg IV PRN when random level < 15                            Vancomycin Plan: give dose today of 750 mg due to random level of 14 7  Recheck level 12/8 at 0600  New Dosing: none                                  Estimated AUC: 414 mcg*hr/mL  Estimated Trough: 14 4 mcg/mL  Next Level: random  am labs wed 12/8 0600  Renal Function Monitoring: Daily BMP and [place] will continue to follow closely for s/sx of nephrotoxicity, infusion reactions and appropriateness of therapy  BMP and CBC will be ordered per protocol  We will continue to follow the patient’s culture results and clinical progress daily  Klarissa OMER  Ph  Pharmacist

## 2022-12-07 NOTE — ASSESSMENT & PLAN NOTE
· Noted overnight on 12/4  · Due to RSV with possible superimposed bacterial pneumonia with concern for possible aspiration  · O2 requirements improved  · On high flow nasal cannula at present  · Wean O2 as able

## 2022-12-07 NOTE — PROGRESS NOTES
PULMONOLOGY PROGRESS NOTE     Name: Jose Briseno   Age & Sex: 80 y o  male   MRN: [id number]  Unit/Bed#: [facility] 431-01   Encounter: [id number]    PATIENT INFORMATION     Name: Jose Briseno   Age & Sex: 80 y o  male   MRN: [id number]  Hospital Stay Days: 14    ASSESSMENT/PLAN     Assessment:   1  Acute respiratory failure with hypoxia  - Improving  2  RSV with possible superimposed bacterial pneumonia  3  Sepsis   4  Anemia  5  HFrEF  6  Dysphagia  7  Atrial fibrillation  8  JOEL on CKD  9  Urosepsis - resolved  10  Urethral trauma    Plan:  • Currently on 7L [address] with SpO2 100%  • Wean FiO2 as tolerated to maintain SpO2 > 90%  • Continue SoluMedrol 40 mg daily  Can transition to Prednisone once patient is cleared to eat by speech therapy  • Continue Atrovent/Xopenex TID  • Encourage IS  • MRSA culture is negative  Recommend discontinuing Vancomycin  • Continue Cefepime for now  • PT/OT  • OOB to chair as tolerated  • Pulmonology will continue to follow  • Remaining of care per primary team       SUBJECTIVE     Patient seen and examined  No acute events overnight  He reports feeling better today  Denies any worsening symptoms       OBJECTIVE     Vitals:    22 0424 22 0630 22 0803 22 0815   BP: 107/54 102/55  106/56   BP Location:       Pulse: 88 87  93   Resp:  20     Temp: 98 3 °F (36 8 °C) 98 1 °F (36 7 °C)     TempSrc:       SpO2: 99% 100% 100% 100%   Weight:       Height:          Temperature:   Temp (24hrs), Av 8 °F (37 1 °C), Min:97 5 °F (36 4 °C), Max:99 7 °F (37 6 °C)    Temperature: 98 1 °F (36 7 °C)  Intake & Output:  I/O        07 0700  07 07 07 07    I V  (mL/kg)  1501 8 (22)     Blood  350     IV Piggyback  250     Total Intake(mL/kg)  2101 8 (30 8)     Urine (mL/kg/hr) 150 (0 1) 830 (0 5)     Stool       Total Output 150 830     Net -150 +1271 8                Weights:   IBW (Ideal Body Weight): 68 4 kg    Body mass index is 22 89 kg/m²  Weight (last 2 days)     Date/Time Weight    12/05/22 0600 --     Weight: Unable to weigh  Too many object on bed for comfort  at 12/05/22 0600        Physical Exam  Vitals and nursing note reviewed  Constitutional:       General: He is not in acute distress  Appearance: He is ill-appearing  He is not toxic-appearing  HENT:      Head: Normocephalic  Eyes:      General: No scleral icterus  Pupils: Pupils are equal, round, and reactive to light  Cardiovascular:      Rate and Rhythm: Normal rate and regular rhythm  Heart sounds: No murmur heard  Pulmonary:      Breath sounds: No stridor  Decreased breath sounds present  No wheezing, rhonchi or rales  Abdominal:      General: There is no distension  Palpations: Abdomen is soft  Tenderness: There is no abdominal tenderness  There is no guarding  Musculoskeletal:      Right lower leg: No edema  Left lower leg: No edema  Skin:     General: Skin is warm  Capillary Refill: Capillary refill takes less than 2 seconds  Coloration: Skin is pale  Neurological:      Mental Status: He is alert  Mental status is at baseline  Cranial Nerves: No cranial nerve deficit  Psychiatric:         Mood and Affect: Mood normal            LABORATORY DATA     Labs: I have personally reviewed pertinent reports    Results from last 7 days   Lab Units 12/07/22 0527 12/06/22 1801 12/06/22  0701 12/06/22  0519 12/05/22  0505 12/04/22  2325   WBC Thousand/uL 5 25  --   --  5 83 13 72* 15 43*   HEMOGLOBIN g/dL 7 0* 7 9* 6 8* 6 7* 7 6* 8 8*   HEMATOCRIT % 22 8* 26 7* 23 7* 22 6* 26 3* 30 0*   PLATELETS Thousands/uL 144*  --   --  156 158 212   NEUTROS PCT %  --   --   --   --  91* 93*   MONOS PCT %  --   --   --   --  6 4   MONO PCT %  --   --   --  1*  --   --       Results from last 7 days   Lab Units 12/07/22 0527 12/06/22  1801 12/06/22  0519 12/05/22  0505   POTASSIUM mmol/L 3 9 3 7 4 1 4 2   CHLORIDE mmol/L 113* 115* 114* 114*   CO2 mmol/L 30 28 28 28   BUN mg/dL 89* 85* 78* 56*   CREATININE mg/dL 2 61* 2 81* 2 85* 2 41*   CALCIUM mg/dL 8 1* 8 2* 8 4 8 1*   ALK PHOS U/L 92  --  110 120*   ALT U/L 22  --  18 13   AST U/L 40  --  47* 20     Results from last 7 days   Lab Units 12/06/22  0519 12/01/22  0522   MAGNESIUM mg/dL 2 5 2 5     Results from last 7 days   Lab Units 12/06/22  0519   PHOSPHORUS mg/dL 4 7*          Results from last 7 days   Lab Units 12/05/22  0505   LACTIC ACID mmol/L 1 6             ABG:   Results from last 7 days   Lab Units 12/04/22  2352   PH ART  7 497*   PCO2 ART mm Hg 34 2*   PO2 ART mm Hg 110 2   HCO3 ART mmol/L 25 9   BASE EXC ART mmol/L 2 7   ABG SOURCE  Radial, Left       Micro:   Results from last 7 days   Lab Units 12/05/22  1843 12/05/22  1345 12/04/22  2324   BLOOD CULTURE   --   --  No Growth at 48 hrs  No Growth at 48 hrs  MRSA CULTURE ONLY  No Methicillin Resistant Staphlyococcus aureus (MRSA) isolated  --   --    LEGIONELLA URINARY ANTIGEN   --  Negative  --    STREP PNEUMONIAE ANTIGEN, URINE   --  Negative  --          IMAGING & DIAGNOSTIC TESTING     Radiology Results: I have personally reviewed pertinent reports  CT abdomen pelvis wo contrast    Result Date: 11/23/2022  Impression: The Schaffer catheter is malpositioned  The catheter balloon is located near the base of the penis, below the level of the prostate  There is gas within the soft tissues of the perineum and penis, most pronounced on the left  Urethral injury is suspected  Urology consultation and follow-up is recommended  The urinary bladder is distended, extending nearly to the level of the umbilicus  The urinary bladder volume is approximately 970 mL  There is gas layering non-dependently within the anterior aspect of the urinary bladder  There is mild dilatation of the renal collecting systems and ureters bilaterally, likely related to the degree of urinary bladder distention    There is bilateral perinephric stranding and fluid, left greater than right, increased compared with November 21, 2022  Fluid tracks caudally bilaterally into the pelvis  Correlation with urinalysis and urine culture and sensitivity is recommended  Persistent nephrograms are present; this raises concern for possible ATN  Clinical and laboratory correlation is recommended  Consider Nephrology consultation  There is a small amount of ascites, increased compared to the prior study  There is body wall edema, worse compared to the prior study  The inferior aspect of the left gluteus john muscle appears asymmetrically enlarged compared with the contralateral right side and there is overlying subcutaneous edema  This may be related to asymmetric edema, myositis, or hematoma  Clinical correlation and follow-up is recommended  There is a moderate-sized left pleural effusion  There is bibasilar atelectasis containing some air bronchograms, left greater than right  Superimposed pneumonia should be excluded clinically  Other nonemergent findings, as described above  Please see discussion  I personally discussed this study with Dolores Shaila on 11/23/2022 at 1:40 AM  Workstation performed: [id number]     XR chest pa & lateral    Result Date: 11/28/2022  Impression: Increasing left effusion Increasing hazy density in the lung suggest worsening congestion Workstation performed: [id number]     Other Diagnostic Testing: I have personally reviewed pertinent reports      ACTIVE MEDICATIONS     Current Facility-Administered Medications   Medication Dose Route Frequency   • acetaminophen (TYLENOL) rectal suppository 650 mg  650 mg Rectal Q6H PRN   • albuterol inhalation solution 2 5 mg  2 5 mg Nebulization Q6H PRN   • amiodarone tablet 200 mg  200 mg Oral Daily With Breakfast   • atorvastatin (LIPITOR) tablet 40 mg  40 mg Oral Daily With Dinner   • cefepime (MAXIPIME) 1,000 mg in dextrose 5 % 50 mL IVPB  1,000 mg Intravenous Q24H   • clopidogrel (PLAVIX) tablet 75 mg  75 mg Oral Daily   • dextrose 5 % infusion  60 mL/hr Intravenous Continuous   • doxylamine (UNISOM) tablet 12 5 mg  12 5 mg Oral HS PRN   • ferrous sulfate tablet 325 mg  325 mg Oral Daily With Breakfast   • guaiFENesin LIQD 400 mg  400 mg Oral BID   • heparin (porcine) subcutaneous injection 5,000 Units  5,000 Units Subcutaneous Q8H [facility] & assisted   • HYDROmorphone HCl (DILAUDID) injection 0 2 mg  0 2 mg Intravenous Q4H PRN   • ipratropium (ATROVENT) 0 02 % inhalation solution 0 5 mg  0 5 mg Nebulization TID   • levalbuterol (XOPENEX) inhalation solution 1 25 mg  1 25 mg Nebulization TID   • Lidocaine Viscous HCl (XYLOCAINE) 2 % mucosal solution 15 mL  15 mL Swish & Spit 4x Daily PRN   • LORazepam (ATIVAN) injection 0 5 mg  0 5 mg Intravenous Q6H PRN   • melatonin tablet 6 mg  6 mg Oral HS   • metoprolol (LOPRESSOR) injection 2 5 mg  2 5 mg Intravenous Q4H   • saliva substitute (MOUTH KOTE) mucosal solution 5 spray  5 spray Mouth/Throat 4x Daily PRN   • vancomycin (VANCOCIN) IVPB (premix in dextrose) 750 mg 150 mL  750 mg Intravenous Daily PRN       VTE Pharmacologic Prophylaxis: Apixaban  VTE Mechanical Prophylaxis: sequential compression device      Disclaimer: Portions of the record may have been created with voice recognition software  Occasional wrong word or "sound a like" substitutions may have occurred due to the inherent limitations of voice recognition software  Careful consideration should be taken to recognize, using context, where substitutions have occurred      Sanam Morrell MD   Pulmonary and Critical Care Fellow, PGY-4  [facility]

## 2022-12-07 NOTE — CONSULTS
Consultation    Nephrology   Bhavani Villasenor 80 y o  male MRN: [id number]  Unit/Bed#: [facility] 431-01 Encounter: [id number]    History of Present Illness   Physician Requesting Consult: Jillian Du MD  Reason for Consult : -Acute kidney injury    ASSESSMENT/PLAN:   80 y o   male with pmh of CHF EF of 45%, CAD status post CABG 2014 with DAPHNEY placement in 2021, CAD, hyperlipidemia, history of mitral valve repair in 2014 hypertension, A  fib presented to the ED on 11/22/22 who was admitted for altered mental status and septic shock  During the course of the hospital stay patient has been treated for septic shock, urethral trauma he has a history of suprapubic catheter in place secondary to urethral trauma due to cystoscopy also diagnosed with RSV infection and paroxysmal A  fib with RVR  Nephrology has been consulted on 12/7/2022 for evaluation and management of acute kidney injury  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:  JOEL multifactorial most likely secondary to obstructive uropathy in light of CT scan suggestive of distended bladder with urinary retention plus prerenal azotemia plus ischemic injury from hypotension and severe anemia plus sepsis plus failure to autoregulated in this elderly gentleman plus some component of vancomycin nephrotoxicity most recent level at 22 2 from close to 6/22  After review of records In [facility] as well as Care everywhere it appears that the patient has a baseline Creatinine of 1 8-2 mg/dL  patient was admitted with a creatinine of 1 71 mg/dL on 11/20/2022  patient's creatinine today is at 2 61 mg/dL  CT abdomen pelvis from 11/23/2022 showing malpositioned Schaffer gas within the soft tissues of the perineum urethral injury  Urinary bladder distended to the level of the umbilicus volume approximately 970 cc    Persistent nephrograms  Status post CT chest abdomen pelvis on 12/7/2022 reading pending  DC current IV fluids and placed on quarter normal saline at 100 cc an hour for 1 L then DC  check BMP every 12  Await renal recovery  Overall prognosis appears to be guarded  Optimize hemodynamic status to avoid delay in renal recovery  Avoid nephrotoxins, adjust meds to appropriate GFR  Strict I/O  Daily weights  Urinary retention protocol if patient does not have a Schaffer  Most likely has underlying CKD secondary to cardiorenal syndrome plus age-related nephron loss  will need to set up patient for follow up with Nephrology as an outpatient post hospitalization  as an outpatient for nephrology, patient follows up with no nephrologist  Called and spoke to patient's spouse all of this from a renal perspective were provided  Blood pressure/normotensive to borderline hypotensive/AFib:  home medications: Aldactone 25 mg p o  daily, metoprolol 100 mg p o  every 12  current medications: Metoprolol 25 mg p o  every 12  Last dose of torsemide was on 12/5/2022  recommendations: Hold diuretics for now, hold Aldactone for now  Optimize hemodynamics  Maintain MAP > 65mmHg  Avoid BP fluctuations  H/H/anemia:  most recent hemoglobin at 7 g procedure  maintain hemoglobin greater than 8 grams/deciliter  On p o  iron  Avoid IV iron in light of infection  Management primary team consider PRBC transfusion if continues to drop    Acid-base electrolytes:    Electrolytes:      Hypernatremia:  Most recent sodium at 148 mEq  Will place patient on quarter normal saline at 100 cc an hour for 2 L then DC  Check BMP every 12    Borderline hypokalemia:  Most recent potassium 3 9 mEq likely to decrease with IV fluids because of dextrose  Supplement gently with 20 mEq K  Dur    Acid-base:    Most recent bicarb at 30, likely contraction alkalosis    Other medical problems:  Proteinuria: Most recent UA with 1+ protein as of 12/5/2022  Sepsis/RSV:  Management per primary team   On cefepime, isolation, Solu-Medrol  Urethral injury: Suprapubic catheter in place  Follow-up with urology    CHF/CAD status post CABG in 2014 and MVR/A  fib with RVR:  Management per primary team   Follow-up with cardiology on amiodarone      Thanks for the consult  Will continue to follow  Please call with questions/ concerns  Above-mentioned orders and Plan in terms of acute kidney injury and hypernatremia was discussed with the team in [address], MD, FASN, 12/7/2022, 12:27 PM          HISTORY OF PRESENT ILLNESS:   Bhavani Villasenor is a 80 y o   male with pmh of CHF EF of 45%, CAD status post CABG 2014 with DAPHNEY placement in 2021, CAD, hyperlipidemia, history of mitral valve repair in 2014 hypertension, A  fib presented to the ED on 11/22/22 who was admitted for altered mental status and septic shock  During the course of the hospital stay patient has been treated for septic shock, urethral trauma he has a history of suprapubic catheter in place secondary to urethral trauma due to cystoscopy also diagnosed with RSV infection and paroxysmal A  fib with RVR  Nephrology has been consulted on 12/7/2022 for evaluation and management of acute kidney injury  Review of record shows patient has a baseline creatinine 1 8 to 2 mg/dL  He was admitted with a creatinine of 1 71 mg/dL on 11/20/2022  Creatinine today is at 2 61 mg/dL  Patient seen and examined with full PPE in place earlier this a m  Hemodynamics have been tenuous  Reports thirst   Urine output close to 830 cc in the last 24 hours  History obtained from chart review and the patient    Consults    Review of Systems   Constitutional: Positive for fatigue  Negative for chills  HENT: Positive for congestion  Respiratory: Positive for cough  Negative for shortness of breath and wheezing  Cardiovascular: Negative for leg swelling  Gastrointestinal: Negative for abdominal pain and diarrhea  Genitourinary: Negative for hematuria  Musculoskeletal: Negative for back pain  Neurological: Negative for headaches  Psychiatric/Behavioral: Negative for agitation and confusion     All other systems reviewed and are negative         Historical Information   Patient Active Problem List   Diagnosis   • Bacteremia due to Pseudomonas   • CAD (coronary artery disease)   • Mitral regurgitation   • Hypertensive urgency   • Hyperlipidemia, mixed   • Carotid artery stenosis   • Stage 3 chronic kidney disease ([facility])   • Multiple lung nodules on CT   • Severe sepsis ([facility])   • Acute respiratory failure with hypoxia ([facility])   • Acute on chronic diastolic CHF (congestive heart failure) ([facility] )   • Acute-on-chronic kidney injury ([facility] )   • Chronic atrial fibrillation ([facility])   • Postprocedural pneumothorax   • Lumbar compression deformity   • Chronic diastolic heart failure ([facility])   • Paroxysmal atrial fibrillation ([facility])   • Closed 2-part intertrochanteric fracture of proximal end of right femur, initial encounter ([facility] )   • Acute on chronic anemia   • Hyponatremia   • Postoperative urinary retention   • Thrombocytopenia ([facility])   • Closed fracture of trochanter of right femur with routine healing   • Leg edema, right   • Pressure injury sacrum and right heel   • Transaminitis   • Elevated troponin level not due myocardial infarction   • Hematuria   • Urethral trauma   • Ambulatory dysfunction   • Dysphagia   • Deep tissue injury to right lateral ankle   • PAD (peripheral artery disease) ([facility])   • RSV (acute bronchiolitis due to respiratory syncytial virus)   • Goals of care, counseling/discussion   • Pneumonia   • Hypernatremia   • Atrial fibrillation with RVR ([facility] )     Past Medical History:   Diagnosis Date   • CHF (congestive heart failure) ([facility])    • CKD (chronic kidney disease) stage 3, GFR 30-59 ml/min ([facility])    • Coronary artery disease    • Hyperlipidemia    • Hypertension    • Mitral regurgitation    • Paroxysmal atrial fibrillation ([facility] )    • Urinary retention      Past Surgical History:   Procedure Laterality Date   • CARDIAC SURGERY      cabg x 2 2014 [facility]   • CORONARY ANGIOPLASTY WITH STENT PLACEMENT 10/2021    DAPHNEY to left main and ramus   • MITRAL VALVE REPAIR  2014    mitral ring   • NC CYSTOURETHROSCOPY W/IRRIG & EVAC CLOTS N/A 11/23/2022    Procedure: Leandra Edward;  Surgeon: Kathi Renner MD;  Location: [facility];  Service: Urology   • NC OPEN RX FEMUR FX+INTRAMED HALIMA Right 11/1/2022    Procedure: INSERTION NAIL IM FEMUR ANTEGRADE (TROCHANTERIC); Surgeon: Selam Reno;   Location: [facility];  Service: Orthopedics   • SUPRAPUBIC TUBE PLACEMENT N/A 11/23/2022    Procedure: INSERTION SUPRAPUBIC CATHETER PERCUTANEOUS;  Surgeon: Kathi Renner MD;  Location[facility];  Service: Urology     Social History   Social History     Substance and Sexual Activity   Alcohol Use Not Currently     Social History     Substance and Sexual Activity   Drug Use Never     Social History     Tobacco Use   Smoking Status Never   Smokeless Tobacco Never     Family History   Problem Relation Age of Onset   • Hypertension Father        Meds/Allergies   current meds:   Current Facility-Administered Medications   Medication Dose Route Frequency   • acetaminophen (TYLENOL) rectal suppository 650 mg  650 mg Rectal Q6H PRN   • albuterol inhalation solution 2 5 mg  2 5 mg Nebulization Q6H PRN   • amiodarone tablet 200 mg  200 mg Oral Daily With Breakfast   • atorvastatin (LIPITOR) tablet 40 mg  40 mg Oral Daily With Dinner   • cefepime (MAXIPIME) 1,000 mg in dextrose 5 % 50 mL IVPB  1,000 mg Intravenous Q24H   • clopidogrel (PLAVIX) tablet 75 mg  75 mg Oral Daily   • dextrose 5 % infusion  60 mL/hr Intravenous Continuous   • doxylamine (UNISOM) tablet 12 5 mg  12 5 mg Oral HS PRN   • ferrous sulfate tablet 325 mg  325 mg Oral Daily With Breakfast   • guaiFENesin LIQD 400 mg  400 mg Oral BID   • heparin (porcine) subcutaneous injection 5,000 Units  5,000 Units Subcutaneous Q8H [address]   • HYDROmorphone HCl (DILAUDID) injection 0 2 mg  0 2 mg Intravenous Q4H PRN   • ipratropium (ATROVENT) 0 02 % inhalation solution 0 5 mg  0 5 mg Nebulization TID   • levalbuterol (XOPENEX) inhalation solution 1 25 mg  1 25 mg Nebulization TID   • Lidocaine Viscous HCl (XYLOCAINE) 2 % mucosal solution 15 mL  15 mL Swish & Spit 4x Daily PRN   • LORazepam (ATIVAN) injection 0 5 mg  0 5 mg Intravenous Q6H PRN   • melatonin tablet 6 mg  6 mg Oral HS   • methylPREDNISolone sodium succinate (Solu-MEDROL) injection 40 mg  40 mg Intravenous Daily   • metoprolol tartrate (LOPRESSOR) tablet 25 mg  25 mg Oral Q12H [address]   • saliva substitute (MOUTH KOTE) mucosal solution 5 spray  5 spray Mouth/Throat 4x Daily PRN   • vancomycin (VANCOCIN) IVPB (premix in dextrose) 750 mg 150 mL  750 mg Intravenous Daily PRN    and PTA meds:   Prior to Admission Medications   Prescriptions Last Dose Informant Patient Reported? Taking?   acetaminophen (TYLENOL) 325 mg tablet   No No   Sig: Take 2 tablets (650 mg total) by mouth every 6 (six) hours as needed for mild pain or moderate pain   albuterol (ProAir HFA) 90 mcg/act inhaler   No No   Sig: Inhale 2 puffs every 6 (six) hours as needed for wheezing   amiodarone 200 mg tablet   Yes No   Sig: Take 200 mg by mouth daily   amoxicillin-clavulanate (AUGMENTIN) 875-125 mg per tablet   No No   Sig: Take 1 tablet by mouth every 12 (twelve) hours for 7 days   apixaban (ELIQUIS) 2 5 mg   No No   Sig: Take 2 5 mg daily through 11/8, then resume taking 2 5 mg twice daily   atorvastatin (LIPITOR) 40 mg tablet   Yes No   Sig: Take 40 mg by mouth daily   clopidogrel (PLAVIX) 75 mg tablet   No No   Sig: Take 1 tablet (75 mg total) by mouth daily Do not start before November 8, 2022     ferrous gluconate (FERGON) 324 mg tablet   No No   Sig: Take 1 tablet (324 mg total) by mouth daily before breakfast Do not start before November 7, 2022    furosemide (LASIX) 20 mg tablet   Yes No   Sig: Take 20 mg by mouth 2 (two) times a day   lidocaine (LIDODERM) 5 %   No No   Sig: Apply 1 patch topically daily Remove & Discard patch within 12 hours or as directed by MD   metoprolol tartrate (LOPRESSOR) 100 mg tablet   Yes No   Sig: Take 100 mg by mouth every 12 (twelve) hours   nitroglycerin (NITROSTAT) 0 4 mg SL tablet   Yes No   oxyCODONE (OXY-IR) 5 MG capsule   Yes No   Sig: Take 5 mg by mouth every 4 (four) hours as needed for moderate pain or severe pain   potassium chloride (K-DUR,KLOR-CON) 20 mEq tablet   No No   Sig: Take 1 tablet (20 mEq total) by mouth 2 (two) times a day for 3 days   spironolactone (ALDACTONE) 25 mg tablet   No No   Sig: Take 1 tablet (25 mg total) by mouth daily   tamsulosin (FLOMAX) 0 4 mg   No No   Sig: Take 1 capsule (0 4 mg total) by mouth daily with dinner      Facility-Administered Medications: None       Scheduled Meds:  Current Facility-Administered Medications   Medication Dose Route Frequency Provider Last Rate   • acetaminophen  650 mg Rectal Q6H PRN Tasha Vega PA-C     • albuterol  2 5 mg Nebulization Q6H PRN Tasha Vega PA-C     • amiodarone  200 mg Oral Daily With Breakfast Oscar Singh MD     • atorvastatin  40 mg Oral Daily With Anand Hughes MD     • cefepime  1,000 mg Intravenous Q24H Priscila Thacker MD 1,000 mg (12/07/22 0148)   • clopidogrel  75 mg Oral Daily Esme Johnston DO     • dextrose  60 mL/hr Intravenous Continuous Priscila Thacker MD 60 mL/hr (12/06/22 3631)   • doxylamine  12 5 mg Oral HS PRN Tasha Vega PA-C     • ferrous sulfate  325 mg Oral Daily With Breakfast Miguel A Rao MD     • guaiFENesin  400 mg Oral BID Herbie Kan PA-C     • heparin (porcine)  5,000 Units Subcutaneous [facility] Priscila Thacker MD     • HYDROmorphone  0 2 mg Intravenous Q4H PRN Nicole Enciso DO     • ipratropium  0 5 mg Nebulization TID Minda Greene MD     • levalbuterol  1 25 mg Nebulization TID Minda Greene MD     • Lidocaine Viscous HCl  15 mL Swish & Spit 4x Daily PRN Sheryle Boyer, DO     • LORazepam  0 5 mg Intravenous Q6H PRN Nicole Enciso DO     • melatonin  6 mg Oral HS Becky Dudley PA-C     • methylPREDNISolone sodium succinate  40 mg Intravenous Daily Dequan Salazar MD     • metoprolol tartrate  25 mg Oral Q12H [address] SANDRINE Tamez     • saliva substitute  5 spray Mouth/Throat 4x Daily PRN Rich Rankin MD     • vancomycin  750 mg Intravenous Daily PRN Henry Stuart PA-C         PRN Meds: •  acetaminophen  •  albuterol  •  doxylamine  •  HYDROmorphone  •  Lidocaine Viscous HCl  •  LORazepam  •  saliva substitute  •  vancomycin    Continuous Infusions:dextrose, 60 mL/hr, Last Rate: 60 mL/hr (22 1741)        No Known Allergies      Invasive Devices: Invasive Devices     Peripheral Intravenous Line  Duration           Peripheral IV 22 Proximal;Right;Ventral (anterior) Forearm 3 days          Drain  Duration           Suprapubic Catheter 18 Fr  14 days                  PHYSICAL EXAM  /63   Pulse 90   Temp 98 °F (36 7 °C)   Resp 16   Ht 5' 8" (1 727 m)   Wt 68 3 kg (150 lb 9 2 oz)   SpO2 100%   BMI 22 89 kg/m²   Temp (24hrs), Av 2 °F (36 8 °C), Min:97 5 °F (36 4 °C), Max:99 7 °F (37 6 °C)        Intake/Output Summary (Last 24 hours) at 2022 1227  Last data filed at 2022 1140  Gross per 24 hour   Intake 1594 33 ml   Output 630 ml   Net 964 33 ml       I/O last 24 hours: In: 2451 8 [I V :1501 8; Blood:700; IV Piggyback:250]  Out: 0835 [Urine:1030]          Current Weight: Weight - Scale:  (Unable to weigh  Too many object on bed for comfort )  First Weight: Weight - Scale: 76 7 kg (169 lb 1 6 oz)  Physical Exam  Vitals and nursing note reviewed  Constitutional:       General: He is not in acute distress  Appearance: Normal appearance  He is normal weight  He is ill-appearing  He is not toxic-appearing or diaphoretic  HENT:      Head: Normocephalic and atraumatic  Mouth/Throat:      Mouth: Mucous membranes are dry  Pharynx: Oropharynx is clear  No oropharyngeal exudate     Eyes: General: No scleral icterus  Conjunctiva/sclera: Conjunctivae normal    Cardiovascular:      Rate and Rhythm: Normal rate  Heart sounds: Normal heart sounds  No friction rub  Pulmonary:      Effort: Pulmonary effort is normal  No respiratory distress  Breath sounds: No stridor  No wheezing  Comments: Coarse breath sounds  Abdominal:      General: There is no distension  Palpations: Abdomen is soft  Tenderness: There is no abdominal tenderness  Comments: Suprapubic catheter   Musculoskeletal:         General: No swelling  Cervical back: Normal range of motion and neck supple  No rigidity  Skin:     General: Skin is warm and dry  Coloration: Skin is not jaundiced  Neurological:      General: No focal deficit present  Mental Status: He is alert and oriented to person, place, and time  Psychiatric:         Mood and Affect: Mood normal          Behavior: Behavior normal            LABORATORY:    Results from last 7 days   Lab Units 12/07/22  0527 12/06/22  1801 12/06/22  0701 12/06/22  0519 12/05/22  0505 12/04/22  2325 12/02/22  0458 12/01/22  0522   WBC Thousand/uL 5 25  --   --  5 83 13 72* 15 43*  --   --    HEMOGLOBIN g/dL 7 0* 7 9* 6 8* 6 7* 7 6* 8 8*  --   --    HEMATOCRIT % 22 8* 26 7* 23 7* 22 6* 26 3* 30 0*  --   --    PLATELETS Thousands/uL 144*  --   --  156 158 212  --   --    POTASSIUM mmol/L 3 9 3 7  --  4 1 4 2 4 5 3 7 4 0   CHLORIDE mmol/L 113* 115*  --  114* 114* 109* 105 104   CO2 mmol/L 30 28  --  28 28 31 33* 30   BUN mg/dL 89* 85*  --  78* 56* 55* 47* 47*   CREATININE mg/dL 2 61* 2 81*  --  2 85* 2 41* 2 43* 1 77* 1 90*   CALCIUM mg/dL 8 1* 8 2*  --  8 4 8 1* 8 6 8 3 8 4   MAGNESIUM mg/dL  --   --   --  2 5  --   --   --  2 5   PHOSPHORUS mg/dL  --   --   --  4 7*  --   --   --   --       rest all reviewed    RADIOLOGY:  XR chest portable   Final Result by Azra Jo MD (12/05 1015)      Improved small left pleural effusion  Bibasilar atelectasis  No new focal airspace consolidation identified  Workstation performed: [id number]         XR foot 2 vw right   Final Result by Randa Escobar MD (12/01 1333)      No radiographic evidence of osteomyelitis  Workstation performed: [id number]         VAS lower limb arterial duplex, complete bilateral   Final Result by Wesly Ferreira MD (11/30 2477)      FL barium swallow video w speech   Final Result by Arizona Apley, DOCUMENTATION (11/30 1519)      XR chest pa & lateral   Final Result by Natalee Wilson MD (11/28 1102)      Increasing left effusion   Increasing hazy density in the lung suggest worsening congestion                  Workstation performed: [id number]         CT chest abdomen pelvis wo contrast    (Results Pending)     Rest all reviewed    Portions of the record may have been created with voice recognition software  Occasional wrong word or "sound a like" substitutions may have occurred due to the inherent limitations of voice recognition software  Read the chart carefully and recognize, using context, where substitutions have occurred  If you have any questions, please contact the dictating provider

## 2022-12-07 NOTE — ASSESSMENT & PLAN NOTE
· Urethral trauma status post cystoscopy with suprapubic catheter in place  · Urology input noted  · Flomax discontinued as pt has SPC and cannot take capsules

## 2022-12-07 NOTE — ASSESSMENT & PLAN NOTE
· Resolved  · Noted overnight on 12/4  · Due to RSV with likely superimposed bacterial pneumonia with concern for possible aspiration  · Had required high flow O2

## 2022-12-07 NOTE — WOUND OSTOMY CARE
Progress Note - Wound   Gus Cruz 80 y o  male MRN: [id number]  Unit/Bed#: [facility] 431-01 Encounter: [id number]        Assessment:   Patient seen today for wound care follow up visit  Patient on P-500 specialty mattress, prevalon boots on  Patient is completely dependent for all care, has supra pubic catheter and is incontinent of bowel  Patient has poor appetite, dislikes thickened liquid  All of patient's wounds have significantly declined again  Concerned that patient may suffer from some vascular component, was unable to obtain full ABIs last week r/t pain and discomfort  Palliative care at bedside during assessment, agrees that vascular component is likely and sent message to [facility] attending to attempt repeat testing  Awaiting further testing in order to fully identify wounds      1  POA Right anterior foot/ankle- unclear as to etiology of wound as wound presents as pressure, however is not in typical location of pressure injury  Purple, intact skin does not navdeep, no drainage present  Wound is larger in size this assessment       2  POA evolving DTI sacral/buttocks- purple, nonblanchable erythema with some partial thickness skin loss with yellow wound bed and small amount of drainage  DTI's have the potential to evolve into full thickness unstageable, stage III, or stage IV wounds       3  POA DTI right heel- black and purple intact nonblanchable erythema and fragile, no drainage  DTI's have the potential to evolve into full thickness unstageable, stage III, or stage IV wounds       4  POA DTI right lateral ankle- intact, purple nonblanchable erythema significantly larger in size than previous assessment, no drainage  DTI's have the potential to evolve into full thickness unstageable, stage III, or stage IV wounds       5  Right lateral foot- circular, nonblanchable erythema, intact, no drainage present, new this assessment   Currently trying to rule out vascular component       6  Left lateral ankle/heel- multiple small circular nonblanchable erythema, intact with no drainage  Currently ruling out vascular over pressure as wound is new this assessment  7  HA DTI left lateral thigh- purple, nonblanchable erythema, intact, no drainage present       No induration, fluctuance, odor, warmth/temperature differences, redness, or purulence noted to the above noted wounds and skin areas assessed  New dressings applied per orders listed below  Patient tolerated well- no s/s of non-verbal pain or discomfort observed during the encounter  Bedside nurse aware of plan of care  See flow sheets for more detailed assessment findings  Wound care will continue to follow        Skin care Plan:  1-Cleanse B/L heels and feet with soap and water  Apply Allevyn foams to wounds  Steve with T for treatment, Change every other day and PRN  2-Turn/reposition q2h or when medically stable for pressure re-distribution on skin   3-Elevate heels to offload pressure, prevalon boots in bed   4-Moisturize skin daily with skin nourishing cream  5-Ehob cushion in chair when out of bed  6-Cleanse sacro-buttocks  with soap and water  Apply Xeroform gauze to wound bed and cover Allevyn foam  Steve with T for treatment  Change every other day and PRN   7-P-500 specialty mattress     Wound 11/21/22 Sacrum (Active)   Wound Image   12/07/22 1025   Wound Description Non-blanchable erythema;Yellow; Beefy red 12/07/22 1025   Pressure Injury Stage DTPI 12/07/22 1025   Rosaura-wound Assessment Pink;Fragile 12/07/22 1025   Wound Length (cm) 5 cm 12/07/22 1025   Wound Width (cm) 8 cm 12/07/22 1025   Wound Depth (cm) 0 2 cm 12/07/22 1025   Wound Surface Area (cm^2) 40 cm^2 12/07/22 1025   Wound Volume (cm^3) 8 cm^3 12/07/22 1025   Calculated Wound Volume (cm^3) 8 cm^3 12/07/22 1025   Tunneling 0 cm 12/07/22 1025   Tunneling in depth located at 0 12/07/22 1025   Undermining 0 12/07/22 1025   Undermining is depth extending from 0 12/07/22 1025   Wound Site Closure CHANDAN 12/07/22 1025   Drainage Amount None 12/07/22 1025   Non-staged Wound Description Full thickness 12/07/22 1025   Treatments Cleansed 12/07/22 1025   Dressing Foam, Silicon (eg  Allevyn, etc); Xeroform 12/07/22 1025   Wound packed? No 12/07/22 1025   Packing- # removed 0 12/07/22 1025   Packing- # inserted 0 12/07/22 1025   Dressing Changed New 12/07/22 1025   Patient Tolerance Tolerated well 12/07/22 1025   Dressing Status Clean;Dry; Intact 12/07/22 1025       Wound 11/21/22 Heel Right (Active)   Wound Image   12/07/22 1018   Wound Description Eschar;Non-blanchable erythema 12/07/22 1018   Pressure Injury Stage DTPI 12/07/22 1018   Rosaura-wound Assessment Clean;Dry; Intact 12/07/22 1018   Wound Length (cm) 7 cm 12/07/22 1018   Wound Width (cm) 6 cm 12/07/22 1018   Wound Depth (cm) 0 cm 12/07/22 1018   Wound Surface Area (cm^2) 42 cm^2 12/07/22 1018   Wound Volume (cm^3) 0 cm^3 12/07/22 1018   Calculated Wound Volume (cm^3) 0 cm^3 12/07/22 1018   Tunneling 0 cm 12/07/22 1018   Tunneling in depth located at 0 12/07/22 1018   Undermining 0 12/07/22 1018   Undermining is depth extending from 0 12/07/22 1018   Wound Site Closure CHANDAN 12/07/22 1018   Drainage Amount None 12/07/22 1018   Non-staged Wound Description Not applicable 30/62/20 5557   Treatments Cleansed 12/07/22 1018   Dressing Foam, Silicon (eg  Allevyn, etc) 12/07/22 1018   Wound packed? No 12/07/22 1018   Packing- # removed 0 12/07/22 1018   Packing- # inserted 0 12/07/22 1018   Dressing Changed New 12/07/22 1018   Patient Tolerance Tolerated well 12/07/22 1018   Dressing Status Clean;Dry; Intact 12/07/22 1018       Wound 11/23/22 Pretibial Distal;Right (Active)   Wound Image   12/07/22 1017   Wound Description Non-blanchable erythema 12/07/22 1017   Pressure Injury Stage DTPI 12/07/22 1017   Rosaura-wound Assessment Clean;Dry; Intact 12/07/22 1017   Wound Length (cm) 12 cm 12/07/22 1017   Wound Width (cm) 9 cm 12/07/22 1017   Wound Depth (cm) 0 cm 12/07/22 1017 Wound Surface Area (cm^2) 108 cm^2 12/07/22 1017   Wound Volume (cm^3) 0 cm^3 12/07/22 1017   Calculated Wound Volume (cm^3) 0 cm^3 12/07/22 1017   Tunneling 0 cm 12/07/22 1017   Tunneling in depth located at 0 12/07/22 1017   Undermining 0 12/07/22 1017   Undermining is depth extending from 0 12/07/22 1017   Wound Site Closure CHANDAN 12/07/22 1017   Drainage Amount None 12/07/22 1017   Non-staged Wound Description Not applicable 89/69/61 9173   Treatments Cleansed 12/07/22 1017   Dressing Foam, Silicon (eg  Allevyn, etc) 12/07/22 1017   Wound packed? No 12/07/22 1017   Packing- # removed 0 12/07/22 1017   Packing- # inserted 0 12/07/22 1017   Dressing Changed New 12/07/22 1017   Patient Tolerance Tolerated well 12/07/22 1017   Dressing Status Clean;Dry; Intact 12/07/22 1017       Wound 11/30/22 Pressure Injury Ankle Right;Medial (Active)   Wound Image   12/07/22 1016   Wound Description Non-blanchable erythema 12/07/22 1016   Pressure Injury Stage DTPI 12/07/22 1016   Rosaura-wound Assessment Clean;Dry; Intact 12/07/22 1016   Wound Length (cm) 2 cm 12/07/22 1016   Wound Width (cm) 2 cm 12/07/22 1016   Wound Depth (cm) 0 cm 12/07/22 1016   Wound Surface Area (cm^2) 4 cm^2 12/07/22 1016   Wound Volume (cm^3) 0 cm^3 12/07/22 1016   Calculated Wound Volume (cm^3) 0 cm^3 12/07/22 1016   Tunneling 0 cm 12/07/22 1016   Tunneling in depth located at 0 12/07/22 1016   Undermining 0 12/07/22 1016   Undermining is depth extending from 0 12/07/22 1016   Wound Site Closure CHANDAN 12/07/22 1016   Drainage Amount None 12/07/22 1016   Non-staged Wound Description Not applicable 72/77/61 1408   Treatments Cleansed 12/07/22 1016   Dressing Foam, Silicon (eg  Allevyn, etc) 12/07/22 1016   Wound packed? No 12/07/22 1016   Packing- # removed 0 12/07/22 1016   Packing- # inserted 0 12/07/22 1016   Dressing Changed New 12/07/22 1016   Patient Tolerance Tolerated well 12/07/22 1016   Dressing Status Clean;Dry; Intact 12/07/22 1016       Wound 11/30/22 Foot Right;Lateral (Active)   Wound Image   12/07/22 1017   Wound Description Non-blanchable erythema 12/07/22 1017   Pressure Injury Stage DTPI 12/07/22 1017   Rosaura-wound Assessment Clean;Dry; Intact 12/07/22 1017   Wound Length (cm) 20 cm 12/07/22 1018   Wound Width (cm) 3 cm 12/07/22 1018   Wound Depth (cm) 0 cm 12/07/22 1018   Wound Surface Area (cm^2) 60 cm^2 12/07/22 1018   Wound Volume (cm^3) 0 cm^3 12/07/22 1018   Calculated Wound Volume (cm^3) 0 cm^3 12/07/22 1018   Tunneling 0 cm 12/07/22 1017   Tunneling in depth located at 0 12/07/22 1017   Undermining 0 12/07/22 1017   Undermining is depth extending from 0 12/07/22 1017   Wound Site Closure CHANDAN 12/07/22 1017   Drainage Amount None 12/07/22 1017   Non-staged Wound Description Not applicable 60/21/28 3409   Treatments Cleansed 12/07/22 1017   Dressing Foam, Silicon (eg  Allevyn, etc) 12/07/22 1017   Wound packed? No 12/07/22 1017   Packing- # removed 0 12/07/22 1017   Packing- # inserted 0 12/07/22 1017   Dressing Changed New 12/07/22 1017   Patient Tolerance Tolerated well 12/07/22 1017   Dressing Status Clean;Dry; Intact 12/07/22 1017       Wound 11/30/22 Foot Left;Lateral (Active)   Wound Image   12/07/22 1020   Wound Description Non-blanchable erythema 12/07/22 1020   Pressure Injury Stage DTPI 12/07/22 1020   Rosaura-wound Assessment Clean;Dry; Intact 12/07/22 1020   Wound Length (cm) 1 cm 12/07/22 1020   Wound Width (cm) 1 cm 12/07/22 1020   Wound Depth (cm) 0 cm 12/07/22 1020   Wound Surface Area (cm^2) 1 cm^2 12/07/22 1020   Wound Volume (cm^3) 0 cm^3 12/07/22 1020   Calculated Wound Volume (cm^3) 0 cm^3 12/07/22 1020   Tunneling 0 cm 12/07/22 1020   Tunneling in depth located at 0 12/07/22 1020   Undermining 0 12/07/22 1020   Undermining is depth extending from 0 12/07/22 1020   Wound Site Closure CHANDAN 12/07/22 1020   Drainage Amount None 12/07/22 1020   Non-staged Wound Description Not applicable 56/31/19 2124   Treatments Cleansed 12/07/22 1020   Dressing Foam, Silicon (eg  Allevyn, etc) 12/07/22 1020   Wound packed? No 12/07/22 1020   Packing- # removed 0 12/07/22 1020   Packing- # inserted 0 12/07/22 1020   Dressing Changed New 12/07/22 1020   Patient Tolerance Tolerated well 12/07/22 1020   Dressing Status Clean;Dry; Intact 12/07/22 1020       Wound 11/30/22 Heel Left (Active)   Wound Image    12/07/22 1020   Wound Description Non-blanchable erythema;Epithelialization;Fragile 12/07/22 1020   Pressure Injury Stage DTPI 12/07/22 1020   Rosaura-wound Assessment Clean;Dry; Intact 12/07/22 1020   Wound Length (cm) 3 cm 12/07/22 1020   Wound Width (cm) 5 cm 12/07/22 1020   Wound Depth (cm) 0 cm 12/07/22 1020   Wound Surface Area (cm^2) 15 cm^2 12/07/22 1020   Wound Volume (cm^3) 0 cm^3 12/07/22 1020   Calculated Wound Volume (cm^3) 0 cm^3 12/07/22 1020   Tunneling 0 cm 12/07/22 1020   Tunneling in depth located at 0 12/07/22 1020   Undermining 0 12/07/22 1020   Undermining is depth extending from 0 12/07/22 1020   Wound Site Closure CHANDAN 12/07/22 1020   Drainage Amount None 12/07/22 1020   Non-staged Wound Description Not applicable 29/44/79 1029   Treatments Cleansed 12/07/22 1020   Dressing Foam, Silicon (eg  Allevyn, etc) 12/07/22 1020   Wound packed? No 12/07/22 1020   Packing- # removed 0 12/07/22 1020   Packing- # inserted 0 12/07/22 1020   Dressing Changed New 12/07/22 1020   Patient Tolerance Tolerated well 12/07/22 1020   Dressing Status Clean;Dry; Intact 12/07/22 1020       Wound 12/07/22 Thigh Right;Lateral (Active)   Wound Image   12/07/22 1030   Wound Description Non-blanchable erythema 12/07/22 1030   Pressure Injury Stage DTPI 12/07/22 1030   Rosaura-wound Assessment Clean; Intact;Dry 12/07/22 1030   Wound Length (cm) 0 2 cm 12/07/22 1030   Wound Width (cm) 4 cm 12/07/22 1030   Wound Depth (cm) 0 cm 12/07/22 1030   Wound Surface Area (cm^2) 0 8 cm^2 12/07/22 1030   Wound Volume (cm^3) 0 cm^3 12/07/22 1030   Calculated Wound Volume (cm^3) 0 cm^3 12/07/22 1030   Tunneling 0 cm 12/07/22 1030   Tunneling in depth located at 0 12/07/22 1030   Undermining 0 12/07/22 1030   Undermining is depth extending from 0 12/07/22 1030   Wound Site Closure CHANDAN 12/07/22 1030   Drainage Amount None 12/07/22 1030   Non-staged Wound Description Not applicable 47/59/90 0562   Treatments Cleansed 12/07/22 1030   Dressing Protective barrier 12/07/22 1030   Wound packed? No 12/07/22 1030   Packing- # removed 0 12/07/22 1030   Packing- # inserted 0 12/07/22 1030   Dressing Changed New 12/07/22 1030   Patient Tolerance Tolerated well 12/07/22 1030   Dressing Status Clean;Dry; Intact 12/07/22 1030         Brigette Vela BSN, RN, [facility] [employer]

## 2022-12-07 NOTE — ASSESSMENT & PLAN NOTE
· Discharged from [address] [place] [address] on 11/06 s/p repair     · Physical therapy  · Eliquis held due to drop in Hb  · Heparin s/c

## 2022-12-07 NOTE — ASSESSMENT & PLAN NOTE
Wt Readings from Last 3 Encounters:   12/04/22 68 3 kg (150 lb 9 2 oz)   11/23/22 74 5 kg (164 lb 3 9 oz)   11/22/22 70 8 kg (156 lb)     · Echo 03/01/2022 with EF 45%     · Now off diuretics due to JOEL and receiving gentle IVF's  · Monitor I/O, daily weights

## 2022-12-07 NOTE — ASSESSMENT & PLAN NOTE
· Multifactorial  · Initially had hematuria  · Iron studies noted  · Iron supplementation PO  · Drop in Hb to 6 8 today - received 1 unit PRBC with improvement in Hb to 7 9  · No hematuria at present  · Eliquis held  · Monitor hemoglobin and transfuse as needed

## 2022-12-07 NOTE — PROGRESS NOTES
[facility]  Progress Note Gato Alvares 1940, 80 y o  male MRN: [id number]  Unit/Bed#: [facility] 431-01 Encounter: [id number]  Primary Care Provider: Naren Clark DO   Date and time admitted to hospital: 11/23/2022  4:04 AM    * Urethral trauma  Assessment & Plan  Urethral trauma status post cystoscopy with suprapubic catheter in place  Urology input noted  Stop Flomax as pt has SPC and cannot take capsules    Acute respiratory failure with hypoxia [facility])  Assessment & Plan  · Noted overnight on 12/4  · Due to RSV with possible superimposed bacterial pneumonia with concern for possible aspiration  · O2 requirements improved  · On high flow nasal cannula at present  · Wean O2 as able    RSV (acute bronchiolitis due to respiratory syncytial virus)  Assessment & Plan  · Resp protocol  · Supportive care   · Pulm following -Solu-Medrol 40 mg IV x1 today      Pneumonia  Assessment & Plan  · Noted overnight on 12/4 when he had fever, tachycardia, tachypnea, worsening hypoxia and was noted to have RSV infection  · Procalcitonin elevated  · Possible superadded bacterial pneumonia  · Continue Cefepime  · Pulm following - input appreciated    Dysphagia  Assessment & Plan  · High risk for aspiration  · Cleared by speech for puréed with honey thick liquids with strict aspiration precautions  · Patient and family do not want PEG tube    Acute-on-chronic kidney injury [facility])  Assessment & Plan  Lab Results   Component Value Date    EGFR 20 12/06/2022    EGFR 19 12/06/2022    EGFR 24 12/05/2022    CREATININE 2 81 (H) 12/06/2022    CREATININE 2 85 (H) 12/06/2022    CREATININE 2 41 (H) 12/05/2022     Estimated Creatinine Clearance: 19 6 mL/min (A) (by C-G formula based on SCr of 2 81 mg/dL (H))    Initially had JOEL which improved with diuresis  Now with increasing creatinine in the setting of infection, no oral intake on 12/5  D5W increased to 60/hr  Received 1 unit PRBC  Albumin x 1  Cleared by speech for oral intake  Monitor creatinine  Avoid nephrotoxic medications and hypotension    Atrial fibrillation with RVR (HCC)  Assessment & Plan  · Noted to be in RVR today  · Was unable to receive oral medications on 12/5  · On metoprolol 2 5 mg IV every 4 hours  · Once oral intake improves metoprolol to be changed to oral  · Cardiology following -input appreciated  · Eliquis held due to drop in Hb today requiring transfusion    Acute on chronic anemia  Assessment & Plan  · Multifactorial  · Initially had hematuria  · Iron studies noted  · Iron supplementation PO  · Drop in Hb to 6 8 today - received 1 unit PRBC with improvement in Hb to 7 9  · No hematuria at present  · Eliquis held  · Monitor hemoglobin and transfuse as needed    Closed fracture of trochanter of right femur with routine healing  Assessment & Plan  · Discharged from [address] [place] [address] on 11/06 s/p repair  · Physical therapy  · Eliquis held due to drop in Hb  · Heparin s/c    Hematuria  Assessment & Plan  · Due to urethral injury  · S/p suprapubic catheter  · Hematuria resolved    CAD (coronary artery disease)  Assessment & Plan  Continue Plavix, beta-blocker, Lipitor    Chronic diastolic heart failure [facility])  Assessment & Plan  Wt Readings from Last 3 Encounters:   12/04/22 68 3 kg (150 lb 9 2 oz)   11/23/22 74 5 kg (164 lb 3 9 oz)   11/22/22 70 8 kg (156 lb)     · Echo 03/01/2022 with EF 45%     · Now off diuretics due to JOEL and receiving gentle IVF's  · Monitor I/O, daily weights          Hypernatremia  Assessment & Plan  · D5W increased  · Monitor Na    Transaminitis  Assessment & Plan  · Likely due to shock liver  · Improved     Deep tissue injury to right lateral ankle  Assessment & Plan  · Wound care appreciated  · Podiatry appreciated  · Right foot Xray no osteo - ok to WBAT    Goals of care, counseling/discussion  Assessment & Plan  · Palliative following  · Family feels he has improved and would like to continue current treatment measures    VTE Pharmacologic Prophylaxis: VTE Score: 21 High Risk (Score >/= 5) - Pharmacological DVT Prophylaxis Ordered: heparin  Sequential Compression Devices Ordered  Patient Centered Rounds: Discussed with RN  Discussions with Specialists or Other Care Team Provider: Discussed with pulmonology, cardiology and palliative care    Education and Discussions with Family / Patient: Updated  (wife) via phone  Time Spent for Care: 40 minutes  More than 50% of total time spent on counseling and coordination of care as described above  Current Length of Stay: 13 day(s)  Current Patient Status: Inpatient   Certification Statement: The patient will continue to require additional inpatient hospital stay due to as above    Code Status: Level 3 - DNAR and DNI    Subjective:   No shortness of breath  Feels better today  No fever  Objective:     Vitals:   Temp (24hrs), Av 5 °F (37 5 °C), Min:98 9 °F (37 2 °C), Max:100 8 °F (38 2 °C)    Temp:  [98 9 °F (37 2 °C)-100 8 °F (38 2 °C)] 99 2 °F (37 3 °C)  HR:  [] 115  Resp:  [16-23] 20  BP: (101-129)/(56-63) 101/57  SpO2:  [95 %-100 %] 98 %  Body mass index is 22 89 kg/m²  Physical Exam:   Physical Exam  Constitutional:       Appearance: He is ill-appearing  HENT:      Head: Normocephalic  Nose: Nose normal       Mouth/Throat:      Mouth: Mucous membranes are moist    Eyes:      Extraocular Movements: Extraocular movements intact  Cardiovascular:      Rate and Rhythm: Rhythm irregular  Pulmonary:      Effort: Pulmonary effort is normal       Comments: Decreased breath sounds bilaterally  Abdominal:      General: Bowel sounds are normal  There is no distension  Palpations: Abdomen is soft  Tenderness: There is no abdominal tenderness  Musculoskeletal:         General: No swelling  Cervical back: Neck supple  Skin:     General: Skin is warm and dry  Neurological:      General: No focal deficit present        Mental Status: He is oriented to person, place, and time  Psychiatric:         Mood and Affect: Mood normal          Behavior: Behavior normal           Additional Data:     Labs:  Results from last 7 days   Lab Units 12/06/22  1801 12/06/22  0701 12/06/22  0519 12/05/22  0505   WBC Thousand/uL  --   --  5 83 13 72*   HEMOGLOBIN g/dL 7 9*   < > 6 7* 7 6*   HEMATOCRIT % 26 7*   < > 22 6* 26 3*   PLATELETS Thousands/uL  --   --  156 158   BANDS PCT %  --   --  2  --    NEUTROS PCT %  --   --   --  91*   LYMPHS PCT %  --   --   --  2*   LYMPHO PCT %  --   --  2*  --    MONOS PCT %  --   --   --  6   MONO PCT %  --   --  1*  --    EOS PCT %  --   --  0 0    < > = values in this interval not displayed  Results from last 7 days   Lab Units 12/06/22  1801 12/06/22  0519   SODIUM mmol/L 150* 149*   POTASSIUM mmol/L 3 7 4 1   CHLORIDE mmol/L 115* 114*   CO2 mmol/L 28 28   BUN mg/dL 85* 78*   CREATININE mg/dL 2 81* 2 85*   ANION GAP mmol/L 7 7   CALCIUM mg/dL 8 2* 8 4   ALBUMIN g/dL  --  1 7*   TOTAL BILIRUBIN mg/dL  --  0 99   ALK PHOS U/L  --  110   ALT U/L  --  18   AST U/L  --  47*   GLUCOSE RANDOM mg/dL 177* 167*         Results from last 7 days   Lab Units 12/06/22  1221 12/06/22  0542 12/06/22  0012   POC GLUCOSE mg/dl 122 159* 158*         Results from last 7 days   Lab Units 12/05/22  0505 12/04/22  2325   LACTIC ACID mmol/L 1 6 2 2*   PROCALCITONIN ng/ml 2 00* 1 09*       Lines/Drains:  Invasive Devices     Peripheral Intravenous Line  Duration           Peripheral IV 12/04/22 Proximal;Right;Ventral (anterior) Forearm 2 days          Drain  Duration           Suprapubic Catheter 18 Fr  13 days                Recent Cultures (last 7 days):   Results from last 7 days   Lab Units 12/05/22  1345 12/04/22  2324   BLOOD CULTURE   --  No Growth at 24 hrs  No Growth at 24 hrs     LEGIONELLA URINARY ANTIGEN  Negative  --        Last 24 Hours Medication List:   Current Facility-Administered Medications   Medication Dose Route Frequency Provider Last Rate   • acetaminophen  650 mg Rectal Q6H PRN Neal ANI AlmodovarC     • Albumin 5%  12 5 g Intravenous Once Chyna Marley MD     • albuterol  2 5 mg Nebulization Q6H PRN Nealjose roberto Almodovar PA-C     • amiodarone  200 mg Oral Daily With Breakfast Ezequiel Mark MD     • atorvastatin  40 mg Oral Daily With Marin Calderon MD     • [START ON 12/7/2022] cefepime  1,000 mg Intravenous Q24H Chyna Marley MD     • clopidogrel  75 mg Oral Daily Shira Villarreal DO     • dextrose  60 mL/hr Intravenous Continuous Chyna Marley MD 60 mL/hr (12/06/22 4161)   • doxylamine  12 5 mg Oral HS PRN Neal Almodovar PA-C     • ferrous sulfate  325 mg Oral Daily With Breakfast Alis Rowell MD     • guaiFENesin  400 mg Oral BID [place] OLEGARIO Mercado     • heparin (porcine)  5,000 Units Subcutaneous [facility] Chyna Marley MD     • HYDROmorphone  0 2 mg Intravenous Q4H PRN Nicole Enciso DO     • ipratropium  0 5 mg Nebulization TID Jhoan Bellamy MD     • levalbuterol  1 25 mg Nebulization TID Jhoan Bellamy MD     • Lidocaine Viscous HCl  15 mL Swish & Spit 4x Daily PRN Bharat Higgins DO     • LORazepam  0 5 mg Intravenous Q6H PRN Nicole Enciso DO     • melatonin  6 mg Oral HS Swathi Quigley PA-C     • metoprolol  2 5 mg Intravenous Q4H SANDRINE Gray     • saliva substitute  5 spray Mouth/Throat 4x Daily PRN Juan Alberto Amaro MD     • vancomycin  750 mg Intravenous Daily PRN NealANI MunguiaC          Today, Patient Was Seen By: Chyna Marley MD    **Please Note: This note may have been constructed using a voice recognition system  **

## 2022-12-07 NOTE — PHYSICAL THERAPY NOTE
Physical Therapy Screen    Patient Name: Silvia Barker    WNWQW'T Date: 12/7/2022     Problem List  Principal Problem:    Urethral trauma  Active Problems:    CAD (coronary artery disease)    Severe sepsis ([facility] )    Acute respiratory failure with hypoxia ([facility] )    Acute-on-chronic kidney injury ([facility] )    Chronic atrial fibrillation (HCC)    Chronic diastolic heart failure (HCC)    Acute on chronic anemia    Closed fracture of trochanter of right femur with routine healing    Transaminitis    Hematuria    Ambulatory dysfunction    Dysphagia    Deep tissue injury to right lateral ankle    PAD (peripheral artery disease) ([facility])    RSV (acute bronchiolitis due to respiratory syncytial virus)    Goals of care, counseling/discussion    Pneumonia    Hypernatremia    Atrial fibrillation with RVR ([facility] )       Past Medical History  Past Medical History:   Diagnosis Date    CHF (congestive heart failure) ([facility])     CKD (chronic kidney disease) stage 3, GFR 30-59 ml/min ([facility])     Coronary artery disease     Hyperlipidemia     Hypertension     Mitral regurgitation     Paroxysmal atrial fibrillation ([facility] )     Urinary retention         Past Surgical History  Past Surgical History:   Procedure Laterality Date    CARDIAC SURGERY      cabg x 2 2014 [facility]    CORONARY ANGIOPLASTY WITH STENT PLACEMENT  10/2021    DAPHNEY to left main and ramus    MITRAL VALVE REPAIR  2014    mitral ring    NE CYSTOURETHROSCOPY W/IRRIG & EVAC CLOTS N/A 11/23/2022    Procedure: CYSTOSCOPY;  Surgeon: Roni Garcia MD;  Location: [facility];  Service: Urology    NE OPEN RX FEMUR FX+INTRAMED HALIMA Right 11/1/2022    Procedure: INSERTION NAIL IM FEMUR ANTEGRADE (TROCHANTERIC); Surgeon: Dianna Yin;   Location: [facility];  Service: Orthopedics    SUPRAPUBIC TUBE PLACEMENT N/A 11/23/2022    Procedure: INSERTION SUPRAPUBIC CATHETER PERCUTANEOUS;  Surgeon: Roni Garcia MD;  Location: [facility]; Service: Urology         12/07/22 1014   Note Type   Note type Screen;Cancelled Session     Chart reviewed; noted pt w/ Hgb of 7 0 this AM w/ transfusion pending; also pending CT chest/abd pelvis; pt is on [address] O2; at this time, based on overall med status/ongoing med issues, will D/C from PT services; please reconsult PT to resume rehab/mobilization when clinical course allows; will otherwise sign off      [facility]

## 2022-12-07 NOTE — PROGRESS NOTES
General Cardiology   Progress Note -  Team One   Sunshine Calderon 80 y o  male MRN: [id number]    Unit/Bed#: [facility] 431-01 Encounter: [id number]    Assessment  1  Paroxysmal atrial fibrillation w/ RVR  -Asymptomatic   -Telemetry review-rate controlled atrial fibrillation   -Had been started on apixaban 2 5 mg twice daily earlier this admission but was placed on hold on 12/5 due to drop in Hgb requiring PRBC transfusion   -On oral amiodarone 200 mg daily + IV Lopressor 2 5 mg every 4 hours  2  Chronic diastolic CHF  3  History of mitral valve repair in 2014  -Appears compensated  -TTE 3/2022- EF 45%, hypokinetic mid anteroseptal, apical septal and apical inferior wall, severe MAC, mild MR, mild TR  -Previously on furosemide 20 mg twice daily and spironolactone 25 mg daily as an outpatient (currently on hold)  4  Acute hypoxic respiratory failure 2/2 # 3  5  RSV infection w/ superimposed PNA  -Pulmonary following   -Remains on mid flow at 7L per minute   -Chest x-ray 12/5 -improved small left pleural effusion, bibasilar atelectasis   -On IV antibiotics  6  CAD s/p CABG 2014 w/ subsequent DAPHNEY to left main and ramus 10/2021  -On clopidogrel, high intensity statin, and BB  7  Dyslipidemia  -Lipid profile 12/2/2022 -cholesterol 81, triglycerides 113, HDL 25, and LDL 33   -On atorvastatin 40 mg daily  8  Acute on chronic anemia   -Hematuria initially (now resolved), iron deficiency noted - now on supplementation  -S/p PRBC transfusion on 12/6 w/ drop in Hgb level to 6 7  -HGB level currently at 7 0 (down from 7 9 yesterday)  9  JOEL superimposed on CKD stage III  -Baseline creatinine around 1 4-1 6   -Creatinine level currently 2 61  Plan  -Heart rates are well controlled in atrial fibrillation  Would recommend continuation of oral amiodarone 200 mg daily now that he is tolerating oral meds we will discontinue IV metoprolol and start metoprolol tartrate 25 mg every 12 hours    Anticoagulation remains on hold in the setting of anemia; he may ultimately not be a suitable candidate for long term anticoagulation given his advanced age, level of deconditioning, and bleeding risk in the setting of pronounced anemia    -Continue clopidogrel (given history of CAD/stenting) and high intensity statin   -Continue to hold diuretics in the setting of JOEL/anemia and poor oral intake   -Consideration of resuming diuretics pending clinical course and recovery in renal function  -IV antibiotics per the pulmonary team   -Wean supplemental O2 requirements as able  -Continue to monitor on telemetry  Subjective  Review of Systems   Constitutional: Positive for malaise/fatigue  Negative for chills and fever  Eyes: Negative for visual disturbance  Cardiovascular: Negative for chest pain, dyspnea on exertion, leg swelling, orthopnea and palpitations  Respiratory: Positive for cough and shortness of breath  Gastrointestinal: Negative for abdominal pain  Neurological: Negative for dizziness, headaches and light-headedness  Objective:   Physical Exam  Vitals reviewed  Constitutional:       Appearance: He is ill-appearing  HENT:      Head: Normocephalic and atraumatic  Eyes:      General: No scleral icterus  Cardiovascular:      Rate and Rhythm: Normal rate  Rhythm irregular  Pulses: Normal pulses  Heart sounds: Normal heart sounds  Pulmonary:      Effort: Pulmonary effort is normal       Breath sounds: No wheezing  Comments: +coarse breath sounds  Abdominal:      Palpations: Abdomen is soft  Musculoskeletal:      Cervical back: Neck supple  Right lower leg: No edema  Left lower leg: No edema  Skin:     Capillary Refill: Capillary refill takes less than 2 seconds  Coloration: Skin is pale  Neurological:      Mental Status: He is alert  Psychiatric:         Mood and Affect: Mood normal          Vitals: Blood pressure 123/63, pulse 88, temperature 98 °F (36 7 °C), resp   rate 16, height 5' 8" (1 727 m), weight 68 3 kg (150 lb 9 2 oz), SpO2 100 %  ,     Body mass index is 22 89 kg/m²  ,   Systolic (27ACC), GSB:595 , Min:99 , ADS:508     Diastolic (54DHQ), NWD:65, Min:54, Max:63      Intake/Output Summary (Last 24 hours) at 12/7/2022 1128  Last data filed at 12/7/2022 1115  Gross per 24 hour   Intake 1594 33 ml   Output 430 ml   Net 1164 33 ml     Weight (last 2 days)     Date/Time Weight    12/05/22 0600 --     Weight: Unable to weigh  Too many object on bed for comfort   at 12/05/22 0600          LABORATORY RESULTS      CBC with diff:   Results from last 7 days   Lab Units 12/07/22  0527 12/06/22  1801 12/06/22  0701 12/06/22  0519 12/05/22  0505 12/04/22  2325   WBC Thousand/uL 5 25  --   --  5 83 13 72* 15 43*   HEMOGLOBIN g/dL 7 0* 7 9* 6 8* 6 7* 7 6* 8 8*   HEMATOCRIT % 22 8* 26 7* 23 7* 22 6* 26 3* 30 0*   MCV fL 97  --   --  97 99* 98   PLATELETS Thousands/uL 144*  --   --  156 158 212   MCH pg 29 2  --   --  28 6 28 6 28 8   MCHC g/dL 30 3*  --   --  29 6* 28 9* 29 3*   RDW % 17 5*  --   --  17 7* 17 9* 17 9*   MPV fL 11 1  --   --  11 2 10 8 11 0   NRBC AUTO /100 WBCs 0  --   --   --  0 0       CMP:  Results from last 7 days   Lab Units 12/07/22  0527 12/06/22  1801 12/06/22  0519 12/05/22  0505 12/04/22  2325 12/02/22  0458 12/01/22  0522   POTASSIUM mmol/L 3 9 3 7 4 1 4 2 4 5 3 7 4 0   CHLORIDE mmol/L 113* 115* 114* 114* 109* 105 104   CO2 mmol/L 30 28 28 28 31 33* 30   BUN mg/dL 89* 85* 78* 56* 55* 47* 47*   CREATININE mg/dL 2 61* 2 81* 2 85* 2 41* 2 43* 1 77* 1 90*   CALCIUM mg/dL 8 1* 8 2* 8 4 8 1* 8 6 8 3 8 4   AST U/L 40  --  47* 20 18  --   --    ALT U/L 22  --  18 13 12  --   --    ALK PHOS U/L 92  --  110 120* 157*  --   --    EGFR ml/min/1 73sq m 21 20 19 24 23 34 32       BMP:  Results from last 7 days   Lab Units 12/07/22  0527 12/06/22  1801 12/06/22  0519 12/05/22  0505 12/04/22  2325 12/02/22  0458 12/01/22  0522   POTASSIUM mmol/L 3 9 3 7 4 1 4 2 4 5 3 7 4 0   CHLORIDE mmol/L 113* 115* 114* 114* 109* 105 104   CO2 mmol/L 30 28 28 28 31 33* 30   BUN mg/dL 89* 85* 78* 56* 55* 47* 47*   CREATININE mg/dL 2 61* 2 81* 2 85* 2 41* 2 43* 1 77* 1 90*   CALCIUM mg/dL 8 1* 8 2* 8 4 8 1* 8 6 8 3 8 4       Lab Results   Component Value Date    NTBNP 10,648 (H) 2022    NTBNP 6,436 (H) 10/06/2021    NTBNP 921 (H) 2021        Results from last 7 days   Lab Units 22  0519 22  0522   MAGNESIUM mg/dL 2 5 2 5                         Lipid Profile:   No results found for: CHOL  Lab Results   Component Value Date    HDL 25 (L) 2022     Lab Results   Component Value Date    LDLCALC 33 2022     Lab Results   Component Value Date    TRIG 113 2022       Cardiac testing:   Results for orders placed during the hospital encounter of 21    Echo complete with contrast if indicated    [place] [id number]    Transthoracic Echocardiogram  2D, M-mode, Doppler, and Color Doppler    Study date:  2021    Patient: Kaci Saunders  MR number: [id number]  Account number: [de-identified]  : 1940  Age: 80 years  Gender: Male  Status: Inpatient  Location: [facility]  Height: 68 in  Weight: 168 lb  BP: 173/ 74 mmHg    Indications: Bacteremia    Diagnoses: R78 81 - Bacteremia    Sonographer:  SILAS Cid  Referring Physician:  Amos Bell MD  Group:  [facility]  Interpreting Physician:  Tracey Stinson DO    SUMMARY    LEFT VENTRICLE:  Abnormal septal motion consistent with prior cardiac surgery  Systolic function was normal  Ejection fraction was estimated to be 60 %  There was mild concentric hypertrophy with a sigmoid shaped basal septum  RIGHT VENTRICLE:  The size was normal   Systolic function was normal visually  Tapse 1 4 cm  LEFT ATRIUM:  The atrium was mildly dilated      MITRAL VALVE:  Prior repair procedures: surgical repair with a #28 annuloplasty ring 3/7/2014  Transmitral velocity was increased  Mean gradient 5 mmHg (4 mmHg on prior study 11/24/2015)  There was mild to moderate regurgitation  TRICUSPID VALVE:  There was mild regurgitation  Estimated peak PA pressure was 35 mmHg  The findings suggest mild pulmonary hypertension  PULMONIC VALVE:  There was trace regurgitation  AORTA:  There was dilatation of the ascending aorta  3 8 cm  COMPARISONS:  Compared to prior study 11/24/2015, the degree of mitral regurgitation has increased from minimal on prior study to mild-moderate on current study  Minimal increase in the mitral valve gradient from 4 mmHg to 5 mmHg  HISTORY: PRIOR HISTORY: MV repair, CAD  MR, Carotid Artery stenosis  CKD3    PROCEDURE: The study was performed in the [facility]  This was a routine study  The transthoracic approach was used  The study included complete 2D imaging, M-mode, complete spectral Doppler, and color Doppler  The heart  rate was 71 bpm, at the start of the study  Images were obtained from the parasternal, apical, subcostal, and suprasternal notch acoustic windows  Echocardiographic views were limited due to lung interference  Image quality was adequate  LEFT VENTRICLE: Abnormal septal motion consistent with prior cardiac surgery  Size was normal  Systolic function was normal  Ejection fraction was estimated to be 60 %  There was mild concentric hypertrophy with a sigmoid shaped basal  septum  DOPPLER: Left ventricular diastolic function is indeterminate due to mitral valve repair  RIGHT VENTRICLE: The size was normal  Systolic function was normal visually  Tapse 1 4 cm  LEFT ATRIUM: The atrium was mildly dilated  RIGHT ATRIUM: Size was normal     MITRAL VALVE: There was lower normal leaflet separation  Prior repair procedures: surgical repair with a #28 annuloplasty ring 3/7/2014  DOPPLER: Transmitral velocity was increased   Mean gradient 5 mmHg (4 mmHg on prior study 2015)  There was mild to moderate regurgitation  AORTIC VALVE: The valve was trileaflet  Leaflets exhibited mildly increased thickness, mild calcification, and normal cuspal separation  There was no echocardiographic evidence of vegetation  DOPPLER: Transaortic velocity was within the  normal range  There was no evidence for stenosis  There was no significant regurgitation  TRICUSPID VALVE: The valve structure was normal  There was normal leaflet separation  DOPPLER: The transtricuspid velocity was within the normal range  There was no evidence for stenosis  There was mild regurgitation  Estimated peak PA  pressure was 35 mmHg  The findings suggest mild pulmonary hypertension  PULMONIC VALVE: Not well visualized  DOPPLER: The transpulmonic velocity was within the normal range  There was trace regurgitation  PERICARDIUM: There was no pericardial effusion  AORTA: The root exhibited normal size  There was dilatation of the ascending aorta  3 8 cm  SYSTEMIC VEINS: IVC: The inferior vena cava was normal in size  SYSTEM MEASUREMENT TABLES    2D  %FS: 29 1 %  AV Diam: 3 47 cm  EDV(Teich): 101 77 ml  EF(Teich): 55 87 %  ESV(Teich): 44 91 ml  IVSd: 1 24 cm  LA Diam: 5 01 cm  LAAs A4C: 13 54 cm2  LAESV A-L A4C: 34 3 ml  LAESV MOD A4C: 32 68 ml  LALs A4C: 4 53 cm  LVEDV MOD A4C: 99 57 ml  LVEF MOD A4C: 57 25 %  LVESV MOD A4C: 42 56 ml  LVIDd: 4 69 cm  LVIDs: 3 32 cm  LVLd A4C: 8 82 cm  LVLs A4C: 8 cm  LVPWd: 1 19 cm  RAAs A4C: 9 72 cm2  RAESV A-L: 16 93 ml  RAESV MOD: 16 99 ml  RALs: 4 73 cm  RVIDd: 2 83 cm  RWT: 0 51  SV MOD A4C: 57 01 ml  SV(Teich): 56 86 ml    CF  MR Als  Mina: 0 46 m/s  MR Flow: 22 57 ml/s  MR Rad: 0 28 cm    CW  AV Vmax: 1 43 m/s  AV maxP 15 mmHg  HR: 70 62 BPM  MR VTI: 197 42 cm  MR Vmax: 6 1 m/s  MR Vmean: 4 32 m/s  MR maxP 16 mmHg  MR meanP 92 mmHg  MV PHT: 95 72 ms  MV VTI: 51 69 cm  MV Vmax: 1 57 m/s  MV Vmean: 1 02 m/s  MV maxP 81 mmHg  MV meanP 68 mmHg  MVA By PHT: 2 3 cm2  TR Vmax: 2 47 m/s  TR maxP 55 mmHg    MM  TAPSE: 1 39 cm    PW  E' Av 06 m/s  E' Lat: 0 07 m/s  E' Sept: 0 05 m/s  E/E' Av 82  E/E' Lat: 18 01  E/E' Sept: 24 65  MR ERO: 0 04 cm2  MR RV: 7 31 ml  MV A Mina: 1 02 m/s  MV Dec [place]: 7 63 m/s2  MV DecT: 172 23 ms  MV E Mina: 1 31 m/s  MV E/A Ratio: 1 28    Inters\[facility]\"" Commission Accredited Echocardiography Laboratory    Prepared and electronically signed by    Cristopher Ng DO  Signed 2021 17:26:11    No results found for this or any previous visit  No results found for this or any previous visit  No valid procedures specified  No results found for this or any previous visit        Meds/Allergies   all current active meds have been reviewed and current meds:   Current Facility-Administered Medications   Medication Dose Route Frequency   • acetaminophen (TYLENOL) rectal suppository 650 mg  650 mg Rectal Q6H PRN   • albuterol inhalation solution 2 5 mg  2 5 mg Nebulization Q6H PRN   • amiodarone tablet 200 mg  200 mg Oral Daily With Breakfast   • atorvastatin (LIPITOR) tablet 40 mg  40 mg Oral Daily With Dinner   • cefepime (MAXIPIME) 1,000 mg in dextrose 5 % 50 mL IVPB  1,000 mg Intravenous Q24H   • clopidogrel (PLAVIX) tablet 75 mg  75 mg Oral Daily   • dextrose 5 % infusion  60 mL/hr Intravenous Continuous   • doxylamine (UNISOM) tablet 12 5 mg  12 5 mg Oral HS PRN   • ferrous sulfate tablet 325 mg  325 mg Oral Daily With Breakfast   • guaiFENesin LIQD 400 mg  400 mg Oral BID   • heparin (porcine) subcutaneous injection 5,000 Units  5,000 Units Subcutaneous Q8H [address]   • HYDROmorphone HCl (DILAUDID) injection 0 2 mg  0 2 mg Intravenous Q4H PRN   • ipratropium (ATROVENT) 0 02 % inhalation solution 0 5 mg  0 5 mg Nebulization TID   • levalbuterol (XOPENEX) inhalation solution 1 25 mg  1 25 mg Nebulization TID   • Lidocaine Viscous HCl (XYLOCAINE) 2 % mucosal solution 15 mL  15 mL Swish & Spit 4x Daily PRN   • LORazepam (ATIVAN) injection 0 5 mg  0 5 mg Intravenous Q6H PRN   • melatonin tablet 6 mg  6 mg Oral HS   • methylPREDNISolone sodium succinate (Solu-MEDROL) injection 40 mg  40 mg Intravenous Daily   • metoprolol (LOPRESSOR) injection 2 5 mg  2 5 mg Intravenous Q4H   • saliva substitute (MOUTH KOTE) mucosal solution 5 spray  5 spray Mouth/Throat 4x Daily PRN   • vancomycin (VANCOCIN) IVPB (premix in dextrose) 750 mg 150 mL  750 mg Intravenous Daily PRN   • vancomycin (VANCOCIN) IVPB (premix in dextrose) 750 mg 150 mL  750 mg Intravenous Once     dextrose, 60 mL/hr, Last Rate: 60 mL/hr (12/06/22 8275)      EKG personally reviewed by SANDRINE Ferrer    Assessment:  Principal Problem:    Urethral trauma  Active Problems:    CAD (coronary artery disease)    Severe sepsis ([facility] )    Acute respiratory failure with hypoxia (HCC)    Acute-on-chronic kidney injury ([facility] )    Chronic atrial fibrillation (HCC)    Chronic diastolic heart failure (HCC)    Acute on chronic anemia    Closed fracture of trochanter of right femur with routine healing    Transaminitis    Hematuria    Ambulatory dysfunction    Dysphagia    Deep tissue injury to right lateral ankle    PAD (peripheral artery disease) (HCC)    RSV (acute bronchiolitis due to respiratory syncytial virus)    Goals of care, counseling/discussion    Pneumonia    Hypernatremia    Atrial fibrillation with RVR ([facility] )    Counseling / Coordination of Care  Total floor / unit time spent today 20 minutes  Greater than 50% of total time was spent with the patient and / or family counseling and / or coordination of care  ** Please Note: Dragon 360 Dictation voice to text software may have been used in the creation of this document   **

## 2022-12-07 NOTE — PROGRESS NOTES
Progress Note - Palliative & Supportive Care  Stephanie Perry  80 y o   male  PPHP 431/PPHP 431-01   MRN: [id number]  Encounter: [id number]     Assessment/Plan:  1  Acute hypoxic respiratory failure on mid-flow, 7L NC  2  RSV  3  Aspiration pneumonia/pneumonitis  4  Hospital-acquired pneumonia  5  Dysphagia  6  Protein calorie malnutrition  7  Wounds, progressing/worsening RLE >LLE, possible vascular component  8  Frailty  9  Debility, deconditioning, ambulatory dysfuntion  10  JOEL on CKD  11  S/p R femur fx/repair/rehab  12  Urethral trauma s/p cystoscopy w/ suprapubic catheter  13  Goals of care  -continue current medical care/optimization  -patient with improved respiratory status, able to wean oxygen requirement down to 7L NC  -wound care at bedside, worsening/progressing kuldeep RLE, concern for vascular component, asking about trying to get ABIs again, d/w primary via tigertext  -nutrition still poor, confirmed with patient that he would not want a feeding tube, consistent with what wife/daughter have said; continue to encourage po intake; patient frustrated with modified diet due to dysphagia  -updated wife on above; although patient resp status with improvement still contending with poor nutrition, wound healing/with progression/worsening, debility and high risk for deterioration  Patient frustrated with diet  Discussed with wife that if patient expresses desire that he wants "real food/water" despite risks, we would re-discuss comfort with or without hospice as option  Patient and wife both upset with potential additional set back  Emotional support and validation provided    -palliative care will f/u later in week or early next week in person; please tigertext with any concerns/questions/change in condition; wife also instructed to call office if wants patient seen sooner or to readdress goals  -d/w primary via tiger text      Code status: Level 3, DNAR/DNI    Subjective:  Patient seen and examined   Wound care at bedside  Wounds progressing/worsening, concern for vascular component  Patient with discomfort to RLE >LLE  Asking for water, "regular water", expresses frustration regarding condition  No use of prn pain meds overnight for pain or sob      Medications    Current Facility-Administered Medications:   •  acetaminophen (TYLENOL) rectal suppository 650 mg, 650 mg, Rectal, Q6H PRN, ARNOLD Oliveros-C, 650 mg at 12/05/22 6121  •  albuterol inhalation solution 2 5 mg, 2 5 mg, Nebulization, Q6H PRN, ARNOLD Oliveros-C  •  amiodarone tablet 200 mg, 200 mg, Oral, Daily With Breakfast, Yen Holland MD, 200 mg at 12/07/22 0805  •  atorvastatin (LIPITOR) tablet 40 mg, 40 mg, Oral, Daily With Shelli Auguste MD, 40 mg at 12/04/22 1710  •  cefepime (MAXIPIME) 1,000 mg in dextrose 5 % 50 mL IVPB, 1,000 mg, Intravenous, Q24H, Tom Andrade MD, Last Rate: 100 mL/hr at 12/07/22 0148, 1,000 mg at 12/07/22 0148  •  clopidogrel (PLAVIX) tablet 75 mg, 75 mg, Oral, Daily, Josephine Chavez DO, 75 mg at 12/07/22 0805  •  dextrose 5 % infusion, 60 mL/hr, Intravenous, Continuous, Tom Andrade MD, Last Rate: 60 mL/hr at 12/06/22 1741, 60 mL/hr at 12/06/22 1741  •  doxylamine (UNISOM) tablet 12 5 mg, 12 5 mg, Oral, HS PRN, Kelli Charles PA-C  •  ferrous sulfate tablet 325 mg, 325 mg, Oral, Daily With Breakfast, Ingrid Samuels MD, 325 mg at 12/07/22 0805  •  guaiFENesin LIQD 400 mg, 400 mg, Oral, BID, Swathi Donahue PA-C, 400 mg at 12/07/22 0805  •  heparin (porcine) subcutaneous injection 5,000 Units, 5,000 Units, Subcutaneous, Q8H [address], Tom Andrade MD, 5,000 Units at 12/07/22 0539  •  HYDROmorphone HCl (DILAUDID) injection 0 2 mg, 0 2 mg, Intravenous, Q4H PRN, Nicole Enciso DO, 0 2 mg at 12/05/22 0436  •  ipratropium (ATROVENT) 0 02 % inhalation solution 0 5 mg, 0 5 mg, Nebulization, TID, Samy Khan MD, 0 5 mg at 12/07/22 0777  •  levalbuterol (XOPENEX) inhalation solution 1 25 mg, 1 25 mg, Nebulization, TID, Mckenzie Celaya MD, 1 25 mg at 12/07/22 1382  •  Lidocaine Viscous HCl (XYLOCAINE) 2 % mucosal solution 15 mL, 15 mL, Swish & Spit, 4x Daily PRN, Angeline Alpers, DO  •  LORazepam (ATIVAN) injection 0 5 mg, 0 5 mg, Intravenous, Q6H PRN, Nicole Enciso DO  •  melatonin tablet 6 mg, 6 mg, Oral, HS, Swathi Donahue PA-C, 6 mg at 12/04/22 2124  •  methylPREDNISolone sodium succinate (Solu-MEDROL) injection 40 mg, 40 mg, Intravenous, Daily, Mckenzie Celaya MD  •  metoprolol (LOPRESSOR) injection 2 5 mg, 2 5 mg, Intravenous, Q4H, SANDRINE Javier, 2 5 mg at 12/07/22 0805  •  saliva substitute (MOUTH KOTE) mucosal solution 5 spray, 5 spray, Mouth/Throat, 4x Daily PRN, Alexei Edward MD  •  vancomycin (VANCOCIN) IVPB (premix in dextrose) 750 mg 150 mL, 750 mg, Intravenous, Daily PRN, Raz Connell PA-C  •  vancomycin (VANCOCIN) IVPB (premix in dextrose) 750 mg 150 mL, 750 mg, Intravenous, Once, Raz Connell PA-C    Objective:  /63   Pulse 88   Temp 98 °F (36 7 °C)   Resp 18   Ht 5' 8" (1 727 m)   Wt 68 3 kg (150 lb 9 2 oz)   SpO2 100%   BMI 22 89 kg/m²   Physical Exam:  General: chronically ill, frail, weaned down to mid-flow 7L NC  Neurological: awake and alert  Cardiovascular: reg  Respiratory: normal effort, no distress, on 7L NC  Gastrointestinal: soft, nt, nd  Musculoskeletal: no edema  Skin: warm, dry; wounds with worsening necrosis/progression kuldeep RLE  Psychiatric: frustrated, depressed mood/flat affect over medical condition     Lab Results:   I have personally reviewed pertinent labs  , CBC:   Lab Results   Component Value Date    WBC 5 25 12/07/2022    HGB 7 0 (L) 12/07/2022    HCT 22 8 (L) 12/07/2022    MCV 97 12/07/2022     (L) 12/07/2022    MCH 29 2 12/07/2022    MCHC 30 3 (L) 12/07/2022    RDW 17 5 (H) 12/07/2022    MPV 11 1 12/07/2022    NRBC 0 12/07/2022   , CMP:   Lab Results   Component Value Date    SODIUM 148 (H) 12/07/2022    K 3 9 12/07/2022     (H) 12/07/2022    CO2 30 12/07/2022    BUN 89 (H) 12/07/2022    CREATININE 2 61 (H) 12/07/2022    CALCIUM 8 1 (L) 12/07/2022    AST 40 12/07/2022    ALT 22 12/07/2022    ALKPHOS 92 12/07/2022    EGFR 21 12/07/2022   , PT/PTT:No results found for: PT, PTT    Counseling / Coordination of Care  Total floor / unit time spent today 55 minutes  Greater than 50% of total time was spent with the patient and / or family counseling and / or coordinating of care  A description of the counseling / coordination of care: current condition, goals of care, emotional support       Darryle Heal Crouse, DO  Palliative & Supportive Care

## 2022-12-08 NOTE — ASSESSMENT & PLAN NOTE
· Discharged from [address] [place] [address] on 11/06 s/p repair     · Physical therapy  · Eliquis held due to drop in Hb  · Heparin s/c

## 2022-12-08 NOTE — ASSESSMENT & PLAN NOTE
· Wound care/podiatry input appreciated  · AZ abnormal - reviewed by vascular - not a candidate for intervention at present, ct wound care

## 2022-12-08 NOTE — ASSESSMENT & PLAN NOTE
· Noted overnight on 12/4 when he had fever, tachycardia, tachypnea, worsening hypoxia and was noted to have RSV infection  · Procalcitonin elevated  · Aspiration suspected  · CT chest done today - Interval development of right lower lobe airspace disease possibly atelectasis or developing pneumonia, potentially aspiration pneumonia  Moderate left basilar effusion and compressive atelectasis redemonstrated  New tree-in-bud opacities in the right upper lobe worrisome for pneumonitis/pneumonia    · Continue Cefepime (D#3)  · Pulm following - input appreciated

## 2022-12-08 NOTE — PROGRESS NOTES
Podiatry - Progress Note  Patient: Klarissa Galindo 80 y o  male   MRN: [id number]  PCP: Carmenza Morris DO  [facility] Encounter: [id number]  Date Of Visit: 22    ASSESSMENT:    Klarissa Galindo is a 80 y o  male with:    1  Right Heel Eschar   2  Deep tissue injury Right lateral ankle  3  Deep tissue injury left heel  4  Chronic Kidney Disease Stage 3  5  CAD  6  Ambulatory Dysfunction    PLAN:    · Plan for continued palliative wound care and strict offloading, with pressure and friction reduction to bilateral lower extremities  Right heel eschar remains stable, with no open wound or drainage present  Multiple DTI's to right lower extremity, will continue to protect with Allevyn foam borders and strict offloading  Left heel DTI new since last evaluation  · Continue local wound care, Betadine DSD to right heel and Allevyn to left heel, appreciate nursing assistance with dressing changes  · Elevation and offloading on green foam wedges or pillows when non-ambulatory  Recommended continued use of Prevalon boots to offload heels  · Rest of care per primary team      Weightbearing status: Weightbearing as tolerated    SUBJECTIVE:     The patient was seen, evaluated, and assessed at bedside today  The patient was awake, alert, and in no acute distress  No acute events overnight  The patient reports mild pain with dressing changes  Patient denies N/V/F/chills/SOB/CP  OBJECTIVE:     Vitals:   /65   Pulse 82   Temp (!) 97 4 °F (36 3 °C)   Resp 16   Ht 5' 8" (1 727 m)   Wt 68 3 kg (150 lb 9 2 oz)   SpO2 96%   BMI 22 89 kg/m²     Temp (24hrs), Av 9 °F (36 6 °C), Min:97 4 °F (36 3 °C), Max:98 2 °F (36 8 °C)      Physical Exam:     Lungs: Non labored breathing  Abdomen: Soft, non-tender  Lower Extremity:  Cardiovascular status at baseline from admission  Neurological status at baseline from admission  Musculoskeletal status at baseline from admission  No calf tenderness noted  Dermatological:    - Left heel DTI , purple and non-blanchable in appearance  Surrounding erythema  No open wound or drainage present    - Right heel stable eschar, without open wound or drainage present  No acute clinical signs of infection  Eschar is well-adhered, no bogginess or fluctuance on palpation    - Lateral right ankle DTI, purple and non-blanchable in appearance, with fibrotic tissue progressing to eschar  No surrounding erythema or edema present  Clinical Images 12/08/22: Additional Data:     Labs:    Results from last 7 days   Lab Units 12/08/22  0500 12/06/22  0701 12/06/22  0519 12/05/22  0505   WBC Thousand/uL 6 27   < > 5 83 13 72*   HEMOGLOBIN g/dL 8 9*   < > 6 7* 7 6*   HEMATOCRIT % 29 4*   < > 22 6* 26 3*   PLATELETS Thousands/uL 132*   < > 156 158   NEUTROS PCT %  --   --   --  91*   LYMPHS PCT %  --   --   --  2*   LYMPHO PCT %  --   --  2*  --    MONOS PCT %  --   --   --  6   MONO PCT %  --   --  1*  --    EOS PCT %  --   --  0 0    < > = values in this interval not displayed  Results from last 7 days   Lab Units 12/08/22  0511 12/07/22  1852 12/07/22  0527   POTASSIUM mmol/L 3 5   < > 3 9   CHLORIDE mmol/L 109*   < > 113*   CO2 mmol/L 28   < > 30   BUN mg/dL 83*   < > 89*   CREATININE mg/dL 2 12*   < > 2 61*   CALCIUM mg/dL 8 2*   < > 8 1*   ALK PHOS U/L  --   --  92   ALT U/L  --   --  22   AST U/L  --   --  40    < > = values in this interval not displayed  * I Have Reviewed All Lab Data Listed Above  Recent Cultures (last 7 days):     Results from last 7 days   Lab Units 12/05/22  1345 12/04/22  2324   BLOOD CULTURE   --  No Growth at 72 hrs  No Growth at 72 hrs  LEGIONELLA URINARY ANTIGEN  Negative  --            Imaging: I have personally reviewed pertinent films in PACS  EKG, Pathology, and Other Studies: I have personally reviewed pertinent reports      ** Please Note: Portions of the record may have been created with voice recognition software  Occasional wrong word or "sound a like" substitutions may have occurred due to the inherent limitations of voice recognition software  Read the chart carefully and recognize, using context, where substitutions have occurred   **

## 2022-12-08 NOTE — PROGRESS NOTES
Follow up Consultation    Nephrology   Zeny Cantu 80 y o  male MRN: [id number]  Unit/Bed#: [address] 431-01 Encounter: [id number]      Physician Requesting Consult: Herman Madsen MD        ASSESSMENT/PLAN:  80 y o   male with pmh of CHF EF of 45%, CAD status post CABG 2014 with DAPHNEY placement in 2021, CAD, hyperlipidemia, history of mitral valve repair in 2014 hypertension, A  fib presented to the ED on 11/22/22 who was admitted for altered mental status and septic shock  During the course of the hospital stay patient has been treated for septic shock, urethral trauma he has a history of suprapubic catheter in place secondary to urethral trauma due to cystoscopy also diagnosed with RSV infection and paroxysmal A  fib with RVR  Nephrology has been consulted on 12/7/2022 for evaluation and management of acute kidney injury  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:  JOEL multifactorial most likely secondary to obstructive uropathy in light of CT scan suggestive of distended bladder with urinary retention plus prerenal azotemia plus ischemic injury from hypotension and severe anemia plus sepsis plus failure to autoregulated in this elderly gentleman plus some component of vancomycin nephrotoxicity most recent level at 22 2 from close to 6/22  After review of records In [facility] as well as Care everywhere it appears that the patient has a baseline Creatinine of 1 8-2 mg/dL  patient was admitted with a creatinine of 1 71 mg/dL on 11/20/2022  patient's creatinine today is at  2 12 mg/dL, stable and improving nearly close to baseline  CT abdomen pelvis from 11/23/2022 showing malpositioned Schaffer gas within the soft tissues of the perineum urethral injury  Urinary bladder distended to the level of the umbilicus volume approximately 970 cc  Persistent nephrograms  Status post CT chest abdomen pelvis on 12/7/2022 no hydronephrosis interval development of right lower lobe pneumonia concern for aspiration    Moderate left basilar effusion with compressive atelectasis  For now we will give quarter normal saline at 100 cc an hour for 500 cc then DC  check BMP in a m  Await renal recovery  Overall prognosis appears to be guarded  Optimize hemodynamic status to avoid delay in renal recovery  Avoid nephrotoxins, adjust meds to appropriate GFR  Strict I/O  Daily weights  Urinary retention protocol if patient does not have a Schaffer  Most likely has underlying CKD secondary to cardiorenal syndrome plus age-related nephron loss  will need to set up patient for follow up with Nephrology as an outpatient post hospitalization  as an outpatient for nephrology, patient follows up with no nephrologist  I called and spoke to patient's spouse today and updated her in terms of renal status and improvement in creatinine all questions and concerns were answered      Blood pressure/normotensive to borderline hypotensive/AFib:  home medications: Aldactone 25 mg p o  daily, metoprolol 100 mg p o  every 12  current medications: Metoprolol 25 mg p o  every 12  Last dose of torsemide was on 12/5/2022  recommendations: Continue to Hold diuretics for now, hold Aldactone for now, reevaluate daily for when diuretics can be reinitiated  Optimize hemodynamics  Maintain MAP > 65mmHg  Avoid BP fluctuations      H/H/anemia:  most recent hemoglobin at  8 9 g procedure  maintain hemoglobin greater than 8 grams/deciliter  On p o  iron  Avoid IV iron in light of infection  Management primary team consider PRBC transfusion if continues to drop     Acid-base electrolytes:     Electrolytes:       Hypernatremia:  Most recent sodium at 143 mEq  Sodium improving we will give additional 500 cc of quarter normal saline today  Check BMP in a m      Borderline hypokalemia:  Most recent potassium 3 5 mEq likely to decrease with IV fluids because of dextrose  Supplement gently with additional 20 mEq K   Dur today     Acid-base:    Most recent bicarb at 28, likely contraction alkalosis, improving     Other medical problems:  Proteinuria: Most recent UA with 1+ protein as of 2022  Sepsis/RSV:  Management per primary team   On cefepime, isolation, Solu-Medrol  Urethral injury: Suprapubic catheter in place  Follow-up with urology  CHF/CAD status post CABG in  and MVR/A  fib with RVR:  Management per primary team   Follow-up with cardiology on amiodarone      Thanks for the consult  Will continue to follow  Please call with questions/ concerns  Above-mentioned orders and Plan in terms of acute kidney injury was discussed with the team in [address] Sari Sprague MD, LIBRADO, 2022, 11:40 AM              Objective :   Patient seen and examined in his room with full PPE in place remains afebrile blood pressure stable and improved urine output to 50 cc plus overall clinically appears to be much better as compared to yesterday still with cough  Overall appears slightly frustrated by being in the hospital       PHYSICAL EXAM  /62   Pulse 81   Temp 97 6 °F (36 4 °C)   Resp 16   Ht 5' 8" (1 727 m)   Wt 68 3 kg (150 lb 9 2 oz)   SpO2 95%   BMI 22 89 kg/m²   Temp (24hrs), Av 8 °F (36 6 °C), Min:97 4 °F (36 3 °C), Max:98 2 °F (36 8 °C)        Intake/Output Summary (Last 24 hours) at 2022 1140  Last data filed at 2022 0911  Gross per 24 hour   Intake 830 ml   Output 50 ml   Net 780 ml       I/O last 24 hours: In: 1180 [P O :50; I V :780; Blood:350]  Out: 250 [Urine:250]      Current Weight: Weight - Scale:  (Unable to weigh  Too many object on bed for comfort )  First Weight: Weight - Scale: 76 7 kg (169 lb 1 6 oz)  Physical Exam  Vitals and nursing note reviewed  Constitutional:       General: He is not in acute distress  Appearance: Normal appearance  He is normal weight  He is ill-appearing  He is not toxic-appearing or diaphoretic  HENT:      Head: Normocephalic and atraumatic  Mouth/Throat:      Pharynx: Oropharynx is clear   No oropharyngeal exudate  Eyes:      General: No scleral icterus  Conjunctiva/sclera: Conjunctivae normal    Cardiovascular:      Rate and Rhythm: Normal rate  Heart sounds: Normal heart sounds  No friction rub  Pulmonary:      Effort: Pulmonary effort is normal  No respiratory distress  Breath sounds: No stridor  Comments: Slight decrease at bases  Abdominal:      General: There is no distension  Palpations: Abdomen is soft  There is no mass  Tenderness: There is no abdominal tenderness  Musculoskeletal:         General: No swelling  Cervical back: Normal range of motion and neck supple  No rigidity  Skin:     General: Skin is warm  Coloration: Skin is not jaundiced  Neurological:      General: No focal deficit present  Mental Status: He is alert and oriented to person, place, and time  Psychiatric:         Mood and Affect: Mood normal          Behavior: Behavior normal              Review of Systems   Constitutional: Positive for fatigue  Negative for chills  HENT: Negative for congestion  Respiratory: Negative for cough, shortness of breath and wheezing  Cardiovascular: Negative for leg swelling  Gastrointestinal: Negative for abdominal pain and nausea  Genitourinary: Negative for flank pain  Musculoskeletal: Negative for back pain  Neurological: Negative for headaches  Psychiatric/Behavioral: Negative for agitation  All other systems reviewed and are negative        Scheduled Meds:  Current Facility-Administered Medications   Medication Dose Route Frequency Provider Last Rate   • acetaminophen  650 mg Rectal Q6H PRN Mike Rodriguez PA-C     • albuterol  2 puff Inhalation Q4H PRN Shawn Lomeli MD     • amiodarone  200 mg Oral Daily With Breakfast Genie Lomeli MD     • apixaban  2 5 mg Oral BID Shawn Lomeli MD     • atorvastatin  40 mg Oral Daily With Vaishnavi Claudio MD     • cefepime  1,000 mg Intravenous Q24H Thera Pronto Feng Cook MD 1,000 mg (12/08/22 0052)   • clopidogrel  75 mg Oral Daily Dev Zhao, DO     • dextrose 5 % and sodium chloride 0 2 %  100 mL/hr Intravenous Continuous Kelsey Cecily Maria MD     • doxylamine  12 5 mg Oral HS PRN Galdino Machado PA-C     • ferrous sulfate  325 mg Oral Daily With Breakfast Isac Brown MD     • guaiFENesin  400 mg Oral BID Kaleb Gonzalez PA-C     • HYDROmorphone  0 2 mg Intravenous Q4H PRN Nicole Enciso DO     • insulin lispro  1-5 Units Subcutaneous TID AC Wilmar Back, DO     • Lidocaine Viscous HCl  15 mL Swish & Spit 4x Daily PRN Dean Manuel DO     • LORazepam  0 5 mg Intravenous Q6H PRN Nicole Enciso DO     • melatonin  6 mg Oral HS Swathi Barreto PA-C     • methylPREDNISolone sodium succinate  30 mg Intravenous Daily Marcie Herring MD     • metoprolol tartrate  25 mg Oral Q12H [address] SANDRINE Albert     • saliva substitute  5 spray Mouth/Throat 4x Daily PRN Rich Wade MD         PRN Meds: •  acetaminophen  •  albuterol  •  doxylamine  •  HYDROmorphone  •  Lidocaine Viscous HCl  •  LORazepam  •  saliva substitute    Continuous Infusions:dextrose 5 % and sodium chloride 0 2 %, 100 mL/hr          Invasive Devices: Invasive Devices     Peripheral Intravenous Line  Duration           Peripheral IV 12/04/22 Proximal;Right;Ventral (anterior) Forearm 4 days          Drain  Duration           Suprapubic Catheter 18 Fr   15 days                  LABORATORY:    Results from last 7 days   Lab Units 12/08/22  0511 12/08/22  0500 12/07/22  1852 12/07/22  0527 12/06/22  1801 12/06/22  0701 12/06/22  0519 12/05/22  0505 12/04/22  2325   WBC Thousand/uL  --  6 27  --  5 25  --   --  5 83 13 72* 15 43*   HEMOGLOBIN g/dL  --  8 9*  --  7 0* 7 9* 6 8* 6 7* 7 6* 8 8*   HEMATOCRIT %  --  29 4*  --  22 8* 26 7* 23 7* 22 6* 26 3* 30 0*   PLATELETS Thousands/uL  --  132*  --  144*  --   --  156 158 212   POTASSIUM mmol/L 3 5  --  3 5 3 9 3 7 --  4 1 4 2 4 5   CHLORIDE mmol/L 109*  --  108 113* 115*  --  114* 114* 109*   CO2 mmol/L 28  --  26 30 28  --  28 28 31   BUN mg/dL 83*  --  87* 89* 85*  --  78* 56* 55*   CREATININE mg/dL 2 12*  --  2 42* 2 61* 2 81*  --  2 85* 2 41* 2 43*   CALCIUM mg/dL 8 2*  --  8 3 8 1* 8 2*  --  8 4 8 1* 8 6   MAGNESIUM mg/dL  --   --   --   --   --   --  2 5  --   --    PHOSPHORUS mg/dL 3 4  --  3 1  --   --   --  4 7*  --   --       rest all reviewed    RADIOLOGY:  CT chest abdomen pelvis wo contrast   Final Result by Bharat Zhao MD (12/07 1302)         1  Interval development of right lower lobe airspace disease possibly atelectasis or developing pneumonia, potentially aspiration pneumonia  2   Moderate left basilar effusion and compressive atelectasis redemonstrated  3   New tree-in-bud opacities in the right upper lobe worrisome for pneumonitis/pneumonia  4   Enlarged prostate which appears slightly larger than on the recent prior studies and now is more hyperdense  Hemorrhagic prostatitis not excluded  No large hematoma in the pelvis  5   Cholelithiasis redemonstrated   6  Sigmoid diverticulosis without bowel obstruction  7   Diminished attenuation of the blood pool compared to the myocardium suggestive of anemia  Workstation performed: [id number]         XR chest portable   Final Result by Haritha Gonsales MD (12/05 1015)      Improved small left pleural effusion  Bibasilar atelectasis  No new focal airspace consolidation identified  Workstation performed: [id number]         XR foot 2 vw right   Final Result by Levy Kohler MD (12/01 1333)      No radiographic evidence of osteomyelitis        Workstation performed: [id number]         VAS lower limb arterial duplex, complete bilateral   Final Result by Radha Rose MD (11/30 1598)      FL barium swallow video w speech   Final Result by BEAU SIEGEL (11/30 2144)      XR chest pa & lateral   Final Result by Rhiannon Burt MD (11/28 1102)      Increasing left effusion   Increasing hazy density in the lung suggest worsening congestion                  Workstation performed: [id number]           Rest all reviewed    Portions of the record may have been created with voice recognition software  Occasional wrong word or "sound a like" substitutions may have occurred due to the inherent limitations of voice recognition software  Read the chart carefully and recognize, using context, where substitutions have occurred  If you have any questions, please contact the dictating provider

## 2022-12-08 NOTE — PROGRESS NOTES
[address] [facility]  Progress Note Xochitl Yuan 1940, 80 y o  male MRN: [id number]  Unit/Bed#: [facility] 431-01 Encounter: [id number]  Primary Care Provider: Korene Aase, DO   Date and time admitted to hospital: 11/23/2022  4:04 AM    * Urethral trauma  Assessment & Plan  · Urethral trauma status post cystoscopy with suprapubic catheter in place  · Urology input noted  · Flomax discontinued as pt has SPC and cannot take capsules    Acute respiratory failure with hypoxia ([address] )  Assessment & Plan  · Resolved  · Noted overnight on 12/4  · Due to RSV with likely superimposed bacterial pneumonia with concern for possible aspiration  · Had required high flow O2      RSV (acute bronchiolitis due to respiratory syncytial virus)  Assessment & Plan  · Resp protocol  · Supportive care   · Received 3 doses of Solumedrol 40 mg IV daily - plan to decrease to 30 mg from 12/8      Pneumonia  Assessment & Plan  · Noted overnight on 12/4 when he had fever, tachycardia, tachypnea, worsening hypoxia and was noted to have RSV infection  · Procalcitonin elevated  · Aspiration suspected  · CT chest done today - Interval development of right lower lobe airspace disease possibly atelectasis or developing pneumonia, potentially aspiration pneumonia  Moderate left basilar effusion and compressive atelectasis redemonstrated  New tree-in-bud opacities in the right upper lobe worrisome for pneumonitis/pneumonia    · Continue Cefepime (D#3)  · Pulm following - input appreciated    Dysphagia  Assessment & Plan  · High risk for aspiration  · Cleared by speech for puréed with honey thick liquids with strict aspiration precautions  · Patient and family do not want PEG tube    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  Assessment & Plan  Lab Results   Component Value Date    EGFR 21 12/07/2022    EGFR 20 12/06/2022    EGFR 19 12/06/2022    CREATININE 2 61 (H) 12/07/2022    CREATININE 2 81 (H) 12/06/2022 CREATININE 2 85 (H) 12/06/2022     Estimated Creatinine Clearance: 21 1 mL/min (A) (by C-G formula based on SCr of 2 61 mg/dL (H))  Baseline creatinine 1 8 to 2  Initially had malpositioned zavala with urethral injury, then creatinine elevated due to volume overload which improved with diuresis  Now creatinine increasing since 12/4 in the setting of new aspiration pneumonia/RSV with decreased oral intake  Current creatinine peaked at 2 85 on 12/6 and improved to 2 61 today  IVF's changed to D5 1/4 NS at 100 ml/hour for 10 hours  Oral intake improved  Received 1 unit PRBC yesterday and 1 today  Monitor creatinine  Avoid nephrotoxic medications and hypotension  Discussed with nephrology - input appreciated    Atrial fibrillation with RVR ([facility] [address] )  Assessment & Plan  · Noted to be in RVR on 12/6 and unable to take PO initially hence BB changed to Metoprolol 2 5 mg IV every 4 hours  · Oral intake improved  · Metoprolol changed to PO - 25 mg q 12h  · Cardiology following - input appreciated  · Eliquis held due to drop in Hb requiring transfusions  · On Amiodarone 200 mg daily    Acute on chronic anemia  Assessment & Plan  · Multifactorial  · Initially had hematuria  · Drop in Hb to 6 8 on 12/6 -received 1 unit PRBC with improvement in Hb to 7 9 but drop to 7 again today  · No hematuria at present  · Eliquis held  · Additional PRBC ordered  · Seen by GI - brown stools on rectal exam  Since no overt GI bleeding and high risk for endoscopy at present monitor at present  · CT C/A/P - per radiology may have hemorrhagic prostatitis - D/w urology - enlarged prostate, as urine clear no contraindication to Eliquis, Proscar added  · Monitor hemoglobin and transfuse as needed    Closed fracture of trochanter of right femur with routine healing  Assessment & Plan  · Discharged from [address] [place] [address] on 11/06 s/p repair     · Physical therapy  · Eliquis held due to drop in Hb  · Heparin s/c    Hematuria  Assessment & Plan  · Due to urethral injury  · S/p suprapubic catheter  · Hematuria resolved    CAD (coronary artery disease)  Assessment & Plan  Continue Plavix, beta-blocker, Lipitor    Chronic diastolic heart failure [facility])  Assessment & Plan  Wt Readings from Last 3 Encounters:   12/04/22 68 3 kg (150 lb 9 2 oz)   11/23/22 74 5 kg (164 lb 3 9 oz)   11/22/22 70 8 kg (156 lb)     · Echo 03/01/2022 with EF 45%  · Now off diuretics due to JOEL and receiving gentle IVF's  · Monitor I/O, daily weights          Hypernatremia  Assessment & Plan  · Improved  · IVF's as above  · Oral fluid intake encouraged      Transaminitis  Assessment & Plan  · Likely due to shock liver  · Improved     Deep tissue injury to right lateral ankle  Assessment & Plan  · Wound care/podiatry input appreciated  · [place] abnormal - reviewed by vascular - not a candidate for intervention at present, ct wound care    Goals of care, counseling/discussion  Assessment & Plan  · Palliative following  · Family feels he has improved and would like to continue current treatment measures    PAD (peripheral artery disease) ([facility] [address] )  Assessment & Plan  · Seen on LEADS  · Reviewed by vascular today as LE wound appeared to have increased - vascular disease possibly contributing to non-healing LE wounds but not a candidate for intervention at present in the setting of current infections/respiratory status and co-morbidities  ·  Ct Plavix, statin       VTE Pharmacologic Prophylaxis: VTE Score: 21 High Risk (Score >/= 5) - Pharmacological DVT Prophylaxis Ordered: heparin  Sequential Compression Devices Ordered  Patient Centered Rounds: Discussed with RN  Discussions with Specialists or Other Care Team Provider: Discussed with nephrology, podiatry, palliative care, vascular, urology    Education and Discussions with Family / Patient: Updated  (wife) at bedside  Time Spent for Care: 45 minutes   More than 50% of total time spent on counseling and coordination of care as described above     Current Length of Stay: 14 day(s)  Current Patient Status: Inpatient   Certification Statement: The patient will continue to require additional inpatient hospital stay due to Pneumonia    Code Status: Level 3 - DNAR and DNI    Subjective:   Off O2  No shortness off breath  Occasional cough  Oral intake improved  Objective:     Vitals:   Temp (24hrs), Av 1 °F (36 7 °C), Min:97 5 °F (36 4 °C), Max:99 2 °F (37 3 °C)    Temp:  [97 5 °F (36 4 °C)-99 2 °F (37 3 °C)] 97 9 °F (36 6 °C)  HR:  [] 89  Resp:  [15-22] 15  BP: ()/(54-63) 134/58  SpO2:  [92 %-100 %] 96 %  Body mass index is 22 89 kg/m²  Physical Exam:   Physical Exam  Vitals reviewed  Constitutional:       Appearance: He is ill-appearing  HENT:      Head: Normocephalic  Nose: Nose normal       Mouth/Throat:      Mouth: Mucous membranes are moist    Eyes:      Extraocular Movements: Extraocular movements intact  Cardiovascular:      Rate and Rhythm: Normal rate  Rhythm irregular  Pulmonary:      Effort: Pulmonary effort is normal       Comments: Decreased breath sounds bilaterally  Abdominal:      General: Bowel sounds are normal       Palpations: Abdomen is soft  Musculoskeletal:         General: No swelling  Cervical back: Neck supple  Skin:     General: Skin is warm and dry  Neurological:      General: No focal deficit present     Psychiatric:         Mood and Affect: Mood normal           Additional Data:     Labs:  Results from last 7 days   Lab Units 22  0527 22  0701 22  0519 22  0505   WBC Thousand/uL 5 25  --  5 83 13 72*   HEMOGLOBIN g/dL 7 0*   < > 6 7* 7 6*   HEMATOCRIT % 22 8*   < > 22 6* 26 3*   PLATELETS Thousands/uL 144*  --  156 158   BANDS PCT %  --   --  2  --    NEUTROS PCT %  --   --   --  91*   LYMPHS PCT %  --   --   --  2*   LYMPHO PCT %  --   --  2*  --    MONOS PCT %  --   --   --  6   MONO PCT %  --   --  1*  --    EOS PCT %  --   --  0 0    < > = values in this interval not displayed  Results from last 7 days   Lab Units 12/07/22  0527   SODIUM mmol/L 148*   POTASSIUM mmol/L 3 9   CHLORIDE mmol/L 113*   CO2 mmol/L 30   BUN mg/dL 89*   CREATININE mg/dL 2 61*   ANION GAP mmol/L 5   CALCIUM mg/dL 8 1*   ALBUMIN g/dL 2 0*   TOTAL BILIRUBIN mg/dL 0 93   ALK PHOS U/L 92   ALT U/L 22   AST U/L 40   GLUCOSE RANDOM mg/dL 232*         Results from last 7 days   Lab Units 12/07/22  0627 12/06/22  2346 12/06/22  1221 12/06/22  0542 12/06/22  0012   POC GLUCOSE mg/dl 229* 211* 122 159* 158*         Results from last 7 days   Lab Units 12/05/22  0505 12/04/22  2325   LACTIC ACID mmol/L 1 6 2 2*   PROCALCITONIN ng/ml 2 00* 1 09*       Lines/Drains:  Invasive Devices     Peripheral Intravenous Line  Duration           Peripheral IV 12/04/22 Proximal;Right;Ventral (anterior) Forearm 3 days          Drain  Duration           Suprapubic Catheter 18 Fr  14 days                Recent Cultures (last 7 days):   Results from last 7 days   Lab Units 12/05/22  1345 12/04/22  2324   BLOOD CULTURE   --  No Growth at 48 hrs  No Growth at 48 hrs     LEGIONELLA URINARY ANTIGEN  Negative  --        Last 24 Hours Medication List:   Current Facility-Administered Medications   Medication Dose Route Frequency Provider Last Rate   • acetaminophen  650 mg Rectal Q6H PRN Loretta Mercado PA-C     • albuterol  2 5 mg Nebulization Q6H PRN Loretta Mercado PA-C     • amiodarone  200 mg Oral Daily With Breakfast Henri Colon MD     • atorvastatin  40 mg Oral Daily With Hugo Wolf MD     • cefepime  1,000 mg Intravenous Q24H Jillian Du MD 1,000 mg (12/07/22 0148)   • clopidogrel  75 mg Oral Daily Arun Stein DO     • dextrose 5 % and sodium chloride 0 2 %  100 mL/hr Intravenous Continuous Dank Grubbs  mL/hr (12/07/22 1511)   • doxylamine  12 5 mg Oral HS PRN Loretta Mercado PA-C     • ferrous sulfate  325 mg Oral Daily With Breakfast Lesly Faizan Zamora MD     • guaiFENesin  400 mg Oral BID Lucho Fisher PA-C     • heparin (porcine)  5,000 Units Subcutaneous [facility] Andrae Mcdaniels MD     • HYDROmorphone  0 2 mg Intravenous Q4H PRN Nicole Enciso, DO     • ipratropium  0 5 mg Nebulization TID Farrel Blizzard, MD     • levalbuterol  1 25 mg Nebulization TID Farrel Blizzard, MD     • Lidocaine Viscous HCl  15 mL Swish & Spit 4x Daily PRN Carlos Calle, DO     • LORazepam  0 5 mg Intravenous Q6H PRN Nicole Enciso DO     • melatonin  6 mg Oral HS Swathi Cueto PA-C     • [START ON 12/8/2022] methylPREDNISolone sodium succinate  30 mg Intravenous Daily Isha Goetz MD     • metoprolol tartrate  25 mg Oral Q12H [address] BoniSANDRINE Costello     • saliva substitute  5 spray Mouth/Throat 4x Daily PRN Spencer Remy MD          Today, Patient Was Seen By: Andrae Mcdaniels MD    **Please Note: This note may have been constructed using a voice recognition system  **

## 2022-12-08 NOTE — ASSESSMENT & PLAN NOTE
· Resp protocol  · Supportive care   · Received 3 doses of Solumedrol 40 mg IV daily - plan to decrease to 30 mg from 12/8

## 2022-12-08 NOTE — ASSESSMENT & PLAN NOTE
Lab Results   Component Value Date    EGFR 21 12/07/2022    EGFR 20 12/06/2022    EGFR 19 12/06/2022    CREATININE 2 61 (H) 12/07/2022    CREATININE 2 81 (H) 12/06/2022    CREATININE 2 85 (H) 12/06/2022     Estimated Creatinine Clearance: 21 1 mL/min (A) (by C-G formula based on SCr of 2 61 mg/dL (H))  Baseline creatinine 1 8 to 2  Initially had malpositioned zavala with urethral injury, then creatinine elevated due to volume overload which improved with diuresis   Now creatinine increasing since 12/4 in the setting of new aspiration pneumonia/RSV with decreased oral intake  Current creatinine peaked at 2 85 on 12/6 and improved to 2 61 today  IVF's changed to D5 1/4 NS at 100 ml/hour for 10 hours  Oral intake improved  Received 1 unit PRBC yesterday and 1 today  Monitor creatinine  Avoid nephrotoxic medications and hypotension  Discussed with nephrology - input appreciated

## 2022-12-08 NOTE — ASSESSMENT & PLAN NOTE
· Seen on LEADS  · Reviewed by vascular today as LE wound appeared to have increased - vascular disease possibly contributing to non-healing LE wounds but not a candidate for intervention at present in the setting of current infections/respiratory status and co-morbidities  ·  Ct Plavix, statin

## 2022-12-08 NOTE — QUICK NOTE
General Cardiology   Progress Note -  Team One   Ghazal Singh 80 y o  male MRN: [id number]    Unit/Bed#: [facility] 431-01 Encounter: [id number]    Assessment  1  Paroxysmal atrial fibrillation w/ RVR  -Asymptomatic   -Telemetry review-fibrillation, rate controlled   -On oral amiodarone 200 mg daily + metoprolol  tartrate 25 mg every 12 hours  -Restarted on apixaban 2 5 mg twice daily  2  Chronic diastolic CHF  3  History of mitral valve repair in 2014  -Appears compensated  -TTE 3/2022- EF 45%, hypokinetic mid anteroseptal, apical septal and apical inferior wall, severe MAC, mild MR, mild TR  -Previously on furosemide 20 mg twice daily and spironolactone 25 mg daily as an outpatient (currently on hold)  4  Acute hypoxic respiratory failure 2/2 # 3  5  RSV infection w/ superimposed PNA  -Pulmonary following   -On RA w/O2 sats in the mid 90s  -CT chest abdomen/pelvis 12/7 -development of right lower lobe airspace disease possibly atelectasis or developing PNA, potentially aspiration PNA, moderate left basilar effusion and compressive atelectasis, new tree-in-bud opacities in the right upper lobe worrisome for pneumonitis/PNA   -On IV antibiotics  6  CAD s/p CABG 2014 w/ subsequent DAPHNEY to left main and ramus 10/2021  -On clopidogrel, high intensity statin, and BB  7  Dyslipidemia  -Lipid profile 12/2/2022 -cholesterol 81, triglycerides 113, HDL 25, and LDL 33   -On atorvastatin 40 mg daily  8  Acute on chronic anemia   -Hematuria initially (now resolved), iron deficiency noted - now on supplementation  -S/p PRBC transfusion on 12/6 w/ drop in Hgb level to 6 7  -HGB level currently at 8 9 (up from 7 0 yesterday)  9  JOEL superimposed on CKD stage III  -Baseline creatinine around 1 4-1 6   -Creatinine level currently 2 12     Plan  -Heart rates are well controlled in atrial fibrillation    Would recommend continuation of oral amiodarone 200 mg daily  and metoprolol tartrate 25 mg every 12 hours   -Continue apixaban 2 5 mg twice daily for systemic anticoagulation   -Continue clopidogrel and high intensity statin   -Continue to hold diuretics in the setting of JOEL/anemia and poor oral intake  Consideration of resuming diuretics pending clinical course and recovery in renal function  -IV antibiotics per the pulmonary team   -Needs aggressive pulmonary hygiene   -Continue to monitor on telemetry  Subjective  Review of Systems   Constitutional: Positive for malaise/fatigue  Cardiovascular: Negative for chest pain  Respiratory: Positive for cough  Negative for shortness of breath  Objective:   Physical Exam  Vitals reviewed  Constitutional:       Appearance: He is ill-appearing  Eyes:      General: No scleral icterus  Cardiovascular:      Rate and Rhythm: Normal rate  Rhythm irregular  Pulses: Normal pulses  Heart sounds: Normal heart sounds  No murmur heard  Abdominal:      Palpations: Abdomen is soft  Tenderness: There is no abdominal tenderness  Musculoskeletal:      Right lower leg: No edema  Left lower leg: No edema  Skin:     General: Skin is warm and dry  Capillary Refill: Capillary refill takes less than 2 seconds  Coloration: Skin is pale  Neurological:      General: No focal deficit present  Mental Status: He is alert and oriented to person, place, and time  Psychiatric:         Mood and Affect: Mood normal             Vitals: Blood pressure 126/65, pulse 82, temperature (!) 97 4 °F (36 3 °C), resp  rate 16, height 5' 8" (1 727 m), weight 68 3 kg (150 lb 9 2 oz), SpO2 96 %  ,     Body mass index is 22 89 kg/m²  ,   Systolic (48BAF), CQZ:507 , Min:119 , GNF:533     Diastolic (67UKC), YCE:27, Min:58, Max:68      Intake/Output Summary (Last 24 hours) at 12/8/2022 1040  Last data filed at 12/8/2022 0911  Gross per 24 hour   Intake 1180 ml   Output 250 ml   Net 930 ml     Weight (last 2 days)     None          LABORATORY RESULTS      CBC with diff:   Results from last 7 days   Lab Units 12/08/22  0500 12/07/22  0527 12/06/22  1801 12/06/22  0701 12/06/22  0519 12/05/22  0505 12/04/22  2325   WBC Thousand/uL 6 27 5 25  --   --  5 83 13 72* 15 43*   HEMOGLOBIN g/dL 8 9* 7 0* 7 9* 6 8* 6 7* 7 6* 8 8*   HEMATOCRIT % 29 4* 22 8* 26 7* 23 7* 22 6* 26 3* 30 0*   MCV fL 97 97  --   --  97 99* 98   PLATELETS Thousands/uL 132* 144*  --   --  156 158 212   MCH pg 29 3 29 2  --   --  28 6 28 6 28 8   MCHC g/dL 30 3* 30 3*  --   --  29 6* 28 9* 29 3*   RDW % 16 4* 17 5*  --   --  17 7* 17 9* 17 9*   MPV fL 11 4 11 1  --   --  11 2 10 8 11 0   NRBC AUTO /100 WBCs  --  0  --   --   --  0 0       CMP:  Results from last 7 days   Lab Units 12/08/22  0511 12/07/22  1852 12/07/22  0527 12/06/22  1801 12/06/22  0519 12/05/22  0505 12/04/22  2325   POTASSIUM mmol/L 3 5 3 5 3 9 3 7 4 1 4 2 4 5   CHLORIDE mmol/L 109* 108 113* 115* 114* 114* 109*   CO2 mmol/L 28 26 30 28 28 28 31   BUN mg/dL 83* 87* 89* 85* 78* 56* 55*   CREATININE mg/dL 2 12* 2 42* 2 61* 2 81* 2 85* 2 41* 2 43*   CALCIUM mg/dL 8 2* 8 3 8 1* 8 2* 8 4 8 1* 8 6   AST U/L  --   --  40  --  47* 20 18   ALT U/L  --   --  22  --  18 13 12   ALK PHOS U/L  --   --  92  --  110 120* 157*   EGFR ml/min/1 73sq m 28 23 21 20 19 24 23       BMP:  Results from last 7 days   Lab Units 12/08/22  0511 12/07/22  1852 12/07/22  0527 12/06/22  1801 12/06/22  0519 12/05/22  0505 12/04/22  2325   POTASSIUM mmol/L 3 5 3 5 3 9 3 7 4 1 4 2 4 5   CHLORIDE mmol/L 109* 108 113* 115* 114* 114* 109*   CO2 mmol/L 28 26 30 28 28 28 31   BUN mg/dL 83* 87* 89* 85* 78* 56* 55*   CREATININE mg/dL 2 12* 2 42* 2 61* 2 81* 2 85* 2 41* 2 43*   CALCIUM mg/dL 8 2* 8 3 8 1* 8 2* 8 4 8 1* 8 6       Lab Results   Component Value Date    NTBNP 10,648 (H) 02/28/2022    NTBNP 6,436 (H) 10/06/2021    NTBNP 921 (H) 04/21/2021        Results from last 7 days   Lab Units 12/06/22  0519   MAGNESIUM mg/dL 2 5                         Lipid Profile:   No results found for: CHOL  Lab Results   Component Value Date    HDL 25 (L) 2022     Lab Results   Component Value Date    LDLCALC 33 2022     Lab Results   Component Value Date    TRIG 113 2022       Cardiac testing:   Results for orders placed during the hospital encounter of 21    Echo complete with contrast if indicated    Narrative  [address]  [address]    Transthoracic Echocardiogram  2D, M-mode, Doppler, and Color Doppler    Study date:  2021    Patient: Nereida Garcia  MR number: [id number]  Account number: [de-identified]  : 1940  Age: 80 years  Gender: Male  Status: Inpatient  Location: [facility]  Height: 68 in  Weight: 168 lb  BP: 173/ 74 mmHg    Indications: Bacteremia    Diagnoses: R78 81 - Bacteremia    Sonographer:  SILAS Quiroz  Referring Physician:  Michael Reyes MD  Group:  Kyaw Farrar  Interpreting Physician:  Yuan Quinonez DO    SUMMARY    LEFT VENTRICLE:  Abnormal septal motion consistent with prior cardiac surgery  Systolic function was normal  Ejection fraction was estimated to be 60 %  There was mild concentric hypertrophy with a sigmoid shaped basal septum  RIGHT VENTRICLE:  The size was normal   Systolic function was normal visually  Tapse 1 4 cm  LEFT ATRIUM:  The atrium was mildly dilated  MITRAL VALVE:  Prior repair procedures: surgical repair with a #28 annuloplasty ring 3/7/2014  Transmitral velocity was increased  Mean gradient 5 mmHg (4 mmHg on prior study 2015)  There was mild to moderate regurgitation  TRICUSPID VALVE:  There was mild regurgitation  Estimated peak PA pressure was 35 mmHg  The findings suggest mild pulmonary hypertension  PULMONIC VALVE:  There was trace regurgitation  AORTA:  There was dilatation of the ascending aorta  3 8 cm      COMPARISONS:  Compared to prior study 2015, the degree of mitral regurgitation has increased from minimal on prior study to mild-moderate on current study  Minimal increase in the mitral valve gradient from 4 mmHg to 5 mmHg  HISTORY: PRIOR HISTORY: MV repair, CAD  MR, Carotid Artery stenosis  CKD3    PROCEDURE: The study was performed in the [place]  This was a routine study  The transthoracic approach was used  The study included complete 2D imaging, M-mode, complete spectral Doppler, and color Doppler  The heart  rate was 71 bpm, at the start of the study  Images were obtained from the parasternal, apical, subcostal, and suprasternal notch acoustic windows  Echocardiographic views were limited due to lung interference  Image quality was adequate  LEFT VENTRICLE: Abnormal septal motion consistent with prior cardiac surgery  Size was normal  Systolic function was normal  Ejection fraction was estimated to be 60 %  There was mild concentric hypertrophy with a sigmoid shaped basal  septum  DOPPLER: Left ventricular diastolic function is indeterminate due to mitral valve repair  RIGHT VENTRICLE: The size was normal  Systolic function was normal visually  Tapse 1 4 cm  LEFT ATRIUM: The atrium was mildly dilated  RIGHT ATRIUM: Size was normal     MITRAL VALVE: There was lower normal leaflet separation  Prior repair procedures: surgical repair with a #28 annuloplasty ring 3/7/2014  DOPPLER: Transmitral velocity was increased  Mean gradient 5 mmHg (4 mmHg on prior study 11/24/2015)  There was mild to moderate regurgitation  AORTIC VALVE: The valve was trileaflet  Leaflets exhibited mildly increased thickness, mild calcification, and normal cuspal separation  There was no echocardiographic evidence of vegetation  DOPPLER: Transaortic velocity was within the  normal range  There was no evidence for stenosis  There was no significant regurgitation  TRICUSPID VALVE: The valve structure was normal  There was normal leaflet separation   DOPPLER: The transtricuspid velocity was within the normal range  There was no evidence for stenosis  There was mild regurgitation  Estimated peak PA  pressure was 35 mmHg  The findings suggest mild pulmonary hypertension  PULMONIC VALVE: Not well visualized  DOPPLER: The transpulmonic velocity was within the normal range  There was trace regurgitation  PERICARDIUM: There was no pericardial effusion  AORTA: The root exhibited normal size  There was dilatation of the ascending aorta  3 8 cm  SYSTEMIC VEINS: IVC: The inferior vena cava was normal in size  SYSTEM MEASUREMENT TABLES    2D  %FS: 29 1 %  AV Diam: 3 47 cm  EDV(Teich): 101 77 ml  EF(Teich): 55 87 %  ESV(Teich): 44 91 ml  IVSd: 1 24 cm  LA Diam: 5 01 cm  LAAs A4C: 13 54 cm2  LAESV A-L A4C: 34 3 ml  LAESV MOD A4C: 32 68 ml  LALs A4C: 4 53 cm  LVEDV MOD A4C: 99 57 ml  LVEF MOD A4C: 57 25 %  LVESV MOD A4C: 42 56 ml  LVIDd: 4 69 cm  LVIDs: 3 32 cm  LVLd A4C: 8 82 cm  LVLs A4C: 8 cm  LVPWd: 1 19 cm  RAAs A4C: 9 72 cm2  RAESV A-L: 16 93 ml  RAESV MOD: 16 99 ml  RALs: 4 73 cm  RVIDd: 2 83 cm  RWT: 0 51  SV MOD A4C: 57 01 ml  SV(Teich): 56 86 ml    CF  MR Als  Mina: 0 46 m/s  MR Flow: 22 57 ml/s  MR Rad: 0 28 cm    CW  AV Vmax: 1 43 m/s  AV maxP 15 mmHg  HR: 70 62 BPM  MR VTI: 197 42 cm  MR Vmax: 6 1 m/s  MR Vmean: 4 32 m/s  MR maxP 16 mmHg  MR meanP 92 mmHg  MV PHT: 95 72 ms  MV VTI: 51 69 cm  MV Vmax: 1 57 m/s  MV Vmean: 1 02 m/s  MV maxP 81 mmHg  MV meanP 68 mmHg  MVA By PHT: 2 3 cm2  TR Vmax: 2 47 m/s  TR maxP 55 mmHg    MM  TAPSE: 1 39 cm    PW  E' Av 06 m/s  E' Lat: 0 07 m/s  E' Sept: 0 05 m/s  E/E' Av 82  E/E' Lat: 18 01  E/E' Sept: 24 65  MR ERO: 0 04 cm2  MR RV: 7 31 ml  MV A Mina: 1 02 m/s  MV Dec [place]: 7 63 m/s2  MV DecT: 172 23 ms  MV E Mina: 1 31 m/s  MV E/A Ratio: 1 28    [facility] Accredited Echocardiography Laboratory    Prepared and electronically signed by    Akua Davies DO  Signed 2021 17:26:11    No results found for this or any previous visit  No results found for this or any previous visit  No valid procedures specified  No results found for this or any previous visit        Meds/Allergies   all current active meds have been reviewed and current meds:   Current Facility-Administered Medications   Medication Dose Route Frequency   • acetaminophen (TYLENOL) rectal suppository 650 mg  650 mg Rectal Q6H PRN   • albuterol (PROVENTIL HFA,VENTOLIN HFA) inhaler 2 puff  2 puff Inhalation Q4H PRN   • amiodarone tablet 200 mg  200 mg Oral Daily With Breakfast   • apixaban (ELIQUIS) tablet 2 5 mg  2 5 mg Oral BID   • atorvastatin (LIPITOR) tablet 40 mg  40 mg Oral Daily With Dinner   • cefepime (MAXIPIME) 1,000 mg in dextrose 5 % 50 mL IVPB  1,000 mg Intravenous Q24H   • clopidogrel (PLAVIX) tablet 75 mg  75 mg Oral Daily   • doxylamine (UNISOM) tablet 12 5 mg  12 5 mg Oral HS PRN   • ferrous sulfate tablet 325 mg  325 mg Oral Daily With Breakfast   • guaiFENesin LIQD 400 mg  400 mg Oral BID   • HYDROmorphone HCl (DILAUDID) injection 0 2 mg  0 2 mg Intravenous Q4H PRN   • insulin lispro (HumaLOG) 100 units/mL subcutaneous injection 1-5 Units  1-5 Units Subcutaneous TID AC   • Lidocaine Viscous HCl (XYLOCAINE) 2 % mucosal solution 15 mL  15 mL Swish & Spit 4x Daily PRN   • LORazepam (ATIVAN) injection 0 5 mg  0 5 mg Intravenous Q6H PRN   • melatonin tablet 6 mg  6 mg Oral HS   • methylPREDNISolone sodium succinate (Solu-MEDROL) injection 30 mg  30 mg Intravenous Daily   • metoprolol tartrate (LOPRESSOR) tablet 25 mg  25 mg Oral Q12H YOLANDA   • saliva substitute (MOUTH KOTE) mucosal solution 5 spray  5 spray Mouth/Throat 4x Daily PRN          EKG personally reviewed by SANDRINE Ivy    Assessment:  Principal Problem:    Urethral trauma  Active Problems:    CAD (coronary artery disease)    Severe sepsis (HCC)    Acute respiratory failure with hypoxia ([facility] [address] )    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    Chronic atrial fibrillation (HCC)    Chronic diastolic heart failure (HCC)    Acute on chronic anemia    Closed fracture of trochanter of right femur with routine healing    Transaminitis    Hematuria    Ambulatory dysfunction    Dysphagia    Deep tissue injury to right lateral ankle    PAD (peripheral artery disease) (HCC)    RSV (acute bronchiolitis due to respiratory syncytial virus)    Goals of care, counseling/discussion    Pneumonia    Hypernatremia    Atrial fibrillation with RVR ([facility] [address] )    Counseling / Coordination of Care  Total floor / unit time spent today 20 minutes  Greater than 50% of total time was spent with the patient and / or family counseling and / or coordination of care  ** Please Note: Dragon 360 Dictation voice to text software may have been used in the creation of this document   **

## 2022-12-08 NOTE — ASSESSMENT & PLAN NOTE
· Multifactorial  · Initially had hematuria  · Drop in Hb to 6 8 on 12/6 -received 1 unit PRBC with improvement in Hb to 7 9 but drop to 7 again today  · No hematuria at present  · Eliquis held  · Additional PRBC ordered  · Seen by GI - brown stools on rectal exam  Since no overt GI bleeding and high risk for endoscopy at present monitor at present    · CT C/A/P - per radiology may have hemorrhagic prostatitis - D/w urology - enlarged prostate, as urine clear no contraindication to Eliquis, Proscar added  · Monitor hemoglobin and transfuse as needed

## 2022-12-08 NOTE — ASSESSMENT & PLAN NOTE
· Noted to be in RVR on 12/6 and unable to take PO initially hence BB changed to Metoprolol 2 5 mg IV every 4 hours  · Oral intake improved  · Metoprolol changed to PO - 25 mg q 12h  · Cardiology following - input appreciated  · Eliquis held due to drop in Hb requiring transfusions  · On Amiodarone 200 mg daily

## 2022-12-09 PROBLEM — N39.0 UTI (URINARY TRACT INFECTION): Status: ACTIVE | Noted: 2022-01-01

## 2022-12-09 NOTE — ASSESSMENT & PLAN NOTE
· Palliative were following  · Family feels he has improved and would like to continue current treatment measures

## 2022-12-09 NOTE — ASSESSMENT & PLAN NOTE
· Multifactorial  · Initially had hematuria  · 1 unit PRBC on 12/6 and 1 on 12/7  · Hb now improved  · Seen by GI on 12/7 - brown stools on rectal exam  Since no overt GI bleeding and high risk for endoscopy at present monitor at present, ok for Eliquis  · CT C/A/P - per radiology may have hemorrhagic prostatitis - D/w urology on 12/7 - enlarged prostate, as urine clear no contraindication to Eliquis, Proscar added  · Eliquis restarted today  · Monitor hemoglobin and transfuse as needed

## 2022-12-09 NOTE — ASSESSMENT & PLAN NOTE
Lab Results   Component Value Date    EGFR 28 12/08/2022    EGFR 23 12/07/2022    EGFR 21 12/07/2022    CREATININE 2 12 (H) 12/08/2022    CREATININE 2 42 (H) 12/07/2022    CREATININE 2 61 (H) 12/07/2022     Estimated Creatinine Clearance: 26 mL/min (A) (by C-G formula based on SCr of 2 12 mg/dL (H))  Baseline creatinine 1 8 to 2  Initially had malpositioned zavala with urethral injury, then creatinine elevated due to volume overload which improved with diuresis   Now creatinine increasing since 12/4 in the setting of new aspiration pneumonia/RSV with decreased oral intake  Current creatinine peaked at 2 85 on 12/6 and improved to 2 12 today  500 ml IVF's today  Oral intake improved  Received 1 unit PRBC on 12/6 and 1 on 12/7  Monitor creatinine  Avoid nephrotoxic medications and hypotension  Discussed with nephrology - input appreciated

## 2022-12-09 NOTE — PROCEDURES
Video Swallow Study      Patient Name: Duc Chavez  JOWMK'Z Date: 12/9/2022        Past Medical History  Past Medical History:   Diagnosis Date   • CHF (congestive heart failure) ([address] )    • CKD (chronic kidney disease) stage 3, GFR 30-59 ml/min ([facility])    • Coronary artery disease    • Hyperlipidemia    • Hypertension    • Mitral regurgitation    • Paroxysmal atrial fibrillation [facility])    • Urinary retention         Past Surgical History  Past Surgical History:   Procedure Laterality Date   • CARDIAC SURGERY      cabg x 2 2014 [facility]   • CORONARY ANGIOPLASTY WITH STENT PLACEMENT  10/2021    DAPHNEY to left main and ramus   • MITRAL VALVE REPAIR  2014    mitral ring   • IA CYSTOURETHROSCOPY W/IRRIG & EVAC CLOTS N/A 11/23/2022    Procedure: CYSTOSCOPY;  Surgeon: Michelle Metcalf MD;  Location: [facility];  Service: Urology   • IA OPEN RX FEMUR FX+INTRAMED HALIMA Right 11/1/2022    Procedure: INSERTION NAIL IM FEMUR ANTEGRADE (TROCHANTERIC); Surgeon: Val Ball; Location: [facility];  Service: Orthopedics   • SUPRAPUBIC TUBE PLACEMENT N/A 11/23/2022    Procedure: INSERTION SUPRAPUBIC CATHETER PERCUTANEOUS;  Surgeon: Michelle Metcalf MD;  Location: [facility];  Service: Urology       Modified (Video) Barium Swallow Study    Summary:  Images are on PACS for review  Pt presents w/ mod oral, severe pharyngeal dysphagia  Mastication and oral manipulation mild-mod prolonged/reduced  Swallow initiation is mod delayed w/ all material spilling at least to the valleculae, thin liquids to the pyriforms  Hyo-laryngeal elevation and pharyngeal constriction are reduced  Epiglottic inversion is incomplete, min inversion  Mod pharyngeal retention  Laryngeal vestibule closure is incomplete  Overall poor coordination of the swallow results in aspiration across consistencies today  Gross aspiration w/ cup sip thin (appreciate immediate cough response)   Trace-mild aspiration of all other consistencies administered during the swallow as well as overflow aspiration noted 2/2 CP prominence and possible osteophtye at the CP impeding bolus flow  Majority of aspiration events are SILENT, cough to gross aspiration event w/ cup sip thin, and throat clear x1 w/ tsp pudding  Several strategies attempted were not successful in eliminating penetration/aspiration  Ultimately, pt at high risk for aspiration w/ all PO at this time  Recommendations:  Diet: NPO   Liquids: NPO   Meds: Non-oral if possible   Strategies: -   Frequent oral care  Upright position  F/u ST tx: Yes   Therapy Prognosis: Guarded   Prognosis considerations: Age, current medical, past medical   Aspiration Precautions  Reflux Precautions  Consider consult with:  ENT  Results reviewed with: pt, physician, family   Aspiration precautions posted  Repeat MBS as necessary  If a dedicated assessment of the esophagus is desired, consider esophagram/barium swallow or EGD  Goals:  Pt will tolerate least restrictive diet w/out s/s aspiration or oral/pharyngeal difficulties  Patient's goal: "I just want real water"       Previous VBS:  Modified (Video) Barium Swallow Study 11/30/22     Summary:  Images are on PACS for review  Some images labeled as cup were straw sips nt (se 16 is by straw)      Oral stage/Pharyngeal stage: Moderately impaired keely by reduced retrieval and prolonged/labored manipulation & bolus formation of all material   Transfers are mod reduced and lingual retraction is reduced w/ post lingual residue w/ all material   Swallows are fairly prompt though his hyolaryngeal movement and pharyngeal constriction are both poor w/ retention in the valleculae & in the PS  Possible osteophyte at the CP impeding the bolus flow  Aspiration of nt & thin after the initial swallows on overflow from the PS w/o response  Cued to cough but his cough is too weak to clear    He was unable to complete physical strategies well  however alternating bites/sips was effective in clearing partial retention  No aspiration w/ ht     ?able vocal cord weakness  Per gross esophageal screen:  [facility]         Recommendations:  Diet: puree  Liquids: honey thick   Meds: crushed in puree   Strategies: alternate bites/sips        Does the pt have pain? No   If yes, was nursing made aware/was it addressed? N/A     Swallow Mechanism Exam  Facial: symmetrical  Labial: WFL  Lingual: WFL  Velum: symmetrical  Mandible: adequate ROM  Dentition: adequate  Vocal quality:weak   Volitional Cough: weak   Respiratory Status: on 2L O2      Swallow Information   Current Risks for Dysphagia & Aspiration: known history of dysphagia and known history of aspiration  Current Symptoms/Concerns: change in respiratory status  Current Diet: puree/level 1 diet and honey thick liquids   Baseline Diet: regular diet and thin liquids      Consistencies Administered:  Pt was viewed sitting upright in the lateral and AP positions  Due to concerns for patient safety trials provided deviated from the MBSImP Validated Protocol  Trials included: 5-mL thin liquid x2, 20-mL cup sip thin, 5-mL nectar thick, 20-mL cup sip nectar thick, 5-mL Honey thick, cup sip honey thick, 5-mL pudding, 1/4 cookie coated with pudding, and 5-mL pudding in the AP position        Oral Impairment:  Lip Closure: complete   Tongue Control During Bolus Hold: fair, mild reduced   Bolus Preparation/Mastication: mod prolonged/reduced  Bolus Transport/Lingual Motion: fair   Oral Residue: w/ all, appropriate recollection and transfer to clear   Initiation of the Pharyngeal Swallow:  Delayed, bolus head in at least the valleculae     Pharyngeal Impairment:  Soft Palate Elevation: complete   Laryngeal Elevation: reduced  Anterior Hyoid Excursion: reduced   Epiglottic Movement: min inversion   Laryngeal Vestibular Closure: incomplete   Pharyngeal Stripping Wave:  Reduced   Pharyngeal Contraction: reduced  PES Opening: CP prominence w/ retropulsion/overflow aspiration after the swallow   Tongue Base Retraction:  Fair   Pharyngeal Residue:   Mod w/ all     Screening of Esophageal Impairment   Esophageal Clearance:  Mild retention       Penetration/Aspiration:  Thin: aspiration during the swallow via tsp (PAS 8) and cup sip (PAS7)  Nectar: aspiration during the swallow via tsp (PAS8) and cup sip  Honey: aspiration during the swallow (PAS8)   Puree: aspiration during the swallow (PAS8)  after the swallow noted during AP view (PAS 6)   Solid: aspiration during the swallow (PAS8)   Strategies: Chin tuck, effortful swallow, head turn L/R not successful in eliminating     8-Point Penetration-Aspiration Scale   1 Material does not enter the airway   2 Material enters the airway, remains above the vocal folds, and is ejected  from the  airway    3 Material enters the airway, remains above the vocal folds, and is not ejected from the airway   4 Material enters the airway, contacts the vocal folds, and is ejected from the airway   5 Material enters the airway, contacts the vocal folds, and is not ejected from the airway    6 Material enters the airway, passes below the vocal folds and is ejected into the larynx or out of the airway    7 Material enters the airway, passes below the vocal folds, and is not ejected from the trachea despite effort    8 Material enters the airway, passes below the vocal folds, and no effort is made to eject

## 2022-12-09 NOTE — ASSESSMENT & PLAN NOTE
· Discharged from [address] [place] [address] on 11/06 s/p repair     · Physical therapy  · Eliquis  · PT

## 2022-12-09 NOTE — ASSESSMENT & PLAN NOTE
· Required transient scheduled IV Metoprolol when he was in RVR and NPO followed by decreased oral intake  · Oral intake improved  · Metoprolol changed to PO - 25 mg q 12h on 12/7  · On Amiodarone 200 mg daily  · Eliquis was held due to drop in Hb requiring transfusions - restarted today

## 2022-12-09 NOTE — ASSESSMENT & PLAN NOTE
· Seen on LEADS  · Reviewed by vascular on 12/7 as LE wound appeared to have increased - vascular disease possibly contributing to non-healing LE wounds but not a candidate for intervention at present in the setting of current infections/respiratory status and co-morbidities  ·  Ct Plavix, statin

## 2022-12-09 NOTE — ASSESSMENT & PLAN NOTE
· Noted overnight on 12/4 when he had fever, tachycardia, tachypnea, worsening hypoxia   · Procalcitonin elevated  · Aspiration suspected  · CT chest (12/7) - Interval development of right lower lobe airspace disease possibly atelectasis or developing pneumonia, potentially aspiration pneumonia  Moderate left basilar effusion and compressive atelectasis redemonstrated  New tree-in-bud opacities in the right upper lobe worrisome for pneumonitis/pneumonia    · Continue Cefepime (D#5/7)  · Pulm were following - input appreciated

## 2022-12-09 NOTE — ASSESSMENT & PLAN NOTE
· Hospitalized from 10/31/22 to 11/6/22 for right hip fracture - s/p ORIF  Postoperatively he developed urinary retention and he was discharged with a zavala  · Presented to the ED at [facility] on 11/20/22 with lower abdominal pain - was noted to have a UTI and was discharged back to rehab on Augmentin  · Brought back to the ED at [facility] on 11/21/22 with abdominal pain and lethargy  Noted to be hypotensive with lactic acid > 10, septic shock from UTI and was on pressors in the ICU  · Also noted to have hematuria and was on CBI  Hematuria persisted and developed abdominal distension with failure of CBI drainage  · CT abdomen (11/23/22) - "The Zavala catheter is malpositioned  The catheter balloon is located near the base of the penis, below the level of the prostate  There is gas within the soft tissues of the perineum and penis, most pronounced on the left    Urethral injury is suspected "  · Transferred emergently to [facility] and underwent suprapubic catheter placement on 11/23  · Hematuria resolved

## 2022-12-09 NOTE — ASSESSMENT & PLAN NOTE
· Required transient scheduled IV Metoprolol when he was in RVR and NPO followed by decreased oral intake  · Oral intake improved  · Metoprolol changed to PO - 25 mg q 12h on 12/7  · On Amiodarone 200 mg daily  · Eliquis was held due to drop in Hb requiring transfusions - restarted on 12/8

## 2022-12-09 NOTE — ASSESSMENT & PLAN NOTE
Wt Readings from Last 3 Encounters:   12/04/22 68 3 kg (150 lb 9 2 oz)   11/23/22 74 5 kg (164 lb 3 9 oz)   11/22/22 70 8 kg (156 lb)     · Echo 03/01/2022 with EF 45%     · Now off diuretics due to JOEL   · Monitor volume status

## 2022-12-09 NOTE — ASSESSMENT & PLAN NOTE
· Noted overnight on 12/4 when he had fever, tachycardia, tachypnea, worsening hypoxia and was noted to have RSV infection  · Procalcitonin elevated  · Aspiration suspected  · CT chest (12/7) - Interval development of right lower lobe airspace disease possibly atelectasis or developing pneumonia, potentially aspiration pneumonia  Moderate left basilar effusion and compressive atelectasis redemonstrated  New tree-in-bud opacities in the right upper lobe worrisome for pneumonitis/pneumonia    · Continue Cefepime (D#4)  · Pulm following - input appreciated

## 2022-12-09 NOTE — PROGRESS NOTES
Follow up Consultation    Nephrology   Mayank Carbajal 80 y o  male MRN: [id number]  Unit/Bed#: [address] Encounter: [id number]      Physician Requesting Consult: Akua Galindo MD        ASSESSMENT/PLAN:  80 y o   male with pmh of CHF EF of 45%, CAD status post CABG 2014 with DAPHNEY placement in 2021, CAD, hyperlipidemia, history of mitral valve repair in 2014 hypertension, A  fib presented to the ED on 11/22/22 who was admitted for altered mental status and septic shock  During the course of the hospital stay patient has been treated for septic shock, urethral trauma he has a history of suprapubic catheter in place secondary to urethral trauma due to cystoscopy also diagnosed with RSV infection and paroxysmal A  fib with RVR  Nephrology has been consulted on 12/7/2022 for evaluation and management of acute kidney injury  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:  JOEL multifactorial most likely secondary to obstructive uropathy in light of CT scan suggestive of distended bladder with urinary retention plus prerenal azotemia plus ischemic injury from hypotension and severe anemia plus sepsis plus failure to autoregulated in this elderly gentleman plus some component of vancomycin nephrotoxicity most recent level at 22 2 from close to 6/22  After review of records [facility] as well as Care everywhere it appears that the patient has a baseline Creatinine of 1 8-2 mg/dL  patient was admitted with a creatinine of 1 71 mg/dL on 11/20/2022  patient's creatinine today is at  1 76 mg/dL, remained stable and now below baseline   CT abdomen pelvis from 11/23/2022 showing malpositioned Schaffer gas within the soft tissues of the perineum urethral injury  Urinary bladder distended to the level of the umbilicus volume approximately 970 cc  Persistent nephrograms  Status post CT chest abdomen pelvis on 12/7/2022 no hydronephrosis interval development of right lower lobe pneumonia concern for aspiration    Moderate left basilar effusion with compressive atelectasis  Hold further IV fluids and diuretics at this time  Evaluate daily for diuretic need  check BMP in a m  Await renal recovery  Overall prognosis appears to be guarded  Optimize hemodynamic status to avoid delay in renal recovery  Avoid nephrotoxins, adjust meds to appropriate GFR  Strict I/O  Daily weights  Urinary retention protocol if patient does not have a Schaffer  Most likely has underlying CKD secondary to cardiorenal syndrome plus age-related nephron loss  will need to set up patient for follow up with Nephrology as an outpatient post hospitalization  as an outpatient for nephrology, patient follows up with no nephrologist  Today called again and spoke to patient's spouse aTtianna Frederick and updated her from a renal standpoint      Blood pressure/normotensive to borderline hypotensive/AFib:  home medications: Aldactone 25 mg p o  daily, metoprolol 100 mg p o  every 12  current medications: Metoprolol 25 mg p o  every 12  Last dose of torsemide was on 12/5/2022  recommendations: Continue to hold Aldactone for now hold diuretics for now Daily evaluation for diuretic needs  Optimize hemodynamics  Maintain MAP > 65mmHg  Avoid BP fluctuations      H/H/anemia:  most recent hemoglobin at  9 2 g/dL   maintain hemoglobin greater than 8 grams/deciliter  On p o  iron  Avoid IV iron in light of infection  Management primary team consider PRBC transfusion if continues to drop     Acid-base electrolytes:     Electrolytes:       Hypernatremia:  Most recent sodium at 143 mEq  Check BMP in a m  Encourage free water intake if passes swallow eval     Borderline hypokalemia:  Most recent potassium 3 5 mEq   Stable resolved     Acid-base:    Most recent bicarb at 29, likely contraction alkalosis, improving     Other medical problems:  Proteinuria: Most recent UA with 1+ protein as of 12/5/2022    Sepsis/RSV:  Management per primary team   On cefepime, isolation, Solu-Medrol  Urethral injury: Suprapubic catheter in place  Follow-up with urology  CHF/CAD status post CABG in 2014 and MVR/A  fib with RVR:  Management per primary team   Follow-up with cardiology on amiodarone      Thanks for the consult  Will continue to follow  Please call with questions/ concerns  Above-mentioned orders and Plan in terms of acute kidney injury was discussed with the team in [address], MD, FASN, 2022, 10:42 AM              Objective :   Patient seen and examined in his room with full PPE in place remains afebrile hemodynamically stable happy to hear renal parameters are improving still continues to maintain thirst   Urine output 650 cc plus  PHYSICAL EXAM  /81   Pulse 78   Temp 98 °F (36 7 °C)   Resp 18   Ht 5' 8" (1 727 m)   Wt 68 3 kg (150 lb 9 2 oz)   SpO2 94%   BMI 22 89 kg/m²   Temp (24hrs), Av 8 °F (36 6 °C), Min:97 6 °F (36 4 °C), Max:98 1 °F (36 7 °C)        Intake/Output Summary (Last 24 hours) at 2022 1042  Last data filed at 2022 0142  Gross per 24 hour   Intake 470 ml   Output 650 ml   Net -180 ml       I/O last 24 hours: In: 26 [P O :50; I V :470]  Out: 650 [Urine:650]      Current Weight: Weight - Scale:  (Unable to weigh  Too many object on bed for comfort )  First Weight: Weight - Scale: 76 7 kg (169 lb 1 6 oz)  Physical Exam  Vitals and nursing note reviewed  Constitutional:       General: He is not in acute distress  Appearance: Normal appearance  He is normal weight  He is ill-appearing  He is not toxic-appearing or diaphoretic  HENT:      Head: Normocephalic and atraumatic  Mouth/Throat:      Mouth: Mucous membranes are moist       Pharynx: Oropharynx is clear  No oropharyngeal exudate  Eyes:      General: No scleral icterus  Conjunctiva/sclera: Conjunctivae normal    Cardiovascular:      Rate and Rhythm: Normal rate  Heart sounds: Normal heart sounds  No friction rub     Pulmonary:      Effort: Pulmonary effort is normal  Breath sounds: No stridor  Comments: Coarse breath sounds no wheezing  Abdominal:      General: There is no distension  Palpations: Abdomen is soft  There is no mass  Tenderness: There is no abdominal tenderness  Comments: Suprapubic catheter in place   Musculoskeletal:         General: No swelling  Cervical back: Normal range of motion and neck supple  No rigidity  Skin:     General: Skin is warm and dry  Coloration: Skin is not jaundiced  Neurological:      General: No focal deficit present  Mental Status: He is alert  Mental status is at baseline  Psychiatric:         Mood and Affect: Mood normal          Behavior: Behavior normal              Review of Systems   Constitutional: Positive for fatigue  Negative for chills  HENT: Negative for congestion  Respiratory: Positive for cough  Negative for shortness of breath and wheezing  Cardiovascular: Negative for leg swelling  Gastrointestinal: Negative for abdominal pain and diarrhea  Musculoskeletal: Positive for back pain  Skin: Negative for pallor  Neurological: Negative for headaches  Psychiatric/Behavioral: Negative for agitation and confusion  All other systems reviewed and are negative        Scheduled Meds:  Current Facility-Administered Medications   Medication Dose Route Frequency Provider Last Rate   • acetaminophen  650 mg Rectal Q6H PRN Raz Connell PA-C     • albuterol  2 puff Inhalation Q4H PRN Jane Vivar MD     • amiodarone  200 mg Oral Daily With Breakfast Tamra Staley MD     • apixaban  2 5 mg Oral BID Jane Vivar MD     • atorvastatin  40 mg Oral Daily With Kamryn Carpenter MD     • cefepime  1,000 mg Intravenous Q24H Jane Vivar MD 1,000 mg (12/09/22 0151)   • clopidogrel  75 mg Oral Daily Juliocesar Vallejo DO     • doxylamine  12 5 mg Oral HS PRN Raz Connell PA-C     • ferrous sulfate  325 mg Oral Daily With Breakfast Lesly Scott Ulloa MD     • guaiFENesin  400 mg Oral BID Niki Bowling PA-C     • HYDROmorphone  0 2 mg Intravenous Q4H PRN Wilmar Back, DO     • insulin lispro  1-5 Units Subcutaneous TID AC Wilmar Back, DO     • Lidocaine Viscous HCl  15 mL Swish & Spit 4x Daily PRN Randall [place], DO     • LORazepam  0 5 mg Intravenous Q6H PRN Nicole Enciso, DO     • melatonin  6 mg Oral HS Swathi Brizuela PA-C     • methylPREDNISolone sodium succinate  30 mg Intravenous Daily Liz Fletcher MD     • metoprolol tartrate  25 mg Oral Q12H [address] SANDRINE Wallace     • oxyCODONE  2 5 mg Oral Q4H PRN Wilmar Back, DO      Or   • oxyCODONE  5 mg Oral Q4H PRN Wilmar Back, DO     • saliva substitute  5 spray Mouth/Throat 4x Daily PRN Chantal Torres MD         PRN Meds: •  acetaminophen  •  albuterol  •  doxylamine  •  HYDROmorphone  •  Lidocaine Viscous HCl  •  LORazepam  •  oxyCODONE **OR** oxyCODONE  •  saliva substitute    Continuous Infusions:       Invasive Devices:      Invasive Devices     Peripherally Inserted Central Catheter Line  Duration           PICC Line 81/03/62 Right Basilic <1 day          Peripheral Intravenous Line  Duration           Peripheral IV 12/04/22 Proximal;Right;Ventral (anterior) Forearm 5 days          Drain  Duration           Suprapubic Catheter 18 Fr  16 days                  LABORATORY:    Results from last 7 days   Lab Units 12/09/22  0524 12/08/22  0511 12/08/22  0500 12/07/22  1852 12/07/22  0527 12/06/22  1801 12/06/22  0701 12/06/22  0519 12/05/22  0505 12/04/22  2325   WBC Thousand/uL 8 04  --  6 27  --  5 25  --   --  5 83 13 72* 15 43*   HEMOGLOBIN g/dL 9 2*  --  8 9*  --  7 0* 7 9* 6 8* 6 7* 7 6* 8 8*   HEMATOCRIT % 30 5*  --  29 4*  --  22 8* 26 7* 23 7* 22 6* 26 3* 30 0*   PLATELETS Thousands/uL 140*  --  132*  --  144*  --   --  156 158 212   POTASSIUM mmol/L 3 9 3 5  --  3 5 3 9 3 7  --  4 1 4 2 4 5   CHLORIDE mmol/L 111* 109*  --  108 113* 115*  --  114* 114* 109*   CO2 mmol/L 29 28  --  26 30 28  --  28 28 31   BUN mg/dL 76* 83*  --  87* 89* 85*  --  78* 56* 55*   CREATININE mg/dL 1 76* 2 12*  --  2 42* 2 61* 2 81*  --  2 85* 2 41* 2 43*   CALCIUM mg/dL 8 3 8 2*  --  8 3 8 1* 8 2*  --  8 4 8 1* 8 6   MAGNESIUM mg/dL  --   --   --   --   --   --   --  2 5  --   --    PHOSPHORUS mg/dL 2 8 3 4  --  3 1  --   --   --  4 7*  --   --       rest all reviewed    RADIOLOGY:  CT chest abdomen pelvis wo contrast   Final Result by Esther Chapin MD (12/07 1302)         1  Interval development of right lower lobe airspace disease possibly atelectasis or developing pneumonia, potentially aspiration pneumonia  2   Moderate left basilar effusion and compressive atelectasis redemonstrated  3   New tree-in-bud opacities in the right upper lobe worrisome for pneumonitis/pneumonia  4   Enlarged prostate which appears slightly larger than on the recent prior studies and now is more hyperdense  Hemorrhagic prostatitis not excluded  No large hematoma in the pelvis  5   Cholelithiasis redemonstrated   6  Sigmoid diverticulosis without bowel obstruction  7   Diminished attenuation of the blood pool compared to the myocardium suggestive of anemia  Workstation performed: [id number]         XR chest portable   Final Result by Yuval Mercado MD (12/05 1015)      Improved small left pleural effusion  Bibasilar atelectasis  No new focal airspace consolidation identified  Workstation performed: [id number]         XR foot 2 vw right   Final Result by Trevor Liu MD (12/01 1333)      No radiographic evidence of osteomyelitis        Workstation performed: [id number]         VAS lower limb arterial duplex, complete bilateral   Final Result by Nell Gayle MD (11/30 9196)      FL barium swallow video w speech   Final Result by CHRISTAL, BEAU (11/30 5609)      XR chest pa & lateral   Final Result by Reanna Hylton Alexsandra Bell MD (11/28 1102)      Increasing left effusion   Increasing hazy density in the lung suggest worsening congestion                  Workstation performed: [id number]         XR chest PICC line portable    (Results Pending)   FL barium swallow video w speech    (Results Pending)     Rest all reviewed    Portions of the record may have been created with voice recognition software  Occasional wrong word or "sound a like" substitutions may have occurred due to the inherent limitations of voice recognition software  Read the chart carefully and recognize, using context, where substitutions have occurred  If you have any questions, please contact the dictating provider

## 2022-12-09 NOTE — NUTRITION
12/09/22 2562   Recommendations/Interventions   Recommendations to Provider Once NGT placement confirmed suggest starting Jevity 1 2 at 20ml/hr gradually increase by 10ml/hr Q4hrs to goal 67ml/hr  Add 115ml free H20 flushes Q6hrs providing 1930 kcal with 89gms Pro  1758ml total free h20  Meeting 92% kcal, and 100% Pro,& fluid needs  Monitor labs,  wt and tolerance

## 2022-12-09 NOTE — ASSESSMENT & PLAN NOTE
· Presentation as above  · Urine culture (11/20) - E   Coli  · Initially on Cefepime, then Cefazolin, then Cephalexin  · Completed antibiotic course  · Had septic shock which has resolved

## 2022-12-09 NOTE — ASSESSMENT & PLAN NOTE
Lab Results   Component Value Date    EGFR 35 12/09/2022    EGFR 28 12/08/2022    EGFR 23 12/07/2022    CREATININE 1 76 (H) 12/09/2022    CREATININE 2 12 (H) 12/08/2022    CREATININE 2 42 (H) 12/07/2022     Estimated Creatinine Clearance: 31 3 mL/min (A) (by C-G formula based on SCr of 1 76 mg/dL (H))  Baseline creatinine 1 8 to 2  Initially had malpositioned zavala with urethral injury, then creatinine elevated due to volume overload which improved with diuresis   Now creatinine increasing since 12/4 in the setting of new aspiration pneumonia/RSV with decreased oral intake  Current creatinine peaked at 2 85 on 12/6 and improved to 1 76 today  Off IVF's  Oral fluid intake encouraged  Received 1 unit PRBC on 12/6 and 1 on 12/7  Monitor creatinine  Avoid nephrotoxic medications and hypotension  Discussed with nephrology - input appreciated

## 2022-12-09 NOTE — ASSESSMENT & PLAN NOTE
· Multifactorial  · Initially had hematuria  · 1 unit PRBC on 12/6 and 1 on 12/7  · Seen by GI on 12/7 - brown stools on rectal exam  Since no overt GI bleeding and high risk for endoscopy at present monitor at present, ok for Eliquis  · CT C/A/P - per radiology may have hemorrhagic prostatitis - D/w urology on 12/7 - enlarged prostate, as urine clear no contraindication to Eliquis, Proscar added  · Eliquis restarted on 12/8  · Hb stable  · Monitor hemoglobin and transfuse as needed

## 2022-12-09 NOTE — QUICK NOTE
Telemetry reviewed - remains in afib w/well controlled heart rates  Would recommend continuation of oral amiodarone 200 mg daily and metoprolol tartrate 25 mg q12h  Can discontinue telemetry  Continue apixaban 2 5 mg BID  for systemic anticoagulation (therapeutic dose based off of age/creatinine)  Oral diuretics remain on hold in the presence of JOEL in which Nephrology is following along  Would reevaluate daily for when diuretics can be restarted  Cardiology will see as needed, please call with any questions or concerns in the interm

## 2022-12-09 NOTE — ASSESSMENT & PLAN NOTE
· Admitted to [address] [place] [address] from 10/31/22 to 11/6/22 with right hip fracture  · S/p repair     · Was undergoing rehab at [facility]  · Physical therapy  · Eliquis  · Discharge back to [facility] when medically stable

## 2022-12-09 NOTE — PROCEDURES
Insert PICC line    Date/Time: 12/9/2022 9:55 AM  Performed by: Robin Wu RN  Authorized by: Maxwell Lozano MD     Patient location:  Bedside  Other Assisting Provider: Yes (comment)    Consent:     Consent obtained:  Written and verbal (physician obtained consent from pt's wife on phone)    Consent given by:  Spouse    Procedural risks discussed: MD instructed  Universal protocol:     Procedure explained and questions answered to patient or proxy's satisfaction: yes      Relevant documents present and verified: yes      Test results available and properly labeled: yes      Radiology Images displayed and confirmed  If images not available, report reviewed: yes      Required blood products, implants, devices, and special equipment available: yes      Site/side marked: yes      Immediately prior to procedure, a time out was called: yes      Patient identity confirmed:  Verbally with patient and arm band  Pre-procedure details:     Hand hygiene: Hand hygiene performed prior to insertion      Sterile barrier technique: All elements of maximal sterile technique followed      Skin preparation:  ChloraPrep    Skin preparation agent: Skin preparation agent completely dried prior to procedure    Indications:     PICC line indications: no peripheral vascular access    Sedation:     Sedation type: Other (comment)  Anesthesia (see MAR for exact dosages):      Anesthesia method:  Local infiltration    Local anesthetic:  Lidocaine 1% w/o epi (2 ml)  Procedure details:     Location:  Basilic    Vessel type: vein      Laterality:  Right    Approach: percutaneous technique used      Patient position:  Flat    Procedural supplies:  Double lumen    Catheter size:  5 Fr    Landmarks identified: yes      Ultrasound guidance: yes      Ultrasound image availability:  Not saved    Sterile ultrasound techniques: Sterile gel and sterile probe covers were used      Number of attempts:  1    Successful placement: yes      Vessel of catheter tip end:  Chest Xray needed to confirm placement (Pink stickers on caps)    Total catheter length (cm):  42    Catheter out on skin (cm):  0    Max flow rate:  999 ml / hr    Arm circumference:  28  Post-procedure details:     Post-procedure:  Dressing applied and securement device placed    Assessment:  Blood return through all ports, free fluid flow and placement verification pending x-ray result    Post-procedure complications: none      Patient tolerance of procedure:   Tolerated well, no immediate complications    Observer: Yes      Observer name:  Jayashree Mills Tech  Comments:      Lot # [id number]  Expires 11/30/23  Pink stickers on caps

## 2022-12-09 NOTE — ASSESSMENT & PLAN NOTE
· Noted overnight on 12/4 due to aspiration pneumonia  · Now resolved  · On admission had septic shock due to UTI requiring pressors

## 2022-12-09 NOTE — CASE MANAGEMENT
Case Management Progress Note    Patient name Aspen Collier  Location [facility] 431/[facility] 814-48 MRN [id number]  : 1940 Date 2022       LOS (days): 16  Geometric Mean LOS (GMLOS) (days): 4 80  Days to GMLOS:-11 4        OBJECTIVE:        Current admission status: Inpatient  Preferred Pharmacy:   [address] [address]  Phone: [phone] Fax: [address]  [facility]  Phone: [phone] Fax: [phone]    Primary Care Provider: Kayden Graham DO    Primary Insurance: MEDICARE  Secondary Insurance: BLUE CROSS    PROGRESS NOTE:    Per provider, pt will likely remain hospitalized throughout the weekend due to ongoing medical management  Pt will be ready for discharge on Monday (at the earliest)  Update given to [facility] -- They will have a bed available on Monday  Facility advised that pt will need a covid test completed within 48 hours of discharge  CM will continue to follow

## 2022-12-09 NOTE — PROGRESS NOTES
[facility]  Progress Note Shawna Rhoades 1940, 80 y o  male MRN: [id number]  Unit/Bed#: [facility] 431-01 Encounter: [id number]  Primary Care Provider: Sebastien Liz DO   Date and time admitted to hospital: 11/23/2022  4:04 AM    * Urethral trauma  Assessment & Plan  · Urethral trauma status post cystoscopy with suprapubic catheter in place  · Urology input noted  · Flomax discontinued as pt has SPC and cannot take capsules    Acute respiratory failure with hypoxia ([address] )  Assessment & Plan  · Resolved  · Noted overnight on 12/4  · Due to RSV with likely superimposed bacterial pneumonia with concern for possible aspiration  · Had required high flow O2      RSV (acute bronchiolitis due to respiratory syncytial virus)  Assessment & Plan  · Resp protocol  · Supportive care   · Received 3 doses of Solumedrol 40 mg IV daily - decreased to 30 mg today       Pneumonia  Assessment & Plan  · Noted overnight on 12/4 when he had fever, tachycardia, tachypnea, worsening hypoxia and was noted to have RSV infection  · Procalcitonin elevated  · Aspiration suspected  · CT chest (12/7) - Interval development of right lower lobe airspace disease possibly atelectasis or developing pneumonia, potentially aspiration pneumonia  Moderate left basilar effusion and compressive atelectasis redemonstrated  New tree-in-bud opacities in the right upper lobe worrisome for pneumonitis/pneumonia    · Continue Cefepime (D#4)  · Pulm following - input appreciated    Dysphagia  Assessment & Plan  · High risk for aspiration  · Cleared by speech for puréed with honey thick liquids with strict aspiration precautions  · Patient and family do not want PEG tube    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  Assessment & Plan  Lab Results   Component Value Date    EGFR 28 12/08/2022    EGFR 23 12/07/2022    EGFR 21 12/07/2022    CREATININE 2 12 (H) 12/08/2022    CREATININE 2 42 (H) 12/07/2022    CREATININE 2 61 (H) 12/07/2022     Estimated Creatinine Clearance: 26 mL/min (A) (by C-G formula based on SCr of 2 12 mg/dL (H))  Baseline creatinine 1 8 to 2  Initially had malpositioned zavala with urethral injury, then creatinine elevated due to volume overload which improved with diuresis  Now creatinine increasing since 12/4 in the setting of new aspiration pneumonia/RSV with decreased oral intake  Current creatinine peaked at 2 85 on 12/6 and improved to 2 12 today  500 ml IVF's today  Oral intake improved  Received 1 unit PRBC on 12/6 and 1 on 12/7  Monitor creatinine  Avoid nephrotoxic medications and hypotension  Discussed with nephrology - input appreciated    Atrial fibrillation with RVR ([facility] [address] )  Assessment & Plan  · Required transient scheduled IV Metoprolol when he was in RVR and NPO followed by decreased oral intake  · Oral intake improved  · Metoprolol changed to PO - 25 mg q 12h on 12/7  · On Amiodarone 200 mg daily  · Eliquis was held due to drop in Hb requiring transfusions - restarted today      Acute on chronic anemia  Assessment & Plan  · Multifactorial  · Initially had hematuria  · 1 unit PRBC on 12/6 and 1 on 12/7  · Hb now improved  · Seen by GI on 12/7 - brown stools on rectal exam  Since no overt GI bleeding and high risk for endoscopy at present monitor at present, ok for Eliquis  · CT C/A/P - per radiology may have hemorrhagic prostatitis - D/w urology on 12/7 - enlarged prostate, as urine clear no contraindication to Eliquis, Proscar added  · Eliquis restarted today  · Monitor hemoglobin and transfuse as needed    Closed fracture of trochanter of right femur with routine healing  Assessment & Plan  · Discharged from [address] [place] [address] on 11/06 s/p repair     · Physical therapy  · Eliquis  · PT    Hematuria  Assessment & Plan  · Due to urethral injury  · S/p suprapubic catheter  · Hematuria resolved    CAD (coronary artery disease)  Assessment & Plan  Continue Plavix, beta-blocker, Lipitor    Chronic diastolic heart failure [facility])  Assessment & Plan  Wt Readings from Last 3 Encounters:   12/04/22 68 3 kg (150 lb 9 2 oz)   11/23/22 74 5 kg (164 lb 3 9 oz)   11/22/22 70 8 kg (156 lb)     · Echo 03/01/2022 with EF 45%  · Now off diuretics due to JOEL and receiving gentle IVF's  · Monitor I/O, daily weights          Hypernatremia  Assessment & Plan  · Due to hypovolemia  · Resolved with IVF's  · Oral fluid intake encouraged      Transaminitis  Assessment & Plan  · Likely due to shock liver  · Resolved     Deep tissue injury to right lateral ankle  Assessment & Plan  · Wound care/podiatry input appreciated  · AZ abnormal - reviewed by vascular - not a candidate for intervention at present, ct wound care    Goals of care, counseling/discussion  Assessment & Plan  · Palliative were following  · Family feels he has improved and would like to continue current treatment measures    PAD (peripheral artery disease) ([address] )  Assessment & Plan  · Seen on LEADS  · Reviewed by vascular on 12/7 as LE wound appeared to have increased - vascular disease possibly contributing to non-healing LE wounds but not a candidate for intervention at present in the setting of current infections/respiratory status and co-morbidities  ·  Ct Plavix, statin       VTE Pharmacologic Prophylaxis: VTE Score: 21 High Risk (Score >/= 5) - Pharmacological DVT Prophylaxis Ordered: apixaban (Eliquis)  Sequential Compression Devices Ordered  Patient Centered Rounds: Discussed with RN    Education and Discussions with Family / Patient: Updated  (wife) at bedside  Time Spent for Care: 20 minutes  More than 50% of total time spent on counseling and coordination of care as described above  Current Length of Stay: 15 day(s)  Current Patient Status: Inpatient   Certification Statement: The patient will continue to require additional inpatient hospital stay due to pneumonia    Code Status: Level 3 - DNAR and DNI    Subjective:   No shortness of breath   Oral intake improving    Objective:     Vitals:   Temp (24hrs), Av 7 °F (36 5 °C), Min:97 4 °F (36 3 °C), Max:98 1 °F (36 7 °C)    Temp:  [97 4 °F (36 3 °C)-98 1 °F (36 7 °C)] 98 1 °F (36 7 °C)  HR:  [81-88] 88  Resp:  [16] 16  BP: (126-131)/(59-68) 131/64  SpO2:  [95 %-97 %] 96 %  Body mass index is 22 89 kg/m²  Physical Exam:   Physical Exam  Vitals reviewed  HENT:      Nose: Nose normal       Mouth/Throat:      Mouth: Mucous membranes are moist    Eyes:      Extraocular Movements: Extraocular movements intact  Cardiovascular:      Rate and Rhythm: Normal rate and regular rhythm  Pulmonary:      Effort: Pulmonary effort is normal  No respiratory distress  Breath sounds: Normal breath sounds  No wheezing  Abdominal:      General: Bowel sounds are normal  There is no distension  Palpations: Abdomen is soft  Tenderness: There is no abdominal tenderness  Musculoskeletal:         General: No swelling  Cervical back: Neck supple  Skin:     General: Skin is warm and dry  Neurological:      General: No focal deficit present  Mental Status: He is alert and oriented to person, place, and time  Psychiatric:         Mood and Affect: Mood normal          Behavior: Behavior normal           Additional Data:     Labs:  Results from last 7 days   Lab Units 22  0500 22  0701 22  0519 22  0505   WBC Thousand/uL 6 27   < > 5 83 13 72*   HEMOGLOBIN g/dL 8 9*   < > 6 7* 7 6*   HEMATOCRIT % 29 4*   < > 22 6* 26 3*   PLATELETS Thousands/uL 132*   < > 156 158   BANDS PCT %  --   --  2  --    NEUTROS PCT %  --   --   --  91*   LYMPHS PCT %  --   --   --  2*   LYMPHO PCT %  --   --  2*  --    MONOS PCT %  --   --   --  6   MONO PCT %  --   --  1*  --    EOS PCT %  --   --  0 0    < > = values in this interval not displayed       Results from last 7 days   Lab Units 22  0511 22  1852 22  0527   SODIUM mmol/L 143   < > 148*   POTASSIUM mmol/L 3 5   < > 3 9 CHLORIDE mmol/L 109*   < > 113*   CO2 mmol/L 28   < > 30   BUN mg/dL 83*   < > 89*   CREATININE mg/dL 2 12*   < > 2 61*   ANION GAP mmol/L 6   < > 5   CALCIUM mg/dL 8 2*   < > 8 1*   ALBUMIN g/dL 2 0*   < > 2 0*   TOTAL BILIRUBIN mg/dL  --   --  0 93   ALK PHOS U/L  --   --  92   ALT U/L  --   --  22   AST U/L  --   --  40   GLUCOSE RANDOM mg/dL 194*   < > 232*    < > = values in this interval not displayed  Results from last 7 days   Lab Units 12/08/22  1742 12/08/22  1221 12/08/22  0520 12/07/22  1850 12/07/22  0627 12/06/22  2346 12/06/22  1221 12/06/22  0542 12/06/22  0012   POC GLUCOSE mg/dl 256* 159* 197* 325* 229* 211* 122 159* 158*         Results from last 7 days   Lab Units 12/08/22  0500 12/05/22  0505 12/04/22  2325   LACTIC ACID mmol/L  --  1 6 2 2*   PROCALCITONIN ng/ml 0 88* 2 00* 1 09*       Lines/Drains:  Invasive Devices     Peripheral Intravenous Line  Duration           Peripheral IV 12/04/22 Proximal;Right;Ventral (anterior) Forearm 4 days          Drain  Duration           Suprapubic Catheter 18 Fr  15 days                Recent Cultures (last 7 days):   Results from last 7 days   Lab Units 12/05/22  1345 12/04/22  2324   BLOOD CULTURE   --  No Growth at 72 hrs  No Growth at 72 hrs     LEGIONELLA URINARY ANTIGEN  Negative  --        Last 24 Hours Medication List:   Current Facility-Administered Medications   Medication Dose Route Frequency Provider Last Rate   • acetaminophen  650 mg Rectal Q6H PRN Eduard Silveira PA-C     • albuterol  2 puff Inhalation Q4H PRN Corrina Eli MD     • amiodarone  200 mg Oral Daily With Breakfast Barbie Verde MD     • apixaban  2 5 mg Oral BID Corrina Eli MD     • atorvastatin  40 mg Oral Daily With Billie Flores MD     • cefepime  1,000 mg Intravenous Q24H Corrina Eli MD 1,000 mg (12/08/22 0052)   • clopidogrel  75 mg Oral Daily Courtney Holt DO     • doxylamine  12 5 mg Oral HS PRN Eduard Silveira PA-C • ferrous sulfate  325 mg Oral Daily With Breakfast Isma Bowers MD     • guaiFENesin  400 mg Oral BID Liliana Cronin PA-C     • HYDROmorphone  0 2 mg Intravenous Q4H PRN Nicole Enciso, DO     • insulin lispro  1-5 Units Subcutaneous TID AC Wilmar Back, DO     • Lidocaine Viscous HCl  15 mL Swish & Spit 4x Daily PRN Jason Cifuentes DO     • LORazepam  0 5 mg Intravenous Q6H PRN Nicole Enciso, DO     • melatonin  6 mg Oral HS Swathi Donahue PA-C     • methylPREDNISolone sodium succinate  30 mg Intravenous Daily Yenny Zacarias MD     • metoprolol tartrate  25 mg Oral Q12H [address] SANDRINE Najera     • saliva substitute  5 spray Mouth/Throat 4x Daily PRN Roberth Bingham MD          Today, Patient Was Seen By: Jamie Foster MD    **Please Note: This note may have been constructed using a voice recognition system  **

## 2022-12-09 NOTE — ASSESSMENT & PLAN NOTE
· Resp protocol  · Supportive care   · On Solumedrol - being tapered q 3 days per pulm recommendations   Likely change to Prednisone in 2 days

## 2022-12-09 NOTE — ASSESSMENT & PLAN NOTE
· Resp protocol  · Supportive care   · Received 3 doses of Solumedrol 40 mg IV daily - decreased to 30 mg today

## 2022-12-09 NOTE — PROGRESS NOTES
[address] [facility]  Progress Note Eleazar Dance 1940, 80 y o  male MRN: [id number]  Unit/Bed#: [facility] 431-01 Encounter: [id number]  Primary Care Provider: Patria Irby DO   Date and time admitted to hospital: 11/23/2022  4:04 AM    * Urethral trauma  Assessment & Plan  · Hospitalized from 10/31/22 to 11/6/22 for right hip fracture - s/p ORIF  Postoperatively he developed urinary retention and he was discharged with a zavala  · Presented to the ED at [facility] on 11/20/22 with lower abdominal pain - was noted to have a UTI and was discharged back to rehab on Augmentin  · Brought back to the ED at [facility] on 11/21/22 with abdominal pain and lethargy  Noted to be hypotensive with lactic acid > 10, septic shock from UTI and was on pressors in the ICU  · Also noted to have hematuria and was on CBI  Hematuria persisted and developed abdominal distension with failure of CBI drainage  · CT abdomen (11/23/22) - "The Zavala catheter is malpositioned  The catheter balloon is located near the base of the penis, below the level of the prostate  There is gas within the soft tissues of the perineum and penis, most pronounced on the left  Urethral injury is suspected "  · Transferred emergently to [facility] and underwent suprapubic catheter placement on 11/23  · Hematuria resolved          Hematuria  Assessment & Plan  · As above    UTI (urinary tract infection)  Assessment & Plan  · Presentation as above  · Urine culture (11/20) - E   Coli  · Initially on Cefepime, then Cefazolin, then Cephalexin  · Completed antibiotic course  · Had septic shock which has resolved    Acute respiratory failure with hypoxia (HCC)  Assessment & Plan  · Resolved  · Noted overnight on 12/4  · Due to RSV with likely superimposed bacterial pneumonia with concern for possible aspiration  · Had required high flow O2      RSV (acute bronchiolitis due to respiratory syncytial virus)  Assessment & Plan  · Resp protocol  · Supportive care   · On Solumedrol - being tapered q 3 days per pulm recommendations  Likely change to Prednisone in 2 days         Pneumonia  Assessment & Plan  · Noted overnight on 12/4 when he had fever, tachycardia, tachypnea, worsening hypoxia   · Procalcitonin elevated  · Aspiration suspected  · CT chest (12/7) - Interval development of right lower lobe airspace disease possibly atelectasis or developing pneumonia, potentially aspiration pneumonia  Moderate left basilar effusion and compressive atelectasis redemonstrated  New tree-in-bud opacities in the right upper lobe worrisome for pneumonitis/pneumonia    · Continue Cefepime (D#5/7)  · Pulm were following - input appreciated    Severe sepsis [facility])  Assessment & Plan  · Noted overnight on 12/4 due to aspiration pneumonia  · Now resolved  · On admission had septic shock due to UTI requiring pressors    Deep tissue injury to right lateral ankle and left heel  Assessment & Plan  · Wound care/podiatry input appreciated  · AZ abnormal - reviewed by vascular - not a candidate for intervention at present, ct wound care    PAD (peripheral artery disease) ([facility] [address] )  Assessment & Plan  · Seen on LEADS  · Reviewed by vascular on 12/7 as LE wound appeared to have increased - vascular disease possibly contributing to non-healing LE wounds but not a candidate for intervention at present in the setting of current infections/respiratory status and co-morbidities  ·  Ct Plavix, statin       Dysphagia  Assessment & Plan  · High risk for aspiration  · Cleared by speech for puréed with honey thick liquids with strict aspiration precautions  · Patient and family do not want PEG tube  · VBS today    Atrial fibrillation with RVR ([facility] [address] )  Assessment & Plan  · Required transient scheduled IV Metoprolol when he was in RVR and NPO followed by decreased oral intake  · Oral intake improved  · Metoprolol changed to PO - 25 mg q 12h on 12/7  · On Amiodarone 200 mg daily  · Eliquis was held due to drop in Hb requiring transfusions - restarted on 12/8      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  Assessment & Plan  Lab Results   Component Value Date    EGFR 35 12/09/2022    EGFR 28 12/08/2022    EGFR 23 12/07/2022    CREATININE 1 76 (H) 12/09/2022    CREATININE 2 12 (H) 12/08/2022    CREATININE 2 42 (H) 12/07/2022     Estimated Creatinine Clearance: 31 3 mL/min (A) (by C-G formula based on SCr of 1 76 mg/dL (H))  Baseline creatinine 1 8 to 2  Initially had malpositioned zavala with urethral injury, then creatinine elevated due to volume overload which improved with diuresis  Now creatinine increasing since 12/4 in the setting of new aspiration pneumonia/RSV with decreased oral intake  Current creatinine peaked at 2 85 on 12/6 and improved to 1 76 today  Off IVF's  Oral fluid intake encouraged  Received 1 unit PRBC on 12/6 and 1 on 12/7  Monitor creatinine  Avoid nephrotoxic medications and hypotension  Discussed with nephrology - input appreciated    Chronic diastolic heart failure [facility])  Assessment & Plan  Wt Readings from Last 3 Encounters:   12/04/22 68 3 kg (150 lb 9 2 oz)   11/23/22 74 5 kg (164 lb 3 9 oz)   11/22/22 70 8 kg (156 lb)     · Echo 03/01/2022 with EF 45%  · Now off diuretics due to JOEL   · Monitor volume status            CAD (coronary artery disease)  Assessment & Plan  Continue Plavix, beta-blocker, Lipitor    Closed fracture of trochanter of right femur with routine healing  Assessment & Plan  · Admitted to [address] [place] [address] from 10/31/22 to 11/6/22 with right hip fracture  · S/p repair     · Was undergoing rehab at [facility]  · Physical therapy  · Eliquis  · Discharge back to [facility] when medically stable    Hypernatremia  Assessment & Plan  · Due to hypovolemia  · Resolved with IVF's  · Oral fluid intake encouraged      Transaminitis  Assessment & Plan  · Likely due to shock liver  · Resolved     Acute on chronic anemia  Assessment & Plan  · Multifactorial  · Initially had hematuria  · 1 unit PRBC on  and 1 on   · Seen by GI on  - brown stools on rectal exam  Since no overt GI bleeding and high risk for endoscopy at present monitor at present, ok for Eliquis  · CT C/A/P - per radiology may have hemorrhagic prostatitis - D/w urology on  - enlarged prostate, as urine clear no contraindication to Eliquis, Proscar added  · Eliquis restarted on   · Hb stable  · Monitor hemoglobin and transfuse as needed    Goals of care, counseling/discussion  Assessment & Plan  · Palliative were following  · Family feels he has improved and would like to continue current treatment measures  VTE Pharmacologic Prophylaxis: VTE Score: 21 High Risk (Score >/= 5) - Pharmacological DVT Prophylaxis Ordered: apixaban (Eliquis)  Sequential Compression Devices Ordered  Patient Centered Rounds: Discussed with RN  Discussions with Specialists or Other Care Team Provider: Discussed with pulmonology    Education and Discussions with Family / Patient: Updated  (wife) via phone  Time Spent for Care: 20 minutes  More than 50% of total time spent on counseling and coordination of care as described above  Current Length of Stay: 16 day(s)  Current Patient Status: Inpatient   Certification Statement: The patient will continue to require additional inpatient hospital stay due to pneumonia    Code Status: Level 3 - DNAR and DNI    Subjective:   Feels better  Wants to try thin liquids  No shortness of breath, cough  Objective:     Vitals:   Temp (24hrs), Av 8 °F (36 6 °C), Min:97 6 °F (36 4 °C), Max:98 1 °F (36 7 °C)    Temp:  [97 6 °F (36 4 °C)-98 1 °F (36 7 °C)] 97 8 °F (36 6 °C)  HR:  [78-88] 82  Resp:  [18] 18  BP: (125-159)/(50-81) 159/50  SpO2:  [94 %-98 %] 97 %  Body mass index is 22 89 kg/m²  Physical Exam:   Physical Exam  Vitals reviewed  HENT:      Head: Normocephalic        Nose: Nose normal       Mouth/Throat:      Mouth: Mucous membranes are moist    Eyes:      Extraocular Movements: Extraocular movements intact  Cardiovascular:      Rate and Rhythm: Rhythm irregular  Pulmonary:      Effort: Pulmonary effort is normal  No respiratory distress  Breath sounds: Normal breath sounds  No wheezing  Abdominal:      General: Bowel sounds are normal  There is no distension  Palpations: Abdomen is soft  Tenderness: There is no abdominal tenderness  Musculoskeletal:         General: No swelling  Skin:     Comments: Right lateral ankle and left heel DTI   Neurological:      General: No focal deficit present  Mental Status: He is alert and oriented to person, place, and time  Psychiatric:         Mood and Affect: Mood normal          Behavior: Behavior normal           Additional Data:     Labs:  Results from last 7 days   Lab Units 12/09/22  0524 12/06/22  0701 12/06/22  0519 12/05/22  0505   WBC Thousand/uL 8 04   < > 5 83 13 72*   HEMOGLOBIN g/dL 9 2*   < > 6 7* 7 6*   HEMATOCRIT % 30 5*   < > 22 6* 26 3*   PLATELETS Thousands/uL 140*   < > 156 158   BANDS PCT %  --   --  2  --    NEUTROS PCT %  --   --   --  91*   LYMPHS PCT %  --   --   --  2*   LYMPHO PCT %  --   --  2*  --    MONOS PCT %  --   --   --  6   MONO PCT %  --   --  1*  --    EOS PCT %  --   --  0 0    < > = values in this interval not displayed  Results from last 7 days   Lab Units 12/09/22  0524 12/08/22  0511 12/07/22  1852 12/07/22  0527   SODIUM mmol/L 145 143   < > 148*   POTASSIUM mmol/L 3 9 3 5   < > 3 9   CHLORIDE mmol/L 111* 109*   < > 113*   CO2 mmol/L 29 28   < > 30   BUN mg/dL 76* 83*   < > 89*   CREATININE mg/dL 1 76* 2 12*   < > 2 61*   ANION GAP mmol/L 5 6   < > 5   CALCIUM mg/dL 8 3 8 2*   < > 8 1*   ALBUMIN g/dL  --  2 0*   < > 2 0*   TOTAL BILIRUBIN mg/dL  --   --   --  0 93   ALK PHOS U/L  --   --   --  92   ALT U/L  --   --   --  22   AST U/L  --   --   --  40   GLUCOSE RANDOM mg/dL 135 194*   < > 232*    < > = values in this interval not displayed           Results from last 7 days Lab Units 12/09/22  1110 12/09/22  0620 12/08/22  2104 12/08/22  1742 12/08/22  1221 12/08/22  0520 12/07/22  1850 12/07/22  0627 12/06/22  2346 12/06/22  1221 12/06/22  0542 12/06/22  0012   POC GLUCOSE mg/dl 120 117 218* 256* 159* 197* 325* 229* 211* 122 159* 158*         Results from last 7 days   Lab Units 12/08/22  0500 12/05/22  0505 12/04/22  2325   LACTIC ACID mmol/L  --  1 6 2 2*   PROCALCITONIN ng/ml 0 88* 2 00* 1 09*       Lines/Drains:  Invasive Devices     Peripherally Inserted Central Catheter Line  Duration           PICC Line 26/99/10 Right Basilic <1 day          Peripheral Intravenous Line  Duration           Peripheral IV 12/04/22 Proximal;Right;Ventral (anterior) Forearm 5 days          Drain  Duration           Suprapubic Catheter 18 Fr  16 days                Central Line:  Goal for removal: Will discontinue when peripheral access obtained  Recent Cultures (last 7 days):   Results from last 7 days   Lab Units 12/05/22  1345 12/04/22  2324   BLOOD CULTURE   --  No Growth After 4 Days  No Growth After 4 Days     LEGIONELLA URINARY ANTIGEN  Negative  --        Last 24 Hours Medication List:   Current Facility-Administered Medications   Medication Dose Route Frequency Provider Last Rate   • acetaminophen  650 mg Rectal Q6H PRN Shahnaz Taylor PA-C     • albuterol  2 puff Inhalation Q4H PRN Isaac Virk MD     • amiodarone  200 mg Oral Daily With Breakfast Lynsey Stearns MD     • apixaban  2 5 mg Oral BID Isaac Virk MD     • atorvastatin  40 mg Oral Daily With Ines Vasquez MD     • cefepime  1,000 mg Intravenous Q24H Isaac Virk MD 1,000 mg (12/09/22 0151)   • clopidogrel  75 mg Oral Daily Yvonne Ripper, DO     • doxylamine  12 5 mg Oral HS PRN Shahnaz Taylor PA-C     • ferrous sulfate  325 mg Oral Daily With Breakfast Vahid Yanez MD     • guaiFENesin  400 mg Oral BID Mitali Mccollum PA-C     • HYDROmorphone  0 2 mg Intravenous Q4H PRN Wilmar Back, DO     • insulin lispro  1-5 Units Subcutaneous TID AC Wilmar Back, DO     • Lidocaine Viscous HCl  15 mL Swish & Spit 4x Daily PRN Randall Park, DO     • LORazepam  0 5 mg Intravenous Q6H PRN Nicole Enciso, DO     • melatonin  6 mg Oral HS Swathi Leo PA-C     • methylPREDNISolone sodium succinate  30 mg Intravenous Daily Liz Fletcher MD     • metoprolol tartrate  25 mg Oral Q12H [facility] SANDRINE Wallace     • oxyCODONE  2 5 mg Oral Q4H PRN Wilmar Back, DO      Or   • oxyCODONE  5 mg Oral Q4H PRN Wilmar Back, DO     • saliva substitute  5 spray Mouth/Throat 4x Daily PRN Chantal Torres MD          Today, Patient Was Seen By: Taqueria Velazquez MD    **Please Note: This note may have been constructed using a voice recognition system  **

## 2022-12-09 NOTE — ASSESSMENT & PLAN NOTE
· High risk for aspiration  · Cleared by speech for puréed with honey thick liquids with strict aspiration precautions  · Patient and family do not want PEG tube  · VBS today

## 2022-12-10 NOTE — RESTORATIVE TECHNICIAN NOTE
Restorative Technician Note      Patient Name: Eulogio Benavides     Restorative Tech Visit Date: 12/10/22  Note Type: Mobility  Patient Position Upon Consult: Bedside chair  Activity Performed: Transferred  Assistive Device: Other (Comment) (smooth )  Patient Position at End of Consult: Supine

## 2022-12-10 NOTE — RESTORATIVE TECHNICIAN NOTE
Restorative Technician Note      Patient Name: Gus Cruz     Restorative Tech Visit Date: 12/10/22  Note Type: Mobility  Patient Position Upon Consult: Bedside chair  Activity Performed: Transferred  Assistive Device: Other (Comment) (smooth )  Patient Position at End of Consult: All needs within reach;  Bedside chair

## 2022-12-10 NOTE — RESTORATIVE TECHNICIAN NOTE
Restorative Technician Note      Patient Name: Stephanie Perry     Restorative Tech Visit Date: 12/10/22  Note Type: Mobility  Patient Position Upon Consult: Supine  Activity Performed: Dangled; Stood  Patient Position at End of Consult:  All needs within reach; Bed/Chair alarm activated; Supine

## 2022-12-10 NOTE — CONSULTS
Otolaryngology Head and Neck Surgery Consultation    Chief complaint  ?vcd      History of the Present Illness    Sridevi Valdivia is a 80 y o  who presents with possible vocal cord dysfunction  He notes 1 week of hoarseness  Some difficulty swallowing  Review of systems (symptoms negative, as below, unless superceded by positive findings in bold)    General: no weight loss/gain, no fatigue, no fevers/chills  Neurologic: no headache, tremors, seizures  Eyes: no diplopia, no vision changes  Ears/Nose/Throat: no hearing loss, nasal obstruction, + hoarseness  Cardiovascular: no palpitations, chest pain  Respiratory : no shortness of breath, no wheezing  Gastrointestinal: no indigestion, heartburn, diarrhea, constipation  Genitourinary: no dysuria, no increased frequency  Musculoskeletal: no bone pain, muscle pain, joint pain  Psychologic: no depression, anxiety  Hematologic: no easy bleeding/bruising  Lymphatic: no swollen lymph nodes    Past Medical History:   Diagnosis Date   • CHF (congestive heart failure) ([facility])    • CKD (chronic kidney disease) stage 3, GFR 30-59 ml/min ([facility])    • Coronary artery disease    • Hyperlipidemia    • Hypertension    • Mitral regurgitation    • Paroxysmal atrial fibrillation ([facility] [address] )    • Urinary retention        Past Surgical History:   Procedure Laterality Date   • CARDIAC SURGERY      cabg x 2 2014 [facility]   • CORONARY ANGIOPLASTY WITH STENT PLACEMENT  10/2021    DAPHNEY to left main and ramus   • MITRAL VALVE REPAIR  2014    mitral ring   • AZ CYSTOURETHROSCOPY W/IRRIG & EVAC CLOTS N/A 11/23/2022    Procedure: CYSTOSCOPY;  Surgeon: Cornelia Villalobos MD;  Location: [facility];  Service: Urology   • AZ OPEN RX FEMUR FX+INTRAMED HALIMA Right 11/1/2022    Procedure: INSERTION NAIL IM FEMUR ANTEGRADE (TROCHANTERIC); Surgeon: Chano Driver;   Location:  MAIN OR;  Service: Orthopedics   • SUPRAPUBIC TUBE PLACEMENT N/A 11/23/2022    Procedure: INSERTION SUPRAPUBIC CATHETER PERCUTANEOUS;  Surgeon: Juan Carlos Amado MD;  Location: [facility];  Service: Urology       Social History     Socioeconomic History   • Marital status: /Civil Union     Spouse name: Not on file   • Number of children: Not on file   • Years of education: Not on file   • Highest education level: Not on file   Occupational History   • Not on file   Tobacco Use   • Smoking status: Never   • Smokeless tobacco: Never   Vaping Use   • Vaping Use: Never used   Substance and Sexual Activity   • Alcohol use: Not Currently   • Drug use: Never   • Sexual activity: Not Currently     Comment: defer   Other Topics Concern   • Not on file   Social History Narrative   • Not on file     Social Determinants of Health     Financial Resource Strain: Not on file   Food Insecurity: No Food Insecurity   • Worried About Running Out of Food in the Last Year: Never true   • Ran Out of Food in the Last Year: Never true   Transportation Needs: No Transportation Needs   • Lack of Transportation (Medical): No   • Lack of Transportation (Non-Medical): No   Physical Activity: Not on file   Stress: Not on file   Social Connections: Not on file   Intimate Partner Violence: Not on file   Housing Stability: Low Risk    • Unable to Pay for Housing in the Last Year: No   • Number of Places Lived in the Last Year: 1   • Unstable Housing in the Last Year: No       Family history: non-contributory    Physical Examination    /83   Pulse 69   Temp 98 1 °F (36 7 °C)   Resp 16   Ht 5' 8" (1 727 m)   Wt 64 kg (141 lb 1 5 oz)   SpO2 98%   BMI 21 45 kg/m²   Constitutional:  Well developed, well nourished and groomed, in no acute distress  Eyes:  Extra-ocular movements intact, pupils equally round and reactive to light and accommodation, the lids and conjunctivae are normal in appearance    HEENT:    Head: Atraumatic, normocephalic, no visible scalp lesions, bony palpation unremarkable without stepoffs, parotid and submandibular salivary glands non-tender to palpation and without masses bilaterally  Ears:  Auricles normal in appearance bilaterally, mastoid prominence non-tender, external auditory canals clear bilaterally, tympanic membranes intact bilaterally without evidence of middle ear effusion or masses, normal appearing ossicles  Nose/Sinuses:  External appearance unremarkable, no maxillary or frontal sinus tenderness to palpation bilaterally, anterior rhinoscopy reveals normal appearing mucosa, without polyps or masses  Dry secretions    Oral Cavity:  no oral mucosal masses or lesions, floor of mouth soft, tongue mobile without masses or lesions  Dry mucous membranes    Oropharynx:  Base of tongue soft and without masses, tonsils bilaterally unremarkable, soft palate mucosa unremarkable, laryngeal mirror exam unrevealing  Neck:  No visible or palpable cervical lesions or lymphadenopathy, thyroid gland is normal in size and symmetry and without masses, normal laryngeal elevation with swallowing  Cardiovascular:  Normal rate and rhythm, no palpable thrills, no jugulovenous distension observed  Respiratory:  Normal respiratory effort without evidence of retractions or use of accessory muscles  Integument:  Normal appearing without observed masses or lesions  Neurologic:  Cranial nerves II-XII intact bilaterally  Psychiatric:  Alert and oriented to time, place and person, normal affect  Flexible nasopharyngoscopy:  Flexible nasopharyngolaryngoscopy was performed after nasal topicalization with lidocaine and oxymetazoline    Findings demonstrate:    Nasal cavity:  Clear, no masses, exudates or polyps  Nasopharynx:  Clear, fossae of Rosenmuller clear bilaterally  Base of tongue:  Clear  Vallecula:   Empty  Epiglottis:  Crisp, decreased laryngeal sensation  Arytenoids/folds: Normal mucosa  Interaryntenoid: Normal mucosa  Postcricoid:  Normal mucosa  Glottis:  Normal vocal fold motion, no laryngeal masses  Piriform sinuses: Thick secretions throughout the larynx      Imaging studies:      Pertinent laboratory data:       Assessment  80 y o  with hoarseness    Plan  Hoarseness due to decreased phonatory effort  The cords move symmetrically  There are thick pooled secretions collected in the piriforms, vallecula and post cricoid space  This correlates with decreased laryngeal sensation, making him an aspiration risk  Recommend modified barium swallow with speech  No ENT intervention planned  Will sign off

## 2022-12-10 NOTE — PROGRESS NOTES
Follow up Consultation    Nephrology   Jose Briseno 80 y o  male MRN: [id number]  Unit/Bed#: [address] 431-01 Encounter: [id number]      Physician Requesting Consult: Horacio Pantoja MD        ASSESSMENT/PLAN:  80 y o   male with pmh of CHF EF of 45%, CAD status post CABG 2014 with DAPHNEY placement in 2021, CAD, hyperlipidemia, history of mitral valve repair in 2014 hypertension, A  fib presented to the ED on 11/22/22 who was admitted for altered mental status and septic shock  During the course of the hospital stay patient has been treated for septic shock, urethral trauma he has a history of suprapubic catheter in place secondary to urethral trauma due to cystoscopy also diagnosed with RSV infection and paroxysmal A  fib with RVR  Nephrology has been consulted on 12/7/2022 for evaluation and management of acute kidney injury  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:  JOEL multifactorial most likely secondary to obstructive uropathy in light of CT scan suggestive of distended bladder with urinary retention plus prerenal azotemia plus ischemic injury from hypotension and severe anemia plus sepsis plus failure to autoregulated in this elderly gentleman plus some component of vancomycin nephrotoxicity most recent level at 22 2 from close to 6/22  After review of records In [facility] as well as Care everywhere it appears that the patient has a baseline Creatinine of 1 8-2 mg/dL  patient was admitted with a creatinine of 1 71 mg/dL on 11/20/2022  patient's creatinine today is at 1 52 mg/dL, continues to remain stable and now below baseline  CT abdomen pelvis from 11/23/2022 showing malpositioned Schaffer gas within the soft tissues of the perineum urethral injury  Urinary bladder distended to the level of the umbilicus volume approximately 970 cc  Persistent nephrograms  Status post CT chest abdomen pelvis on 12/7/2022 no hydronephrosis interval development of right lower lobe pneumonia concern for aspiration    Moderate left basilar effusion with compressive atelectasis  Hold further IV fluids and diuretics at this time  Evaluate daily for diuretic need  check BMP in a m  Await renal recovery  Overall prognosis appears to be guarded  Optimize hemodynamic status to avoid delay in renal recovery  Avoid nephrotoxins, adjust meds to appropriate GFR  Strict I/O  Daily weights  Urinary retention protocol if patient does not have a Schaffer  Most likely has underlying CKD secondary to cardiorenal syndrome plus age-related nephron loss  will need to set up patient for follow up with Nephrology as an outpatient post hospitalization  as an outpatient for nephrology, patient follows up with no nephrologist     Blood pressure/normotensive to borderline hypotensive/AFib:  home medications: Aldactone 25 mg p o  daily, metoprolol 100 mg p o  every 12  current medications: Metoprolol 2 5 mg IV Q4  Last dose of torsemide was on 12/5/2022  recommendations: Continue to hold Aldactone for now hold diuretics for now Daily evaluation for diuretic needs  Still euvolemic to hypovolemic  Optimize hemodynamics  Maintain MAP > 65mmHg  Avoid BP fluctuations      H/H/anemia:  most recent hemoglobin at  9 1 g/dL   maintain hemoglobin greater than 8 grams/deciliter  On p o  iron  Avoid IV iron in light of infection  Management primary team consider PRBC transfusion if continues to drop     Acid-base electrolytes:     Electrolytes:       Hypernatremia:  Most recent sodium at 144 mEq  Check BMP in a m  Fortunately not able to pass swallow eval patient may need feeding tube  Primary team discussing with family today  For now continue on quarter normal saline at 40 cc an hour     Borderline hypokalemia:  Most recent potassium 3 7 mEq   Stable resolved     Acid-base:    Most recent bicarb at 29, likely contraction alkalosis, stable     Other medical problems:  Proteinuria: Most recent UA with 1+ protein as of 12/5/2022    Sepsis/RSV:  Management per primary team   On cefepime, isolation, Solu-Medrol  Urethral injury: Suprapubic catheter in place  Follow-up with urology  CHF/CAD status post CABG in 2014 and MVR/A  fib with RVR:  Management per primary team   Follow-up with cardiology on amiodarone      Thanks for the consult  Will continue to follow  Please call with questions/ concerns  Above-mentioned orders and Plan in terms of acute kidney injury was discussed with the team in depth with [facility] text    Naheed Cage MD, FASN, 12/10/2022, 6:03 AM              Objective :   Patient seen and examined in his room with full PPE in place remains afebrile hemodynamically stable hypertensive renal parameters are improving  Was started on IV fluids of quarter normal saline overnight at 40 cc an hour  Urine output 1 L plus      PHYSICAL EXAM  /83   Pulse 69   Temp 98 1 °F (36 7 °C)   Resp 16   Ht 5' 8" (1 727 m)   Wt 68 3 kg (150 lb 9 2 oz)   SpO2 98%   BMI 22 89 kg/m²   Temp (24hrs), Av °F (36 7 °C), Min:97 8 °F (36 6 °C), Max:98 2 °F (36 8 °C)        Intake/Output Summary (Last 24 hours) at 12/10/2022 0603  Last data filed at 12/10/2022 0149  Gross per 24 hour   Intake 50 ml   Output 650 ml   Net -600 ml       I/O last 24 hours: In: 48 [P O :50]  Out: 950 [Urine:950]      Current Weight: Weight - Scale:  (Unable to weigh  Too many object on bed for comfort )  First Weight: Weight - Scale: 76 7 kg (169 lb 1 6 oz)  Physical Exam  Vitals and nursing note reviewed  Constitutional:       General: He is not in acute distress  Appearance: Normal appearance  He is normal weight  He is not ill-appearing, toxic-appearing or diaphoretic  HENT:      Head: Normocephalic and atraumatic  Mouth/Throat:      Pharynx: Oropharynx is clear  No oropharyngeal exudate  Eyes:      General: No scleral icterus  Conjunctiva/sclera: Conjunctivae normal    Cardiovascular:      Rate and Rhythm: Normal rate  Heart sounds: No friction rub     Pulmonary: Effort: Pulmonary effort is normal  No respiratory distress  Breath sounds: Normal breath sounds  Abdominal:      General: There is no distension  Tenderness: There is no abdominal tenderness  Musculoskeletal:         General: No swelling  Cervical back: Normal range of motion and neck supple  No rigidity  Skin:     General: Skin is warm and dry  Coloration: Skin is not jaundiced  Neurological:      General: No focal deficit present  Mental Status: He is alert  Mental status is at baseline  Psychiatric:         Mood and Affect: Mood normal              Review of Systems   Constitutional: Negative for chills and fatigue  HENT: Negative for congestion  Respiratory: Positive for cough  Negative for shortness of breath and wheezing  Cardiovascular: Negative for leg swelling  Gastrointestinal: Negative for abdominal pain and diarrhea  Genitourinary: Negative for flank pain  Musculoskeletal: Negative for back pain  Neurological: Negative for headaches  Psychiatric/Behavioral: Negative for agitation and confusion  All other systems reviewed and are negative        Scheduled Meds:  Current Facility-Administered Medications   Medication Dose Route Frequency Provider Last Rate   • acetaminophen  650 mg Rectal Q6H PRN Brooklyn Christie PA-C     • albuterol  2 puff Inhalation Q4H PRN Ac Rivera MD     • amiodarone  200 mg Oral Daily With Breakfast Melissa Cardenas MD     • atorvastatin  40 mg Oral Daily With Susie Umana MD     • cefepime  1,000 mg Intravenous Q24H Acmehdi Rivera MD 1,000 mg (12/10/22 0278)   • clopidogrel  75 mg Oral Daily Claudette Pepper, DO     • dextrose 5 % and sodium chloride 0 45 %  40 mL/hr Intravenous Continuous Ac Rivera MD 40 mL/hr (12/09/22 7705)   • doxylamine  12 5 mg Oral HS PRN Brooklyn Christie PA-C     • ferrous sulfate  325 mg Oral Daily With Breakfast Estuardo Calles MD     • guaiFENesin  400 mg Oral BID Eduar Sewell PA-C     • heparin (porcine)  3-20 Units/kg/hr (Order-Specific) Intravenous Titrated Laura Baker MD 14 Units/kg/hr (12/10/22 0225)   • heparin (porcine)  1,950 Units Intravenous Q1H PRN Laura Baker MD     • heparin (porcine)  3,900 Units Intravenous Q1H PRN Laura Baker MD     • HYDROmorphone  0 2 mg Intravenous Q4H PRN Wilmar Back DO     • insulin lispro  1-5 Units Subcutaneous TID AC Wilmar Back DO     • Lidocaine Viscous HCl  15 mL Swish & Spit 4x Daily PRN Natalya Abraham DO     • LORazepam  0 5 mg Intravenous Q6H PRN Nicole Enciso DO     • melatonin  6 mg Oral HS Eduar Sewell PA-C     • methylPREDNISolone sodium succinate  30 mg Intravenous Daily Stacy Flanagan MD     • metoprolol  2 5 mg Intravenous Q4H Laura Baker MD     • oxyCODONE  2 5 mg Oral Q4H PRN Wilmar Back DO      Or   • oxyCODONE  5 mg Oral Q4H PRN Wilmar Back DO     • pantoprazole  40 mg Intravenous Q24H [facility] Laura Baker MD     • saliva substitute  5 spray Mouth/Throat 4x Daily PRN Guanaco Rodriguez MD         PRN Meds: •  acetaminophen  •  albuterol  •  doxylamine  •  heparin (porcine)  •  heparin (porcine)  •  HYDROmorphone  •  Lidocaine Viscous HCl  •  LORazepam  •  oxyCODONE **OR** oxyCODONE  •  saliva substitute    Continuous Infusions:dextrose 5 % and sodium chloride 0 45 %, 40 mL/hr, Last Rate: 40 mL/hr (12/09/22 1829)  heparin (porcine), 3-20 Units/kg/hr (Order-Specific), Last Rate: 14 Units/kg/hr (12/10/22 0225)          Invasive Devices:      Invasive Devices     Peripherally Inserted Central Catheter Line  Duration           PICC Line 73/87/70 Right Basilic <1 day          Drain  Duration           Suprapubic Catheter 18 Fr  16 days                  LABORATORY:    Results from last 7 days   Lab Units 12/10/22  0456 12/09/22  1828 12/09/22  0524 12/08/22  0511 12/08/22  0500 12/07/22  1852 12/07/22  0527 12/06/22  1801 12/06/22  0701 12/06/22  2855 12/05/22  0505 12/04/22  2325   WBC Thousand/uL  --  7 70 8 04  --  6 27  --  5 25  --   --  5 83 13 72* 15 43*   HEMOGLOBIN g/dL  --  10 1* 9 2*  --  8 9*  --  7 0* 7 9* 6 8* 6 7* 7 6* 8 8*   HEMATOCRIT %  --  33 4* 30 5*  --  29 4*  --  22 8* 26 7* 23 7* 22 6* 26 3* 30 0*   PLATELETS Thousands/uL  --  160 140*  --  132*  --  144*  --   --  156 158 212   POTASSIUM mmol/L 3 7  --  3 9 3 5  --  3 5 3 9 3 7  --  4 1 4 2 4 5   CHLORIDE mmol/L 112*  --  111* 109*  --  108 113* 115*  --  114* 114* 109*   CO2 mmol/L 29  --  29 28  --  26 30 28  --  28 28 31   BUN mg/dL 68*  --  76* 83*  --  87* 89* 85*  --  78* 56* 55*   CREATININE mg/dL 1 52*  --  1 76* 2 12*  --  2 42* 2 61* 2 81*  --  2 85* 2 41* 2 43*   CALCIUM mg/dL 8 0*  --  8 3 8 2*  --  8 3 8 1* 8 2*  --  8 4 8 1* 8 6   MAGNESIUM mg/dL  --   --   --   --   --   --   --   --   --  2 5  --   --    PHOSPHORUS mg/dL  --   --  2 8 3 4  --  3 1  --   --   --  4 7*  --   --       rest all reviewed    RADIOLOGY:  FL barium swallow video w speech   Final Result by BEAU SIEGEL (12/09 1444)      XR chest PICC line portable   Final Result by Rozina Andersen MD (12/09 1329)      Right PICC in upper SVC  Persistent left effusion and left greater than right bibasilar opacity which could be due to atelectasis and/or pneumonia  Workstation performed: [id number]         CT chest abdomen pelvis wo contrast   Final Result by Dina Conte MD (12/07 1302)         1  Interval development of right lower lobe airspace disease possibly atelectasis or developing pneumonia, potentially aspiration pneumonia  2   Moderate left basilar effusion and compressive atelectasis redemonstrated  3   New tree-in-bud opacities in the right upper lobe worrisome for pneumonitis/pneumonia  4   Enlarged prostate which appears slightly larger than on the recent prior studies and now is more hyperdense  Hemorrhagic prostatitis not excluded    No large hematoma in the pelvis  5   Cholelithiasis redemonstrated   6  Sigmoid diverticulosis without bowel obstruction  7   Diminished attenuation of the blood pool compared to the myocardium suggestive of anemia  Workstation performed: [id number]         XR chest portable   Final Result by Rushie Goldmann, MD (12/05 1015)      Improved small left pleural effusion  Bibasilar atelectasis  No new focal airspace consolidation identified  Workstation performed: [id number]         XR foot 2 vw right   Final Result by Xavier Levine MD (12/01 1333)      No radiographic evidence of osteomyelitis  Workstation performed: [id number]         VAS lower limb arterial duplex, complete bilateral   Final Result by Berenice Alva MD (11/30 2247)      FL barium swallow video w speech   Final Result by BEAU SIEGEL (11/30 3559)      XR chest pa & lateral   Final Result by Perla Jacobson MD (11/28 1102)      Increasing left effusion   Increasing hazy density in the lung suggest worsening congestion                  Workstation performed: [id number]           Rest all reviewed    Portions of the record may have been created with voice recognition software  Occasional wrong word or "sound a like" substitutions may have occurred due to the inherent limitations of voice recognition software  Read the chart carefully and recognize, using context, where substitutions have occurred  If you have any questions, please contact the dictating provider

## 2022-12-11 NOTE — ASSESSMENT & PLAN NOTE
Wt Readings from Last 3 Encounters:   12/11/22 64 8 kg (142 lb 13 7 oz)   11/23/22 74 5 kg (164 lb 3 9 oz)   11/22/22 70 8 kg (156 lb)     · Echo 03/01/2022 with EF 45%     · Off diuretics due to JOEL and now NPO status  · Monitor volume status

## 2022-12-11 NOTE — ASSESSMENT & PLAN NOTE
Lab Results   Component Value Date    EGFR 50 12/11/2022    EGFR 42 12/10/2022    EGFR 35 12/09/2022    CREATININE 1 31 (H) 12/11/2022    CREATININE 1 52 (H) 12/10/2022    CREATININE 1 76 (H) 12/09/2022     Estimated Creatinine Clearance: 39 8 mL/min (A) (by C-G formula based on SCr of 1 31 mg/dL (H))  Baseline creatinine 1 8 to 2  Initially had malpositioned zavala with urethral injury, then creatinine elevated due to volume overload which improved with diuresis   Now creatinine increasing since 12/4 in the setting of new aspiration pneumonia/RSV with decreased oral intake  Current creatinine peaked at 2 85 on 12/6 and improved to 1 52 today  Received 1 unit PRBC on 12/6 and 1 on 12/7  Now NPO due to severe dysphagia  Gentle IVF's  Monitor creatinine  Avoid nephrotoxic medications and hypotension  Discussed with nephrology - input appreciated

## 2022-12-11 NOTE — PROGRESS NOTES
Follow up Consultation    Nephrology   Klarissa Galindo 80 y o  male MRN: [id number]  Unit/Bed#: [address] 431-01 Encounter: [id number]      Physician Requesting Consult: Raleigh Torres MD        ASSESSMENT/PLAN:  80 y o   male with pmh of CHF EF of 45%, CAD status post CABG 2014 with DAPHNEY placement in 2021, CAD, hyperlipidemia, history of mitral valve repair in 2014 hypertension, A  fib presented to the ED on 11/22/22 who was admitted for altered mental status and septic shock  During the course of the hospital stay patient has been treated for septic shock, urethral trauma he has a history of suprapubic catheter in place secondary to urethral trauma due to cystoscopy also diagnosed with RSV infection and paroxysmal A  fib with RVR  Nephrology has been consulted on 12/7/2022 for evaluation and management of acute kidney injury  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:  JOEL multifactorial most likely secondary to obstructive uropathy in light of CT scan suggestive of distended bladder with urinary retention plus prerenal azotemia plus ischemic injury from hypotension and severe anemia plus sepsis plus failure to autoregulated in this elderly gentleman plus some component of vancomycin nephrotoxicity most recent level at 22 2 from close to 6/22  After review of records In [facility] as well as Care everywhere it appears that the patient has a baseline Creatinine of 1 8-2 mg/dL  patient was admitted with a creatinine of 1 71 mg/dL on 11/20/2022  patient's creatinine today is at  1 31 mg/dL, at this time continues to remain stable and below baseline   CT abdomen pelvis from 11/23/2022 showing malpositioned Schaffer gas within the soft tissues of the perineum urethral injury  Urinary bladder distended to the level of the umbilicus volume approximately 970 cc  Persistent nephrograms  Status post CT chest abdomen pelvis on 12/7/2022 no hydronephrosis interval development of right lower lobe pneumonia concern for aspiration  Moderate left basilar effusion with compressive atelectasis  Increase IV fluids of quarter normal saline to 50 cc an hour for now while patient continues to remain n p o  once tolerating adequate p o  free water then can DC IV fluids  Evaluate daily for diuretic need  check BMP in a m  Await renal recovery  Overall prognosis appears to be guarded  Optimize hemodynamic status to avoid delay in renal recovery  Avoid nephrotoxins, adjust meds to appropriate GFR  Strict I/O  Daily weights  Urinary retention protocol if patient does not have a Schaffer  Most likely has underlying CKD secondary to cardiorenal syndrome plus age-related nephron loss  will need to set up patient for follow up with Nephrology as an outpatient post hospitalization  as an outpatient for nephrology, patient follows up with no nephrologist     Blood pressure/normotensive to borderline hypotensive/AFib:  home medications: Aldactone 25 mg p o  daily, metoprolol 100 mg p o  every 12  current medications: Metoprolol 2 5 mg IV Q4  Last dose of torsemide was on 12/5/2022  recommendations: Continue to hold Aldactone for now hold diuretics for now Daily evaluation for diuretic needs  Still euvolemic today no changes  Optimize hemodynamics  Maintain MAP > 65mmHg  Avoid BP fluctuations      H/H/anemia:  most recent hemoglobin at  9 1 g/dL   maintain hemoglobin greater than 8 grams/deciliter  On p o  iron  Avoid IV iron in light of infection  Management primary team consider PRBC transfusion if continues to drop     Acid-base electrolytes:     Electrolytes:       Hypernatremia:  Most recent sodium at 147 mEq  Check BMP in a m  Fortunately not able to pass swallow eval patient may need feeding tube  Primary team discussing with family    Increase quarter normal saline to 50 cc an hour for now while awaiting final disposition with regards to feeding tube     Borderline hypokalemia:  Most recent potassium 3 6 mEq   Will supplement with 20 mg KCl today     Acid-base:    Most recent bicarb at any 7, likely contraction alkalosis, continues to improve     Other medical problems:  Proteinuria: Most recent UA with 1+ protein as of 2022  Sepsis/RSV:  Management per primary team   On cefepime, isolation, Solu-Medrol  Urethral injury: Suprapubic catheter in place  Follow-up with urology  CHF/CAD status post CABG in 2014 and MVR/A  fib with RVR:  Management per primary team   Follow-up with cardiology on amiodarone      Thanks for the consult  Will continue to follow  Please call with questions/ concerns  Above-mentioned orders and Plan in terms of acute kidney injury was discussed with the team in depth with [facility] text    [address], MD, FASN, 2022, 5:56 AM              Objective :   Patient seen and examined in his room with full PPE in place remains afebrile overall much more alert and conversational this a m  happy to hear renal parameters are stable and improving  Urine output 775 cc plus  PHYSICAL EXAM  /80 (BP Location: Right arm)   Pulse 76   Temp 97 5 °F (36 4 °C) (Oral)   Resp 17   Ht 5' 8" (1 727 m)   Wt 64 kg (141 lb 1 5 oz)   SpO2 98%   BMI 21 45 kg/m²   Temp (24hrs), Av 9 °F (36 6 °C), Min:97 4 °F (36 3 °C), Max:98 2 °F (36 8 °C)        Intake/Output Summary (Last 24 hours) at 2022 0556  Last data filed at 2022 0319  Gross per 24 hour   Intake 933 03 ml   Output 1125 ml   Net -191 97 ml       I/O last 24 hours: In: 933 [I V :933]  Out: 1475 [Urine:1475]      Current Weight: Weight - Scale: 64 kg (141 lb 1 5 oz)  First Weight: Weight - Scale: 76 7 kg (169 lb 1 6 oz)  Physical Exam  Vitals and nursing note reviewed  Constitutional:       General: He is not in acute distress  Appearance: Normal appearance  He is normal weight  He is not ill-appearing, toxic-appearing or diaphoretic  HENT:      Head: Normocephalic and atraumatic        Mouth/Throat:      Mouth: Mucous membranes are moist  Pharynx: Oropharynx is clear  No oropharyngeal exudate  Eyes:      General: No scleral icterus  Conjunctiva/sclera: Conjunctivae normal    Cardiovascular:      Rate and Rhythm: Normal rate  Heart sounds: Normal heart sounds  No friction rub  Pulmonary:      Effort: Pulmonary effort is normal  No respiratory distress  Breath sounds: Normal breath sounds  No stridor  Abdominal:      General: There is no distension  Palpations: Abdomen is soft  There is no mass  Tenderness: There is no abdominal tenderness  Comments: Suprapubic catheter in place   Musculoskeletal:         General: No swelling  Cervical back: Normal range of motion and neck supple  No rigidity  Skin:     General: Skin is warm  Coloration: Skin is not jaundiced  Neurological:      General: No focal deficit present  Mental Status: He is alert and oriented to person, place, and time  Mental status is at baseline  Psychiatric:         Mood and Affect: Mood normal          Behavior: Behavior normal              Review of Systems   Constitutional: Negative for chills and fatigue  HENT: Negative for congestion  Respiratory: Negative for cough, shortness of breath and wheezing  Cardiovascular: Negative for leg swelling  Gastrointestinal: Negative for abdominal pain and diarrhea  Musculoskeletal: Negative for back pain  Skin: Negative for rash  Neurological: Negative for headaches  Psychiatric/Behavioral: Negative for agitation and confusion  All other systems reviewed and are negative        Scheduled Meds:  Current Facility-Administered Medications   Medication Dose Route Frequency Provider Last Rate   • acetaminophen  650 mg Rectal Q6H PRN Pratik Bustillo PA-C     • albuterol  2 puff Inhalation Q4H PRN Brando Jimenez MD     • amiodarone  200 mg Oral Daily With Breakfast Divya Victoria MD     • atorvastatin  40 mg Oral Daily With Rosmery Dominguez MD     • cefepime  1,000 mg Intravenous Q24H Horacio Pantoja MD 1,000 mg (12/11/22 0054)   • clopidogrel  75 mg Oral Daily Benita Desai DO     • dextrose 5 % and sodium chloride 0 45 %  40 mL/hr Intravenous Continuous Horacio Pantoja MD 40 mL/hr (12/09/22 1829)   • doxylamine  12 5 mg Oral HS PRN Isaiah Bains PA-C     • ferrous sulfate  325 mg Oral Daily With Breakfast Semaj Levine MD     • guaiFENesin  400 mg Oral BID Kayley Cui PA-C     • heparin (porcine)  3-20 Units/kg/hr (Order-Specific) Intravenous Titrated Horacio Pantoja MD 14 Units/kg/hr (12/11/22 0326)   • heparin (porcine)  1,950 Units Intravenous Q1H PRN Horacio Pantoja MD     • heparin (porcine)  3,900 Units Intravenous Q1H PRN Horacio Pantoja MD     • HYDROmorphone  0 2 mg Intravenous Q4H PRN Wilmar Back DO     • insulin lispro  1-5 Units Subcutaneous TID AC Horacio Pantoja MD     • Lidocaine Viscous HCl  15 mL Swish & Spit 4x Daily PRN Cristy Lynch, DO     • LORazepam  0 5 mg Intravenous Q6H PRN Nicole Enciso DO     • melatonin  6 mg Oral HS Kayley Cui PA-C     • methylPREDNISolone sodium succinate  20 mg Intravenous Daily Horacio Pantoja MD     • metoprolol  2 5 mg Intravenous Q4H Horacio Pantoja MD     • oxyCODONE  2 5 mg Oral Q4H PRN Wilmar Back DO      Or   • oxyCODONE  5 mg Oral Q4H PRN Wilmar Back DO     • pantoprazole  40 mg Intravenous Q24H [address] Horacio Pantoja MD     • saliva substitute  5 spray Mouth/Throat 4x Daily PRN Mayo Perez MD         PRN Meds: •  acetaminophen  •  albuterol  •  doxylamine  •  heparin (porcine)  •  heparin (porcine)  •  HYDROmorphone  •  Lidocaine Viscous HCl  •  LORazepam  •  oxyCODONE **OR** oxyCODONE  •  saliva substitute    Continuous Infusions:dextrose 5 % and sodium chloride 0 45 %, 40 mL/hr, Last Rate: 40 mL/hr (12/09/22 1829)  heparin (porcine), 3-20 Units/kg/hr (Order-Specific), Last Rate: 14 Units/kg/hr (12/11/22 5175)          Invasive Devices: Invasive Devices     Peripherally Inserted Central Catheter Line  Duration           PICC Line 58/42/42 Right Basilic 1 day          Drain  Duration           Suprapubic Catheter 18 Fr  17 days                  LABORATORY:    Results from last 7 days   Lab Units 12/11/22  0225 12/10/22  0456 12/09/22  1828 12/09/22  0524 12/08/22  0511 12/08/22  0500 12/07/22  1852 12/07/22  0527 12/06/22  1801 12/06/22  0701 12/06/22  0519 12/05/22  0505 12/04/22  2325   WBC Thousand/uL  --   --  7 70 8 04  --  6 27  --  5 25  --   --  5 83 13 72* 15 43*   HEMOGLOBIN g/dL  --   --  10 1* 9 2*  --  8 9*  --  7 0* 7 9* 6 8* 6 7* 7 6* 8 8*   HEMATOCRIT %  --   --  33 4* 30 5*  --  29 4*  --  22 8* 26 7* 23 7* 22 6* 26 3* 30 0*   PLATELETS Thousands/uL  --   --  160 140*  --  132*  --  144*  --   --  156 158 212   POTASSIUM mmol/L 3 6 3 7  --  3 9 3 5  --  3 5 3 9 3 7  --  4 1 4 2 4 5   CHLORIDE mmol/L 117* 112*  --  111* 109*  --  108 113* 115*  --  114* 114* 109*   CO2 mmol/L 27 29  --  29 28  --  26 30 28  --  28 28 31   BUN mg/dL 60* 68*  --  76* 83*  --  87* 89* 85*  --  78* 56* 55*   CREATININE mg/dL 1 31* 1 52*  --  1 76* 2 12*  --  2 42* 2 61* 2 81*  --  2 85* 2 41* 2 43*   CALCIUM mg/dL 8 0* 8 0*  --  8 3 8 2*  --  8 3 8 1* 8 2*  --  8 4 8 1* 8 6   MAGNESIUM mg/dL  --   --   --   --   --   --   --   --   --   --  2 5  --   --    PHOSPHORUS mg/dL  --   --   --  2 8 3 4  --  3 1  --   --   --  4 7*  --   --       rest all reviewed    RADIOLOGY:  FL barium swallow video w speech   Final Result by BEAU SIEGEL (12/09 1444)      XR chest PICC line portable   Final Result by Kimi Scott MD (12/09 1329)      Right PICC in upper SVC  Persistent left effusion and left greater than right bibasilar opacity which could be due to atelectasis and/or pneumonia                    Workstation performed: [id number]         CT chest abdomen pelvis wo contrast   Final Result by Robin Calvo MD (12/07 1302)         1  Interval development of right lower lobe airspace disease possibly atelectasis or developing pneumonia, potentially aspiration pneumonia  2   Moderate left basilar effusion and compressive atelectasis redemonstrated  3   New tree-in-bud opacities in the right upper lobe worrisome for pneumonitis/pneumonia  4   Enlarged prostate which appears slightly larger than on the recent prior studies and now is more hyperdense  Hemorrhagic prostatitis not excluded  No large hematoma in the pelvis  5   Cholelithiasis redemonstrated   6  Sigmoid diverticulosis without bowel obstruction  7   Diminished attenuation of the blood pool compared to the myocardium suggestive of anemia  Workstation performed: [id number]         XR chest portable   Final Result by Yuval Mercado MD (12/05 1015)      Improved small left pleural effusion  Bibasilar atelectasis  No new focal airspace consolidation identified  Workstation performed: [id number]         XR foot 2 vw right   Final Result by Trevor Liu MD (12/01 1333)      No radiographic evidence of osteomyelitis  Workstation performed: [id number]         VAS lower limb arterial duplex, complete bilateral   Final Result by Nell Gayle MD (11/30 2247)      FL barium swallow video w speech   Final Result by SYSTEMAMOS, BEAU (11/30 1519)      XR chest pa & lateral   Final Result by Paige Rios MD (11/28 1102)      Increasing left effusion   Increasing hazy density in the lung suggest worsening congestion                  Workstation performed: [id number]           Rest all reviewed    Portions of the record may have been created with voice recognition software  Occasional wrong word or "sound a like" substitutions may have occurred due to the inherent limitations of voice recognition software   Read the chart carefully and recognize, using context, where substitutions have occurred  If you have any questions, please contact the dictating provider

## 2022-12-11 NOTE — PROGRESS NOTES
Progress Note - Palliative & Supportive Care  [facility]  80 y o   male  PPHP 431/PPHP [id number]   MRN: [id number]  Encounter: [id number]     ASSESSMENT:    Patient Active Problem List   Diagnosis   • Bacteremia due to Pseudomonas   • CAD (coronary artery disease)   • Mitral regurgitation   • Hypertensive urgency   • Hyperlipidemia, mixed   • Carotid artery stenosis   • Stage 3 chronic kidney disease (HCC)   • Multiple lung nodules on CT   • Severe sepsis ([facility])   • Acute respiratory failure with hypoxia ([facility])   • Acute on chronic diastolic CHF (congestive heart failure) ([facility])   • 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   • Chronic atrial fibrillation ([facility])   • Postprocedural pneumothorax   • Lumbar compression deformity   • Chronic diastolic heart failure ([facility])   • Paroxysmal atrial fibrillation ([facility])   • Closed 2-part intertrochanteric fracture of proximal end of right femur, [facility] )   • Acute on chronic anemia   • Hyponatremia   • Postoperative urinary retention   • Thrombocytopenia ([facility])   • Closed fracture of trochanter of right femur with routine healing   • Leg edema, right   • Pressure injury sacrum and right heel   • Transaminitis   • Elevated troponin level not due myocardial infarction   • Hematuria   • Urethral trauma   • Ambulatory dysfunction   • Dysphagia   • Deep tissue injury to right lateral ankle and left heel   • PAD (peripheral artery disease) ([facility])   • RSV (acute bronchiolitis due to respiratory syncytial virus)   • Goals of care, counseling/discussion   • Pneumonia   • Hypernatremia   • Atrial fibrillation with RVR ([facility] )   • UTI (urinary tract infection)     Active issues specifically addressed today include: goals of care, dysphagia w/ aspiration, aspiration PNA, protein calorie malnutrition    PLAN:    1  Goals:   • Unchanged  • No clarity at this time in terms of NGT   Patient prefers to defer to his family for medical decision-making, and family states they "want to do what he wants"  • Spouse [facility] states she plans to discuss goals with the patient later this afternoon  • Counseled patient and [facility] (separately) that NGT would not necessarily reduce risk of aspiration events  • Briefly mentioned hospice to [facility], who recognizes that that is an option to discuss if/when they are ready - but she is not ready to discuss with Palliative today  • Palliative will follow for ongoing goals of care discussions as situation evolves  2  Social Support:  • Supportive listening provided  • Provided anxiety containment    3  Symptom management:  • Continue current symptom regimen, including  o Guaifenesin PO liquid BID (if tolerating PO)  o Melatonin 6mg PO QHS, Unisom 12 5mg QHS PRN (if tolerating PO)  o Daily PPI (IV)  o Tylenol MA (per rectum)  o OxyIR PO PRN (if tolerating PO)  o Hydromorphone 0 2mg IV q4h PRN  o Viscous lidocaine (if tolerating PO)  o Lorazepam 0 5mg IV q6h PRN  o Mouth Kote / oral care    Code status: Level 3 - DNAR and DNI   Decisional apparatus:  Patient has some capacity capacity to make medical decisions on my exam today but prefers that such discussions be held with his wife [facility] - she is his default substitute decision maker per [place], and he trusts her judgment  Advance Directive / Living Will / POLST:  Nothing on file  We appreciate the opportunity to participate in this patient's care  We will continue to follow  Please do not hesitate to contact our on-call provider through our clinic answering service at [phone] should you have acute symptom control concerns  INTERVAL HISTORY:    [facility] was contacted by [facility] as the patient's recent VBS showed severe aspiration and he was again made NPO, to assist in discussions regarding an NGT, future SLP evaluations, hospice  Patient seen and examined at bedside, no family present   Interview limited by severe bilateral hearing loss, and moreso by patient's desire to defer challenging decision-making to his family  He does endorse some mild back pain (last oxyIR usage was 12/9/22 at 0017h)  He states he would consider an NGT but does not wish to receive a PEG  When this provider discussed NGT placement would not change aspiration risk, he then stated he did not wish to make the decision  In phone call with patient's wife Carina Bullard, she states she and the family "want to do what he wants" but recognize that he is deferring to them  She demonstrates some understanding that NGT would not reduce aspiration risk  She is unsure how to proceed but states she plans to discuss goals with him later this afternoon, and in the coming days      MEDICATIONS / ALLERGIES:  all current active meds have been reviewed and current meds:   Current Facility-Administered Medications   Medication Dose Route Frequency   • acetaminophen (TYLENOL) rectal suppository 650 mg  650 mg Rectal Q6H PRN   • albuterol (PROVENTIL HFA,VENTOLIN HFA) inhaler 2 puff  2 puff Inhalation Q4H PRN   • amiodarone tablet 200 mg  200 mg Oral Daily With Breakfast   • atorvastatin (LIPITOR) tablet 40 mg  40 mg Oral Daily With Dinner   • cefepime (MAXIPIME) 1,000 mg in dextrose 5 % 50 mL IVPB  1,000 mg Intravenous Q24H   • clopidogrel (PLAVIX) tablet 75 mg  75 mg Oral Daily   • dextrose 5 % and sodium chloride 0 45 % infusion  50 mL/hr Intravenous Continuous   • doxylamine (UNISOM) tablet 12 5 mg  12 5 mg Oral HS PRN   • ferrous sulfate tablet 325 mg  325 mg Oral Daily With Breakfast   • guaiFENesin LIQD 400 mg  400 mg Oral BID   • heparin (porcine) 25,000 units in 0 45% NaCl 250 mL infusion (premix)  3-20 Units/kg/hr (Order-Specific) Intravenous Titrated   • heparin (porcine) injection 1,950 Units  1,950 Units Intravenous Q1H PRN   • heparin (porcine) injection 3,900 Units  3,900 Units Intravenous Q1H PRN   • HYDROmorphone HCl (DILAUDID) injection 0 2 mg  0 2 mg Intravenous Q4H PRN   • insulin lispro (HumaLOG) 100 units/mL subcutaneous injection 1-5 Units  1-5 Units Subcutaneous TID AC   • Lidocaine Viscous HCl (XYLOCAINE) 2 % mucosal solution 15 mL  15 mL Swish & Spit 4x Daily PRN   • LORazepam (ATIVAN) injection 0 5 mg  0 5 mg Intravenous Q6H PRN   • melatonin tablet 6 mg  6 mg Oral HS   • methylPREDNISolone sodium succinate (Solu-MEDROL) injection 20 mg  20 mg Intravenous Daily   • metoprolol (LOPRESSOR) injection 2 5 mg  2 5 mg Intravenous Q4H   • oxyCODONE (ROXICODONE) IR tablet 2 5 mg  2 5 mg Oral Q4H PRN    Or   • oxyCODONE (ROXICODONE) IR tablet 5 mg  5 mg Oral Q4H PRN   • pantoprazole (PROTONIX) injection 40 mg  40 mg Intravenous Q24H YOLANDA   • potassium chloride 20 mEq IVPB (premix)  20 mEq Intravenous Once   • saliva substitute (MOUTH KOTE) mucosal solution 5 spray  5 spray Mouth/Throat 4x Daily PRN       No Known Allergies    OBJECTIVE:  /82   Pulse 97   Temp 97 6 °F (36 4 °C)   Resp 18   Ht 5' 8" (1 727 m)   Wt 64 8 kg (142 lb 13 7 oz)   SpO2 97%   BMI 21 72 kg/m²   Nursing notes reviewed  Physical Exam  Vitals reviewed  Constitutional:       General: He is not in acute distress  Appearance: He is cachectic  He is ill-appearing  Comments: Frail and debilitated  HENT:      Head: Normocephalic and atraumatic  Right Ear: External ear normal  Decreased hearing noted  Left Ear: External ear normal  Decreased hearing noted  Eyes:      General: No scleral icterus  Right eye: No discharge  Left eye: No discharge  Extraocular Movements: Extraocular movements intact  Conjunctiva/sclera: Conjunctivae normal       Pupils: Pupils are equal, round, and reactive to light  Pulmonary:      Effort: Pulmonary effort is normal  No tachypnea, bradypnea, accessory muscle usage or respiratory distress  Abdominal:      General: Abdomen is flat  There is no distension  Palpations: Abdomen is soft  Tenderness: There is no guarding  Musculoskeletal:      Cervical back: Normal range of motion        Right lower leg: No edema  Left lower leg: No edema  Skin:     General: Skin is dry  Coloration: Skin is pale  Neurological:      Mental Status: He is alert and oriented to person, place, and time  Cranial Nerves: No dysarthria or facial asymmetry  Motor: Weakness present  Psychiatric:         Attention and Perception: Attention normal          Mood and Affect: Mood is anxious and depressed  Affect is not inappropriate  Speech: Speech normal          Behavior: Behavior normal  Behavior is cooperative  Thought Content: Thought content normal        Lab Results: I have personally reviewed pertinent labs  HEMATOLOGY PROFILE:  Results from last 7 days   Lab Units 12/09/22  1828 12/09/22  0524 12/08/22  0500 12/06/22  0701 12/06/22  0519 12/05/22  0505 12/04/22  2325   WBC Thousand/uL 7 70 8 04 6 27   < > 5 83 13 72* 15 43*   HEMOGLOBIN g/dL 10 1* 9 2* 8 9*   < > 6 7* 7 6* 8 8*   HEMATOCRIT % 33 4* 30 5* 29 4*   < > 22 6* 26 3* 30 0*   PLATELETS Thousands/uL 160 140* 132*   < > 156 158 212   NEUTROS PCT %  --   --   --   --   --  91* 93*   MONOS PCT %  --   --   --   --   --  6 4   MONO PCT %  --   --   --   --  1*  --   --     < > = values in this interval not displayed         CHEMISTRY PROFILE:  Results from last 7 days   Lab Units 12/11/22  0225 12/10/22  0456 12/09/22  0524 12/08/22  0511 12/07/22  1852 12/07/22  0527 12/06/22  1801 12/06/22  0519 12/05/22  0505   SODIUM mmol/L 147 144 145 143 142 148*   < > 149* 148*   POTASSIUM mmol/L 3 6 3 7 3 9 3 5 3 5 3 9   < > 4 1 4 2   CHLORIDE mmol/L 117* 112* 111* 109* 108 113*   < > 114* 114*   CO2 mmol/L 27 29 29 28 26 30   < > 28 28   BUN mg/dL 60* 68* 76* 83* 87* 89*   < > 78* 56*   CREATININE mg/dL 1 31* 1 52* 1 76* 2 12* 2 42* 2 61*   < > 2 85* 2 41*   ALBUMIN g/dL  --   --   --  2 0* 2 1* 2 0*  --  1 7* 1 7*   CALCIUM mg/dL 8 0* 8 0* 8 3 8 2* 8 3 8 1*   < > 8 4 8 1*   ALK PHOS U/L  --   --   --   --   --  92  --  110 120*   ALT U/L  -- --   --   --   --  22  --  18 13   AST U/L  --   --   --   --   --  40  --  47* 20    < > = values in this interval not displayed  Albumin:  0   Lab Value Date/Time    ALB 2 0 (L) 12/08/2022 6571     Imaging Studies: I have personally reviewed pertinent reports  EKG, Pathology, and Other Studies: I have personally reviewed pertinent reports  Counseling / Coordination of Care: Total floor / unit time spent today 35 minutes, including phone call with wife Lalo from approximately 11:00-11:05am  Greater than 50% of total time was spent with the patient and / or family counseling and / or coordination of care  A description of the counseling / coordination of care: symptom assessment and management, medication review, psychosocial support, chart review, imaging review, lab review, goals of care, supportive listening and anticipatory guidance  Attila Sabillon MD  [facility]      Portions of this document may have been created using dictation software and as such some "sound alike" terms may have been generated by the system  Do not hesitate to contact me with any questions or clarifications

## 2022-12-11 NOTE — ASSESSMENT & PLAN NOTE
· Resolved  · Began overnight on 12/4  · Due to RSV with likely superimposed bacterial pneumonia with concern for possible aspiration  · Had required high flow O2 no

## 2022-12-11 NOTE — PROGRESS NOTES
[address] [facility]  Progress Note Sara Sabillon 1940, 80 y o  male MRN: [id number]  Unit/Bed#: [facility] 431-01 Encounter: [id number]  Primary Care Provider: Brenda Brown DO   Date and time admitted to hospital: 11/23/2022  4:04 AM    * Urethral trauma  Assessment & Plan  · Hospitalized from 10/31/22 to 11/6/22 for right hip fracture - s/p ORIF  Postoperatively he developed urinary retention and he was discharged with a zavala  · Presented to the ED at [address] [place] [address] on 11/20/22 with lower abdominal pain - was noted to have a UTI and was discharged back to rehab on Augmentin  · Brought back to the ED at [address] [place] [address] on 11/21/22 with abdominal pain and lethargy  Noted to be hypotensive with lactic acid > 10, septic shock from UTI and was on pressors in the ICU  · Also noted to have hematuria and was on CBI  Hematuria persisted and developed abdominal distension with failure of CBI drainage  · CT abdomen (11/23/22) - "The Zavala catheter is malpositioned  The catheter balloon is located near the base of the penis, below the level of the prostate  There is gas within the soft tissues of the perineum and penis, most pronounced on the left  Urethral injury is suspected "  · [facility] emerg[facility] and underwent suprapubic catheter placement on 11/23  · Hematuria resolved          Hematuria  Assessment & Plan  · As above    UTI (urinary tract infection)  Assessment & Plan  · Presentation as above  · Urine culture (11/20) - E   Coli  · Initially on Cefepime, then Cefazolin, then Cephalexin  · Completed antibiotic course  · Had septic shock which has resolved    Acute respiratory failure with hypoxia (HCC)  Assessment & Plan  · Resolved  · Began overnight on 12/4  · Due to RSV with likely superimposed bacterial pneumonia with concern for possible aspiration  · Had required high flow O2      RSV (acute bronchiolitis due to respiratory syncytial virus)  Assessment & Plan  · Resp protocol  · Supportive care   · On Solumedrol - being tapered q 3 days per pulm recommendations  Pneumonia  Assessment & Plan  · Noted overnight on 12/4 when he had fever, tachycardia, tachypnea, worsening hypoxia   · Procalcitonin elevated  · Aspiration suspected  · CT chest (12/7) - Interval development of right lower lobe airspace disease possibly atelectasis or developing pneumonia, potentially aspiration pneumonia  Moderate left basilar effusion and compressive atelectasis redemonstrated  New tree-in-bud opacities in the right upper lobe worrisome for pneumonitis/pneumonia    · Continue Cefepime (D#6/7)  · Pulm were following - input appreciated    Severe sepsis [facility])  Assessment & Plan  · Noted overnight on 12/4 due to aspiration pneumonia  · Now resolved  · On admission had septic shock due to UTI requiring pressors    Deep tissue injury to right lateral ankle and left heel  Assessment & Plan  · Wound care/podiatry input appreciated  · AZ abnormal - reviewed by vascular - not a candidate for intervention at present, ct wound care    PAD (peripheral artery disease) ([facility] [address] )  Assessment & Plan  · Seen on LEADS  · Reviewed by vascular on 12/7 as LE wound appeared to have increased - vascular disease possibly contributing to non-healing LE wounds but not a candidate for intervention at present in the setting of current infections/respiratory status and co-morbidities  ·  Ct Plavix, statin       Dysphagia  Assessment & Plan  · High risk for aspiration  · Was cleared by speech for puréed with honey thick liquids with strict aspiration precautions but repeat VBS on 12/9 showed severe aspiration and speech recommended he be placed NPO  · Seen by ENT - no vocal cord dysfunction   · Patient and family do not want PEG tube  · Speech reassessment next week  · Patient and family deciding on whether he wants tube feeds through [facility] in the interim    Atrial fibrillation with RVR ([facility] [address] )  Assessment & Plan  · Placed back on scheduled IV Metoprolol on 12/9 when made NPO  · Eliquis held and on heparin drip  · Was also receiving Amiodarone 200 mg daily    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  Assessment & Plan  Lab Results   Component Value Date    EGFR 42 12/10/2022    EGFR 35 12/09/2022    EGFR 28 12/08/2022    CREATININE 1 52 (H) 12/10/2022    CREATININE 1 76 (H) 12/09/2022    CREATININE 2 12 (H) 12/08/2022     Estimated Creatinine Clearance: 33 9 mL/min (A) (by C-G formula based on SCr of 1 52 mg/dL (H))  Baseline creatinine 1 8 to 2  Initially had malpositioned zavala with urethral injury, then creatinine elevated due to volume overload which improved with diuresis  Now creatinine increasing since 12/4 in the setting of new aspiration pneumonia/RSV with decreased oral intake  Current creatinine peaked at 2 85 on 12/6 and improved to 1 52 today  Received 1 unit PRBC on 12/6 and 1 on 12/7  Now NPO due to severe dysphagia  Gentle IVF's  Monitor creatinine  Avoid nephrotoxic medications and hypotension  Discussed with nephrology - input appreciated    Chronic diastolic heart failure [facility])  Assessment & Plan  Wt Readings from Last 3 Encounters:   12/10/22 64 kg (141 lb 1 5 oz)   11/23/22 74 5 kg (164 lb 3 9 oz)   11/22/22 70 8 kg (156 lb)     · Echo 03/01/2022 with EF 45%  · Off diuretics due to JOEL and now NPO status  · Monitor volume status            CAD (coronary artery disease)  Assessment & Plan  Continue BB  Unable to get Plavix, statin as NPO and no enteric access      Closed fracture of trochanter of right femur with routine healing  Assessment & Plan  · Admitted to [address] [place] [address] from 10/31/22 to 11/6/22 with right hip fracture  · S/p repair     · Was undergoing rehab at [facility]  · Physical therapy      Hypernatremia  Assessment & Plan  · Due to hypovolemia  · Resolved with IVF's    Transaminitis  Assessment & Plan  · Likely due to shock liver  · Resolved     Acute on chronic anemia  Assessment & Plan  · Multifactorial  · Initially had hematuria  · 1 unit PRBC on  and 1 on   · Seen by GI on  - brown stools on rectal exam  Since no overt GI bleeding and high risk for endoscopy at present monitor at present, ok for Eliquis  · CT C/A/P - per radiology may have hemorrhagic prostatitis - D/w urology on  - enlarged prostate, as urine clear no contraindication to Eliquis, Proscar added  · Eliquis restarted on  and changed to heparin drip on  when made NPO due to severe dysphagia  · Hb stable  · Monitor hemoglobin and transfuse as needed    Goals of care, counseling/discussion  Assessment & Plan  · Palliative were following - family noted that he had improved and wanted to continue treatment of medical conditions followed by discharge to rehab  · Now made NPO on  due to severe dysphagia  · Speech reassessment planned for next week  · Palliative care contacted to review regarding goals of care    VTE Pharmacologic Prophylaxis: VTE Score: 21 High Risk (Score >/= 5) - Pharmacological DVT Prophylaxis Ordered: heparin drip  Sequential Compression Devices Ordered  Patient Centered Rounds: Discussed with RN  Discussions with Specialists or Other Care Team Provider: Discussed with palliative care and speech therapy    Education and Discussions with Family / Patient: Updated  (wife, daughter and son in law) at bedside  Time Spent for Care: 20 minutes  More than 50% of total time spent on counseling and coordination of care as described above  Current Length of Stay: 17 day(s)  Current Patient Status: Inpatient   Certification Statement: The patient will continue to require additional inpatient hospital stay due to severe dysphagia, aspiration pneumonia, RSV    Code Status: Level 3 - DNAR and DNI    Subjective:   No shortness of breath   Intermittent cough    Objective:     Vitals:   Temp (24hrs), Av °F (36 7 °C), Min:97 4 °F (36 3 °C), Max:98 2 °F (36 8 °C)    Temp:  [97 4 °F (36 3 °C)-98 2 °F (36 8 °C)] 97 4 °F (36 3 °C)  HR:  [69-93] 93  Resp:  [16-18] 18  BP: (137-154)/(76-94) 152/77  SpO2:  [96 %-99 %] 96 %  Body mass index is 21 45 kg/m²  Physical Exam:   Physical Exam  HENT:      Head: Normocephalic  Nose: Nose normal       Mouth/Throat:      Mouth: Mucous membranes are moist    Eyes:      Extraocular Movements: Extraocular movements intact  Cardiovascular:      Rate and Rhythm: Normal rate  Rhythm irregular  Pulmonary:      Effort: Pulmonary effort is normal  No respiratory distress  Breath sounds: Normal breath sounds  No wheezing  Abdominal:      General: Bowel sounds are normal  There is no distension  Palpations: Abdomen is soft  Tenderness: There is no abdominal tenderness  Musculoskeletal:         General: No swelling  Cervical back: Neck supple  Skin:     Comments: Deep tissue injury to right lateral ankle and left heel   Neurological:      General: No focal deficit present  Mental Status: He is alert  Psychiatric:         Mood and Affect: Mood normal           Additional Data:     Labs:  Results from last 7 days   Lab Units 12/09/22  1828 12/06/22  0701 12/06/22  0519 12/05/22  0505   WBC Thousand/uL 7 70   < > 5 83 13 72*   HEMOGLOBIN g/dL 10 1*   < > 6 7* 7 6*   HEMATOCRIT % 33 4*   < > 22 6* 26 3*   PLATELETS Thousands/uL 160   < > 156 158   BANDS PCT %  --   --  2  --    NEUTROS PCT %  --   --   --  91*   LYMPHS PCT %  --   --   --  2*   LYMPHO PCT %  --   --  2*  --    MONOS PCT %  --   --   --  6   MONO PCT %  --   --  1*  --    EOS PCT %  --   --  0 0    < > = values in this interval not displayed       Results from last 7 days   Lab Units 12/10/22  0456 12/09/22  0524 12/08/22  0511 12/07/22  1852 12/07/22  0527   SODIUM mmol/L 144   < > 143   < > 148*   POTASSIUM mmol/L 3 7   < > 3 5   < > 3 9   CHLORIDE mmol/L 112*   < > 109*   < > 113*   CO2 mmol/L 29   < > 28   < > 30   BUN mg/dL 68*   < > 83*   < > 89*   CREATININE mg/dL 1 52*   < > 2 12*   < > 2 61*   ANION GAP mmol/L 3*   < > 6   < > 5   CALCIUM mg/dL 8 0*   < > 8 2*   < > 8 1*   ALBUMIN g/dL  --   --  2 0*   < > 2 0*   TOTAL BILIRUBIN mg/dL  --   --   --   --  0 93   ALK PHOS U/L  --   --   --   --  92   ALT U/L  --   --   --   --  22   AST U/L  --   --   --   --  40   GLUCOSE RANDOM mg/dL 237*   < > 194*   < > 232*    < > = values in this interval not displayed  Results from last 7 days   Lab Units 12/09/22  1828   INR  1 38*     Results from last 7 days   Lab Units 12/10/22  1616 12/10/22  1104 12/10/22  0527 12/09/22  2052 12/09/22  1555 12/09/22  1110 12/09/22  0620 12/08/22  2104 12/08/22  1742 12/08/22  1221 12/08/22  0520 12/07/22  1850   POC GLUCOSE mg/dl 155* 137 131 167* 171* 120 117 218* 256* 159* 197* 325*         Results from last 7 days   Lab Units 12/08/22  0500 12/05/22  0505 12/04/22  2325   LACTIC ACID mmol/L  --  1 6 2 2*   PROCALCITONIN ng/ml 0 88* 2 00* 1 09*       Lines/Drains:  Invasive Devices     Peripherally Inserted Central Catheter Line  Duration           PICC Line 84/94/42 Right Basilic 1 day          Drain  Duration           Suprapubic Catheter 18 Fr  17 days                Central Line:  Goal for removal: Will discontinue when peripheral access obtained  Recent Cultures (last 7 days):   Results from last 7 days   Lab Units 12/05/22  1345 12/04/22  2324   BLOOD CULTURE   --  No Growth After 5 Days  No Growth After 5 Days     LEGIONELLA URINARY ANTIGEN  Negative  --        Last 24 Hours Medication List:   Current Facility-Administered Medications   Medication Dose Route Frequency Provider Last Rate   • acetaminophen  650 mg Rectal Q6H PRN Sheila Winter PA-C     • albuterol  2 puff Inhalation Q4H PRN Andrae Mcdaniels MD     • amiodarone  200 mg Oral Daily With Breakfast Myla Thibodeaux MD     • atorvastatin  40 mg Oral Daily With Jaylan Dodd MD     • cefepime  1,000 mg Intravenous Q24H Andrae Mcdaniels MD 1,000 mg (12/10/22 8810)   • clopidogrel  75 mg Oral Daily Radha Figueroa DO     • dextrose 5 % and sodium chloride 0 45 %  40 mL/hr Intravenous Continuous Jeanine Shea MD 40 mL/hr (12/09/22 1829)   • doxylamine  12 5 mg Oral HS PRN Kamilah Colon PA-C     • ferrous sulfate  325 mg Oral Daily With Breakfast Sadie Snow MD     • guaiFENesin  400 mg Oral BID Estiven Elder PA-C     • heparin (porcine)  3-20 Units/kg/hr (Order-Specific) Intravenous Titrated Jeanine Shea MD 10 Units/kg/hr (12/10/22 1957)   • heparin (porcine)  1,950 Units Intravenous Q1H PRN Jeanine Shea MD     • heparin (porcine)  3,900 Units Intravenous Q1H PRN Jeanine Shea MD     • HYDROmorphone  0 2 mg Intravenous Q4H PRN Wilmar Back DO     • insulin lispro  1-5 Units Subcutaneous TID AC Jeanine Shea MD     • Lidocaine Viscous HCl  15 mL Swish & Spit 4x Daily PRN Adolfo Rahman DO     • LORazepam  0 5 mg Intravenous Q6H PRN Nicole Enciso DO     • melatonin  6 mg Oral HS Swathi Mazariegos PA-C     • [START ON 12/11/2022] methylPREDNISolone sodium succinate  20 mg Intravenous Daily Jeanine Shea MD     • metoprolol  2 5 mg Intravenous Q4H Jeanine Shea MD     • oxyCODONE  2 5 mg Oral Q4H PRN Wilmar Back DO      Or   • oxyCODONE  5 mg Oral Q4H PRN Wilmar Back DO     • pantoprazole  40 mg Intravenous Q24H [facility] Jeanine Shea MD     • saliva substitute  5 spray Mouth/Throat 4x Daily PRN Rina Espinoza MD          Today, Patient Was Seen By: Jeanine Shea MD    **Please Note: This note may have been constructed using a voice recognition system  **

## 2022-12-11 NOTE — ASSESSMENT & PLAN NOTE
· Palliative were following - family noted that he had improved and wanted to continue treatment of medical conditions followed by discharge to rehab  · Now made NPO on 12/9 due to severe dysphagia  · Speech reassessment planned for next week  · Palliative care contacted to review regarding goals of care

## 2022-12-11 NOTE — ASSESSMENT & PLAN NOTE
· Resp protocol  · Supportive care   · On Solumedrol - being tapered q 3 days per pulm recommendations

## 2022-12-11 NOTE — PLAN OF CARE
Problem: MOBILITY - ADULT  Goal: Maintain or return to baseline ADL function  Description: INTERVENTIONS:  -  Assess patient's ability to carry out ADLs; assess patient's baseline for ADL function and identify physical deficits which impact ability to perform ADLs (bathing, care of mouth/teeth, toileting, grooming, dressing, etc )  - Assess/evaluate cause of self-care deficits   - Assess range of motion  - Assess patient's mobility; develop plan if impaired  - Assess patient's need for assistive devices and provide as appropriate  - Encourage maximum independence but intervene and supervise when necessary  - Involve family in performance of ADLs  - Assess for home care needs following discharge   - Consider OT consult to assist with ADL evaluation and planning for discharge  - Provide patient education as appropriate  Outcome: Progressing  Goal: Maintains/Returns to pre admission functional level  Description: INTERVENTIONS:  - Perform BMAT or MOVE assessment daily    - Set and communicate daily mobility goal to care team and patient/family/caregiver  - Collaborate with rehabilitation services on mobility goals if consulted  - Reposition patient every 2 hours    - Out of bed for toileting  - Record patient progress and toleration of activity level   Outcome: Progressing     Problem: Potential for Falls  Goal: Patient will remain free of falls  Description: INTERVENTIONS:  - Educate patient/family on patient safety including physical limitations  - Instruct patient to call for assistance with activity   - Consult OT/PT to assist with strengthening/mobility   - Keep Call bell within reach  - Keep bed low and locked with side rails adjusted as appropriate  - Keep care items and personal belongings within reach  - Initiate and maintain comfort rounds  - Make Fall Risk Sign visible to staff  - Offer Toileting every 2 Hours, in advance of need  - Initiate/Maintain bed alarm  - Obtain necessary fall risk management equipment:   - Apply yellow socks and bracelet for high fall risk patients  - Consider moving patient to room near nurses station  Outcome: Progressing     Problem: Prexisting or High Potential for Compromised Skin Integrity  Goal: Skin integrity is maintained or improved  Description: INTERVENTIONS:  - Identify patients at risk for skin breakdown  - Assess and monitor skin integrity  - Assess and monitor nutrition and hydration status  - Monitor labs   - Assess for incontinence   - Turn and reposition patient  - Assist with mobility/ambulation  - Relieve pressure over bony prominences  - Avoid friction and shearing  - Provide appropriate hygiene as needed including keeping skin clean and dry  - Evaluate need for skin moisturizer/barrier cream  - Collaborate with interdisciplinary team   - Patient/family teaching  - Consider wound care consult   Outcome: Progressing     Problem: Nutrition/Hydration-ADULT  Goal: Nutrient/Hydration intake appropriate for improving, restoring or maintaining nutritional needs  Description: Monitor and assess patient's nutrition/hydration status for malnutrition  Collaborate with interdisciplinary team and initiate plan and interventions as ordered  Monitor patient's weight and dietary intake as ordered or per policy  Utilize nutrition screening tool and intervene as necessary  Determine patient's food preferences and provide high-protein, high-caloric foods as appropriate       INTERVENTIONS:  - Monitor oral intake, urinary output, labs, and treatment plans  - Assess nutrition and hydration status and recommend course of action  - Evaluate amount of meals eaten  - Assist patient with eating if necessary   - Allow adequate time for meals  - Recommend/ encourage appropriate diets, oral nutritional supplements, and vitamin/mineral supplements  - Order, calculate, and assess calorie counts as needed  - Recommend, monitor, and adjust tube feedings and TPN/PPN based on assessed needs  - Assess need for intravenous fluids  - Provide specific nutrition/hydration education as appropriate  - Include patient/family/caregiver in decisions related to nutrition  Outcome: Progressing     Problem: PAIN - ADULT  Goal: Verbalizes/displays adequate comfort level or baseline comfort level  Description: Interventions:  - Encourage patient to monitor pain and request assistance  - Assess pain using appropriate pain scale  - Administer analgesics based on type and severity of pain and evaluate response  - Implement non-pharmacological measures as appropriate and evaluate response  - Consider cultural and social influences on pain and pain management  - Notify physician/advanced practitioner if interventions unsuccessful or patient reports new pain  Outcome: Progressing     Problem: Knowledge Deficit  Goal: Patient/family/caregiver demonstrates understanding of disease process, treatment plan, medications, and discharge instructions  Description: Complete learning assessment and assess knowledge base    Interventions:  - Provide teaching at level of understanding  - Provide teaching via preferred learning methods  Outcome: Progressing

## 2022-12-11 NOTE — ASSESSMENT & PLAN NOTE
· Admitted to [facility] from 10/31/22 to 11/6/22 with right hip fracture  · S/p repair     · Was undergoing rehab at [facility]  · Physical therapy

## 2022-12-11 NOTE — ASSESSMENT & PLAN NOTE
· Hospitalized from 10/31/22 to 11/6/22 for right hip fracture - s/p ORIF  Postoperatively he developed urinary retention and he was discharged with a zavala  · Presented to the ED at [facility] on 11/20/22 with lower abdominal pain - was noted to have a UTI and was discharged back to rehab on Augmentin  · Brought back to the ED at [facility] on 11/21/22 with abdominal pain and lethargy  Noted to be hypotensive with lactic acid > 10, septic shock from UTI and was on pressors in the ICU  · Also noted to have hematuria and was on CBI  Hematuria persisted and developed abdominal distension with failure of CBI drainage  · CT abdomen (11/23/22) - "The Zavala catheter is malpositioned  The catheter balloon is located near the base of the penis, below the level of the prostate  There is gas within the soft tissues of the perineum and penis, most pronounced on the left    Urethral injury is suspected "  · Transferred emergently to [facility] and underwent suprapubic catheter placement on 11/23  · Hematuria resolved

## 2022-12-11 NOTE — ASSESSMENT & PLAN NOTE
· Noted overnight on 12/4 when he had fever, tachycardia, tachypnea, worsening hypoxia   · Procalcitonin elevated  · Aspiration suspected  · CT chest (12/7) - Interval development of right lower lobe airspace disease possibly atelectasis or developing pneumonia, potentially aspiration pneumonia  Moderate left basilar effusion and compressive atelectasis redemonstrated  New tree-in-bud opacities in the right upper lobe worrisome for pneumonitis/pneumonia    · Continue Cefepime (D#6/7)  · Pulm were following - input appreciated

## 2022-12-11 NOTE — ASSESSMENT & PLAN NOTE
· Hospitalized from 10/31/22 to 11/6/22 for right hip fracture - s/p ORIF  Postoperatively he developed urinary retention and he was discharged with a zavala  · Presented to the ED at [address] [place] [address] on 11/20/22 with lower abdominal pain - was noted to have a UTI and was discharged back to rehab on Augmentin  · Brought back to the ED at [address] [place] [address] on 11/21/22 with abdominal pain and lethargy  Noted to be hypotensive with lactic acid > 10, septic shock from UTI and was on pressors in the ICU  · Also noted to have hematuria and was on CBI  Hematuria persisted and developed abdominal distension with failure of CBI drainage  · CT abdomen (11/23/22) - "The Zavala catheter is malpositioned  The catheter balloon is located near the base of the penis, below the level of the prostate  There is gas within the soft tissues of the perineum and penis, most pronounced on the left    Urethral injury is suspected "  · Transferred emergently to [facility] and underwent suprapubic catheter placement on 11/23  · Hematuria resolved

## 2022-12-11 NOTE — ASSESSMENT & PLAN NOTE
· Palliative were following - family noted that he had improved and wanted to continue treatment of medical conditions followed by discharge to rehab  · Now made NPO on 12/9 due to severe dysphagia  · Speech reassessment planned for next week  · Palliative care reviewed today - no decision by family yet

## 2022-12-11 NOTE — ASSESSMENT & PLAN NOTE
Wt Readings from Last 3 Encounters:   12/10/22 64 kg (141 lb 1 5 oz)   11/23/22 74 5 kg (164 lb 3 9 oz)   11/22/22 70 8 kg (156 lb)     · Echo 03/01/2022 with EF 45%     · Off diuretics due to JOEL and now NPO status  · Monitor volume status

## 2022-12-11 NOTE — PROGRESS NOTES
[address] [facility]  Progress Note Enrique Courtney 1940, 80 y o  male MRN: [id number]  Unit/Bed#: [facility] 431-01 Encounter: [id number]  Primary Care Provider: Carmenza Morris DO   Date and time admitted to hospital: 11/23/2022  4:04 AM    * Urethral trauma  Assessment & Plan  · Hospitalized from 10/31/22 to 11/6/22 for right hip fracture - s/p ORIF  Postoperatively he developed urinary retention and he was discharged with a schaffer  · Presented to the ED at [address] [place] [address] on 11/20/22 with lower abdominal pain - was noted to have a UTI and was discharged back to rehab on Augmentin  · Brought back to the ED at [address] [place] [address] on 11/21/22 with abdominal pain and lethargy  Noted to be hypotensive with lactic acid > 10, septic shock from UTI and was on pressors in the ICU  · Also noted to have hematuria and was on CBI  Hematuria persisted and developed abdominal distension with failure of CBI drainage  · CT abdomen (11/23/22) - "The Schaffer catheter is malpositioned  The catheter balloon is located near the base of the penis, below the level of the prostate  There is gas within the soft tissues of the perineum and penis, most pronounced on the left  Urethral injury is suspected "  · [facility] emerg[facility] and underwent suprapubic catheter placement on 11/23  · Hematuria resolved          Hematuria  Assessment & Plan  · As above    UTI (urinary tract infection)  Assessment & Plan  · Presentation as above  · Urine culture (11/20) - E   Coli  · Initially on Cefepime, then Cefazolin, then Cephalexin  · Completed antibiotic course  · Had septic shock which has resolved    Acute respiratory failure with hypoxia (HCC)  Assessment & Plan  · Resolved  · Began overnight on 12/4  · Due to RSV with likely superimposed bacterial pneumonia with concern for possible aspiration  · Had required high flow O2      RSV (acute bronchiolitis due to respiratory syncytial virus)  Assessment & Plan  · Resp protocol  · Supportive care   · On Solumedrol - being tapered q 3 days per pulm recommendations  Pneumonia  Assessment & Plan  · Noted overnight on 12/4 when he had fever, tachycardia, tachypnea, worsening hypoxia   · Procalcitonin elevated  · Aspiration suspected  · CT chest (12/7) - Interval development of right lower lobe airspace disease possibly atelectasis or developing pneumonia, potentially aspiration pneumonia  Moderate left basilar effusion and compressive atelectasis redemonstrated  New tree-in-bud opacities in the right upper lobe worrisome for pneumonitis/pneumonia    · Continue Cefepime (D#7/7)  · Pulm were following - input appreciated    Severe sepsis [facility])  Assessment & Plan  · Noted overnight on 12/4 due to aspiration pneumonia  · Now resolved  · On admission had septic shock due to UTI requiring pressors    Deep tissue injury to right lateral ankle and left heel  Assessment & Plan  · Wound care/podiatry input appreciated  · AZ abnormal - reviewed by vascular - not a candidate for intervention at present, ct wound care    PAD (peripheral artery disease) ([facility] [address] )  Assessment & Plan  · Seen on LEADS  · Reviewed by vascular on 12/7 as LE wound appeared to have increased - vascular disease possibly contributing to non-healing LE wounds but not a candidate for intervention at present in the setting of current infections/respiratory status and co-morbidities  ·  Ct Plavix, statin       Dysphagia  Assessment & Plan  · High risk for aspiration  · Was cleared by speech for puréed with honey thick liquids with strict aspiration precautions but repeat VBS on 12/9 showed severe aspiration and speech recommended he be placed NPO  · Seen by ENT - no vocal cord dysfunction   · Patient and family do not want PEG tube  · Speech reassessment this week  · Patient and family deciding on whether he wants tube feeds through NGT in the interim    Atrial fibrillation with RVR ([facility] [address] )  Assessment & Plan  · Placed back on scheduled IV Metoprolol on 12/9 when made NPO  · Eliquis held and on heparin drip  · Was also receiving Amiodarone 200 mg daily    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  Assessment & Plan  Lab Results   Component Value Date    EGFR 50 12/11/2022    EGFR 42 12/10/2022    EGFR 35 12/09/2022    CREATININE 1 31 (H) 12/11/2022    CREATININE 1 52 (H) 12/10/2022    CREATININE 1 76 (H) 12/09/2022     Estimated Creatinine Clearance: 39 8 mL/min (A) (by C-G formula based on SCr of 1 31 mg/dL (H))  Baseline creatinine 1 8 to 2  Initially had malpositioned zavala with urethral injury, then creatinine elevated due to volume overload which improved with diuresis  Now creatinine increasing since 12/4 in the setting of new aspiration pneumonia/RSV with decreased oral intake  Current creatinine peaked at 2 85 on 12/6 and improved to 1 52 today  Received 1 unit PRBC on 12/6 and 1 on 12/7  Now NPO due to severe dysphagia  Gentle IVF's  Monitor creatinine  Avoid nephrotoxic medications and hypotension  Discussed with nephrology - input appreciated    Chronic diastolic heart failure [facility])  Assessment & Plan  Wt Readings from Last 3 Encounters:   12/11/22 64 8 kg (142 lb 13 7 oz)   11/23/22 74 5 kg (164 lb 3 9 oz)   11/22/22 70 8 kg (156 lb)     · Echo 03/01/2022 with EF 45%  · Off diuretics due to JOEL and now NPO status  · Monitor volume status            CAD (coronary artery disease)  Assessment & Plan  Continue BB  Unable to get Plavix, statin as NPO and no enteric access      Closed fracture of trochanter of right femur with routine healing  Assessment & Plan  · Admitted to [address] [place] [address] from 10/31/22 to 11/6/22 with right hip fracture  · S/p repair     · Was undergoing rehab at [facility]  · Physical therapy      Hypernatremia  Assessment & Plan  · Due to hypovolemia  · Resolved with IVF's    Transaminitis  Assessment & Plan  · Likely due to shock liver  · Resolved     Acute on chronic anemia  Assessment & Plan  · Multifactorial  · Initially had hematuria  · 1 unit PRBC on  and 1 on   · Seen by GI on  - brown stools on rectal exam  Since no overt GI bleeding and high risk for endoscopy at present monitor at present, ok for Eliquis  · CT C/A/P - per radiology may have hemorrhagic prostatitis - D/w urology on  - enlarged prostate, as urine clear no contraindication to Eliquis, Proscar added  · Eliquis restarted on  and changed to heparin drip on  when made NPO due to severe dysphagia  · Hb stable  · Monitor hemoglobin and transfuse as needed    Goals of care, counseling/discussion  Assessment & Plan  · Palliative were following - family noted that he had improved and wanted to continue treatment of medical conditions followed by discharge to rehab  · Now made NPO on  due to severe dysphagia  · Speech reassessment planned for next week  · Palliative care reviewed today - no decision by family yet    VTE Pharmacologic Prophylaxis: VTE Score: 21 High Risk (Score >/= 5) - Pharmacological DVT Prophylaxis Ordered: heparin drip  Sequential Compression Devices Ordered  Patient Centered Rounds: Discussed with RN  Discussions with Specialists or Other Care Team Provider: Discussed with palliative care    Education and Discussions with Family / Patient: Updated  (wife) via phone  Time Spent for Care: 20 minutes  More than 50% of total time spent on counseling and coordination of care as described above  Current Length of Stay: 18 day(s)  Current Patient Status: Inpatient   Certification Statement: The patient will continue to require additional inpatient hospital stay due to severe dysphagia awaiting family decision on goals    Code Status: Level 3 - DNAR and DNI    Subjective:   Awake and alert  No shortness of breath or cough      Objective:     Vitals:   Temp (24hrs), Av 7 °F (36 5 °C), Min:97 5 °F (36 4 °C), Max:98 °F (36 7 °C)    Temp:  [97 5 °F (36 4 °C)-98 °F (36 7 °C)] 98 °F (36 7 °C)  HR:  [56-97] 85  Resp: [17-18] 18  BP: (132-157)/(65-87) 152/85  SpO2:  [95 %-99 %] 97 %  Body mass index is 21 72 kg/m²  Physical Exam:   Physical Exam  Vitals reviewed  HENT:      Nose: Nose normal       Mouth/Throat:      Mouth: Mucous membranes are moist    Eyes:      Extraocular Movements: Extraocular movements intact  Cardiovascular:      Rate and Rhythm: Normal rate  Rhythm irregular  Pulmonary:      Effort: Pulmonary effort is normal  No respiratory distress  Breath sounds: Normal breath sounds  No wheezing  Abdominal:      General: Bowel sounds are normal  There is no distension  Palpations: Abdomen is soft  Tenderness: There is no abdominal tenderness  Musculoskeletal:         General: No swelling  Cervical back: Neck supple  Skin:     Comments: DTI right lateral ankle and left heel   Neurological:      General: No focal deficit present  Mental Status: He is alert  Psychiatric:         Mood and Affect: Mood normal           Additional Data:     Labs:  Results from last 7 days   Lab Units 12/09/22  1828 12/06/22  0701 12/06/22  0519 12/05/22  0505   WBC Thousand/uL 7 70   < > 5 83 13 72*   HEMOGLOBIN g/dL 10 1*   < > 6 7* 7 6*   HEMATOCRIT % 33 4*   < > 22 6* 26 3*   PLATELETS Thousands/uL 160   < > 156 158   BANDS PCT %  --   --  2  --    NEUTROS PCT %  --   --   --  91*   LYMPHS PCT %  --   --   --  2*   LYMPHO PCT %  --   --  2*  --    MONOS PCT %  --   --   --  6   MONO PCT %  --   --  1*  --    EOS PCT %  --   --  0 0    < > = values in this interval not displayed       Results from last 7 days   Lab Units 12/11/22  0225 12/09/22  0524 12/08/22  0511 12/07/22  1852 12/07/22  0527   SODIUM mmol/L 147   < > 143   < > 148*   POTASSIUM mmol/L 3 6   < > 3 5   < > 3 9   CHLORIDE mmol/L 117*   < > 109*   < > 113*   CO2 mmol/L 27   < > 28   < > 30   BUN mg/dL 60*   < > 83*   < > 89*   CREATININE mg/dL 1 31*   < > 2 12*   < > 2 61*   ANION GAP mmol/L 3*   < > 6   < > 5   CALCIUM mg/dL 8 0* < > 8 2*   < > 8 1*   ALBUMIN g/dL  --   --  2 0*   < > 2 0*   TOTAL BILIRUBIN mg/dL  --   --   --   --  0 93   ALK PHOS U/L  --   --   --   --  92   ALT U/L  --   --   --   --  22   AST U/L  --   --   --   --  40   GLUCOSE RANDOM mg/dL 108   < > 194*   < > 232*    < > = values in this interval not displayed  Results from last 7 days   Lab Units 12/09/22  1828   INR  1 38*     Results from last 7 days   Lab Units 12/11/22  1749 12/11/22  1611 12/11/22  1054 12/11/22  0619 12/11/22  0103 12/10/22  2118 12/10/22  1616 12/10/22  1104 12/10/22  0527 12/09/22  2052 12/09/22  1555 12/09/22  1110   POC GLUCOSE mg/dl 137 165* 125 101 110 125 155* 137 131 167* 171* 120         Results from last 7 days   Lab Units 12/08/22  0500 12/05/22  0505 12/04/22  2325   LACTIC ACID mmol/L  --  1 6 2 2*   PROCALCITONIN ng/ml 0 88* 2 00* 1 09*       Lines/Drains:  Invasive Devices     Peripherally Inserted Central Catheter Line  Duration           PICC Line 41/92/79 Right Basilic 2 days          Drain  Duration           Suprapubic Catheter 18 Fr  18 days                Central Line:  Goal for removal: Will discontinue when peripheral access obtained  Recent Cultures (last 7 days):   Results from last 7 days   Lab Units 12/05/22  1345 12/04/22  2324   BLOOD CULTURE   --  No Growth After 5 Days  No Growth After 5 Days     LEGIONELLA URINARY ANTIGEN  Negative  --        Last 24 Hours Medication List:   Current Facility-Administered Medications   Medication Dose Route Frequency Provider Last Rate   • acetaminophen  650 mg Rectal Q6H PRN Kirsten Phelan PA-C     • albuterol  2 puff Inhalation Q4H PRN Eusebia Garcia MD     • amiodarone  200 mg Oral Daily With Breakfast Ford Barnes MD     • atorvastatin  40 mg Oral Daily With Derrek Pak MD     • cefepime  1,000 mg Intravenous Q24H Eusebia Garcia MD 1,000 mg (12/11/22 0054)   • clopidogrel  75 mg Oral Daily Harpreet Patience, DO     • dextrose 5 % and sodium chloride 0 45 %  50 mL/hr Intravenous Continuous Kelsey Cecily Maria MD 50 mL/hr (12/11/22 1802)   • doxylamine  12 5 mg Oral HS PRN Rosa Elena Ness PA-C     • ferrous sulfate  325 mg Oral Daily With Breakfast Karen Lawson MD     • guaiFENesin  400 mg Oral BID Vamshi Kwan PA-C     • heparin (porcine)  3-20 Units/kg/hr (Order-Specific) Intravenous Titrated Hawa Flannery MD 14 Units/kg/hr (12/11/22 0326)   • heparin (porcine)  1,950 Units Intravenous Q1H PRN Hawa Flannery MD     • heparin (porcine)  3,900 Units Intravenous Q1H PRN Hawa Flannery MD     • HYDROmorphone  0 2 mg Intravenous Q4H PRN Wilmar Back DO     • insulin lispro  1-5 Units Subcutaneous TID AC Hawa Flannery MD     • Lidocaine Viscous HCl  15 mL Swish & Spit 4x Daily PRN Yesika Bethea DO     • LORazepam  0 5 mg Intravenous Q6H PRN Nicole Enciso DO     • melatonin  6 mg Oral HS Vamshi Kwan PA-C     • methylPREDNISolone sodium succinate  20 mg Intravenous Daily Hawa Flannery MD     • metoprolol  2 5 mg Intravenous Q4H Hawa Flannery MD     • oxyCODONE  2 5 mg Oral Q4H PRN Wilmar Back DO      Or   • oxyCODONE  5 mg Oral Q4H PRN Wilmar Back DO     • pantoprazole  40 mg Intravenous Q24H [facility] Hawa Flannery MD     • saliva substitute  5 spray Mouth/Throat 4x Daily PRN Carlton Merino MD          Today, Patient Was Seen By: Hawa Flannery MD    **Please Note: This note may have been constructed using a voice recognition system  **

## 2022-12-11 NOTE — ASSESSMENT & PLAN NOTE
· Multifactorial  · Initially had hematuria  · 1 unit PRBC on 12/6 and 1 on 12/7  · Seen by GI on 12/7 - brown stools on rectal exam  Since no overt GI bleeding and high risk for endoscopy at present monitor at present, ok for Eliquis  · CT C/A/P - per radiology may have hemorrhagic prostatitis - D/w urology on 12/7 - enlarged prostate, as urine clear no contraindication to Eliquis, Proscar added  · Eliquis restarted on 12/8 and changed to heparin drip on 12/9 when made NPO due to severe dysphagia  · Hb stable  · Monitor hemoglobin and transfuse as needed

## 2022-12-11 NOTE — ASSESSMENT & PLAN NOTE
Lab Results   Component Value Date    EGFR 42 12/10/2022    EGFR 35 12/09/2022    EGFR 28 12/08/2022    CREATININE 1 52 (H) 12/10/2022    CREATININE 1 76 (H) 12/09/2022    CREATININE 2 12 (H) 12/08/2022     Estimated Creatinine Clearance: 33 9 mL/min (A) (by C-G formula based on SCr of 1 52 mg/dL (H))  Baseline creatinine 1 8 to 2  Initially had malpositioned zavala with urethral injury, then creatinine elevated due to volume overload which improved with diuresis   Now creatinine increasing since 12/4 in the setting of new aspiration pneumonia/RSV with decreased oral intake  Current creatinine peaked at 2 85 on 12/6 and improved to 1 52 today  Received 1 unit PRBC on 12/6 and 1 on 12/7  Now NPO due to severe dysphagia  Gentle IVF's  Monitor creatinine  Avoid nephrotoxic medications and hypotension  Discussed with nephrology - input appreciated

## 2022-12-11 NOTE — ASSESSMENT & PLAN NOTE
· Noted overnight on 12/4 when he had fever, tachycardia, tachypnea, worsening hypoxia   · Procalcitonin elevated  · Aspiration suspected  · CT chest (12/7) - Interval development of right lower lobe airspace disease possibly atelectasis or developing pneumonia, potentially aspiration pneumonia  Moderate left basilar effusion and compressive atelectasis redemonstrated  New tree-in-bud opacities in the right upper lobe worrisome for pneumonitis/pneumonia    · Continue Cefepime (D#7/7)  · Pulm were following - input appreciated

## 2022-12-11 NOTE — ASSESSMENT & PLAN NOTE
· Resolved  · Began overnight on 12/4  · Due to RSV with likely superimposed bacterial pneumonia with concern for possible aspiration  · Had required high flow O2

## 2022-12-11 NOTE — ASSESSMENT & PLAN NOTE
· High risk for aspiration  · Was cleared by speech for puréed with honey thick liquids with strict aspiration precautions but repeat VBS on 12/9 showed severe aspiration and speech recommended he be placed NPO  · Seen by ENT - no vocal cord dysfunction   · Patient and family do not want PEG tube  · Speech reassessment this week  · Patient and family deciding on whether he wants tube feeds through NGT in the interim

## 2022-12-11 NOTE — ASSESSMENT & PLAN NOTE
· High risk for aspiration  · Was cleared by speech for puréed with honey thick liquids with strict aspiration precautions but repeat VBS on 12/9 showed severe aspiration and speech recommended he be placed NPO  · Seen by ENT - no vocal cord dysfunction   · Patient and family do not want PEG tube  · Speech reassessment next week  · Patient and family deciding on whether he wants tube feeds through NGT in the interim

## 2022-12-11 NOTE — ASSESSMENT & PLAN NOTE
· Noted overnight on 12/4 due to aspiration pneumonia  · Now resolved  · On admission had septic shock due to UTI requiring pressors English

## 2022-12-11 NOTE — ASSESSMENT & PLAN NOTE
· Placed back on scheduled IV Metoprolol on 12/9 when made NPO  · Eliquis held and on heparin drip  · Was also receiving Amiodarone 200 mg daily

## 2022-12-12 NOTE — ASSESSMENT & PLAN NOTE
Wt Readings from Last 3 Encounters:   12/11/22 64 8 kg (142 lb 13 7 oz)   11/23/22 74 5 kg (164 lb 3 9 oz)   11/22/22 70 8 kg (156 lb)     · Echo 03/01/2022 with EF 45%     · Off diuretics due to JOEL and now NPO status  · On gentle IVF's  · Monitor volume status

## 2022-12-12 NOTE — PHYSICIAN ADVISOR
Current patient class: Inpatient  The patient is currently on Hospital Day: 20      The patient was admitted to the hospital at  4:04 AM on 11/23/22 for the following diagnosis:  Hematuria [R31 9]     CMS OUTLIER STAY REVIEW    After review of the relevant documentation, labs, vital signs and test results, the patient is appropriate for CONTINUED INPATIENT ADMISSION  The patient continues to remain hospitalized receiving acute medical care  The patient has surpassed the expected duration of stay, however given the clinical condition, need for further acute care management, the patient is appropriate to remain in an inpatient status  The patient still being actively managed, and does have unresolved medical issues requiring further hospitalization  This review is conducted at 20 days, to help satisfy the requirements for significant outlier stay review as per CMS  Given the current condition of this patient, the patient satisfies this review was determination for continued inpatient stay  Rationale is as follows:    Patient presented with abdominal pain and lethargy and was found to be in septic shock from UTI and required pressors in the ICU  He was transferred emergently to [facility] and underwent suprapubic catheter placement  He was also having hematuria  He went on to develop acute respiratory failure with hypoxia due to RSV with likely superimposed bacterial pneumonia with concern for possible aspiration  He has been followed by palliative care and goals of care have been addressed  The patient was made n p o  about 3 days ago due to severe dysphagia  Patient and family are deciding about potential NG tube placement  The patient continues to require active Hospital management      The patient’s vitals on arrival were   ED Triage Vitals   Temperature Pulse Respirations Blood Pressure SpO2   11/23/22 0432 11/23/22 0432 11/23/22 0432 11/23/22 0432 11/23/22 0600   97 9 °F (36 6 °C) 70 20 107/67 100 %      Temp Source Heart Rate Source Patient Position - Orthostatic VS BP Location FiO2 (%)   11/23/22 0432 11/23/22 0800 11/23/22 2000 11/23/22 2000 12/06/22 0720   Temporal Monitor Lying Right arm 40      Pain Score       11/23/22 0432       5           Past Medical History:   Diagnosis Date   • CHF (congestive heart failure) ([facility])    • CKD (chronic kidney disease) stage 3, GFR 30-59 ml/min ([facility])    • Coronary artery disease    • Hyperlipidemia    • Hypertension    • Mitral regurgitation    • Paroxysmal atrial fibrillation ([address] )    • Urinary retention      Past Surgical History:   Procedure Laterality Date   • CARDIAC SURGERY      cabg x 2 2014 [facility]   • CORONARY ANGIOPLASTY WITH STENT PLACEMENT  10/2021    DAPHNEY to left main and ramus   • MITRAL VALVE REPAIR  2014    mitral ring   • ND CYSTOURETHROSCOPY W/IRRIG & EVAC CLOTS N/A 11/23/2022    Procedure: CYSTOSCOPY;  Surgeon: Kathi Renner MD;  Location: [facility];  Service: Urology   • ND OPEN RX FEMUR FX+INTRAMED HALIMA Right 11/1/2022    Procedure: INSERTION NAIL IM FEMUR ANTEGRADE (TROCHANTERIC); Surgeon: Selam Reno;   Location: [facility];  Service: Orthopedics   • SUPRAPUBIC TUBE PLACEMENT N/A 11/23/2022    Procedure: INSERTION SUPRAPUBIC CATHETER PERCUTANEOUS;  Surgeon: Kathi Renner MD;  Location: [facility];  Service: Urology           Consults have been placed to:   IP CONSULT TO UROLOGY  IP CONSULT TO CASE MANAGEMENT  IP CONSULT TO PODIATRY  IP CONSULT TO VASCULAR SURGERY  IP CONSULT TO PALLIATIVE CARE  IP CONSULT TO PALLIATIVE CARE  IP CONSULT TO CARDIOLOGY  IP CONSULT TO NEPHROLOGY  IP CONSULT TO GASTROENTEROLOGY  IP CONSULT TO PICC TEAM  IP CONSULT TO ENT    Vitals:    12/12/22 0016 12/12/22 0333 12/12/22 0738 12/12/22 1124   BP:  141/82 160/84 162/83   BP Location:       Pulse: 86 85 79 72   Resp:       Temp: (!) 97 4 °F (36 3 °C) 97 6 °F (36 4 °C) 97 8 °F (36 6 °C) 97 7 °F (36 5 °C)   TempSrc: Oral      SpO2: 98% 100% 98% 99%   Weight:       Height:           Most recent labs:    Recent Labs     12/09/22  1828 12/10/22  0456 12/12/22  0526   WBC 7 70  --   --    HGB 10 1*  --   --    HCT 33 4*  --   --      --   --    K  --    < > 3 9   CALCIUM  --    < > 7 9*   BUN  --    < > 47*   CREATININE  --    < > 1 19   INR 1 38*  --   --     < > = values in this interval not displayed         Scheduled Meds:  Current Facility-Administered Medications   Medication Dose Route Frequency Provider Last Rate   • acetaminophen  650 mg Rectal Q6H PRN Bart Palma PA-C     • albuterol  2 puff Inhalation Q4H PRN Yari Bush MD     • amiodarone  200 mg Oral Daily With Breakfast Sagar Stephens MD     • atorvastatin  40 mg Oral Daily With Violette Jolly MD     • clopidogrel  75 mg Oral Daily Lisashirley Haynes DO     • dextrose 5 % and sodium chloride 0 45 %  50 mL/hr Intravenous Continuous Kelsey Rab Marni Parekh MD 50 mL/hr (12/11/22 1802)   • doxylamine  12 5 mg Oral HS PRN Bart Palma PA-C     • ferrous sulfate  325 mg Oral Daily With Breakfast Santosh Mejia MD     • guaiFENesin  400 mg Oral BID Justyn Cevallos PA-C     • heparin (porcine)  3-20 Units/kg/hr (Order-Specific) Intravenous Titrated Yari Bush MD 12 Units/kg/hr (12/12/22 3140)   • heparin (porcine)  1,950 Units Intravenous Q1H PRN Yari Bush MD     • heparin (porcine)  3,900 Units Intravenous Q1H PRN Yari Bush MD     • HYDROmorphone  0 2 mg Intravenous Q4H PRN Wilmar Back DO     • insulin lispro  1-5 Units Subcutaneous TID [facility] Yari Bush MD     • Lidocaine Viscous HCl  15 mL Swish & Spit 4x Daily PRN Catheline Cuff, DO     • LORazepam  0 5 mg Intravenous Q6H PRN Nicole Encsio DO     • melatonin  6 mg Oral HS Swathi Perez PA-C     • methylPREDNISolone sodium succinate  20 mg Intravenous Daily Yari Bush MD     • metoprolol  2 5 mg Intravenous Q4H Yari Bush MD     • oxyCODONE  2 5 mg Oral Q4H PRN Wilmar Back DO      Or   • oxyCODONE  5 mg Oral Q4H PRN Wilmar Back DO     • pantoprazole  40 mg Intravenous Q24H [address] Clarisse Pimentel MD     • saliva substitute  5 spray Mouth/Throat 4x Daily PRN King Belcher MD       Continuous Infusions:dextrose 5 % and sodium chloride 0 45 %, 50 mL/hr, Last Rate: 50 mL/hr (12/11/22 1802)  heparin (porcine), 3-20 Units/kg/hr (Order-Specific), Last Rate: 12 Units/kg/hr (12/12/22 0624)      PRN Meds: •  acetaminophen  •  albuterol  •  doxylamine  •  heparin (porcine)  •  heparin (porcine)  •  HYDROmorphone  •  Lidocaine Viscous HCl  •  LORazepam  •  oxyCODONE **OR** oxyCODONE  •  saliva substitute    Surgical procedures (if appropriate):  Procedure(s):  CYSTOSCOPY  INSERTION SUPRAPUBIC CATHETER PERCUTANEOUS

## 2022-12-12 NOTE — ASSESSMENT & PLAN NOTE
· Rate controlled   · Had been recommended Metoprolol tartrate 25 mg q 12hrs but placed back on scheduled IV Metoprolol on 12/9 when made NPO  · Eliquis held and on heparin drip  · Was also receiving Amiodarone 200 mg daily Yes

## 2022-12-12 NOTE — PROGRESS NOTES
NEPHROLOGY PROGRESS NOTE   Sridevi Valdivia 80 y o  male MRN: [id number]  Unit/Bed#: [facility] 431-01 Encounter: [id number]  Reason for Consult: Acute kidney injury    ASSESSMENT/PLAN:  1  Acute kidney injury, etiology multifactorial   Suspect likely prerenal azotemia with a possible component from ATN given transient hypotension  2  Urethral injury status post suprapubic catheter  3  Atrial fibrillation, remains on metoprolol  4  Hypernatremia, resolved with free water supplementation (D51/2)  5  Hypokalemia, currently remained stable with supplementation  6  Postoperative ORIF for right hip fracture  7  Dysphagia, work-up as per primary service  Ongoing goals of care discussion, patient declines PEG tube placement at this time  8  Diastolic heart failure, volume status currently appears stable, previous ejection fraction 45%    PLAN:  · Renal function has continued to improve creatinine currently 1 19  · Sodium level remained stable at 145 with current IV fluids  · Ongoing goals of care discussion regards to dysphagia  · No changes from nephrology standpoint  · Will sign off at this time, please call if questions or concerns    SUBJECTIVE:  Seen and examined  Patient is awake and alert  Remains on IV fluids  Patient remains NPO  Review of Systems    OBJECTIVE:  Current Weight: Weight - Scale: 64 8 kg (142 lb 13 7 oz)  Vitals:    12/12/22 0016 12/12/22 0333 12/12/22 0738 12/12/22 1124   BP:  141/82 160/84 162/83   BP Location:       Pulse: 86 85 79 72   Resp:       Temp: (!) 97 4 °F (36 3 °C) 97 6 °F (36 4 °C) 97 8 °F (36 6 °C) 97 7 °F (36 5 °C)   TempSrc: Oral      SpO2: 98% 100% 98% 99%   Weight:       Height:           Intake/Output Summary (Last 24 hours) at 12/12/2022 1139  Last data filed at 12/11/2022 2200  Gross per 24 hour   Intake 1094 43 ml   Output 450 ml   Net 644 43 ml       Physical Exam  Constitutional:       Appearance: He is not ill-appearing  HENT:      Head: Normocephalic and atraumatic     Eyes: General: No scleral icterus  Cardiovascular:      Rate and Rhythm: Normal rate and regular rhythm  Pulmonary:      Effort: Pulmonary effort is normal       Breath sounds: Rhonchi present  Abdominal:      General: There is no distension  Palpations: Abdomen is soft  Musculoskeletal:      Right lower leg: No edema  Left lower leg: No edema  Skin:     General: Skin is warm and dry  Findings: No rash  Neurological:      Mental Status: He is alert           Medications:    Current Facility-Administered Medications:   •  acetaminophen (TYLENOL) rectal suppository 650 mg, 650 mg, Rectal, Q6H PRN, Radha Olivia PA-C, 650 mg at 12/05/22 0515  •  albuterol (PROVENTIL HFA,VENTOLIN HFA) inhaler 2 puff, 2 puff, Inhalation, Q4H PRN, Zita Olvera MD  •  amiodarone tablet 200 mg, 200 mg, Oral, Daily With Breakfast, Lachelle Crabtree MD, 200 mg at 12/09/22 3165  •  atorvastatin (LIPITOR) tablet 40 mg, 40 mg, Oral, Daily With Linda Robison MD, 40 mg at 12/08/22 1813  •  clopidogrel (PLAVIX) tablet 75 mg, 75 mg, Oral, Daily, Donnis Po, DO, 75 mg at 12/09/22 8163  •  dextrose 5 % and sodium chloride 0 45 % infusion, 50 mL/hr, Intravenous, Continuous, Kelsey Maria MD, Last Rate: 50 mL/hr at 12/11/22 1802, 50 mL/hr at 12/11/22 1802  •  doxylamine (UNISOM) tablet 12 5 mg, 12 5 mg, Oral, HS PRN, Radha Olivia PA-C  •  ferrous sulfate tablet 325 mg, 325 mg, Oral, Daily With Breakfast, Nadine Bonilla MD, 325 mg at 12/09/22 1043  •  guaiFENesin LIQD 400 mg, 400 mg, Oral, BID, Swathi Donahue PA-C, 400 mg at 12/09/22 7452  •  heparin (porcine) 25,000 units in 0 45% NaCl 250 mL infusion (premix), 3-20 Units/kg/hr (Order-Specific), Intravenous, Titrated, Zita Olvera MD, Last Rate: 7 8 mL/hr at 12/12/22 0624, 12 Units/kg/hr at 12/12/22 3098  •  heparin (porcine) injection 1,950 Units, 1,950 Units, Intravenous, Q1H PRN, Zita Olvera MD, 1,950 Units at 12/10/22 0211  •  heparin (porcine) injection 3,900 Units, 3,900 Units, Intravenous, Q1H PRN, Maxwell Lozano MD, 3,900 Units at 12/11/22 0331  •  HYDROmorphone HCl (DILAUDID) injection 0 2 mg, 0 2 mg, Intravenous, Q4H PRN, Wilmar Back DO, 0 2 mg at 12/12/22 0039  •  insulin lispro (HumaLOG) 100 units/mL subcutaneous injection 1-5 Units, 1-5 Units, Subcutaneous, TID AC **AND** Fingerstick Glucose (POCT), , , TID AC, Maxwell Lozano MD  •  Lidocaine Viscous HCl (XYLOCAINE) 2 % mucosal solution 15 mL, 15 mL, Swish & Spit, 4x Daily PRN, Thai Shabazz DO  •  LORazepam (ATIVAN) injection 0 5 mg, 0 5 mg, Intravenous, Q6H PRN, Nicole Enciso DO  •  melatonin tablet 6 mg, 6 mg, Oral, HS, Swathi Donahue PA-C, 6 mg at 12/08/22 2143  •  methylPREDNISolone sodium succinate (Solu-MEDROL) injection 20 mg, 20 mg, Intravenous, Daily, Maxwell Lozano MD, 20 mg at 12/12/22 1016  •  metoprolol (LOPRESSOR) injection 2 5 mg, 2 5 mg, Intravenous, Q4H, Maxwell Lozano MD, 2 5 mg at 12/12/22 1016  •  oxyCODONE (ROXICODONE) IR tablet 2 5 mg, 2 5 mg, Oral, Q4H PRN **OR** oxyCODONE (ROXICODONE) IR tablet 5 mg, 5 mg, Oral, Q4H PRN, Wilmar Back DO, 5 mg at 12/09/22 0017  •  pantoprazole (PROTONIX) injection 40 mg, 40 mg, Intravenous, Q24H [facility], Maxwell Lozano MD, 40 mg at 12/12/22 1016  •  saliva substitute (MOUTH KOTE) mucosal solution 5 spray, 5 spray, Mouth/Throat, 4x Daily PRN, Elizabeth Verde MD    Laboratory Results:  Results from last 7 days   Lab Units 12/12/22  0526 12/11/22  0225 12/10/22  0456 12/09/22  1828 12/09/22  0524 12/08/22  0511 12/08/22  0500 12/07/22  1852 12/07/22  0527 12/06/22  1801 12/06/22  0701 12/06/22  0519   WBC Thousand/uL  --   --   --  7 70 8 04  --  6 27  --  5 25  --   --  5 83   HEMOGLOBIN g/dL  --   --   --  10 1* 9 2*  --  8 9*  --  7 0* 7 9* 6 8* 6 7*   HEMATOCRIT %  --   --   --  33 4* 30 5*  --  29 4*  --  22 8* 26 7* 23 7* 22 6*   PLATELETS Thousands/uL  --   --   --  160 140*  -- 132*  --  144*  --   --  156   POTASSIUM mmol/L 3 9 3 6 3 7  --  3 9 3 5  --  3 5 3 9 3 7  --  4 1   CHLORIDE mmol/L 116* 117* 112*  --  111* 109*  --  108 113* 115*  --  114*   CO2 mmol/L 24 27 29  --  29 28  --  26 30 28  --  28   BUN mg/dL 47* 60* 68*  --  76* 83*  --  87* 89* 85*  --  78*   CREATININE mg/dL 1 19 1 31* 1 52*  --  1 76* 2 12*  --  2 42* 2 61* 2 81*  --  2 85*   CALCIUM mg/dL 7 9* 8 0* 8 0*  --  8 3 8 2*  --  8 3 8 1* 8 2*  --  8 4   MAGNESIUM mg/dL  --   --   --   --   --   --   --   --   --   --   --  2 5   PHOSPHORUS mg/dL  --   --   --   --  2 8 3 4  --  3 1  --   --   --  4 7*

## 2022-12-12 NOTE — ASSESSMENT & PLAN NOTE
Lab Results   Component Value Date    EGFR 56 12/12/2022    EGFR 50 12/11/2022    EGFR 42 12/10/2022    CREATININE 1 19 12/12/2022    CREATININE 1 31 (H) 12/11/2022    CREATININE 1 52 (H) 12/10/2022     Estimated Creatinine Clearance: 43 9 mL/min (by C-G formula based on SCr of 1 19 mg/dL)  Baseline creatinine 1 8 to 2  Initially had malpositioned zavala with urethral injury, then creatinine elevated due to volume overload which improved with diuresis   Now creatinine increasing since 12/4 in the setting of new aspiration pneumonia/RSV with decreased oral intake  Current creatinine peaked at 2 85 on 12/6 and improved to 1 52 today  Received 1 unit PRBC on 12/6 and 1 on 12/7  Now NPO due to severe dysphagia  Gentle IVF's  Monitor creatinine  Avoid nephrotoxic medications and hypotension  Discussed with nephrology - input appreciated

## 2022-12-12 NOTE — ASSESSMENT & PLAN NOTE
· Admitted to [address] [place] [address] from 10/31/22 to 11/6/22 with right hip fracture  · S/p repair     · Was undergoing rehab at [facility]  · Physical therapy

## 2022-12-12 NOTE — ASSESSMENT & PLAN NOTE
· Noted overnight on 12/4 when he had fever, tachycardia, tachypnea, worsening hypoxia   · Procalcitonin elevated  · Aspiration suspected  · CT chest (12/7) - Interval development of right lower lobe airspace disease possibly atelectasis or developing pneumonia, potentially aspiration pneumonia  Moderate left basilar effusion and compressive atelectasis redemonstrated  New tree-in-bud opacities in the right upper lobe worrisome for pneumonitis/pneumonia    · Complete 7 day course of Cefepime   · Pulm were following - input appreciated

## 2022-12-12 NOTE — PROGRESS NOTES
[facility]  Progress Note Sara Sabillon 1940, 80 y o  male MRN: [id number]  Unit/Bed#: [facility] 431-01 Encounter: [id number]  Primary Care Provider: Brenda Brown DO   Date and time admitted to hospital: 11/23/2022  4:04 AM    * Urethral trauma  Assessment & Plan  · Hospitalized from 10/31/22 to 11/6/22 for right hip fracture - s/p ORIF  Postoperatively he developed urinary retention and he was discharged with a zavala  · Presented to the ED at [address] [place] [address] on 11/20/22 with lower abdominal pain - was noted to have a UTI and was discharged back to rehab on Augmentin  · Brought back to the ED at [address] [place] [address] on 11/21/22 with abdominal pain and lethargy  Noted to be hypotensive with lactic acid > 10, septic shock from UTI and was on pressors in the ICU  · Also noted to have hematuria and was on CBI  Hematuria persisted and developed abdominal distension with failure of CBI drainage  · CT abdomen (11/23/22) - "The Zavala catheter is malpositioned  The catheter balloon is located near the base of the penis, below the level of the prostate  There is gas within the soft tissues of the perineum and penis, most pronounced on the left  Urethral injury is suspected "  · Transferred emergently to [facility] and underwent suprapubic catheter placement on 11/23  · Hematuria resolved          Hematuria  Assessment & Plan  · As above    UTI (urinary tract infection)  Assessment & Plan  · Presentation as above  · Urine culture (11/20) - E   Coli  · Initially on Cefepime, then Cefazolin, then Cephalexin  · Completed antibiotic course  · Had septic shock which has resolved    Acute respiratory failure with hypoxia (HCC)  Assessment & Plan  · Resolved  · Began overnight on 12/4  · Due to RSV with likely superimposed bacterial pneumonia with concern for possible aspiration  · Had required high flow O2      RSV (acute bronchiolitis due to respiratory syncytial virus)  Assessment & Plan  · Resp protocol  · Supportive care   · On Solumedrol - being tapered q 3 days per pulm recommendations  Pneumonia  Assessment & Plan  · Noted overnight on 12/4 when he had fever, tachycardia, tachypnea, worsening hypoxia   · Procalcitonin elevated  · Aspiration suspected  · CT chest (12/7) - Interval development of right lower lobe airspace disease possibly atelectasis or developing pneumonia, potentially aspiration pneumonia  Moderate left basilar effusion and compressive atelectasis redemonstrated  New tree-in-bud opacities in the right upper lobe worrisome for pneumonitis/pneumonia    · Complete 7 day course of Cefepime   · Pulm were following - input appreciated    Severe sepsis [facility])  Assessment & Plan  · Noted overnight on 12/4 due to aspiration pneumonia  · Now resolved  · On admission had septic shock due to UTI requiring pressors    Deep tissue injury to right lateral ankle and left heel  Assessment & Plan  · Wound care/podiatry input appreciated  · AZ abnormal - reviewed by vascular - not a candidate for intervention at present, ct wound care    PAD (peripheral artery disease) ([facility] [address] )  Assessment & Plan  · Seen on LEADS  · Reviewed by vascular on 12/7 as LE wound appeared to have increased - vascular disease possibly contributing to non-healing LE wounds but not a candidate for intervention at present in the setting of current infections/respiratory status and co-morbidities  ·  Plavix, statin ordered - unable to take as NPO due to dysphagia       Dysphagia  Assessment & Plan  · High risk for aspiration  · Was cleared by speech for pureed with honey thick liquids with strict aspiration precautions but repeat VBS on 12/9 showed severe aspiration and speech recommended he be placed NPO  · Seen by ENT - no vocal cord dysfunction   · Patient and family did not want PEG tube but today are reconsidering PEG   · Repeat VBS on 12/13       Atrial fibrillation with RVR ([facility] [address] )  Assessment & Plan  · Rate controlled   · Had been recommended Metoprolol tartrate 25 mg q 12hrs but placed back on scheduled IV Metoprolol on 12/9 when made NPO  · Eliquis held and on heparin drip  · Was also receiving Amiodarone 200 mg daily    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  Assessment & Plan  Lab Results   Component Value Date    EGFR 56 12/12/2022    EGFR 50 12/11/2022    EGFR 42 12/10/2022    CREATININE 1 19 12/12/2022    CREATININE 1 31 (H) 12/11/2022    CREATININE 1 52 (H) 12/10/2022     Estimated Creatinine Clearance: 43 9 mL/min (by C-G formula based on SCr of 1 19 mg/dL)  Baseline creatinine 1 8 to 2  Initially had malpositioned zavala with urethral injury, then creatinine elevated due to volume overload which improved with diuresis  Now creatinine increasing since 12/4 in the setting of new aspiration pneumonia/RSV with decreased oral intake  Current creatinine peaked at 2 85 on 12/6 and improved to 1 52 today  Received 1 unit PRBC on 12/6 and 1 on 12/7  Now NPO due to severe dysphagia  Gentle IVF's  Monitor creatinine  Avoid nephrotoxic medications and hypotension  Discussed with nephrology - input appreciated    Chronic diastolic heart failure [facility])  Assessment & Plan  Wt Readings from Last 3 Encounters:   12/11/22 64 8 kg (142 lb 13 7 oz)   11/23/22 74 5 kg (164 lb 3 9 oz)   11/22/22 70 8 kg (156 lb)     · Echo 03/01/2022 with EF 45%  · Off diuretics due to JOEL and now NPO status  · On gentle IVF's  · Monitor volume status            CAD (coronary artery disease)  Assessment & Plan  Continue BB  Unable to get Plavix, statin as NPO and no enteric access      Closed fracture of trochanter of right femur with routine healing  Assessment & Plan  · Admitted to [address] [place] [address] from 10/31/22 to 11/6/22 with right hip fracture  · S/p repair     · Was undergoing rehab at [facility]  · Physical therapy      Hypernatremia  Assessment & Plan  · Due to hypovolemia  · Resolved with IVF's    Transaminitis  Assessment & Plan  · Likely due to shock liver  · Resolved     Acute on chronic anemia  Assessment & Plan  · Multifactorial  · Initially had hematuria  · 1 unit PRBC on 12/6 and 1 on 12/7  · Seen by GI on 12/7 - brown stools on rectal exam  Since no overt GI bleeding and high risk for endoscopy at present monitor at present, ok for Eliquis  · CT C/A/P - per radiology may have hemorrhagic prostatitis - D/w urology on 12/7 - enlarged prostate, as urine clear no contraindication to Eliquis, Proscar added  · Eliquis restarted on 12/8 and changed to heparin drip on 12/9 when made NPO due to severe dysphagia  · Hb stable  · Monitor hemoglobin and transfuse as needed    Goals of care, counseling/discussion  Assessment & Plan  · Palliative were following - family noted that he had improved and wanted to continue treatment of medical conditions followed by discharge to rehab  · Now made NPO on 12/9 due to severe dysphagia  · Palliative care following again from 12/11   · Wife and patient awaiting repeat VBS on 12/13  · They may want PEG placement as wife was informed by a family member that it was reversible      VTE Pharmacologic Prophylaxis: VTE Score: 21 High Risk (Score >/= 5) - Pharmacological DVT Prophylaxis Ordered: heparin drip  Sequential Compression Devices Ordered  Patient Centered Rounds: Discussed with RN  Discussions with Specialists or Other Care Team Provider: Discussed with palliative care    Education and Discussions with Family / Patient: Updated  (wife) at bedside  Time Spent for Care: 20 minutes  More than 50% of total time spent on counseling and coordination of care as described above  Current Length of Stay: 19 day(s)  Current Patient Status: Inpatient   Certification Statement: The patient will continue to require additional inpatient hospital stay due to severe dysphagia awaiting repeat VBS    Code Status: Level 3 - DNAR and DNI    Subjective:   No shortness of breath or cough  No fever  Awake and alert      Objective:     Vitals:   Temp (24hrs), Av 7 °F (36 5 °C), Min:97 4 °F (36 3 °C), Max:97 8 °F (36 6 °C)    Temp:  [97 4 °F (36 3 °C)-97 8 °F (36 6 °C)] 97 8 °F (36 6 °C)  HR:  [72-86] 84  BP: (141-162)/(81-84) 161/81  SpO2:  [98 %-100 %] 98 %  Body mass index is 21 72 kg/m²  Physical Exam:   Physical Exam  Vitals reviewed  HENT:      Head: Normocephalic  Nose: Nose normal       Mouth/Throat:      Mouth: Mucous membranes are moist    Eyes:      Extraocular Movements: Extraocular movements intact  Cardiovascular:      Rate and Rhythm: Normal rate  Rhythm irregular  Pulmonary:      Effort: Pulmonary effort is normal  No respiratory distress  Breath sounds: Normal breath sounds  No wheezing  Abdominal:      General: Bowel sounds are normal  There is no distension  Palpations: Abdomen is soft  Tenderness: There is no abdominal tenderness  Musculoskeletal:         General: No swelling  Skin:     Comments: Deep tissue injury to right lateral ankle and left heel   Neurological:      General: No focal deficit present  Mental Status: He is alert  Psychiatric:         Mood and Affect: Mood normal           Additional Data:     Labs:  Results from last 7 days   Lab Units 22  1828 22  0701 22  0519   WBC Thousand/uL 7 70   < > 5 83   HEMOGLOBIN g/dL 10 1*   < > 6 7*   HEMATOCRIT % 33 4*   < > 22 6*   PLATELETS Thousands/uL 160   < > 156   BANDS PCT %  --   --  2   LYMPHO PCT %  --   --  2*   MONO PCT %  --   --  1*   EOS PCT %  --   --  0    < > = values in this interval not displayed       Results from last 7 days   Lab Units 22  0526 22  0524 22  0511 22  1852 22  0527   SODIUM mmol/L 145   < > 143   < > 148*   POTASSIUM mmol/L 3 9   < > 3 5   < > 3 9   CHLORIDE mmol/L 116*   < > 109*   < > 113*   CO2 mmol/L 24   < > 28   < > 30   BUN mg/dL 47*   < > 83*   < > 89*   CREATININE mg/dL 1 19   < > 2 12*   < > 2 61*   ANION GAP mmol/L 5   < > 6   < > 5   CALCIUM mg/dL 7 9*   < > 8 2*   < > 8 1*   ALBUMIN g/dL  --   --  2 0*   < > 2 0*   TOTAL BILIRUBIN mg/dL  --   --   --   --  0 93   ALK PHOS U/L  --   --   --   --  92   ALT U/L  --   --   --   --  22   AST U/L  --   --   --   --  40   GLUCOSE RANDOM mg/dL 265*   < > 194*   < > 232*    < > = values in this interval not displayed  Results from last 7 days   Lab Units 12/09/22  1828   INR  1 38*     Results from last 7 days   Lab Units 12/12/22  1623 12/12/22  1122 12/12/22  0610 12/11/22  1749 12/11/22  1611 12/11/22  1054 12/11/22  0619 12/11/22  0103 12/10/22  2118 12/10/22  1616 12/10/22  1104 12/10/22  0527   POC GLUCOSE mg/dl 128 100 103 137 165* 125 101 110 125 155* 137 131         Results from last 7 days   Lab Units 12/08/22  0500   PROCALCITONIN ng/ml 0 88*       Lines/Drains:  Invasive Devices     Peripherally Inserted Central Catheter Line  Duration           PICC Line 42/32/90 Right Basilic 3 days          Drain  Duration           Suprapubic Catheter 18 Fr  19 days                Central Line:  Goal for removal: Will discontinue when peripheral access obtained       Last 24 Hours Medication List:   Current Facility-Administered Medications   Medication Dose Route Frequency Provider Last Rate   • acetaminophen  650 mg Rectal Q6H PRN Jessica Agarwal PA-C     • albuterol  2 puff Inhalation Q4H PRN Raleigh Torres MD     • amiodarone  200 mg Oral Daily With Breakfast Angelica Wu MD     • atorvastatin  40 mg Oral Daily With Lico Flores MD     • clopidogrel  75 mg Oral Daily Linnette Polanco DO     • dextrose 5 % and sodium chloride 0 45 %  50 mL/hr Intravenous Continuous Florenciaviki Wolf MD 50 mL/hr (12/11/22 1802)   • doxylamine  12 5 mg Oral HS PRN Jessica Agarwal PA-C     • ferrous sulfate  325 mg Oral Daily With Breakfast Isi Osei MD     • guaiFENesin  400 mg Oral BID Francisco Warner PA-C     • heparin (porcine)  3-20 Units/kg/hr (Order-Specific) Intravenous Titrated Eusebia Garcai MD 12 Units/kg/hr (12/12/22 3470)   • heparin (porcine)  1,950 Units Intravenous Q1H PRN Eusebia Garcia MD     • heparin (porcine)  3,900 Units Intravenous Q1H PRN Eusebia Garcia MD     • HYDROmorphone  0 2 mg Intravenous Q4H PRN Wilmar Back, DO     • insulin lispro  1-5 Units Subcutaneous TID [facility] Eusebia Garcia MD     • Lidocaine Viscous HCl  15 mL Swish & Spit 4x Daily PRN Mike Jaylen, DO     • LORazepam  0 5 mg Intravenous Q6H PRN Nicole Enciso, DO     • melatonin  6 mg Oral HS Adelina Rios PA-C     • methylPREDNISolone sodium succinate  20 mg Intravenous Daily Eusebia Garcia MD     • metoprolol  2 5 mg Intravenous Q4H Eusebia Garcia MD     • oxyCODONE  2 5 mg Oral Q4H PRN Wilmar Back, DO      Or   • oxyCODONE  5 mg Oral Q4H PRN Wilmar Back, DO     • pantoprazole  40 mg Intravenous Q24H Dalia Cotto MD     • saliva substitute  5 spray Mouth/Throat 4x Daily PRN Trish Mercado MD          Today, Patient Was Seen By: Eusebia Garcia MD    **Please Note: This note may have been constructed using a voice recognition system  **

## 2022-12-12 NOTE — ASSESSMENT & PLAN NOTE
· High risk for aspiration  · Was cleared by speech for pureed with honey thick liquids with strict aspiration precautions but repeat VBS on 12/9 showed severe aspiration and speech recommended he be placed NPO  · Seen by ENT - no vocal cord dysfunction   · Patient and family did not want PEG tube but today are reconsidering PEG   · Repeat VBS on 12/13

## 2022-12-12 NOTE — PROGRESS NOTES
Pastoral Care Progress Note    2022  Patient: Sunshine Calderon : 1940  Admission Date & Time: 2022 0404  MRN: [id number] CSN: [id number]          Pt unable to verbally express much, and  visited him and had brief conversation to offer him company, encouragement, to listen to him, and to pray for his healing  Pt  expressed thanks

## 2022-12-12 NOTE — PROGRESS NOTES
Progress note - Palliative and Supportive Care   Aki Barrett 80 y o  male [id number]    Patient Active Problem List   Diagnosis   • Bacteremia due to Pseudomonas   • CAD (coronary artery disease)   • Mitral regurgitation   • Hypertensive urgency   • Hyperlipidemia, mixed   • Carotid artery stenosis   • Stage 3 chronic kidney disease (HCC)   • Multiple lung nodules on CT   • Severe sepsis (HCC)   • Acute respiratory failure with hypoxia (HCC)   • Acute on chronic diastolic CHF (congestive heart failure) (HCC)   • 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   • Chronic atrial fibrillation (HCC)   • Postprocedural pneumothorax   • Lumbar compression deformity   • Chronic diastolic heart failure (HCC)   • Paroxysmal atrial fibrillation (HCC)   • Closed 2-part intertrochanteric fracture of proximal end of right femur, initial encounter ([email or web address] )   • Acute on chronic anemia   • Hyponatremia   • Postoperative urinary retention   • Thrombocytopenia (HCC)   • Closed fracture of trochanter of right femur with routine healing   • Leg edema, right   • Pressure injury sacrum and right heel   • Transaminitis   • Elevated troponin level not due myocardial infarction   • Hematuria   • Urethral trauma   • Ambulatory dysfunction   • Dysphagia   • Deep tissue injury to right lateral ankle and left heel   • PAD (peripheral artery disease) (formerly [facility])   • RSV (acute bronchiolitis due to respiratory syncytial virus)   • Goals of care, counseling/discussion   • Pneumonia   • Hypernatremia   • Atrial fibrillation with RVR ([facility] [address] [facility] )   • UTI (urinary tract infection)     Active issues specifically addressed today include:   · Goals of care, counseling/discussion  · Dysphagia with aspiration  · Aspiration pneumonia  · Protein calorie malnutrition  · Deep tissue injury to right lateral ankle and left heel    Plan:  1  Symptom management   -Continue current regimen as below:  ? Guaifenesin PO liquid BID (if tolerating PO)  ?  Melatonin 6mg PO QHS, Unisom 12 5mg QHS PRN (if tolerating PO)  ? Daily PPI (IV)  ? Tylenol NH (per rectum) every 6 hours as needed  ? OxyIR PO PRN (if tolerating PO)  ? Hydromorphone 0 2mg IV q4h PRN  ? Viscous lidocaine (if tolerating PO)  ? Lorazepam 0 5mg IV q6h PRN  ? Mouth Kote / oral care  24-hour OME: 0    2  Goals   -Currently awaiting results of repeat swallow study to be performed tomorrow, 12/13  If patient fails swallow study again, we will need to revisit possibility of pursuing NG tube versus PEG  -I talked with Jenni Garcia at bedside this afternoon regarding this decision point, she has a person in the family that is an RN that she talk to her about a PEG tube and was less resistant to this idea as she was told it could be reversible and she did not realize that before  -We will continue to have ongoing goals of care discussions as patient's clinical status evolves     Code Status: DNR/DNI- Level 3   Decisional apparatus:  Patient has marginal competency on my exam today  If competence is lost, patient's substitute decision maker would default to spouse Jenni Garcia by [place]  Advance Directive / Living Will / POLST: None on file    3  Psychosocial   • Supportive listening provided      Interval history:  Over the weekend, [facility] was contacted by [facility] to assist in discussions regarding an NG tube, further SLP evaluations, hospice  Patient reiterated at this point that he would consider an NG tube but did not wish to receive a PEG tube  He was unable to make a decision independently at this time, and relies on family to assist and challenging decision making  Dr Prince Claros, [facility] provider over the weekend, discussed with patient's wife Jenni Garcia regarding potential NG tube placement  At this point, she was unsure how to proceed and stated she plans to discuss goals with patient later that day and in the coming days  This morning, patient seen at bedside, no family present  Patient was getting cleaned by RN staff    I asked patient how he feels and he said "fear"  He indicated that his family should be coming around 1 PM today  Later in the afternoon, I revisited patient's room and patient's wife Katia Douglass was at bedside  Patient denied any complaints at this point  Katia Douglass stated that he was agreeable to a feeding tube earlier in the day per their discussions, has written above, patient is set to have a repeat swallow study done tomorrow        MEDICATIONS / ALLERGIES:     all current active meds have been reviewed and current meds:   Current Facility-Administered Medications   Medication Dose Route Frequency   • acetaminophen (TYLENOL) rectal suppository 650 mg  650 mg Rectal Q6H PRN   • albuterol (PROVENTIL HFA,VENTOLIN HFA) inhaler 2 puff  2 puff Inhalation Q4H PRN   • amiodarone tablet 200 mg  200 mg Oral Daily With Breakfast   • atorvastatin (LIPITOR) tablet 40 mg  40 mg Oral Daily With Dinner   • clopidogrel (PLAVIX) tablet 75 mg  75 mg Oral Daily   • dextrose 5 % and sodium chloride 0 45 % infusion  50 mL/hr Intravenous Continuous   • doxylamine (UNISOM) tablet 12 5 mg  12 5 mg Oral HS PRN   • ferrous sulfate tablet 325 mg  325 mg Oral Daily With Breakfast   • guaiFENesin LIQD 400 mg  400 mg Oral BID   • heparin (porcine) 25,000 units in 0 45% NaCl 250 mL infusion (premix)  3-20 Units/kg/hr (Order-Specific) Intravenous Titrated   • heparin (porcine) injection 1,950 Units  1,950 Units Intravenous Q1H PRN   • heparin (porcine) injection 3,900 Units  3,900 Units Intravenous Q1H PRN   • HYDROmorphone HCl (DILAUDID) injection 0 2 mg  0 2 mg Intravenous Q4H PRN   • insulin lispro (HumaLOG) 100 units/mL subcutaneous injection 1-5 Units  1-5 Units Subcutaneous TID AC   • Lidocaine Viscous HCl (XYLOCAINE) 2 % mucosal solution 15 mL  15 mL Swish & Spit 4x Daily PRN   • LORazepam (ATIVAN) injection 0 5 mg  0 5 mg Intravenous Q6H PRN   • melatonin tablet 6 mg  6 mg Oral HS   • methylPREDNISolone sodium succinate (Solu-MEDROL) injection 20 mg  20 mg Intravenous Daily   • metoprolol (LOPRESSOR) injection 2 5 mg  2 5 mg Intravenous Q4H   • oxyCODONE (ROXICODONE) IR tablet 2 5 mg  2 5 mg Oral Q4H PRN    Or   • oxyCODONE (ROXICODONE) IR tablet 5 mg  5 mg Oral Q4H PRN   • pantoprazole (PROTONIX) injection 40 mg  40 mg Intravenous Q24H [address]   • saliva substitute (MOUTH KOTE) mucosal solution 5 spray  5 spray Mouth/Throat 4x Daily PRN       No Known Allergies    OBJECTIVE:    Physical Exam  Physical Exam  Vitals and nursing note reviewed  Constitutional:       General: He is awake  He is not in acute distress  Appearance: He is cachectic  He is ill-appearing  He is not toxic-appearing or diaphoretic  Interventions: Nasal cannula in place  HENT:      Head: Normocephalic and atraumatic  Mouth/Throat:      Pharynx: Oropharynx is clear  Eyes:      Pupils: Pupils are equal, round, and reactive to light  Cardiovascular:      Rate and Rhythm: Normal rate  Pulmonary:      Effort: Pulmonary effort is normal  No respiratory distress  Abdominal:      General: There is no distension  Comments: Suprapubic zavala in place   Musculoskeletal:      Cervical back: Neck supple  Right lower leg: No edema  Left lower leg: No edema  Skin:     General: Skin is dry  Coloration: Skin is pale  Comments: B/L heel/foot wounds with dressing in place, CDI   Neurological:      General: No focal deficit present  Mental Status: He is alert  Mental status is at baseline  Psychiatric:         Behavior: Behavior normal          Lab Results:   I have personally reviewed pertinent labs  , CBC: No results found for: WBC, HGB, HCT, MCV, PLT, ADJUSTEDWBC, MCH, MCHC, RDW, MPV, NRBC, CMP:   Lab Results   Component Value Date    SODIUM 145 12/12/2022    K 3 9 12/12/2022     (H) 12/12/2022    CO2 24 12/12/2022    BUN 47 (H) 12/12/2022    CREATININE 1 19 12/12/2022    CALCIUM 7 9 (L) 12/12/2022    EGFR 56 12/12/2022     Imaging Studies: n/a    EKG, Pathology, and Other Studies: n/a    Counseling / Coordination of Care    Total floor / unit time spent today 30 minutes  Greater than 50% of total time was spent with the patient and / or family counseling and / or coordination of care  A description of the counseling / coordination of care: medication review, psychosocial support, chart review, lab review, goals of care and supportive listening

## 2022-12-12 NOTE — ASSESSMENT & PLAN NOTE
· Seen on LEADS  · Reviewed by vascular on 12/7 as LE wound appeared to have increased - vascular disease possibly contributing to non-healing LE wounds but not a candidate for intervention at present in the setting of current infections/respiratory status and co-morbidities  ·  Plavix, statin ordered - unable to take as NPO due to dysphagia

## 2022-12-13 NOTE — ASSESSMENT & PLAN NOTE
· Seen on LEADS  · Reviewed by vascular on 12/7 as LE wound appeared to have increased - vascular disease possibly contributing to non-healing LE wounds but not a candidate for intervention at present in the setting of current infections/respiratory status and co-morbidities  · Continue Plavix and statin

## 2022-12-13 NOTE — PROGRESS NOTES
[address] [facility]  Progress Note Jan Moreno 1940, 80 y o  male MRN: [id number]  Unit/Bed#: [facility] 431-01 Encounter: [id number]  Primary Care Provider: Ezra Alcala DO   Date and time admitted to hospital: 11/23/2022  4:04 AM    UTI (urinary tract infection)  Assessment & Plan  · Presentation as above  · Urine culture (11/20) - E  Coli  · Initially on Cefepime, then Cefazolin, then Cephalexin  · Completed antibiotic course  · Had septic shock which has resolved    Atrial fibrillation with RVR (HCC)  Assessment & Plan  · Rate controlled   · eliquis and metoprolol resumed    Hypernatremia  Assessment & Plan  · Due to hypovolemia  · Resolved with IVF's    Pneumonia  Assessment & Plan  · Noted overnight on 12/4 when he had fever, tachycardia, tachypnea, worsening hypoxia   · Procalcitonin elevated  · Aspiration suspected  · CT chest (12/7) - Interval development of right lower lobe airspace disease possibly atelectasis or developing pneumonia, potentially aspiration pneumonia  Moderate left basilar effusion and compressive atelectasis redemonstrated  New tree-in-bud opacities in the right upper lobe worrisome for pneumonitis/pneumonia  · Complete 7 day course of Cefepime   · Pulm were following - input appreciated    Goals of care, counseling/discussion  Assessment & Plan  · Palliative were following - family noted that he had improved and wanted to continue treatment of medical conditions followed by discharge to rehab  · Now made NPO on 12/9 due to severe dysphagia  · Palliative care following again from 12/11   · Is pending VBS today on 12/13  · They may want PEG placement as wife was informed by a family member that it was reversible      RSV (acute bronchiolitis due to respiratory syncytial virus)  Assessment & Plan  · Resp protocol  · Supportive care   · On Solumedrol - being tapered q 3 days per pulm recommendations            PAD (peripheral artery disease) [facility])  Assessment & Plan  · Seen on LEADS  · Reviewed by vascular on 12/7 as LE wound appeared to have increased - vascular disease possibly contributing to non-healing LE wounds but not a candidate for intervention at present in the setting of current infections/respiratory status and co-morbidities  · Continue Plavix and statin       Deep tissue injury to right lateral ankle and left heel  Assessment & Plan  · Wound care/podiatry input appreciated  · AZ abnormal - reviewed by vascular - not a candidate for intervention at present, ct wound care    Dysphagia  Assessment & Plan  · High risk for aspiration  · Was cleared by speech for pureed with honey thick liquids with strict aspiration precautions but repeat VBS on 12/9 showed severe aspiration and speech recommended he be placed NPO  · Seen by ENT - no vocal cord dysfunction   · Patient and family did not want PEG tube but today are reconsidering PEG   · Repeat VBS on 12/13       Ambulatory dysfunction  Assessment & Plan  Multifactorial with deconditioning contributing  Safe ambulation  Fall precautions  Physical therapy    Hematuria  Assessment & Plan  · As above    Transaminitis  Assessment & Plan  · Likely due to shock liver  · Resolved     Closed fracture of trochanter of right femur with routine healing  Assessment & Plan  · Admitted to [address] [place] [address] from 10/31/22 to 11/6/22 with right hip fracture  · S/p repair     · Was undergoing rehab at [facility]  · Physical therapy      Acute on chronic anemia  Assessment & Plan  · Multifactorial  · Initially had hematuria  · 1 unit PRBC on 12/6 and 1 on 12/7  · Seen by GI on 12/7 - brown stools on rectal exam  Since no overt GI bleeding and high risk for endoscopy at present monitor at present, ok for Eliquis  · CT C/A/P - per radiology may have hemorrhagic prostatitis - D/w urology on 12/7 - enlarged prostate, as urine clear no contraindication to Eliquis, Proscar added  · Eliquis restarted on 12/8 and changed to heparin drip on 12/9 when made NPO due to severe dysphagia  · Hb stable  · Monitor hemoglobin and transfuse as needed    Chronic diastolic heart failure [facility])  Assessment & Plan  Wt Readings from Last 3 Encounters:   12/13/22 62 2 kg (137 lb 2 oz)   11/23/22 74 5 kg (164 lb 3 9 oz)   11/22/22 70 8 kg (156 lb)     · Echo 03/01/2022 with EF 45%  · Off diuretics due to JOEL and now NPO status  · To be on IV fluids while patient is n p o  and pending speech evaluation  · Monitor volume status            Chronic atrial fibrillation (HCC)  Assessment & Plan  Continue amiodarone, beta-blocker  Eliquis for anticoagulation    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  Assessment & Plan  Lab Results   Component Value Date    EGFR 64 12/13/2022    EGFR 56 12/12/2022    EGFR 50 12/11/2022    CREATININE 1 07 12/13/2022    CREATININE 1 19 12/12/2022    CREATININE 1 31 (H) 12/11/2022     Estimated Creatinine Clearance: 46 8 mL/min (by C-G formula based on SCr of 1 07 mg/dL)  Baseline creatinine 1 8 to 2  Initially had malpositioned zavala with urethral injury, then creatinine elevated due to volume overload which improved with diuresis   Now creatinine increasing since 12/4 in the setting of new aspiration pneumonia/RSV with decreased oral intake  Current creatinine peaked at 2 85 on 12/6 and improved to 1 07 today  Received 1 unit PRBC on 12/6 and 1 on 12/7  Now NPO due to severe dysphagia  Continue gentle IVF's  Monitor creatinine  Avoid nephrotoxic medications and hypotension  Discussed with nephrology - input appreciated    Acute respiratory failure with hypoxia [facility])  Assessment & Plan  · Resolved  · Began overnight on 12/4  · Due to RSV with likely superimposed bacterial pneumonia with concern for possible aspiration  · Had required high flow O2      Severe sepsis (HCC)  Assessment & Plan  · Noted overnight on 12/4 due to aspiration pneumonia  · Now resolved  · On admission had septic shock due to UTI requiring pressors    CAD (coronary artery disease)  Assessment & Plan  Continue BB,  Plavix and statin      * Urethral trauma  Assessment & Plan  · Hospitalized from 10/31/22 to 11/6/22 for right hip fracture - s/p ORIF  Postoperatively he developed urinary retention and he was discharged with a zavala  · Presented to the ED at [address] [place] [address] on 11/20/22 with lower abdominal pain - was noted to have a UTI and was discharged back to rehab on Augmentin  · Brought back to the ED at [address] [place] [address] on 11/21/22 with abdominal pain and lethargy  Noted to be hypotensive with lactic acid > 10, septic shock from UTI and was on pressors in the ICU  · Also noted to have hematuria and was on CBI  Hematuria persisted and developed abdominal distension with failure of CBI drainage  · CT abdomen (11/23/22) - "The Zavala catheter is malpositioned  The catheter balloon is located near the base of the penis, below the level of the prostate  There is gas within the soft tissues of the perineum and penis, most pronounced on the left  Urethral injury is suspected "  · Transferred emergently to [facility] and underwent suprapubic catheter placement on 11/23  · Hematuria resolved          Hyperglycemia-resolved as of 11/29/2022  Assessment & Plan  A1c 4 6  Resolved    UTI (urinary tract infection)-resolved as of 12/4/2022  Assessment & Plan  Antibiotics transition to IV cefazolin based on culture sensitivities  Transitioned to p o  Keflex completed 7 day course of antibiotics    Septic shock (HCC)-resolved as of 12/4/2022  Assessment & Plan  · Present on admission  · Resolved          VTE Pharmacologic Prophylaxis: VTE Score: 21 High Risk (Score >/= 5) - Pharmacological DVT Prophylaxis Ordered: heparin drip  Sequential Compression Devices Ordered  Patient Centered Rounds: I performed bedside rounds with nursing staff today    Discussions with Specialists or Other Care Team Provider: n/a    Education and Discussions with Family / Patient: Attempted to update  (wife) via phone  Left voicemail  Time Spent for Care: 30 minutes  More than 50% of total time spent on counseling and coordination of care as described above  Current Length of Stay: 20 day(s)  Current Patient Status: Inpatient   Certification Statement: The patient will continue to require additional inpatient hospital stay due to Pending VBS today  Discharge Plan: To facility    Code Status: Level 3 - DNAR and DNI    Subjective:    denies any acute complaints during my evaluation he is pending video barium swallow    Objective:     Vitals:   Temp (24hrs), Av 6 °F (36 4 °C), Min:97 4 °F (36 3 °C), Max:98 °F (36 7 °C)    Temp:  [97 4 °F (36 3 °C)-98 °F (36 7 °C)] 97 5 °F (36 4 °C)  HR:  [78-84] 78  Resp:  [18] 18  BP: (151-165)/(73-96) 151/73  SpO2:  [98 %] 98 %  Body mass index is 20 85 kg/m²  Input and Output Summary (last 24 hours): Intake/Output Summary (Last 24 hours) at 2022 1612  Last data filed at 2022 1400  Gross per 24 hour   Intake 1533 05 ml   Output 1100 ml   Net 433 05 ml       Physical Exam:   Physical Exam  Vitals and nursing note reviewed  Constitutional:       General: He is not in acute distress  Appearance: He is well-developed  He is not ill-appearing, toxic-appearing or diaphoretic  HENT:      Head: Normocephalic and atraumatic  Eyes:      General: No scleral icterus  Conjunctiva/sclera: Conjunctivae normal    Cardiovascular:      Rate and Rhythm: Normal rate  Rhythm irregular  Heart sounds: No murmur heard  No friction rub  No gallop  Pulmonary:      Effort: Pulmonary effort is normal  No respiratory distress  Breath sounds: Normal breath sounds  No stridor  No wheezing or rhonchi  Abdominal:      General: There is no distension  Palpations: Abdomen is soft  There is no mass  Tenderness: There is no abdominal tenderness  There is no guarding or rebound  Hernia: No hernia is present     Musculoskeletal:         General: No swelling or tenderness  Cervical back: Neck supple  Skin:     General: Skin is warm and dry  Capillary Refill: Capillary refill takes less than 2 seconds  Coloration: Skin is not jaundiced or pale  Neurological:      Mental Status: He is alert  Psychiatric:         Mood and Affect: Mood normal           Additional Data:     Labs:  Results from last 7 days   Lab Units 12/13/22  0438   WBC Thousand/uL 9 68   HEMOGLOBIN g/dL 9 7*   HEMATOCRIT % 31 3*   PLATELETS Thousands/uL 119*     Results from last 7 days   Lab Units 12/13/22  0438 12/09/22  0524 12/08/22  0511 12/07/22  1852 12/07/22  0527   SODIUM mmol/L 147   < > 143   < > 148*   POTASSIUM mmol/L 4 0   < > 3 5   < > 3 9   CHLORIDE mmol/L 118*   < > 109*   < > 113*   CO2 mmol/L 24   < > 28   < > 30   BUN mg/dL 41*   < > 83*   < > 89*   CREATININE mg/dL 1 07   < > 2 12*   < > 2 61*   ANION GAP mmol/L 5   < > 6   < > 5   CALCIUM mg/dL 8 4   < > 8 2*   < > 8 1*   ALBUMIN g/dL  --   --  2 0*   < > 2 0*   TOTAL BILIRUBIN mg/dL  --   --   --   --  0 93   ALK PHOS U/L  --   --   --   --  92   ALT U/L  --   --   --   --  22   AST U/L  --   --   --   --  40   GLUCOSE RANDOM mg/dL 94   < > 194*   < > 232*    < > = values in this interval not displayed       Results from last 7 days   Lab Units 12/09/22  1828   INR  1 38*     Results from last 7 days   Lab Units 12/13/22  1308 12/13/22  0524 12/13/22  0003 12/12/22  1623 12/12/22  1122 12/12/22  0610 12/11/22  1749 12/11/22  1611 12/11/22  1054 12/11/22  0619 12/11/22  0103 12/10/22  2118   POC GLUCOSE mg/dl 102 90 97 128 100 103 137 165* 125 101 110 125         Results from last 7 days   Lab Units 12/08/22  0500   PROCALCITONIN ng/ml 0 88*       Lines/Drains:  Invasive Devices     Peripherally Inserted Central Catheter Line  Duration           PICC Line 89/01/04 Right Basilic <1 day          Drain  Duration           Suprapubic Catheter 18 Fr  20 days                Central Line:  Goal for removal: Will discontinue when peripheral access obtained  Imaging: No pertinent imaging reviewed  Recent Cultures (last 7 days):         Last 24 Hours Medication List:   Current Facility-Administered Medications   Medication Dose Route Frequency Provider Last Rate   • acetaminophen  650 mg Rectal Q6H PRN Raz Connell PA-C     • albuterol  2 puff Inhalation Q4H PRN Jane Vivar MD     • amiodarone  200 mg Oral Daily With Breakfast Tamra Staley MD     • apixaban  2 5 mg Oral BID Wilmar Back, DO     • atorvastatin  40 mg Oral Daily With Kamryn Carpenter MD     • clopidogrel  75 mg Oral Daily Juliocesar Vallejo DO     • doxylamine  12 5 mg Oral HS PRN Raz Connell PA-C     • ferrous sulfate  325 mg Oral Daily With Breakfast Parul Gabriel MD     • guaiFENesin  400 mg Oral BID Stefano Sacks, PA-C     • HYDROmorphone  0 2 mg Intravenous Q4H PRN Wilmar Back DO     • insulin lispro  1-5 Units Subcutaneous TID AC Jane Vivar MD     • Lidocaine Viscous HCl  15 mL Swish & Spit 4x Daily PRN Angeline Alpers, DO     • LORazepam  0 5 mg Intravenous Q6H PRN Nicole Enciso DO     • melatonin  6 mg Oral HS Stefano Sacks, PA-C     • methylPREDNISolone sodium succinate  20 mg Intravenous Daily Jane Vivar MD     • metoprolol tartrate  25 mg Oral Q12H [facility] Wilmar Back, DO     • oxyCODONE  2 5 mg Oral Q4H PRN Wilmar Back DO      Or   • oxyCODONE  5 mg Oral Q4H PRN Wilmar Back DO     • pantoprazole  40 mg Intravenous Q24H Rach Mcdaniels MD     • saliva substitute  5 spray Mouth/Throat 4x Daily PRN Alexei Edward MD          Today, Patient Was Seen By: Laura Staley DO    **Please Note: This note may have been constructed using a voice recognition system  **

## 2022-12-13 NOTE — NURSING NOTE
Discussed pt's PICC with Dr Roger Gonzales  PICC tip in high SVC and pt has the potential to have PICC long term or possibly discharged with PICC  Agree to exchange current PICC for a longer measurement for optimal tip location (ACJ)

## 2022-12-13 NOTE — ASSESSMENT & PLAN NOTE
· Hospitalized from 10/31/22 to 11/6/22 for right hip fracture - s/p ORIF  Postoperatively he developed urinary retention and he was discharged with a zavala  · Presented to the ED at [address] [place] [address] on 11/20/22 with lower abdominal pain - was noted to have a UTI and was discharged back to rehab on Augmentin  · Brought back to the ED at [address] [place] [address] on 11/21/22 with abdominal pain and lethargy  Noted to be hypotensive with lactic acid > 10, septic shock from UTI and was on pressors in the ICU  · Also noted to have hematuria and was on CBI  Hematuria persisted and developed abdominal distension with failure of CBI drainage  · CT abdomen (11/23/22) - "The Zavala catheter is malpositioned  The catheter balloon is located near the base of the penis, below the level of the prostate  There is gas within the soft tissues of the perineum and penis, most pronounced on the left    Urethral injury is suspected "  · Transferred emergently to [facility] and underwent suprapubic catheter placement on 11/23  · Hematuria resolved

## 2022-12-13 NOTE — ASSESSMENT & PLAN NOTE
· Palliative were following - family noted that he had improved and wanted to continue treatment of medical conditions followed by discharge to rehab  · Now made NPO on 12/9 due to severe dysphagia  · Palliative care following again from 12/11   · Is pending VBS today on 12/13  · They may want PEG placement as wife was informed by a family member that it was reversible

## 2022-12-13 NOTE — ASSESSMENT & PLAN NOTE
Lab Results   Component Value Date    EGFR 64 12/13/2022    EGFR 56 12/12/2022    EGFR 50 12/11/2022    CREATININE 1 07 12/13/2022    CREATININE 1 19 12/12/2022    CREATININE 1 31 (H) 12/11/2022     Estimated Creatinine Clearance: 46 8 mL/min (by C-G formula based on SCr of 1 07 mg/dL)  Baseline creatinine 1 8 to 2  Initially had malpositioned zavala with urethral injury, then creatinine elevated due to volume overload which improved with diuresis   Now creatinine increasing since 12/4 in the setting of new aspiration pneumonia/RSV with decreased oral intake  Current creatinine peaked at 2 85 on 12/6 and improved to 1 07 today  Received 1 unit PRBC on 12/6 and 1 on 12/7  Now NPO due to severe dysphagia  Continue gentle IVF's  Monitor creatinine  Avoid nephrotoxic medications and hypotension  Discussed with nephrology - input appreciated

## 2022-12-13 NOTE — ASSESSMENT & PLAN NOTE
Wt Readings from Last 3 Encounters:   12/13/22 62 2 kg (137 lb 2 oz)   11/23/22 74 5 kg (164 lb 3 9 oz)   11/22/22 70 8 kg (156 lb)     · Echo 03/01/2022 with EF 45%     · Off diuretics due to JOEL and now NPO status  · To be on IV fluids while patient is n p o  and pending speech evaluation  · Monitor volume status

## 2022-12-13 NOTE — PROCEDURES
Video Swallow Study      Patient Name: Jose Briseno  PVULP'F Date: 12/13/2022        Past Medical History  Past Medical History:   Diagnosis Date   • CHF (congestive heart failure) ([facility] [address] )    • CKD (chronic kidney disease) stage 3, GFR 30-59 ml/min ([facility])    • Coronary artery disease    • Hyperlipidemia    • Hypertension    • Mitral regurgitation    • Paroxysmal atrial fibrillation [facility])    • Urinary retention         Past Surgical History  Past Surgical History:   Procedure Laterality Date   • CARDIAC SURGERY      cabg x 2 2014 [facility]   • CORONARY ANGIOPLASTY WITH STENT PLACEMENT  10/2021    DAPHNEY to left main and ramus   • MITRAL VALVE REPAIR  2014    mitral ring   • ND CYSTOURETHROSCOPY W/IRRIG & EVAC CLOTS N/A 11/23/2022    Procedure: CYSTOSCOPY;  Surgeon: Keo Figueredo MD;  Location: [facility];  Service: Urology   • ND OPEN RX FEMUR FX+INTRAMED HALIMA Right 11/1/2022    Procedure: INSERTION NAIL IM FEMUR ANTEGRADE (TROCHANTERIC); Surgeon: Katelyn Payne; Location: [facility];  Service: Orthopedics   • SUPRAPUBIC TUBE PLACEMENT N/A 11/23/2022    Procedure: INSERTION SUPRAPUBIC CATHETER PERCUTANEOUS;  Surgeon: Keo Figueredo MD;  Location: [facility];  Service: Urology       Modified (Video) Barium Swallow Study    Summary:  Images are on PACS for review  Pt presents presents w/ mild-mod oral, mod-severe pharyngeal dysphagia, slight improvement from prior study  Swallow initiation w/ mod-severe delay w/ all material spilling deep into the pharynx prior to initiation  "Driving forces" of the swallow are reduced/weak, min hyo-laryngeal elevation, epiglottic inversion and pharyngeal constriction  Mod-severe pharyngeal retention w/ all, not fully cleared w/ secondary swallows  Retention appears to be result of reduced drive as well as reduced PES relaxation and suspected osteophytes impeding bolus flow   Aspiration during the swallow w/ tsps of thin, nectar thick via tsp and cup  Cough response only to gross aspiration of nectar thick  Several strategies attempted not successful in eliminating aspiration or severe retention  Recommendations:  Diet: Puree w/ honey thick if pt agreeable   Meds: Non-oral if possible   Strategies: Tsps   Frequent oral care  Upright position  F/u ST tx: Yes   Therapy Prognosis: Fair   Prognosis considerations: Age, current medical, dysphagia tx course    Aspiration Precautions  Reflux Precautions  Consider consult with:   Results reviewed with: pt, nursing, family, physician   Aspiration precautions posted  Repeat MBS as necessary  If a dedicated assessment of the esophagus is desired, consider esophagram/barium swallow or EGD  Goals:  Pt will tolerate least restrictive diet w/out s/s aspiration or oral/pharyngeal difficulties  Patient's goal: none stated       Previous VBS:  Modified (Video) Barium Swallow Study 12/9/22     Summary:  Images are on PACS for review  Pt presents w/ mod oral, severe pharyngeal dysphagia  Mastication and oral manipulation mild-mod prolonged/reduced  Swallow initiation is mod delayed w/ all material spilling at least to the valleculae, thin liquids to the pyriforms  Hyo-laryngeal elevation and pharyngeal constriction are reduced  Epiglottic inversion is incomplete, min inversion  Mod pharyngeal retention  Laryngeal vestibule closure is incomplete  Overall poor coordination of the swallow results in aspiration across consistencies today  Gross aspiration w/ cup sip thin (appreciate immediate cough response)  Trace-mild aspiration of all other consistencies administered during the swallow as well as overflow aspiration noted 2/2 CP prominence and possible osteophtye at the CP impeding bolus flow  Majority of aspiration events are SILENT, cough to gross aspiration event w/ cup sip thin, and throat clear x1 w/ tsp pudding  Several strategies attempted were not successful in eliminating penetration/aspiration  Ultimately, pt at high risk for aspiration w/ all PO at this time          Recommendations:  Diet: NPO   Liquids: NPO   Meds: Non-oral if possible     Modified (Video) Barium Swallow Study 11/30/22     Summary:  Images are on PACS for review  Some images labeled as cup were straw sips nt (se 16 is by straw)      Oral stage/Pharyngeal stage: Moderately impaired keely by reduced retrieval and prolonged/labored manipulation & bolus formation of all material   Transfers are mod reduced and lingual retraction is reduced w/ post lingual residue w/ all material   Swallows are fairly prompt though his hyolaryngeal movement and pharyngeal constriction are both poor w/ retention in the valleculae & in the PS  Possible osteophyte at the CP impeding the bolus flow   Aspiration of nt & thin after the initial swallows on overflow from the PS w/o response   Cued to cough but his cough is too weak to clear   He was unable to complete physical strategies well  however alternating bites/sips was effective in clearing partial retention   No aspiration w/ ht     ?able vocal cord weakness  Per gross esophageal screen:  [facility]        Recommendations:  Diet: puree  Liquids: honey thick   Meds: crushed in puree   Strategies: alternate bites/sips         Does the pt have pain? No  If yes, was nursing made aware/was it addressed? N/A     Swallow Mechanism Exam  Facial: symmetrical  Labial: WFL  Lingual: WFL  Velum: symmetrical  Mandible: adequate ROM  Dentition: adequate  Vocal quality:weak though improved   Volitional Cough: weak   Respiratory Status: on RA     Swallow Information   Current Risks for Dysphagia & Aspiration: known history of dysphagia and known history of aspiration  Current Diet: NPO   Baseline Diet: regular diet and thin liquids      Consistencies Administered:  Pt was viewed sitting upright in the lateral and AP positions   Due to concerns for patient safety / patient refusal, trials provided deviated from the [facility] Validated Protocol  Trials consisted of honey thick via tsp and cup, nectar thick via tsp and cup, thin liquid via tsp, and pudding  Hard solid not administered 2/2 concerns for pharyngeal retention       Oral Impairment:  Lip Closure:  Complete   Tongue Control During Bolus Hold: fair   Bolus Preparation/Mastication: mastication not assessed today  Bolus Transport/Lingual Motion: fairly prompt   Oral Residue: w/ all, appropriate recollection   Initiation of the Pharyngeal Swallow:  Bolus head in the pyriforms, mod delay     Pharyngeal Impairment:  Soft Palate Elevation: complete   Laryngeal Elevation: reduced   Anterior Hyoid Excursion: reduced   Epiglottic Movement: min inversion   Laryngeal Vestibular Closure: incomplete   Pharyngeal Stripping Wave:  Diminished    PES Opening: reduced   Tongue Base Retraction: fairly complete   Pharyngeal Residue: mod w/ all, not fully cleared w/ secondary swallows     Screening of Esophageal Impairment   Esophageal Clearance: briefly assessed in lateral view, noted retention       Penetration/Aspiration:  Thin: rosanna aspiration w/ tsps (PAS 8)   Nectar: rosanna aspiration via cup sip (PAS 7), trace aspiration via tsp (PAS  8)  Honey: trace penetration via cup sip (PAS 4)    Puree: no laryngeal penetration or aspiration   Solid: no laryngeal penetration or aspiration   Response: cough response to aspiration of nectar, SILENT w// thin   Strategies: head turn L/R, prep set not effective     8-Point Penetration-Aspiration Scale   1 Material does not enter the airway   2 Material enters the airway, remains above the vocal folds, and is ejected  from the  airway    3 Material enters the airway, remains above the vocal folds, and is not ejected from the airway   4 Material enters the airway, contacts the vocal folds, and is ejected from the airway   5 Material enters the airway, contacts the vocal folds, and is not ejected from the airway    6 Material enters the airway, passes below the vocal folds and is ejected into the larynx or out of the airway    7 Material enters the airway, passes below the vocal folds, and is not ejected from the trachea despite effort    8 Material enters the airway, passes below the vocal folds, and no effort is made to eject

## 2022-12-13 NOTE — NURSING NOTE
Discussed CXR results with Radiologist, Dr Simone Woodruff and Dr Merlinda Pitts Dr Harlen Jakob agrees that it's acceptable to only withdrawn PICC 4cm (not 6cm as in radiology read report) as goal is to be AJC, not high SVC  Updated primary RN, Miley Samuels via [employer] to pull PICC back 4cm and repeat CXR

## 2022-12-13 NOTE — PROGRESS NOTES
PT has had poor nutrient intake during hospitalization, pending VBS, possible plans for PEG  If PEG placed recommend [email or web address], advance by 10mL every 6hrs to goal of 60mL/hr, water flushes 100mL every 4hrs provides total of 1728cal, 80g pro, 1762mL

## 2022-12-13 NOTE — SPEECH THERAPY NOTE
Speech Language/Pathology    Speech/Language Pathology Progress Note    Patient Name: Josafat Lowe  ACIIG'P Date: 12/13/2022     Problem List  Principal Problem:    Urethral trauma  Active Problems:    CAD (coronary artery disease)    Severe sepsis ([facility])    Acute respiratory failure with hypoxia ([facility])    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    Chronic atrial fibrillation ([facility])    Chronic diastolic heart failure ([facility])    Acute on chronic anemia    Closed fracture of trochanter of right femur with routine healing    Transaminitis    Hematuria    Ambulatory dysfunction    Dysphagia    Deep tissue injury to right lateral ankle and left heel    PAD (peripheral artery disease) ([facility])    RSV (acute bronchiolitis due to respiratory syncytial virus)    Goals of care, counseling/discussion    Pneumonia    Hypernatremia    Atrial fibrillation with RVR ([address] )    UTI (urinary tract infection)       Past Medical History  Past Medical History:   Diagnosis Date   • CHF (congestive heart failure) ([facility])    • CKD (chronic kidney disease) stage 3, GFR 30-59 ml/min ([facility])    • Coronary artery disease    • Hyperlipidemia    • Hypertension    • Mitral regurgitation    • Paroxysmal atrial fibrillation ([address] )    • Urinary retention         Past Surgical History  Past Surgical History:   Procedure Laterality Date   • CARDIAC SURGERY      cabg x 2 2014 [facility]   • CORONARY ANGIOPLASTY WITH STENT PLACEMENT  10/2021    DAPHNEY to left main and ramus   • MITRAL VALVE REPAIR  2014    mitral ring   • SD CYSTOURETHROSCOPY W/IRRIG & EVAC CLOTS N/A 11/23/2022    Procedure: CYSTOSCOPY;  Surgeon: Alex Lima MD;  Location:  MAIN OR;  Service: Urology   • SD OPEN RX FEMUR FX+INTRAMED HALIMA Right 11/1/2022    Procedure: INSERTION NAIL IM FEMUR ANTEGRADE (TROCHANTERIC); Surgeon: Kassy Hernandez;   Location:  MAIN OR;  Service: Orthopedics   • SUPRAPUBIC TUBE PLACEMENT N/A 11/23/2022    Procedure: INSERTION SUPRAPUBIC CATHETER PERCUTANEOUS; Surgeon: Farideh Gonzales MD;  Location: [facility];  Service: Urology         Subjective:  "What do we have to eat?"      Current Diet:  Puree w/ honey thick    Objective:  Pt seen for dx dysphagia tx following VBS  VBS findings thoroughly reviewed w/ pt  Education provided on identified aspiration of various consistencies and ongoing aspiration risk  Education provided on potential recommendation for resuming puree w/ honey thick w/ at least mild risk given pharyngeal retention and overall pharyngeal weakness  At this time, pt wishes to further discuss his options when his wife and physician are present, states he would now consider a feeding tube if needed  Called pt's wife to update regarding today's VBS findings  Pt's wife demonstrated understanding of findings, recommendations, options, and plan to s/w physician upon her arrival today  Pt did complete 4 oz of Magic Cup Ice cream during education  Labial seal is adequate for retrieval and oral containment  Transfers are adequate without significant oral residue  Pt w/ intermittent wet cough prior to, during, and after PO, not directly timed w/ swallows  Physician notified of all the aforementioned, ST to be available for questions as needed  Assessment:  Pt w/ good understanding of VBS findings, aspiration risk, and options for POC  Pt appears able to tolerate puree today  Plan for further discussions w/ family and physician regarding POC       Plan/Recommendations:  Continue puree w/ honey thick if pt agreeable  Frequent/thorough oral care  ST to f/u as able and appropriate

## 2022-12-13 NOTE — ASSESSMENT & PLAN NOTE
· Admitted to [address] [place] [address] from 10/31/22 to 11/6/22 with right hip fracture  · S/p repair     · Was undergoing rehab at [facility]  · Physical therapy

## 2022-12-13 NOTE — PROCEDURES
Insert PICC line    Date/Time: 12/13/2022 1:35 PM  Performed by: Divya Floyd RN  Authorized by: Radha Red DO     Patient location:  Bedside  Other Assisting Provider: Yes (comment) Jeannette Obrien RN)    Consent:     Consent obtained:  Written (Obtained by provider)    Consent given by:  Patient    Risks discussed:  Arterial puncture, bleeding, infection, incorrect placement, nerve damage and pneumothorax (Discussed by provider)    Alternatives discussed: Discussed by provider  Universal protocol:     Procedure explained and questions answered to patient or proxy's satisfaction: yes      Relevant documents present and verified: yes      Test results available and properly labeled: yes      Radiology Images displayed and confirmed  If images not available, report reviewed: yes      Required blood products, implants, devices, and special equipment available: yes      Site/side marked: yes      Immediately prior to procedure, a time out was called: yes      Patient identity confirmed:  Verbally with patient, arm band, provided demographic data and hospital-assigned identification number  Pre-procedure details:     Hand hygiene: Hand hygiene performed prior to insertion      Sterile barrier technique: All elements of maximal sterile technique followed      Skin preparation:  ChloraPrep    Skin preparation agent: Skin preparation agent completely dried prior to procedure    Indications:     PICC line indications: replacement    Sedation:     Sedation type: None  Anesthesia (see MAR for exact dosages): Anesthesia method:  Local infiltration    Local anesthetic:  Lidocaine 1% w/o epi (2 mL administered)  Procedure details:     Location:  [place]    Vessel type: vein      Laterality:  Right    Site selection rationale: Over the wire exchange of previously placed PICCs      Approach: percutaneous technique used      Patient position:  Flat    Procedural supplies:  Double lumen    Catheter size:  5 Fr    Landmarks identified: yes      Ultrasound guidance: yes (Over the wire exchange)      Ultrasound image availability:  Not saved (Unable to save image to Epic )    Sterile ultrasound techniques: Sterile gel and sterile probe covers were used      Number of attempts:  1    Successful placement: yes      Vessel of catheter tip end:  Chest Xray needed to confirm placement    Total catheter length (cm):  48    Catheter out on skin (cm):  0    Max flow rate:  999 ml/hr    Arm circumference:  25 5  Post-procedure details:     Post-procedure:  Dressing applied and securement device placed    Assessment:  Blood return through all ports, free fluid flow and placement verification pending x-ray result (Pink stickers applied to both lumens )    Post-procedure complications: local hematoma      Post-procedure complications comment:  Hematoma at insertion site from previous placement  Patient tolerance of procedure:   Tolerated well, no immediate complications  Comments:      Marva Murray Ref# [id number] Lot# [id number] Exp: 5/31/2023

## 2022-12-14 NOTE — ASSESSMENT & PLAN NOTE
· Admitted to [address] [place] [address] from 10/31/22 to 11/6/22 with right hip fracture  · S/p repair     · Was undergoing rehab at [facility]  · Physical therapy

## 2022-12-14 NOTE — ASSESSMENT & PLAN NOTE
· Palliative were following - family noted that he had improved and wanted to continue treatment of medical conditions followed by discharge to rehab  · Now made NPO on 12/9 due to severe dysphagia  · Palliative care following again from 12/11   · Anticipating PEG tube placement if patient does not have increased oral intake with ongoing poor appetite and dysphagia

## 2022-12-14 NOTE — ASSESSMENT & PLAN NOTE
· High risk for aspiration  · Was cleared by speech for pureed with honey thick liquids with strict aspiration precautions but repeat VBS on 12/9 showed severe aspiration and speech recommended he be placed NPO  · Seen by ENT - no vocal cord dysfunction   · Patient and family did not want PEG tube but today are reconsidering PEG   · Repeat VBS on 12/13 -patient able to tolerate purée  · Continue on puréed diet and monitor, will measure nutritional intake but I suspect patient may require a PEG tube    Discussed with family

## 2022-12-14 NOTE — WOUND OSTOMY CARE
Progress Note - Wound   Bhavani Villasenor 80 y o  male MRN: [id number]  Unit/Bed#: [facility] 431-01 Encounter: [id number]        Assessment:   Patient seen today for wound care follow up visit  Patient on P-500 specialty mattress, prevalon boots on  Patient is completely dependent for all care, has supra pubic catheter and is incontinent of bowel  Patient has poor appetite, dislikes thickened liquid  Patient's overall appearance has improved this week  Family deciding goals of care vs optimal management with PEG tube placement due to poor appetite  Wounds in general seem to have improved this week      1  POA Right anterior foot/ankle- unclear as to etiology of wound as wound presents as pressure, however is not in typical location of pressure injury  Purple, intact skin does not navdeep, no drainage present  Wound is larger in size this assessment       2  POA evolving DTI sacral/buttocks now presents as unstageable wound-  some partial thickness skin loss with yellow wound bed and small amount of drainage  Recommending Calazime cream moving forward due to soiled Allevyn foam      3  POA DTI right heel- black and purple intact nonblanchable erythema and fragile, no drainage  DTI's have the potential to evolve into full thickness unstageable, stage III, or stage IV wounds       4  POA DTI right lateral ankle/leg- intact, purple nonblanchable erythema significantly larger in size than previous assessment, no drainage  DTI's have the potential to evolve into full thickness unstageable, stage III, or stage IV wounds  Recommending placing Xeroform gauze and then Allevyn due to fragile, lifting skin       5  Right lateral foot- circular, nonblanchable erythema, intact, no drainage present, new this assessment  Currently trying to rule out vascular component       6  Left lateral ankle/heel- multiple small circular nonblanchable erythema, intact with no drainage   Currently ruling out vascular over pressure as wound is new this assessment       7  HA DTI left lateral thigh- purple, nonblanchable erythema, intact, no drainage present       No induration, fluctuance, odor, warmth/temperature differences, redness, or purulence noted to the above noted wounds and skin areas assessed  New dressings applied per orders listed below  Patient tolerated well- no s/s of non-verbal pain or discomfort observed during the encounter  Bedside nurse aware of plan of care  See flow sheets for more detailed assessment findings  Wound care will continue to follow         Skin care Plan:  1-Apply Xeroform gauze to right lateral and medial foot and cover Allevyn silicone bordered foam to B/L heel and feet kim t for treatment change every other day and as needed for soilage and displacement  2-Turn/reposition q2h or when medically stable for pressure re-distribution on skin   3-Elevate heels to offload pressure, prevalon boots in bed   4-Moisturize skin daily with skin nourishing cream  5-Ehob cushion in chair when out of bed    6-Calazime paste to sacrum/buttocks TID and PRN  7-P-500 specialty mattress       Wound 11/21/22 Sacrum (Active)   Wound Image   12/14/22 1041   Wound Description Pink;Yellow 12/14/22 1041   Pressure Injury Stage U 12/14/22 1041   Rosaura-wound Assessment Fragile;Pink 12/14/22 1041   Wound Length (cm) 5 cm 12/14/22 1041   Wound Width (cm) 4 cm 12/14/22 1041   Wound Depth (cm) 0 1 cm 12/14/22 1041   Wound Surface Area (cm^2) 20 cm^2 12/14/22 1041   Wound Volume (cm^3) 2 cm^3 12/14/22 1041   Calculated Wound Volume (cm^3) 2 cm^3 12/14/22 1041   Tunneling 0 cm 12/14/22 1041   Tunneling in depth located at 0 12/14/22 1041   Undermining 0 12/14/22 1041   Undermining is depth extending from 0 12/14/22 1041   Wound Site Closure CHANDAN 12/14/22 1041   Drainage Amount Scant 12/14/22 1041   Drainage Description Serosanguineous 12/14/22 1041   Non-staged Wound Description Partial thickness 12/14/22 1041   Treatments Cleansed 12/14/22 1041   Dressing Moisture barrier 12/14/22 1041   Wound packed? No 12/14/22 1041   Packing- # removed 0 12/14/22 1041   Packing- # inserted 0 12/14/22 1041   Dressing Changed New 12/14/22 1041   Patient Tolerance Tolerated well 12/14/22 1041   Dressing Status Clean;Dry; Intact 12/14/22 1041       Wound 11/21/22 Heel Right (Active)   Wound Image   12/14/22 1036   Wound Description Eschar 12/14/22 1036   Pressure Injury Stage DTPI 12/07/22 2100   Rosaura-wound Assessment Clean;Dry; Intact 12/14/22 1036   Wound Length (cm) 6 5 cm 12/14/22 1036   Wound Width (cm) 7 cm 12/14/22 1036   Wound Depth (cm) 0 cm 12/14/22 1036   Wound Surface Area (cm^2) 45 5 cm^2 12/14/22 1036   Wound Volume (cm^3) 0 cm^3 12/14/22 1036   Calculated Wound Volume (cm^3) 0 cm^3 12/14/22 1036   Tunneling 0 cm 12/14/22 1036   Tunneling in depth located at 0 12/14/22 1036   Undermining 0 12/14/22 1036   Undermining is depth extending from 0 12/14/22 1036   Wound Site Closure CHANDAN 12/14/22 1036   Drainage Amount None 12/14/22 1036   Non-staged Wound Description Not applicable 50/21/47 1342   Treatments Cleansed 12/14/22 1036   Dressing Foam, Silicon (eg  Allevyn, etc) 12/14/22 1036   Wound packed? No 12/14/22 1036   Packing- # removed 0 12/14/22 1036   Packing- # inserted 0 12/14/22 1036   Dressing Changed New 12/14/22 1036   Patient Tolerance Tolerated well 12/14/22 1036   Dressing Status Clean;Dry; Intact 12/14/22 1036       Wound 11/23/22 Pretibial Distal;Right (Active)   Wound Image   12/14/22 1038   Wound Description Epithelialization;Fragile; Non-blanchable erythema 12/14/22 1038   Pressure Injury Stage DTPI 12/07/22 2100   Rosaura-wound Assessment Clean;Dry; Intact 12/14/22 1038   Wound Length (cm) 14 cm 12/14/22 1038   Wound Width (cm) 3 cm 12/14/22 1038   Wound Depth (cm) 0 cm 12/14/22 1038   Wound Surface Area (cm^2) 42 cm^2 12/14/22 1038   Wound Volume (cm^3) 0 cm^3 12/14/22 1038   Calculated Wound Volume (cm^3) 0 cm^3 12/14/22 1038   Tunneling 0 cm 12/14/22 1038 Tunneling in depth located at 0 12/14/22 1038   Undermining 0 12/14/22 1038   Undermining is depth extending from 0 12/14/22 1038   Wound Site Closure CHANDAN 12/14/22 1038   Drainage Amount None 12/14/22 1038   Non-staged Wound Description Not applicable 54/92/57 0232   Treatments Cleansed 12/14/22 1038   Dressing Xeroform; Foam, Silicon (eg  Allevyn, etc) 12/14/22 1038   Wound packed? No 12/14/22 1038   Packing- # removed 0 12/14/22 1038   Packing- # inserted 0 12/14/22 1038   Dressing Changed New 12/14/22 1038   Patient Tolerance Tolerated well 12/14/22 1038   Dressing Status Clean;Dry; Intact 12/14/22 1038       Wound 11/30/22 Pressure Injury Ankle Right;Medial (Active)   Wound Image   12/14/22 1038   Wound Description Epithelialization;Eschar;Non-blanchable erythema;Fragile 12/14/22 1038   Pressure Injury Stage DTPI 12/07/22 2100   Rosaura-wound Assessment Clean;Dry; Intact 12/14/22 1038   Wound Length (cm) 10 cm 12/14/22 1038   Wound Width (cm) 8 cm 12/14/22 1038   Wound Depth (cm) 0 cm 12/14/22 1038   Wound Surface Area (cm^2) 80 cm^2 12/14/22 1038   Wound Volume (cm^3) 0 cm^3 12/14/22 1038   Calculated Wound Volume (cm^3) 0 cm^3 12/14/22 1038   Tunneling 0 cm 12/14/22 1038   Tunneling in depth located at 0 12/14/22 1038   Undermining 0 12/14/22 1038   Undermining is depth extending from 0 12/14/22 1038   Wound Site Closure CHANDAN 12/14/22 1038   Drainage Amount None 12/14/22 1038   Non-staged Wound Description Not applicable 04/01/91 9503   Treatments Cleansed 12/14/22 1038   Dressing Foam, Silicon (eg  Allevyn, etc); Xeroform 12/14/22 1038   Wound packed? No 12/14/22 1038   Packing- # removed 0 12/14/22 1038   Packing- # inserted 0 12/14/22 1038   Dressing Changed New 12/14/22 1038   Patient Tolerance Tolerated well 12/14/22 1038   Dressing Status Clean;Dry; Intact 12/14/22 1038       Wound 11/30/22 Foot Right;Lateral (Active)   Wound Image   12/14/22 1037   Wound Description Epithelialization;Fragile; Non-blanchable erythema 12/14/22 1037   Pressure Injury Stage DTPI 12/07/22 2100   Rosaura-wound Assessment Clean;Dry; Intact 12/14/22 1037   Wound Length (cm) 9 cm 12/14/22 1037   Wound Width (cm) 2 cm 12/14/22 1037   Wound Depth (cm) 0 cm 12/14/22 1037   Wound Surface Area (cm^2) 18 cm^2 12/14/22 1037   Wound Volume (cm^3) 0 cm^3 12/14/22 1037   Calculated Wound Volume (cm^3) 0 cm^3 12/14/22 1037   Tunneling 0 cm 12/14/22 1037   Tunneling in depth located at 0 12/14/22 1037   Undermining 0 12/14/22 1037   Undermining is depth extending from 0 12/14/22 1037   Wound Site Closure CHANDAN 12/14/22 1037   Drainage Amount None 12/12/22 2330   Non-staged Wound Description Not applicable 37/35/42 9330   Treatments Cleansed 12/14/22 1037   Dressing Foam, Silicon (eg  Allevyn, etc) 12/14/22 1037   Wound packed? No 12/14/22 1037   Packing- # removed 0 12/14/22 1037   Packing- # inserted 0 12/14/22 1037   Dressing Changed New 12/14/22 1037   Patient Tolerance Tolerated well 12/14/22 1037   Dressing Status Clean; Intact;Dry 12/14/22 1037       Wound 11/30/22 Foot Left;Lateral (Active)   Wound Image   12/07/22 1020   Wound Description Non-blanchable erythema 12/14/22 1400   Pressure Injury Stage DTPI 12/07/22 1020   Rosaura-wound Assessment Clean;Dry; Intact 12/14/22 1400   Wound Length (cm) 1 cm 12/14/22 1400   Wound Width (cm) 1 cm 12/14/22 1400   Wound Depth (cm) 0 cm 12/14/22 1400   Wound Surface Area (cm^2) 1 cm^2 12/14/22 1400   Wound Volume (cm^3) 0 cm^3 12/14/22 1400   Calculated Wound Volume (cm^3) 0 cm^3 12/14/22 1400   Tunneling 0 cm 12/14/22 1400   Tunneling in depth located at 0 12/14/22 1400   Undermining 0 12/14/22 1400   Undermining is depth extending from 0 12/14/22 1400   Wound Site Closure CHANDAN 12/14/22 1400   Drainage Amount None 12/14/22 1400   Non-staged Wound Description Not applicable 23/12/07 2070   Treatments Cleansed 12/14/22 1400   Dressing Foam, Silicon (eg  Allevyn, etc) 12/14/22 1400   Wound packed?  No 12/14/22 1400 Packing- # removed 0 12/14/22 1400   Packing- # inserted 0 12/14/22 1400   Dressing Changed New 12/14/22 1400   Patient Tolerance Tolerated well 12/14/22 1400   Dressing Status Clean;Dry; Intact 12/14/22 1400       Wound 11/30/22 Heel Left (Active)   Wound Image   12/14/22 1036   Wound Description Non-blanchable erythema 12/14/22 1036   Pressure Injury Stage DTPI 12/07/22 1020   Rosaura-wound Assessment Clean;Dry; Intact 12/14/22 1036   Wound Length (cm) 3 cm 12/14/22 1036   Wound Width (cm) 5 cm 12/14/22 1036   Wound Depth (cm) 0 cm 12/14/22 1036   Wound Surface Area (cm^2) 15 cm^2 12/14/22 1036   Wound Volume (cm^3) 0 cm^3 12/14/22 1036   Calculated Wound Volume (cm^3) 0 cm^3 12/14/22 1036   Tunneling 0 cm 12/14/22 1036   Tunneling in depth located at 0 12/14/22 1036   Undermining 0 12/14/22 1036   Undermining is depth extending from 0 12/14/22 1036   Wound Site Closure CHANDAN 12/14/22 1036   Drainage Amount None 12/14/22 1036   Non-staged Wound Description Not applicable 42/82/14 6174   Treatments Cleansed 12/14/22 1036   Dressing Foam, Silicon (eg  Allevyn, etc) 12/14/22 1036   Wound packed? No 12/14/22 1036   Packing- # removed 0 12/14/22 1036   Packing- # inserted 0 12/14/22 1036   Dressing Changed New 12/14/22 1036   Patient Tolerance Tolerated well 12/14/22 1036   Dressing Status Clean;Dry; Intact 12/14/22 1036       Wound 12/07/22 Thigh Right;Lateral (Active)   Wound Image   12/14/22 1040   Wound Description Non-blanchable erythema 12/14/22 1040   Pressure Injury Stage DTPI 12/14/22 1040   Rosaura-wound Assessment Clean;Dry; Intact 12/14/22 1040   Wound Length (cm) 0 2 cm 12/14/22 1040   Wound Width (cm) 3 cm 12/14/22 1040   Wound Depth (cm) 0 cm 12/14/22 1040   Wound Surface Area (cm^2) 0 6 cm^2 12/14/22 1040   Wound Volume (cm^3) 0 cm^3 12/14/22 1040   Calculated Wound Volume (cm^3) 0 cm^3 12/14/22 1040   Tunneling 0 cm 12/14/22 1040   Tunneling in depth located at 0 12/14/22 1040   Undermining 0 12/14/22 1040 Undermining is depth extending from 0 12/14/22 1040   Wound Site Closure CHANDAN 12/14/22 1040   Drainage Amount None 12/14/22 1040   Non-staged Wound Description Not applicable 58/92/37 8180   Treatments Cleansed 12/14/22 1040   Dressing Protective barrier 12/14/22 1040   Wound packed? No 12/14/22 1040   Packing- # removed 0 12/14/22 1040   Packing- # inserted 0 12/14/22 1040   Dressing Changed New 12/14/22 1040   Patient Tolerance Tolerated well 12/14/22 1040   Dressing Status Clean; Intact;Dry 12/14/22 1040         Brigette Vela BSN, RN, [facility]

## 2022-12-14 NOTE — ASSESSMENT & PLAN NOTE
· Hospitalized from 10/31/22 to 11/6/22 for right hip fracture - s/p ORIF  Postoperatively he developed urinary retention and he was discharged with a zavala  · Presented to the ED at [address] [place] [address] on 11/20/22 with lower abdominal pain - was noted to have a UTI and was discharged back to rehab on Augmentin  · Brought back to the ED at [address] [place] [address] on 11/21/22 with abdominal pain and lethargy  Noted to be hypotensive with lactic acid > 10, septic shock from UTI and was on pressors in the ICU  · Also noted to have hematuria and was on CBI  Hematuria persisted and developed abdominal distension with failure of CBI drainage  · CT abdomen (11/23/22) - "The Zavala catheter is malpositioned  The catheter balloon is located near the base of the penis, below the level of the prostate  There is gas within the soft tissues of the perineum and penis, most pronounced on the left    Urethral injury is suspected "  · Transferred emergently to [facility] and underwent suprapubic catheter placement on 11/23  · Hematuria resolved

## 2022-12-14 NOTE — ASSESSMENT & PLAN NOTE
Wt Readings from Last 3 Encounters:   12/14/22 63 2 kg (139 lb 5 3 oz)   11/23/22 74 5 kg (164 lb 3 9 oz)   11/22/22 70 8 kg (156 lb)     · Echo 03/01/2022 with EF 45%     ·   · Patient hypernatremic will give fluid bolus  · Monitor volume status

## 2022-12-14 NOTE — PROGRESS NOTES
[address] [facility]  Progress Note Joanna Hogan 1940, 80 y o  male MRN: [id number]  Unit/Bed#: [facility] 431-01 Encounter: [id number]  Primary Care Provider: Barbara España DO   Date and time admitted to hospital: 11/23/2022  4:04 AM    UTI (urinary tract infection)  Assessment & Plan  · Presentation as above  · Urine culture (11/20) - E  Coli  · Initially on Cefepime, then Cefazolin, then Cephalexin  · Completed antibiotic course  · Had septic shock which has resolved    Atrial fibrillation with RVR ([facility] [address] )  Assessment & Plan  · Rate controlled   · eliquis and metoprolol resumed    Hypernatremia  Assessment & Plan  · poor oral intake, will bolus 1 L today    Pneumonia  Assessment & Plan  · Noted overnight on 12/4 when he had fever, tachycardia, tachypnea, worsening hypoxia   · Procalcitonin elevated  · Aspiration suspected  · CT chest (12/7) - Interval development of right lower lobe airspace disease possibly atelectasis or developing pneumonia, potentially aspiration pneumonia  Moderate left basilar effusion and compressive atelectasis redemonstrated  New tree-in-bud opacities in the right upper lobe worrisome for pneumonitis/pneumonia  · Complete 7 day course of Cefepime   · Pulm were following - input appreciated    Goals of care, counseling/discussion  Assessment & Plan  · Palliative were following - family noted that he had improved and wanted to continue treatment of medical conditions followed by discharge to rehab  · Now made NPO on 12/9 due to severe dysphagia  · Palliative care following again from 12/11   · Anticipating PEG tube placement if patient does not have increased oral intake with ongoing poor appetite and dysphagia      RSV (acute bronchiolitis due to respiratory syncytial virus)  Assessment & Plan  · Resp protocol  · Supportive care   · On Solumedrol - being tapered q 3 days per pulm recommendations            PAD (peripheral artery disease) ([facility])  Assessment & Plan  · Seen on LEADS  · Reviewed by vascular on 12/7 as LE wound appeared to have increased - vascular disease possibly contributing to non-healing LE wounds but not a candidate for intervention at present in the setting of current infections/respiratory status and co-morbidities  · Continue Plavix and statin       Deep tissue injury to right lateral ankle and left heel  Assessment & Plan  · Wound care/podiatry input appreciated  · AZ abnormal - reviewed by vascular - not a candidate for intervention at present, ct wound care    Dysphagia  Assessment & Plan  · High risk for aspiration  · Was cleared by speech for pureed with honey thick liquids with strict aspiration precautions but repeat VBS on 12/9 showed severe aspiration and speech recommended he be placed NPO  · Seen by ENT - no vocal cord dysfunction   · Patient and family did not want PEG tube but today are reconsidering PEG   · Repeat VBS on 12/13 -patient able to tolerate purée  · Continue on puréed diet and monitor, will measure nutritional intake but I suspect patient may require a PEG tube  Discussed with family      Ambulatory dysfunction  Assessment & Plan  Multifactorial with deconditioning contributing  Safe ambulation  Fall precautions  Physical therapy    Hematuria  Assessment & Plan  · As above    Transaminitis  Assessment & Plan  · Likely due to shock liver  · Resolved     Closed fracture of trochanter of right femur with routine healing  Assessment & Plan  · Admitted to [address] [place] [address] from 10/31/22 to 11/6/22 with right hip fracture  · S/p repair     · Was undergoing rehab at [facility]  · Physical therapy      Acute on chronic anemia  Assessment & Plan  · Multifactorial  · Initially had hematuria  · 1 unit PRBC on 12/6 and 1 on 12/7  · Seen by GI on 12/7 - brown stools on rectal exam  Since no overt GI bleeding and high risk for endoscopy at present monitor at present, ok for Eliquis  · CT C/A/P - per radiology may have hemorrhagic prostatitis - D/w urology on 12/7 - enlarged prostate, as urine clear no contraindication to Eliquis, Proscar added  · Eliquis restarted on 12/8 and changed to heparin drip on 12/9 when made NPO due to severe dysphagia  · Hb stable  · Monitor hemoglobin and transfuse as needed    Chronic diastolic heart failure [facility])  Assessment & Plan  Wt Readings from Last 3 Encounters:   12/14/22 63 2 kg (139 lb 5 3 oz)   11/23/22 74 5 kg (164 lb 3 9 oz)   11/22/22 70 8 kg (156 lb)     · Echo 03/01/2022 with EF 45%  ·   · Patient hypernatremic will give fluid bolus  · Monitor volume status            Chronic atrial fibrillation (HCC)  Assessment & Plan  Continue amiodarone, beta-blocker  Eliquis for anticoagulation    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  Assessment & Plan  Lab Results   Component Value Date    EGFR 62 12/14/2022    EGFR 64 12/13/2022    EGFR 56 12/12/2022    CREATININE 1 09 12/14/2022    CREATININE 1 07 12/13/2022    CREATININE 1 19 12/12/2022     Estimated Creatinine Clearance: 46 7 mL/min (by C-G formula based on SCr of 1 09 mg/dL)  Baseline creatinine 1 8 to 2  Initially had malpositioned zavala with urethral injury, then creatinine elevated due to volume overload which improved with diuresis   Now creatinine increasing since 12/4 in the setting of new aspiration pneumonia/RSV with decreased oral intake  Current creatinine peaked at 2 85 on 12/6 and improved to 1 07 today  Received 1 unit PRBC on 12/6 and 1 on 12/7  Avoid nephrotoxic medications and hypotension  Discussed with nephrology - input appreciated  Kidney function stable    Acute respiratory failure with hypoxia [facility])  Assessment & Plan  · Resolved  · Began overnight on 12/4  · Due to RSV with likely superimposed bacterial pneumonia with concern for possible aspiration  · Had required high flow O2      Severe sepsis (HCC)  Assessment & Plan  · Noted overnight on 12/4 due to aspiration pneumonia  · Now resolved  · On admission had septic shock due to UTI requiring pressors    CAD (coronary artery disease)  Assessment & Plan  Continue BB,  Plavix and statin      * Urethral trauma  Assessment & Plan  · Hospitalized from 10/31/22 to 11/6/22 for right hip fracture - s/p ORIF  Postoperatively he developed urinary retention and he was discharged with a zavala  · Presented to the ED at [address] [place] [address] on 11/20/22 with lower abdominal pain - was noted to have a UTI and was discharged back to rehab on Augmentin  · Brought back to the ED at [address] [place] [address] on 11/21/22 with abdominal pain and lethargy  Noted to be hypotensive with lactic acid > 10, septic shock from UTI and was on pressors in the ICU  · Also noted to have hematuria and was on CBI  Hematuria persisted and developed abdominal distension with failure of CBI drainage  · CT abdomen (11/23/22) - "The Zavala catheter is malpositioned  The catheter balloon is located near the base of the penis, below the level of the prostate  There is gas within the soft tissues of the perineum and penis, most pronounced on the left  Urethral injury is suspected "  · Transferred emergently to [facility] and underwent suprapubic catheter placement on 11/23  · Hematuria resolved          Hyperglycemia-resolved as of 11/29/2022  Assessment & Plan  A1c 4 6  Resolved    UTI (urinary tract infection)-resolved as of 12/4/2022  Assessment & Plan  Antibiotics transition to IV cefazolin based on culture sensitivities  Transitioned to p o  Keflex completed 7 day course of antibiotics    Septic shock (HCC)-resolved as of 12/4/2022  Assessment & Plan  · Present on admission  · Resolved          VTE Pharmacologic Prophylaxis: VTE Score: 21 High Risk (Score >/= 5) - Pharmacological DVT Prophylaxis Ordered: apixaban (Eliquis)  Sequential Compression Devices Ordered  Patient Centered Rounds: I performed bedside rounds with nursing staff today    Discussions with Specialists or Other Care Team Provider: n/a    Education and Discussions with Family / Patient: Updated contact person (wife) at bedside  Time Spent for Care: 30 minutes  More than 50% of total time spent on counseling and coordination of care as described above  Current Length of Stay: 21 day(s)  Current Patient Status: Inpatient   Certification Statement: The patient will continue to require additional inpatient hospital stay due to Pending nutrition assessment, may require PEG tube placement  Discharge Plan: Anticipate discharge in >72 hrs to home with home services  Code Status: Level 3 - DNAR and DNI    Subjective:   Patient denies any acute complaints reports poor appetite    Objective:     Vitals:   Temp (24hrs), Av 4 °F (36 3 °C), Min:97 2 °F (36 2 °C), Max:97 5 °F (36 4 °C)    Temp:  [97 2 °F (36 2 °C)-97 5 °F (36 4 °C)] 97 5 °F (36 4 °C)  HR:  [67-98] 76  Resp:  [17-21] 17  BP: (134-164)/(71-81) 134/71  SpO2:  [97 %-98 %] 98 %  Body mass index is 21 19 kg/m²  Input and Output Summary (last 24 hours): Intake/Output Summary (Last 24 hours) at 2022 1712  Last data filed at 2022 1517  Gross per 24 hour   Intake 0 ml   Output 525 ml   Net -525 ml       Physical Exam:   Physical Exam  Vitals and nursing note reviewed  Constitutional:       General: He is not in acute distress  Appearance: He is well-developed  He is not ill-appearing, toxic-appearing or diaphoretic  HENT:      Head: Normocephalic and atraumatic  Eyes:      General: No scleral icterus  Conjunctiva/sclera: Conjunctivae normal    Cardiovascular:      Rate and Rhythm: Normal rate  Rhythm irregular  Heart sounds: No murmur heard  No friction rub  No gallop  Pulmonary:      Effort: Pulmonary effort is normal  No respiratory distress  Breath sounds: Normal breath sounds  No stridor  No wheezing or rhonchi  Abdominal:      General: There is no distension  Palpations: Abdomen is soft  There is no mass  Tenderness: There is no abdominal tenderness  There is no guarding or rebound  Hernia: No hernia is present  Musculoskeletal:         General: No swelling or tenderness  Cervical back: Neck supple  Skin:     General: Skin is warm and dry  Capillary Refill: Capillary refill takes less than 2 seconds  Coloration: Skin is not jaundiced or pale  Neurological:      Mental Status: He is alert  Psychiatric:         Mood and Affect: Mood normal           Additional Data:     Labs:  Results from last 7 days   Lab Units 12/14/22  0441   WBC Thousand/uL 10 84*   HEMOGLOBIN g/dL 9 8*   HEMATOCRIT % 31 7*   PLATELETS Thousands/uL 113*   LYMPHO PCT % 0*   MONO PCT % 3*   EOS PCT % 0     Results from last 7 days   Lab Units 12/14/22  0441   SODIUM mmol/L 148*   POTASSIUM mmol/L 3 8   CHLORIDE mmol/L 116*   CO2 mmol/L 24   BUN mg/dL 40*   CREATININE mg/dL 1 09   ANION GAP mmol/L 8   CALCIUM mg/dL 8 1*   ALBUMIN g/dL 2 0*   TOTAL BILIRUBIN mg/dL 1 04*   ALK PHOS U/L 112   ALT U/L 27   AST U/L 22   GLUCOSE RANDOM mg/dL 86     Results from last 7 days   Lab Units 12/09/22  1828   INR  1 38*     Results from last 7 days   Lab Units 12/14/22  1538 12/14/22  1111 12/14/22  0620 12/14/22  0602 12/14/22  0532 12/14/22  0003 12/13/22  1735 12/13/22  1308 12/13/22  0524 12/13/22  0003 12/12/22  1623 12/12/22  1122   POC GLUCOSE mg/dl 209* 79 83 74 75 91 95 102 90 97 128 100         Results from last 7 days   Lab Units 12/14/22  0441 12/08/22  0500   PROCALCITONIN ng/ml 0 25 0 88*       Lines/Drains:  Invasive Devices     Peripherally Inserted Central Catheter Line  Duration           PICC Line 16/02/22 Right Basilic 1 day          Drain  Duration           Suprapubic Catheter 18 Fr  21 days                Central Line:  Goal for removal: Will discontinue when peripheral access obtained  Imaging: No pertinent imaging reviewed      Recent Cultures (last 7 days):         Last 24 Hours Medication List:   Current Facility-Administered Medications   Medication Dose Route Frequency Provider Last Rate   • acetaminophen  650 mg Rectal Q6H PRN Jessica Agarwal PA-C     • albuterol  2 puff Inhalation Q4H PRN Raleigh Torres MD     • amiodarone  200 mg Oral Daily With Breakfast Angelica Wu MD     • apixaban  2 5 mg Oral BID Wilmar Back, DO     • atorvastatin  40 mg Oral Daily With Lico Flores MD     • clopidogrel  75 mg Oral Daily Linnette Polanco, DO     • doxylamine  12 5 mg Oral HS PRN Jessica Agarwal PA-C     • ferrous sulfate  325 mg Oral Daily With Breakfast Isi Osei MD     • guaiFENesin  400 mg Oral BID Francisco Warner PA-C     • HYDROmorphone  0 2 mg Intravenous Q4H PRN Wilmar Back, DO     • insulin lispro  1-5 Units Subcutaneous TID AC Raleigh Torres MD     • Lidocaine Viscous HCl  15 mL Swish & Spit 4x Daily PRN Aparnaashlee Momin, DO     • LORazepam  0 5 mg Intravenous Q6H PRN Nicole Enciso DO     • melatonin  6 mg Oral HS Francisco Warner PA-C     • [START ON 12/15/2022] methylPREDNISolone sodium succinate  10 mg Intravenous Daily Wilmar Back, DO     • metoprolol tartrate  25 mg Oral Q12H [address] Wilmar Back, DO     • oxyCODONE  2 5 mg Oral Q4H PRN Wilmar Back, DO      Or   • oxyCODONE  5 mg Oral Q4H PRN Wilmar Blissai, DO     • pantoprazole  40 mg Intravenous Q24H [address] Raleigh Torres MD     • saliva substitute  5 spray Mouth/Throat 4x Daily PRN Lenny Gunderson MD          Today, Patient Was Seen By: Macy Nogueira DO    **Please Note: This note may have been constructed using a voice recognition system  **

## 2022-12-14 NOTE — ASSESSMENT & PLAN NOTE
Lab Results   Component Value Date    EGFR 62 12/14/2022    EGFR 64 12/13/2022    EGFR 56 12/12/2022    CREATININE 1 09 12/14/2022    CREATININE 1 07 12/13/2022    CREATININE 1 19 12/12/2022     Estimated Creatinine Clearance: 46 7 mL/min (by C-G formula based on SCr of 1 09 mg/dL)  Baseline creatinine 1 8 to 2  Initially had malpositioned zavala with urethral injury, then creatinine elevated due to volume overload which improved with diuresis   Now creatinine increasing since 12/4 in the setting of new aspiration pneumonia/RSV with decreased oral intake  Current creatinine peaked at 2 85 on 12/6 and improved to 1 07 today  Received 1 unit PRBC on 12/6 and 1 on 12/7  Avoid nephrotoxic medications and hypotension  Discussed with nephrology - input appreciated  Kidney function stable

## 2022-12-15 NOTE — PROGRESS NOTES
[address] [facility]  Progress Note Luz Vargas 1940, 80 y o  male MRN: [id number]  Unit/Bed#: [facility] 431-01 Encounter: [id number]  Primary Care Provider: Jennyfer Bey DO   Date and time admitted to hospital: 11/23/2022  4:04 AM    UTI (urinary tract infection)  Assessment & Plan  · Presentation as above  · Urine culture (11/20) - E  Coli  · Initially on Cefepime, then Cefazolin, then Cephalexin  · Completed antibiotic course  · Had septic shock which has resolved    Atrial fibrillation with RVR ([facility] [address] )  Assessment & Plan  · Rate controlled   · eliquis and metoprolol resumed    Hypernatremia  Assessment & Plan  · poor oral intake, will bolus 1 L today    Pneumonia  Assessment & Plan  · Noted overnight on 12/4 when he had fever, tachycardia, tachypnea, worsening hypoxia   · Procalcitonin elevated  · Aspiration suspected  · CT chest (12/7) - Interval development of right lower lobe airspace disease possibly atelectasis or developing pneumonia, potentially aspiration pneumonia  Moderate left basilar effusion and compressive atelectasis redemonstrated  New tree-in-bud opacities in the right upper lobe worrisome for pneumonitis/pneumonia  · Complete 7 day course of Cefepime   ·     Goals of care, counseling/discussion  Assessment & Plan  · Palliative were following - family noted that he had improved and wanted to continue treatment of medical conditions followed by discharge to rehab  · Now made NPO on 12/9 due to severe dysphagia  · Palliative care following again from 12/11   · Anticipating PEG tube placement if patient does not have increased oral intake with ongoing poor appetite and dysphagia  ·       RSV (acute bronchiolitis due to respiratory syncytial virus)  Assessment & Plan  · Resp protocol  · Supportive care   · On Solumedrol - being tapered q 3 days per pulm recommendations            PAD (peripheral artery disease) ([facility])  Assessment & Plan  · Seen on LEADS  · Reviewed by vascular on 12/7 as LE wound appeared to have increased - vascular disease possibly contributing to non-healing LE wounds but not a candidate for intervention at present in the setting of current infections/respiratory status and co-morbidities  · Continue Plavix and statin       Deep tissue injury to right lateral ankle and left heel  Assessment & Plan  · Wound care/podiatry input appreciated  · AZ abnormal - reviewed by vascular - not a candidate for intervention at present, ct wound care    Dysphagia  Assessment & Plan  · High risk for aspiration  · Was cleared by speech for pureed with honey thick liquids with strict aspiration precautions but repeat VBS on 12/9 showed severe aspiration and speech recommended he be placed NPO  · Seen by ENT - no vocal cord dysfunction   · Patient and family did not want PEG tube but today are reconsidering PEG   · Repeat VBS on 12/13 -patient able to tolerate purée  · Continue on puréed diet and monitor, will measure nutritional intake but I suspect patient may require a PEG tube  Discussed with family  · Over the past 24 hours patient has had limited oral intake eating less than 50% of his meals if any  We will continue Remeron to stimulate appetite      Ambulatory dysfunction  Assessment & Plan  Multifactorial with deconditioning contributing  Safe ambulation  Fall precautions  Physical therapy    Hematuria  Assessment & Plan  · As above    Transaminitis  Assessment & Plan  · Likely due to shock liver  · Resolved     Closed fracture of trochanter of right femur with routine healing  Assessment & Plan  · Admitted to [address] [place] [address] from 10/31/22 to 11/6/22 with right hip fracture  · S/p repair     · Was undergoing rehab at [facility]  · Physical therapy      Acute on chronic anemia  Assessment & Plan  · Multifactorial  · Initially had hematuria  · 1 unit PRBC on 12/6 and 1 on 12/7  · Seen by GI on 12/7 - brown stools on rectal exam  Since no overt GI bleeding and high risk for endoscopy at present monitor at present, ok for Eliquis  · CT C/A/P - per radiology may have hemorrhagic prostatitis - D/w urology on 12/7 - enlarged prostate, as urine clear no contraindication to Eliquis, Proscar added  · Eliquis restarted on 12/8 and changed to heparin drip on 12/9 when made NPO due to severe dysphagia  · Hb stable  · Monitor hemoglobin and transfuse as needed    Chronic diastolic heart failure [facility])  Assessment & Plan  Wt Readings from Last 3 Encounters:   12/15/22 63 kg (138 lb 14 2 oz)   11/23/22 74 5 kg (164 lb 3 9 oz)   11/22/22 70 8 kg (156 lb)     · Echo 03/01/2022 with EF 45%  ·   · Patient appears euvolemic on exam  · Monitor volume status            Chronic atrial fibrillation (HCC)  Assessment & Plan  Continue amiodarone, beta-blocker  Eliquis for anticoagulation    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  Assessment & Plan  Lab Results   Component Value Date    EGFR 62 12/14/2022    EGFR 64 12/13/2022    EGFR 56 12/12/2022    CREATININE 1 09 12/14/2022    CREATININE 1 07 12/13/2022    CREATININE 1 19 12/12/2022     Estimated Creatinine Clearance: 46 6 mL/min (by C-G formula based on SCr of 1 09 mg/dL)  Baseline creatinine 1 8 to 2  Initially had malpositioned zavala with urethral injury, then creatinine elevated due to volume overload which improved with diuresis   Now creatinine increasing since 12/4 in the setting of new aspiration pneumonia/RSV with decreased oral intake  Current creatinine peaked at 2 85 on 12/6 and improved to 1 07 today  Received 1 unit PRBC on 12/6 and 1 on 12/7  Avoid nephrotoxic medications and hypotension  Discussed with nephrology - input appreciated  Kidney function stable  Patient is having poor oral intake we will continue on gentle fluid hydration    Acute respiratory failure with hypoxia (HCC)  Assessment & Plan  · Resolved  · Began overnight on 12/4  · Due to RSV with likely superimposed bacterial pneumonia with concern for possible aspiration  · Had required high flow O2      Severe sepsis ([facility] [address] )  Assessment & Plan  · Noted overnight on 12/4 due to aspiration pneumonia  · Now resolved  · On admission had septic shock due to UTI requiring pressors    CAD (coronary artery disease)  Assessment & Plan  Continue BB,  Plavix and statin      * Urethral trauma  Assessment & Plan  · Hospitalized from 10/31/22 to 11/6/22 for right hip fracture - s/p ORIF  Postoperatively he developed urinary retention and he was discharged with a zavala  · Presented to the ED at [facility] on 11/20/22 with lower abdominal pain - was noted to have a UTI and was discharged back to rehab on Augmentin  · Brought back to the ED at [facility] on 11/21/22 with abdominal pain and lethargy  Noted to be hypotensive with lactic acid > 10, septic shock from UTI and was on pressors in the ICU  · Also noted to have hematuria and was on CBI  Hematuria persisted and developed abdominal distension with failure of CBI drainage  · CT abdomen (11/23/22) - "The Zavala catheter is malpositioned  The catheter balloon is located near the base of the penis, below the level of the prostate  There is gas within the soft tissues of the perineum and penis, most pronounced on the left  Urethral injury is suspected "  · Transferred emergently to [facility] and underwent suprapubic catheter placement on 11/23  · Hematuria resolved          Hyperglycemia-resolved as of 11/29/2022  Assessment & Plan  A1c 4 6  Resolved    UTI (urinary tract infection)-resolved as of 12/4/2022  Assessment & Plan  Antibiotics transition to IV cefazolin based on culture sensitivities  Transitioned to p o  Keflex completed 7 day course of antibiotics    Septic shock (HCC)-resolved as of 12/4/2022  Assessment & Plan  · Present on admission  · Resolved        VTE Pharmacologic Prophylaxis: VTE Score: 21 High Risk (Score >/= 5) - Pharmacological DVT Prophylaxis Ordered: apixaban (Eliquis)  Sequential Compression Devices Ordered  Patient Centered Rounds:  I performed bedside rounds with nursing staff today  Discussions with Specialists or Other Care Team Provider: n/a    Education and Discussions with Family / Patient: Updated  (wife) via phone  Time Spent for Care: 30 minutes  More than 50% of total time spent on counseling and coordination of care as described above  Current Length of Stay: 22 day(s)  Current Patient Status: Inpatient   Certification Statement: The patient will continue to require additional inpatient hospital stay due to Pending nutritional assessment may require PEG tube placement  Discharge Plan: Anticipate discharge in 48-72 hrs to home with home services  Code Status: Level 3 - DNAR and DNI    Subjective:   Patient continues to report poor appetite, denies any abdominal pain odynophagia nausea vomiting    Objective:     Vitals:   Temp (24hrs), Av 8 °F (36 6 °C), Min:97 3 °F (36 3 °C), Max:98 1 °F (36 7 °C)    Temp:  [97 3 °F (36 3 °C)-98 1 °F (36 7 °C)] 98 1 °F (36 7 °C)  HR:  [76-90] 83  Resp:  [17] 17  BP: (146-155)/(77-81) 146/81  SpO2:  [97 %-99 %] 97 %  Body mass index is 21 12 kg/m²  Input and Output Summary (last 24 hours): Intake/Output Summary (Last 24 hours) at 12/15/2022 1713  Last data filed at 12/15/2022 1400  Gross per 24 hour   Intake 598 ml   Output 725 ml   Net -127 ml       Physical Exam:   Physical Exam  Vitals and nursing note reviewed  Constitutional:       General: He is not in acute distress  Appearance: He is well-developed  He is not ill-appearing, toxic-appearing or diaphoretic  HENT:      Head: Normocephalic and atraumatic  Eyes:      General: No scleral icterus  Conjunctiva/sclera: Conjunctivae normal    Cardiovascular:      Rate and Rhythm: Normal rate  Rhythm irregular  Heart sounds: No murmur heard  No friction rub  No gallop  Pulmonary:      Effort: Pulmonary effort is normal  No respiratory distress  Breath sounds: No stridor  Rales present   No wheezing or rhonchi  Abdominal:      General: There is no distension  Palpations: Abdomen is soft  There is no mass  Tenderness: There is no abdominal tenderness  There is no guarding or rebound  Hernia: No hernia is present  Musculoskeletal:         General: No swelling, tenderness, deformity or signs of injury  Cervical back: Neck supple  Skin:     General: Skin is warm and dry  Capillary Refill: Capillary refill takes less than 2 seconds  Coloration: Skin is not jaundiced or pale  Neurological:      Mental Status: He is alert     Psychiatric:         Mood and Affect: Mood normal           Additional Data:     Labs:  Results from last 7 days   Lab Units 12/14/22  0441   WBC Thousand/uL 10 84*   HEMOGLOBIN g/dL 9 8*   HEMATOCRIT % 31 7*   PLATELETS Thousands/uL 113*   LYMPHO PCT % 0*   MONO PCT % 3*   EOS PCT % 0     Results from last 7 days   Lab Units 12/14/22  0441   SODIUM mmol/L 148*   POTASSIUM mmol/L 3 8   CHLORIDE mmol/L 116*   CO2 mmol/L 24   BUN mg/dL 40*   CREATININE mg/dL 1 09   ANION GAP mmol/L 8   CALCIUM mg/dL 8 1*   ALBUMIN g/dL 2 0*   TOTAL BILIRUBIN mg/dL 1 04*   ALK PHOS U/L 112   ALT U/L 27   AST U/L 22   GLUCOSE RANDOM mg/dL 86     Results from last 7 days   Lab Units 12/09/22  1828   INR  1 38*     Results from last 7 days   Lab Units 12/15/22  1638 12/15/22  1102 12/15/22  0608 12/14/22  2104 12/14/22  1538 12/14/22  1111 12/14/22  0620 12/14/22  0602 12/14/22  0532 12/14/22  0003 12/13/22  1735 12/13/22  1308   POC GLUCOSE mg/dl 160* 75 75 125 209* 79 83 74 75 91 95 102         Results from last 7 days   Lab Units 12/14/22  0441   PROCALCITONIN ng/ml 0 25       Lines/Drains:  Invasive Devices     Peripherally Inserted Central Catheter Line  Duration           PICC Line 76/26/60 Right Basilic 2 days          Drain  Duration           Suprapubic Catheter 18 Fr  22 days                Central Line:  Goal for removal: Will discontinue when peripheral access obtained  Imaging: No pertinent imaging reviewed  Recent Cultures (last 7 days):         Last 24 Hours Medication List:   Current Facility-Administered Medications   Medication Dose Route Frequency Provider Last Rate   • acetaminophen  650 mg Rectal Q6H PRN Iraj Dorado PA-C     • albuterol  2 puff Inhalation Q4H PRN Grant Estrada MD     • amiodarone  200 mg Oral Daily With Breakfast Junior Way MD     • apixaban  2 5 mg Oral BID Wilmar Banai, DO     • atorvastatin  40 mg Oral Daily With Shazia Feliciano MD     • clopidogrel  75 mg Oral Daily Raeann Morrison DO     • dextrose  75 mL/hr Intravenous Continuous Wilmar Banai, DO     • doxylamine  12 5 mg Oral HS PRN Iraj Dorado PA-C     • ferrous sulfate  325 mg Oral Daily With Breakfast Governor Paula MD     • guaiFENesin  400 mg Oral BID Khadar Bartlett PA-C     • HYDROmorphone  0 2 mg Intravenous Q4H PRN Wilmar Banai, DO     • insulin lispro  1-5 Units Subcutaneous TID AC Grant Estrada MD     • Lidocaine Viscous HCl  15 mL Swish & Spit 4x Daily PRN Herman Patches, DO     • LORazepam  0 5 mg Intravenous Q6H PRN Nicole Enciso, DO     • melatonin  6 mg Oral HS Swathi Colmenares PA-C     • methylPREDNISolone sodium succinate  10 mg Intravenous Daily Wilmar Banai, DO     • metoprolol tartrate  25 mg Oral Q12H [facility] Wilmar Banai, DO     • mirtazapine  7 5 mg Oral HS Wilmar Banai, DO     • oxyCODONE  2 5 mg Oral Q4H PRN Wilmar Banai, DO      Or   • oxyCODONE  5 mg Oral Q4H PRN Wilmar Banai, DO     • pantoprazole  40 mg Intravenous Q24H Cecilio Hinkle MD     • saliva substitute  5 spray Mouth/Throat 4x Daily PRN Ankush Morgan MD          Today, Patient Was Seen By: Irl Fothergill, DO    **Please Note: This note may have been constructed using a voice recognition system  **

## 2022-12-15 NOTE — ASSESSMENT & PLAN NOTE
· High risk for aspiration  · Was cleared by speech for pureed with honey thick liquids with strict aspiration precautions but repeat VBS on 12/9 showed severe aspiration and speech recommended he be placed NPO  · Seen by ENT - no vocal cord dysfunction   · Patient and family did not want PEG tube but today are reconsidering PEG   · Repeat VBS on 12/13 -patient able to tolerate purée  · Continue on puréed diet and monitor, will measure nutritional intake but I suspect patient may require a PEG tube  Discussed with family  · Over the past 24 hours patient has had limited oral intake eating less than 50% of his meals if any    We will continue Remeron to stimulate appetite

## 2022-12-15 NOTE — ASSESSMENT & PLAN NOTE
· Palliative were following - family noted that he had improved and wanted to continue treatment of medical conditions followed by discharge to rehab  · Now made NPO on 12/9 due to severe dysphagia  · Palliative care following again from 12/11   · Anticipating PEG tube placement if patient does not have increased oral intake with ongoing poor appetite and dysphagia  ·

## 2022-12-15 NOTE — ASSESSMENT & PLAN NOTE
· Noted overnight on 12/4 when he had fever, tachycardia, tachypnea, worsening hypoxia   · Procalcitonin elevated  · Aspiration suspected  · CT chest (12/7) - Interval development of right lower lobe airspace disease possibly atelectasis or developing pneumonia, potentially aspiration pneumonia  Moderate left basilar effusion and compressive atelectasis redemonstrated  New tree-in-bud opacities in the right upper lobe worrisome for pneumonitis/pneumonia    · Complete 7 day course of Cefepime   ·

## 2022-12-15 NOTE — ASSESSMENT & PLAN NOTE
Lab Results   Component Value Date    EGFR 62 12/14/2022    EGFR 64 12/13/2022    EGFR 56 12/12/2022    CREATININE 1 09 12/14/2022    CREATININE 1 07 12/13/2022    CREATININE 1 19 12/12/2022     Estimated Creatinine Clearance: 46 6 mL/min (by C-G formula based on SCr of 1 09 mg/dL)  Baseline creatinine 1 8 to 2  Initially had malpositioned zavala with urethral injury, then creatinine elevated due to volume overload which improved with diuresis   Now creatinine increasing since 12/4 in the setting of new aspiration pneumonia/RSV with decreased oral intake  Current creatinine peaked at 2 85 on 12/6 and improved to 1 07 today  Received 1 unit PRBC on 12/6 and 1 on 12/7  Avoid nephrotoxic medications and hypotension  Discussed with nephrology - input appreciated  Kidney function stable  Patient is having poor oral intake we will continue on gentle fluid hydration

## 2022-12-15 NOTE — PROGRESS NOTES
Podiatry - Progress Note  Patient: Jose Meza 80 y o  male   MRN: [id number]  PCP: Merrill Salazar,   Unit/Bed#: [facility] 431-01 Encounter: [id number]  Date Of Visit: 12/15/22    ASSESSMENT:    Jose Meza is a 80 y o  male with:    1  Right Heel Eschar   2  Deep tissue injury Right lateral ankle  3  Deep tissue injury left heel  4  Chronic Kidney Disease Stage 3  5  CAD  6  Ambulatory Dysfunction    PLAN:  · Plan for continued palliative wound care and strict offloading, with pressure and friction reduction to bilateral lower extremities  Right heel eschar remains stable, with no open wound or drainage present  Multiple DTI's to right lower extremity, will continue to protect with Allevyn foam borders and strict offloading  Left heel DTI appears stable at this time, will continue to offload/protect with heel Allevyn  · Continue local wound care, Betadine DSD to right heel and Allevyn foam borders to bilateral DTI's, appreciate nursing assistance with dressing changes  · Elevation and offloading on green foam wedges or pillows when non-ambulatory  Recommend use of Prevalon boots bilaterally, if tolerated by patient  · Rest of care per primary team      Weightbearing status: Weightbearing as tolerated    SUBJECTIVE:     The patient was seen, evaluated, and assessed at bedside today  The patient was awake, alert, and in no acute distress  No acute events overnight  The patient reports no pain in lower extremities today  Patient denies N/V/F/chills/SOB/CP  OBJECTIVE:     Vitals:   /77   Pulse 76   Temp (!) 97 3 °F (36 3 °C)   Resp 17   Ht 5' 8" (1 727 m)   Wt 63 kg (138 lb 14 2 oz)   SpO2 98%   BMI 21 12 kg/m²     Temp (24hrs), Av 6 °F (36 4 °C), Min:97 3 °F (36 3 °C), Max:98 °F (36 7 °C)      Physical Exam:     Lungs: Non labored breathing  Abdomen: Soft, non-tender  Lower Extremity:  Cardiovascular status at baseline from admission  Neurological status at baseline from admission  Musculoskeletal status at baseline from admission  No calf tenderness noted  - Right heel stable/dry eschar with surrounding localized erythema and ecchymosis  No open wound or drainage present  No crepitus or fluctuance  Eschar is well adhered  - Right lateral leg DTI skin is purple in appearance and non-blanchable  No open wound or drainage present  - Left heel DTI , purple and non-blanchable in appearance  Surrounding erythema  No open wound or drainage present  - Lateral right ankle DTI, purple and non-blanchable in appearance, with fibrotic tissue  No surrounding erythema or edema present  Clinical Images 12/15/22:    Right Heel:      Right lateral leg:      Right medial ankle:      Left lateral ankle:      Left Heel:      Additional Data:     Labs:    Results from last 7 days   Lab Units 12/14/22  0441   WBC Thousand/uL 10 84*   HEMOGLOBIN g/dL 9 8*   HEMATOCRIT % 31 7*   PLATELETS Thousands/uL 113*   LYMPHO PCT % 0*   MONO PCT % 3*   EOS PCT % 0     Results from last 7 days   Lab Units 12/14/22  0441   POTASSIUM mmol/L 3 8   CHLORIDE mmol/L 116*   CO2 mmol/L 24   BUN mg/dL 40*   CREATININE mg/dL 1 09   CALCIUM mg/dL 8 1*   ALK PHOS U/L 112   ALT U/L 27   AST U/L 22     Results from last 7 days   Lab Units 12/09/22  1828   INR  1 38*       * I Have Reviewed All Lab Data Listed Above  Recent Cultures (last 7 days):               Imaging: I have personally reviewed pertinent films in PACS  EKG, Pathology, and Other Studies: I have personally reviewed pertinent reports  ** Please Note: Portions of the record may have been created with voice recognition software  Occasional wrong word or "sound a like" substitutions may have occurred due to the inherent limitations of voice recognition software  Read the chart carefully and recognize, using context, where substitutions have occurred   **

## 2022-12-15 NOTE — NUTRITION
12/15/22 1357   Recommendations/Interventions   Summary Patient's appetite remains suboptimal on current diet per RN, meal completions 0-50% noted  Frequent NPO status secondary to high aspiration risk  Calorie count posted 12/15-12/16, patient's family to consider PEG placement  Interventions/Recommendations Continue current diet order;Supplement continue; Initiate daily weight;Lab consider order (Specify)  (Monitor electrolytes )   Intervention Comments Will follow up with calorie count results and recommendations

## 2022-12-15 NOTE — PROGRESS NOTES
Progress note - Palliative and Supportive Care   Zaid Munguia 80 y o  male [id number]    Patient Active Problem List   Diagnosis   • Bacteremia due to Pseudomonas   • CAD (coronary artery disease)   • Mitral regurgitation   • Hypertensive urgency   • Hyperlipidemia, mixed   • Carotid artery stenosis   • Stage 3 chronic kidney disease (HCC)   • Multiple lung nodules on CT   • Severe sepsis (HCC)   • Acute respiratory failure with hypoxia (HCC)   • Acute on chronic diastolic CHF (congestive heart failure) (HCC)   • 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   • Chronic atrial fibrillation (HCC)   • Postprocedural pneumothorax   • Lumbar compression deformity   • Chronic diastolic heart failure (HCC)   • Paroxysmal atrial fibrillation (HCC)   • Closed 2-part intertrochanteric fracture of proximal end of right femur, [facility] ([facility] )   • Acute on chronic anemia   • Hyponatremia   • Postoperative urinary retention   • Thrombocytopenia (HCC)   • Closed fracture of trochanter of right femur with routine healing   • Leg edema, right   • Pressure injury sacrum and right heel   • Transaminitis   • Elevated troponin level not due myocardial infarction   • Hematuria   • Urethral trauma   • Ambulatory dysfunction   • Dysphagia   • Deep tissue injury to right lateral ankle and left heel   • PAD (peripheral artery disease) (HCC)   • RSV (acute bronchiolitis due to respiratory syncytial virus)   • Goals of care, counseling/discussion   • Pneumonia   • Hypernatremia   • Atrial fibrillation with RVR ([facility] )   • UTI (urinary tract infection)     Active issues specifically addressed today include:   • Goals of care, counseling/discussion  • Dysphagia with aspiration  • Aspiration pneumonia  • Protein calorie malnutrition  • Deep tissue injury to right lateral ankle and left heel    Plan:  1  Symptom management   -Continue current regimen as below:  ? Guaifenesin PO liquid BID (if tolerating PO)  ?  Melatonin 6mg PO QHS, Unisom 12 5mg QHS PRN (if tolerating PO)  ? Daily PPI (IV)  ? Tylenol TX (per rectum) every 6 hours as needed  ? OxyIR 2 5mg/5mg q4 hr PRN mod/severe pain PO PRN (if tolerating PO)  ? Hydromorphone 0 2mg IV q4h PRN  ? Viscous lidocaine (if tolerating PO)  ? Lorazepam 0 5mg IV q6h PRN  ? Mouth Kote / oral care  -wound care as recommended by podiatry     2  Goals  -Will continue to follow for possible need for PEG tube; spoke with patient's wife Aminata Schofield today by phone- she would like to see how patient does with current diet  Code Status: DNR/DNI - Level 3   Decisional apparatus:  Patient is marginally competent on my exam today  If competence is lost, patient's substitute decision maker would default to spouse [place] 169  Advance Directive / Living Will / POLST:  None on file  3  Psychosocial   • Supportive listening provided  • Normalized experience of patient/family  • Provided anxiety containment    Interval history:  Repeat VBS on 12/13/22 - patient able to tolerate puree diet  Nutrition is currently following closely for calorie counts and to ensure adequate nutritional intake by mouth       MEDICATIONS / ALLERGIES:     all current active meds have been reviewed and current meds:   Current Facility-Administered Medications   Medication Dose Route Frequency   • acetaminophen (TYLENOL) rectal suppository 650 mg  650 mg Rectal Q6H PRN   • albuterol (PROVENTIL HFA,VENTOLIN HFA) inhaler 2 puff  2 puff Inhalation Q4H PRN   • amiodarone tablet 200 mg  200 mg Oral Daily With Breakfast   • apixaban (ELIQUIS) tablet 2 5 mg  2 5 mg Oral BID   • atorvastatin (LIPITOR) tablet 40 mg  40 mg Oral Daily With Dinner   • clopidogrel (PLAVIX) tablet 75 mg  75 mg Oral Daily   • doxylamine (UNISOM) tablet 12 5 mg  12 5 mg Oral HS PRN   • ferrous sulfate tablet 325 mg  325 mg Oral Daily With Breakfast   • guaiFENesin LIQD 400 mg  400 mg Oral BID   • HYDROmorphone HCl (DILAUDID) injection 0 2 mg  0 2 mg Intravenous Q4H PRN   • insulin lispro (HumaLOG) 100 units/mL subcutaneous injection 1-5 Units  1-5 Units Subcutaneous TID AC   • Lidocaine Viscous HCl (XYLOCAINE) 2 % mucosal solution 15 mL  15 mL Swish & Spit 4x Daily PRN   • LORazepam (ATIVAN) injection 0 5 mg  0 5 mg Intravenous Q6H PRN   • melatonin tablet 6 mg  6 mg Oral HS   • methylPREDNISolone sodium succinate (Solu-MEDROL) injection 10 mg  10 mg Intravenous Daily   • metoprolol tartrate (LOPRESSOR) tablet 25 mg  25 mg Oral Q12H YOLANDA   • mirtazapine (REMERON) tablet 7 5 mg  7 5 mg Oral HS   • oxyCODONE (ROXICODONE) IR tablet 2 5 mg  2 5 mg Oral Q4H PRN    Or   • oxyCODONE (ROXICODONE) IR tablet 5 mg  5 mg Oral Q4H PRN   • pantoprazole (PROTONIX) injection 40 mg  40 mg Intravenous Q24H [address]   • saliva substitute (MOUTH KOTE) mucosal solution 5 spray  5 spray Mouth/Throat 4x Daily PRN       No Known Allergies    OBJECTIVE:    Physical Exam  Physical Exam  Vitals and nursing note reviewed  Constitutional:       General: He is not in acute distress  Appearance: He is well-developed  He is ill-appearing  He is not toxic-appearing or diaphoretic  HENT:      Head: Normocephalic and atraumatic  Eyes:      Pupils: Pupils are equal, round, and reactive to light  Cardiovascular:      Rate and Rhythm: Normal rate  Pulmonary:      Effort: Pulmonary effort is normal  No respiratory distress  Comments: Intermittent cough  Abdominal:      General: There is no distension  Musculoskeletal:      Cervical back: Neck supple  Right lower leg: No edema  Left lower leg: No edema  Comments: B/L feet dressing CDI   Skin:     General: Skin is warm and dry  Neurological:      Mental Status: He is alert  Mental status is at baseline  Psychiatric:         Mood and Affect: Mood normal          Behavior: Behavior normal          Lab Results: I have personally reviewed pertinent labs   Imaging Studies:   Modified (Video) Barium Swallow Study  "Images are on PACS for review  Pt presents presents w/ mild-mod oral, mod-severe pharyngeal dysphagia, slight improvement from prior study  Swallow initiation w/ mod-severe delay w/ all material spilling deep into the pharynx prior to initiation  "Driving forces" of the swallow are reduced/weak, min hyo-laryngeal elevation, epiglottic inversion and pharyngeal constriction  Mod-severe pharyngeal retention w/ all, not fully cleared w/ secondary swallows  Retention appears to be result of reduced drive as well as reduced PES relaxation and suspected osteophytes impeding bolus flow  Aspiration during the swallow w/ tsps of thin, nectar thick via tsp and cup  Cough response only to gross aspiration of nectar thick  Several strategies attempted not successful in eliminating aspiration or severe retention  "    EKG, Pathology, and Other Studies: N/a    Counseling / Coordination of Care    Total floor / unit time spent today 30 minutes  Greater than 50% of total time was spent with the patient and / or family counseling and / or coordination of care  A description of the counseling / coordination of care: chart review, goals of care, supportive listening and anticipatory guidance

## 2022-12-15 NOTE — ASSESSMENT & PLAN NOTE
Wt Readings from Last 3 Encounters:   12/15/22 63 kg (138 lb 14 2 oz)   11/23/22 74 5 kg (164 lb 3 9 oz)   11/22/22 70 8 kg (156 lb)     · Echo 03/01/2022 with EF 45%     ·   · Patient appears euvolemic on exam  · Monitor volume status

## 2022-12-15 NOTE — ASSESSMENT & PLAN NOTE
· Admitted to [address] [place] [address] from 10/31/22 to 11/6/22 with right hip fracture  · S/p repair     · Was undergoing rehab at [facility]  · Physical therapy

## 2022-12-15 NOTE — PLAN OF CARE
Problem: Nutrition/Hydration-ADULT  Goal: Nutrient/Hydration intake appropriate for improving, restoring or maintaining nutritional needs  Description: Monitor and assess patient's nutrition/hydration status for malnutrition  Collaborate with interdisciplinary team and initiate plan and interventions as ordered  Monitor patient's weight and dietary intake as ordered or per policy  Utilize nutrition screening tool and intervene as necessary  Determine patient's food preferences and provide high-protein, high-caloric foods as appropriate  INTERVENTIONS:  - Monitor oral intake, urinary output, labs, and treatment plans  - Assess nutrition and hydration status and recommend course of action  - Evaluate amount of meals eaten  - Assist patient with eating if necessary   - Allow adequate time for meals  - Recommend/ encourage appropriate diets, oral nutritional supplements, and vitamin/mineral supplements  - Order, calculate, and assess calorie counts as needed  - Recommend, monitor, and adjust tube feedings and TPN/PPN based on assessed needs  - Assess need for intravenous fluids  - Provide specific nutrition/hydration education as appropriate  - Include patient/family/caregiver in decisions related to nutrition  Outcome: Not Progressing   Appetite remains poor, calorie count posted 12/15-12/16  Will follow up with results and recommendations

## 2022-12-15 NOTE — ASSESSMENT & PLAN NOTE
· Hospitalized from 10/31/22 to 11/6/22 for right hip fracture - s/p ORIF  Postoperatively he developed urinary retention and he was discharged with a zavala  · Presented to the ED at [address] [place] [address] on 11/20/22 with lower abdominal pain - was noted to have a UTI and was discharged back to rehab on Augmentin  · Brought back to the ED at [address] [place] [address] on 11/21/22 with abdominal pain and lethargy  Noted to be hypotensive with lactic acid > 10, septic shock from UTI and was on pressors in the ICU  · Also noted to have hematuria and was on CBI  Hematuria persisted and developed abdominal distension with failure of CBI drainage  · CT abdomen (11/23/22) - "The Zavala catheter is malpositioned  The catheter balloon is located near the base of the penis, below the level of the prostate  There is gas within the soft tissues of the perineum and penis, most pronounced on the left    Urethral injury is suspected "  · Transferred emergently to [facility] and underwent suprapubic catheter placement on 11/23  · Hematuria resolved

## 2022-12-15 NOTE — RESTORATIVE TECHNICIAN NOTE
Restorative Technician Note      Patient Name: Rosmery Whitmore     Restorative Tech Visit Date: 12/15/22  Note Type: Mobility  Patient Position Upon Consult: Supine  Activity Performed: Transferred  Assistive Device: Other (Comment) (smooth )  Patient Position at End of Consult: All needs within reach;  Bedside chair; Bed/Chair alarm activated

## 2022-12-16 NOTE — PROGRESS NOTES
Pastoral Care Progress Note    2022  Patient: Alex Jose : 1940  Admission Date & Time: 2022 040  MRN: [id number] CSN: [id number]      Pt took turn for the worse, wife crying/feeling anxious about what to do next medically  Provided support, consolation, answer questions, talked about quality of life/suffering, tried to alleviate her feeling guilt  She doesn't want him to suffer, but she's not ready for him to die, even though she knows they have lived a good long life and death will come  Prayed with her, and remain available                   Chaplaincy Interventions Utilized:   Empowerment: Clarified, confirmed, or reviewed information from treatment team , Provided anticipatory guidance, Provided anxiety containment, and Provided guilt counseling    Exploration: Explored emotional needs & resources, Explored relational needs & resources, and Explored spiritual needs & resources    Collaboration: Advocated for patient/family and Consulted with interdisciplinary team    Relationship Building: Cultivated a relationship of care and support, Listened empathically, and Provided silent and supportive presence       22 1300   Clinical Encounter Type   Visited With Patient and family together   Routine Visit Follow-up   Holiness Encounters   Holiness Needs Prayer

## 2022-12-16 NOTE — ASSESSMENT & PLAN NOTE
· Multifactorial  · Initially had hematuria  · 1 unit PRBC on 12/6 and 1 on 12/7  · Seen by GI on 12/7 - brown stools on rectal exam  Since no overt GI bleeding and high risk for endoscopy at present monitor at present, ok for Eliquis  · CT C/A/P - per radiology may have hemorrhagic prostatitis - D/w urology on 12/7 - enlarged prostate, as urine clear no contraindication to Eliquis, Proscar added  · Eliquis restarted on 12/8 and changed to heparin drip on 12/9 when made NPO due to severe dysphagia  · Hgb stable  · Monitor hemoglobin and transfuse as needed

## 2022-12-16 NOTE — ASSESSMENT & PLAN NOTE
· Admitted to [facility] from 10/31/22 to 11/6/22 with right hip fracture  · S/p repair     · Was undergoing rehab at [facility]  · Physical therapy

## 2022-12-16 NOTE — ASSESSMENT & PLAN NOTE
Lab Results   Component Value Date    EGFR 58 12/16/2022    EGFR 62 12/14/2022    EGFR 64 12/13/2022    CREATININE 1 16 12/16/2022    CREATININE 1 09 12/14/2022    CREATININE 1 07 12/13/2022     Estimated Creatinine Clearance: 43 8 mL/min (by C-G formula based on SCr of 1 16 mg/dL)  Baseline creatinine 1 8 to 2  Initially had malpositioned zavala with urethral injury, then creatinine elevated due to volume overload which improved with diuresis   Now creatinine increasing since 12/4 in the setting of new aspiration pneumonia/RSV with decreased oral intake  Current creatinine peaked at 2 85 on 12/6 and improved to 1 07 today  Received 1 unit PRBC on 12/6 and 1 on 12/7  Avoid nephrotoxic medications and hypotension  Discussed with nephrology - input appreciated  Kidney function stable

## 2022-12-16 NOTE — PROGRESS NOTES
Progress note - Palliative and Supportive Care   Gus Cruz 80 y o  male [id number]    Patient Active Problem List   Diagnosis   • Bacteremia due to Pseudomonas   • CAD (coronary artery disease)   • Mitral regurgitation   • Hypertensive urgency   • Hyperlipidemia, mixed   • Carotid artery stenosis   • Stage 3 chronic kidney disease (HCC)   • Multiple lung nodules on CT   • Severe sepsis (HCC)   • Acute respiratory failure with hypoxia (HCC)   • Acute on chronic diastolic CHF (congestive heart failure) ([facility])   • 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   • Chronic atrial fibrillation (HCC)   • Postprocedural pneumothorax   • Lumbar compression deformity   • Chronic diastolic heart failure (HCC)   • Paroxysmal atrial fibrillation (HCC)   • Closed 2-part intertrochanteric fracture of proximal end of right femur, [facility]   • Acute on chronic anemia   • Hyponatremia   • Postoperative urinary retention   • Thrombocytopenia ([facility])   • Closed fracture of trochanter of right femur with routine healing   • Leg edema, right   • Pressure injury sacrum and right heel   • Transaminitis   • Elevated troponin level not due myocardial infarction   • Hematuria   • Urethral trauma   • Ambulatory dysfunction   • Dysphagia   • Deep tissue injury to right lateral ankle and left heel   • PAD (peripheral artery disease) ([facility])   • RSV (acute bronchiolitis due to respiratory syncytial virus)   • Goals of care, counseling/discussion   • Pneumonia   • Hypernatremia   • Atrial fibrillation with RVR ([facility] )   • UTI (urinary tract infection)     Active issues specifically addressed today include:   - delirium d/t medical condition  - acute on chronic resp failure  - inability to PO d/t oral dysphagia  - goals discussion    Plan:  1  Symptom management - PRN opioids and BZDs offered   - D/C PO meds    2  Goals - Comfort Measures Only (CMO)    - pt no longer competent  - Wife leads cares    Would wish not to escalate further    - Family requests time at bedside tonight, with biPAP for comfort  = When family have left, pt may remove biPAP    = Tomorrow morning, should pt survive, please remove biPAP     Code Status: DNR/DNI - Level 4   Decisional apparatus:  Patient is not competent on my exam today  If competence is lost, patient's substitute decision maker would default to wife by [place] 169  Advance Directive / Living Will / POLST:  None on file    Bita Friday, MD  Palliative and Supportive Care  Clinic/Answering Service:   You can find me on [employer]! Interval history:       Since last visit he has aspirated again, less thatn a sday after being offered an enteral modified diet  He has demonstrated a parapneumonic effusion, and broad spec ABx are running  He underwent thora today to relieve fluid from lung, but has returned to floor requiring biPAP  He appears exhausted and agonal at bedside  On my visit this PM, there are family at bedside, and the pt is not appropriately responsive  Nursing reports and charts are reviewed  Bedside rounding performed with RN  Family are offered choice between aggressive cares, inlcuding PEG, as pt is unlikely for next several weeks to be able to eat normally, safely  Wife becomes visibily anxious at this possibility, and instead requests that we consider alternative s  Comfort cares with disuse of aggressive monitoring and labs is offered instead, and she is aweare that this is what the pt would prefer  Daughter in the room is in agreement, and supports the decision to move to comfort  Family requests that he have access to country [facility] musci after they leave tonight  Family feels he is more comfortable on biPAP; they are promised that we will use this overnight, so long as he tolerates this interventions      MEDICATIONS / ALLERGIES:     all current active meds have been reviewed and current meds:   Current Facility-Administered Medications Medication Dose Route Frequency   • acetaminophen (TYLENOL) rectal suppository 650 mg  650 mg Rectal Q6H PRN   • albuterol (PROVENTIL HFA,VENTOLIN HFA) inhaler 2 puff  2 puff Inhalation Q4H PRN   • HYDROmorphone (DILAUDID) injection 0 3 mg  0 3 mg Intravenous Q1H PRN   • LORazepam (ATIVAN) injection 0 5 mg  0 5 mg Intravenous Q1H PRN   • pantoprazole (PROTONIX) injection 40 mg  40 mg Intravenous Q24H [address]   • saliva substitute (MOUTH KOTE) mucosal solution 5 spray  5 spray Mouth/Throat 4x Daily PRN       No Known Allergies    OBJECTIVE:    Physical Exam  Physical Exam  Constitutional:       General: He is not in acute distress  Appearance: He is not diaphoretic  Comments: Frail, cachectic   HENT:      Head: Normocephalic and atraumatic  Right Ear: External ear normal       Left Ear: External ear normal       Mouth/Throat:      Pharynx: No oropharyngeal exudate (membranes tacky)  Eyes:      General:         Right eye: No discharge  Left eye: No discharge  Comments: Does not open eyes during encounter  Neck:      Trachea: No tracheal deviation  Pulmonary:      Effort: Respiratory distress present  Breath sounds: No stridor  Wheezing and rales present  Comments: Agonal resps  Abdominal:      General: There is no distension  Palpations: Abdomen is soft  Musculoskeletal:         General: No deformity  Skin:     General: Skin is warm and dry  Coloration: Skin is pale  Findings: No erythema or rash  Neurological:      Comments: Not alert, not interactive  Facies appear generally symmetric  Psychiatric:      Comments: Cannot participate in exam          Lab Results:   I have personally reviewed pertinent labs  , CBC:   Lab Results   Component Value Date    WBC 24 89 (H) 12/16/2022    HGB 10 6 (L) 12/16/2022    HCT 34 8 (L) 12/16/2022    MCV 96 12/16/2022     12/16/2022    MCH 29 1 12/16/2022    MCHC 30 5 (L) 12/16/2022    RDW 16 6 (H) 12/16/2022 MPV 11 7 12/16/2022   , CMP:   Lab Results   Component Value Date    SODIUM 141 12/16/2022    K 3 8 12/16/2022     (H) 12/16/2022    CO2 24 12/16/2022    BUN 38 (H) 12/16/2022    CREATININE 1 16 12/16/2022    CALCIUM 8 2 (L) 12/16/2022    EGFR 58 12/16/2022   , BMP:  Lab Results   Component Value Date    SODIUM 141 12/16/2022    K 3 8 12/16/2022     (H) 12/16/2022    CO2 24 12/16/2022    BUN 38 (H) 12/16/2022    CREATININE 1 16 12/16/2022    GLUC 138 12/16/2022    CALCIUM 8 2 (L) 12/16/2022    AGAP 6 12/16/2022    EGFR 58 12/16/2022   , PT/PTT:No results found for: PT, PTT   Renal function appropriate for opioids  Imaging Studies: reviewed reports  EKG, Pathology, and Other Studies: reveiwed reports    Counseling / Coordination of Care    Total floor / unit time spent today 45+ minutes  Greater than 50% of total time was spent with the patient and / or family counseling and / or coordination of care  A description of the counseling / coordination of care: chart review; symptom pursuit; supportive listening; anticipatory guidance   review and assessment of decisional apparatus and capacity, applicable legal codes and extant documents; adjustment of parenteral controlled substances for advanced pain and symptoms, and review of the patient's controlled substance dispensing history in the Prescription Drug Monitoring Program in compliance with the [facility] regulations before prescribing said controlled substances; end-of-life cares

## 2022-12-16 NOTE — SEDATION DOCUMENTATION
Left thoracentesis completed by Scott Morrow PA-C without complications  750mL of isidro fluid removed from pleural space  Labs sent  Band-ad over left back puncture site

## 2022-12-16 NOTE — ASSESSMENT & PLAN NOTE
· Hospitalized from 10/31/22 to 11/6/22 for right hip fracture - s/p ORIF  Postoperatively he developed urinary retention and he was discharged with a zavala  · Presented to the ED at [address] [place] [address] on 11/20/22 with lower abdominal pain - was noted to have a UTI and was discharged back to rehab on Augmentin  · Brought back to the ED at [address] [place] [address] on 11/21/22 with abdominal pain and lethargy  Noted to be hypotensive with lactic acid > 10, septic shock from UTI and was on pressors in the ICU  · Also noted to have hematuria and was on CBI  Hematuria persisted and developed abdominal distension with failure of CBI drainage  · CT abdomen (11/23/22) - "The Zavala catheter is malpositioned  The catheter balloon is located near the base of the penis, below the level of the prostate  There is gas within the soft tissues of the perineum and penis, most pronounced on the left    Urethral injury is suspected "  · Transferred emergently to [facility] and underwent suprapubic catheter placement on 11/23  · Hematuria resolved

## 2022-12-16 NOTE — BRIEF OP NOTE (RAD/CATH)
Left IR THORACENTESIS Procedure Note    PATIENT NAME: Tacos Delgado  : 1940  MRN: [id number]    Pre-op Diagnosis:   1  Trauma of urethra, initial encounter    2  Septic shock ([facility] )    3  Heel ulcer ([facility] )    4  Injury of heel, right, initial encounter    5  PAD (peripheral artery disease) ([facility] )    6  Goals of care, counseling/discussion    7  Paroxysmal atrial fibrillation (HCC)    8  Aspiration pneumonia ([facility] )    9  Atrial fibrillation with RVR ([facility] )    10  JOEL (acute kidney injury) ([facility] )    11  Anemia    12  Aspiration pneumonia, unspecified aspiration pneumonia type, unspecified laterality, unspecified part of lung ([facility] )    15  RSV (acute bronchiolitis due to respiratory syncytial virus)    14  Aspiration into respiratory tract      Post-op Diagnosis:   1  Trauma of urethra, initial encounter    2  Septic shock ([facility] )    3  Heel ulcer ([facility] )    4  Injury of heel, right, initial encounter    5  PAD (peripheral artery disease) ([facility] )    6  Goals of care, counseling/discussion    7  Paroxysmal atrial fibrillation (HCC)    8  Aspiration pneumonia ([facility] )    9  Atrial fibrillation with RVR ([facility] )    10  JOEL (acute kidney injury) ([facility] )    11  Anemia    12  Aspiration pneumonia, unspecified aspiration pneumonia type, unspecified laterality, unspecified part of lung ([facility] )    15  RSV (acute bronchiolitis due to respiratory syncytial virus)    14  Aspiration into respiratory tract        Provider:  Emmanuel Ng PA-C     No qualified resident was available, Resident is only observing    Estimated Blood Loss: minimal    Findings: left sided thoracentesis  750cc clear isidro fluid removed       Specimens: sent    Complications:  None immediate    Anesthesia: local    Emmanuel Ng PA-C     Date: 2022  Time: 2:54 PM

## 2022-12-16 NOTE — PROGRESS NOTES
[facility]  Progress Note Inocencio Kohler 1940, 80 y o  male MRN: [id number]  Unit/Bed#: [facility] 431-01 Encounter: [id number]  Primary Care Provider: Matt Sales DO   Date and time admitted to hospital: 11/23/2022  4:04 AM    UTI (urinary tract infection)  Assessment & Plan  · Presentation as above  · Urine culture (11/20) - E  Coli  · Initially on Cefepime, then Cefazolin, then Cephalexin  · Completed antibiotic course  · Had septic shock which has resolved    Atrial fibrillation with RVR ([address] )  Assessment & Plan  · Rate controlled   · eliquis and metoprolol resumed    Hypernatremia  Assessment & Plan  · poor oral intake, will bolus 1 L today    Pneumonia  Assessment & Plan  · Noted overnight on 12/4 when he had fever, tachycardia, tachypnea, worsening hypoxia   · Procalcitonin elevated  · Aspiration suspected  · CT chest (12/7) - Interval development of right lower lobe airspace disease possibly atelectasis or developing pneumonia, potentially aspiration pneumonia  Moderate left basilar effusion and compressive atelectasis redemonstrated  New tree-in-bud opacities in the right upper lobe worrisome for pneumonitis/pneumonia  · Complete 7 day course of Cefepime       12/16/22: Patient now with worsening oxygen requirements overnight, leukocytosis trending up to 24  Congested and rhonchorous breath sounds    Tachypneic as well    -X-ray with left-sided pleural effusion  -Consult IR for thoracentesis labs ordered  -Start on ceftriaxone and Flagyl    Goals of care, counseling/discussion  Assessment & Plan  · Palliative were following - family noted that he had improved and wanted to continue treatment of medical conditions followed by discharge to rehab  · Now made NPO on 12/9 due to severe dysphagia  · Palliative care following again from 12/11   · Anticipating PEG tube placement if patient does not have increased oral intake with ongoing poor appetite and dysphagia  ·       RSV (acute bronchiolitis due to respiratory syncytial virus)  Assessment & Plan  · Resp protocol  · Supportive care            PAD (peripheral artery disease) ([facility])  Assessment & Plan  · Seen on LEADS  · Reviewed by vascular on 12/7 as LE wound appeared to have increased - vascular disease possibly contributing to non-healing LE wounds but not a candidate for intervention at present in the setting of current infections/respiratory status and co-morbidities  · Continue Plavix and statin       Deep tissue injury to right lateral ankle and left heel  Assessment & Plan  · Wound care/podiatry input appreciated  · AZ abnormal - reviewed by vascular - not a candidate for intervention at present, ct wound care    Dysphagia  Assessment & Plan  · High risk for aspiration  · Was cleared by speech for pureed with honey thick liquids with strict aspiration precautions but repeat VBS on 12/9 showed severe aspiration and speech recommended he be placed NPO  · Seen by ENT - no vocal cord dysfunction   · Patient and family did not want PEG tube but today are reconsidering PEG   · Repeat VBS on 12/13 -patient able to tolerate purée  Continues to aspirate, today he had worsening leukocytosis and oxygen requirements  Will keep patient n p o  He will need PEG tube placement      Ambulatory dysfunction  Assessment & Plan  Multifactorial with deconditioning contributing  Safe ambulation  Fall precautions  Physical therapy    Hematuria  Assessment & Plan  · As above    Transaminitis  Assessment & Plan  · Likely due to shock liver  · Resolved     Closed fracture of trochanter of right femur with routine healing  Assessment & Plan  · Admitted to [address] [place] [address] from 10/31/22 to 11/6/22 with right hip fracture  · S/p repair     · Was undergoing rehab at [facility]  · Physical therapy      Acute on chronic anemia  Assessment & Plan  · Multifactorial  · Initially had hematuria  · 1 unit PRBC on 12/6 and 1 on 12/7  · Seen by GI on 12/7 - brown stools on rectal exam  Since no overt GI bleeding and high risk for endoscopy at present monitor at present, ok for Eliquis  · CT C/A/P - per radiology may have hemorrhagic prostatitis - D/w urology on 12/7 - enlarged prostate, as urine clear no contraindication to Eliquis, Proscar added  · Eliquis restarted on 12/8 and changed to heparin drip on 12/9 when made NPO due to severe dysphagia  · Hgb stable  · Monitor hemoglobin and transfuse as needed    Chronic diastolic heart failure [facility])  Assessment & Plan  Wt Readings from Last 3 Encounters:   12/15/22 63 kg (138 lb 14 2 oz)   11/23/22 74 5 kg (164 lb 3 9 oz)   11/22/22 70 8 kg (156 lb)     · Echo 03/01/2022 with EF 45%  ·   · Patient appears mildly hypervolemic on exam we will monitor off fluids  · Monitor volume status            Chronic atrial fibrillation (HCC)  Assessment & Plan  Continue amiodarone, beta-blocker  Eliquis for anticoagulation    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  Assessment & Plan  Lab Results   Component Value Date    EGFR 58 12/16/2022    EGFR 62 12/14/2022    EGFR 64 12/13/2022    CREATININE 1 16 12/16/2022    CREATININE 1 09 12/14/2022    CREATININE 1 07 12/13/2022     Estimated Creatinine Clearance: 43 8 mL/min (by C-G formula based on SCr of 1 16 mg/dL)  Baseline creatinine 1 8 to 2  Initially had malpositioned zavala with urethral injury, then creatinine elevated due to volume overload which improved with diuresis   Now creatinine increasing since 12/4 in the setting of new aspiration pneumonia/RSV with decreased oral intake  Current creatinine peaked at 2 85 on 12/6 and improved to 1 07 today  Received 1 unit PRBC on 12/6 and 1 on 12/7  Avoid nephrotoxic medications and hypotension  Discussed with nephrology - input appreciated  Kidney function stable      Acute respiratory failure with hypoxia ([facility] )  Assessment & Plan  · Patient with worsening respiratory failure overnight now on 2 L nasal cannula and mildly tachypneic  · Check chest x-ray, give dose of Lasix, on antibiotics with ceftriaxone and Flagyl  · Suspect patient continues to aspirate, will make n p o  and finalize plan for PEG tube with family      Severe sepsis ([facility] )  Assessment & Plan  · Noted overnight on 12/4 due to aspiration pneumonia  · Now resolved  · On admission had septic shock due to UTI requiring pressors    CAD (coronary artery disease)  Assessment & Plan  Continue BB,  Plavix and statin      * Urethral trauma  Assessment & Plan  · Hospitalized from 10/31/22 to 11/6/22 for right hip fracture - s/p ORIF  Postoperatively he developed urinary retention and he was discharged with a zavala  · Presented to the ED at [address] [place] [address] on 11/20/22 with lower abdominal pain - was noted to have a UTI and was discharged back to rehab on Augmentin  · Brought back to the ED at [address] [place] [address] on 11/21/22 with abdominal pain and lethargy  Noted to be hypotensive with lactic acid > 10, septic shock from UTI and was on pressors in the ICU  · Also noted to have hematuria and was on CBI  Hematuria persisted and developed abdominal distension with failure of CBI drainage  · CT abdomen (11/23/22) - "The Zavala catheter is malpositioned  The catheter balloon is located near the base of the penis, below the level of the prostate  There is gas within the soft tissues of the perineum and penis, most pronounced on the left  Urethral injury is suspected "  · Transferred emergently to [facility] and underwent suprapubic catheter placement on 11/23  · Hematuria resolved          Hyperglycemia-resolved as of 11/29/2022  Assessment & Plan  A1c 4 6  Resolved    UTI (urinary tract infection)-resolved as of 12/4/2022  Assessment & Plan  Antibiotics transition to IV cefazolin based on culture sensitivities  Transitioned to p o   Keflex completed 7 day course of antibiotics    Septic shock (HCC)-resolved as of 12/4/2022  Assessment & Plan  · Present on admission  · Resolved          VTE Pharmacologic Prophylaxis: VTE Score: 21 High Risk (Score >/= 5) - Pharmacological DVT Prophylaxis Ordered: apixaban (Eliquis)  Sequential Compression Devices Ordered  Patient Centered Rounds: I performed bedside rounds with nursing staff today  Discussions with Specialists or Other Care Team Provider: n/a    Education and Discussions with Family / Patient: Updated  (wife) at bedside  Time Spent for Care: 30 minutes  More than 50% of total time spent on counseling and coordination of care as described above  Current Length of Stay: 23 day(s)  Current Patient Status: Inpatient   Certification Statement: The patient will continue to require additional inpatient hospital stay due to Pending PEG tube placement  Discharge Plan: Anticipate discharge in >72 hrs to discharge location to be determined pending rehab evaluations  Code Status: Level 3 - DNAR and DNI    Subjective:   Patient more lethargic this morning, alert to self and place only  Objective:     Vitals:   Temp (24hrs), Av 2 °F (36 2 °C), Min:95 9 °F (35 5 °C), Max:98 2 °F (36 8 °C)    Temp:  [95 9 °F (35 5 °C)-98 2 °F (36 8 °C)] 98 2 °F (36 8 °C)  HR:  [] 125  Resp:  [16-24] 24  BP: ()/(52-96) 93/52  SpO2:  [89 %-97 %] 97 %  Body mass index is 21 12 kg/m²  Input and Output Summary (last 24 hours): Intake/Output Summary (Last 24 hours) at 2022 1644  Last data filed at 2022 1440  Gross per 24 hour   Intake 600 ml   Output 1350 ml   Net -750 ml       Physical Exam:   Physical Exam  Vitals and nursing note reviewed  Constitutional:       General: He is not in acute distress  Appearance: He is well-developed  He is not ill-appearing, toxic-appearing or diaphoretic  HENT:      Head: Normocephalic and atraumatic  Eyes:      General: No scleral icterus  Conjunctiva/sclera: Conjunctivae normal    Cardiovascular:      Rate and Rhythm: Normal rate  Rhythm irregular  Heart sounds: No murmur heard  No friction rub  No gallop  Pulmonary:      Effort: Pulmonary effort is normal  No respiratory distress  Breath sounds: No stridor  Rales present  No wheezing or rhonchi  Abdominal:      General: There is no distension  Palpations: Abdomen is soft  There is no mass  Tenderness: There is no abdominal tenderness  There is no guarding or rebound  Hernia: No hernia is present  Musculoskeletal:         General: No swelling, tenderness, deformity or signs of injury  Cervical back: Neck supple  Skin:     General: Skin is warm and dry  Capillary Refill: Capillary refill takes less than 2 seconds  Coloration: Skin is not jaundiced or pale  Neurological:      Mental Status: He is alert     Psychiatric:         Mood and Affect: Mood normal           Additional Data:     Labs:  Results from last 7 days   Lab Units 12/16/22  0445 12/14/22  0441   WBC Thousand/uL 24 89* 10 84*   HEMOGLOBIN g/dL 10 6* 9 8*   HEMATOCRIT % 34 8* 31 7*   PLATELETS Thousands/uL 167 113*   LYMPHO PCT %  --  0*   MONO PCT %  --  3*   EOS PCT %  --  0     Results from last 7 days   Lab Units 12/16/22  0445 12/14/22  0441   SODIUM mmol/L 141 148*   POTASSIUM mmol/L 3 8 3 8   CHLORIDE mmol/L 111* 116*   CO2 mmol/L 24 24   BUN mg/dL 38* 40*   CREATININE mg/dL 1 16 1 09   ANION GAP mmol/L 6 8   CALCIUM mg/dL 8 2* 8 1*   ALBUMIN g/dL  --  2 0*   TOTAL BILIRUBIN mg/dL  --  1 04*   ALK PHOS U/L  --  112   ALT U/L  --  27   AST U/L  --  22   GLUCOSE RANDOM mg/dL 138 86     Results from last 7 days   Lab Units 12/09/22  1828   INR  1 38*     Results from last 7 days   Lab Units 12/16/22  1554 12/16/22  1110 12/16/22  0633 12/15/22  2152 12/15/22  1638 12/15/22  1102 12/15/22  0608 12/14/22  2104 12/14/22  1538 12/14/22  1111 12/14/22  0620 12/14/22  0602   POC GLUCOSE mg/dl 191* 126 120 147* 160* 75 75 125 209* 79 83 74         Results from last 7 days   Lab Units 12/14/22  0441   PROCALCITONIN ng/ml 0 25 Lines/Drains:  Invasive Devices     Peripherally Inserted Central Catheter Line  Duration           PICC Line 64/17/67 Right Basilic 3 days          Drain  Duration           Suprapubic Catheter 18 Fr  23 days                Central Line:  Goal for removal: Will discontinue when peripheral access obtained  Telemetry:  Telemetry Orders (From admission, onward)             48 Hour Telemetry Monitoring  Continuous x 48 hours        References:    Telemetry Guidelines   Question:  Reason for 48 Hour Telemetry  Answer:  Acute Respiratory Failure on Bipap                 Telemetry Reviewed: afib  Indication for Continued Telemetry Use: Acute respiratory failure on Bipap             Imaging: No pertinent imaging reviewed      Recent Cultures (last 7 days):         Last 24 Hours Medication List:   Current Facility-Administered Medications   Medication Dose Route Frequency Provider Last Rate   • acetaminophen  650 mg Rectal Q6H PRN Kelli Charles PA-C     • albuterol  2 puff Inhalation Q4H PRN Tom Andrade MD     • amiodarone  200 mg Oral Daily With Breakfast Yen Holland MD     • apixaban  2 5 mg Oral BID Wilmar Back, DO     • atorvastatin  40 mg Oral Daily With Kit Hammonds MD     • cefTRIAXone  2,000 mg Intravenous Q24H Wilmar Back, DO 2,000 mg (12/16/22 1055)   • clopidogrel  75 mg Oral Daily Josephine Chavez DO     • doxylamine  12 5 mg Oral HS PRN Kelli Charles PA-C     • guaiFENesin  400 mg Oral BID Comfort Tan PA-C     • HYDROmorphone  0 2 mg Intravenous Q4H PRN Wilmar Back, DO     • insulin lispro  1-5 Units Subcutaneous TID [facility] Tom Andrade MD     • Lidocaine Viscous HCl  15 mL Swish & Spit 4x Daily PRN Alfredo Tolentino DO     • LORazepam  0 5 mg Intravenous Q6H PRN Nicole Enciso DO     • melatonin  6 mg Oral HS Swathi Ruffin PA-C     • methylPREDNISolone sodium succinate  40 mg Intravenous Q8H [facility] Wilmar Back, DO     • metoprolol tartrate  25 mg Oral Q12H [address] Wilmar Banai, DO     • metroNIDAZOLE  500 mg Intravenous Q8H Wilmar Banai,  mg (12/16/22 4439)   • mirtazapine  7 5 mg Oral HS Wilmar Banai, DO     • oxyCODONE  2 5 mg Oral Q4H PRN Wilmar Banai, DO      Or   • oxyCODONE  5 mg Oral Q4H PRN Wilmar Banai, DO     • pantoprazole  40 mg Intravenous Q24H [address] Andrae Mcdaniels MD     • saliva substitute  5 spray Mouth/Throat 4x Daily PRN Spencer Remy MD          Today, Patient Was Seen By: Presley Niño DO    **Please Note: This note may have been constructed using a voice recognition system  **

## 2022-12-16 NOTE — ASSESSMENT & PLAN NOTE
· Noted overnight on 12/4 when he had fever, tachycardia, tachypnea, worsening hypoxia   · Procalcitonin elevated  · Aspiration suspected  · CT chest (12/7) - Interval development of right lower lobe airspace disease possibly atelectasis or developing pneumonia, potentially aspiration pneumonia  Moderate left basilar effusion and compressive atelectasis redemonstrated  New tree-in-bud opacities in the right upper lobe worrisome for pneumonitis/pneumonia  · Complete 7 day course of Cefepime       12/16/22: Patient now with worsening oxygen requirements overnight, leukocytosis trending up to 24  Congested and rhonchorous breath sounds    Tachypneic as well    -X-ray with left-sided pleural effusion  -Consult IR for thoracentesis labs ordered  -Start on ceftriaxone and Flagyl

## 2022-12-16 NOTE — ASSESSMENT & PLAN NOTE
Wt Readings from Last 3 Encounters:   12/15/22 63 kg (138 lb 14 2 oz)   11/23/22 74 5 kg (164 lb 3 9 oz)   11/22/22 70 8 kg (156 lb)     · Echo 03/01/2022 with EF 45%     ·   · Patient appears mildly hypervolemic on exam we will monitor off fluids  · Monitor volume status

## 2022-12-16 NOTE — ASSESSMENT & PLAN NOTE
· High risk for aspiration  · Was cleared by speech for pureed with honey thick liquids with strict aspiration precautions but repeat VBS on 12/9 showed severe aspiration and speech recommended he be placed NPO  · Seen by ENT - no vocal cord dysfunction   · Patient and family did not want PEG tube but today are reconsidering PEG   · Repeat VBS on 12/13 -patient able to tolerate purée  Continues to aspirate, today he had worsening leukocytosis and oxygen requirements  Will keep patient n p o    He will need PEG tube placement

## 2022-12-16 NOTE — SPEECH THERAPY NOTE
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    Chronic atrial fibrillation ([facility])    Chronic diastolic heart failure ([facility])    Acute on chronic anemia    Closed fracture of trochanter of right femur with routine healing    Transaminitis    Hematuria    Ambulatory dysfunction    Dysphagia    Deep tissue injury to right lateral ankle and left heel    PAD (peripheral artery disease) ([facility])    RSV (acute bronchiolitis due to respiratory syncytial virus)    Goals of care, counseling/discussion    Pneumonia    Hypernatremia    Atrial fibrillation with RVR ([address] )    UTI (urinary tract infection)       Past Medical History  Past Medical History:   Diagnosis Date   • CHF (congestive heart failure) ([facility])    • CKD (chronic kidney disease) stage 3, GFR 30-59 ml/min ([facility])    • Coronary artery disease    • Hyperlipidemia    • Hypertension    • Mitral regurgitation    • Paroxysmal atrial fibrillation ([address] )    • Urinary retention         Past Surgical History  Past Surgical History:   Procedure Laterality Date   • CARDIAC SURGERY      cabg x 2 2014 [facility]   • CORONARY ANGIOPLASTY WITH STENT PLACEMENT  10/2021    DAPHNEY to left main and ramus   • MITRAL VALVE REPAIR  2014    mitral ring   • [place] CYSTOURETHROSCOPY W/IRRIG & EVAC CLOTS N/A 11/23/2022    Procedure: CYSTOSCOPY;  Surgeon: Michelle Metcalf MD;  Location:  MAIN OR;  Service: Urology   • [place] OPEN RX FEMUR FX+INTRAMED HALIMA Right 11/1/2022    Procedure: INSERTION NAIL IM FEMUR ANTEGRADE (TROCHANTERIC); Surgeon: Val Ball;   Location:  MAIN OR;  Service: Orthopedics   • SUPRAPUBIC TUBE PLACEMENT N/A 11/23/2022    Procedure: INSERTION SUPRAPUBIC CATHETER PERCUTANEOUS; Surgeon: Roni Garcia MD;  Location: [facility] MAIN [facility];  Service: Urology         Subjective:  Pt lethargic, w/ eyes closed though responding to questions w/ severely dysphonic/aphonic voicing    "I want to go home and never come back to the hospital"    Current Diet:  Puree w/ honey thick     Objective:  Pt seen for dx dysphagia tx f/u  Max attempt to complete thorough oral care  Pt initially refusing oral care though w/ max encouragement and explanation of benefit to reduce risk of infection, pt agreeable to brief oral care w/ toothbrush  Attempted small amounts of puree and honey thick liquid from tray  Pt accepted x1 tsp each  Weak-absent labial seal for retrieval  Min lingual movement for bolus formation and transfer  Weak swallow palpated followed by immediate strong, wet cough response  Wet coughing was noted prior to trials, as well as after  Pt declined any subsequent trials, stating frustration  Pt states "if I keep getting these results I'll never go home " Education provided on benefit of participation in [address] [place] [address] Dr benitez, though pt continued to decline and request ST leave  Assessment:  Pt w/ increased lethargy, wet coughing, and WBC (per discussion w/ physician/RN), at this time does not appear to be well-tolerating initiated diet  Min bolus acceptance today, unable to assess tolerance       Plan/Recommendations:  Consider NPO/defer to physician given current medical status  Frequent and thorough oral care  ST to f/u as able and appropriate

## 2022-12-16 NOTE — ASSESSMENT & PLAN NOTE
· Patient with worsening respiratory failure overnight now on 2 L nasal cannula and mildly tachypneic  · Check chest x-ray, give dose of Lasix, on antibiotics with ceftriaxone and Flagyl  · Suspect patient continues to aspirate, will make n p o  and finalize plan for PEG tube with family

## 2022-12-17 NOTE — NURSING NOTE
Patient kept comfortable, repositioned every two hours  Just now pulled off BiPAP, respiratory now placed on 4LNC  Complex Repair And Skin Substitute Graft Text: The defect edges were debeveled with a #15 scalpel blade.  The primary defect was closed partially with a complex linear closure.  Given the location of the remaining defect, shape of the defect and the proximity to free margins a skin substitute graft was deemed most appropriate to repair the remaining defect.  The graft was trimmed to fit the size of the remaining defect.  The graft was then placed in the primary defect, oriented appropriately, and sutured into place.

## 2022-12-17 NOTE — NURSING NOTE
Left a detailed message on pt confidential message that Dr Mancera the covering provider refilled pts 2 medication she had requested.  dr sera joseph off still till 9-7-22 .call [facility] to schedule MRI, it has been approved. Left [facility] scheduling ph# too.per Dr Mancera pt to take one tablet of lorazepam that Dr SERA Joseph had already prescribed for pt a few minutes before MRI. Pt to make sure she has someone to drive her to MRI and someone to drive pt home from mRI since pt should not drive while on this medication   Patient wedding ring was left by family  Nursing staff has removed it and placed in container  Family notify

## 2022-12-17 NOTE — PROGRESS NOTES
Progress note - Palliative and Supportive Care   Briseida Marte 80 y o  male [id number]    Patient Active Problem List   Diagnosis   • Bacteremia due to Pseudomonas   • CAD (coronary artery disease)   • Mitral regurgitation   • Hypertensive urgency   • Hyperlipidemia, mixed   • Carotid artery stenosis   • Stage 3 chronic kidney disease (HCC)   • Multiple lung nodules on CT   • Severe sepsis (HCC)   • Acute respiratory failure with hypoxia (HCC)   • Acute on chronic diastolic CHF (congestive heart failure) (HCC)   • 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   • Chronic atrial fibrillation (HCC)   • Postprocedural pneumothorax   • Lumbar compression deformity   • Chronic diastolic heart failure (HCC)   • Paroxysmal atrial fibrillation (HCC)   • Closed 2-part intertrochanteric fracture of proximal end of right femur, initial encounter ([facility] )   • Acute on chronic anemia   • Hyponatremia   • Postoperative urinary retention   • Thrombocytopenia (HCC)   • Closed fracture of trochanter of right femur with routine healing   • Leg edema, right   • Pressure injury sacrum and right heel   • Transaminitis   • Elevated troponin level not due myocardial infarction   • Hematuria   • Urethral trauma   • Ambulatory dysfunction   • Dysphagia   • Deep tissue injury to right lateral ankle and left heel   • PAD (peripheral artery disease) ([facility])   • RSV (acute bronchiolitis due to respiratory syncytial virus)   • Goals of care, counseling/discussion   • Pneumonia   • Hypernatremia   • Atrial fibrillation with RVR ([facility] )   • UTI (urinary tract infection)     Active issues specifically addressed today include:   Palliative care encounter  Comfort measures only status  End-of-life care    Plan:  1  Symptom management -   • Dilaudid 0 3 mg IV nightly hours as needed pain or increased work of breathing  • Ativan 0 5 mg IV every hour as needed anxiety    2   Goals -   • Comfort measures only while admitted     Code Status: comfort - Level 4   Decisional apparatus:  Patient is not competent on my exam today  If competence is lost, patient's substitute decision maker would default to spouse [place]  Advance Directive / Living Will / POLST:  None on file    3  Social support-  • Family at bedside during time of encounter    4  Follow-up  • Care remains available for symptom management adjustment while admitted    Interval history:     Patient received 1 dose of Ativan and 2 doses of IV Dilaudid in the past 24 hours  At time of evaluation he is taking shallow breaths, but without periods of apnea or increased work of breathing  His hands are warm, feet cool but not mottled  No family at bedside  BiPAP was removed overnight for patient's comfort  MEDICATIONS / ALLERGIES:     all current active meds have been reviewed    No Known Allergies    OBJECTIVE:    Physical Exam  Physical Exam  Constitutional:       General: He is not in acute distress  Appearance: He is ill-appearing  HENT:      Head: Atraumatic  Eyes:      Conjunctiva/sclera: Conjunctivae normal    Cardiovascular:      Rate and Rhythm: Normal rate  Pulmonary:      Effort: No respiratory distress  Abdominal:      Tenderness: There is no guarding  Skin:     Comments: Bilateral hands are warm  Feet are cool but not mottled     Neurological:      Comments: Unresponsive to verbal or tactile stimuli   Psychiatric:      Comments: No agitation         Lab Results:   Results from last 7 days   Lab Units 12/16/22 0445 12/14/22 0441 12/13/22  0438   WBC Thousand/uL 24 89* 10 84* 9 68   HEMOGLOBIN g/dL 10 6* 9 8* 9 7*   HEMATOCRIT % 34 8* 31 7* 31 3*   PLATELETS Thousands/uL 167 113* 119*   MONO PCT %  --  3*  --      Results from last 7 days   Lab Units 12/16/22 0445 12/14/22 0441 12/13/22  0438   POTASSIUM mmol/L 3 8 3 8 4 0   CHLORIDE mmol/L 111* 116* 118*   CO2 mmol/L 24 24 24   BUN mg/dL 38* 40* 41*   CREATININE mg/dL 1 16 1 09 1 07   CALCIUM mg/dL 8 2* 8 1* 8 4   ALK PHOS U/L  --  112  --    ALT U/L  --  27  --    AST U/L  --  22  --        Imaging Studies: reviewed pertinent studies   EKG, Pathology, and Other Studies: reviewed pertinent studies    Counseling / Coordination of Care    Total floor / unit time spent today 15 minutes  Greater than 50% of total time was spent with the patient and / or family counseling and / or coordination of care  A description of the counseling / coordination of care: time spent assessing patient, symptom management check

## 2022-12-17 NOTE — DEATH NOTE
INPATIENT DEATH NOTE  Marco Smyth 80 y o  male MRN: [id number]  Unit/Bed#: [facility] 431-01 Encounter: [id number]    Date, Time and Cause of Death    Date of Death: 22  Time of Death: 12:45 PM  Preliminary Cause of Death: Acute respiratory failure with hypercapnia ([facility] )  Entered by: Wilmar Back[RB1 1]     Attribution     RB1 1 Rober Gallego DO 22 14:41           Patient's Information  Pronounced by: Rober Gallego MD  Did the patient's death occur in the ED?: No  Did the patient's death occur in the OR?: No  Did the patient's death occur less than 10 days post-op?: No  Did the patient's death occur within 24 hours of admission?: No  Was code status DNR at the time of death?: Yes (comfort care)    PHYSICAL EXAM:  Unresponsive to noxious stimuli, Spontaneous respirations absent, Breath sounds absent, Carotid pulse absent, Heart sounds absent, Pupillary light reflex absent and Corneal blink reflex absent    Medical Examiner notification criteria:  NONE APPLICABLE   Medical Examiner's office notified?:  No, does not meet ME notification criteria   Medical Examiner accepted case?:  No  Name of Medical Examiner:     Family Notification  Was the family notified?: Yes  Date Notified: 22  Notified by: Rober Gallego   Relationship to Patient: Spouse  Family Notification Route:  At bedside  Was the family told to contact a  home?: Yes  Name of [facility][de-identified] Cheryl Pina     Autopsy Options:  Post-mortem examination declined by next of kin    Primary Service Attending Physician notified?:  yes - Attending:  Rober Gallego DO    Physician/Resident responsible for completing Discharge Summary:  Rober Gallego

## 2022-12-19 LAB
LYMPHOCYTES NFR BLD AUTO: 59 %
MONO+MESO NFR FLD MANUAL: 4 %
MONOCYTES NFR BLD AUTO: 7 %
NEUTS SEG NFR BLD AUTO: 30 %
PATHOLOGIST INTERPRETATION: NORMAL
PATHOLOGY REVIEW: YES
TOTAL CELLS COUNTED SPEC: 100

## 2022-12-20 LAB
BACTERIA SPEC BFLD CULT: NO GROWTH
GRAM STN SPEC: NORMAL

## 2022-12-21 NOTE — ASSESSMENT & PLAN NOTE
Lab Results   Component Value Date    EGFR 58 12/16/2022    EGFR 62 12/14/2022    EGFR 64 12/13/2022    CREATININE 1 16 12/16/2022    CREATININE 1 09 12/14/2022    CREATININE 1 07 12/13/2022     Estimated Creatinine Clearance: 43 8 mL/min (by C-G formula based on SCr of 1 16 mg/dL)  Baseline creatinine 1 8 to 2  Initially had malpositioned zavala with urethral injury, then creatinine elevated due to volume overload which improved with diuresis   Now creatinine increasing since 12/4 in the setting of new aspiration pneumonia/RSV with decreased oral intake

## 2022-12-21 NOTE — ASSESSMENT & PLAN NOTE
· Admitted to [address] [place] [address] from 10/31/22 to 11/6/22 with right hip fracture  · S/p repair     · Was undergoing rehab at [facility]  · Physical therapy

## 2022-12-21 NOTE — ASSESSMENT & PLAN NOTE
· High risk for aspiration  · Was cleared by speech for pureed with honey thick liquids with strict aspiration precautions but repeat VBS on 12/9 showed severe aspiration and speech recommended he be placed NPO  · Seen by ENT - no vocal cord dysfunction   · Patient and family did not want PEG tube but today are reconsidering PEG   · Repeat VBS on 12/13 -patient able to tolerate purée  Continues to aspirate, today he had worsening leukocytosis and oxygen requirements   now on comfort care

## 2022-12-21 NOTE — ASSESSMENT & PLAN NOTE
· Patient with worsening respiratory failure today, sp thoracentesis  · Now requiring bipap due to increased work of breathing and desaturation on nc    · Patient transitioned to comfort care measures

## 2022-12-21 NOTE — ASSESSMENT & PLAN NOTE
· Noted overnight on 12/4 when he had fever, tachycardia, tachypnea, worsening hypoxia   · Procalcitonin elevated  · Aspiration suspected  · CT chest (12/7) - Interval development of right lower lobe airspace disease possibly atelectasis or developing pneumonia, potentially aspiration pneumonia  Moderate left basilar effusion and compressive atelectasis redemonstrated  New tree-in-bud opacities in the right upper lobe worrisome for pneumonitis/pneumonia  · Complete 7 day course of Cefepime       12/16/22: Patient now with worsening oxygen requirements overnight, leukocytosis trending up to 24  Congested and rhonchorous breath sounds  Tachypneic as well    -X-ray with left-sided pleural effusion  Sp thoracentesis w  -required bipap due to increased work of breathing, after discussion with family now transitioned to comfort care

## 2022-12-21 NOTE — ASSESSMENT & PLAN NOTE
· Palliative were following - family noted that he had improved and wanted to continue treatment of medical conditions followed by discharge to rehab  · Now made NPO on 12/9 due to severe dysphagia  · Palliative care following again from 12/11   · Patient now on comfort care after discussion with family  ·

## 2022-12-21 NOTE — DISCHARGE SUMMARY
[address] [facility]  Discharge- Ya Fuller 1940, 80 y o  male MRN: [id number]  Unit/Bed#: [facility] 431-01 Encounter: [id number]  Primary Care Provider: Shantanu Petersen DO   Date and time admitted to hospital: 11/23/2022  4:04 AM    UTI (urinary tract infection)  Assessment & Plan  · Presentation as above  · Urine culture (11/20) - E  Coli  · Initially on Cefepime, then Cefazolin, then Cephalexin  · Completed antibiotic course  · Had septic shock which has resolved    Hypernatremia  Assessment & Plan  · poor oral intake    Pneumonia  Assessment & Plan  · Noted overnight on 12/4 when he had fever, tachycardia, tachypnea, worsening hypoxia   · Procalcitonin elevated  · Aspiration suspected  · CT chest (12/7) - Interval development of right lower lobe airspace disease possibly atelectasis or developing pneumonia, potentially aspiration pneumonia  Moderate left basilar effusion and compressive atelectasis redemonstrated  New tree-in-bud opacities in the right upper lobe worrisome for pneumonitis/pneumonia  · Complete 7 day course of Cefepime       12/16/22: Patient now with worsening oxygen requirements overnight, leukocytosis trending up to 24  Congested and rhonchorous breath sounds  Tachypneic as well    -X-ray with left-sided pleural effusion  Sp thoracentesis w  -required bipap due to increased work of breathing, after discussion with family now transitioned to comfort care       Goals of care, counseling/discussion  Assessment & Plan  · Palliative were following - family noted that he had improved and wanted to continue treatment of medical conditions followed by discharge to rehab  · Now made NPO on 12/9 due to severe dysphagia  · Palliative care following again from 12/11   · Patient now on comfort care after discussion with family  ·       RSV (acute bronchiolitis due to respiratory syncytial virus)  Assessment & Plan  · Resp protocol  · Supportive care            PAD (peripheral artery disease) ([facility] )  Assessment & Plan  · Seen on LEADS  · Reviewed by vascular on 12/7 as LE wound appeared to have increased - vascular disease possibly contributing to non-healing LE wounds but not a candidate for intervention at present in the setting of current infections/respiratory status and co-morbidities    Deep tissue injury to right lateral ankle and left heel  Assessment & Plan  · Wound care/podiatry input appreciated  · AZ abnormal - reviewed by vascular - not a candidate for intervention at present, ct wound care    Dysphagia  Assessment & Plan  · High risk for aspiration  · Was cleared by speech for pureed with honey thick liquids with strict aspiration precautions but repeat VBS on 12/9 showed severe aspiration and speech recommended he be placed NPO  · Seen by ENT - no vocal cord dysfunction   · Patient and family did not want PEG tube but today are reconsidering PEG   · Repeat VBS on 12/13 -patient able to tolerate purée  Continues to aspirate, today he had worsening leukocytosis and oxygen requirements   now on comfort care      Ambulatory dysfunction  Assessment & Plan  Multifactorial with deconditioning contributing  Safe ambulation  Fall precautions  Physical therapy    Hematuria  Assessment & Plan  · As above    Transaminitis  Assessment & Plan  · Likely due to shock liver  · Resolved     Closed fracture of trochanter of right femur with routine healing  Assessment & Plan  · Admitted to [address] [place] [address] from 10/31/22 to 11/6/22 with right hip fracture  · S/p repair     · Was undergoing rehab at [facility]  · Physical therapy      Acute on chronic anemia  Assessment & Plan  · Multifactorial  · Initially had hematuria  · 1 unit PRBC on 12/6 and 1 on 12/7  · Seen by GI on 12/7 - brown stools on rectal exam  Since no overt GI bleeding and high risk for endoscopy at present monitor at present, ok for Eliquis  CT C/A/P - per radiology may have hemorrhagic prostatitis - D/w urology on 12/7 - enlarged prostate, as urine clear no contraindication to E        Chronic diastolic heart failure [facility])  Assessment & Plan  Wt Readings from Last 3 Encounters:   12/15/22 63 kg (138 lb 14 2 oz)   11/23/22 74 5 kg (164 lb 3 9 oz)   11/22/22 70 8 kg (156 lb)     · Echo 03/01/2022 with EF 45%  ·   · Patient appears mildly hypervolemic on exam we will monitor off fluids  · Monitor volume status  · Patient is now comfort care after discussion with family            Chronic atrial fibrillation [facility])  Assessment & Plan  No further management    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  Assessment & Plan  Lab Results   Component Value Date    EGFR 58 12/16/2022    EGFR 62 12/14/2022    EGFR 64 12/13/2022    CREATININE 1 16 12/16/2022    CREATININE 1 09 12/14/2022    CREATININE 1 07 12/13/2022     Estimated Creatinine Clearance: 43 8 mL/min (by C-G formula based on SCr of 1 16 mg/dL)  Baseline creatinine 1 8 to 2  Initially had malpositioned zavala with urethral injury, then creatinine elevated due to volume overload which improved with diuresis  Now creatinine increasing since 12/4 in the setting of new aspiration pneumonia/RSV with decreased oral intake      Acute respiratory failure with hypoxia ([address] )  Assessment & Plan  · Patient with worsening respiratory failure today, sp thoracentesis  · Now requiring bipap due to increased work of breathing and desaturation on nc  · Patient transitioned to comfort care measures      Severe sepsis [facility])  Assessment & Plan  · Noted overnight on 12/4 due to aspiration pneumonia, again concern for aspiration today  Noted to have worsening resp failure requiring bipap  After discussion with family transitioned to comfort care  · On admission had septic shock due to UTI requiring pressors    CAD (coronary artery disease)  Assessment & Plan  Stop all gdmt      * Urethral trauma  Assessment & Plan  · Hospitalized from 10/31/22 to 11/6/22 for right hip fracture - s/p ORIF  Postoperatively he developed urinary retention and he was discharged with a zavala  · Presented to the ED at [facility] on 11/20/22 with lower abdominal pain - was noted to have a UTI and was discharged back to rehab on Augmentin  · Brought back to the ED at [facility] on 11/21/22 with abdominal pain and lethargy  Noted to be hypotensive with lactic acid > 10, septic shock from UTI and was on pressors in the ICU  · Also noted to have hematuria and was on CBI  Hematuria persisted and developed abdominal distension with failure of CBI drainage  · CT abdomen (11/23/22) - "The Zavala catheter is malpositioned  The catheter balloon is located near the base of the penis, below the level of the prostate  There is gas within the soft tissues of the perineum and penis, most pronounced on the left    Urethral injury is suspected "  · Transferred emergently to [facility] and underwent suprapubic catheter placement on 11/23  · Hematuria resolved          Septic shock (HCC)-resolved as of 12/4/2022  Assessment & Plan  · Present on admission  · Resolved        Medical Problems     Resolved Problems  Date Reviewed: 12/21/2022          Resolved    Septic shock ([address] ) 12/4/2022     Resolved by  Aparna Kub,     UTI (urinary tract infection) 12/4/2022     Resolved by  Aparna Kualejo,     Hyperglycemia 11/29/2022     Resolved by  Aparna Momin DO        Discharging Physician / Practitioner: Macy Nogueira DO  PCP: Carmenza Morris DO  Admission Date:   Admission Orders (From admission, onward)     Ordered        11/23/22 1004  Inpatient Admission  Once            11/23/22 0650  Admit Patient to  Once,   Status:  Canceled                      Discharge Date: 12/17/22  Consultations During Hospital Stay:  · Palliative  · Speech  · ent   · Cardiology  · GI  · nephrology    Procedures Performed:   · picc line    Significant Findings / Test Results:   XR chest portable   Final Result by Kenney Moya MD (12/16 1118)      At least moderate sized left pleural effusion has increased in size since December 13, 2022  Workstation performed: [id number]         XR chest PICC line portable   Final Result by Luis Peters MD (12/14 1045)      PICC catheter has been withdrawn with tip now in SVC  Pleural-parenchymal density at the left base is unchanged as well as atelectasis at the right lung base  Workstation performed: [id number]         XR chest PICC line portable   Final Result by Maren Robertson MD (12/13 1415)      Right PICC line tip in right atrium  Withdraw 6 0 cm  Left base persistent increased effusion, atelectasis and/or infiltrate  Right base atelectasis and small effusion                        Workstation performed: [id number]         FL barium swallow video w speech   Final Result by SYSTEMGENERATED, DOCUMENTATION (12/13 1040)      FL barium swallow video w speech   Final Result by SYSTEMGENERATED, DOCUMENTATION (12/09 1444)      XR chest PICC line portable   Final Result by Esme Adorno MD (12/09 1329)      Right PICC in upper SVC  Persistent left effusion and left greater than right bibasilar opacity which could be due to atelectasis and/or pneumonia  Workstation performed: [id number]         CT chest abdomen pelvis wo contrast   Final Result by Argentina Brito MD (12/07 1302)         1  Interval development of right lower lobe airspace disease possibly atelectasis or developing pneumonia, potentially aspiration pneumonia  2   Moderate left basilar effusion and compressive atelectasis redemonstrated  3   New tree-in-bud opacities in the right upper lobe worrisome for pneumonitis/pneumonia  4   Enlarged prostate which appears slightly larger than on the recent prior studies and now is more hyperdense  Hemorrhagic prostatitis not excluded  No large hematoma in the pelvis  5   Cholelithiasis redemonstrated   6    Sigmoid diverticulosis without bowel obstruction  7   Diminished attenuation of the blood pool compared to the myocardium suggestive of anemia  Workstation performed: [id number]         XR chest portable   Final Result by Roland Dugan MD ( 1015)      Improved small left pleural effusion  Bibasilar atelectasis  No new focal airspace consolidation identified  Workstation performed: [id number]         XR foot 2 vw right   Final Result by Wes Mejia MD ( 1333)      No radiographic evidence of osteomyelitis  Workstation performed: [id number]         VAS lower limb arterial duplex, complete bilateral   Final Result by Lisa Kendall MD (2247)      FL barium swallow video w speech   Final Result by BEAU SIEGEL ( 151)      XR chest pa & lateral   Final Result by Surya Richmond MD ( 110)      Increasing left effusion   Increasing hazy density in the lung suggest worsening congestion                  Workstation performed: [id number]         IR IN-Patient Thoracentesis    (Results Pending)   ·     Incidental Findings:   ·    · I reviewed the above mentioned incidental findings with the patient and/or family and they expressed understanding  Test Results Pending at Discharge (will require follow up):   · none     Outpatient Tests Requested:  · none    Complications:      Reason for Admission: urethral trauma    Hospital Course:   Jose Briseno is a 80 y o  male patient who originally presented to the hospital on 2022 for evaluation of hematuria  He was found to have septic shock secondary to uti  Ct imaging showed malpsoition fo zavala catheter  He was treated with abx for uti  He was noted ot have dysphagia and aspiration  Speech did allow dysphagia level one diet, family agreed to trial diet  Unfortunately he continued to aspirate and progressed to resp failure  Family elected to transition patient to [facility]           Please see above list of diagnoses and related plan for additional information  Condition at Discharge: terminal    Discharge Day Visit / Exam:   Subjective:    Vitals: Blood Pressure: (!) 91/49 (22 1113)  Pulse: (!) 116 (22 1113)  Temperature: 98 2 °F (36 8 °C) (22 1454)  Temp Source: Oral (12/15/22 2315)  Respirations: (!) 24 (22 0716)  Height: 5' 8" (172 7 cm) (22 0432)  Weight - Scale: 63 kg (138 lb 14 2 oz) (12/15/22 0600)  SpO2: (!) 85 % (22 1113)  Exam:   Physical Exam  Eyes:      Comments: Pupils not reactive to light     Cardiovascular:      Comments: Absent pulse heart sounds  Neurological:      Comments: No reaction to noxious stimuli          Discussion with Family: Updated  (significant other) at bedside  Discharge instructions/Information to patient and family:   See after visit summary for information provided to patient and family  Provisions for Follow-Up Care:  See after visit summary for information related to follow-up care and any pertinent home health orders  Disposition:   Other:     Planned Readmission: no     Discharge Statement:  I spent 35 minutes discharging the patient  This time was spent on the day of discharge  I had direct contact with the patient on the day of discharge  Greater than 50% of the total time was spent examining patient, answering all patient questions, arranging and discussing plan of care with patient as well as directly providing post-discharge instructions  Additional time then spent on discharge activities  Discharge Medications:  See after visit summary for reconciled discharge medications provided to patient and/or family        **Please Note: This note may have been constructed using a voice recognition system**

## 2022-12-21 NOTE — ASSESSMENT & PLAN NOTE
Wt Readings from Last 3 Encounters:   12/15/22 63 kg (138 lb 14 2 oz)   11/23/22 74 5 kg (164 lb 3 9 oz)   11/22/22 70 8 kg (156 lb)     · Echo 03/01/2022 with EF 45%     ·   · Patient appears mildly hypervolemic on exam we will monitor off fluids  · Monitor volume status  · Patient is now comfort care after discussion with family

## 2022-12-21 NOTE — ASSESSMENT & PLAN NOTE
· Seen on LEADS  · Reviewed by vascular on 12/7 as LE wound appeared to have increased - vascular disease possibly contributing to non-healing LE wounds but not a candidate for intervention at present in the setting of current infections/respiratory status and co-morbidities

## 2022-12-21 NOTE — ASSESSMENT & PLAN NOTE
· Multifactorial  · Initially had hematuria  · 1 unit PRBC on 12/6 and 1 on 12/7  · Seen by GI on 12/7 - brown stools on rectal exam  Since no overt GI bleeding and high risk for endoscopy at present monitor at present, ok for Eliquis  CT C/A/P - per radiology may have hemorrhagic prostatitis - D/w urology on 12/7 - enlarged prostate, as urine clear no contraindication to E

## 2022-12-21 NOTE — ASSESSMENT & PLAN NOTE
· Noted overnight on 12/4 due to aspiration pneumonia, again concern for aspiration today  Noted to have worsening resp failure requiring bipap  After discussion with family transitioned to comfort care     · On admission had septic shock due to UTI requiring pressors

## 2022-12-21 NOTE — ASSESSMENT & PLAN NOTE
· Hospitalized from 10/31/22 to 11/6/22 for right hip fracture - s/p ORIF  Postoperatively he developed urinary retention and he was discharged with a zavala  · Presented to the ED at [address] [place] [address] on 11/20/22 with lower abdominal pain - was noted to have a UTI and was discharged back to rehab on Augmentin  · Brought back to the ED at [address] [place] [address] on 11/21/22 with abdominal pain and lethargy  Noted to be hypotensive with lactic acid > 10, septic shock from UTI and was on pressors in the ICU  · Also noted to have hematuria and was on CBI  Hematuria persisted and developed abdominal distension with failure of CBI drainage  · CT abdomen (11/23/22) - "The Zavala catheter is malpositioned  The catheter balloon is located near the base of the penis, below the level of the prostate  There is gas within the soft tissues of the perineum and penis, most pronounced on the left    Urethral injury is suspected "  · Transferred emergently to \[facility]\"" and underwent suprapubic catheter placement on 11/23  · Hematuria resolved

## 2024-10-20 NOTE — ASSESSMENT & PLAN NOTE
Lab Results   Component Value Date    EGFR 33 11/29/2022    EGFR 31 11/28/2022    EGFR 30 11/27/2022    CREATININE 1 85 (H) 11/29/2022    CREATININE 1 91 (H) 11/28/2022    CREATININE 1 98 (H) 11/27/2022     Estimated Creatinine Clearance: 29 8 mL/min (A) (by C-G formula based on SCr of 1 85 mg/dL (H))    Acute on chronic kidney disease improving with diuresis  Monitor kidney function closely  Avoid nephrotoxins  Suprapubic catheter in place 4

## 2024-12-24 NOTE — ASSESSMENT & PLAN NOTE
Aerobic Exercise for a Healthy Heart  Exercise is a lot more than an energy booster and a stress reliever. It also strengthens your heart muscle, lowers your blood pressure and cholesterol, and burns calories. It can also improve your resting muscle tone, and your mood.     Choose an aerobic activity  Choose an activity that makes your heart and lungs work harder than they do when you rest or walk normally. This aerobic exercise can improve the way your heart and other muscles use oxygen. Make it fun by exercising with a friend and choosing an activity you enjoy. Here are some ideas:  Walking  Swimming  Bicycling  Stair climbing  Dancing  Jogging  Gardening  Exercise regularly  If you haven’t been exercising regularly,  get your doctor’s OK first. Then start slowly.  Here are some tips:  Begin exercising 3 times a week for 5 to 10 minutes at a time.  When you feel comfortable, add a few minutes each session.  Slowly build up to exercising 3 to 4 times each week. Each session should last for 40 minutes, on average, and involve moderate- to vigorous-intensity physical activity.  Your goal should be at least 30 minutes of moderate-intensity aerobic activity at least 5 days per week for a total of 150 or at least 25 minutes of vigorous aerobic activity at least 3 days per week for a total of 75 minutes  If you have been given nitroglycerin, be sure to carry it when you exercise.  If you get chest pain (angina) when you’re exercising, stop what you’re doing, take your nitroglycerin, and call your doctor.  [employer] last reviewed this educational content on 6/1/2019 © 2000-2020 [employer]. [address] [place] [address], [place] [address]. All rights reserved. This information is not intended as a substitute for professional medical care. Always follow your healthcare professional's instructions.        Exercise for a Healthier Heart     Exercise with a friend. When activity is fun, you're more likely to stick  · Palliative were following - family noted that he had improved and wanted to continue treatment of medical conditions followed by discharge to rehab  · Now made NPO on 12/9 due to severe dysphagia  · Palliative care following again from 12/11   · Wife and patient awaiting repeat VBS on 12/13  · They may want PEG placement as wife was informed by a family member that it was reversible with it.   You may wonder how you can improve the health of your heart. If you’re thinking about exercise, you’re on the right track. You don’t need to become an athlete. But you do need a certain amount of brisk exercise to help strengthen your heart. If you have been diagnosed with a heart condition, your healthcare provider may advise exercise to help stabilize your condition. To help make exercise a habit, choose safe, fun activities.   Before you start  Check with your healthcare provider before starting an exercise program. This is especially important if you have not been active for a while. It's also important if you have a long-term (chronic) health problem such as heart disease, diabetes, or obesity. Or if you are at high risk for having these problems.   Why exercise?  Exercising regularly offers many healthy rewards. It can help you do all of the following:  Improve your blood cholesterol level to help prevent further heart trouble  Lower your blood pressure to help prevent a stroke or heart attack  Control diabetes, or reduce your risk of getting this disease  Improve your heart and lung function  Reach and stay at a healthy weight  Make your muscles stronger so you can stay active  Prevent falls and fractures by slowing the loss of bone mass (osteoporosis)  Manage stress better  Reduce your blood pressure  Improve your sense of self and your body image  Exercise tips    Ease into your routine. Set small goals. Then build on them. If you are not sure what your activity level should be, talk with your healthcare provider first before starting an exercise routine.  Exercise on most days. Aim for a total of 150 minutes (2 hours and 30 minutes) or more of moderate-intensity aerobic activity each week. Or 75 minutes (1 hour and 15 minutes) or more of vigorous-intensity aerobic activity each week. Or try for a combination of both. Moderate activity means that you breathe heavier and your heart rate increases  but you can still talk. Think about doing 40 minutes of moderate exercise, 3 to 4 times a week. For best results, activity should last for about 40 minutes to lower blood pressure and cholesterol. It's OK to work up to the 40-minute period over time. Examples of moderate-intensity activity are walking 1 mile in 15 minutes. Or doing 30 to 45 minutes of yard work.  Step up your daily activity level.  Along with your exercise program, try being more active the whole day. Walk instead of drive. Or park further away so that you take more steps each day. Do more household tasks or yard work. You may not be able to meet the advised mount of physical activity. But doing some moderate- or vigorous-intensity aerobic activity can help reduce your risk for heart disease. Your healthcare provider can help you figure out what is best for you.  Choose 1 or more activities you enjoy.  Walking is one of the easiest things you can do. You can also try swimming, riding a bike, dancing, or taking an exercise class.    When to call your healthcare provider  Call your healthcare provider if you have any of these:   Chest pain or feel dizzy or lightheaded  Burning, tightness, pressure, or heaviness in your chest, neck, shoulders, back, or arms  Abnormal shortness of breath  More joint or muscle pain  A very fast or irregular heartbeat (palpitations)  [employer] last reviewed this educational content on 7/1/2019 © 2000-2020 [employer]. [address] [place] [address]. All rights reserved. This information is not intended as a substitute for professional medical care. Always follow your healthcare professional's instructions.        Eating Heart-Healthy Foods  Eating has a big impact on your heart health. In fact, eating healthier can improve several of your heart risks at once. For instance, it helps you manage weight, cholesterol, and blood pressure. Here are ideas to help you make heart-healthy changes without  giving up all the foods and flavors you love.  Getting started  Talk with your healthcare provider about eating plans, such as the DASH or Mediterranean diet. You may also be referred to a dietitian.  Change a few things at a time. Give yourself time to get used to a few eating changes before adding more.  Work to create a tasty, healthy eating plan that you can stick to for the rest of your life.    Goals for healthy eating  Below are some tips to improve your eating habits:  Limit saturated fats and trans fats. Saturated fats raise your levels of cholesterol, so keep these fats to a minimum. They are found in foods such as fatty meats, whole milk, cheese, and palm and coconut oils. Avoid trans fats because they lower good cholesterol as well as raise bad cholesterol. Trans fats are most often found in processed foods.  Reduce sodium (salt) intake. Eating too much salt may increase your blood pressure. Limit your sodium intake to 2,300 milligrams (mg) per day (the amount in 1 teaspoon of salt), or less if your healthcare provider recommends it. Dining out less often and eating fewer processed foods are two great ways to decrease the amount of salt you consume.  Managing calories. A calorie is a unit of energy. Your body burns calories for fuel, but if you eat more calories than your body burns, the extras are stored as fat. Your healthcare provider can help you create a diet plan to manage your calories. This will likely include eating healthier foods as well as exercising regularly. To help you track your progress, keep a diary to record what you eat and how often you exercise.  Choose the right foods  Aim to make these foods staples of your diet. If you have diabetes, you may have different recommendations than what is listed here:  Fruits and vegetables provide plenty of nutrients without a lot of calories. At meals, fill half your plate with these foods. Split the other half of your plate between whole grains  and lean protein.  Whole grains are high in fiber and rich in vitamins and nutrients. Good choices include whole-wheat bread, pasta, and brown rice.  Lean proteins give you nutrition with less fat. Good choices include fish, skinless chicken, and beans.  Low-fat or nonfat dairy provides nutrients without a lot of fat. Try low-fat or nonfat milk, cheese, or yogurt.  Healthy fats can be good for you in small amounts. These are unsaturated fats, such as olive oil, nuts, and fish. Try to have at least 2 servings per week of fatty fish, such as salmon, sardines, mackerel, rainbow trout, and albacore tuna. These contain omega-3 fatty acids, which are good for your heart. Flaxseed is another source of a heart-healthy fat.  More on heart-healthy eating  Read food labels  Healthy eating starts at the grocery store. Be sure to pay attention to food labels on packaged foods. Look for products that are high in fiber and protein, and low in saturated fat, cholesterol, and sodium. Avoid products that contain trans fat. And pay close attention to serving size. For instance, if you plan to eat two servings, double all the numbers on the label.  Prepare food right  A key part of healthy cooking is cutting down on added fat and salt. Look on the internet for lower-fat, lower-sodium recipes. Also, try these tips:  Remove fat from meat and skin from poultry before cooking.  Skim fat from the surface of soups and sauces.  Broil, boil, bake, steam, grill, and microwave food without added fats.  Choose ingredients that spice up your food without adding calories, fat, or sodium. Try these items: horseradish, hot sauce, lemon, mustard, nonfat salad dressings, and vinegar. For salt-free herbs and spices, try basil, cilantro, cinnamon, pepper, and rosemary.  Date Last Reviewed: 10/1/2017  © 7069-7551 [employer]. [address] [place] [address], [place] [address]. All rights reserved. This information is not intended as a substitute for  professional medical care. Always follow your healthcare professional's instructions.       4 Steps for Eating Healthier  Changing the way you eat can improve your health. It can lower your cholesterol and blood pressure, and help you stay at a healthy weight. Your diet doesn’t have to be bland and boring to be healthy. Just watch your calories and follow these steps:    Step 1. Eat fewer unhealthy fats  Choose more fish and lean meats instead of fatty cuts of meat.  Skip butter and lard, and use less margarine.  Pass on foods that have palm, coconut, or hydrogenated oils.  Eat fewer high-fat dairy foods like cheese, ice cream, and whole milk.  Get a heart-healthy cookbook and try some low-fat recipes.  Step 2. Go light on salt  Keep the saltshaker off the table.  Limit high-salt ingredients, such as soy sauce, bouillon, and garlic salt.  Instead of adding salt when cooking, season your food with herbs and flavorings. Try lemon, garlic, and onion, or salt-free herb seasonings.  Limit convenience foods, such as boxed or canned foods and restaurant food.  Read food labels and choose lower-sodium options.  Step 3. Limit sugar  Pause before you add sugars to pancakes, cereal, coffee, or tea. This includes white and brown table sugar, syrup, honey, and molasses. Cut your usual amount by half.  Use non-sugar sweeteners. Stevia, aspartame, and sucralose can satisfy a sweet tooth without adding calories.  Swap out sugar-filled soda and other drinks. Buy sugar-free or low-calorie beverages. Remember water is always the best choice.  Read labels and choose foods with less added sugar. Keep in mind that dairy foods and foods with fruit will have some natural sugar.  Cut the sugar in recipes by 1/3 to 1/2. Boost the flavor with extracts like almond, vanilla, or orange. Or add spices such as cinnamon or nutmeg.  Step 4. Eat more fiber  Eat fresh fruits and vegetables every day.  Boost your diet with whole grains. Go for oats,  whole-grain rice, and bran.  Add beans and lentils to your meals.  Drink more water to match your fiber increase to help prevent constipation.  Date Last Reviewed: 6/1/2017 © 2000-2018 [employer]. [address] [place] [address], [place] [address]. All rights reserved. This information is not intended as a substitute for professional medical care. Always follow your healthcare professional's instructions.       Exercise for a Healthier Heart     Exercise with a friend. When activity is fun, you're more likely to stick with it.   You may wonder how you can improve the health of your heart. If you’re thinking about exercise, you’re on the right track. You don’t need to become an athlete. But you do need a certain amount of brisk exercise to help strengthen your heart. If you have been diagnosed with a heart condition, your healthcare provider may advise exercise to help stabilize your condition. To help make exercise a habit, choose safe, fun activities.   Before you start  Check with your healthcare provider before starting an exercise program. This is especially important if you have not been active for a while. It's also important if you have a long-term (chronic) health problem such as heart disease, diabetes, or obesity. Or if you are at high risk for having these problems.   Why exercise?  Exercising regularly offers many healthy rewards. It can help you do all of the following:  Improve your blood cholesterol level to help prevent further heart trouble  Lower your blood pressure to help prevent a stroke or heart attack  Control diabetes, or reduce your risk of getting this disease  Improve your heart and lung function  Reach and stay at a healthy weight  Make your muscles stronger so you can stay active  Prevent falls and fractures by slowing the loss of bone mass (osteoporosis)  Manage stress better  Reduce your blood pressure  Improve your sense of self and your body image  Exercise tips    Ease into  your routine. Set small goals. Then build on them. If you are not sure what your activity level should be, talk with your healthcare provider first before starting an exercise routine.  Exercise on most days. Aim for a total of 150 minutes (2 hours and 30 minutes) or more of moderate-intensity aerobic activity each week. Or 75 minutes (1 hour and 15 minutes) or more of vigorous-intensity aerobic activity each week. Or try for a combination of both. Moderate activity means that you breathe heavier and your heart rate increases but you can still talk. Think about doing 40 minutes of moderate exercise, 3 to 4 times a week. For best results, activity should last for about 40 minutes to lower blood pressure and cholesterol. It's OK to work up to the 40-minute period over time. Examples of moderate-intensity activity are walking 1 mile in 15 minutes. Or doing 30 to 45 minutes of yard work.  Step up your daily activity level.  Along with your exercise program, try being more active the whole day. Walk instead of drive. Or park further away so that you take more steps each day. Do more household tasks or yard work. You may not be able to meet the advised mount of physical activity. But doing some moderate- or vigorous-intensity aerobic activity can help reduce your risk for heart disease. Your healthcare provider can help you figure out what is best for you.  Choose 1 or more activities you enjoy.  Walking is one of the easiest things you can do. You can also try swimming, riding a bike, dancing, or taking an exercise class.    When to call your healthcare provider  Call your healthcare provider if you have any of these:   Chest pain or feel dizzy or lightheaded  Burning, tightness, pressure, or heaviness in your chest, neck, shoulders, back, or arms  Abnormal shortness of breath  More joint or muscle pain  A very fast or irregular heartbeat (palpitations)  [employer] last reviewed this educational content on 7/1/2019  ©  8665-1190 [employer]. [address] [place] [address], [place] [address]. All rights reserved. This information is not intended as a substitute for professional medical care. Always follow your healthcare professional's instructions.
